# Patient Record
Sex: FEMALE | Race: OTHER | NOT HISPANIC OR LATINO | URBAN - METROPOLITAN AREA
[De-identification: names, ages, dates, MRNs, and addresses within clinical notes are randomized per-mention and may not be internally consistent; named-entity substitution may affect disease eponyms.]

---

## 2017-09-01 ENCOUNTER — OUTPATIENT (OUTPATIENT)
Dept: OUTPATIENT SERVICES | Facility: HOSPITAL | Age: 77
LOS: 1 days | End: 2017-09-01
Payer: MEDICAID

## 2017-09-01 PROCEDURE — G9001: CPT

## 2017-09-17 ENCOUNTER — EMERGENCY (EMERGENCY)
Facility: HOSPITAL | Age: 77
LOS: 1 days | Discharge: HOME | End: 2017-09-17
Attending: EMERGENCY MEDICINE | Admitting: EMERGENCY MEDICINE
Payer: MEDICARE

## 2017-09-17 VITALS
RESPIRATION RATE: 16 BRPM | TEMPERATURE: 98 F | SYSTOLIC BLOOD PRESSURE: 97 MMHG | OXYGEN SATURATION: 100 % | DIASTOLIC BLOOD PRESSURE: 64 MMHG | HEART RATE: 82 BPM

## 2017-09-17 VITALS
TEMPERATURE: 98 F | OXYGEN SATURATION: 98 % | SYSTOLIC BLOOD PRESSURE: 104 MMHG | DIASTOLIC BLOOD PRESSURE: 70 MMHG | HEART RATE: 87 BPM | RESPIRATION RATE: 18 BRPM

## 2017-09-17 PROCEDURE — 99284 EMERGENCY DEPT VISIT MOD MDM: CPT | Mod: 25,GC

## 2017-09-17 RX ORDER — IBUPROFEN 200 MG
600 TABLET ORAL ONCE
Qty: 0 | Refills: 0 | Status: DISCONTINUED | OUTPATIENT
Start: 2017-09-17 | End: 2017-09-17

## 2017-09-17 RX ORDER — ACETAMINOPHEN 500 MG
975 TABLET ORAL ONCE
Qty: 0 | Refills: 0 | Status: COMPLETED | OUTPATIENT
Start: 2017-09-17 | End: 2017-09-17

## 2017-09-17 RX ADMIN — Medication 975 MILLIGRAM(S): at 06:54

## 2017-09-17 RX ADMIN — Medication 975 MILLIGRAM(S): at 06:35

## 2017-09-17 NOTE — ED ADULT NURSE NOTE - PMH
Asthma    Breast carcinoma  Lt side s/p lumpectomy and radiation in 2004  DM (diabetes mellitus)    No pertinent past medical history    Renal mass

## 2017-09-17 NOTE — ED PROVIDER NOTE - MEDICAL DECISION MAKING DETAILS
Chronic arthritic pain, benign exam, pt has no pain at present. Plan - acetaminophen, dc home Chronic arthritic pain, benign exam, pt has no pain at present. Plan - acetaminophen, dc home    Cabot: 77F with OA and chronic ankle and wrist pain, here with her chronic pain.  Did not try any oral medications to treat but states that her pain is now gone.  No trauma.  No F/C/edema.  On exam, looks well, HDS, wrist and knee joints without edema, non tender, no erythema, FROM.  Ok to go home with tylenol for pain.

## 2017-09-17 NOTE — ED PROVIDER NOTE - CARE PLAN
Principal Discharge DX:	Arthralgia, unspecified joint  Instructions for follow-up, activity and diet:	Take all medications as directed.  For pain take Acetaminophen (Tylenol) 250mg-650mg every 6-8 hours.  Follow up with your primary physician in 2 days. If needed call [phone] to find a primary care physician or call  [phone] to schedule an appointment with the general medicine clinic.   You were given copies of all labs and imaging results from this ER visit, please take them to your appointment.  Return to the ER for worsening symptoms or any other concerns.

## 2017-09-17 NOTE — ED PROVIDER NOTE - PROGRESS NOTE DETAILS
Juan C Barton MD [facility] Note: 76 yo woman p/w b/l ankle pain. Has had pain in ankles for a "long time," only using Vicks Vapor Rub for pain. Pain worsened overnight, prompting visit. States she has new doctor, had plain films 2 wks ago. Denies fever, injury. Physical Exam: *Gen: NAD, AOx3; *Eyes: PERRL; *HEENT: Airway patent, MMM; *CV: RRR, S1/S2; *Resp: CTAB, non-labored; *Abd: soft, non tender, no guarding; *: no cva tenderness; *Skin: dry, intact; *Neuro: AOx3, no deficits; *MSK: ankles w/ full rom, no effusion, non tender w/ palpation, normal pedal pulses b/l. A/P: 76 yo woman w/ b/l ankle pain. Likely 2/2 osteoarthritis. Will treat pain, discharge w/ outpt f/u.

## 2017-09-17 NOTE — ED ADULT NURSE REASSESSMENT NOTE - NS ED NURSE REASSESS COMMENT FT1
No acute distress at present. Pt. denies pain at present. Pt. is being discharged. Discharge teaching done by MD Weil.

## 2017-09-17 NOTE — ED PROVIDER NOTE - PLAN OF CARE
Take all medications as directed.  For pain take Acetaminophen (Tylenol) 250mg-650mg every 6-8 hours.  Follow up with your primary physician in 2 days. If needed call [phone] to find a primary care physician or call  [phone] to schedule an appointment with the general medicine clinic.   You were given copies of all labs and imaging results from this ER visit, please take them to your appointment.  Return to the ER for worsening symptoms or any other concerns.

## 2017-09-17 NOTE — ED PROVIDER NOTE - ATTENDING CONTRIBUTION TO CARE
I, Jennifer Cabot, MD, have performed a history and physical exam of the patient and discussed their management with the resident. I reviewed the resident's note and agree with the documented findings and plan of care. My medical decision making and observations are found above.    Cabot: 77F with OA and chronic ankle and wrist pain, here with her chronic pain.  Did not try any oral medications to treat but states that her pain is now gone.  No trauma.  No F/C/edema.  On exam, looks well, HDS, wrist and knee joints without edema, non tender, no erythema, FROM.  Ok to go home with tylenol for pain.

## 2017-09-17 NOTE — ED ADULT TRIAGE NOTE - CHIEF COMPLAINT QUOTE
Patient c/o bilateral ankle pain, and other joint pains, has arthritis. Pain has been for 2-3 weeks, getting worse.

## 2017-09-17 NOTE — ED PROVIDER NOTE - MUSCULOSKELETAL, MLM
No muscle or joint tenderness. Full ROM in wrists, knees, shoulders, ankles, knees, and hips w/o pain.

## 2017-09-17 NOTE — ED ADULT NURSE NOTE - OBJECTIVE STATEMENT
Patient received in room 10. Patient received in room 10. Pt. is A&O x3 and ambulatory with a cane at baseline. Pt. c/o bilateral ankle pain and bilateral wrist pain. Pt. states she has arthritis and the pain began about 3 weeks ago but has been getting worse the past few days. No edema noted. Respirations are even and unlabored on room air. VS as noted. MD at bedside at present. Will continue to monitor.

## 2017-09-17 NOTE — ED PROVIDER NOTE - OBJECTIVE STATEMENT
76 yo F hx osteoarthritis/breast ca/asthma/DM p/w intermittent bilateral wrist and bilateral ankle pain for 3 weeks. Hx similar symptoms for years, previously relieved w/ tylenol. She has not taken tylenol for the current pain because she previous developed "stomach upset" when taking tylenol plus "some additive." Has only attempted topical analgesics w/o relief. No associated symptoms, including no fever/chills/rash/n/v/d.

## 2017-09-21 ENCOUNTER — INPATIENT (INPATIENT)
Facility: HOSPITAL | Age: 77
LOS: 2 days | Discharge: HOME | DRG: 690 | End: 2017-09-24
Attending: HOSPITALIST | Admitting: HOSPITALIST
Payer: COMMERCIAL

## 2017-09-21 VITALS
WEIGHT: 192.9 LBS | TEMPERATURE: 98 F | HEART RATE: 84 BPM | HEIGHT: 65 IN | DIASTOLIC BLOOD PRESSURE: 70 MMHG | RESPIRATION RATE: 17 BRPM | OXYGEN SATURATION: 98 % | SYSTOLIC BLOOD PRESSURE: 101 MMHG

## 2017-09-21 DIAGNOSIS — N17.9 ACUTE KIDNEY FAILURE, UNSPECIFIED: ICD-10-CM

## 2017-09-21 LAB
ALBUMIN SERPL ELPH-MCNC: 3 G/DL — LOW (ref 3.5–5)
ALP SERPL-CCNC: 132 U/L — HIGH (ref 40–120)
ALT FLD-CCNC: 16 U/L DA — SIGNIFICANT CHANGE UP (ref 10–60)
ANION GAP SERPL CALC-SCNC: 10 MMOL/L — SIGNIFICANT CHANGE UP (ref 5–17)
APPEARANCE UR: ABNORMAL
AST SERPL-CCNC: 10 U/L — SIGNIFICANT CHANGE UP (ref 10–40)
BASOPHILS # BLD AUTO: 0.1 K/UL — SIGNIFICANT CHANGE UP (ref 0–0.2)
BASOPHILS NFR BLD AUTO: 0.7 % — SIGNIFICANT CHANGE UP (ref 0–2)
BILIRUB SERPL-MCNC: 0.3 MG/DL — SIGNIFICANT CHANGE UP (ref 0.2–1.2)
BILIRUB UR-MCNC: NEGATIVE — SIGNIFICANT CHANGE UP
BUN SERPL-MCNC: 54 MG/DL — HIGH (ref 7–18)
CALCIUM SERPL-MCNC: 8.8 MG/DL — SIGNIFICANT CHANGE UP (ref 8.4–10.5)
CHLORIDE SERPL-SCNC: 103 MMOL/L — SIGNIFICANT CHANGE UP (ref 96–108)
CO2 SERPL-SCNC: 18 MMOL/L — LOW (ref 22–31)
COLOR SPEC: YELLOW — SIGNIFICANT CHANGE UP
CREAT SERPL-MCNC: 3.48 MG/DL — HIGH (ref 0.5–1.3)
DIFF PNL FLD: ABNORMAL
EOSINOPHIL # BLD AUTO: 0.1 K/UL — SIGNIFICANT CHANGE UP (ref 0–0.5)
EOSINOPHIL NFR BLD AUTO: 0.8 % — SIGNIFICANT CHANGE UP (ref 0–6)
GLUCOSE SERPL-MCNC: 307 MG/DL — HIGH (ref 70–99)
GLUCOSE UR QL: 1000 MG/DL
HCT VFR BLD CALC: 35.8 % — SIGNIFICANT CHANGE UP (ref 34.5–45)
HGB BLD-MCNC: 11.1 G/DL — LOW (ref 11.5–15.5)
KETONES UR-MCNC: NEGATIVE — SIGNIFICANT CHANGE UP
LEUKOCYTE ESTERASE UR-ACNC: ABNORMAL
LYMPHOCYTES # BLD AUTO: 1 K/UL — SIGNIFICANT CHANGE UP (ref 1–3.3)
LYMPHOCYTES # BLD AUTO: 9.5 % — LOW (ref 13–44)
MAGNESIUM SERPL-MCNC: 1.7 MG/DL — SIGNIFICANT CHANGE UP (ref 1.6–2.6)
MCHC RBC-ENTMCNC: 23.4 PG — LOW (ref 27–34)
MCHC RBC-ENTMCNC: 31 GM/DL — LOW (ref 32–36)
MCV RBC AUTO: 75.4 FL — LOW (ref 80–100)
MONOCYTES # BLD AUTO: 0.5 K/UL — SIGNIFICANT CHANGE UP (ref 0–0.9)
MONOCYTES NFR BLD AUTO: 4.5 % — SIGNIFICANT CHANGE UP (ref 2–14)
NEUTROPHILS # BLD AUTO: 9.3 K/UL — HIGH (ref 1.8–7.4)
NEUTROPHILS NFR BLD AUTO: 84.5 % — HIGH (ref 43–77)
NITRITE UR-MCNC: NEGATIVE — SIGNIFICANT CHANGE UP
PH UR: 5 — SIGNIFICANT CHANGE UP (ref 5–8)
PLATELET # BLD AUTO: 255 K/UL — SIGNIFICANT CHANGE UP (ref 150–400)
POTASSIUM SERPL-MCNC: 4.9 MMOL/L — SIGNIFICANT CHANGE UP (ref 3.5–5.3)
POTASSIUM SERPL-SCNC: 4.9 MMOL/L — SIGNIFICANT CHANGE UP (ref 3.5–5.3)
PROT SERPL-MCNC: 7.7 G/DL — SIGNIFICANT CHANGE UP (ref 6–8.3)
PROT UR-MCNC: 500 MG/DL
RBC # BLD: 4.74 M/UL — SIGNIFICANT CHANGE UP (ref 3.8–5.2)
RBC # FLD: 15.8 % — HIGH (ref 10.3–14.5)
SODIUM SERPL-SCNC: 131 MMOL/L — LOW (ref 135–145)
SP GR SPEC: 1.02 — SIGNIFICANT CHANGE UP (ref 1.01–1.02)
UROBILINOGEN FLD QL: NEGATIVE — SIGNIFICANT CHANGE UP
WBC # BLD: 11 K/UL — HIGH (ref 3.8–10.5)
WBC # FLD AUTO: 11 K/UL — HIGH (ref 3.8–10.5)

## 2017-09-21 PROCEDURE — 99285 EMERGENCY DEPT VISIT HI MDM: CPT

## 2017-09-21 RX ORDER — INSULIN LISPRO 100/ML
VIAL (ML) SUBCUTANEOUS
Qty: 0 | Refills: 0 | Status: DISCONTINUED | OUTPATIENT
Start: 2017-09-21 | End: 2017-09-24

## 2017-09-21 RX ORDER — HEPARIN SODIUM 5000 [USP'U]/ML
5000 INJECTION INTRAVENOUS; SUBCUTANEOUS EVERY 12 HOURS
Qty: 0 | Refills: 0 | Status: DISCONTINUED | OUTPATIENT
Start: 2017-09-21 | End: 2017-09-24

## 2017-09-21 RX ORDER — SODIUM CHLORIDE 9 MG/ML
1000 INJECTION INTRAMUSCULAR; INTRAVENOUS; SUBCUTANEOUS ONCE
Qty: 0 | Refills: 0 | Status: COMPLETED | OUTPATIENT
Start: 2017-09-21 | End: 2017-09-21

## 2017-09-21 RX ORDER — DIAZEPAM 5 MG
5 TABLET ORAL ONCE
Qty: 0 | Refills: 0 | Status: DISCONTINUED | OUTPATIENT
Start: 2017-09-21 | End: 2017-09-21

## 2017-09-21 RX ORDER — CEFTRIAXONE 500 MG/1
1 INJECTION, POWDER, FOR SOLUTION INTRAMUSCULAR; INTRAVENOUS ONCE
Qty: 0 | Refills: 0 | Status: COMPLETED | OUTPATIENT
Start: 2017-09-21 | End: 2017-09-21

## 2017-09-21 RX ORDER — CEFTRIAXONE 500 MG/1
1 INJECTION, POWDER, FOR SOLUTION INTRAMUSCULAR; INTRAVENOUS EVERY 24 HOURS
Qty: 0 | Refills: 0 | Status: DISCONTINUED | OUTPATIENT
Start: 2017-09-21 | End: 2017-09-24

## 2017-09-21 RX ORDER — INSULIN LISPRO 100/ML
6 VIAL (ML) SUBCUTANEOUS ONCE
Qty: 0 | Refills: 0 | Status: COMPLETED | OUTPATIENT
Start: 2017-09-21 | End: 2017-09-21

## 2017-09-21 RX ADMIN — Medication 6 UNIT(S): at 20:56

## 2017-09-21 RX ADMIN — Medication 5 MILLIGRAM(S): at 20:55

## 2017-09-21 RX ADMIN — CEFTRIAXONE 100 GRAM(S): 500 INJECTION, POWDER, FOR SOLUTION INTRAMUSCULAR; INTRAVENOUS at 23:03

## 2017-09-21 RX ADMIN — SODIUM CHLORIDE 1000 MILLILITER(S): 9 INJECTION INTRAMUSCULAR; INTRAVENOUS; SUBCUTANEOUS at 23:03

## 2017-09-21 RX ADMIN — SODIUM CHLORIDE 1000 MILLILITER(S): 9 INJECTION INTRAMUSCULAR; INTRAVENOUS; SUBCUTANEOUS at 21:42

## 2017-09-21 NOTE — H&P ADULT - PROBLEM SELECTOR PLAN 2
patient has positive UA with >50 WBC and moderate LE.  s/p 1 dose of rocephin in ED  will start on Iv rocephin 1 gm Q24  f/u urine cultures pain and stiffness of both upper extremity and lower extremity especially in fingers and toes, likely secondary to uncontrolled blood sugars causing Diabetic neuropathy, possible hypocalcemic  induced stiffness , possible vitamin deficiency.  -O/e- neurological exam is intact, no Chvostek sign or trousseau sign , but while examining patient has about 2-3 episodes of stiffness of both thumbs leading to adduction .  EKG-  NSR @83BPM, LAD, bifascicular block,( unchanged from previous EKG) no ST T wave changes.  will give 1 dose of oral calcium carbonate  will start on gabapentin and pain management with tylenol  f/u vitamin b12, folate levels. pain and stiffness of both upper extremity and lower extremity especially in fingers and toes, likely due to osteoarthritis.  Also possibly related to diabetic neuropathy, possible hypocalcemic  induced stiffness , possible vitamin deficiency.  -O/e- neurological exam is intact, no Chvostek sign or trousseau sign , but while examining patient has about 2-3 episodes of stiffness of both thumbs leading to adduction .  EKG-  NSR @83BPM, LAD, bifascicular block,( unchanged from previous EKG) no ST T wave changes.  will give 1 dose of oral calcium carbonate  will start on gabapentin and pain control with Tylenol  f/u vitamin b12, folate levels.

## 2017-09-21 NOTE — H&P ADULT - PROBLEM SELECTOR PLAN 5
s/p left mastectomy and radiation  currently in remission.  needs outpatient oncologist follow up s/p left lumpectomy and radiation  currently in remission.  needs outpatient oncologist follow up

## 2017-09-21 NOTE — H&P ADULT - HISTORY OF PRESENT ILLNESS
77 Female, from home, lives with spouse, ambulates with the help of walker,  PMH of Asthma, anemia( on weekly ? procrit), CHF(last echo in 2015 s/o preserved EF), DM, CKD (unknown baseline) HTN( not on meds as BP running low) breast ca s/p left mastectomy, radiation in 2005, ? uterine cancer presented to ED with c/o pain and stiffness in both fingers and toes  worsening since last week. patient was well until sunday when she started to have pain in all her fingers and toes with stiffness, baseline she has this pain, but not so severe, went to [facility], who d/c her on tylenol #3, later today in evening again she started having pain in both fingers and toes, which was so bad, she started crying, 9/10 intensity, crampy, "pulling kind of pain", associated with stiffness of fingers, little relief with massage, no aggravating factors, radiating to neck and shoulders, unable to ambulate as earlier or doing daily activities due to pain. patient was on calcium supplement but stopped taking it for last few days. patient denies any joint stiffness, morning stiffness, rash, joint pains, numbness, tingling of extremities. patient is concerned of stroke, so decided to come to ED. Also she had lab work done yesterday, which showed that she has UTI, PCP called her to collect antibiotic, awaiting delivery to home.  PSH- s/p left mastectomy 2005  allergies- NKDA  FH- breast cancer in aunt, and DM in father  social Hx- exsmoker, smoked 2 PPD for 30 years, quit 7 years ago, ex ethanolic, no illicit drug use.    In ED, patient had stable vitals, EKG- NSR @83BPM, LAD, bifascicular block,( unchanged from previous EKG) no ST T wave changes. UA- >50 WBC, moderate LE, Labs pertinent for WBC- 11, h/h-11.1/ 35, corrected sodium of 134, glucose of 307, anion gap of 10, bun/ creat- 54/3.48, alk phos-132 , s/p 1 dose of rocephin, 2 L NS bolus, 8 units of humalog, 5 mg of valium in ED      will admit to medicine for evaluation of pain and stiffness in extremities. 77 Female, from home, lives with spouse, ambulates with the help of walker,  PMH of Asthma, anemia( on weekly ? procrit), CHF(last echo in 2015 s/o preserved EF), DM, CKD (unknown baseline) HTN( not on meds as BP running low) breast ca s/p left mastectomy, radiation in 2005, ? uterine cancer presented to ED with c/o pain and stiffness in both upper and lower extremities especially in fingers and toes  worsening since last week. patient was well until sunday when she started to have pain in all her fingers and toes with stiffness, baseline she has this pain, but not so severe, went to [facility], who d/c her on tylenol #3, later today in evening again she started having pain in both fingers and toes, which was so bad, she started crying, 9/10 intensity, crampy, "pulling kind of pain", associated with stiffness of fingers, little relief with massage, no aggravating factors, radiating to neck and shoulders, unable to ambulate as earlier or doing daily activities due to pain. patient was on calcium supplement but stopped taking it for last few days. patient denies any joint stiffness, morning stiffness, rash, joint pains, numbness, tingling of extremities. patient is concerned of stroke, so decided to come to ED. Also she had lab work done yesterday, which showed that she has UTI, PCP called her to collect antibiotic, awaiting delivery to home.  PSH- s/p left mastectomy 2005  allergies- NKDA  FH- breast cancer in aunt, and DM in father  social Hx- exsmoker, smoked 2 PPD for 30 years, quit 7 years ago, ex ethanolic, no illicit drug use.    In ED, patient had stable vitals, EKG- NSR @83BPM, LAD, bifascicular block,( unchanged from previous EKG) no ST T wave changes. UA- >50 WBC, moderate LE, Labs pertinent for WBC- 11, h/h-11.1/ 35, corrected sodium of 134, glucose of 307, anion gap of 10, bun/ creat- 54/3.48, alk phos-132 , s/p 1 dose of rocephin, 2 L NS bolus, 8 units of humalog, 5 mg of valium in ED      will admit to medicine for evaluation of pain and stiffness in extremities. 77 Female, from home, lives with spouse, ambulates with the help of walker,  PMH of Asthma, anemia( on weekly ? procrit), CHF(last echo in 2015 s/o preserved EF), DM, CKD (unknown baseline) HTN( not on meds as BP running low) breast ca s/p left lumpectomy, radiation in 2005, ? uterine cancer presented to ED with c/o pain and stiffness in both upper and lower extremities especially in fingers and toes  worsening since last week. patient was well until sunday when she started to have pain in all her fingers and toes with stiffness, baseline she has this pain, but not so severe, went to [facility], who d/c her on tylenol #3, later today in evening again she started having pain in both fingers and toes, which was so bad, she started crying, 9/10 intensity, crampy, "pulling kind of pain", associated with stiffness of fingers, little relief with massage, no aggravating factors, radiating to neck and shoulders, unable to ambulate as earlier or doing daily activities due to pain. patient was on calcium supplement but stopped taking it for last few days. patient denies any joint stiffness, morning stiffness, rash, joint pains, numbness, tingling of extremities. patient is concerned of stroke, so decided to come to ED. Also she had lab work done yesterday, which showed that she has UTI, PCP called her to collect antibiotic, awaiting delivery to home.  PSH- s/p left mastectomy 2005  allergies- NKDA  FH- breast cancer in aunt, and DM in father  social Hx- exsmoker, smoked 2 PPD for 30 years, quit 7 years ago, ex ethanolic, no illicit drug use.    In ED, patient had stable vitals, EKG- NSR @83BPM, LAD, bifascicular block,( unchanged from previous EKG) no ST T wave changes. UA- >50 WBC, moderate LE, Labs pertinent for WBC- 11, h/h-11.1/ 35, corrected sodium of 134, glucose of 307, anion gap of 10, bun/ creat- 54/3.48, alk phos-132 , s/p 1 dose of rocephin, 2 L NS bolus, 8 units of humalog, 5 mg of valium in ED      will admit to medicine for evaluation of pain and stiffness in extremities. 77 Female, from home, lives with spouse, ambulates with the help of walker,  PMH of Asthma, anemia( on weekly ? procrit), CHF(last echo in 2015 s/o preserved EF), DM, CKD (unknown baseline) HTN( not on meds as BP running low) breast ca s/p left lumpectomy, radiation in 2005, ? uterine cancer presented to ED with c/o pain and stiffness in both upper and lower extremities especially in fingers and toes  worsening since last week. patient was well until sunday when she started to have pain in all her fingers and toes with stiffness, baseline she has this pain, but not so severe, went to [facility], who d/c her on tylenol #3, later today in evening again she started having pain in both fingers and toes, which was so bad, she started crying, 9/10 intensity, crampy, "pulling kind of pain", associated with stiffness of fingers, little relief with massage, no aggravating factors, radiating to neck and shoulders, unable to ambulate as earlier or doing daily activities due to pain. patient was on calcium supplement but stopped taking it for last few days. patient denies any joint stiffness, morning stiffness, rash, joint pains, numbness, tingling of extremities. patient is concerned of stroke, so decided to come to ED. Also she had lab work done yesterday, which showed that she has UTI, PCP called her to collect antibiotic, awaiting delivery to home.  PSH- s/p left lumpectomy  2005  allergies- NKDA  FH- breast cancer in aunt, and DM in father  social Hx- exsmoker, smoked 2 PPD for 30 years, quit 7 years ago, ex ethanolic, no illicit drug use.    In ED, patient had stable vitals, EKG- NSR @83BPM, LAD, bifascicular block,( unchanged from previous EKG) no ST T wave changes. UA- >50 WBC, moderate LE, Labs pertinent for WBC- 11, h/h-11.1/ 35, corrected sodium of 134, glucose of 307, anion gap of 10, bun/ creat- 54/3.48, alk phos-132 , s/p 1 dose of rocephin, 2 L NS bolus, 8 units of humalog, 5 mg of valium in ED      will admit to medicine for evaluation of pain and stiffness in extremities. 77 Female, from home, lives with spouse, ambulates with the help of walker,  PMH of Asthma, anemia( on weekly procrit), CHF(last echo in 2015 s/o preserved EF), DM, CKD (unknown baseline) HTN( not on meds as BP running low) breast ca s/p left lumpectomy, radiation in 2005, questionable uterine cancer presented to ED with c/o pain and stiffness in both upper and lower extremities especially in fingers and toes  worsening since last week. patient was well until sunday when she started to have pain in all her fingers and toes with stiffness, baseline she has this pain, but not so severe, went to [facility], who d/c her on tylenol #3, later today in evening again she started having pain in both fingers and toes, which was so bad, she started crying, 9/10 intensity, crampy, "pulling kind of pain", associated with stiffness of fingers, little relief with massage, no aggravating factors, radiating to neck and shoulders, unable to ambulate as earlier or doing daily activities due to pain. patient was on calcium supplement but stopped taking it for last few days. patient denies any joint stiffness, morning stiffness, rash, joint pains, numbness, tingling of extremities. patient is concerned of stroke, so decided to come to ED. Also she had lab work done yesterday, which showed that she has UTI, PCP called her to collect antibiotic, awaiting delivery to home.  PSH- s/p left lumpectomy  2005  allergies- NKDA  FH- breast cancer in aunt, and DM in father  social Hx- exsmoker, smoked 2 PPD for 30 years, quit 7 years ago, ex ethanolic, no illicit drug use.    In ED, patient had stable vitals, EKG- NSR @83BPM, LAD, bifascicular block,( unchanged from previous EKG) no ST T wave changes. UA- >50 WBC, moderate LE, Labs pertinent for WBC- 11, h/h-11.1/ 35, corrected sodium of 134, glucose of 307, anion gap of 10, bun/ creat- 54/3.48, alk phos-132 , s/p 1 dose of rocephin, 2 L NS bolus, 8 units of humalog, 5 mg of valium in ED      will admit to medicine for evaluation of pain and stiffness in extremities.

## 2017-09-21 NOTE — ED PROVIDER NOTE - MEDICAL DECISION MAKING DETAILS
77 yr old female with hx of osteoarthritis/breast ca/asthma/DM and renal impairment? presents to ed c/o cramping at wrist and feet x 3 wks not improving. pt states having sx on and of but decided to come today because its getting worse.  no fever, no trauma, no n/v, no dysuria, no diarrhea.  pt tried otc meds without relieve.   in ed fs 335.    pt with muscle cramps r/o electrolyte imbalance (hyperK, hypocalcium, 77 yr old female with hx of osteoarthritis/breast ca/asthma/DM and renal impairment? presents to ed c/o cramping at wrist and feet x 3 wks not improving. pt states having sx on and of but decided to come today because its getting worse.  no fever, no trauma, no n/v, no dysuria, no diarrhea.  pt tried otc meds without relieve.   in ed fs 335.    pt with muscle cramps r/o electrolyte imbalance (hyperK, hypocalcium,) vs neuropathy vs arthritis.  will obtain basic labs, ua, give valium then re-assess

## 2017-09-21 NOTE — H&P ADULT - ATTENDING COMMENTS
Patient was seen and examined by myself with team. Case was discussed with house staff in details.  76 y/o F with h/o CKD 4 presents with generalized weakness and joint aches and pains. Patient was seen and examined by myself with team. Case was discussed with house staff in details.  76 y/o F with h/o CKD 4 presents with generalized weakness and joint aches and pains.  She is found to have abnormal urinalysis with possible UTI.- continue with antibiotics; follow up urine culture  - Pulmonary congestion on CXR with very mild SOB- avoid IV fluids; consider diuretics; monitor renal functions  - CKD 4- avoid nephrotoxins; monitor renal functions daily  - Presumed osteoarthritis with multiple joint pains- Tylenol and oral opioids PRN for pain; avoid NSAIDS due to renal dysfunction.  Physical therapy as tolerated.  - Relative hypotension- monitor BP; currently asymptomatic.  DISPO- discharge planning with oput patient follow up Patient was seen and examined by myself with team on September 22nd. Case was discussed with house staff in details.  78 y/o F with h/o CKD 4 presents with generalized weakness and joint aches and pains.  She is found to have abnormal urinalysis with possible UTI.- continue with antibiotics; follow up urine culture  - Pulmonary congestion on CXR with very mild SOB- avoid IV fluids; consider diuretics; monitor renal functions  - CKD 4- avoid nephrotoxins; monitor renal functions daily  - Presumed osteoarthritis with multiple joint pains- Tylenol and oral opioids PRN for pain; avoid NSAIDS due to renal dysfunction.  Physical therapy as tolerated.  - Relative hypotension- monitor BP; currently asymptomatic.  DISPO- discharge planning with outpatient follow up Patient was seen and examined by myself with team on September 22nd. Case was discussed with house staff in details.  76 y/o F with h/o CKD 4 presents with generalized weakness and joint aches and pains.  She is found to have abnormal urinalysis with possible UTI.- continue with antibiotics; follow up urine culture  - Pulmonary congestion on CXR with very mild SOB- avoid IV fluids; consider diuretics; monitor renal functions  - CKD 4- avoid nephrotoxins; monitor renal functions daily  - Presumed osteoarthritis with multiple joint pains- Tylenol and oral opioids PRN for pain; avoid NSAIDS due to renal dysfunction.  Physical therapy as tolerated.  - Relative hypotension- monitor BP; currently asymptomatic.  - Chronic Diastolic heart failure - not in overt heart failure but CXR evidence of mild congestion. - close monitoring.  DISPO- discharge planning with outpatient follow up Patient was seen and examined by myself with team on September 22nd. Case was discussed with house staff in details.  76 y/o F with h/o CKD 4 presents with generalized weakness and joint aches and pains.  She is found to have abnormal urinalysis with possible UTI.- continue with antibiotics; follow up urine culture  - Pulmonary congestion on CXR with very mild SOB- avoid IV fluids; consider diuretics; monitor renal functions  - CKD 4- avoid nephrotoxins; monitor renal functions daily  - Presumed osteoarthritis with multiple joint pains- Tylenol and oral opioids PRN for pain; avoid NSAIDS due to renal dysfunction.  Physical therapy as tolerated.  - Relative hypotension- monitor BP; currently asymptomatic.  - Chronic Diastolic heart failure - not in overt heart failure but CXR evidence of mild congestion. - close monitoring.  DISPO- discharge planning with outpatient follow up.  Plan discussed with patient

## 2017-09-21 NOTE — ED PROVIDER NOTE - CONSTITUTIONAL, MLM
normal... Well appearing, well nourished, awake, alert, oriented to person, place, time/situation and in no apparent distress. obese

## 2017-09-21 NOTE — ED ADULT NURSE NOTE - ED STAT RN HANDOFF DETAILS 2
received  pt.in bed  at  2330 from rn ng. pt.is awake and  responsive,.denies pain..endorsed to lamberto pappas

## 2017-09-21 NOTE — H&P ADULT - MUSCULOSKELETAL
detailed exam normal strength/no joint swelling/no joint erythema/no calf tenderness/ROM intact/no joint warmth

## 2017-09-21 NOTE — H&P ADULT - PROBLEM SELECTOR PLAN 6
patient has baseline anemia of 9-10 based on previous records  H/h on admission is 11.1/35  on weekly injections of procrit   likely multiple etiology- AOCD secondary to CKD, iron deficiency- hypochromic.  f/u anemia panel.  f/u CBC in AM

## 2017-09-21 NOTE — H&P ADULT - NSHPSOCIALHISTORY_GEN_ALL_CORE
exsmoker, smoked 2 PPD for 30 years, quit 7 years ago, ex ethanolic, no illicit drug use ex smoker, smoked 2 PPD for 30 years, quit 7 years ago, ex ethanolic, no illicit drug use

## 2017-09-21 NOTE — H&P ADULT - PROBLEM SELECTOR PLAN 7
not in exacerbation  aptinet takes Advair and tiotropium inhalers at home  will continue home dose of advair and tiotropium inhalers not in exacerbation  patient takes Advair and tiotropium inhalers at home  will continue home dose of advair and tiotropium inhalers

## 2017-09-21 NOTE — H&P ADULT - NSHPLABSRESULTS_GEN_ALL_CORE
ICU Vital Signs Last 24 Hrs  T(C): 36.9 (21 Sep 2017 23:58), Max: 36.9 (21 Sep 2017 23:58)  T(F): 98.4 (21 Sep 2017 23:58), Max: 98.4 (21 Sep 2017 23:58)  HR: 76 (21 Sep 2017 23:58) (76 - 84)  BP: 101/57 (21 Sep 2017 23:58) (101/57 - 101/70)  BP(mean): --  ABP: --  ABP(mean): --  RR: 16 (21 Sep 2017 23:58) (16 - 17)  SpO2: 99% (21 Sep 2017 23:58) (98% - 99%)      LABS                    11.1   11.0  )-----------( 255      ( 21 Sep 2017 21:21 )             35.8         131<L>  |  103  |  54<H>  ----------------------------<  307<H>  4.9   |  18<L>  |  3.48<H>    Ca    8.8      21 Sep 2017 21:21  Mg     1.7         TPro  7.7  /  Alb  3.0<L>  /  TBili  0.3  /  DBili  x   /  AST  10  /  ALT  16  /  AlkPhos  132<H>          Urinalysis Basic - ( 21 Sep 2017 21:58 )    Color: Yellow / Appearance: x / S.020 / pH: x  Gluc: x / Ketone: Negative  / Bili: Negative / Urobili: Negative   Blood: x / Protein: 500 mg/dL / Nitrite: Negative   Leuk Esterase: Moderate / RBC: 0-2 /HPF / WBC >50 /HPF   Sq Epi: x / Non Sq Epi: Few / Bacteria: Many /HPF    CAPILLARY BLOOD GLUCOSE  243 (21 Sep 2017 23:57)    EKG- NSR @83BPM, LAD, bifascicular block,( unchanged from previous EKG) no ST T wave changes.

## 2017-09-21 NOTE — ED ADULT NURSE NOTE - OBJECTIVE STATEMENT
Pt is co bilateral hand cramps, with high glucose in triage. denies chest pain, sob, nausea and vomiting.

## 2017-09-21 NOTE — H&P ADULT - NSHPREVIEWOFSYSTEMS_GEN_ALL_CORE
ROS negative for fever, SOB, chest pain, cough, rash, headache, dizziness, diaphoresis, palpitations, abdominal pain, numbness, tingling of extremities, diarrhea, constipation, urinary disturbances.

## 2017-09-21 NOTE — H&P ADULT - PROBLEM SELECTOR PLAN 4
patient takes  lantus 40 units twice daily and humalog 20 units twice daily at home, will start on HSS and continue home dose of lantus and start on humalog 10 units premeals thrice daily  monitor accuchecks.  f/u a1c patient takes  Lantus 40 units twice daily and Humalog 20 units twice daily at home, will start on HSS and continue home dose of Lantus and start on Humalog 10 units pre meals thrice daily  monitor accuchecks.  f/u a1c

## 2017-09-21 NOTE — ED PROVIDER NOTE - PROGRESS NOTE DETAILS
Cornelius: pt ua shows uti and renal function worsen compare to results from 2 yr ago.  pt possibly has underlying ckd since she is seeing Dr Raza Cornelius: pt ua shows uti and renal function worsen compare to results from 2 yr ago.  pt possibly has underlying ckd since she is seeing Dr Castanon.  Lab shows normal K and Calcium.  unlikely lytes imbalance causing sx.  admit to medicine for ckd with uti.  hydrate appropriately.

## 2017-09-21 NOTE — H&P ADULT - NEUROLOGICAL DETAILS
alert and oriented x 3/sensation intact/deep reflexes intact/cranial nerves intact/normal strength/superficial reflexes intact

## 2017-09-21 NOTE — ED PROVIDER NOTE - OBJECTIVE STATEMENT
77 yr old female with hx of osteoarthritis/breast ca/asthma/DM and renal impairment? presents to ed c/o cramping at wrist and feet x 3 wks not improving. pt states having sx on and of but decided to come today because its getting worse.  no fever, no trauma, no n/v, no dysuria, no diarrhea.  pt tried otc meds without relieve.   in ed fs 335.

## 2017-09-21 NOTE — ED PROVIDER NOTE - CARE PLAN
Principal Discharge DX:	FLYNN (acute kidney injury)  Secondary Diagnosis:	Urinary tract infection with hematuria, site unspecified

## 2017-09-21 NOTE — ED PROVIDER NOTE - FAMILY HISTORY
Aunt  Still living? Unknown  Family history of breast cancer, Age at diagnosis: Age Unknown     Father  Still living? Unknown  Diabetes mellitus, Age at diagnosis: Age Unknown

## 2017-09-21 NOTE — H&P ADULT - PROBLEM SELECTOR PLAN 1
pain and stiffness of both upper extremity and lower extremity especially in fingers and toes, likely secondary to uncontrolled blood sugars causing Diabetic neuropathy vs hypocalcemic  induced stiffness vs vitamin deficiency.  -O/e- neurological exam is intact, no chvostek sign or trousseau sign , but while examining patient has about 2-3 episodes of stiffness of both thumbs leading to adduction .  EKG-  NSR @83BPM, LAD, bifascicular block,( unchanged from previous EKG) no ST T wave changes.  will give 1 dose of oral calcium carbonate  will start on gabapentin and pain management with tylenol  f/u vitamin b12, folate levels. patient has positive UA with >50 WBC and moderate LE.  s/p 1 dose of rocephin in ED  will start on Iv rocephin 1 gm Q24  f/u urine cultures patient has positive UA with >50 WBC and moderate LE.  s/p 1 dose of Rocephin in ED  will start on Iv Rocephin 1 gm Q24  f/u urine cultures

## 2017-09-21 NOTE — H&P ADULT - PROBLEM SELECTOR PLAN 3
patient has CKD stage iV with baseline creatinine of 1.9 to 2.0 from previous records  patient has creatinine of 3.48 on admission   will get urine lytes and avoid nephrotoxic medications.  f/u BMP in AM.  Dr. Castanon outpatient nephrologist  nephrology consult- DR. Sorenson patient has CKD stage iV with baseline creatinine of 1.9 to 2.0 from previous records  patient has creatinine of 3.48 on admission  FENA- 2.6%   will avoid nephrotoxic medications.  f/u BMP in AM.  Dr. Castanon outpatient nephrologist  nephrology consult- DR. Sorenson patient has CKD stage IV with baseline creatinine of  2.0- 3.0  from previous records and per nephrologist Dr Castanon  patient has creatinine of 3.48 on admission  FENA- 2.6%   will avoid nephrotoxic medications.  f/u BMP in AM.  Dr. Castanon outpatient nephrologist  nephrology consult- DR. Sorenson

## 2017-09-21 NOTE — H&P ADULT - ASSESSMENT
77 Female, from home, lives with spouse, ambulates with the help of walker,  PMH of Asthma, anemia( on weekly ? procrit), CHF(last echo in 2015 s/o preserved EF), DM, CKD (unknown baseline) HTN( not on meds as BP running low) breast ca s/p left mastectomy, radiation in 2005, ? uterine cancer presented to ED with c/o pain and stiffness in both fingers and toes  worsening since last week.     In ED, patient had stable vitals, EKG- NSR @83BPM, LAD, bifascicular block,( unchanged from previous EKG) no ST T wave changes. UA- >50 WBC, moderate LE, Labs pertinent for WBC- 11, h/h-11.1/ 35, corrected sodium of 134, glucose of 307, anion gap of 10, bun/ creat- 54/3.48, alk phos-132 , s/p 1 dose of rocephin, 2 L NS bolus, 8 units of humalog, 5 mg of valium in ED      will admit to medicine for evaluation of pain and stiffness in extremities. 77 Female, from home, lives with spouse, ambulates with the help of walker,  PMH of Asthma, anemia( on weekly ? procrit), CHF(last echo in 2015 s/o preserved EF), DM, CKD (unknown baseline) HTN( not on meds as BP running low) breast ca s/p left mastectomy, radiation in 2005, ? uterine cancer presented to ED with c/o pain and stiffness of  upper and lower extremities especially in both fingers and toes  worsening since last week.     In ED, patient had stable vitals, EKG- NSR @83BPM, LAD, bifascicular block,( unchanged from previous EKG) no ST T wave changes. UA- >50 WBC, moderate LE, Labs pertinent for WBC- 11, h/h-11.1/ 35, corrected sodium of 134, glucose of 307, anion gap of 10, bun/ creat- 54/3.48, alk phos-132 , s/p 1 dose of rocephin, 2 L NS bolus, 8 units of humalog, 5 mg of valium in ED      will admit to medicine for evaluation of pain and stiffness in extremities. 77 Female, from home, lives with spouse, ambulates with the help of walker,  PMH of Asthma, anemia( on weekly ? procrit), CHF(last echo in 2015 s/o preserved EF), DM, CKD (unknown baseline) HTN( not on meds as BP running low) breast ca s/p left lumpectomy, radiation in 2005, ? uterine cancer presented to ED with c/o pain and stiffness of  upper and lower extremities especially in both fingers and toes  worsening since last week.     In ED, patient had stable vitals, EKG- NSR @83BPM, LAD, bifascicular block,( unchanged from previous EKG) no ST T wave changes. UA- >50 WBC, moderate LE, Labs pertinent for WBC- 11, h/h-11.1/ 35, corrected sodium of 134, glucose of 307, anion gap of 10, bun/ creat- 54/3.48, alk phos-132 , s/p 1 dose of rocephin, 2 L NS bolus, 8 units of humalog, 5 mg of valium in ED      will admit to medicine for evaluation of pain and stiffness in extremities. 77 Female, from home, lives with spouse, ambulates with the help of walker,  PMH of Asthma, anemia( on weekly ? procrit), CHF(last echo in 2015 s/o preserved EF), DM, CKD (unknown baseline) HTN( not on meds as BP running low) breast ca s/p left lumpectomy, radiation in 2005, questionable uterine cancer presented to ED with c/o pain and stiffness of  upper and lower extremities especially in both fingers and toes  worsening since last week.     In ED, patient had stable vitals, EKG- NSR @83BPM, LAD, bifascicular block,( unchanged from previous EKG) no ST T wave changes. UA- >50 WBC, moderate LE, Labs pertinent for WBC- 11, h/h-11.1/ 35, corrected sodium of 134, glucose of 307, anion gap of 10, bun/ creat- 54/3.48, alk phos-132 , s/p 1 dose of rocephin, 2 L NS bolus, 8 units of humalog, 5 mg of valium in ED      will admit to medicine for evaluation of pain and stiffness in extremities and UTI. 77 Female, from home, lives with spouse, ambulates with the help of walker,  PMH of Asthma, anemia(on procrit), CHF(last echo in 2015 s/o preserved EF), DM, CKD (unknown baseline) HTN( not on meds as BP running low) breast ca s/p left lumpectomy, radiation in 2005, questionable uterine cancer presented to ED with c/o pain and stiffness of  upper and lower extremities especially in both fingers and toes  worsening since last week.     In ED, patient had stable vitals, EKG- NSR @83BPM, LAD, bifascicular block,( unchanged from previous EKG) no ST T wave changes. UA- >50 WBC, moderate LE, Labs pertinent for WBC- 11, h/h-11.1/ 35, corrected sodium of 134, glucose of 307, anion gap of 10, bun/ creat- 54/3.48, alk phos-132 , s/p 1 dose of Rocephin 2 L NS bolus, 8 units of Humalog 5 mg of valium in ED      Admitted to medicine for evaluation of pain and stiffness in extremities and UTI.

## 2017-09-21 NOTE — H&P ADULT - REASON FOR ADMISSION
pain and stiffness in both fingers and toes pain and stiffness of upper and lower extremities especially in both fingers and toes

## 2017-09-22 ENCOUNTER — TRANSCRIPTION ENCOUNTER (OUTPATIENT)
Age: 77
End: 2017-09-22

## 2017-09-22 DIAGNOSIS — E78.5 HYPERLIPIDEMIA, UNSPECIFIED: ICD-10-CM

## 2017-09-22 DIAGNOSIS — I50.9 HEART FAILURE, UNSPECIFIED: ICD-10-CM

## 2017-09-22 DIAGNOSIS — N39.0 URINARY TRACT INFECTION, SITE NOT SPECIFIED: ICD-10-CM

## 2017-09-22 DIAGNOSIS — N18.9 CHRONIC KIDNEY DISEASE, UNSPECIFIED: ICD-10-CM

## 2017-09-22 DIAGNOSIS — D64.9 ANEMIA, UNSPECIFIED: ICD-10-CM

## 2017-09-22 DIAGNOSIS — N17.9 ACUTE KIDNEY FAILURE, UNSPECIFIED: ICD-10-CM

## 2017-09-22 DIAGNOSIS — E11.9 TYPE 2 DIABETES MELLITUS WITHOUT COMPLICATIONS: ICD-10-CM

## 2017-09-22 DIAGNOSIS — R69 ILLNESS, UNSPECIFIED: ICD-10-CM

## 2017-09-22 DIAGNOSIS — C50.919 MALIGNANT NEOPLASM OF UNSPECIFIED SITE OF UNSPECIFIED FEMALE BREAST: ICD-10-CM

## 2017-09-22 DIAGNOSIS — Z29.9 ENCOUNTER FOR PROPHYLACTIC MEASURES, UNSPECIFIED: ICD-10-CM

## 2017-09-22 DIAGNOSIS — R52 PAIN, UNSPECIFIED: ICD-10-CM

## 2017-09-22 DIAGNOSIS — J45.909 UNSPECIFIED ASTHMA, UNCOMPLICATED: ICD-10-CM

## 2017-09-22 LAB
24R-OH-CALCIDIOL SERPL-MCNC: 10.6 NG/ML — LOW (ref 30–80)
ANION GAP SERPL CALC-SCNC: 11 MMOL/L — SIGNIFICANT CHANGE UP (ref 5–17)
BUN SERPL-MCNC: 50 MG/DL — HIGH (ref 7–18)
CALCIUM SERPL-MCNC: 8.8 MG/DL — SIGNIFICANT CHANGE UP (ref 8.4–10.5)
CHLORIDE SERPL-SCNC: 112 MMOL/L — HIGH (ref 96–108)
CHOLEST SERPL-MCNC: 251 MG/DL — HIGH (ref 10–199)
CO2 SERPL-SCNC: 18 MMOL/L — LOW (ref 22–31)
CREAT SERPL-MCNC: 3.06 MG/DL — HIGH (ref 0.5–1.3)
FERRITIN SERPL-MCNC: 30 NG/ML — SIGNIFICANT CHANGE UP (ref 15–150)
FOLATE SERPL-MCNC: 8.8 NG/ML — SIGNIFICANT CHANGE UP (ref 4.8–24.2)
GLUCOSE SERPL-MCNC: 186 MG/DL — HIGH (ref 70–99)
HBA1C BLD-MCNC: 13.4 % — HIGH (ref 4–5.6)
HCT VFR BLD CALC: 33 % — LOW (ref 34.5–45)
HDLC SERPL-MCNC: 46 MG/DL — SIGNIFICANT CHANGE UP (ref 40–125)
HGB BLD-MCNC: 10.3 G/DL — LOW (ref 11.5–15.5)
IRON SATN MFR SERPL: 24 UG/DL — LOW (ref 40–150)
IRON SATN MFR SERPL: 9 % — LOW (ref 15–50)
LIPID PNL WITH DIRECT LDL SERPL: 160 MG/DL — SIGNIFICANT CHANGE UP
MAGNESIUM SERPL-MCNC: 1.9 MG/DL — SIGNIFICANT CHANGE UP (ref 1.6–2.6)
MCHC RBC-ENTMCNC: 23.4 PG — LOW (ref 27–34)
MCHC RBC-ENTMCNC: 31.2 GM/DL — LOW (ref 32–36)
MCV RBC AUTO: 75 FL — LOW (ref 80–100)
OSMOLALITY UR: 197 MOS/KG — SIGNIFICANT CHANGE UP (ref 50–1200)
PHOSPHATE SERPL-MCNC: 3.8 MG/DL — SIGNIFICANT CHANGE UP (ref 2.5–4.5)
PLATELET # BLD AUTO: 222 K/UL — SIGNIFICANT CHANGE UP (ref 150–400)
POTASSIUM SERPL-MCNC: 4.3 MMOL/L — SIGNIFICANT CHANGE UP (ref 3.5–5.3)
POTASSIUM SERPL-SCNC: 4.3 MMOL/L — SIGNIFICANT CHANGE UP (ref 3.5–5.3)
POTASSIUM UR-SCNC: 6 MMOL/L — LOW (ref 25–125)
PROT ?TM UR-MCNC: 88 MG/DL — HIGH (ref 0–12)
RBC # BLD: 4.4 M/UL — SIGNIFICANT CHANGE UP (ref 3.8–5.2)
RBC # BLD: 4.42 M/UL — SIGNIFICANT CHANGE UP (ref 3.8–5.2)
RBC # FLD: 15.8 % — HIGH (ref 10.3–14.5)
RETICS #: 80.4 K/UL — SIGNIFICANT CHANGE UP (ref 25–125)
RETICS/RBC NFR: 1.8 % — SIGNIFICANT CHANGE UP (ref 0.5–2.5)
SODIUM SERPL-SCNC: 141 MMOL/L — SIGNIFICANT CHANGE UP (ref 135–145)
SODIUM UR-SCNC: 34 MMOL/L — LOW (ref 40–220)
TIBC SERPL-MCNC: 260 UG/DL — SIGNIFICANT CHANGE UP (ref 250–450)
TOTAL CHOLESTEROL/HDL RATIO MEASUREMENT: 5.5 RATIO — SIGNIFICANT CHANGE UP (ref 3.3–7.1)
TRIGL SERPL-MCNC: 223 MG/DL — HIGH (ref 10–149)
TSH SERPL-MCNC: 0.37 UU/ML — SIGNIFICANT CHANGE UP (ref 0.34–4.82)
UIBC SERPL-MCNC: 236 UG/DL — SIGNIFICANT CHANGE UP (ref 110–370)
VIT B12 SERPL-MCNC: 402 PG/ML — SIGNIFICANT CHANGE UP (ref 243–894)
WBC # BLD: 8.6 K/UL — SIGNIFICANT CHANGE UP (ref 3.8–10.5)
WBC # FLD AUTO: 8.6 K/UL — SIGNIFICANT CHANGE UP (ref 3.8–10.5)

## 2017-09-22 PROCEDURE — 99223 1ST HOSP IP/OBS HIGH 75: CPT | Mod: GC

## 2017-09-22 PROCEDURE — 71010: CPT | Mod: 26

## 2017-09-22 RX ORDER — ACETAMINOPHEN 500 MG
650 TABLET ORAL EVERY 6 HOURS
Qty: 0 | Refills: 0 | Status: DISCONTINUED | OUTPATIENT
Start: 2017-09-22 | End: 2017-09-24

## 2017-09-22 RX ORDER — INSULIN GLARGINE 100 [IU]/ML
40 INJECTION, SOLUTION SUBCUTANEOUS
Qty: 0 | Refills: 0 | Status: DISCONTINUED | OUTPATIENT
Start: 2017-09-22 | End: 2017-09-24

## 2017-09-22 RX ORDER — GABAPENTIN 400 MG/1
1 CAPSULE ORAL
Qty: 60 | Refills: 0 | OUTPATIENT
Start: 2017-09-22 | End: 2017-10-22

## 2017-09-22 RX ORDER — INSULIN LISPRO 100/ML
2 VIAL (ML) SUBCUTANEOUS ONCE
Qty: 0 | Refills: 0 | Status: COMPLETED | OUTPATIENT
Start: 2017-09-22 | End: 2017-09-22

## 2017-09-22 RX ORDER — ALBUTEROL 90 UG/1
2 AEROSOL, METERED ORAL EVERY 8 HOURS
Qty: 0 | Refills: 0 | Status: DISCONTINUED | OUTPATIENT
Start: 2017-09-22 | End: 2017-09-24

## 2017-09-22 RX ORDER — SODIUM CHLORIDE 9 MG/ML
1000 INJECTION INTRAMUSCULAR; INTRAVENOUS; SUBCUTANEOUS
Qty: 0 | Refills: 0 | Status: DISCONTINUED | OUTPATIENT
Start: 2017-09-22 | End: 2017-09-22

## 2017-09-22 RX ORDER — SENNA PLUS 8.6 MG/1
2 TABLET ORAL AT BEDTIME
Qty: 0 | Refills: 0 | Status: DISCONTINUED | OUTPATIENT
Start: 2017-09-22 | End: 2017-09-24

## 2017-09-22 RX ORDER — INSULIN LISPRO 100/ML
10 VIAL (ML) SUBCUTANEOUS
Qty: 0 | Refills: 0 | Status: DISCONTINUED | OUTPATIENT
Start: 2017-09-22 | End: 2017-09-24

## 2017-09-22 RX ORDER — INSULIN LISPRO 100/ML
3 VIAL (ML) SUBCUTANEOUS ONCE
Qty: 0 | Refills: 0 | Status: COMPLETED | OUTPATIENT
Start: 2017-09-22 | End: 2017-09-22

## 2017-09-22 RX ORDER — FUROSEMIDE 40 MG
20 TABLET ORAL DAILY
Qty: 0 | Refills: 0 | Status: DISCONTINUED | OUTPATIENT
Start: 2017-09-22 | End: 2017-09-24

## 2017-09-22 RX ORDER — CALCIUM CARBONATE 500(1250)
1 TABLET ORAL ONCE
Qty: 0 | Refills: 0 | Status: COMPLETED | OUTPATIENT
Start: 2017-09-22 | End: 2017-09-22

## 2017-09-22 RX ORDER — FUROSEMIDE 40 MG
1 TABLET ORAL
Qty: 0 | Refills: 0 | COMMUNITY
Start: 2017-09-22

## 2017-09-22 RX ORDER — TIOTROPIUM BROMIDE 18 UG/1
1 CAPSULE ORAL; RESPIRATORY (INHALATION) DAILY
Qty: 0 | Refills: 0 | Status: DISCONTINUED | OUTPATIENT
Start: 2017-09-22 | End: 2017-09-24

## 2017-09-22 RX ORDER — GABAPENTIN 400 MG/1
1 CAPSULE ORAL
Qty: 0 | Refills: 0 | COMMUNITY
Start: 2017-09-22

## 2017-09-22 RX ORDER — BRIMONIDINE TARTRATE 2 MG/MG
1 SOLUTION/ DROPS OPHTHALMIC
Qty: 0 | Refills: 0 | Status: DISCONTINUED | OUTPATIENT
Start: 2017-09-22 | End: 2017-09-24

## 2017-09-22 RX ORDER — SODIUM CHLORIDE 9 MG/ML
250 INJECTION, SOLUTION INTRAVENOUS
Qty: 0 | Refills: 0 | Status: DISCONTINUED | OUTPATIENT
Start: 2017-09-22 | End: 2017-09-22

## 2017-09-22 RX ORDER — GABAPENTIN 400 MG/1
100 CAPSULE ORAL
Qty: 0 | Refills: 0 | Status: DISCONTINUED | OUTPATIENT
Start: 2017-09-22 | End: 2017-09-24

## 2017-09-22 RX ORDER — TIMOLOL 0.5 %
1 DROPS OPHTHALMIC (EYE)
Qty: 0 | Refills: 0 | Status: DISCONTINUED | OUTPATIENT
Start: 2017-09-22 | End: 2017-09-24

## 2017-09-22 RX ADMIN — HEPARIN SODIUM 5000 UNIT(S): 5000 INJECTION INTRAVENOUS; SUBCUTANEOUS at 06:33

## 2017-09-22 RX ADMIN — CEFTRIAXONE 100 GRAM(S): 500 INJECTION, POWDER, FOR SOLUTION INTRAMUSCULAR; INTRAVENOUS at 22:44

## 2017-09-22 RX ADMIN — GABAPENTIN 100 MILLIGRAM(S): 400 CAPSULE ORAL at 06:33

## 2017-09-22 RX ADMIN — Medication 1 DROP(S): at 18:53

## 2017-09-22 RX ADMIN — BRIMONIDINE TARTRATE 1 DROP(S): 2 SOLUTION/ DROPS OPHTHALMIC at 18:53

## 2017-09-22 RX ADMIN — Medication 10 UNIT(S): at 17:14

## 2017-09-22 RX ADMIN — ALBUTEROL 2 PUFF(S): 90 AEROSOL, METERED ORAL at 22:45

## 2017-09-22 RX ADMIN — ALBUTEROL 2 PUFF(S): 90 AEROSOL, METERED ORAL at 06:49

## 2017-09-22 RX ADMIN — Medication 1 DROP(S): at 06:32

## 2017-09-22 RX ADMIN — Medication 10 UNIT(S): at 08:31

## 2017-09-22 RX ADMIN — GABAPENTIN 100 MILLIGRAM(S): 400 CAPSULE ORAL at 17:16

## 2017-09-22 RX ADMIN — Medication 650 MILLIGRAM(S): at 18:53

## 2017-09-22 RX ADMIN — GABAPENTIN 100 MILLIGRAM(S): 400 CAPSULE ORAL at 00:18

## 2017-09-22 RX ADMIN — Medication 1: at 08:31

## 2017-09-22 RX ADMIN — Medication 1 TABLET(S): at 00:18

## 2017-09-22 RX ADMIN — Medication 2 UNIT(S): at 00:18

## 2017-09-22 RX ADMIN — TIOTROPIUM BROMIDE 1 CAPSULE(S): 18 CAPSULE ORAL; RESPIRATORY (INHALATION) at 12:38

## 2017-09-22 RX ADMIN — Medication 2: at 17:14

## 2017-09-22 RX ADMIN — Medication 2: at 12:36

## 2017-09-22 RX ADMIN — INSULIN GLARGINE 40 UNIT(S): 100 INJECTION, SOLUTION SUBCUTANEOUS at 22:43

## 2017-09-22 RX ADMIN — HEPARIN SODIUM 5000 UNIT(S): 5000 INJECTION INTRAVENOUS; SUBCUTANEOUS at 17:16

## 2017-09-22 RX ADMIN — BRIMONIDINE TARTRATE 1 DROP(S): 2 SOLUTION/ DROPS OPHTHALMIC at 06:32

## 2017-09-22 RX ADMIN — SENNA PLUS 2 TABLET(S): 8.6 TABLET ORAL at 22:44

## 2017-09-22 RX ADMIN — INSULIN GLARGINE 40 UNIT(S): 100 INJECTION, SOLUTION SUBCUTANEOUS at 11:14

## 2017-09-22 RX ADMIN — Medication 3 UNIT(S): at 03:10

## 2017-09-22 RX ADMIN — SODIUM CHLORIDE 50 MILLILITER(S): 9 INJECTION, SOLUTION INTRAVENOUS at 11:14

## 2017-09-22 RX ADMIN — Medication 10 UNIT(S): at 12:37

## 2017-09-22 RX ADMIN — Medication 650 MILLIGRAM(S): at 18:15

## 2017-09-22 NOTE — CONSULT NOTE ADULT - ASSESSMENT
A/P   PT WITH  CKD 4-5 ,  CR IS BACK AT HER BASELINE  HAS NO VOL DEPLETION,  WAS TOLD TO  D/C IV FLUIDS  HB GOOD, GOT JACKELINE LAST WEEK  BP IS AT HER BASELINE, HAD HIGH CR ON  ADMISSION  ON IV ABX FOR UTI   IS GOING HOME TODAY, WILL FOLLOW ME IN OFFICE IN  1-2 WEEKS

## 2017-09-22 NOTE — PROGRESS NOTE ADULT - PROBLEM SELECTOR PLAN 9
IMPROVED VTE score -2 , DVT prophylaxis with heparin SQ not in exacerbation  patient takes Advair and tiotropium inhalers at home  c/w home dose of advair and tiotropium inhalers

## 2017-09-22 NOTE — PROGRESS NOTE ADULT - PROBLEM SELECTOR PLAN 5
s/p left lumpectomy and radiation  currently in remission.  needs outpatient oncologist follow up Pt ASCVD risk score approx 13%  suggested to pt to start with moderate-intensity statin as recommended for >75 years of age, however patient refused at this time, stating previous use of statin did not agree with her and made her feel "not right". Discussed risks and benefits with patient.

## 2017-09-22 NOTE — PROGRESS NOTE ADULT - PROBLEM SELECTOR PLAN 7
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  needs outpatient oncologist follow up

## 2017-09-22 NOTE — CONSULT NOTE ADULT - SUBJECTIVE AND OBJECTIVE BOX
HISTORY OF PRESENT ILLNESS: HPI:  77 Female, HpEF, DM, CKD (unknown baseline) CARDIOMEMS  HTN( not on meds as BP running low) breast ca s/p left lumpectomy, radiation in 2005, questionable uterine cancer presented to ED with c/o pain and stiffness in both upper and lower extremities especially in fingers and toes  worsening since last week. patient was well until sunday when she started to have pain in all her fingers and toes with stiffness, baseline she has this pain, but not so severe, [facility], who d/c her on tylenol #3, later today in evening again she started having pain in both fingers and toes, which was so bad, she started crying, 9/10 intensity, crampy, "pulling kind of pain", associated with stiffness of fingers, little relief with massage, no aggravating factors, radiating to neck and shoulders, unable to ambulate as earlier or doing daily activities due to pain. patient was on calcium supplement but stopped taking it for last few days. patient denies any joint stiffness, morning stiffness, rash, joint pains, numbness, tingling of extremities. patient is concerned of stroke, so decided to come to ED. Also she had lab work done yesterday, which showed that she has UTI, PCP called her to collect antibiotic, awaiting delivery to home.  PSH- s/p left lumpectomy  2005  allergies- NKDA  FH- breast cancer in aunt, and DM in father  social Hx- exsmoker, smoked 2 PPD for 30 years, quit 7 years ago, ex ethanolic, no illicit drug use.    In ED, patient had stable vitals, EKG- NSR @83BPM, LAD, bifascicular block,( unchanged from previous EKG) no ST T wave changes. UA- >50 WBC, moderate LE, Labs pertinent for WBC- 11, h/h-11.1/ 35, corrected sodium of 134, glucose of 307, anion gap of 10, bun/ creat- 54/3.48, alk phos-132 , s/p 1 dose of rocephin, 2 L NS bolus, 8 units of humalog, 5 mg of valium in ED      will admit to medicine for evaluation of pain and stiffness in extremities. (21 Sep 2017 23:24)      PAST MEDICAL & SURGICAL HISTORY:  No pertinent past medical history  Breast carcinoma: Lt side s/p lumpectomy and radiation in 2004  Renal mass  DM (diabetes mellitus)  Asthma  S/P lumpectomy, left breast: 2004          MEDICATIONS:  MEDICATIONS  (STANDING):  cefTRIAXone   IVPB 1 Gram(s) IV Intermittent every 24 hours  insulin lispro (HumaLOG) corrective regimen sliding scale   SubCutaneous three times a day before meals  heparin  Injectable 5000 Unit(s) SubCutaneous every 12 hours  insulin glargine Injectable (LANTUS) 40 Unit(s) SubCutaneous two times a day  ALBUTerol    90 MICROgram(s) HFA Inhaler 2 Puff(s) Inhalation every 8 hours  tiotropium 18 MICROgram(s) Capsule 1 Capsule(s) Inhalation daily  brimonidine 0.2% Ophthalmic Solution 1 Drop(s) Both EYES two times a day  timolol 0.5% Solution 1 Drop(s) Both EYES two times a day  insulin lispro Injectable (HumaLOG) 10 Unit(s) SubCutaneous three times a day before meals  gabapentin 100 milliGRAM(s) Oral two times a day      Allergies    No Known Allergies    Intolerances        FAMILY HISTORY:  Diabetes mellitus (Father)  Family history of breast cancer (Aunt)    Non-contributary for premature coronary disease or sudden cardiac death    SOCIAL HISTORY:    [X] Non-smoker  [ ] Smoker  [ ] Alcohol      REVIEW OF SYSTEMS:  [ ]chest pain  [  ]shortness of breath  [  ]palpitations  [  ]syncope  [ ]near syncope [ ]upper extremity weakness   [ ] lower extremity weakness  [  ]diplopia  [  ]altered mental status   [  ]fevers  [ ]chills [ ]nausea  [ ]vomitting  [  ]dysphagia    [ ]abdominal pain  [ ]melena  [ ]BRBPR    [  ]epistaxis  [  ]rash    [ ]lower extremity edema        [ ] All others negative	  [ ] Unable to obtain    PHYSICAL EXAM:  T(C): 37.1 (09-22-17 @ 15:49), Max: 37.1 (09-22-17 @ 01:10)  HR: 106 (09-22-17 @ 15:49) (76 - 106)  BP: 103/61 (09-22-17 @ 15:49) (101/57 - 141/87)  RR: 20 (09-22-17 @ 15:49) (16 - 20)  SpO2: 97% (09-22-17 @ 15:49) (96% - 100%)  Wt(kg): --  I&O's Summary        HEENT:   Normal oral mucosa, PERRL, EOMI	  Lymphatic: No obvious lymphadenopathy , no edema  Cardiovascular: Normal S1 S2, No JVD,  1/6 HODA murmur , Peripheral pulses palpable 2+ bilaterally  Respiratory: Lungs clear to auscultation, normal effort 	  Gastrointestinal:  Soft, Non-tender, + BS	  Skin: No rashes, No cyanosis, warm to touch  Musculoskeletal: Normal range of motion, normal strength  Psychiatry:  Appropriate Mood & affect     TELEMETRY: 	    ECG:  	Sinus 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, LAFB  RADIOLOGY:      CXR: mild vascular congestion      	  LABS:	 	    CARDIAC MARKERS:                              10.3   8.6   )-----------( 222      ( 22 Sep 2017 07:00 )             33.0     Hb Trend: 10.3<--, 11.1<--    09-22    141  |  112<H>  |  50<H>  ----------------------------<  186<H>  4.3   |  18<L>  |  3.06<H>    Ca    8.8      22 Sep 2017 07:00  Phos  3.8     09-22  Mg     1.9     09-22    TPro  7.7  /  Alb  3.0<L>  /  TBili  0.3  /  DBili  x   /  AST  10  /  ALT  16  /  AlkPhos  132<H>  09-21    Creatinine Trend: 3.06<--, 3.48<--    HgA1c: Hemoglobin A1C, Whole Blood: 13.4 % (09-22 @ 09:40)    TSH: Thyroid Stimulating Hormone, Serum: 0.37 uU/mL (09-22 @ 07:00)      ASSESSMENT/PLAN: 	77y Female HpEF, DM, CKD (unknown baseline) CARDIOMEMS  HTN( not on meds as BP running low) breast ca s/p left lumpectomy, radiation in 2005, questionable uterine cancer presented to ED with c/o pain and stiffness in both upper and lower extremities.    - Patient appears well compensated from CHF prospective  - Cont home dose of lasix  - Stressed compliance with transmitting cardiomems data    I once again thank you for allowing me to participate in the care of our mutual patient.  If you have any questions or concerns please do not hesitate to contact me.    Kyler Jaimes MD, FACC  [facility], [facility]  [address] [place] [address]  PHONE:  [phone]  BEEPER : [phone]
Patient is a 77y Female whom presented to the hospital with GEN  ACHES AND PAIN   KNOWN  FROM OFFICE ,  WAS BEING FOLLOWED FOR CKD 4-5, BASELINE CR 2.8-3.2  HAS DMII  WITH  NEPHROPATHY AND CKD, HAS RENAL CYST  BP LOW LATELY, OFF BP  MEDS   , ANEMIA ON JACKELINE AS OUT PT, BR ASTHMA,  H/O  BREAST CA IN   THE PAST     PAST MEDICAL & SURGICAL HISTORY:  No pertinent past medical history  Breast carcinoma: Lt side s/p lumpectomy and radiation in   Renal mass  DM (diabetes mellitus)  Asthma  S/P lumpectomy, left breast:     No Known Allergies    Home Medications Reviewed  Hospital Medications:   MEDICATIONS  (STANDING):  cefTRIAXone   IVPB 1 Gram(s) IV Intermittent every 24 hours  insulin lispro (HumaLOG) corrective regimen sliding scale   SubCutaneous three times a day before meals  heparin  Injectable 5000 Unit(s) SubCutaneous every 12 hours  insulin glargine Injectable (LANTUS) 40 Unit(s) SubCutaneous two times a day  ALBUTerol    90 MICROgram(s) HFA Inhaler 2 Puff(s) Inhalation every 8 hours  tiotropium 18 MICROgram(s) Capsule 1 Capsule(s) Inhalation daily  brimonidine 0.2% Ophthalmic Solution 1 Drop(s) Both EYES two times a day  timolol 0.5% Solution 1 Drop(s) Both EYES two times a day  insulin lispro Injectable (HumaLOG) 10 Unit(s) SubCutaneous three times a day before meals  gabapentin 100 milliGRAM(s) Oral two times a day    SOCIAL HISTORY:  Denies ETOh,Smoking,   FAMILY HISTORY:  Diabetes mellitus (Father)  Family history of breast cancer (Aunt)    REVIEW OF SYSTEMS:  WAS HAVING   PAINS ALL OVER HER JTS AND BACK   NO FEVER/CHILLS   NO SOB   DIDNT NOTICE LEG SWELLING  VOIDING WELL  APPETIE IS GOOD,  NO N/V  VITALS:  T(F): 98.3 (17 @ 07:49), Max: 98.7 (17 @ 01:10)  HR: 87 (17 @ 07:49)  BP: 106/59 (17 @ 07:49)  RR: 20 (17 @ 07:49)  SpO2: 100% (17 @ 07:49)  Wt(kg): --    Height (cm): 165.1 ( @ 19:22)  Weight (kg): 87.5 ( @ 19:22)  BMI (kg/m2): 32.1 ( @ 19:22)  BSA (m2): 1.95 ( @ :22)  PHYSICAL EXAM:  Constitutional: NAD  HEENT: CONJUNCTIVA PINL  Neck: No JVD  Respiratory: CTAB, no wheezes, rales or rhonchi  Cardiovascular: S1, S2, RRR  Gastrointestinal: BS+, soft, NT/ND  Extremities:  No peripheral edema  Neurological: A/O x 3,   Psychiatric: Normal mood, normal affect  : No CVA tenderness. No haas.     LABS:      141  |  112<H>  |  50<H>  ----------------------------<  186<H>  4.3   |  18<L>  |  3.06<H>    Ca    8.8      22 Sep 2017 07:00  Phos  3.8       Mg     1.9         TPro  7.7  /  Alb  3.0<L>  /  TBili  0.3  /  DBili      /  AST  10  /  ALT  16  /  AlkPhos  132<H>      Creatinine Trend: 3.06 <--, 3.48 <--                        10.3   8.6   )-----------( 222      ( 22 Sep 2017 07:00 )             33.0     Urine Studies:  Urinalysis Basic - ( 21 Sep 2017 21:58 )    Color: Yellow / Appearance:  / S.020 / pH:   Gluc:  / Ketone: Negative  / Bili: Negative / Urobili: Negative   Blood:  / Protein: 500 mg/dL / Nitrite: Negative   Leuk Esterase: Moderate / RBC: 0-2 /HPF / WBC >50 /HPF   Sq Epi:  / Non Sq Epi: Few / Bacteria: Many /HPF      Sodium, Random Urine: 34 mmol/L ( @ 04:00)  Osmolality, Random Urine: 197 mos/kg ( @ 04:00)  Potassium, Random Urine: 6 mmol/L ( @ 04:00)  Creatinine, Random Urine: 32 mg/dL ( @ 04:00)    RADIOLOGY & ADDITIONAL STUDIES:

## 2017-09-22 NOTE — PROGRESS NOTE ADULT - PROBLEM SELECTOR PLAN 8
IMPROVED VTE score -2 , DVT prophylaxis with heparin SQ not in exacerbation  patient takes Advair and tiotropium inhalers at home  c/w home dose of advair and tiotropium inhalers patient has baseline anemia of 9-10 based on previous records  H/h on admission is 11.1/35  on weekly injections of procrit   likely multiple etiology- AOCD secondary to CKD, iron deficiency- hypochromic  this am Hgb 10.3 in AM, MCV 75  low serum iron and iron sat  f/u anemia panel

## 2017-09-22 NOTE — DISCHARGE NOTE ADULT - INFLUENZA VACCINE DATE
Sept. 21st, 2017 (@ [facility] w/ primary doctor) Sept. 21, 2017 (@[facility] w[facility]) Sept 21, 2017 (@[facility] w/ PCP)

## 2017-09-22 NOTE — PROGRESS NOTE ADULT - PROBLEM SELECTOR PLAN 2
pain and stiffness of both upper extremity and lower extremity especially in fingers and toes, likely secondary to uncontrolled blood sugars causing Diabetic neuropathy, possible hypocalcemic  induced stiffness , possible vitamin deficiency.  -O/e- neurological exam is intact, no Chvostek sign or trousseau sign , but while examining patient has about 2-3 episodes of stiffness of both thumbs leading to adduction .  EKG-  NSR @83BPM, LAD, bifascicular block,( unchanged from previous EKG) no ST T wave changes.  will give 1 dose of oral calcium carbonate  will start on gabapentin and pain management with tylenol  f/u vitamin b12, folate levels. pain and stiffness of both upper extremity and lower extremity especially in fingers and toes, likely secondary to uncontrolled blood sugars causing Diabetic neuropathy, possible hypocalcemic  induced stiffness , possible vitamin deficiency.  -O/e- neurological exam is intact, no Chvostek sign or trousseau sign , but while examining patient has about 2-3 episodes of stiffness of both thumbs leading to adduction .  EKG-  NSR @83BPM, LAD, bifascicular block,( unchanged from previous EKG) no ST T wave changes.  gave 1 dose of oral calcium carbonate  started on gabapentin and pain management with tylenol

## 2017-09-22 NOTE — DISCHARGE NOTE ADULT - PLAN OF CARE
resolution Pain is likely secondary to diabetic neuropathy. Continue with gabapentin as prescribed Creatinine back to baseline of approx 3 follow up with your Nephrologist Dr. Castanon within one week follow up with your primary care doctor Hgb>10 continue with procrit injections It is recommended that you take a moderate intensity statin. You refused to take statin due to previous bad experience. Follow up with cardiologist for regular checkup of lipid profile. continue with medications as prescribed including lasix  [facility] reporting continue with insulin as prescribed continue with insulin as prescribed.     VITAMIN D SUPPLEMENT:  PLEASE TAKE 1 TAB ONCE A WEEK AND FOLLOW UP WITH YOUR PCP TO HAVE YOUR VITAMIN D LEVEL MEASURED.

## 2017-09-22 NOTE — DISCHARGE NOTE ADULT - MEDICATION SUMMARY - MEDICATIONS TO TAKE
I will START or STAY ON the medications listed below when I get home from the hospital:    HumaLOG 100 units/mL subcutaneous solution  -- 20 unit(s) subcutaneous 2 times a day (before meals) before breakfast and before dinner  -- Indication: For DM (diabetes mellitus)    Lantus 100 units/mL subcutaneous solution  -- 40 unit(s) subcutaneous 2 times a day  -- Indication: For DM (diabetes mellitus)    tiotropium 18 mcg inhalation capsule  -- 1 cap(s) inhaled once a day  -- Indication: For Asthma    albuterol-ipratropium CFC free 100 mcg-20 mcg/inh inhalation aerosol  --  inhaled   -- Indication: For Asthma    Advair Diskus 100 mcg-50 mcg inhalation powder  -- 1 puff(s) inhaled 2 times a day  -- Indication: For Asthma    furosemide 20 mg oral tablet  -- 1 tab(s) by mouth once a day  -- Indication: For Heart failure    Alphagan P 0.1% ophthalmic solution  -- 1 drop(s) to each affected eye every 8 hours  -- Indication: For glaucoma    timolol hemihydrate 0.5% ophthalmic solution  -- 1 drop(s) to each affected eye 2 times a day  -- Indication: For glaucoma I will START or STAY ON the medications listed below when I get home from the hospital:    HumaLOG 100 units/mL subcutaneous solution  -- 20 unit(s) subcutaneous 2 times a day (before meals) before breakfast and before dinner  -- Indication: For DM (diabetes mellitus)    Lantus 100 units/mL subcutaneous solution  -- 40 unit(s) subcutaneous 2 times a day  -- Indication: For DM (diabetes mellitus)    albuterol-ipratropium CFC free 100 mcg-20 mcg/inh inhalation aerosol  --  inhaled   -- Indication: For Asthma    Advair Diskus 100 mcg-50 mcg inhalation powder  -- 1 puff(s) inhaled 2 times a day  -- Indication: For Asthma    tiotropium 18 mcg inhalation capsule  -- 1 cap(s) inhaled once a day  -- Indication: For Asthma    furosemide 20 mg oral tablet  -- 1 tab(s) by mouth once a day  -- Indication: For Heart failure    Alphagan P 0.1% ophthalmic solution  -- 1 drop(s) to each affected eye every 8 hours  -- Indication: For glaucoma    timolol hemihydrate 0.5% ophthalmic solution  -- 1 drop(s) to each affected eye 2 times a day  -- Indication: For glaucoma    D3-50 (50,000 intl units) oral capsule  -- 1 cap(s) by mouth once a week   -- It is very important that you take or use this exactly as directed.  Do not skip doses or discontinue unless directed by your doctor.    -- Indication: For vitamin D supplement I will START or STAY ON the medications listed below when I get home from the hospital:    gabapentin 100 mg oral capsule  -- 1 cap(s) by mouth 2 times a day  -- Indication: For neuropathy    HumaLOG 100 units/mL subcutaneous solution  -- 20 unit(s) subcutaneous 2 times a day (before meals) before breakfast and before dinner  -- Indication: For DM (diabetes mellitus)    Lantus 100 units/mL subcutaneous solution  -- 40 unit(s) subcutaneous 2 times a day  -- Indication: For DM (diabetes mellitus)    albuterol-ipratropium CFC free 100 mcg-20 mcg/inh inhalation aerosol  --  inhaled   -- Indication: For Asthma    Advair Diskus 100 mcg-50 mcg inhalation powder  -- 1 puff(s) inhaled 2 times a day  -- Indication: For Asthma    tiotropium 18 mcg inhalation capsule  -- 1 cap(s) inhaled once a day  -- Indication: For Asthma    furosemide 20 mg oral tablet  -- 1 tab(s) by mouth once a day  -- Indication: For Hypertension    Alphagan P 0.1% ophthalmic solution  -- 1 drop(s) to each affected eye every 8 hours  -- Indication: For glaucoma    timolol hemihydrate 0.5% ophthalmic solution  -- 1 drop(s) to each affected eye 2 times a day  -- Indication: For glaucoma    D3-50 (50,000 intl units) oral capsule  -- 1 cap(s) by mouth once a week   -- It is very important that you take or use this exactly as directed.  Do not skip doses or discontinue unless directed by your doctor.    -- Indication: For vitamin D supplement I will START or STAY ON the medications listed below when I get home from the hospital:    gabapentin 100 mg oral capsule  -- 1 cap(s) by mouth 2 times a day  -- Indication: For neuropathy    HumaLOG 100 units/mL subcutaneous solution  -- 20 unit(s) subcutaneous 2 times a day (before meals) before breakfast and before dinner  -- Indication: For DM (diabetes mellitus)    Lantus 100 units/mL subcutaneous solution  -- 40 unit(s) subcutaneous 2 times a day  -- Indication: For DM (diabetes mellitus)    albuterol-ipratropium CFC free 100 mcg-20 mcg/inh inhalation aerosol  --  inhaled   -- Indication: For Asthma    Advair Diskus 100 mcg-50 mcg inhalation powder  -- 1 puff(s) inhaled 2 times a day  -- Indication: For Asthma    tiotropium 18 mcg inhalation capsule  -- 1 cap(s) inhaled once a day  -- Indication: For Asthma    furosemide 20 mg oral tablet  -- 1 tab(s) by mouth once a day  -- Indication: For Hypertension    Alphagan P 0.1% ophthalmic solution  -- 1 drop(s) to each affected eye every 8 hours  -- Indication: For glaucoma    timolol hemihydrate 0.5% ophthalmic solution  -- 1 drop(s) to each affected eye 2 times a day  -- Indication: For glaucoma    D3-50 (50,000 intl units) oral capsule  -- 1 cap(s) by mouth once a week   -- It is very important that you take or use this exactly as directed.  Do not skip doses or discontinue unless directed by your doctor.    -- Indication: For vitamin d supplement

## 2017-09-22 NOTE — DISCHARGE NOTE ADULT - CARE PROVIDER_API CALL
Kyler Jaimes (MD), Cardiovascular Disease  [address] [facility] [address]  [place] [address]  Phone: [phone]  Fax: [phone] Kyler Jaimes), Cardiovascular Disease  [address] [facility] [place] [address]  [place] [address]  Phone: [phone]  Fax: [phone]    Regan Castanon), Internal Medicine; Nephrology  [address] [place] [address]  Phone: [phone]  Fax: [phone]

## 2017-09-22 NOTE — PROGRESS NOTE ADULT - PROBLEM SELECTOR PLAN 3
patient has CKD stage iV with baseline creatinine of 1.9 to 2.0 from previous records  patient has creatinine of 3.48 on admission  FENA- 2.6%   will avoid nephrotoxic medications.  f/u BMP in AM.  Dr. Castanon outpatient nephrologist  nephrology consult- DR. Sorenson patient has CKD stage iV with baseline creatinine of 1.9 to 2.0 from previous records  FENA- 2.6% on admission  patient has creatinine of 3.48 on admission, improved to 3.06 s/p 2L NS bolus  will give another 250ml of 0.45% NS this AM  will avoid nephrotoxic medications.  Dr. Castanon outpatient nephrologist  f/u nephrology consult- Dr. Sorenson  f/u vitamin b12, folate levels

## 2017-09-22 NOTE — PROGRESS NOTE ADULT - PROBLEM SELECTOR PLAN 6
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  needs outpatient oncologist follow up pt has history of [facility], pt counseled by Dr. Jaimes, cardiologist on transmitting information  pt euvolemic at this time  will resume home dose of lasix PO 20mg daily as per Dr. Jaimes recommendations

## 2017-09-22 NOTE — PROGRESS NOTE ADULT - SUBJECTIVE AND OBJECTIVE BOX
HPI:  77 Female, from home, lives with spouse, ambulates with the help of walker,  PMH of Asthma, anemia( on weekly procrit), CHF(last echo in  s/o preserved EF), DM, CKD (unknown baseline) HTN( not on meds as BP running low) breast ca s/p left lumpectomy, radiation in , questionable uterine cancer presented to ED with c/o pain and stiffness in both upper and lower extremities especially in fingers and toes  worsening since last week. patient was well until  when she started to have pain in all her fingers and toes with stiffness, baseline she has this pain, but not so severe, went to [facility], who d/c her on tylenol #3, later today in evening again she started having pain in both fingers and toes, which was so bad, she started crying, 9/10 intensity, crampy, "pulling kind of pain", associated with stiffness of fingers, little relief with massage, no aggravating factors, radiating to neck and shoulders, unable to ambulate as earlier or doing daily activities due to pain. patient was on calcium supplement but stopped taking it for last few days. patient denies any joint stiffness, morning stiffness, rash, joint pains, numbness, tingling of extremities. patient is concerned of stroke, so decided to come to ED. Also she had lab work done yesterday, which showed that she has UTI, PCP called her to collect antibiotic, awaiting delivery to home.  PSH- s/p left lumpectomy    allergies- NKDA  FH- breast cancer in aunt, and DM in father  social Hx- exsmoker, smoked 2 PPD for 30 years, quit 7 years ago, ex ethanolic, no illicit drug use.    In ED, patient had stable vitals, EKG- NSR @83BPM, LAD, bifascicular block,( unchanged from previous EKG) no ST T wave changes. UA- >50 WBC, moderate LE, Labs pertinent for WBC- 11, h/h-11.1/ 35, corrected sodium of 134, glucose of 307, anion gap of 10, bun/ creat- 54/3.48, alk phos-132 , s/p 1 dose of rocephin, 2 L NS bolus, 8 units of humalog, 5 mg of valium in ED      will admit to medicine for evaluation of pain and stiffness in extremities. (21 Sep 2017 23:24)      Patient is a 77y old  Female who presents with a chief complaint of pain and stiffness of upper and lower extremities especially in both fingers and toes (21 Sep 2017 23:24)      INTERVAL HPI/OVERNIGHT EVENTS:  T(C): 37.1 (17 @ 01:10), Max: 37.1 (17 @ 01:10)  HR: 81 (17 @ 01:10) (76 - 84)  BP: 141/87 (17 @ 01:10) (101/57 - 141/87)  RR: 18 (17 @ 01:10) (16 - 18)  SpO2: 96% (17 @ 01:10) (96% - 99%)  Wt(kg): --  I&O's Summary      REVIEW OF SYSTEMS: denies fever, chills, SOB, palpitations, chest pain, abdominal pain, nausea, vomitting, diarrhea, constipation, dizziness    MEDICATIONS  (STANDING):  cefTRIAXone   IVPB 1 Gram(s) IV Intermittent every 24 hours  insulin lispro (HumaLOG) corrective regimen sliding scale   SubCutaneous three times a day before meals  heparin  Injectable 5000 Unit(s) SubCutaneous every 12 hours  insulin glargine Injectable (LANTUS) 40 Unit(s) SubCutaneous two times a day  ALBUTerol    90 MICROgram(s) HFA Inhaler 2 Puff(s) Inhalation every 8 hours  tiotropium 18 MICROgram(s) Capsule 1 Capsule(s) Inhalation daily  brimonidine 0.2% Ophthalmic Solution 1 Drop(s) Both EYES two times a day  timolol 0.5% Solution 1 Drop(s) Both EYES two times a day  insulin lispro Injectable (HumaLOG) 10 Unit(s) SubCutaneous three times a day before meals  gabapentin 100 milliGRAM(s) Oral two times a day    MEDICATIONS  (PRN):  acetaminophen   Tablet. 650 milliGRAM(s) Oral every 6 hours PRN Moderate Pain (4 - 6)      PHYSICAL EXAM:  GENERAL: NAD, well-groomed, well-developed  HEAD:  Atraumatic, Normocephalic  EYES: EOMI, PERRLA, conjunctiva and sclera clear  ENMT: No tonsillar erythema, exudates, or enlargement; Moist mucous membranes, Good dentition, No lesions  NECK: Supple, No JVD, Normal thyroid  NERVOUS SYSTEM:  Alert & Oriented X3, Good concentration; Motor Strength 5/5 B/L upper and lower extremities; DTRs 2+ intact and symmetric  CHEST/LUNG: Clear to percussion bilaterally; No rales, rhonchi, wheezing, or rubs  HEART: Regular rate and rhythm; No murmurs, rubs, or gallops  ABDOMEN: Soft, Nontender, Nondistended; Bowel sounds present  EXTREMITIES:  2+ Peripheral Pulses, No clubbing, cyanosis, or edema  LYMPH: No lymphadenopathy noted  SKIN: No rashes or lesions  LABS:                        10.3   8.6   )-----------( 222      ( 22 Sep 2017 07:00 )             33.0         131<L>  |  103  |  54<H>  ----------------------------<  307<H>  4.9   |  18<L>  |  3.48<H>    Ca    8.8      21 Sep 2017 21:21  Mg     1.7         TPro  7.7  /  Alb  3.0<L>  /  TBili  0.3  /  DBili  x   /  AST  10  /  ALT  16  /  AlkPhos  132<H>        Urinalysis Basic - ( 21 Sep 2017 21:58 )    Color: Yellow / Appearance: x / S.020 / pH: x  Gluc: x / Ketone: Negative  / Bili: Negative / Urobili: Negative   Blood: x / Protein: 500 mg/dL / Nitrite: Negative   Leuk Esterase: Moderate / RBC: 0-2 /HPF / WBC >50 /HPF   Sq Epi: x / Non Sq Epi: Few / Bacteria: Many /HPF      CAPILLARY BLOOD GLUCOSE  290 (22 Sep 2017 01:10)  243 (21 Sep 2017 23:57)  307 (21 Sep 2017 21:39)  335 (21 Sep 2017 19:22)            Urinalysis Basic - ( 21 Sep 2017 21:58 )    Color: Yellow / Appearance: x / S.020 / pH: x  Gluc: x / Ketone: Negative  / Bili: Negative / Urobili: Negative   Blood: x / Protein: 500 mg/dL / Nitrite: Negative   Leuk Esterase: Moderate / RBC: 0-2 /HPF / WBC >50 /HPF   Sq Epi: x / Non Sq Epi: Few / Bacteria: Many /HPF HPI:  77 Female, from home, lives with spouse, ambulates with the help of walker,  PMH of Asthma, anemia( on weekly procrit), CHF(last echo in  s/o preserved EF), DM, CKD (unknown baseline) HTN( not on meds as BP running low) breast ca s/p left lumpectomy, radiation in , questionable uterine cancer presented to ED with c/o pain and stiffness in both upper and lower extremities especially in fingers and toes  worsening since last week. patient was well until  when she started to have pain in all her fingers and toes with stiffness, baseline she has this pain, but not so severe, went to [facility], who d/c her on tylenol #3, later today in evening again she started having pain in both fingers and toes, which was so bad, she started crying, 9/10 intensity, crampy, "pulling kind of pain", associated with stiffness of fingers, little relief with massage, no aggravating factors, radiating to neck and shoulders, unable to ambulate as earlier or doing daily activities due to pain. patient was on calcium supplement but stopped taking it for last few days. patient denies any joint stiffness, morning stiffness, rash, joint pains, numbness, tingling of extremities. patient is concerned of stroke, so decided to come to ED. Also she had lab work done yesterday, which showed that she has UTI, PCP called her to collect antibiotic, awaiting delivery to home.  PSH- s/p left lumpectomy    allergies- NKDA  FH- breast cancer in aunt, and DM in father  social Hx- exsmoker, smoked 2 PPD for 30 years, quit 7 years ago, ex ethanolic, no illicit drug use.    In ED, patient had stable vitals, EKG- NSR @83BPM, LAD, bifascicular block,( unchanged from previous EKG) no ST T wave changes. UA- >50 WBC, moderate LE, Labs pertinent for WBC- 11, h/h-11.1/ 35, corrected sodium of 134, glucose of 307, anion gap of 10, bun/ creat- 54/3.48, alk phos-132 , s/p 1 dose of rocephin, 2 L NS bolus, 8 units of humalog, 5 mg of valium in ED      will admit to medicine for evaluation of pain and stiffness in extremities. (21 Sep 2017 23:24)      Patient is a 77y old  Female who presents with a chief complaint of pain and stiffness of upper and lower extremities especially in both fingers and toes (21 Sep 2017 23:24)      INTERVAL HPI/OVERNIGHT EVENTS:    Pt states she is feeling much better, not feeling as weak or stiff as last night.     T(C): 37.1 (17 @ 01:10), Max: 37.1 (17 @ 01:10)  HR: 81 (17 @ 01:10) (76 - 84)  BP: 141/87 (17 @ 01:10) (101/57 - 141/87)  RR: 18 (17 @ 01:10) (16 - 18)  SpO2: 96% (17 @ 01:10) (96% - 99%)  Wt(kg): --  I&O's Summary      REVIEW OF SYSTEMS: denies fever, chills, SOB, palpitations, chest pain, abdominal pain, nausea, vomitting, diarrhea, constipation, dizziness    MEDICATIONS  (STANDING):  cefTRIAXone   IVPB 1 Gram(s) IV Intermittent every 24 hours  insulin lispro (HumaLOG) corrective regimen sliding scale   SubCutaneous three times a day before meals  heparin  Injectable 5000 Unit(s) SubCutaneous every 12 hours  insulin glargine Injectable (LANTUS) 40 Unit(s) SubCutaneous two times a day  ALBUTerol    90 MICROgram(s) HFA Inhaler 2 Puff(s) Inhalation every 8 hours  tiotropium 18 MICROgram(s) Capsule 1 Capsule(s) Inhalation daily  brimonidine 0.2% Ophthalmic Solution 1 Drop(s) Both EYES two times a day  timolol 0.5% Solution 1 Drop(s) Both EYES two times a day  insulin lispro Injectable (HumaLOG) 10 Unit(s) SubCutaneous three times a day before meals  gabapentin 100 milliGRAM(s) Oral two times a day    MEDICATIONS  (PRN):  acetaminophen   Tablet. 650 milliGRAM(s) Oral every 6 hours PRN Moderate Pain (4 - 6)      PHYSICAL EXAM:  GENERAL: NAD, well-groomed, well-developed  HEAD:  Atraumatic, Normocephalic  EYES: EOMI, PERRLA, conjunctiva and sclera clear  ENMT: No tonsillar erythema, exudates, or enlargement; Moist mucous membranes, Good dentition, No lesions  NECK: Supple, No JVD, Normal thyroid  NERVOUS SYSTEM:  Alert & Oriented X3, Good concentration; Motor Strength 5/5 B/L upper and lower extremities; DTRs 2+ intact and symmetric  CHEST/LUNG: Clear to percussion bilaterally; No rales, rhonchi, wheezing, or rubs  HEART: Regular rate and rhythm; No murmurs, rubs, or gallops  ABDOMEN: Soft, Nontender, Nondistended; Bowel sounds present  EXTREMITIES:  2+ Peripheral Pulses, No clubbing, cyanosis, or edema  LYMPH: No lymphadenopathy noted  SKIN: No rashes or lesions  LABS:                        10.3   8.6   )-----------( 222      ( 22 Sep 2017 07:00 )             33.0         131<L>  |  103  |  54<H>  ----------------------------<  307<H>  4.9   |  18<L>  |  3.48<H>    Ca    8.8      21 Sep 2017 21:21  Mg     1.7         TPro  7.7  /  Alb  3.0<L>  /  TBili  0.3  /  DBili  x   /  AST  10  /  ALT  16  /  AlkPhos  132<H>        Urinalysis Basic - ( 21 Sep 2017 21:58 )    Color: Yellow / Appearance: x / S.020 / pH: x  Gluc: x / Ketone: Negative  / Bili: Negative / Urobili: Negative   Blood: x / Protein: 500 mg/dL / Nitrite: Negative   Leuk Esterase: Moderate / RBC: 0-2 /HPF / WBC >50 /HPF   Sq Epi: x / Non Sq Epi: Few / Bacteria: Many /HPF      CAPILLARY BLOOD GLUCOSE  290 (22 Sep 2017 01:10)  243 (21 Sep 2017 23:57)  307 (21 Sep 2017 21:39)  335 (21 Sep 2017 19:22)            Urinalysis Basic - ( 21 Sep 2017 21:58 )    Color: Yellow / Appearance: x / S.020 / pH: x  Gluc: x / Ketone: Negative  / Bili: Negative / Urobili: Negative   Blood: x / Protein: 500 mg/dL / Nitrite: Negative   Leuk Esterase: Moderate / RBC: 0-2 /HPF / WBC >50 /HPF   Sq Epi: x / Non Sq Epi: Few / Bacteria: Many /HPF HPI:  77 Female, from home, lives with spouse, ambulates with the help of walker,  PMH of Asthma, anemia( on weekly procrit), CHF(last echo in  s/o preserved EF), DM, CKD (unknown baseline) HTN( not on meds as BP running low) breast ca s/p left lumpectomy, radiation in , questionable uterine cancer presented to ED with c/o pain and stiffness in both upper and lower extremities especially in fingers and toes  worsening since last week. patient was well until  when she started to have pain in all her fingers and toes with stiffness, baseline she has this pain, but not so severe, went to [facility], who d/c her on tylenol #3, later today in evening again she started having pain in both fingers and toes, which was so bad, she started crying, 9/10 intensity, crampy, "pulling kind of pain", associated with stiffness of fingers, little relief with massage, no aggravating factors, radiating to neck and shoulders, unable to ambulate as earlier or doing daily activities due to pain. patient was on calcium supplement but stopped taking it for last few days. patient denies any joint stiffness, morning stiffness, rash, joint pains, numbness, tingling of extremities. patient is concerned of stroke, so decided to come to ED. Also she had lab work done yesterday, which showed that she has UTI, PCP called her to collect antibiotic, awaiting delivery to home.  PSH- s/p left lumpectomy    allergies- NKDA  FH- breast cancer in aunt, and DM in father  social Hx- exsmoker, smoked 2 PPD for 30 years, quit 7 years ago, ex ethanolic, no illicit drug use.    In ED, patient had stable vitals, EKG- NSR @83BPM, LAD, bifascicular block,( unchanged from previous EKG) no ST T wave changes. UA- >50 WBC, moderate LE, Labs pertinent for WBC- 11, h/h-11.1/ 35, corrected sodium of 134, glucose of 307, anion gap of 10, bun/ creat- 54/3.48, alk phos-132 , s/p 1 dose of rocephin, 2 L NS bolus, 8 units of humalog, 5 mg of valium in ED      will admit to medicine for evaluation of pain and stiffness in extremities. (21 Sep 2017 23:24)      Patient is a 77y old  Female who presents with a chief complaint of pain and stiffness of upper and lower extremities especially in both fingers and toes (21 Sep 2017 23:24)      INTERVAL HPI/OVERNIGHT EVENTS:    Pt states she is feeling much better, not feeling as weak or stiff as last night.     T(C): 37.1 (17 @ 01:10), Max: 37.1 (17 @ 01:10)  HR: 81 (17 @ 01:10) (76 - 84)  BP: 141/87 (17 @ 01:10) (101/57 - 141/87)  RR: 18 (17 @ 01:10) (16 - 18)  SpO2: 96% (17 @ 01:10) (96% - 99%)  Wt(kg): --  I&O's Summary      REVIEW OF SYSTEMS: denies fever, chills, SOB, palpitations, chest pain, abdominal pain, nausea, vomitting, diarrhea, constipation, dizziness    MEDICATIONS  (STANDING):  cefTRIAXone   IVPB 1 Gram(s) IV Intermittent every 24 hours  insulin lispro (HumaLOG) corrective regimen sliding scale   SubCutaneous three times a day before meals  heparin  Injectable 5000 Unit(s) SubCutaneous every 12 hours  insulin glargine Injectable (LANTUS) 40 Unit(s) SubCutaneous two times a day  ALBUTerol    90 MICROgram(s) HFA Inhaler 2 Puff(s) Inhalation every 8 hours  tiotropium 18 MICROgram(s) Capsule 1 Capsule(s) Inhalation daily  brimonidine 0.2% Ophthalmic Solution 1 Drop(s) Both EYES two times a day  timolol 0.5% Solution 1 Drop(s) Both EYES two times a day  insulin lispro Injectable (HumaLOG) 10 Unit(s) SubCutaneous three times a day before meals  gabapentin 100 milliGRAM(s) Oral two times a day    MEDICATIONS  (PRN):  acetaminophen   Tablet. 650 milliGRAM(s) Oral every 6 hours PRN Moderate Pain (4 - 6)      PHYSICAL EXAM:  GENERAL: NAD, well-groomed, well-developed  HEAD:  Atraumatic, Normocephalic  EYES: EOMI, PERRLA, conjunctiva and sclera clear  ENMT: No tonsillar erythema, exudates, or enlargement; Moist mucous membranes, Good dentition, No lesions  NECK: Supple, No JVD, Normal thyroid  NERVOUS SYSTEM:  Awake & Oriented X3, Good concentration; Motor Strength 5/5 B/L upper and lower extremities  CHEST/LUNG: Clear to auscultation bilaterally; No rales, rhonchi, wheezing, or rubs  HEART: Regular rate and rhythm; No murmurs, rubs, or gallops  ABDOMEN: Soft, Nontender, Nondistended; Bowel sounds present  EXTREMITIES:  2+ Peripheral Pulses, No clubbing, cyanosis, or edema, no contractures noticed on digits    LABS:                        10.3   8.6   )-----------( 222      ( 22 Sep 2017 07:00 )             33.0         131<L>  |  103  |  54<H>  ----------------------------<  307<H>  4.9   |  18<L>  |  3.48<H>    Ca    8.8      21 Sep 2017 21:21  Mg     1.7         TPro  7.7  /  Alb  3.0<L>  /  TBili  0.3  /  DBili  x   /  AST  10  /  ALT  16  /  AlkPhos  132<H>        Urinalysis Basic - ( 21 Sep 2017 21:58 )    Color: Yellow / Appearance: x / S.020 / pH: x  Gluc: x / Ketone: Negative  / Bili: Negative / Urobili: Negative   Blood: x / Protein: 500 mg/dL / Nitrite: Negative   Leuk Esterase: Moderate / RBC: 0-2 /HPF / WBC >50 /HPF   Sq Epi: x / Non Sq Epi: Few / Bacteria: Many /HPF      CAPILLARY BLOOD GLUCOSE  290 (22 Sep 2017 01:10)  243 (21 Sep 2017 23:57)  307 (21 Sep 2017 21:39)  335 (21 Sep 2017 19:22)            Urinalysis Basic - ( 21 Sep 2017 21:58 )    Color: Yellow / Appearance: x / S.020 / pH: x  Gluc: x / Ketone: Negative  / Bili: Negative / Urobili: Negative   Blood: x / Protein: 500 mg/dL / Nitrite: Negative   Leuk Esterase: Moderate / RBC: 0-2 /HPF / WBC >50 /HPF   Sq Epi: x / Non Sq Epi: Few / Bacteria: Many /HPF HPI:  77 Female, from home, lives with spouse, ambulates with the help of walker,  PMH of Asthma, anemia( on weekly procrit), CHF(last echo in  s/o preserved EF) with MEMS, DM, CKD (unknown baseline) HTN( not on meds as BP running low) breast ca s/p left lumpectomy, radiation in , questionable uterine cancer presented to ED with c/o pain and stiffness in both upper and lower extremities especially in fingers and toes  worsening since last week. patient was well until  when she started to have pain in all her fingers and toes with stiffness, baseline she has this pain, but not so severe, went to [facility], who d/c her on tylenol #3, later today in evening again she started having pain in both fingers and toes, which was so bad, she started crying, 9/10 intensity, crampy, "pulling kind of pain", associated with stiffness of fingers, little relief with massage, no aggravating factors, radiating to neck and shoulders, unable to ambulate as earlier or doing daily activities due to pain. patient was on calcium supplement but stopped taking it for last few days. patient denies any joint stiffness, morning stiffness, rash, joint pains, numbness, tingling of extremities. patient is concerned of stroke, so decided to come to ED. Also she had lab work done yesterday, which showed that she has UTI, PCP called her to collect antibiotic, awaiting delivery to home.  PSH- s/p left lumpectomy    allergies- NKDA  FH- breast cancer in aunt, and DM in father  social Hx- exsmoker, smoked 2 PPD for 30 years, quit 7 years ago, ex ethanolic, no illicit drug use.    In ED, patient had stable vitals, EKG- NSR @83BPM, LAD, bifascicular block,( unchanged from previous EKG) no ST T wave changes. UA- >50 WBC, moderate LE, Labs pertinent for WBC- 11, h/h-11.1/ 35, corrected sodium of 134, glucose of 307, anion gap of 10, bun/ creat- 54/3.48, alk phos-132 , s/p 1 dose of rocephin, 2 L NS bolus, 8 units of humalog, 5 mg of valium in ED      will admit to medicine for evaluation of pain and stiffness in extremities. (21 Sep 2017 23:24)      Patient is a 77y old  Female who presents with a chief complaint of pain and stiffness of upper and lower extremities especially in both fingers and toes (21 Sep 2017 23:24)      INTERVAL HPI/OVERNIGHT EVENTS:    Pt states she is feeling much better, not feeling as weak or stiff as last night.     T(C): 37.1 (17 @ 01:10), Max: 37.1 (17 @ 01:10)  HR: 81 (17 @ 01:10) (76 - 84)  BP: 141/87 (17 @ 01:10) (101/57 - 141/87)  RR: 18 (17 @ 01:10) (16 - 18)  SpO2: 96% (17 @ 01:10) (96% - 99%)  Wt(kg): --  I&O's Summary      REVIEW OF SYSTEMS: denies fever, chills, SOB, palpitations, chest pain, abdominal pain, nausea, vomitting, diarrhea, constipation, dizziness    MEDICATIONS  (STANDING):  cefTRIAXone   IVPB 1 Gram(s) IV Intermittent every 24 hours  insulin lispro (HumaLOG) corrective regimen sliding scale   SubCutaneous three times a day before meals  heparin  Injectable 5000 Unit(s) SubCutaneous every 12 hours  insulin glargine Injectable (LANTUS) 40 Unit(s) SubCutaneous two times a day  ALBUTerol    90 MICROgram(s) HFA Inhaler 2 Puff(s) Inhalation every 8 hours  tiotropium 18 MICROgram(s) Capsule 1 Capsule(s) Inhalation daily  brimonidine 0.2% Ophthalmic Solution 1 Drop(s) Both EYES two times a day  timolol 0.5% Solution 1 Drop(s) Both EYES two times a day  insulin lispro Injectable (HumaLOG) 10 Unit(s) SubCutaneous three times a day before meals  gabapentin 100 milliGRAM(s) Oral two times a day    MEDICATIONS  (PRN):  acetaminophen   Tablet. 650 milliGRAM(s) Oral every 6 hours PRN Moderate Pain (4 - 6)      PHYSICAL EXAM:  GENERAL: NAD, well-groomed, well-developed  HEAD:  Atraumatic, Normocephalic  EYES: EOMI, PERRLA, conjunctiva and sclera clear  ENMT: No tonsillar erythema, exudates, or enlargement; Moist mucous membranes, Good dentition, No lesions  NECK: Supple, No JVD, Normal thyroid  NERVOUS SYSTEM:  Awake & Oriented X3, Good concentration; Motor Strength 5/5 B/L upper and lower extremities  CHEST/LUNG: Clear to auscultation bilaterally; No rales, rhonchi, wheezing, or rubs  HEART: Regular rate and rhythm; No murmurs, rubs, or gallops  ABDOMEN: Soft, Nontender, Nondistended; Bowel sounds present  EXTREMITIES:  2+ Peripheral Pulses, No clubbing, cyanosis, or edema, no contractures noticed on digits    LABS:                        10.3   8.6   )-----------( 222      ( 22 Sep 2017 07:00 )             33.0         131<L>  |  103  |  54<H>  ----------------------------<  307<H>  4.9   |  18<L>  |  3.48<H>    Ca    8.8      21 Sep 2017 21:21  Mg     1.7         TPro  7.7  /  Alb  3.0<L>  /  TBili  0.3  /  DBili  x   /  AST  10  /  ALT  16  /  AlkPhos  132<H>        Urinalysis Basic - ( 21 Sep 2017 21:58 )    Color: Yellow / Appearance: x / S.020 / pH: x  Gluc: x / Ketone: Negative  / Bili: Negative / Urobili: Negative   Blood: x / Protein: 500 mg/dL / Nitrite: Negative   Leuk Esterase: Moderate / RBC: 0-2 /HPF / WBC >50 /HPF   Sq Epi: x / Non Sq Epi: Few / Bacteria: Many /HPF      CAPILLARY BLOOD GLUCOSE  290 (22 Sep 2017 01:10)  243 (21 Sep 2017 23:57)  307 (21 Sep 2017 21:39)  335 (21 Sep 2017 19:22)            Urinalysis Basic - ( 21 Sep 2017 21:58 )    Color: Yellow / Appearance: x / S.020 / pH: x  Gluc: x / Ketone: Negative  / Bili: Negative / Urobili: Negative   Blood: x / Protein: 500 mg/dL / Nitrite: Negative   Leuk Esterase: Moderate / RBC: 0-2 /HPF / WBC >50 /HPF   Sq Epi: x / Non Sq Epi: Few / Bacteria: Many /HPF

## 2017-09-22 NOTE — DISCHARGE NOTE ADULT - PATIENT PORTAL LINK FT
“You can access the [employer] Patient Portal, offered by [facility], by registering with the following website: [email or web address]”

## 2017-09-22 NOTE — DISCHARGE NOTE ADULT - HOSPITAL COURSE
77 Female, from home, lives with spouse, ambulates with the help of walker,  PMH of Asthma, anemia( on weekly ? procrit), CHF(last echo in 2015 s/o preserved EF) with MEMS, DM, CKD (unknown baseline) HTN( not on meds as BP running low) breast ca s/p left lumpectomy, radiation in 2005, questionable uterine cancer, presented to ED with c/o pain and stiffness of  upper and lower extremities especially in both fingers and toes  worsening since last week. In ED, patient had stable vitals, EKG- NSR @83BPM, LAD, bifascicular block,( unchanged from previous EKG) no ST T wave changes. UA- >50 WBC, moderate LE, Labs pertinent for WBC- 11, h/h-11.1/ 35, corrected sodium of 134, glucose of 307, anion gap of 10, bun/ creat- 54/3.48, alk phos-132 , s/p 1 dose of Rocephin, 2 L NS bolus, 8 units of humalog, 5 mg of valium in ED.  Pt admitted to medicine for evaluation of pain and stiffness in extremities and UTI. Pt symptoms resolved after 1 dose of calcium carbonate and initiation of gabapentin. Pt likely had episode of diabetic neuropathic pain. Pt also treated with rocephin for UTI (to complete total of 3 doses.. count!! i think there's only been 2 doses..)           Problem/Plan - 1:  ·  Problem: Urinary tract infection with hematuria, site unspecified.  Plan: patient has positive UA with >50 WBC and moderate LE.  s/p 1 dose of rocephin in ED  continue with Iv rocephin 1 gm Q24  f/u urine cultures.      Problem/Plan - 2:  ·  Problem: Pain.  Plan: pain and stiffness of both upper extremity and lower extremity especially in fingers and toes, likely secondary to uncontrolled blood sugars causing Diabetic neuropathy, possible hypocalcemic  induced stiffness , possible vitamin deficiency.  -O/e- neurological exam is intact, no Chvostek sign or trousseau sign , but while examining patient has about 2-3 episodes of stiffness of both thumbs leading to adduction .  EKG-  NSR @83BPM, LAD, bifascicular block,( unchanged from previous EKG) no ST T wave changes.  gave 1 dose of oral calcium carbonate  started on gabapentin and pain management with tylenol.      Problem/Plan - 3:  ·  Problem: Acute kidney injury superimposed on chronic kidney disease.  Plan: patient has CKD stage iV with baseline creatinine of 1.9 to 2.0 from previous records  FENA- 2.6% on admission  patient has creatinine of 3.48 on admission, improved to 3.06 s/p 2L NS bolus  will give another 250ml of 0.45% NS this AM  will avoid nephrotoxic medications.  Dr. Castanon outpatient nephrologist  f/u nephrology consult- Dr. Sorenson  f/u vitamin b12, folate levels.      Problem/Plan - 4:  ·  Problem: DM (diabetes mellitus).  Plan: patient takes  lantus 40 units twice daily and humalog 20 units twice daily at home, started on HSS, continuing home dose of lantus  started on humalog 10 units premeals thrice daily  monitor accuchecks.  f/u a1c.      Problem/Plan - 5:  ·  Problem: Hyperlipidemia.  Plan: Pt ASCVD risk score approx 13%  suggested to pt to start with moderate-intensity statin as recommended for >75 years of age, however patient refused at this time, stating previous use of statin did not agree with her and made her feel "not right". Discussed risks and benefits with patient.      Problem/Plan - 6:  Problem: Heart failure. Breast carcinoma  Plan: pt has history of MEMS, pt counseled by Dr. Jaimes, cardiologist on transmitting information  pt euvolemic at this time  will resume home dose of lasix PO 20mg daily as per Dr. Jaimes recommendations. s/p left lumpectomy and radiation  currently in remission.  needs outpatient oncologist follow up       Problem/Plan - 7:  ·  Problem: Breast carcinoma. Anemia  Plan: s/p left lumpectomy and radiation  currently in remission.  needs outpatient oncologist follow up. 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 not in exacerbation  patient takes Advair and tiotropium inhalers at home  c/w home dose of advair and tiotropium inhalers      Problem/Plan - 9:  ·  Problem: Asthma. Prophylactic measure  Plan: not in exacerbation  patient takes Advair and tiotropium inhalers at home  c/w home dose of advair and tiotropium inhalers. IMPROVED VTE score -2 , DVT prophylaxis with heparin SQ      Problem/Plan - 10:  Problem: Prophylactic measure. Plan; IMPROVED VTE score -2 , DVT prophylaxis with heparin SQ 77 Female, from home, lives with spouse, ambulates with the help of walker,  PMH of Asthma, anemia( on weekly ? procrit), CHF(last echo in 2015 s/o preserved EF) with MEMS, DM, CKD (unknown baseline) HTN( not on meds as BP running low) breast ca s/p left lumpectomy, radiation in 2005, questionable uterine cancer, presented to ED with c/o pain and stiffness of  upper and lower extremities especially in both fingers and toes  worsening since last week. In ED, patient had stable vitals, EKG- NSR @83BPM, LAD, bifascicular block,( unchanged from previous EKG) no ST T wave changes. UA- >50 WBC, moderate LE, Labs pertinent for WBC- 11, h/h-11.1/ 35, corrected sodium of 134, glucose of 307, anion gap of 10, bun/ creat- 54/3.48, alk phos-132 , s/p 1 dose of Rocephin, 2 L NS bolus, 8 units of humalog, 5 mg of valium in ED.  Pt admitted to medicine for evaluation of pain and stiffness in extremities and UTI. Pt symptoms resolved after 1 dose of calcium carbonate and initiation of gabapentin. Pt likely had episode of diabetic neuropathic pain. Pt also treated with rocephin for UTI.            Problem/Plan - 1:  ·  Problem: Urinary tract infection with hematuria, site unspecified.  Plan: patient has positive UA with >50 WBC and moderate LE.  s/p 1 dose of rocephin in ED  continue with Iv rocephin 1 gm Q24  f/u urine cultures.      Problem/Plan - 2:  ·  Problem: Pain.  Plan: pain and stiffness of both upper extremity and lower extremity especially in fingers and toes, likely secondary to uncontrolled blood sugars causing Diabetic neuropathy, possible hypocalcemic  induced stiffness , possible vitamin deficiency.  -O/e- neurological exam is intact, no Chvostek sign or trousseau sign , but while examining patient has about 2-3 episodes of stiffness of both thumbs leading to adduction .  EKG-  NSR @83BPM, LAD, bifascicular block,( unchanged from previous EKG) no ST T wave changes.  gave 1 dose of oral calcium carbonate  started on gabapentin and pain management with tylenol.      Problem/Plan - 3:  ·  Problem: Acute kidney injury superimposed on chronic kidney disease.  Plan: patient has CKD stage iV with baseline creatinine of 1.9 to 2.0 from previous records  FENA- 2.6% on admission  patient has creatinine of 3.48 on admission, improved to 3.06 s/p 2L NS bolus  will give another 250ml of 0.45% NS this AM  will avoid nephrotoxic medications.  Dr. Castanon outpatient nephrologist  f/u nephrology consult- Dr. Sorenson  f/u vitamin b12, folate levels.      Problem/Plan - 4:  ·  Problem: DM (diabetes mellitus).  Plan: patient takes  lantus 40 units twice daily and humalog 20 units twice daily at home, started on HSS, continuing home dose of lantus  started on humalog 10 units premeals thrice daily  monitor accuchecks.  f/u a1c.      Problem/Plan - 5:  ·  Problem: Hyperlipidemia.  Plan: Pt ASCVD risk score approx 13%  suggested to pt to start with moderate-intensity statin as recommended for >75 years of age, however patient refused at this time, stating previous use of statin did not agree with her and made her feel "not right". Discussed risks and benefits with patient.      Problem/Plan - 6:  Problem: Heart failure. Breast carcinoma  Plan: pt has history of MEMS, pt counseled by Dr. Jaimes, cardiologist on transmitting information  pt euvolemic at this time  will resume home dose of lasix PO 20mg daily as per Dr. Jaimes recommendations. s/p left lumpectomy and radiation  currently in remission.  needs outpatient oncologist follow up       Problem/Plan - 7:  ·  Problem: Breast carcinoma. Anemia  Plan: s/p left lumpectomy and radiation  currently in remission.  needs outpatient oncologist follow up. 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 not in exacerbation  patient takes Advair and tiotropium inhalers at home  c/w home dose of advair and tiotropium inhalers      Problem/Plan - 9:  ·  Problem: Asthma. Prophylactic measure  Plan: not in exacerbation  patient takes Advair and tiotropium inhalers at home  c/w home dose of advair and tiotropium inhalers. IMPROVED VTE score -2 , DVT prophylaxis with heparin SQ      Problem/Plan - 10:  Problem: Prophylactic measure. Plan; IMPROVED VTE score -2 , DVT prophylaxis with heparin SQ 77 Female, from home, lives with spouse, ambulates with the help of walker,  PMH of Asthma, anemia( on weekly ? procrit), CHF(last echo in 2015 s/o preserved EF) with MEMS, DM, CKD (unknown baseline) HTN( not on meds as BP running low) breast ca s/p left lumpectomy, radiation in 2005, questionable uterine cancer, presented to ED with c/o pain and stiffness of  upper and lower extremities especially in both fingers and toes  worsening since last week. In ED, patient had stable vitals, EKG- NSR @83BPM, LAD, bifascicular block,( unchanged from previous EKG) no ST T wave changes. UA- >50 WBC, moderate LE, Labs pertinent for WBC- 11, h/h-11.1/ 35, corrected sodium of 134, glucose of 307, anion gap of 10, bun/ creat- 54/3.48, alk phos-132 , s/p 1 dose of Rocephin, 2 L NS bolus, 8 units of humalog, 5 mg of valium in ED.  Pt admitted to medicine for evaluation of pain and stiffness in extremities and UTI. Pt symptoms resolved after 1 dose of calcium carbonate and initiation of gabapentin. Pt likely had episode of diabetic neuropathic pain.     UA of patient was positive for UTI. Urine cultures showed... ....................Pt also treated with rocephin for UTI for a total of 3 days.     Pt was seen by outpatient nephrologist and cardiologist during admission. Nephrologist Dr. Castanon stated that pt creatinine is back to baseline (approx 3), no more fluids are required, and to followup with Dr. Castanon in one week. Dr. Jaimes cardiologist also evaluated the patient, and we have recontinued patient's home dose of PO lasix 20mg daily as recommended by Dr. Jaimes. Dr. Jaimes stressed regular reporting of MEMS with patient.    Pt is stable and medically cleared for discharge. Pt to follow up with Dr. Castanon nephrologist in one week, and with Dr. Jaimes as scheduled. Discussed above with attending.         f/u nephrology consult- Dr. Sorenson  f/u vitamin b12, folate levels.      Problem/Plan - 4:  ·  Problem: DM (diabetes mellitus).  Plan: patient takes  lantus 40 units twice daily and humalog 20 units twice daily at home, started on HSS, continuing home dose of lantus  started on humalog 10 units premeals thrice daily  monitor accuchecks.  f/u a1c.      Problem/Plan - 5:  ·  Problem: Hyperlipidemia.  Plan: Pt ASCVD risk score approx 13%  suggested to pt to start with moderate-intensity statin as recommended for >75 years of age, however patient refused at this time, stating previous use of statin did not agree with her and made her feel "not right". Discussed risks and benefits with patient.     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 Plan; IMPROVED VTE score -2 , DVT prophylaxis with heparin SQ 77 Female, from home, lives with spouse, ambulates with the help of walker,  PMH of Asthma, anemia( on weekly ? procrit), CHF(last echo in 2015 s/o preserved EF) with MEMS, DM, CKD (unknown baseline) HTN( not on meds as BP running low) breast ca s/p left lumpectomy, radiation in 2005, questionable uterine cancer, presented to ED with c/o pain and stiffness of  upper and lower extremities especially in both fingers and toes  worsening since last week. In ED, patient had stable vitals, EKG- NSR @83BPM, LAD, bifascicular block,( unchanged from previous EKG) no ST T wave changes. UA- >50 WBC, moderate LE, Labs pertinent for WBC- 11, h/h-11.1/ 35, corrected sodium of 134, glucose of 307, anion gap of 10, bun/ creat- 54/3.48, alk phos-132 , s/p 1 dose of Rocephin, 2 L NS bolus, 8 units of humalog, 5 mg of valium in ED.  Pt admitted to medicine for evaluation of pain and stiffness in extremities and UTI. Pt symptoms resolved after 1 dose of calcium carbonate and initiation of gabapentin. Pt likely had episode of diabetic neuropathic pain.     UA of patient was positive for UTI and was treated with Rocephin for UTI for a total of 3 days.     Pt was seen by outpatient nephrologist and cardiologist during admission. Nephrologist Dr. Castanon stated that pt creatinine is back to baseline (approx 3), no more fluids are required, and to followup with Dr. Castanon in one week. Dr. Jaimes cardiologist also evaluated the patient, and we have recontinued patient's home dose of PO lasix 20mg daily as recommended by Dr. Jaimes. Dr. Jaimes stressed regular reporting of MEMS with patient.    Pt is stable and medically cleared for discharge. Pt to follow up with Dr. Castanon nephrologist in one week, and with Dr. Jaimes as scheduled. Discussed above with attending.

## 2017-09-22 NOTE — DISCHARGE NOTE ADULT - CARE PLAN
Principal Discharge DX:	Pain  Goal:	resolution  Instructions for follow-up, activity and diet:	Pain is likely secondary to diabetic neuropathy. Continue with gabapentin as prescribed  Secondary Diagnosis:	Acute kidney injury superimposed on chronic kidney disease  Goal:	Creatinine back to baseline of approx 3  Instructions for follow-up, activity and diet:	follow up with your Nephrologist Dr. Castanon within one week  Secondary Diagnosis:	Urinary tract infection with hematuria, site unspecified  Goal:	resolution  Instructions for follow-up, activity and diet:	follow up with your primary care doctor  Secondary Diagnosis:	Anemia  Goal:	Hgb>10  Instructions for follow-up, activity and diet:	continue with procrit injections  Secondary Diagnosis:	Hyperlipidemia  Instructions for follow-up, activity and diet:	It is recommended that you take a moderate intensity statin. You refused to take statin due to previous bad experience. Follow up with cardiologist for regular checkup of lipid profile.  Secondary Diagnosis:	Heart failure  Instructions for follow-up, activity and diet:	continue with medications as prescribed including lasix  MEMS reporting  Secondary Diagnosis:	DM (diabetes mellitus)  Instructions for follow-up, activity and diet:	continue with insulin as prescribed Principal Discharge DX:	Pain  Goal:	resolution  Instructions for follow-up, activity and diet:	Pain is likely secondary to diabetic neuropathy. Continue with gabapentin as prescribed  Secondary Diagnosis:	Acute kidney injury superimposed on chronic kidney disease  Goal:	Creatinine back to baseline of approx 3  Instructions for follow-up, activity and diet:	follow up with your Nephrologist Dr. Castanon within one week  Secondary Diagnosis:	Urinary tract infection with hematuria, site unspecified  Goal:	resolution  Instructions for follow-up, activity and diet:	follow up with your primary care doctor  Secondary Diagnosis:	Anemia  Goal:	Hgb>10  Instructions for follow-up, activity and diet:	continue with procrit injections  Secondary Diagnosis:	Hyperlipidemia  Instructions for follow-up, activity and diet:	It is recommended that you take a moderate intensity statin. You refused to take statin due to previous bad experience. Follow up with cardiologist for regular checkup of lipid profile.  Secondary Diagnosis:	Heart failure  Instructions for follow-up, activity and diet:	continue with medications as prescribed including lasix  MEMS reporting  Secondary Diagnosis:	DM (diabetes mellitus)  Instructions for follow-up, activity and diet:	continue with insulin as prescribed.     VITAMIN D SUPPLEMENT:  PLEASE TAKE 1 TAB ONCE A WEEK AND FOLLOW UP WITH YOUR PCP TO HAVE YOUR VITAMIN D LEVEL MEASURED.

## 2017-09-22 NOTE — DISCHARGE NOTE ADULT - SECONDARY DIAGNOSIS.
Acute kidney injury superimposed on chronic kidney disease Urinary tract infection with hematuria, site unspecified Anemia Hyperlipidemia Heart failure DM (diabetes mellitus)

## 2017-09-22 NOTE — PROGRESS NOTE ADULT - PROBLEM SELECTOR PLAN 4
patient takes  lantus 40 units twice daily and humalog 20 units twice daily at home, will start on HSS and continue home dose of lantus and start on humalog 10 units premeals thrice daily  monitor accuchecks.  f/u a1c patient takes  lantus 40 units twice daily and humalog 20 units twice daily at home, started on HSS, continuing home dose of lantus  started on humalog 10 units premeals thrice daily  monitor accuchecks.  f/u a1c

## 2017-09-23 LAB
ANION GAP SERPL CALC-SCNC: 8 MMOL/L — SIGNIFICANT CHANGE UP (ref 5–17)
BUN SERPL-MCNC: 57 MG/DL — HIGH (ref 7–18)
CALCIUM SERPL-MCNC: 8.4 MG/DL — SIGNIFICANT CHANGE UP (ref 8.4–10.5)
CHLORIDE SERPL-SCNC: 111 MMOL/L — HIGH (ref 96–108)
CO2 SERPL-SCNC: 18 MMOL/L — LOW (ref 22–31)
CREAT SERPL-MCNC: 3.45 MG/DL — HIGH (ref 0.5–1.3)
GLUCOSE SERPL-MCNC: 158 MG/DL — HIGH (ref 70–99)
POTASSIUM SERPL-MCNC: 4.9 MMOL/L — SIGNIFICANT CHANGE UP (ref 3.5–5.3)
POTASSIUM SERPL-SCNC: 4.9 MMOL/L — SIGNIFICANT CHANGE UP (ref 3.5–5.3)
SODIUM SERPL-SCNC: 137 MMOL/L — SIGNIFICANT CHANGE UP (ref 135–145)

## 2017-09-23 PROCEDURE — 99233 SBSQ HOSP IP/OBS HIGH 50: CPT | Mod: GC

## 2017-09-23 RX ORDER — ERGOCALCIFEROL 1.25 MG/1
50000 CAPSULE ORAL
Qty: 0 | Refills: 0 | Status: DISCONTINUED | OUTPATIENT
Start: 2017-09-23 | End: 2017-09-24

## 2017-09-23 RX ADMIN — GABAPENTIN 100 MILLIGRAM(S): 400 CAPSULE ORAL at 06:11

## 2017-09-23 RX ADMIN — BRIMONIDINE TARTRATE 1 DROP(S): 2 SOLUTION/ DROPS OPHTHALMIC at 06:12

## 2017-09-23 RX ADMIN — HEPARIN SODIUM 5000 UNIT(S): 5000 INJECTION INTRAVENOUS; SUBCUTANEOUS at 06:11

## 2017-09-23 RX ADMIN — Medication 10 UNIT(S): at 09:15

## 2017-09-23 RX ADMIN — ALBUTEROL 2 PUFF(S): 90 AEROSOL, METERED ORAL at 21:29

## 2017-09-23 RX ADMIN — BRIMONIDINE TARTRATE 1 DROP(S): 2 SOLUTION/ DROPS OPHTHALMIC at 18:14

## 2017-09-23 RX ADMIN — Medication 1 DROP(S): at 06:13

## 2017-09-23 RX ADMIN — Medication 1: at 18:10

## 2017-09-23 RX ADMIN — Medication 10 UNIT(S): at 18:10

## 2017-09-23 RX ADMIN — Medication 650 MILLIGRAM(S): at 12:38

## 2017-09-23 RX ADMIN — Medication: at 09:16

## 2017-09-23 RX ADMIN — Medication 10 UNIT(S): at 12:28

## 2017-09-23 RX ADMIN — INSULIN GLARGINE 40 UNIT(S): 100 INJECTION, SOLUTION SUBCUTANEOUS at 09:13

## 2017-09-23 RX ADMIN — HEPARIN SODIUM 5000 UNIT(S): 5000 INJECTION INTRAVENOUS; SUBCUTANEOUS at 18:11

## 2017-09-23 RX ADMIN — ALBUTEROL 2 PUFF(S): 90 AEROSOL, METERED ORAL at 12:41

## 2017-09-23 RX ADMIN — Medication 650 MILLIGRAM(S): at 18:17

## 2017-09-23 RX ADMIN — SENNA PLUS 2 TABLET(S): 8.6 TABLET ORAL at 21:30

## 2017-09-23 RX ADMIN — TIOTROPIUM BROMIDE 1 CAPSULE(S): 18 CAPSULE ORAL; RESPIRATORY (INHALATION) at 12:39

## 2017-09-23 RX ADMIN — INSULIN GLARGINE 40 UNIT(S): 100 INJECTION, SOLUTION SUBCUTANEOUS at 22:10

## 2017-09-23 RX ADMIN — Medication 650 MILLIGRAM(S): at 13:38

## 2017-09-23 RX ADMIN — GABAPENTIN 100 MILLIGRAM(S): 400 CAPSULE ORAL at 18:18

## 2017-09-23 RX ADMIN — ALBUTEROL 2 PUFF(S): 90 AEROSOL, METERED ORAL at 06:12

## 2017-09-23 RX ADMIN — ERGOCALCIFEROL 50000 UNIT(S): 1.25 CAPSULE ORAL at 12:32

## 2017-09-23 RX ADMIN — Medication 20 MILLIGRAM(S): at 06:11

## 2017-09-23 RX ADMIN — CEFTRIAXONE 100 GRAM(S): 500 INJECTION, POWDER, FOR SOLUTION INTRAMUSCULAR; INTRAVENOUS at 22:10

## 2017-09-23 RX ADMIN — Medication 3: at 12:29

## 2017-09-23 NOTE — DIETITIAN INITIAL EVALUATION ADULT. - DIET TYPE
consistent carbohydrate (evening snack)/no concentrated potassium/low sodium/DASH/TLC (sodium and cholesterol restricted diet)/no concentrated phosphorus

## 2017-09-23 NOTE — PHYSICAL THERAPY INITIAL EVALUATION ADULT - CRITERIA FOR SKILLED THERAPEUTIC INTERVENTIONS
impairments found/predicted duration of therapy intervention/rehab potential/therapy frequency/anticipated discharge recommendation

## 2017-09-23 NOTE — CHART NOTE - NSCHARTNOTEFT_GEN_A_CORE
Patient's blood pressure was running on lower side. Discharge postponed due to blood pressure. 74/42 repeat blood pressure was 91/52. will monitor patient for today. discussed with RN and Dr Ferguson.

## 2017-09-23 NOTE — DIETITIAN INITIAL EVALUATION ADULT. - PROBLEM SELECTOR PLAN 3
patient has CKD stage IV with baseline creatinine of  2.0- 3.0  from previous records and per nephrologist Dr Castanon  patient has creatinine of 3.48 on admission  FENA- 2.6%   will avoid nephrotoxic medications.  f/u BMP in AM.  Dr. Castanon outpatient nephrologist  nephrology consult- DR. Sorenson

## 2017-09-23 NOTE — PROGRESS NOTE ADULT - SUBJECTIVE AND OBJECTIVE BOX
MEDICAL ATTENDING NOTE    Patient is a 77y old  Female who presents with a chief complaint of pain and stiffness of upper and lower extremities especially in both fingers and toes (22 Sep 2017 17:53)      INTERVAL HPI/OVERNIGHT EVENTS: no new complaints    MEDICATIONS  (STANDING):  cefTRIAXone   IVPB 1 Gram(s) IV Intermittent every 24 hours  insulin lispro (HumaLOG) corrective regimen sliding scale   SubCutaneous three times a day before meals  heparin  Injectable 5000 Unit(s) SubCutaneous every 12 hours  insulin glargine Injectable (LANTUS) 40 Unit(s) SubCutaneous two times a day  ALBUTerol    90 MICROgram(s) HFA Inhaler 2 Puff(s) Inhalation every 8 hours  tiotropium 18 MICROgram(s) Capsule 1 Capsule(s) Inhalation daily  brimonidine 0.2% Ophthalmic Solution 1 Drop(s) Both EYES two times a day  timolol 0.5% Solution 1 Drop(s) Both EYES two times a day  insulin lispro Injectable (HumaLOG) 10 Unit(s) SubCutaneous three times a day before meals  gabapentin 100 milliGRAM(s) Oral two times a day  senna 2 Tablet(s) Oral at bedtime  furosemide    Tablet 20 milliGRAM(s) Oral daily  ergocalciferol 35035 Unit(s) Oral <User Schedule>    MEDICATIONS  (PRN):  acetaminophen   Tablet. 650 milliGRAM(s) Oral every 6 hours PRN Moderate Pain (4 - 6)      __________________________________________________  REVIEW OF SYSTEMS:    CONSTITUTIONAL: No fever,   EYES: no acute visual disturbances  RESPIRATORY: No cough; No shortness of breath  CARDIOVASCULAR: No chest pain, no palpitations  GASTROINTESTINAL: No abd. pain. No nausea or vomiting; No diarrhea   NEUROLOGICAL: No headache   MUSCULOSKELETAL: No joint pain, no muscle pain      ALL OTHER  ROS negative        Vital Signs Last 24 Hrs  T(C): 36.9 (22 Sep 2017 23:22), Max: 37.1 (22 Sep 2017 15:49)  T(F): 98.5 (22 Sep 2017 23:22), Max: 98.7 (22 Sep 2017 15:49)  HR: 78 (23 Sep 2017 08:26) (78 - 106)  BP: 118/74 (23 Sep 2017 08:26) (103/61 - 131/88)  BP(mean): --  RR: 18 (22 Sep 2017 23:22) (18 - 20)  SpO2: 98% (23 Sep 2017 08:26) (96% - 98%)    ________________________________________________  PHYSICAL EXAM:  GENERAL: NAD  HEENT: Normocephalic;  conjunctivae and sclerae clear; moist mucous membranes;   NECK : supple  CHEST/LUNG: Clear to auscultation ; no wheezing  HEART: S1 S2  regular;   ABDOMEN: Soft, Nontender, Nondistended; Bowel sounds present  EXTREMITIES: no cyanosis; no edema; no calf tenderness  SKIN: warm and dry  NERVOUS SYSTEM:  Awake and alert; Oriented  to place, person and time ; no new deficits    _________________________________________________  LABS:                        10.3   8.6   )-----------( 222      ( 22 Sep 2017 07:00 )             33.0     09-22    141  |  112<H>  |  50<H>  ----------------------------<  186<H>  4.3   |  18<L>  |  3.06<H>    Ca    8.8      22 Sep 2017 07:00  Phos  3.8     09-22  Mg     1.9     09-22    TPro  7.7  /  Alb  3.0<L>  /  TBili  0.3  /  DBili  x   /  AST  10  /  ALT  16  /  AlkPhos  132<H>  09-21            CAPILLARY BLOOD GLUCOSE  268 (23 Sep 2017 12:01)  231 (23 Sep 2017 08:48)  252 (22 Sep 2017 20:56)  229 (22 Sep 2017 17:06)            Consultant(s) Notes Reviewed:   YES    Care Discussed with Consultants :     Plan of care was discussed with patient ; all questions and concerns were addressed and care was aligned with patient's wishes.

## 2017-09-23 NOTE — PROGRESS NOTE ADULT - PROBLEM SELECTOR PLAN 4
Continue with Himalog and Lantus  Dose adjustments to optimize control  Discussed with patient in details about her uncontrolled diabetes and need for compliance. She expressed that she has no been very compliant with her diet.

## 2017-09-23 NOTE — PHYSICAL THERAPY INITIAL EVALUATION ADULT - ACTIVE RANGE OF MOTION EXAMINATION, REHAB EVAL
bilateral upper extremity Active ROM was WFL (within functional limits)/bilateral  lower extremity Active ROM was WFL (within functional limits)/with C/O pain

## 2017-09-23 NOTE — PROGRESS NOTE ADULT - PROBLEM SELECTOR PLAN 6
chronic diastolic heart failure- stable. Continue regular home cardiac meds.  No signs of acute decompensation

## 2017-09-23 NOTE — PROGRESS NOTE ADULT - PROBLEM SELECTOR PLAN 5
Patient declined statins; low cholesterol diet recommended.  risk/benefits have been discussed and understood.

## 2017-09-23 NOTE — DIETITIAN INITIAL EVALUATION ADULT. - PROBLEM SELECTOR PLAN 1
patient has positive UA with >50 WBC and moderate LE.  s/p 1 dose of Rocephin in ED  will start on Iv Rocephin 1 gm Q24  f/u urine cultures

## 2017-09-23 NOTE — DIETITIAN INITIAL EVALUATION ADULT. - PROBLEM SELECTOR PLAN 2
pain and stiffness of both upper extremity and lower extremity especially in fingers and toes, likely due to osteoarthritis.  Also possibly related to diabetic neuropathy, possible hypocalcemic  induced stiffness , possible vitamin deficiency.  -O/e- neurological exam is intact, no Chvostek sign or trousseau sign , but while examining patient has about 2-3 episodes of stiffness of both thumbs leading to adduction .  EKG-  NSR @83BPM, LAD, bifascicular block,( unchanged from previous EKG) no ST T wave changes.  will give 1 dose of oral calcium carbonate  will start on gabapentin and pain control with Tylenol  f/u vitamin b12, folate levels.

## 2017-09-23 NOTE — PROGRESS NOTE ADULT - SUBJECTIVE AND OBJECTIVE BOX
no tele    no palpitations, no syncope, no angina    cefTRIAXone   IVPB 1 Gram(s) IV Intermittent every 24 hours  insulin lispro (HumaLOG) corrective regimen sliding scale   SubCutaneous three times a day before meals  heparin  Injectable 5000 Unit(s) SubCutaneous every 12 hours  insulin glargine Injectable (LANTUS) 40 Unit(s) SubCutaneous two times a day  ALBUTerol    90 MICROgram(s) HFA Inhaler 2 Puff(s) Inhalation every 8 hours  tiotropium 18 MICROgram(s) Capsule 1 Capsule(s) Inhalation daily  brimonidine 0.2% Ophthalmic Solution 1 Drop(s) Both EYES two times a day  timolol 0.5% Solution 1 Drop(s) Both EYES two times a day  insulin lispro Injectable (HumaLOG) 10 Unit(s) SubCutaneous three times a day before meals  gabapentin 100 milliGRAM(s) Oral two times a day  acetaminophen   Tablet. 650 milliGRAM(s) Oral every 6 hours PRN  senna 2 Tablet(s) Oral at bedtime  furosemide    Tablet 20 milliGRAM(s) Oral daily  ergocalciferol 60296 Unit(s) Oral <User Schedule>                            10.3   8.6   )-----------( 222      ( 22 Sep 2017 07:00 )             33.0       09-22    141  |  112<H>  |  50<H>  ----------------------------<  186<H>  4.3   |  18<L>  |  3.06<H>    Ca    8.8      22 Sep 2017 07:00  Phos  3.8     09-22  Mg     1.9     09-22    TPro  7.7  /  Alb  3.0<L>  /  TBili  0.3  /  DBili  x   /  AST  10  /  ALT  16  /  AlkPhos  132<H>  09-21      T(C): 36.9 (09-22-17 @ 23:22), Max: 37.1 (09-22-17 @ 15:49)  HR: 78 (09-23-17 @ 08:26) (78 - 106)  BP: 118/74 (09-23-17 @ 08:26) (103/61 - 131/88)  RR: 18 (09-22-17 @ 23:22) (18 - 20)  SpO2: 98% (09-23-17 @ 08:26) (96% - 98%)  Wt(kg): --    no JVD  RRR, no murmurs  CTAB  soft nt/nd  +1 edema    A/P) 76 y/o female PMH diastolic heart failure, DM, CKD, HTN, has cardioMEMS admitted with upper/lower ext stiffness. Is stable from CV perspective.    -continue lasix 20mg po daily  -no further inpatient cardiac workup needed  -f/u with Josue on October 2nd at 2:15 PM

## 2017-09-23 NOTE — DIETITIAN INITIAL EVALUATION ADULT. - PROBLEM SELECTOR PLAN 7
not in exacerbation  patient takes Advair and tiotropium inhalers at home  will continue home dose of advair and tiotropium inhalers

## 2017-09-23 NOTE — DIETITIAN INITIAL EVALUATION ADULT. - OTHER INFO
Pt visited. Reports H/O DM x 32 years. IDDM.  Appetite is Good. Per pt for  Last week Pt have not been checking Acc checks  for a week since pt does not have  strips/Pt admitts to Eating Lot of ROTIS and Carbs.But have decreased in amount lately. Food choices Obtained

## 2017-09-23 NOTE — PROGRESS NOTE ADULT - PROBLEM SELECTOR PLAN 8
patient has baseline anemia of 9-10 based on previous records  H/h on admission is 11.1/35  on weekly injections of procrit   likely multiple etiology- AOCD secondary to CKD, iron deficiency- hypochromic  this am Hgb 10.3 in AM, MCV 75  low serum iron and iron sat  f/u anemia panel

## 2017-09-23 NOTE — DIETITIAN INITIAL EVALUATION ADULT. - PROBLEM SELECTOR PLAN 4
patient takes  Lantus 40 units twice daily and Humalog 20 units twice daily at home, will start on HSS and continue home dose of Lantus and start on Humalog 10 units pre meals thrice daily  monitor accuchecks.  f/u a1c

## 2017-09-23 NOTE — PROGRESS NOTE ADULT - ATTENDING COMMENTS
DISPO- OOB to chair.  She was scheduled for discharge today but told by nursing staff that systolic BP was 70's earlier this morning. Repeat blood pressure is fine. She is asymptomatic.  will monitor for few hours in view of transient hypotension.  If stable, will likely discharge later today.

## 2017-09-24 VITALS
TEMPERATURE: 98 F | RESPIRATION RATE: 15 BRPM | HEART RATE: 68 BPM | SYSTOLIC BLOOD PRESSURE: 95 MMHG | DIASTOLIC BLOOD PRESSURE: 68 MMHG | OXYGEN SATURATION: 99 %

## 2017-09-24 LAB
CULTURE RESULTS: SIGNIFICANT CHANGE UP
SPECIMEN SOURCE: SIGNIFICANT CHANGE UP

## 2017-09-24 PROCEDURE — 99239 HOSP IP/OBS DSCHRG MGMT >30: CPT

## 2017-09-24 RX ORDER — CHOLECALCIFEROL (VITAMIN D3) 125 MCG
1 CAPSULE ORAL
Qty: 4 | Refills: 0 | OUTPATIENT
Start: 2017-09-24 | End: 2017-10-24

## 2017-09-24 RX ORDER — GABAPENTIN 400 MG/1
1 CAPSULE ORAL
Qty: 0 | Refills: 0 | DISCHARGE
Start: 2017-09-24 | End: 2017-10-24

## 2017-09-24 RX ORDER — TIOTROPIUM BROMIDE 18 UG/1
1 CAPSULE ORAL; RESPIRATORY (INHALATION)
Qty: 1 | Refills: 0
Start: 2017-09-24 | End: 2017-10-08

## 2017-09-24 RX ORDER — FUROSEMIDE 40 MG
1 TABLET ORAL
Qty: 14 | Refills: 0 | OUTPATIENT
Start: 2017-09-24 | End: 2017-10-08

## 2017-09-24 RX ORDER — GABAPENTIN 400 MG/1
1 CAPSULE ORAL
Qty: 60 | Refills: 0
Start: 2017-09-24 | End: 2017-10-24

## 2017-09-24 RX ADMIN — Medication 4: at 12:26

## 2017-09-24 RX ADMIN — Medication 10 UNIT(S): at 12:26

## 2017-09-24 RX ADMIN — ALBUTEROL 2 PUFF(S): 90 AEROSOL, METERED ORAL at 05:44

## 2017-09-24 RX ADMIN — INSULIN GLARGINE 40 UNIT(S): 100 INJECTION, SOLUTION SUBCUTANEOUS at 08:32

## 2017-09-24 RX ADMIN — Medication 2: at 08:36

## 2017-09-24 RX ADMIN — Medication 10 UNIT(S): at 08:31

## 2017-09-24 RX ADMIN — HEPARIN SODIUM 5000 UNIT(S): 5000 INJECTION INTRAVENOUS; SUBCUTANEOUS at 05:45

## 2017-09-24 RX ADMIN — TIOTROPIUM BROMIDE 1 CAPSULE(S): 18 CAPSULE ORAL; RESPIRATORY (INHALATION) at 12:27

## 2017-09-24 RX ADMIN — ALBUTEROL 2 PUFF(S): 90 AEROSOL, METERED ORAL at 13:44

## 2017-09-24 RX ADMIN — Medication 1 DROP(S): at 06:21

## 2017-09-24 RX ADMIN — GABAPENTIN 100 MILLIGRAM(S): 400 CAPSULE ORAL at 05:45

## 2017-09-24 RX ADMIN — BRIMONIDINE TARTRATE 1 DROP(S): 2 SOLUTION/ DROPS OPHTHALMIC at 05:45

## 2017-09-24 NOTE — PROGRESS NOTE ADULT - ASSESSMENT
77 Female, from home, lives with spouse, ambulates with the help of walker,  PMH of Asthma, anemia( on weekly ? procrit), CHF(last echo in 2015 s/o preserved EF), DM, CKD (unknown baseline) HTN( not on meds as BP running low) breast ca s/p left lumpectomy, radiation in 2005, questionable uterine cancer presented to ED with c/o pain and stiffness of  upper and lower extremities especially in both fingers and toes  worsening since last week.
77 Female, from home, lives with spouse, ambulates with the help of walker,  PMH of Asthma, anemia( on weekly ? procrit), CHF(last echo in 2015 s/o preserved EF), DM, CKD (unknown baseline) HTN( not on meds as BP running low) breast ca s/p left lumpectomy, radiation in 2005, questionable uterine cancer presented to ED with c/o pain and stiffness of  upper and lower extremities especially in both fingers and toes  worsening since last week.
A/P   PT WITH  CKD 4   CR YESTERDAY WAS 3.4,  WILL FOLLOW  IN  OFF   HAS NO VOL OVERLOAD   BP  USUALLY  RUNS LOW ON  HER, IS ASYMPTOMATIC  TREATED FOR UTI  IS BEING D/C HOME TODAY, HAS APT TO SEE ME IN OFFICE
77 Female, from home, lives with spouse, ambulates with the help of walker,  PMH of Asthma, anemia( on weekly ? procrit), CHF(last echo in 2015 s/o preserved EF), DM, CKD (unknown baseline) HTN( not on meds as BP running low) breast ca s/p left lumpectomy, radiation in 2005, questionable uterine cancer presented to ED with c/o pain and stiffness of  upper and lower extremities especially in both fingers and toes  worsening since last week.     In ED, patient had stable vitals, EKG- NSR @83BPM, LAD, bifascicular block,( unchanged from previous EKG) no ST T wave changes. UA- >50 WBC, moderate LE, Labs pertinent for WBC- 11, h/h-11.1/ 35, corrected sodium of 134, glucose of 307, anion gap of 10, bun/ creat- 54/3.48, alk phos-132 , s/p 1 dose of rocephin, 2 L NS bolus, 8 units of humalog, 5 mg of valium in ED      will admit to medicine for evaluation of pain and stiffness in extremities and UTI.

## 2017-09-24 NOTE — PROGRESS NOTE ADULT - SUBJECTIVE AND OBJECTIVE BOX
MEDICAL ATTENDING NOTE    Patient is a 77y old  Female who presents with a chief complaint of pain and stiffness of upper and lower extremities especially in both fingers and toes (22 Sep 2017 17:53)      INTERVAL HPI/OVERNIGHT EVENTS: no new complaints    MEDICATIONS  (STANDING):  cefTRIAXone   IVPB 1 Gram(s) IV Intermittent every 24 hours  insulin lispro (HumaLOG) corrective regimen sliding scale   SubCutaneous three times a day before meals  heparin  Injectable 5000 Unit(s) SubCutaneous every 12 hours  insulin glargine Injectable (LANTUS) 40 Unit(s) SubCutaneous two times a day  ALBUTerol    90 MICROgram(s) HFA Inhaler 2 Puff(s) Inhalation every 8 hours  tiotropium 18 MICROgram(s) Capsule 1 Capsule(s) Inhalation daily  brimonidine 0.2% Ophthalmic Solution 1 Drop(s) Both EYES two times a day  timolol 0.5% Solution 1 Drop(s) Both EYES two times a day  insulin lispro Injectable (HumaLOG) 10 Unit(s) SubCutaneous three times a day before meals  gabapentin 100 milliGRAM(s) Oral two times a day  senna 2 Tablet(s) Oral at bedtime  furosemide    Tablet 20 milliGRAM(s) Oral daily  ergocalciferol 47495 Unit(s) Oral <User Schedule>    MEDICATIONS  (PRN):  acetaminophen   Tablet. 650 milliGRAM(s) Oral every 6 hours PRN Moderate Pain (4 - 6)      __________________________________________________  REVIEW OF SYSTEMS:    CONSTITUTIONAL: No fever,   EYES: no acute visual disturbances  RESPIRATORY: No cough; No shortness of breath  CARDIOVASCULAR: No chest pain, no palpitations  GASTROINTESTINAL: No abd. pain. No nausea or vomiting; No diarrhea   NEUROLOGICAL: No headache   MUSCULOSKELETAL: occasional joint pains but relieved with Tylenol      ALL OTHER  ROS negative        Vital Signs Last 24 Hrs  T(C): 36.7 (24 Sep 2017 08:16), Max: 37 (23 Sep 2017 16:03)  T(F): 98.1 (24 Sep 2017 08:16), Max: 98.6 (23 Sep 2017 16:03)  HR: 100 (24 Sep 2017 08:16) (73 - 100)  BP: 85/44 (24 Sep 2017 08:16) (70/44 - 103/60)  BP(mean): --  RR: 16 (24 Sep 2017 08:16) (16 - 16)  SpO2: 98% (24 Sep 2017 08:16) (97% - 98%)    ________________________________________________  PHYSICAL EXAM:  GENERAL: NAD; obese  HEENT: Normocephalic;  conjunctivae and sclerae clear; moist mucous membranes;   NECK : supple  CHEST/LUNG: Clear to auscultation ; no wheezing  HEART: S1 S2  regular;   ABDOMEN: Soft, Nontender, Nondistended; Bowel sounds present  EXTREMITIES: no cyanosis; no edema; no calf tenderness  SKIN: warm and dry  NERVOUS SYSTEM:  Awake and alert; Oriented  to place, person and time ; no new deficits    _________________________________________________  LABS:    09-23    137  |  111<H>  |  57<H>  ----------------------------<  158<H>  4.9   |  18<L>  |  3.45<H>    Ca    8.4      23 Sep 2017 15:03          CAPILLARY BLOOD GLUCOSE  336 (24 Sep 2017 11:29)  226 (24 Sep 2017 08:15)  212 (23 Sep 2017 21:46)  160 (23 Sep 2017 16:18)            RADIOLOGY & ADDITIONAL TESTS:    Imaging Personally Reviewed:  YES    Consultant(s) Notes Reviewed:   YES    Care Discussed with Consultants :     Plan of care was discussed with patient ; all questions and concerns were addressed and care was aligned with patient's wishes.

## 2017-09-24 NOTE — PROGRESS NOTE ADULT - PROBLEM SELECTOR PLAN 4
uncontrolled T2DM with A1c >13  Continue with Humalog and Lantus  Dose adjustments to optimize control  diabetes counselling done

## 2017-09-24 NOTE — PROGRESS NOTE ADULT - PROBLEM SELECTOR PLAN 1
complete antibiotics today
completed antibiotics
patient has positive UA with >50 WBC and moderate LE.  s/p 1 dose of rocephin in ED  continue with Iv rocephin 1 gm Q24  f/u urine cultures

## 2017-09-24 NOTE — PROGRESS NOTE ADULT - SUBJECTIVE AND OBJECTIVE BOX
Patient is a 77y Female with  ARF  ,  HAS H/O CKD 4    HER D/C  HOME WAS DELAYED  YESTERDAY AS HER BP  WAS NOTED TO  BE LOW  IS READY  TO GO  HOME TODAY   FEELS BETTER, JT PAINS IMPROVED  No Known Allergies    Hospital Medications:   MEDICATIONS  (STANDING):  cefTRIAXone   IVPB 1 Gram(s) IV Intermittent every 24 hours  insulin lispro (HumaLOG) corrective regimen sliding scale   SubCutaneous three times a day before meals  heparin  Injectable 5000 Unit(s) SubCutaneous every 12 hours  insulin glargine Injectable (LANTUS) 40 Unit(s) SubCutaneous two times a day  ALBUTerol    90 MICROgram(s) HFA Inhaler 2 Puff(s) Inhalation every 8 hours  tiotropium 18 MICROgram(s) Capsule 1 Capsule(s) Inhalation daily  brimonidine 0.2% Ophthalmic Solution 1 Drop(s) Both EYES two times a day  timolol 0.5% Solution 1 Drop(s) Both EYES two times a day  insulin lispro Injectable (HumaLOG) 10 Unit(s) SubCutaneous three times a day before meals  gabapentin 100 milliGRAM(s) Oral two times a day  senna 2 Tablet(s) Oral at bedtime  furosemide    Tablet 20 milliGRAM(s) Oral daily  ergocalciferol 69347 Unit(s) Oral <User Schedule>    REVIEW OF SYSTEMS:  IS AFEBRILE  HAS NO  COUGH, SOB OR  CH PAIN  NO DIZINESS   APPETITE IS GOOD,  NO N/V  NO URINARY COMPLAINTS  VITALS:  T(F): 98.1 (17 @ 08:16), Max: 98.6 (17 @ 16:03)  HR: 100 (17 @ 08:16)  BP: 85/44 (17 @ 08:16)  RR: 16 (17 @ 08:16)  SpO2: 98% (17 @ 08:16)  Wt(kg): --      PHYSICAL EXAM:  Constitutional: NAD  HEENT: CONJUNCTIVA PINK  Neck: No JVD  Respiratory: CTAB, no wheezes, rales or rhonchi  Cardiovascular: S1, S2, RRR  Gastrointestinal: BS+, soft, NT/ND  Extremities: No peripheral edema  Neurological: A/O x 3,    No haas.   LABS:      137  |  111<H>  |  57<H>  ----------------------------<  158<H>  4.9   |  18<L>  |  3.45<H>    Ca    8.4      23 Sep 2017 15:03      Creatinine Trend: 3.45 <--, 3.06 <--, 3.48 <--    Urine Studies:  Urinalysis Basic - ( 21 Sep 2017 21:58 )    Color: Yellow / Appearance:  / S.020 / pH:   Gluc:  / Ketone: Negative  / Bili: Negative / Urobili: Negative   Blood:  / Protein: 500 mg/dL / Nitrite: Negative   Leuk Esterase: Moderate / RBC: 0-2 /HPF / WBC >50 /HPF   Sq Epi:  / Non Sq Epi: Few / Bacteria: Many /HPF      Sodium, Random Urine: 34 mmol/L ( @ 04:00)  Osmolality, Random Urine: 197 mos/kg ( @ 04:00)  Potassium, Random Urine: 6 mmol/L ( @ 04:00)  Creatinine, Random Urine: 32 mg/dL ( @ 04:00)    RADIOLOGY & ADDITIONAL STUDIES:

## 2017-09-24 NOTE — PROGRESS NOTE ADULT - ATTENDING COMMENTS
DISPO- Bp improved today; likely her baseline SBP is between 90- 110. she remains asymptomatic at this time. She was able to ambulate >100 feet with me using her cane.  I will discharge her home today and have recommended that she should followup with her PMD within 1 week. Daughter and son were presents at bedside.  Plan discussed with patient and family in details at bedside and all their questions / concerns were addressed  Discussed with nursing staff

## 2017-09-24 NOTE — PROGRESS NOTE ADULT - PROBLEM SELECTOR PLAN 10
IMPROVED VTE score -2 , DVT prophylaxis with heparin SQ

## 2017-09-26 DIAGNOSIS — J45.909 UNSPECIFIED ASTHMA, UNCOMPLICATED: ICD-10-CM

## 2017-09-26 DIAGNOSIS — D63.1 ANEMIA IN CHRONIC KIDNEY DISEASE: ICD-10-CM

## 2017-09-26 DIAGNOSIS — I13.0 HYPERTENSIVE HEART AND CHRONIC KIDNEY DISEASE WITH HEART FAILURE AND STAGE 1 THROUGH STAGE 4 CHRONIC KIDNEY DISEASE, OR UNSPECIFIED CHRONIC KIDNEY DISEASE: ICD-10-CM

## 2017-09-26 DIAGNOSIS — I95.9 HYPOTENSION, UNSPECIFIED: ICD-10-CM

## 2017-09-26 DIAGNOSIS — M19.90 UNSPECIFIED OSTEOARTHRITIS, UNSPECIFIED SITE: ICD-10-CM

## 2017-09-26 DIAGNOSIS — N17.9 ACUTE KIDNEY FAILURE, UNSPECIFIED: ICD-10-CM

## 2017-09-26 DIAGNOSIS — I50.32 CHRONIC DIASTOLIC (CONGESTIVE) HEART FAILURE: ICD-10-CM

## 2017-09-26 DIAGNOSIS — Z85.3 PERSONAL HISTORY OF MALIGNANT NEOPLASM OF BREAST: ICD-10-CM

## 2017-09-26 DIAGNOSIS — N39.0 URINARY TRACT INFECTION, SITE NOT SPECIFIED: ICD-10-CM

## 2017-09-26 DIAGNOSIS — N18.4 CHRONIC KIDNEY DISEASE, STAGE 4 (SEVERE): ICD-10-CM

## 2017-09-26 DIAGNOSIS — E11.40 TYPE 2 DIABETES MELLITUS WITH DIABETIC NEUROPATHY, UNSPECIFIED: ICD-10-CM

## 2017-09-26 DIAGNOSIS — N28.1 CYST OF KIDNEY, ACQUIRED: ICD-10-CM

## 2017-09-26 DIAGNOSIS — E11.22 TYPE 2 DIABETES MELLITUS WITH DIABETIC CHRONIC KIDNEY DISEASE: ICD-10-CM

## 2017-09-26 DIAGNOSIS — R31.9 HEMATURIA, UNSPECIFIED: ICD-10-CM

## 2017-10-19 PROCEDURE — 83735 ASSAY OF MAGNESIUM: CPT

## 2017-10-19 PROCEDURE — 80061 LIPID PANEL: CPT

## 2017-10-19 PROCEDURE — 82607 VITAMIN B-12: CPT

## 2017-10-19 PROCEDURE — 81001 URINALYSIS AUTO W/SCOPE: CPT

## 2017-10-19 PROCEDURE — 82306 VITAMIN D 25 HYDROXY: CPT

## 2017-10-19 PROCEDURE — 94640 AIRWAY INHALATION TREATMENT: CPT

## 2017-10-19 PROCEDURE — 85045 AUTOMATED RETICULOCYTE COUNT: CPT

## 2017-10-19 PROCEDURE — 82728 ASSAY OF FERRITIN: CPT

## 2017-10-19 PROCEDURE — 84100 ASSAY OF PHOSPHORUS: CPT

## 2017-10-19 PROCEDURE — 87086 URINE CULTURE/COLONY COUNT: CPT

## 2017-10-19 PROCEDURE — 83036 HEMOGLOBIN GLYCOSYLATED A1C: CPT

## 2017-10-19 PROCEDURE — 96374 THER/PROPH/DIAG INJ IV PUSH: CPT

## 2017-10-19 PROCEDURE — 82570 ASSAY OF URINE CREATININE: CPT

## 2017-10-19 PROCEDURE — 93005 ELECTROCARDIOGRAM TRACING: CPT

## 2017-10-19 PROCEDURE — 84133 ASSAY OF URINE POTASSIUM: CPT

## 2017-10-19 PROCEDURE — 84300 ASSAY OF URINE SODIUM: CPT

## 2017-10-19 PROCEDURE — 83935 ASSAY OF URINE OSMOLALITY: CPT

## 2017-10-19 PROCEDURE — 82746 ASSAY OF FOLIC ACID SERUM: CPT

## 2017-10-19 PROCEDURE — 99285 EMERGENCY DEPT VISIT HI MDM: CPT | Mod: 25

## 2017-10-19 PROCEDURE — 71045 X-RAY EXAM CHEST 1 VIEW: CPT

## 2017-10-19 PROCEDURE — 80048 BASIC METABOLIC PNL TOTAL CA: CPT

## 2017-10-19 PROCEDURE — 85027 COMPLETE CBC AUTOMATED: CPT

## 2017-10-19 PROCEDURE — 84443 ASSAY THYROID STIM HORMONE: CPT

## 2017-10-19 PROCEDURE — 96372 THER/PROPH/DIAG INJ SC/IM: CPT | Mod: XU

## 2017-10-19 PROCEDURE — 84156 ASSAY OF PROTEIN URINE: CPT

## 2017-10-19 PROCEDURE — 80053 COMPREHEN METABOLIC PANEL: CPT

## 2017-10-19 PROCEDURE — 83550 IRON BINDING TEST: CPT

## 2017-11-29 ENCOUNTER — INPATIENT (INPATIENT)
Facility: HOSPITAL | Age: 77
LOS: 1 days | Discharge: HOME | DRG: 291 | End: 2017-12-01
Attending: HOSPITALIST | Admitting: HOSPITALIST
Payer: COMMERCIAL

## 2017-11-29 VITALS
TEMPERATURE: 98 F | OXYGEN SATURATION: 97 % | HEIGHT: 65 IN | WEIGHT: 203.05 LBS | DIASTOLIC BLOOD PRESSURE: 86 MMHG | SYSTOLIC BLOOD PRESSURE: 138 MMHG | HEART RATE: 104 BPM | RESPIRATION RATE: 20 BRPM

## 2017-11-29 DIAGNOSIS — J45.909 UNSPECIFIED ASTHMA, UNCOMPLICATED: ICD-10-CM

## 2017-11-29 DIAGNOSIS — I50.9 HEART FAILURE, UNSPECIFIED: ICD-10-CM

## 2017-11-29 DIAGNOSIS — I10 ESSENTIAL (PRIMARY) HYPERTENSION: ICD-10-CM

## 2017-11-29 DIAGNOSIS — N18.9 CHRONIC KIDNEY DISEASE, UNSPECIFIED: ICD-10-CM

## 2017-11-29 DIAGNOSIS — D64.9 ANEMIA, UNSPECIFIED: ICD-10-CM

## 2017-11-29 DIAGNOSIS — Z29.9 ENCOUNTER FOR PROPHYLACTIC MEASURES, UNSPECIFIED: ICD-10-CM

## 2017-11-29 DIAGNOSIS — E11.9 TYPE 2 DIABETES MELLITUS WITHOUT COMPLICATIONS: ICD-10-CM

## 2017-11-29 LAB
ALBUMIN SERPL ELPH-MCNC: 2.9 G/DL — LOW (ref 3.5–5)
ALP SERPL-CCNC: 98 U/L — SIGNIFICANT CHANGE UP (ref 40–120)
ALT FLD-CCNC: 18 U/L DA — SIGNIFICANT CHANGE UP (ref 10–60)
ANION GAP SERPL CALC-SCNC: 10 MMOL/L — SIGNIFICANT CHANGE UP (ref 5–17)
AST SERPL-CCNC: 10 U/L — SIGNIFICANT CHANGE UP (ref 10–40)
BASOPHILS # BLD AUTO: 0.1 K/UL — SIGNIFICANT CHANGE UP (ref 0–0.2)
BASOPHILS NFR BLD AUTO: 0.9 % — SIGNIFICANT CHANGE UP (ref 0–2)
BILIRUB SERPL-MCNC: 0.3 MG/DL — SIGNIFICANT CHANGE UP (ref 0.2–1.2)
BUN SERPL-MCNC: 55 MG/DL — HIGH (ref 7–18)
CALCIUM SERPL-MCNC: 8.4 MG/DL — SIGNIFICANT CHANGE UP (ref 8.4–10.5)
CHLORIDE SERPL-SCNC: 112 MMOL/L — HIGH (ref 96–108)
CK SERPL-CCNC: 124 U/L — SIGNIFICANT CHANGE UP (ref 21–215)
CO2 SERPL-SCNC: 17 MMOL/L — LOW (ref 22–31)
CREAT SERPL-MCNC: 3.61 MG/DL — HIGH (ref 0.5–1.3)
EOSINOPHIL # BLD AUTO: 0.4 K/UL — SIGNIFICANT CHANGE UP (ref 0–0.5)
EOSINOPHIL NFR BLD AUTO: 3.4 % — SIGNIFICANT CHANGE UP (ref 0–6)
GLUCOSE SERPL-MCNC: 390 MG/DL — HIGH (ref 70–99)
HCT VFR BLD CALC: 31.4 % — LOW (ref 34.5–45)
HGB BLD-MCNC: 9.3 G/DL — LOW (ref 11.5–15.5)
LYMPHOCYTES # BLD AUTO: 16.2 % — SIGNIFICANT CHANGE UP (ref 13–44)
LYMPHOCYTES # BLD AUTO: 2 K/UL — SIGNIFICANT CHANGE UP (ref 1–3.3)
MCHC RBC-ENTMCNC: 23.3 PG — LOW (ref 27–34)
MCHC RBC-ENTMCNC: 29.5 GM/DL — LOW (ref 32–36)
MCV RBC AUTO: 79.1 FL — LOW (ref 80–100)
MONOCYTES # BLD AUTO: 0.7 K/UL — SIGNIFICANT CHANGE UP (ref 0–0.9)
MONOCYTES NFR BLD AUTO: 5.9 % — SIGNIFICANT CHANGE UP (ref 2–14)
NEUTROPHILS # BLD AUTO: 8.9 K/UL — HIGH (ref 1.8–7.4)
NEUTROPHILS NFR BLD AUTO: 73.6 % — SIGNIFICANT CHANGE UP (ref 43–77)
NT-PROBNP SERPL-SCNC: 3409 PG/ML — HIGH (ref 0–450)
PLATELET # BLD AUTO: 219 K/UL — SIGNIFICANT CHANGE UP (ref 150–400)
POTASSIUM SERPL-MCNC: 5.4 MMOL/L — HIGH (ref 3.5–5.3)
POTASSIUM SERPL-SCNC: 5.4 MMOL/L — HIGH (ref 3.5–5.3)
PROT SERPL-MCNC: 7.4 G/DL — SIGNIFICANT CHANGE UP (ref 6–8.3)
RBC # BLD: 3.97 M/UL — SIGNIFICANT CHANGE UP (ref 3.8–5.2)
RBC # FLD: 18.1 % — HIGH (ref 10.3–14.5)
SODIUM SERPL-SCNC: 139 MMOL/L — SIGNIFICANT CHANGE UP (ref 135–145)
TROPONIN I SERPL-MCNC: 0.12 NG/ML — HIGH (ref 0–0.04)
WBC # BLD: 12.1 K/UL — HIGH (ref 3.8–10.5)
WBC # FLD AUTO: 12.1 K/UL — HIGH (ref 3.8–10.5)

## 2017-11-29 PROCEDURE — 71010: CPT | Mod: 26

## 2017-11-29 PROCEDURE — 99285 EMERGENCY DEPT VISIT HI MDM: CPT

## 2017-11-29 RX ORDER — INSULIN LISPRO 100/ML
VIAL (ML) SUBCUTANEOUS
Qty: 0 | Refills: 0 | Status: DISCONTINUED | OUTPATIENT
Start: 2017-11-29 | End: 2017-11-29

## 2017-11-29 RX ORDER — ASPIRIN/CALCIUM CARB/MAGNESIUM 324 MG
162 TABLET ORAL ONCE
Qty: 0 | Refills: 0 | Status: COMPLETED | OUTPATIENT
Start: 2017-11-29 | End: 2017-11-29

## 2017-11-29 RX ORDER — FUROSEMIDE 40 MG
40 TABLET ORAL ONCE
Qty: 0 | Refills: 0 | Status: DISCONTINUED | OUTPATIENT
Start: 2017-11-29 | End: 2017-11-29

## 2017-11-29 RX ORDER — ERGOCALCIFEROL 1.25 MG/1
50000 CAPSULE ORAL
Qty: 0 | Refills: 0 | Status: DISCONTINUED | OUTPATIENT
Start: 2017-11-29 | End: 2017-12-01

## 2017-11-29 RX ORDER — FUROSEMIDE 40 MG
60 TABLET ORAL ONCE
Qty: 0 | Refills: 0 | Status: COMPLETED | OUTPATIENT
Start: 2017-11-29 | End: 2017-11-29

## 2017-11-29 RX ORDER — INSULIN LISPRO 100/ML
VIAL (ML) SUBCUTANEOUS
Qty: 0 | Refills: 0 | Status: DISCONTINUED | OUTPATIENT
Start: 2017-11-29 | End: 2017-12-01

## 2017-11-29 RX ORDER — INSULIN LISPRO 100/ML
7 VIAL (ML) SUBCUTANEOUS
Qty: 0 | Refills: 0 | Status: DISCONTINUED | OUTPATIENT
Start: 2017-11-29 | End: 2017-11-30

## 2017-11-29 RX ORDER — ALBUTEROL 90 UG/1
2 AEROSOL, METERED ORAL EVERY 6 HOURS
Qty: 0 | Refills: 0 | Status: DISCONTINUED | OUTPATIENT
Start: 2017-11-29 | End: 2017-12-01

## 2017-11-29 RX ORDER — TIOTROPIUM BROMIDE 18 UG/1
1 CAPSULE ORAL; RESPIRATORY (INHALATION) DAILY
Qty: 0 | Refills: 0 | Status: DISCONTINUED | OUTPATIENT
Start: 2017-11-29 | End: 2017-12-01

## 2017-11-29 RX ORDER — HEPARIN SODIUM 5000 [USP'U]/ML
5000 INJECTION INTRAVENOUS; SUBCUTANEOUS EVERY 8 HOURS
Qty: 0 | Refills: 0 | Status: DISCONTINUED | OUTPATIENT
Start: 2017-11-29 | End: 2017-12-01

## 2017-11-29 RX ORDER — INSULIN LISPRO 100/ML
8 VIAL (ML) SUBCUTANEOUS ONCE
Qty: 0 | Refills: 0 | Status: COMPLETED | OUTPATIENT
Start: 2017-11-29 | End: 2017-11-29

## 2017-11-29 RX ORDER — TIMOLOL 0.5 %
1 DROPS OPHTHALMIC (EYE) DAILY
Qty: 0 | Refills: 0 | Status: DISCONTINUED | OUTPATIENT
Start: 2017-11-29 | End: 2017-12-01

## 2017-11-29 RX ORDER — FUROSEMIDE 40 MG
60 TABLET ORAL DAILY
Qty: 0 | Refills: 0 | Status: DISCONTINUED | OUTPATIENT
Start: 2017-11-30 | End: 2017-12-01

## 2017-11-29 RX ORDER — INSULIN GLARGINE 100 [IU]/ML
25 INJECTION, SOLUTION SUBCUTANEOUS AT BEDTIME
Qty: 0 | Refills: 0 | Status: DISCONTINUED | OUTPATIENT
Start: 2017-11-29 | End: 2017-11-30

## 2017-11-29 RX ADMIN — ERGOCALCIFEROL 50000 UNIT(S): 1.25 CAPSULE ORAL at 23:11

## 2017-11-29 RX ADMIN — ALBUTEROL 2 PUFF(S): 90 AEROSOL, METERED ORAL at 23:12

## 2017-11-29 RX ADMIN — Medication 3: at 23:16

## 2017-11-29 RX ADMIN — INSULIN GLARGINE 25 UNIT(S): 100 INJECTION, SOLUTION SUBCUTANEOUS at 23:11

## 2017-11-29 RX ADMIN — Medication 8 UNIT(S): at 18:09

## 2017-11-29 RX ADMIN — Medication 162 MILLIGRAM(S): at 18:08

## 2017-11-29 RX ADMIN — Medication 60 MILLIGRAM(S): at 18:09

## 2017-11-29 RX ADMIN — HEPARIN SODIUM 5000 UNIT(S): 5000 INJECTION INTRAVENOUS; SUBCUTANEOUS at 23:12

## 2017-11-29 NOTE — H&P ADULT - ATTENDING COMMENTS
Patient was seen and examined at bedside in ED yesterday. She states she was on daily Lasix however another physician told her that she may skip and take it as needed for edema. She also mentions that lately she has been non compliant with salt restrictions in diet and may have drank too much.  She noted progressive worsening of breath since few days, last night she need to place 6 pillows instead of 2 pillows.  Physical exam:   vitals are stable  HEENT: Neck supple  Heart: S1, S2 normal  Abdomen: NT, ND  Chest: bilateral rales appreciated up to mid lung zone  LE: LE edema + 2-3  CXR:   < from: Xray Chest 1 View AP- PORTABLE-Urgent (11.29.17 @ 14:47) >    IMPRESSION:    Cardiomegaly with prominence of interstitial and vascular markings..    < end of copied text >  < from: Transthoracic Echocardiogram (07.21.15 @ 14:51) >  < from: Transthoracic Echocardiogram (07.21.15 @ 14:51) >    CONCLUSIONS:  1. Mitral annular calcification, otherwise normal mitral  valve. Minimal mitral regurgitation.  2. Normal left ventricular internal dimensions and wall  thicknesses.  3. Normal left ventricular systolic function. No segmental  wall motion abnormalities.  4. Mild diastolic dysfunction (Stage I).  5. Normal right ventricular size and function.  < end of copied text >  A/P:    1- Acute on chronic diastolic CHF:   repeat ECHO  trend troponin,  Lasix 60 mg daily.   cardiology consultation  most likely the exacerbation is secondary to medication, diet non compliance.  Tele monitor  2-  CKD stage 5: baseline creatinine 3-3.5  GOC discussion should be started, as most probably she will need dialysis soemtime in the future  Renal consult, creatinine may worsen with diuresis  3- Diabetes: start on Lantus 25 units BID   endo consult to adjust  suspecting medication non compliance though she says she took her insulin last night,   presented with FSBS 390.    Rest as above Patient was seen and examined at bedside in ED yesterday. She states she was on daily Lasix however another physician told her that she may skip and take it as needed for edema. She also mentions that lately she has been non compliant with salt restrictions in diet and may have drank too much.  She noted progressive worsening of breath since few days, last night she need to place 6 pillows instead of 2 pillows.  Physical exam:   vitals are stable  HEENT: Neck supple  Heart: S1, S2 normal  Abdomen: NT, ND  Chest: bilateral rales appreciated up to mid lung zone  LE: LE edema + 2-3  CXR:   < from: Xray Chest 1 View AP- PORTABLE-Urgent (11.29.17 @ 14:47) >    IMPRESSION:    Cardiomegaly with prominence of interstitial and vascular markings..    < end of copied text >  < from: Transthoracic Echocardiogram (07.21.15 @ 14:51) >  < from: Transthoracic Echocardiogram (07.21.15 @ 14:51) >    CONCLUSIONS:  1. Mitral annular calcification, otherwise normal mitral  valve. Minimal mitral regurgitation.  2. Normal left ventricular internal dimensions and wall  thicknesses.  3. Normal left ventricular systolic function. No segmental  wall motion abnormalities.  4. Mild diastolic dysfunction (Stage I).  5. Normal right ventricular size and function.  < end of copied text >  A/P:    1- Acute on chronic diastolic CHF:   repeat ECHO  trend troponin,  Lasix 60 mg daily.   cardiology consultation  most likely the exacerbation is secondary to medication, diet non compliance.  Tele monitor, daily weight, I/o charting  2-  CKD stage 5: baseline creatinine 3-3.5  GOC discussion should be started, as most probably she will need dialysis soemtime in the future  Renal consult, creatinine may worsen with diuresis  3- Diabetes: start on Lantus 25 units BID   endo consult to adjust  suspecting medication non compliance though she says she took her insulin last night,   presented with FSBS 390.    Rest as above

## 2017-11-29 NOTE — H&P ADULT - ASSESSMENT
77F PMHx Asthma, Anemia (on weekly procrit), CHF (last echo in 2015 s/o preserved EF), DM, CKD (Baseline Creatinine ~ 3), HTN (recently-discontinued home dose of carvedilol due to hypotension), Breast CA x 2 (s/p L lumpectomy, radiation 2005) Uterine CA (s/p JACLYN) BIBEMS c/o exertional dyspnea x 3 days non-responsive to home dose of lasix 40mg. Called EMS when symptoms worsened to the point of not being able to walk more than two steps and less than one block. Patient was recently discharged from [facility] for treatment of urinary tract infection and was scheduled to follow up with outpatient nephrologist within 1 week of discharge; however, her appointment had to be rescheduled.   In ED vitals significant for ; SpO2 97% on room air; Labs significant for WBC 12.1, Hgb 9.3 (Baseline ~ 10.5), K 5.4, BUN/Cr 55/3.61 (Baseline Creatinine ~ 3)  EKG revealed left anterior bifascicular block (unchanged from prior admission); Troponin 0.125, BNP 3409  CXR revealed cardiomegaly with prominence pulmonary interstitial/vascular markings. Patient was given Lasix 60mg IV and will be admitted for HF exacerbation.

## 2017-11-29 NOTE — H&P ADULT - PROBLEM SELECTOR PLAN 3
Patient reports recently discontinuing anti-HTN medications due to hypotension  Continue to monitor BP and start medication as needed

## 2017-11-29 NOTE — ED ADULT NURSE NOTE - CHPI ED SYMPTOMS NEG
no hemoptysis/no wheezing/no chills/no body aches/no chest pain/no fever/no headache/no cough/no diaphoresis/no edema

## 2017-11-29 NOTE — H&P ADULT - PROBLEM SELECTOR PLAN 6
Patient takes Lantus 20U BID in addition to Humalog 20U BID  Will start lantus 25U QHS and humalog 7U TID   HSS and accuchecks   F/U A1c Patient takes Lantus 40U BID in addition to Humalog 20U BID  Will start lantus 25U QHS and humalog 7U TID   HSS and accuchecks   F/U A1c  Endocrinology Dr. Carlos consulted

## 2017-11-29 NOTE — H&P ADULT - PROBLEM SELECTOR PLAN 2
BUN/Cr in ED 55/3.61 (Baseline Creatinine ~ 3)  Patient was instructed to follow up with outpatient nephrologist, Dr. Castanon, but failed to do so  Nephrology Dr. Tyler consulted BUN/Cr in ED 55/3.61 (Baseline Creatinine ~ 3)  Patient was instructed to follow up with outpatient nephrologist, Dr. Castanon, but failed to do so  Nephrology Dr. Sorenson consulted

## 2017-11-29 NOTE — H&P ADULT - HISTORY OF PRESENT ILLNESS
77F PMHx Asthma, Anemia( on weekly procrit), CHF (last echo in 2015 s/o preserved EF), DM, CKD (unknown baseline) HTN( not on meds as BP running low) breast ca s/p left lumpectomy, radiation in 2005, questionable uterine cancer BIBEMS c/o dyspnea x 3 days. Patient took 20mg lasix 2 days ago with minimal relief (was told by primary care physician to take it only if she felt badly) also took albuterol pump without relief. felt very shortness of breath today while ascending stairs upon EMS arrival.     Patient recently discharged from [facility] presented to ED with c/o pain and stiffness in both upper and lower extremities especially in fingers and toes  worsening since last week. patient was well until sunday when she started to have pain in all her fingers and toes with stiffness, baseline she has this pain, but not so severe, went to [facility], who d/c her on tylenol #3, later today in evening again she started having pain in both fingers and toes, which was so bad, she started crying, 9/10 intensity, crampy, "pulling kind of pain", associated with stiffness of fingers, little relief with massage, no aggravating factors, radiating to neck and shoulders, unable to ambulate as earlier or doing daily activities due to pain. patient was on calcium supplement but stopped taking it for last few days. patient denies any joint stiffness, morning stiffness, rash, joint pains, numbness, tingling of extremities. patient is concerned of stroke, so decided to come to ED. Also she had lab work done yesterday, which showed that she has UTI, PCP called her to collect antibiotic, awaiting delivery to home.  PSH- s/p left lumpectomy  2005  allergies- NKDA  FH- breast cancer in aunt, and DM in father  social Hx- exsmoker, smoked 2 PPD for 30 years, quit 7 years ago, ex ethanolic, no illicit drug use.    In ED, patient had stable vitals, EKG- NSR @83BPM, LAD, bifascicular block,( unchanged from previous EKG) no ST T wave changes. UA- >50 WBC, moderate LE, Labs pertinent for WBC- 11, h/h-11.1/ 35, corrected sodium of 134, glucose of 307, anion gap of 10, bun/ creat- 54/3.48, alk phos-132 , s/p 1 dose of rocephin, 2 L NS bolus, 8 units of humalog, 5 mg of valium in ED      UA of patient was positive for UTI and was treated with Rocephin for UTI for a total of 3 days.     Pt was seen by outpatient nephrologist and cardiologist during admission. Nephrologist Dr. Castanon stated that pt creatinine is back to baseline (approx 3), no more fluids are required, and to followup with Dr. Castanon in one week. Dr. Jaimes cardiologist also evaluated the patient, and we have recontinued patient's home dose of PO lasix 20mg daily as recommended by Dr. Jaimes. Dr. Jaimes stressed regular reporting of MEMS with patient.    Pt is stable and medically cleared for discharge. Pt to follow up with Dr. Castanon nephrologist in one week, and with Dr. Jaimes as scheduled. Discussed above with attending.     will admit to medicine for evaluation of pain and stiffness in extremities.  ED Course:   Vitals stable, Orthostatics negative   Labs significant for  EKG   Imaging revealed   S/P IVF [] Antimicrobials []     Goals of Care: Full Code [] DNR [] DNI []    Emergency Contact: 77F PMHx Asthma, Anemia( on weekly procrit), CHF (last echo in 2015 s/o preserved EF), DM, CKD (unknown baseline) HTN( not on meds as BP running low) breast ca s/p left lumpectomy, radiation in 2005, questionable uterine cancer BIBEMS c/o dyspnea x 3 days. Patient took 20mg lasix 2 days ago with minimal relief (was told by primary care physician to take it only if she felt badly) also took albuterol pump without relief. felt very shortness of breath today while ascending stairs upon EMS arrival.     Patient recently discharged from [facility] for treatment of urinary tract infection. He was evaluated by both cardiology, Dr. Jaimes, and nephrology, Dr. Castanon, and scheduled to follow up with outpatient nephrologist within 1 week of discharge.     PSH- s/p left lumpectomy  2005  allergies- NKDA  FH- breast cancer in aunt, and DM in father  social Hx- exsmoker, smoked 2 PPD for 30 years, quit 7 years ago, ex ethanolic, no illicit drug use.    ED Course:   Vitals significant for ; normal SpO2 and blood pressure   Labs significant for WBC 12.1, Hgb 9.3 (Baseline ~ 10.5), K 5.4, BUN/Cr 55/3.61 (Baseline Creatinine ~ 3)  EKG revealed LAD, bifascicular block (unchanged from prior admission); Troponin 0.125, BNP 3409  CXR revealed cardiomegaly with prominence pulmonary interstitial/vascular markings   S/P IVF [] Antimicrobials [] Lasix 60mg IV    Goals of Care: Full Code [] DNR [] DNI []    Emergency Contact: Matthew Miranda (Son) [phone] 77F PMHx Asthma, Anemia (on weekly procrit), CHF (last echo in 2015 s/o preserved EF), DM, CKD (Baseline Creatinine ~ 3), HTN (recently-discontinued home dose of carvedilol due to hypotension), Breast CA x 2 (s/p L lumpectomy, radiation 2005) Uterine CA (s/p JACLYN) BIBEMS c/o dyspnea x 3 days. According to patient, she attempted to take her home-dose of lasix 20mg (which she takes as needed for shortness of breath as per the recommendation of her outpatient PCP, Dr. Garcia @ [facility]), but her symptoms persisted. She also reports increased intake of water(4 bottle daily over similar time period). Her dyspnea has been associated with subjective weakness and fatigue over the past two days that is worse with exertion and mildly improves with rest. She has only been able to walk up 2 steps and less than 1 block on day of admission, prompting her to call EMS to bring her to the hospital. Patient denied any symptoms of chest pain, orthopnea, paroxysmal nocturnal dyspnea, cough, dizziness, or syncope. She also denied any recent sick contacts or fevers.     Patient was recently discharged from [facility] for treatment of urinary tract infection, during which she was given ceftriaxone x 3 days and was evaluated by cardiology, Dr. Jaimes, and Nephrology, Dr. Castanon. She was scheduled to follow up with outpatient nephrologist within 1 week of discharge; however, her appointment had to be rescheduled .     ED Course:   Vitals significant for ; SpO2 97% on room air   Labs significant for WBC 12.1, Hgb 9.3 (Baseline ~ 10.5), K 5.4, BUN/Cr 55/3.61 (Baseline Creatinine ~ 3)  EKG revealed left anterior bifascicular block (unchanged from prior admission); Troponin 0.125, BNP 3409  CXR revealed cardiomegaly with prominence pulmonary interstitial/vascular markings   S/P IVF [] Antimicrobials [] Lasix 60mg IV    Goals of Care: Full Code [x] DNR [] DNI []    Emergency Contact: Matthew Miranda (Son) [phone] 77F PMHx Asthma, Anemia (on weekly procrit), CHF (last echo in 2015 s/o preserved EF), DM, CKD (Baseline Creatinine ~ 3), HTN (recently-discontinued home dose of carvedilol due to hypotension), Breast CA x 2 (s/p L lumpectomy, radiation 2005) Uterine CA (s/p JACLYN) BIBEMS c/o dyspnea x 3 days. According to patient, she attempted to take her home-dose of lasix 20mg (which she takes as needed for shortness of breath as per the recommendation of her outpatient PCP, Dr. Garcia @ [facility]), but her symptoms persisted. She also reports increased intake of water(4 bottle daily over similar time period). Her dyspnea has been associated with subjective weakness and fatigue over the past two days that is worse with exertion and mildly improves with rest. She has only been able to walk up 2 steps and less than 1 block on day of admission, prompting her to call EMS to bring her to the hospital. Patient denied any symptoms of chest pain, orthopnea, paroxysmal nocturnal dyspnea, cough, dizziness, or syncope. She also denied any recent sick contacts or fevers.     Patient was recently discharged from [facility] 9/2017 for treatment of urinary tract infection, during which she was given ceftriaxone x 3 days and was evaluated by cardiology, Dr. Jaimes, and Nephrology, Dr. Castanon. She was scheduled to follow up with outpatient nephrologist within 1 week of discharge; however, her appointment had to be rescheduled .     ED Course:   Vitals significant for ; SpO2 97% on room air   Labs significant for WBC 12.1, Hgb 9.3 (Baseline ~ 10.5), K 5.4, BUN/Cr 55/3.61 (Baseline Creatinine ~ 3)  EKG revealed left anterior bifascicular block (unchanged from prior admission); Troponin 0.125, BNP 3409  CXR revealed cardiomegaly with prominence pulmonary interstitial/vascular markings   S/P IVF [] Antimicrobials [] Lasix 60mg IV    Goals of Care: Full Code [x] DNR [] DNI []    Emergency Contact: Matthew Miranda (Son) [phone]

## 2017-11-29 NOTE — H&P ADULT - NEGATIVE CARDIOVASCULAR SYMPTOMS
no chest pain/no peripheral edema/no palpitations no paroxysmal nocturnal dyspnea/no chest pain/no palpitations/no orthopnea/no peripheral edema

## 2017-11-29 NOTE — ED ADULT NURSE NOTE - ED STAT RN HANDOFF DETAILS 2
pt.  remained   stable.  denies  pain.  on CM with  NSR. denies  chest pain,  transfer to  [address] report  given to jaqueline orantes,.not  in  distress

## 2017-11-29 NOTE — H&P ADULT - NSHPSOCIALHISTORY_GEN_ALL_CORE
Lives at home with spouse, ambulates with the help of walker  Former 2PPD smoker x 30 years (quit 7 years prior). Prior alcoholic. Denies any prior drug use.

## 2017-11-29 NOTE — H&P ADULT - FAMILY HISTORY
Diabetes mellitus     Aunt  Still living? Unknown  Family history of breast cancer, Age at diagnosis: Age Unknown

## 2017-11-29 NOTE — ED PROVIDER NOTE - OBJECTIVE STATEMENT
Patient with shortness of breath for 3 days. history asthma and congestive heart failure Diabetes Mellitus, renal insufficiency. patient took 20mg lasix 2 days ago with minimal relief (was told by primary care physician to take it only if she felt badly) also took albuterol pump without relief. felt very shortness of breath today while ascending stairs upon EMS arrival. denies chest pain.

## 2017-11-29 NOTE — H&P ADULT - PMH
Asthma    Breast carcinoma  Lt side s/p lumpectomy and radiation in 2004  DM (diabetes mellitus)    No pertinent past medical history    Renal mass Anemia    Asthma    Breast carcinoma  Lt side s/p lumpectomy and radiation in 2004  Chronic kidney disease    Congestive heart failure    DM (diabetes mellitus)    Hypertension    Renal mass

## 2017-11-29 NOTE — ED PROVIDER NOTE - CARE PLAN
Principal Discharge DX:	CHF exacerbation  Secondary Diagnosis:	NSTEMI (non-ST elevated myocardial infarction)

## 2017-11-29 NOTE — H&P ADULT - PROBLEM SELECTOR PLAN 5
Potentially due to chronic kidney disease  Patient has weekly epogen injections (unsure of dosage)  Hgb in ED 9.3 (Baseline ~ 10.5)  Primary team to follow up with PCP to determine epogen dosage  Continue to monitor CBC

## 2017-11-29 NOTE — H&P ADULT - PROBLEM SELECTOR PLAN 1
Patient reports 3 day history of worsening exertional dyspnea, refractory to Lasix 20mg that she was told to take as needed by PCP  EKG in ED revealed left anterior bifascicular block without ischemic changes; Troponin 0.125, BNP 3409  CXR showed cardiomegaly with prominent pulmonary interstitial/vascular markings  Continue with lasix 60mg IV daily   F/U T2, TTE  Cardiology Dr. Jaimes consulted

## 2017-11-29 NOTE — ED PROVIDER NOTE - MEDICAL DECISION MAKING DETAILS
Patient with ALFORD and shortness of breath, positive troponin. admit to telemetry for cardiac workup, IV diuresis

## 2017-11-30 ENCOUNTER — TRANSCRIPTION ENCOUNTER (OUTPATIENT)
Age: 77
End: 2017-11-30

## 2017-11-30 LAB
ANION GAP SERPL CALC-SCNC: 11 MMOL/L — SIGNIFICANT CHANGE UP (ref 5–17)
ANION GAP SERPL CALC-SCNC: 11 MMOL/L — SIGNIFICANT CHANGE UP (ref 5–17)
BUN SERPL-MCNC: 57 MG/DL — HIGH (ref 7–18)
BUN SERPL-MCNC: 57 MG/DL — HIGH (ref 7–18)
CALCIUM SERPL-MCNC: 8.8 MG/DL — SIGNIFICANT CHANGE UP (ref 8.4–10.5)
CALCIUM SERPL-MCNC: 8.8 MG/DL — SIGNIFICANT CHANGE UP (ref 8.4–10.5)
CHLORIDE SERPL-SCNC: 111 MMOL/L — HIGH (ref 96–108)
CHLORIDE SERPL-SCNC: 111 MMOL/L — HIGH (ref 96–108)
CHOLEST SERPL-MCNC: 310 MG/DL — HIGH (ref 10–199)
CK MB BLD-MCNC: 4.4 % — HIGH (ref 0–3.5)
CK MB CFR SERPL CALC: 5.1 NG/ML — HIGH (ref 0–3.6)
CK SERPL-CCNC: 116 U/L — SIGNIFICANT CHANGE UP (ref 21–215)
CO2 SERPL-SCNC: 17 MMOL/L — LOW (ref 22–31)
CO2 SERPL-SCNC: 19 MMOL/L — LOW (ref 22–31)
CREAT SERPL-MCNC: 3.33 MG/DL — HIGH (ref 0.5–1.3)
CREAT SERPL-MCNC: 3.56 MG/DL — HIGH (ref 0.5–1.3)
GLUCOSE SERPL-MCNC: 265 MG/DL — HIGH (ref 70–99)
GLUCOSE SERPL-MCNC: 274 MG/DL — HIGH (ref 70–99)
HBA1C BLD-MCNC: 11.4 % — HIGH (ref 4–5.6)
HCT VFR BLD CALC: 29 % — LOW (ref 34.5–45)
HDLC SERPL-MCNC: 47 MG/DL — SIGNIFICANT CHANGE UP (ref 40–125)
HGB BLD-MCNC: 8.6 G/DL — LOW (ref 11.5–15.5)
LIPID PNL WITH DIRECT LDL SERPL: 224 MG/DL — SIGNIFICANT CHANGE UP
MAGNESIUM SERPL-MCNC: 1.8 MG/DL — SIGNIFICANT CHANGE UP (ref 1.6–2.6)
MCHC RBC-ENTMCNC: 24.2 PG — LOW (ref 27–34)
MCHC RBC-ENTMCNC: 29.8 GM/DL — LOW (ref 32–36)
MCV RBC AUTO: 81.2 FL — SIGNIFICANT CHANGE UP (ref 80–100)
PHOSPHATE SERPL-MCNC: 4.3 MG/DL — SIGNIFICANT CHANGE UP (ref 2.5–4.5)
PLATELET # BLD AUTO: 212 K/UL — SIGNIFICANT CHANGE UP (ref 150–400)
POTASSIUM SERPL-MCNC: 4.7 MMOL/L — SIGNIFICANT CHANGE UP (ref 3.5–5.3)
POTASSIUM SERPL-MCNC: 5 MMOL/L — SIGNIFICANT CHANGE UP (ref 3.5–5.3)
POTASSIUM SERPL-SCNC: 4.7 MMOL/L — SIGNIFICANT CHANGE UP (ref 3.5–5.3)
POTASSIUM SERPL-SCNC: 5 MMOL/L — SIGNIFICANT CHANGE UP (ref 3.5–5.3)
RBC # BLD: 3.57 M/UL — LOW (ref 3.8–5.2)
RBC # FLD: 18.1 % — HIGH (ref 10.3–14.5)
SODIUM SERPL-SCNC: 139 MMOL/L — SIGNIFICANT CHANGE UP (ref 135–145)
SODIUM SERPL-SCNC: 141 MMOL/L — SIGNIFICANT CHANGE UP (ref 135–145)
TOTAL CHOLESTEROL/HDL RATIO MEASUREMENT: 6.6 RATIO — SIGNIFICANT CHANGE UP (ref 3.3–7.1)
TRIGL SERPL-MCNC: 195 MG/DL — HIGH (ref 10–149)
TROPONIN I SERPL-MCNC: 0.15 NG/ML — HIGH (ref 0–0.04)
TSH SERPL-MCNC: 0.72 UU/ML — SIGNIFICANT CHANGE UP (ref 0.34–4.82)
VIT B12 SERPL-MCNC: 345 PG/ML — SIGNIFICANT CHANGE UP (ref 243–894)
WBC # BLD: 8.7 K/UL — SIGNIFICANT CHANGE UP (ref 3.8–10.5)
WBC # FLD AUTO: 8.7 K/UL — SIGNIFICANT CHANGE UP (ref 3.8–10.5)

## 2017-11-30 RX ORDER — ATORVASTATIN CALCIUM 80 MG/1
40 TABLET, FILM COATED ORAL AT BEDTIME
Qty: 0 | Refills: 0 | Status: DISCONTINUED | OUTPATIENT
Start: 2017-11-30 | End: 2017-12-01

## 2017-11-30 RX ORDER — INSULIN LISPRO 100/ML
10 VIAL (ML) SUBCUTANEOUS
Qty: 0 | Refills: 0 | Status: DISCONTINUED | OUTPATIENT
Start: 2017-11-30 | End: 2017-11-30

## 2017-11-30 RX ORDER — INSULIN LISPRO 100/ML
12 VIAL (ML) SUBCUTANEOUS
Qty: 0 | Refills: 0 | Status: DISCONTINUED | OUTPATIENT
Start: 2017-11-30 | End: 2017-12-01

## 2017-11-30 RX ORDER — INSULIN GLARGINE 100 [IU]/ML
40 INJECTION, SOLUTION SUBCUTANEOUS AT BEDTIME
Qty: 0 | Refills: 0 | Status: DISCONTINUED | OUTPATIENT
Start: 2017-11-30 | End: 2017-12-01

## 2017-11-30 RX ORDER — ERYTHROPOIETIN 10000 [IU]/ML
20000 INJECTION, SOLUTION INTRAVENOUS; SUBCUTANEOUS ONCE
Qty: 0 | Refills: 0 | Status: COMPLETED | OUTPATIENT
Start: 2017-11-30 | End: 2017-11-30

## 2017-11-30 RX ADMIN — HEPARIN SODIUM 5000 UNIT(S): 5000 INJECTION INTRAVENOUS; SUBCUTANEOUS at 14:02

## 2017-11-30 RX ADMIN — Medication 7 UNIT(S): at 11:52

## 2017-11-30 RX ADMIN — INSULIN GLARGINE 40 UNIT(S): 100 INJECTION, SOLUTION SUBCUTANEOUS at 21:54

## 2017-11-30 RX ADMIN — TIOTROPIUM BROMIDE 1 CAPSULE(S): 18 CAPSULE ORAL; RESPIRATORY (INHALATION) at 14:11

## 2017-11-30 RX ADMIN — Medication 1 DROP(S): at 13:57

## 2017-11-30 RX ADMIN — ALBUTEROL 2 PUFF(S): 90 AEROSOL, METERED ORAL at 16:52

## 2017-11-30 RX ADMIN — Medication 5: at 11:52

## 2017-11-30 RX ADMIN — ATORVASTATIN CALCIUM 40 MILLIGRAM(S): 80 TABLET, FILM COATED ORAL at 21:54

## 2017-11-30 RX ADMIN — HEPARIN SODIUM 5000 UNIT(S): 5000 INJECTION INTRAVENOUS; SUBCUTANEOUS at 06:33

## 2017-11-30 RX ADMIN — Medication 3: at 08:47

## 2017-11-30 RX ADMIN — Medication 7 UNIT(S): at 08:47

## 2017-11-30 RX ADMIN — HEPARIN SODIUM 5000 UNIT(S): 5000 INJECTION INTRAVENOUS; SUBCUTANEOUS at 21:55

## 2017-11-30 RX ADMIN — Medication 1: at 16:53

## 2017-11-30 RX ADMIN — ERYTHROPOIETIN 20000 UNIT(S): 10000 INJECTION, SOLUTION INTRAVENOUS; SUBCUTANEOUS at 16:53

## 2017-11-30 RX ADMIN — ALBUTEROL 2 PUFF(S): 90 AEROSOL, METERED ORAL at 08:48

## 2017-11-30 RX ADMIN — Medication 60 MILLIGRAM(S): at 06:33

## 2017-11-30 RX ADMIN — Medication 10 UNIT(S): at 16:53

## 2017-11-30 RX ADMIN — ALBUTEROL 2 PUFF(S): 90 AEROSOL, METERED ORAL at 22:08

## 2017-11-30 NOTE — PROGRESS NOTE ADULT - ATTENDING COMMENTS
Patient was seen and examined by myself. Case was discussed with house staff in details. I have reviewed and agree with the plan as outlined above with edits where appropriate. Patient was seen and examined by myself. Case was discussed with house staff in details. I have reviewed and agree with the plan as outlined above with edits where appropriate.  Acute heart failure- improving.; continue with current meds.

## 2017-11-30 NOTE — CONSULT NOTE ADULT - ASSESSMENT
77 yr old with stable CKD . admitted with hypervolemia and worsening anemia after missing diuretics and JACKELINE.  Symptomatically better with diuresis. cont lasix IV  will give a dose of procrit 20,000.  If continues to feel better in AM no objection to D/C and close follow-up at renal office.

## 2017-11-30 NOTE — DISCHARGE NOTE ADULT - MEDICATION SUMMARY - MEDICATIONS TO TAKE
I will START or STAY ON the medications listed below when I get home from the hospital:    gabapentin 100 mg oral capsule  -- 1 cap(s) by mouth 2 times a day  -- Indication: For Neuropathic pain    Lantus 100 units/mL subcutaneous solution  -- 50 unit(s) subcutaneous once a day (at bedtime)   -- Do not drink alcoholic beverages when taking this medication.  It is very important that you take or use this exactly as directed.  Do not skip doses or discontinue unless directed by your doctor.  Keep in refrigerator.  Do not freeze.    -- Indication: For Diabetes    HumaLOG 100 units/mL subcutaneous solution  -- 20 unit(s) subcutaneous 2 times a day with lunch and dinner.  -- Do not drink alcoholic beverages when taking this medication.  It is very important that you take or use this exactly as directed.  Do not skip doses or discontinue unless directed by your doctor.  Keep in refrigerator.  Do not freeze.    -- Indication: For Diabetes    atorvastatin 40 mg oral tablet  -- 1 tab(s) by mouth once a day (at bedtime)  -- Indication: For Hld    tiotropium 18 mcg inhalation capsule  -- 1 cap(s) inhaled once a day  -- Indication: For Asthma    albuterol-ipratropium CFC free 100 mcg-20 mcg/inh inhalation aerosol  --  inhaled   -- Indication: For Asthma    Advair Diskus 100 mcg-50 mcg inhalation powder  -- 1 puff(s) inhaled 2 times a day  -- Indication: For Asthma    furosemide 40 mg oral tablet  -- 1 tab(s) by mouth once a day   -- Avoid prolonged or excessive exposure to direct and/or artificial sunlight while taking this medication.  It is very important that you take or use this exactly as directed.  Do not skip doses or discontinue unless directed by your doctor.  It may be advisable to drink a full glass orange juice or eat a banana daily while taking this medication.    -- Indication: For CHF     Alphagan P 0.1% ophthalmic solution  -- 1 drop(s) to each affected eye every 8 hours  -- Indication: For GLAUCOMA    timolol hemihydrate 0.5% ophthalmic solution  -- 1 drop(s) to each affected eye 2 times a day  -- Indication: For GLAUCOMA    Drisdol 50,000 intl units (1.25 mg) oral capsule  -- 1 cap(s) by mouth once a week  -- Indication: For HypOVITAMINOSIS D

## 2017-11-30 NOTE — DISCHARGE NOTE ADULT - MEDICATION SUMMARY - MEDICATIONS TO STOP TAKING
I will STOP taking the medications listed below when I get home from the hospital:    HumaLOG 100 units/mL subcutaneous solution  -- 20 unit(s) subcutaneous 2 times a day (before meals) before breakfast and before dinner    Lantus 100 units/mL subcutaneous solution  -- 40 unit(s) subcutaneous 2 times a day    furosemide 20 mg oral tablet  -- 1 tab(s) by mouth once a day

## 2017-11-30 NOTE — CONSULT NOTE ADULT - PROBLEM SELECTOR RECOMMENDATION 9
un controlled  due to non compliance with insulin and smbg  change lantus to 40 units qhs  humalog 12 ac tid   fsg ac and hs  compliance d/w pt at length   c

## 2017-11-30 NOTE — DISCHARGE NOTE ADULT - CARE PROVIDER_API CALL
Regan Castanon), Internal Medicine; Nephrology  [facility] [address] [place] [address]  Phone: [phone]  Fax: [phone]

## 2017-11-30 NOTE — CONSULT NOTE ADULT - SUBJECTIVE AND OBJECTIVE BOX
77 yr old female with advanced CKD and CHF. recently admitted at [facility] and upon discharge did not follow-up  at the nephrology office.  Presents to ED with progressive SOB after missing a few doses of lasix. In ED CXR showed pulmonary edema. started on lasix 60mg iv and feels better already. She has missed recent doses of weekly procrit. noted to have worsening anemia. renal consulted ofr CKD and anemia of CKD.    PAST MEDICAL & SURGICAL HISTORY:  Anemia  Hypertension  Congestive heart failure  Chronic kidney disease  Breast carcinoma: Lt side s/p lumpectomy and radiation in 2004  Renal mass  DM (diabetes mellitus)  Asthma  S/P lumpectomy, left breast: 2004    No Known Allergies    Home Medications Reviewed  Hospital Medications:   MEDICATIONS  (STANDING):  ALBUTerol    90 MICROgram(s) HFA Inhaler 2 Puff(s) Inhalation every 6 hours  atorvastatin 40 milliGRAM(s) Oral at bedtime  ergocalciferol 62487 Unit(s) Oral <User Schedule>  furosemide   Injectable 60 milliGRAM(s) IV Push daily  heparin  Injectable 5000 Unit(s) SubCutaneous every 8 hours  insulin glargine Injectable (LANTUS) 40 Unit(s) SubCutaneous at bedtime  insulin lispro (HumaLOG) corrective regimen sliding scale   SubCutaneous Before meals and at bedtime  insulin lispro Injectable (HumaLOG) 12 Unit(s) SubCutaneous three times a day before meals  timolol 0.5% Solution 1 Drop(s) Both EYES daily  tiotropium 18 MICROgram(s) Capsule 1 Capsule(s) Inhalation daily    SOCIAL HISTORY:  Denies ETOh,Smoking,   FAMILY HISTORY:  Diabetes mellitus  Family history of breast cancer (Aunt)      VITALS:  T(F): 98.4 (11-30-17 @ 15:54), Max: 99.3 (11-29-17 @ 21:14)  HR: 89 (11-30-17 @ 15:54)  BP: 107/64 (11-30-17 @ 15:54)  RR: 18 (11-30-17 @ 15:54)  SpO2: 99% (11-30-17 @ 15:54)  Wt(kg): --      PHYSICAL EXAM:  Constitutional: NAD  HEENT: anicteric sclera, oropharynx clear,   Neck: JVD+  Respiratory: CTAB, no wheezes, rales or rhonchi  Cardiovascular: S1, S2, RRR  Gastrointestinal: BS+, soft, NT/ND  Extremities: No cyanosis or clubbing.  peripheral edema++  Neurological: A/O x 3, no focal deficits  Psychiatric: Normal mood, normal affect  : No CVA tenderness. No haas.       LABS:  11-30    141  |  111<H>  |  57<H>  ----------------------------<  274<H>  5.0   |  19<L>  |  3.33<H>    Ca    8.8      30 Nov 2017 07:26  Phos  4.3     11-30  Mg     1.8     11-30    TPro  7.4  /  Alb  2.9<L>  /  TBili  0.3  /  DBili      /  AST  10  /  ALT  18  /  AlkPhos  98  11-29    Creatinine Trend: 3.33 <--, 3.56 <--, 3.61 <--                        8.6    8.7   )-----------( 212      ( 30 Nov 2017 07:26 )             29.0     Urine Studies:      RADIOLOGY & ADDITIONAL STUDIES:

## 2017-11-30 NOTE — CONSULT NOTE ADULT - SUBJECTIVE AND OBJECTIVE BOX
HISTORY OF PRESENT ILLNESS: HPI:  77F PMHx Asthma, Anemia (on weekly procrit), CHF s/p Cardio MEMS implantation (last echo in 2015 s/o preserved EF), DM, CKD (Baseline Creatinine ~ 3), HTN (recently-discontinued home dose of carvedilol due to hypotension), Breast CA x 2 (s/p L lumpectomy, radiation 2005) Uterine CA (s/p JACLYN) BIBEMS c/o dyspnea x 3 days. According to patient, she attempted to take her home-dose of lasix 20mg (which she takes as needed for shortness of breath as per the recommendation of her outpatient PCP, Dr. Garcia @ [facility]), but her symptoms persisted. She also reports increased intake of water(4 bottle daily over similar time period). Her dyspnea has been associated with subjective weakness and fatigue over the past two days that is worse with exertion and mildly improves with rest. She has only been able to walk up 2 steps and less than 1 block on day of admission, prompting her to call EMS to bring her to the hospital. Patient denied any symptoms of chest pain, orthopnea, paroxysmal nocturnal dyspnea, cough, dizziness, or syncope. She also denied any recent sick contacts or fevers.         ED Course:   Vitals significant for ; SpO2 97% on room air   Labs significant for WBC 12.1, Hgb 9.3 (Baseline ~ 10.5), K 5.4, BUN/Cr 55/3.61 (Baseline Creatinine ~ 3)  EKG revealed left anterior bifascicular block (unchanged from prior admission); Troponin 0.125, BNP 3409  CXR revealed cardiomegaly with prominence pulmonary interstitial/vascular markings   S/P IVF [] Antimicrobials [] Lasix 60mg IV    Goals of Care: Full Code [x] DNR [] DNI []    Emergency Contact: Matthew Miranda (Son) [phone] (29 Nov 2017 17:51)      PAST MEDICAL & SURGICAL HISTORY:  Anemia  Hypertension  Congestive heart failure  Chronic kidney disease  Breast carcinoma: Lt side s/p lumpectomy and radiation in 2004  Renal mass  DM (diabetes mellitus)  Asthma  S/P lumpectomy, left breast: 2004          MEDICATIONS:  MEDICATIONS  (STANDING):  ALBUTerol    90 MICROgram(s) HFA Inhaler 2 Puff(s) Inhalation every 6 hours  atorvastatin 40 milliGRAM(s) Oral at bedtime  epoetin randy Injectable 20067 Unit(s) SubCutaneous once  ergocalciferol 72260 Unit(s) Oral <User Schedule>  furosemide   Injectable 60 milliGRAM(s) IV Push daily  heparin  Injectable 5000 Unit(s) SubCutaneous every 8 hours  insulin glargine Injectable (LANTUS) 25 Unit(s) SubCutaneous at bedtime  insulin lispro (HumaLOG) corrective regimen sliding scale   SubCutaneous Before meals and at bedtime  insulin lispro Injectable (HumaLOG) 7 Unit(s) SubCutaneous three times a day before meals  timolol 0.5% Solution 1 Drop(s) Both EYES daily  tiotropium 18 MICROgram(s) Capsule 1 Capsule(s) Inhalation daily      Allergies    No Known Allergies    Intolerances        FAMILY HISTORY:  Diabetes mellitus  Family history of breast cancer (Aunt)    Non-contributary for premature coronary disease or sudden cardiac death    SOCIAL HISTORY:    [ X] Non-smoker  [ ] Smoker  [ ] Alcohol      REVIEW OF SYSTEMS:  [ ]chest pain  [ X ]shortness of breath  [  ]palpitations  [  ]syncope  [ ]near syncope [ ]upper extremity weakness   [ ] lower extremity weakness  [  ]diplopia  [  ]altered mental status   [  ]fevers  [ ]chills [ ]nausea  [ ]vomitting  [  ]dysphagia    [ ]abdominal pain  [ ]melena  [ ]BRBPR    [  ]epistaxis  [  ]rash    [ ]lower extremity edema        [X ] All others negative	  [ ] Unable to obtain    PHYSICAL EXAM:  T(C): 37 (11-30-17 @ 11:41), Max: 37.4 (11-29-17 @ 21:14)  HR: 84 (11-30-17 @ 11:41) (83 - 103)  BP: 139/53 (11-30-17 @ 11:41) (122/69 - 155/85)  RR: 18 (11-30-17 @ 11:41) (18 - 20)  SpO2: 98% (11-30-17 @ 11:41) (96% - 100%)  Wt(kg): --  I&O's Summary        HEENT:   Normal oral mucosa, PERRL, EOMI	  Lymphatic: No obvious lymphadenopathy , no edema  Cardiovascular: Normal S1 S2, No JVD,  1/6 HODA murmur , Peripheral pulses palpable 2+ bilaterally  Respiratory: Lungs clear to auscultation, normal effort 	  Gastrointestinal:  Soft, Non-tender, + BS	  Skin: No rashes, No cyanosis, warm to touch  Musculoskeletal: Normal range of motion, normal strength  Psychiatry:  Appropriate Mood & affect     TELEMETRY: 	  Sinus  ECG:  	Sinus 87 BPM, RBBB, LAFB  RADIOLOGY:      CXR: Cardiomegaly with prominence of interstitial and vascular markings..        	  LABS:	 	    CARDIAC MARKERS:  CARDIAC MARKERS ( 30 Nov 2017 01:03 )  0.147 ng/mL / x     / 116 U/L / x     / 5.1 ng/mL  CARDIAC MARKERS ( 29 Nov 2017 15:16 )  0.125 ng/mL / x     / 124 U/L / x     / x                                  8.6    8.7   )-----------( 212      ( 30 Nov 2017 07:26 )             29.0     Hb Trend: 8.6<--, 9.3<--    11-30    141  |  111<H>  |  57<H>  ----------------------------<  274<H>  5.0   |  19<L>  |  3.33<H>    Ca    8.8      30 Nov 2017 07:26  Phos  4.3     11-30  Mg     1.8     11-30    TPro  7.4  /  Alb  2.9<L>  /  TBili  0.3  /  DBili  x   /  AST  10  /  ALT  18  /  AlkPhos  98  11-29    Creatinine Trend: 3.33<--, 3.56<--, 3.61<--    Coags:      proBNP: Serum Pro-Brain Natriuretic Peptide: 3409 pg/mL (11-29 @ 15:16)    Lipid Profile:   HgA1c: Hemoglobin A1C, Whole Blood: 11.4 % (11-30 @ 10:08)    TSH: Thyroid Stimulating Hormone, Serum: 0.72 uU/mL (11-30 @ 07:26)      ASSESSMENT/PLAN: 	77y Female  CHF s/p Cardio MEMS implantation (last echo in 2015 s/o preserved EF), DM, CKD (Baseline Creatinine ~ 3), HTN (recently-discontinued home dose of carvedilol due to hypotension), Breast CA x 2 (s/p L lumpectomy, radiation 2005) Uterine CA (s/p JACLYN) BIBEMS c/o dyspnea x 3 days acute on chronic diastolic CHF    - Cont Lasix 60 IV daily, pt's home dose was 20 QD  - She has not used her MEMS pillow in a while, will check to see last transmission to our office  - echo  - Stress in AM  - Suspect pulmonary congestion is multifactorial, renal disease and diastolic CHF    I once again thank you for allowing me to participate in the care of your mutual patient.  If you have any questions or concerns please do not hesitate to contact me.    Kyler Jaimes MD, [facility], [facility]  [address] [place] [address]  PHONE:  [phone]  BEEPER : [phone]

## 2017-11-30 NOTE — CONSULT NOTE ADULT - ASSESSMENT
77F PMHx Asthma, Anemia (on weekly procrit), CHF (last echo in 2015 s/o preserved EF), DM, CKD (Baseline Creatinine ~ 3), HTN (recently-discontinued home dose of carvedilol due to hypotension), Breast CA x 2 (s/p L lumpectomy, radiation 2005) Uterine CA (s/p JACLYN) BIBEMS c/o dyspnea x 3 days. admitted with chf exac and un cont dm

## 2017-11-30 NOTE — DISCHARGE NOTE ADULT - PATIENT PORTAL LINK FT
“You can access the Follow[employer] Patient Portal, offered by [facility], by registering with the following website: [email or web address]”

## 2017-11-30 NOTE — DISCHARGE NOTE ADULT - HOSPITAL COURSE
Reyna Miranda is a 77year old F PMHx Asthma, Anemia (on weekly procrit), CHF (last echo in 2015 s/o preserved EF), DM, CKD (Baseline Creatinine ~ 3), HTN (recently-discontinued home dose of carvedilol due to hypotension), Breast CA x 2 (s/p L lumpectomy, radiation 2005) Uterine CA (s/p JACLYN) BIBEMS c/o dyspnea x 3 days. According to patient, she attempted to take her home-dose of lasix 20mg (which she takes as needed for shortness of breath as per the recommendation of her outpatient PCP, Dr. Garcia @ [facility]), but her symptoms persisted. She also reports increased intake of water(4 bottle daily over similar time period). Her dyspnea has been associated with subjective weakness and fatigue over the past two days that is worse with exertion and mildly improves with rest. She has only been able to walk up 2 steps and less than 1 block on day of admission, prompting her to call EMS to bring her to the hospital. Patient denied any symptoms of chest pain, orthopnea, paroxysmal nocturnal dyspnea, cough, dizziness, or syncope. She also denied any recent sick contacts or fevers.     Patient was recently discharged from [facility] 9/2017 for treatment of urinary tract infection, during which she was given ceftriaxone x 3 days and was evaluated by cardiology, Dr. Jaimes, and Nephrology, Dr. Castanon. She was scheduled to follow up with outpatient nephrologist within 1 week of discharge; however, her appointment had to be rescheduled .     ED Course:   Vitals significant for ; SpO2 97% on room air   Labs significant for WBC 12.1, Hgb 9.3 (Baseline ~ 10.5), K 5.4, BUN/Cr 55/3.61 (Baseline Creatinine ~ 3)  EKG revealed left anterior bifascicular block (unchanged from prior admission); Troponin 0.125, BNP 3409  CXR revealed cardiomegaly with prominence pulmonary interstitial/vascular markings   s/p Lasix 60mg IV    Floor course:    Patient admitted to telemetry floor for CHF exacerbation. EKG in ED revealed left anterior bifascicular block without ischemic changes Troponin 0.125, Troponin: 0.147; BNP 3409. CXR showed cardiomegaly with prominent pulmonary interstitial/vascular markings. Initially treated with Lasix 60 mg IV and switched to Lasix 40 mg QD PO. Strict I/O's, daily weights. BMP monitored daily with replacement as needed. Dr Jaimes consulted.  TTE revealed:    CONCLUSIONS:  1. Densly calcified mitral valve leaflet, mitral annulus  calcification. Mild-moderate mitral regurgitation. Mild  mitral valve stenosis.  2. Calcified trileaflet aortic valve with normal opening.  3. Aortic Root: 3.5 cm.    4. Mild left atrial enlargement.  5. Moderate concentric left ventricular hypertrophy.  6. Normal Left Ventricular Systolic Function,  (EF = 55 to  60%)  7. Grade II diastolic dysfunction.  8. Right atrium not well visualized.  9. Right ventricle not well visualized.  10. RA Pressure is 10 mm Hg.  11. RV systolic pressure is 49 mm Hg. Moderate pulmonary  hypertension.  12. There is mild-moderate tricuspid regurgitation.  13. There is moderate pulmonic regurgitation.  14. Normal pericardium with no pericardial effusion.      NST: revealed    IMPRESSIONS:Normal Study  * Negative ECG evidence of ischemia after IV  administartion of Regadenoson.  * Myocardial Perfusion SPECT results are normal.  * No clear evidence of ischemia or infarct.  * Post-stress resting myocardial perfusion gated SPECT  imaging was performed (LVEF = 53 %) with no regional wall  motion abnormalities.        Patient with Chronic kidney disease stage 4, consulted by Dr Sorenson.  BUN/Cr in ED 55/3.61 (Baseline Creatinine ~ 3)  Procrit started, will continue it on a weekly basis. Patient will follow up with Dr Castanon clinic within a week for BMP check up and routinary check up.    Patient was evaluated by endocrinology services due to poorly controlled DM with HbA1C: 11%, will continue with Lantus 50U HS and Humalog 20U at lunch and dinner.  Patient will follow up with endocrinologist clinic within a week.    Patient is stable and ready to be discharged home today. Explained importance of medication compliance.   For a complete history please refer to medical records as this is only a brief summary of your stay.

## 2017-11-30 NOTE — CHART NOTE - NSCHARTNOTEFT_GEN_A_CORE
RN was exposed to needle stick from insulin injection tonight.     Pt was informed and consent obtained to perform blood work for hepatitis panel and HIV.    All questions/concerns addressed. Consent in chart.

## 2017-11-30 NOTE — DISCHARGE NOTE ADULT - PLAN OF CARE
RESOLUTION You were admitted for heart failure exacerbation likely secondary to medication non-compliance. You will continue with Lasix 40 mg You were admitted for heart failure exacerbation likely secondary to medication non-compliance. You were evaluated by cardiology services. You will continue with Lasix 40 mg daily. Your stress test was normal. You will follow up with your primary care doctor within a week. You will continue with your home asthma medications as mentioned in the note.  Follow up with your primary care doctor within a week. Avoid triggers. You have chronic kidney disease stage 4, you were evaluated by nephrology services. 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 Follow up with your nephrologist Dr Castanon within a week for BMP and routinary check up. HbA1C <7% Your HbA1C was found to be: Your blood pressure has been in good control off medications.  Monitor your blood pressure at home and document it for your next appointment with primary care doctor. You will follow up with your primary care doctor within a week. Low salt diet encouraged. control of symptoms Your HbA1C was found to be: 11.4%, poorly controlled diabetes. You were evaluated by endocrinologist who recommended to continue with Lantus 50U HS and Humalog 20U with lunch and dinner. Check your glucose at home daily, before meals and at bedtime. Low fat, low processed food diet encouraged. If your blood sugar is greater 250 mg/dl contact your primary care doctor for adjustment of medications. Follow up with your primary care doctor within a week. BP<130/90 mmHg

## 2017-11-30 NOTE — DISCHARGE NOTE ADULT - CARE PLAN
Principal Discharge DX:	CHF exacerbation  Goal:	RESOLUTION  Instructions for follow-up, activity and diet:	You were admitted for heart failure exacerbation likely secondary to medication non-compliance. You will continue with Lasix 40 mg  Secondary Diagnosis:	Asthma  Secondary Diagnosis:	Chronic kidney disease  Secondary Diagnosis:	DM (diabetes mellitus)  Secondary Diagnosis:	Hypertension Principal Discharge DX:	CHF exacerbation  Goal:	RESOLUTION  Instructions for follow-up, activity and diet:	You were admitted for heart failure exacerbation likely secondary to medication non-compliance. You were evaluated by cardiology services. You will continue with Lasix 40 mg daily. Your stress test was normal. You will follow up with your primary care doctor within a week.  Secondary Diagnosis:	Asthma  Instructions for follow-up, activity and diet:	You will continue with your home asthma medications as mentioned in the note.  Follow up with your primary care doctor within a week. Avoid triggers.  Secondary Diagnosis:	Chronic kidney disease  Instructions for follow-up, activity and diet:	You have chronic kidney disease stage 4, you were evaluated by nephrology services. 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 Follow up with your nephrologist Dr Castanon within a week for BMP and routinary check up.  Secondary Diagnosis:	DM (diabetes mellitus)  Goal:	HbA1C <7%  Instructions for follow-up, activity and diet:	Your HbA1C was found to be:  Secondary Diagnosis:	Hypertension Principal Discharge DX:	CHF exacerbation  Goal:	RESOLUTION  Instructions for follow-up, activity and diet:	You were admitted for heart failure exacerbation likely secondary to medication non-compliance. You were evaluated by cardiology services. You will continue with Lasix 40 mg daily. Your stress test was normal. You will follow up with your primary care doctor within a week.  Secondary Diagnosis:	Asthma  Goal:	control of symptoms  Instructions for follow-up, activity and diet:	You will continue with your home asthma medications as mentioned in the note.  Follow up with your primary care doctor within a week. Avoid triggers.  Secondary Diagnosis:	Chronic kidney disease  Instructions for follow-up, activity and diet:	You have chronic kidney disease stage 4, you were evaluated by nephrology services. 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 Follow up with your nephrologist Dr Castanon within a week for BMP and routinary check up.  Secondary Diagnosis:	DM (diabetes mellitus)  Goal:	HbA1C <7%  Instructions for follow-up, activity and diet:	Your HbA1C was found to be: 11.4%, poorly controlled diabetes. You were evaluated by endocrinologist who recommended to continue with Lantus 50U HS and Humalog 20U with lunch and dinner. Check your glucose at home daily, before meals and at bedtime. Low fat, low processed food diet encouraged. If your blood sugar is greater 250 mg/dl contact your primary care doctor for adjustment of medications. Follow up with your primary care doctor within a week.  Secondary Diagnosis:	Hypertension  Goal:	BP<130/90 mmHg  Instructions for follow-up, activity and diet:	Your blood pressure has been in good control off medications.  Monitor your blood pressure at home and document it for your next appointment with primary care doctor. You will follow up with your primary care doctor within a week. Low salt diet encouraged.

## 2017-11-30 NOTE — PROGRESS NOTE ADULT - PROBLEM SELECTOR PLAN 7
IMPROVE VTE score = 2 for age and immobilization  Heparin for DVT chemoprophylaxis    DISPOSITION: dc planning for tomorrow

## 2017-11-30 NOTE — PROGRESS NOTE ADULT - PROBLEM SELECTOR PLAN 6
Patient takes Lantus 40U BID in addition to Humalog 20U BID  Lantus to 40 units HS  Humalog 12 units AC TID  HSS and FS AC/HS  HbA1C: 11.4%  Dr Carlos consult noted Lantus to 40 units HS  Humalog 12 units AC TID  HSS and FS AC/HS  HbA1C: 11.4%  Dr Carlos consult noted

## 2017-11-30 NOTE — CONSULT NOTE ADULT - SUBJECTIVE AND OBJECTIVE BOX
Patient is a 77y old  Female who presents with a chief complaint of Shortness of breath (30 Nov 2017 06:58)      HPI:  77F PMHx Asthma, Anemia (on weekly procrit), CHF (last echo in 2015 s/o preserved EF), DM, CKD (Baseline Creatinine ~ 3), HTN (recently-discontinued home dose of carvedilol due to hypotension), Breast CA x 2 (s/p L lumpectomy, radiation 2005) Uterine CA (s/p JACLYN) BIBEMS c/o dyspnea x 3 days. According to patient, she attempted to take her home-dose of lasix 20mg (which she takes as needed for shortness of breath as per the recommendation of her outpatient PCP, Dr. Garcia @ [facility]), but her symptoms persisted. She also reports increased intake of water(4 bottle daily over similar time period). Her dyspnea has been associated with subjective weakness and fatigue over the past two days that is worse with exertion and mildly improves with rest. She has only been able to walk up 2 steps and less than 1 block on day of admission, prompting her to call EMS to bring her to the hospital. Patient denied any symptoms of chest pain, orthopnea, paroxysmal nocturnal dyspnea, cough, dizziness, or syncope. She also denied any recent sick contacts or fevers.     Patient was recently discharged from [facility] 9/2017 for treatment of urinary tract infection, during which she was given ceftriaxone x 3 days and was evaluated by cardiology, Dr. Jaimes, and Nephrology, Dr. Castanon. She was scheduled to follow up with outpatient nephrologist within 1 week of discharge; however, her appointment had to be rescheduled . Pt admits to taking lantus 40 bid and novolog 20 bid, checks fsg qam 150-200    ED Course:   Vitals significant for ; SpO2 97% on room air   Labs significant for WBC 12.1, Hgb 9.3 (Baseline ~ 10.5), K 5.4, BUN/Cr 55/3.61 (Baseline Creatinine ~ 3)  EKG revealed left anterior bifascicular block (unchanged from prior admission); Troponin 0.125, BNP 3409  CXR revealed cardiomegaly with prominence pulmonary interstitial/vascular markings   S/P IVF [] Antimicrobials [] Lasix 60mg IV    Goals of Care: Full Code [x] DNR [] DNI []    Emergency Contact: Matthew Miranda (Son) [phone] (29 Nov 2017 17:51)      PAST MEDICAL & SURGICAL HISTORY:  Anemia  Hypertension  Congestive heart failure  Chronic kidney disease  Breast carcinoma: Lt side s/p lumpectomy and radiation in 2004  Renal mass  DM (diabetes mellitus)  Asthma  S/P lumpectomy, left breast: 2004         MEDICATIONS  (STANDING):  ALBUTerol    90 MICROgram(s) HFA Inhaler 2 Puff(s) Inhalation every 6 hours  atorvastatin 40 milliGRAM(s) Oral at bedtime  epoetin randy Injectable 78545 Unit(s) SubCutaneous once  ergocalciferol 50093 Unit(s) Oral <User Schedule>  furosemide   Injectable 60 milliGRAM(s) IV Push daily  heparin  Injectable 5000 Unit(s) SubCutaneous every 8 hours  insulin glargine Injectable (LANTUS) 25 Unit(s) SubCutaneous at bedtime  insulin lispro (HumaLOG) corrective regimen sliding scale   SubCutaneous Before meals and at bedtime  insulin lispro Injectable (HumaLOG) 7 Unit(s) SubCutaneous three times a day before meals  timolol 0.5% Solution 1 Drop(s) Both EYES daily  tiotropium 18 MICROgram(s) Capsule 1 Capsule(s) Inhalation daily    MEDICATIONS  (PRN):      FAMILY HISTORY:  Diabetes mellitus  Family history of breast cancer (Aunt)      SOCIAL HISTORY:      REVIEW OF SYSTEMS:  CONSTITUTIONAL: No fever, weight loss, or fatigue  EYES: No eye pain, visual disturbances, or discharge  ENT:  No difficulty hearing, tinnitus, vertigo; No sinus or throat pain  NECK: No pain or stiffness  RESPIRATORY: No cough, wheezing, chills or hemoptysis; No Shortness of Breath  CARDIOVASCULAR: No chest pain, palpitations, passing out, dizziness, or leg swelling  GASTROINTESTINAL: No abdominal or epigastric pain. No nausea, vomiting, or hematemesis; No diarrhea or constipation. No melena or hematochezia.  GENITOURINARY: No dysuria, frequency, hematuria, or incontinence  NEUROLOGICAL: No headaches, memory loss, loss of strength, numbness, or tremors  SKIN: No itching, burning, rashes, or lesions   LYMPH Nodes: No enlarged glands  ENDOCRINE: No heat or cold intolerance; No hair loss  MUSCULOSKELETAL: No joint pain or swelling; No muscle, back, or extremity pain  PSYCHIATRIC: No depression, anxiety, mood swings, or difficulty sleeping  HEME/LYMPH: No easy bruising, or bleeding gums  ALLERGY AND IMMUNOLOGIC: No hives or eczema	        Vital Signs Last 24 Hrs  T(C): 36.9 (30 Nov 2017 15:54), Max: 37.4 (29 Nov 2017 21:14)  T(F): 98.4 (30 Nov 2017 15:54), Max: 99.3 (29 Nov 2017 21:14)  HR: 89 (30 Nov 2017 15:54) (83 - 103)  BP: 107/64 (30 Nov 2017 15:54) (107/64 - 155/85)  BP(mean): --  RR: 18 (30 Nov 2017 15:54) (18 - 20)  SpO2: 99% (30 Nov 2017 15:54) (96% - 100%)      Constitutional:    HEENT: nad    Neck:  No JVD, bruits or thyromegaly    Respiratory:  Clear without rales or rhonchi    Cardiovascular:  RR without murmur, rub or gallop.    Gastrointestinal: Soft without hepatosplenomegaly.    Extremities: without cyanosis, clubbing or edema.    Neurological:  Oriented   x  3    . No gross sensory or motor defects.        LABS:                        8.6    8.7   )-----------( 212      ( 30 Nov 2017 07:26 )             29.0     11-30    141  |  111<H>  |  57<H>  ----------------------------<  274<H>  5.0   |  19<L>  |  3.33<H>    Ca    8.8      30 Nov 2017 07:26  Phos  4.3     11-30  Mg     1.8     11-30    TPro  7.4  /  Alb  2.9<L>  /  TBili  0.3  /  DBili  x   /  AST  10  /  ALT  18  /  AlkPhos  98  11-29    CARDIAC MARKERS ( 30 Nov 2017 01:03 )  0.147 ng/mL / x     / 116 U/L / x     / 5.1 ng/mL  CARDIAC MARKERS ( 29 Nov 2017 15:16 )  0.125 ng/mL / x     / 124 U/L / x     / x              CAPILLARY BLOOD GLUCOSE      POCT Blood Glucose.: 369 mg/dL (30 Nov 2017 11:27)  POCT Blood Glucose.: 273 mg/dL (30 Nov 2017 08:18)  POCT Blood Glucose.: 261 mg/dL (29 Nov 2017 22:18)  POCT Blood Glucose.: 338 mg/dL (29 Nov 2017 18:07)      RADIOLOGY & ADDITIONAL STUDIES:

## 2017-12-01 VITALS
DIASTOLIC BLOOD PRESSURE: 63 MMHG | RESPIRATION RATE: 18 BRPM | TEMPERATURE: 99 F | SYSTOLIC BLOOD PRESSURE: 127 MMHG | OXYGEN SATURATION: 97 % | HEART RATE: 79 BPM

## 2017-12-01 LAB
ANION GAP SERPL CALC-SCNC: 8 MMOL/L — SIGNIFICANT CHANGE UP (ref 5–17)
BUN SERPL-MCNC: 67 MG/DL — HIGH (ref 7–18)
CALCIUM SERPL-MCNC: 8.9 MG/DL — SIGNIFICANT CHANGE UP (ref 8.4–10.5)
CHLORIDE SERPL-SCNC: 110 MMOL/L — HIGH (ref 96–108)
CO2 SERPL-SCNC: 21 MMOL/L — LOW (ref 22–31)
CREAT SERPL-MCNC: 3.39 MG/DL — HIGH (ref 0.5–1.3)
GLUCOSE SERPL-MCNC: 197 MG/DL — HIGH (ref 70–99)
HAV IGM SER-ACNC: SIGNIFICANT CHANGE UP
HBV CORE IGM SER-ACNC: SIGNIFICANT CHANGE UP
HBV SURFACE AG SER-ACNC: SIGNIFICANT CHANGE UP
HCT VFR BLD CALC: 29.2 % — LOW (ref 34.5–45)
HCV AB S/CO SERPL IA: 0.08 S/CO — SIGNIFICANT CHANGE UP
HCV AB SERPL-IMP: SIGNIFICANT CHANGE UP
HGB BLD-MCNC: 8.8 G/DL — LOW (ref 11.5–15.5)
HIV 1 & 2 AB SERPL IA.RAPID: SIGNIFICANT CHANGE UP
HIV 1+2 AB+HIV1 P24 AG SERPL QL IA: SIGNIFICANT CHANGE UP
MAGNESIUM SERPL-MCNC: 1.9 MG/DL — SIGNIFICANT CHANGE UP (ref 1.6–2.6)
MCHC RBC-ENTMCNC: 24.3 PG — LOW (ref 27–34)
MCHC RBC-ENTMCNC: 30 GM/DL — LOW (ref 32–36)
MCV RBC AUTO: 81.1 FL — SIGNIFICANT CHANGE UP (ref 80–100)
PHOSPHATE SERPL-MCNC: 4 MG/DL — SIGNIFICANT CHANGE UP (ref 2.5–4.5)
PLATELET # BLD AUTO: 207 K/UL — SIGNIFICANT CHANGE UP (ref 150–400)
POTASSIUM SERPL-MCNC: 5.5 MMOL/L — HIGH (ref 3.5–5.3)
POTASSIUM SERPL-SCNC: 5.5 MMOL/L — HIGH (ref 3.5–5.3)
RBC # BLD: 3.6 M/UL — LOW (ref 3.8–5.2)
RBC # FLD: 17.9 % — HIGH (ref 10.3–14.5)
SODIUM SERPL-SCNC: 139 MMOL/L — SIGNIFICANT CHANGE UP (ref 135–145)
WBC # BLD: 8.5 K/UL — SIGNIFICANT CHANGE UP (ref 3.8–10.5)
WBC # FLD AUTO: 8.5 K/UL — SIGNIFICANT CHANGE UP (ref 3.8–10.5)

## 2017-12-01 PROCEDURE — 71010: CPT | Mod: 26

## 2017-12-01 RX ORDER — INSULIN LISPRO 100/ML
20 VIAL (ML) SUBCUTANEOUS
Qty: 0 | Refills: 0 | COMMUNITY

## 2017-12-01 RX ORDER — ATORVASTATIN CALCIUM 80 MG/1
1 TABLET, FILM COATED ORAL
Qty: 30 | Refills: 0
Start: 2017-12-01 | End: 2017-12-31

## 2017-12-01 RX ORDER — SODIUM POLYSTYRENE SULFONATE 4.1 MEQ/G
15 POWDER, FOR SUSPENSION ORAL ONCE
Qty: 0 | Refills: 0 | Status: COMPLETED | OUTPATIENT
Start: 2017-12-01 | End: 2017-12-01

## 2017-12-01 RX ORDER — INSULIN GLARGINE 100 [IU]/ML
40 INJECTION, SOLUTION SUBCUTANEOUS
Qty: 0 | Refills: 0 | COMMUNITY

## 2017-12-01 RX ORDER — FUROSEMIDE 40 MG
1 TABLET ORAL
Qty: 30 | Refills: 0 | OUTPATIENT
Start: 2017-12-01 | End: 2017-12-31

## 2017-12-01 RX ORDER — ERGOCALCIFEROL 1.25 MG/1
1 CAPSULE ORAL
Qty: 3 | Refills: 0
Start: 2017-12-01 | End: 2017-12-22

## 2017-12-01 RX ORDER — FUROSEMIDE 40 MG
40 TABLET ORAL DAILY
Qty: 0 | Refills: 0 | Status: DISCONTINUED | OUTPATIENT
Start: 2017-12-01 | End: 2017-12-01

## 2017-12-01 RX ORDER — INSULIN GLARGINE 100 [IU]/ML
50 INJECTION, SOLUTION SUBCUTANEOUS
Qty: 1 | Refills: 0 | OUTPATIENT
Start: 2017-12-01 | End: 2017-12-31

## 2017-12-01 RX ORDER — INSULIN LISPRO 100/ML
20 VIAL (ML) SUBCUTANEOUS
Qty: 1 | Refills: 0 | OUTPATIENT
Start: 2017-12-01 | End: 2017-12-31

## 2017-12-01 RX ADMIN — Medication 40 MILLIGRAM(S): at 17:32

## 2017-12-01 RX ADMIN — Medication 1 DROP(S): at 11:29

## 2017-12-01 RX ADMIN — ALBUTEROL 2 PUFF(S): 90 AEROSOL, METERED ORAL at 11:26

## 2017-12-01 RX ADMIN — TIOTROPIUM BROMIDE 1 CAPSULE(S): 18 CAPSULE ORAL; RESPIRATORY (INHALATION) at 11:29

## 2017-12-01 RX ADMIN — SODIUM POLYSTYRENE SULFONATE 15 GRAM(S): 4.1 POWDER, FOR SUSPENSION ORAL at 13:27

## 2017-12-01 RX ADMIN — Medication 3: at 11:29

## 2017-12-01 RX ADMIN — HEPARIN SODIUM 5000 UNIT(S): 5000 INJECTION INTRAVENOUS; SUBCUTANEOUS at 13:28

## 2017-12-01 RX ADMIN — Medication 12 UNIT(S): at 11:26

## 2017-12-01 RX ADMIN — ALBUTEROL 2 PUFF(S): 90 AEROSOL, METERED ORAL at 17:02

## 2017-12-01 RX ADMIN — Medication 12 UNIT(S): at 17:03

## 2017-12-01 RX ADMIN — HEPARIN SODIUM 5000 UNIT(S): 5000 INJECTION INTRAVENOUS; SUBCUTANEOUS at 05:55

## 2017-12-01 RX ADMIN — Medication 3: at 17:03

## 2017-12-01 NOTE — PROGRESS NOTE ADULT - ATTENDING COMMENTS
Patient seen and examined, agree with above assessment and plan as transcribed above.    - F/u echo and stress  - Keep Net negative, clinically improving      Kyler Jaimes MD, FACC  [facility], [facility]  [address] [place] [address]  PHONE:  [phone]  BEEPER : [phone]

## 2017-12-01 NOTE — PROGRESS NOTE ADULT - ASSESSMENT
77F PMHx Asthma, Anemia (on weekly procrit), CHF (last echo in 2015 s/o preserved EF), DM, CKD (Baseline Creatinine ~ 3), HTN (recently-discontinued home dose of carvedilol due to hypotension), Breast CA x 2 (s/p L lumpectomy, radiation 2005) Uterine CA (s/p JACLYN) BIBEMS c/o exertional dyspnea x 3 days non-responsive to home dose of lasix 40mg. Called EMS when symptoms worsened to the point of not being able to walk more than two steps and less than one block. Patient was recently discharged from [facility] for treatment of urinary tract infection and was scheduled to follow up with outpatient nephrologist within 1 week of discharge; however, her appointment had to be rescheduled.   In ED vitals significant for ; SpO2 97% on room air; Labs significant for WBC 12.1, Hgb 9.3 (Baseline ~ 10.5), K 5.4, BUN/Cr 55/3.61 (Baseline Creatinine ~ 3)  EKG revealed left anterior bifascicular block (unchanged from prior admission); Troponin 0.125, BNP 3409.    n.
77 yr old with stable CKD . admitted with hypervolemia and worsening anemia after missing diuretics and JACKELINE.  Symptomatically better with diuresis. cont lasix IV.  s/p procrit 20,000.  s/p NST.  D/C planning. F/U at renal clinic upon D/C
77F PMHx Asthma, Anemia (on weekly procrit), CHF (last echo in 2015 s/o preserved EF), DM, CKD (Baseline Creatinine ~ 3), HTN (recently-discontinued home dose of carvedilol due to hypotension), Breast CA x 2 (s/p L lumpectomy, radiation 2005) Uterine CA (s/p JACLYN) BIBEMS c/o exertional dyspnea x 3 days non-responsive to home dose of lasix 40mg. Called EMS when symptoms worsened to the point of not being able to walk more than two steps and less than one block. Patient was recently discharged from [facility] for treatment of urinary tract infection and was scheduled to follow up with outpatient nephrologist within 1 week of discharge; however, her appointment had to be rescheduled.   In ED vitals significant for ; SpO2 97% on room air; Labs significant for WBC 12.1, Hgb 9.3 (Baseline ~ 10.5), K 5.4, BUN/Cr 55/3.61 (Baseline Creatinine ~ 3)  EKG revealed left anterior bifascicular block (unchanged from prior admission); Troponin 0.125, BNP 3409.    CXR revealed cardiomegaly with prominent pulmonary interstitial/vascular markings. Patient was given Lasix 60mg IV and will be admitted for HF exacerbation.

## 2017-12-01 NOTE — PROGRESS NOTE ADULT - PROBLEM SELECTOR PLAN 1
clinically improved'  switch to oral Lasix  outpatient PCP follow up within 1 week
EKG in ED revealed left anterior bifascicular block without ischemic changes Troponin 0.125, Troponin: 0.147; BNP 3409  CXR showed cardiomegaly with prominent pulmonary interstitial/vascular markings  Lasix 60mg IV daily   TTE: pending  NST tomorrow  CXR repeat in AM   Strict I/O's, daily weight  Monitor BMP daily  Dr Jaimes consult noted

## 2017-12-01 NOTE — PROGRESS NOTE ADULT - PROBLEM SELECTOR PLAN 3
stable
Patient reports recently discontinuing anti-HTN medications due to hypotension  Continue to monitor BP and start medication as needed

## 2017-12-01 NOTE — PROGRESS NOTE ADULT - PROBLEM SELECTOR PLAN 5
Potentially due to chronic kidney disease  Weekly Epogen (dose received today)  Hgb in ED 9.3 (Baseline ~ 10.5)  Monitor CBC
Lantus dose adjusted to 50units at home once daily as patient was non compliant with the twice daily dose. She has bee n advised on appropriate regimen and counselled multiple times on need for compliance.  She will follow up with her endocrinologist for further dose adjustments on insulin upon discharge.

## 2017-12-01 NOTE — PROGRESS NOTE ADULT - PROBLEM SELECTOR PLAN 4
continue weekly epogen per primary nephrologist other wise stable
Not currently in exacerbation  Continue with DUONEB PRN and daily Spiriva

## 2017-12-01 NOTE — PROGRESS NOTE ADULT - SUBJECTIVE AND OBJECTIVE BOX
PGY 1 Note discussed with supervising resident and primary attending    Patient is a 77y old  Female who presents with a chief complaint of Shortness of breath (30 Nov 2017 06:58)      INTERVAL HPI/OVERNIGHT EVENTS: Patient seen and examined at bedside. No overnight events. Refers improvement of symptoms.    MEDICATIONS  (STANDING):  ALBUTerol    90 MICROgram(s) HFA Inhaler 2 Puff(s) Inhalation every 6 hours  atorvastatin 40 milliGRAM(s) Oral at bedtime  ergocalciferol 67500 Unit(s) Oral <User Schedule>  furosemide   Injectable 60 milliGRAM(s) IV Push daily  heparin  Injectable 5000 Unit(s) SubCutaneous every 8 hours  insulin glargine Injectable (LANTUS) 25 Unit(s) SubCutaneous at bedtime  insulin lispro (HumaLOG) corrective regimen sliding scale   SubCutaneous Before meals and at bedtime  insulin lispro Injectable (HumaLOG) 7 Unit(s) SubCutaneous three times a day before meals  timolol 0.5% Solution 1 Drop(s) Both EYES daily  tiotropium 18 MICROgram(s) Capsule 1 Capsule(s) Inhalation daily    MEDICATIONS  (PRN):      __________________________________________________  REVIEW OF SYSTEMS:    CONSTITUTIONAL: No fever, no chills  EYES: no acute visual disturbances  NECK: No pain or stiffness  RESPIRATORY: No cough; No shortness of breath  CARDIOVASCULAR: No chest pain, no palpitations  GASTROINTESTINAL: No pain. No nausea or vomiting; No diarrhea   NEUROLOGICAL: No headache or numbness, no tremors  MUSCULOSKELETAL: No joint pain, no muscle pain  GENITOURINARY: no dysuria, no frequency, no hesitancy  PSYCHIATRY: no depression , no anxiety  ALL OTHER  ROS negative        Vital Signs Last 24 Hrs  T(C): 37 (30 Nov 2017 11:41), Max: 37.4 (29 Nov 2017 21:14)  T(F): 98.6 (30 Nov 2017 11:41), Max: 99.3 (29 Nov 2017 21:14)  HR: 84 (30 Nov 2017 11:41) (83 - 104)  BP: 139/53 (30 Nov 2017 11:41) (122/69 - 155/85)  BP(mean): --  RR: 18 (30 Nov 2017 11:41) (18 - 20)  SpO2: 98% (30 Nov 2017 11:41) (96% - 100%)    ________________________________________________  PHYSICAL EXAM:  GENERAL: NAD, obese  HEENT: Normocephalic;  PERRLA, EOMI, conjunctivae and sclerae clear; moist mucous membranes.  NECK : supple  CHEST/LUNG: CTA B/L   HEART: S1 S2 nL, no M/G/R, RRR  ABDOMEN: Soft, NT/ND, BS+  EXTREMITIES: no ECC  SKIN: warm and dry; no rash  NERVOUS SYSTEM:  AAOx3    _________________________________________________  LABS:                        8.6    8.7   )-----------( 212      ( 30 Nov 2017 07:26 )             29.0     11-30    141  |  111<H>  |  57<H>  ----------------------------<  274<H>  5.0   |  19<L>  |  3.33<H>    Ca    8.8      30 Nov 2017 07:26  Phos  4.3     11-30  Mg     1.8     11-30    TPro  7.4  /  Alb  2.9<L>  /  TBili  0.3  /  DBili  x   /  AST  10  /  ALT  18  /  AlkPhos  98  11-29        POCT Blood Glucose.: 369 mg/dL (30 Nov 2017 11:27)  POCT Blood Glucose.: 273 mg/dL (30 Nov 2017 08:18)  POCT Blood Glucose.: 261 mg/dL (29 Nov 2017 22:18)  POCT Blood Glucose.: 338 mg/dL (29 Nov 2017 18:07)  POCT Blood Glucose.: 442 mg/dL (29 Nov 2017 13:44)        RADIOLOGY & ADDITIONAL TESTS:    Imaging Personally Reviewed:  YES    Consultant(s) Notes Reviewed:   YES    Care Discussed with Consultants : YES    Plan of care was discussed with patient and /or primary care giver; all questions and concerns were addressed and care was aligned with patient's wishes. YES
Interval Events:  pt in nad  eating well    Allergies    No Known Allergies    Intolerances      Endocrine/Metabolic Medications:  atorvastatin 40 milliGRAM(s) Oral at bedtime  insulin glargine Injectable (LANTUS) 40 Unit(s) SubCutaneous at bedtime  insulin lispro (HumaLOG) corrective regimen sliding scale   SubCutaneous Before meals and at bedtime  insulin lispro Injectable (HumaLOG) 12 Unit(s) SubCutaneous three times a day before meals      Vital Signs Last 24 Hrs  T(C): 36.6 (01 Dec 2017 10:53), Max: 37.1 (30 Nov 2017 23:40)  T(F): 97.8 (01 Dec 2017 10:53), Max: 98.8 (30 Nov 2017 23:40)  HR: 80 (01 Dec 2017 10:53) (80 - 103)  BP: 121/54 (01 Dec 2017 10:53) (106/72 - 128/74)  BP(mean): --  RR: 18 (01 Dec 2017 10:53) (18 - 18)  SpO2: 98% (01 Dec 2017 10:53) (96% - 100%)      PHYSICAL EXAM  All physical exam findings normal, except those marked:  General:	Alert, active, cooperative, NAD, well hydrated  .		[] Abnormal:  Neck		Normal: supple, no cervical adenopathy, no palpable thyroid  .		[] Abnormal:  Cardiovascular	Normal: regular rate, normal S1, S2, no murmurs  .		[] Abnormal:  Respiratory	Normal: no chest wall deformity, normal respiratory pattern, CTA B/L  .		[] Abnormal:  Abdominal	Normal: soft, ND, NT, bowel sounds present, no masses, no organomegaly  .		[] Abnormal:  		Normal normal genitalia, testes descended, circumcised/uncircumcised  .		Corin stage:			Breast corin:  .		Menstrual history:  .		[] Abnormal:  Extremities	Normal: FROM x4  .		[] Abnormal:  Skin		Normal: intact and not indurated, no rash, no acanthosis nigricans  .		[] Abnormal:  Neurologic	Normal: grossly intact  .		[] Abnormal:    LABS                        8.8    8.5   )-----------( 207      ( 01 Dec 2017 08:01 )             29.2                               139    |  110    |  67                  Calcium: 8.9   / iCa: x      (12-01 @ 08:01)    ----------------------------<  197       Magnesium: 1.9                              5.5     |  21     |  3.39             Phosphorous: 4.0        CAPILLARY BLOOD GLUCOSE      POCT Blood Glucose.: 293 mg/dL (01 Dec 2017 10:47)  POCT Blood Glucose.: 207 mg/dL (01 Dec 2017 07:39)  POCT Blood Glucose.: 145 mg/dL (30 Nov 2017 21:19)  POCT Blood Glucose.: 191 mg/dL (30 Nov 2017 16:14)        Assesment/plan    Dm- better but remains un cont  will need more basal insulin   cont humalog 12 ac tid  change lantus to 46 qhs  fsg ac and hs
SOB IMPROVED. S/P NST    No Known Allergies    Hospital Medications:   MEDICATIONS  (STANDING):  ALBUTerol    90 MICROgram(s) HFA Inhaler 2 Puff(s) Inhalation every 6 hours  atorvastatin 40 milliGRAM(s) Oral at bedtime  ergocalciferol 06521 Unit(s) Oral <User Schedule>  furosemide    Tablet 40 milliGRAM(s) Oral daily  heparin  Injectable 5000 Unit(s) SubCutaneous every 8 hours  insulin glargine Injectable (LANTUS) 40 Unit(s) SubCutaneous at bedtime  insulin lispro (HumaLOG) corrective regimen sliding scale   SubCutaneous Before meals and at bedtime  insulin lispro Injectable (HumaLOG) 12 Unit(s) SubCutaneous three times a day before meals  sodium polystyrene sulfonate Suspension 15 Gram(s) Oral once  timolol 0.5% Solution 1 Drop(s) Both EYES daily  tiotropium 18 MICROgram(s) Capsule 1 Capsule(s) Inhalation daily      VITALS:  T(F): 97.8 (12-01-17 @ 10:53), Max: 98.8 (11-30-17 @ 23:40)  HR: 80 (12-01-17 @ 10:53)  BP: 121/54 (12-01-17 @ 10:53)  RR: 18 (12-01-17 @ 10:53)  SpO2: 98% (12-01-17 @ 10:53)  Wt(kg): --    11-30 @ 07:01  -  12-01 @ 07:00  --------------------------------------------------------  IN: 200 mL / OUT: 600 mL / NET: -400 mL        PHYSICAL EXAM:  Constitutional: NAD  HEENT: anicteric sclera, oropharynx clear.  Neck: No JVD  Respiratory: occasional rales.   Cardiovascular: S1, S2, RRR  Gastrointestinal: BS+, soft, NT/ND  Extremities: No cyanosis or clubbing.  peripheral edema  Neurological: A/O x 3, no focal deficits  Psychiatric: Normal mood, normal affect  : No CVA tenderness. No haas.       LABS:  12-01    139  |  110<H>  |  67<H>  ----------------------------<  197<H>  5.5<H>   |  21<L>  |  3.39<H>    Ca    8.9      01 Dec 2017 08:01  Phos  4.0     12-01  Mg     1.9     12-01    TPro  7.4  /  Alb  2.9<L>  /  TBili  0.3  /  DBili      /  AST  10  /  ALT  18  /  AlkPhos  98  11-29    Creatinine Trend: 3.39 <--, 3.33 <--, 3.56 <--, 3.61 <--                        8.8    8.5   )-----------( 207      ( 01 Dec 2017 08:01 )             29.2     Urine Studies:      RADIOLOGY & ADDITIONAL STUDIES:
Subjective:  pt seen and examined, no complaints on exam.   no chest pain on Palpitation on exam     ALBUTerol    90 MICROgram(s) HFA Inhaler 2 Puff(s) Inhalation every 6 hours  atorvastatin 40 milliGRAM(s) Oral at bedtime  ergocalciferol 78701 Unit(s) Oral <User Schedule>  furosemide   Injectable 60 milliGRAM(s) IV Push daily  heparin  Injectable 5000 Unit(s) SubCutaneous every 8 hours  insulin glargine Injectable (LANTUS) 40 Unit(s) SubCutaneous at bedtime  insulin lispro (HumaLOG) corrective regimen sliding scale   SubCutaneous Before meals and at bedtime  insulin lispro Injectable (HumaLOG) 12 Unit(s) SubCutaneous three times a day before meals  timolol 0.5% Solution 1 Drop(s) Both EYES daily  tiotropium 18 MICROgram(s) Capsule 1 Capsule(s) Inhalation daily                            8.6    8.7   )-----------( 212      ( 30 Nov 2017 07:26 )             29.0       Hemoglobin: 8.6 g/dL (11-30 @ 07:26)  Hemoglobin: 9.3 g/dL (11-29 @ 15:16)      11-30    141  |  111<H>  |  57<H>  ----------------------------<  274<H>  5.0   |  19<L>  |  3.33<H>    Ca    8.8      30 Nov 2017 07:26  Phos  4.3     11-30  Mg     1.8     11-30    TPro  7.4  /  Alb  2.9<L>  /  TBili  0.3  /  DBili  x   /  AST  10  /  ALT  18  /  AlkPhos  98  11-29    Creatinine Trend: 3.33<--, 3.56<--, 3.61<--    COAGS:     CARDIAC MARKERS ( 30 Nov 2017 01:03 )  0.147 ng/mL / x     / 116 U/L / x     / 5.1 ng/mL  CARDIAC MARKERS ( 29 Nov 2017 15:16 )  0.125 ng/mL / x     / 124 U/L / x     / x            T(C): 36.8 (12-01-17 @ 04:30), Max: 37.1 (11-30-17 @ 23:40)  HR: 82 (12-01-17 @ 04:30) (82 - 103)  BP: 109/56 (12-01-17 @ 04:30) (107/64 - 139/53)  RR: 18 (12-01-17 @ 04:30) (18 - 18)  SpO2: 96% (12-01-17 @ 04:30) (96% - 100%)  Wt(kg): --    I&O's Summary    30 Nov 2017 07:01  -  01 Dec 2017 05:24  --------------------------------------------------------  IN: 200 mL / OUT: 600 mL / NET: -400 mL    HEENT:   Normal oral mucosa, PERRL, EOMI	  Lymphatic: No lymphadenopathy , no edema  Cardiovascular: Normal S1 S2, No JVD, No murmurs , Peripheral pulses palpable 2+ bilaterally  Respiratory: Lungs clear to auscultation, normal effort 	  Gastrointestinal:  Soft, Non-tender, + BS	    TELEMETRY: 	  NSR  DIAGNOSTIC TESTING:  [ ] Echocardiogram: < from: Transthoracic Echocardiogram (07.21.15 @ 14:51) >  CONCLUSIONS:  1. Mitral annular calcification, otherwise normal mitral  valve. Minimal mitral regurgitation.  2. Normal left ventricular internal dimensions and wall  thicknesses.  3. Normal left ventricular systolic function. No segmental  wall motion abnormalities.  4. Mild diastolic dysfunction (Stage I).  5. Normal right ventricular size and function.    < end of copied text >    [ ]  Catheterization:  [ ] Stress Test:    OTHER: 	        ASSESSMENT/PLAN: 	77y Female CHF s/p Cardio MEMS implantation (last echo in 2015 s/o preserved EF), DM, CKD (Baseline Creatinine ~ 3), HTN (recently-discontinued home dose of carvedilol due to hypotension), Breast CA x 2 (s/p L lumpectomy, radiation 2005) Uterine CA (s/p JACLYN) BIBEMS c/o dyspnea x 3 days acute on chronic diastolic     Echo - repeat pending .   NST today   Diuresis with IV lasix . keep net negative/   GI / DVT prophylaxis.   keep K>4, mag >2.0   trend creatine    cont tele   supp o2   further plan post echo / nst   D/W Dr Jaimes
MEDICAL ATTENDING NOTE    Patient is a 77y old  Female who presents with a chief complaint of Shortness of breath (30 Nov 2017 06:58)      INTERVAL HPI/OVERNIGHT EVENTS: no new complaints    MEDICATIONS  (STANDING):  ALBUTerol    90 MICROgram(s) HFA Inhaler 2 Puff(s) Inhalation every 6 hours  atorvastatin 40 milliGRAM(s) Oral at bedtime  ergocalciferol 67213 Unit(s) Oral <User Schedule>  furosemide    Tablet 40 milliGRAM(s) Oral daily  heparin  Injectable 5000 Unit(s) SubCutaneous every 8 hours  insulin glargine Injectable (LANTUS) 40 Unit(s) SubCutaneous at bedtime  insulin lispro (HumaLOG) corrective regimen sliding scale   SubCutaneous Before meals and at bedtime  insulin lispro Injectable (HumaLOG) 12 Unit(s) SubCutaneous three times a day before meals  timolol 0.5% Solution 1 Drop(s) Both EYES daily  tiotropium 18 MICROgram(s) Capsule 1 Capsule(s) Inhalation daily    MEDICATIONS  (PRN):      __________________________________________________  ----------------------------------------------------------------------------------  REVIEW OF SYSTEMS: no fever, no SOB, No Chest pain; feels well      Vital Signs Last 24 Hrs  T(C): 37.1 (01 Dec 2017 15:43), Max: 37.1 (30 Nov 2017 23:40)  T(F): 98.7 (01 Dec 2017 15:43), Max: 98.8 (30 Nov 2017 23:40)  HR: 79 (01 Dec 2017 15:43) (79 - 103)  BP: 127/63 (01 Dec 2017 15:43) (106/72 - 128/74)  BP(mean): --  RR: 18 (01 Dec 2017 15:43) (18 - 18)  SpO2: 97% (01 Dec 2017 15:43) (96% - 100%)    _________________  PHYSICAL EXAM:  ---------------------------   NAD; Normocephalic;   LUNGS - no wheezing  HEART: S1 S2+   ABDOMEN: Soft, Nontender, non distended  EXTREMITIES: no cyanosis; no edema  NERVOUS SYSTEM:  Awake and alert; no new deficits    _________________________________________________  LABS:                        8.8    8.5   )-----------( 207      ( 01 Dec 2017 08:01 )             29.2     12-01    139  |  110<H>  |  67<H>  ----------------------------<  197<H>  5.5<H>   |  21<L>  |  3.39<H>    Ca    8.9      01 Dec 2017 08:01  Phos  4.0     12-01  Mg     1.9     12-01          CAPILLARY BLOOD GLUCOSE      POCT Blood Glucose.: 283 mg/dL (01 Dec 2017 16:24)  POCT Blood Glucose.: 293 mg/dL (01 Dec 2017 10:47)  POCT Blood Glucose.: 207 mg/dL (01 Dec 2017 07:39)  POCT Blood Glucose.: 145 mg/dL (30 Nov 2017 21:19)            Care Discussed with Consultants :     Plan of care was discussed with patient ; all questions and concerns were addressed and care was aligned with patient's wishes.  Patient has been advised to follow up with PMD upon discharge from the hospital.  Discharge plans discussed with nursing staff .

## 2017-12-01 NOTE — PROGRESS NOTE ADULT - PROBLEM SELECTOR PLAN 2
CKD 4- stable; follow up with nephrologist
BUN/Cr in ED 55/3.61 (Baseline Creatinine ~ 3)  Procrit started  Dr. Sorenson consulted

## 2017-12-19 PROCEDURE — A9502: CPT

## 2017-12-19 PROCEDURE — 83880 ASSAY OF NATRIURETIC PEPTIDE: CPT

## 2017-12-19 PROCEDURE — 78452 HT MUSCLE IMAGE SPECT MULT: CPT

## 2017-12-19 PROCEDURE — 93005 ELECTROCARDIOGRAM TRACING: CPT

## 2017-12-19 PROCEDURE — 80074 ACUTE HEPATITIS PANEL: CPT

## 2017-12-19 PROCEDURE — 80061 LIPID PANEL: CPT

## 2017-12-19 PROCEDURE — 82607 VITAMIN B-12: CPT

## 2017-12-19 PROCEDURE — 71045 X-RAY EXAM CHEST 1 VIEW: CPT

## 2017-12-19 PROCEDURE — 84100 ASSAY OF PHOSPHORUS: CPT

## 2017-12-19 PROCEDURE — 83036 HEMOGLOBIN GLYCOSYLATED A1C: CPT

## 2017-12-19 PROCEDURE — 87389 HIV-1 AG W/HIV-1&-2 AB AG IA: CPT

## 2017-12-19 PROCEDURE — 99285 EMERGENCY DEPT VISIT HI MDM: CPT | Mod: 25

## 2017-12-19 PROCEDURE — 93306 TTE W/DOPPLER COMPLETE: CPT

## 2017-12-19 PROCEDURE — 84443 ASSAY THYROID STIM HORMONE: CPT

## 2017-12-19 PROCEDURE — 85027 COMPLETE CBC AUTOMATED: CPT

## 2017-12-19 PROCEDURE — 80053 COMPREHEN METABOLIC PANEL: CPT

## 2017-12-19 PROCEDURE — 82553 CREATINE MB FRACTION: CPT

## 2017-12-19 PROCEDURE — 83735 ASSAY OF MAGNESIUM: CPT

## 2017-12-19 PROCEDURE — 86703 HIV-1/HIV-2 1 RESULT ANTBDY: CPT

## 2017-12-19 PROCEDURE — 93017 CV STRESS TEST TRACING ONLY: CPT

## 2017-12-19 PROCEDURE — 82962 GLUCOSE BLOOD TEST: CPT

## 2017-12-19 PROCEDURE — 82550 ASSAY OF CK (CPK): CPT

## 2017-12-19 PROCEDURE — 84484 ASSAY OF TROPONIN QUANT: CPT

## 2017-12-19 PROCEDURE — 94640 AIRWAY INHALATION TREATMENT: CPT

## 2017-12-19 PROCEDURE — 80048 BASIC METABOLIC PNL TOTAL CA: CPT

## 2018-01-04 NOTE — ED ADULT NURSE NOTE - FALL HARM RISK CONCLUSION
Initial Anesthesia Post-op Note    Patient: Fela Ellison  Procedure(s) Performed: ADENOTONSILLECTOMY  Anesthesia type: General    Vital Last Value   Temperature 36.7 Â°C (98 Â°F) (01/04/18 0830)   Pulse 88 (01/04/18 0830)   Respiratory Rate 16 (01/04/18 0656)   Non-Invasive   Blood Pressure 103/57 (01/04/18 0830)   Arterial  Blood Pressure     Pulse Oximetry 93 % (01/04/18 0830)     Last 24 I/O:   Intake/Output Summary (Last 24 hours) at 01/04/18 0830  Last data filed at 01/04/18 0816   Gross per 24 hour   Intake              700 ml   Output               30 ml   Net              670 ml       PATIENT LOCATION: PACU Phase 1  POST-OP VITAL SIGNS: stable  LEVEL OF CONSCIOUSNESS: obtunded  RESPIRATORY STATUS: spontaneous ventilation  CARDIOVASCULAR: blood pressure returned to baseline and stable  HYDRATION: euvolemic    PAIN MANAGEMENT: well controlled  NAUSEA: None  AIRWAY PATENCY: positional obstruction  POST-OP ASSESSMENT: no complications, patient tolerated procedure well with no complications and no evidence of recall  COMPLICATIONS: none  Comments: 103/57 P86 95%    Report given to RN. Vital signs are stable at this time. Patient is spontaneously breathing. She needs a slight jaw thrust to open her AW otherwise she does obstruct. There are no immediate anesthetic complications.   HANDOFF:  Handoff to receiving nurse was performed and questions were answered Fall with Harm Risk

## 2018-01-10 PROBLEM — Z00.00 ENCOUNTER FOR PREVENTIVE HEALTH EXAMINATION: Status: ACTIVE | Noted: 2018-01-10

## 2018-02-09 ENCOUNTER — APPOINTMENT (OUTPATIENT)
Dept: VASCULAR SURGERY | Facility: CLINIC | Age: 78
End: 2018-02-09
Payer: MEDICARE

## 2018-02-09 VITALS — SYSTOLIC BLOOD PRESSURE: 158 MMHG | HEART RATE: 98 BPM | DIASTOLIC BLOOD PRESSURE: 78 MMHG

## 2018-02-09 VITALS — BODY MASS INDEX: 32.15 KG/M2 | WEIGHT: 193 LBS | RESPIRATION RATE: 17 BRPM | TEMPERATURE: 98.7 F | HEIGHT: 65 IN

## 2018-02-09 DIAGNOSIS — Z85.3 PERSONAL HISTORY OF MALIGNANT NEOPLASM OF BREAST: ICD-10-CM

## 2018-02-09 DIAGNOSIS — Z86.39 PERSONAL HISTORY OF OTHER ENDOCRINE, NUTRITIONAL AND METABOLIC DISEASE: ICD-10-CM

## 2018-02-09 DIAGNOSIS — Z86.79 PERSONAL HISTORY OF OTHER DISEASES OF THE CIRCULATORY SYSTEM: ICD-10-CM

## 2018-02-09 DIAGNOSIS — Z87.891 PERSONAL HISTORY OF NICOTINE DEPENDENCE: ICD-10-CM

## 2018-02-09 DIAGNOSIS — T82.898A OTHER SPECIFIED COMPLICATION OF VASCULAR PROSTHETIC DEVICES, IMPLANTS AND GRAFTS, INITIAL ENCOUNTER: ICD-10-CM

## 2018-02-09 PROCEDURE — 93990 DOPPLER FLOW TESTING: CPT

## 2018-02-09 PROCEDURE — 99204 OFFICE O/P NEW MOD 45 MIN: CPT

## 2018-02-09 RX ORDER — SODIUM POLYSTYRENE SULFONATE 15 G/60ML
15 SUSPENSION ORAL; RECTAL
Qty: 120 | Refills: 0 | Status: ACTIVE | COMMUNITY
Start: 2017-10-13

## 2018-02-09 RX ORDER — EPOETIN ALFA 4000 [IU]/ML
4000 SOLUTION INTRAVENOUS; SUBCUTANEOUS
Qty: 10 | Refills: 0 | Status: ACTIVE | COMMUNITY
Start: 2017-12-15

## 2018-02-09 RX ORDER — HUMAN INSULIN 100 [IU]/ML
100 INJECTION, SUSPENSION SUBCUTANEOUS
Qty: 30 | Refills: 0 | Status: ACTIVE | COMMUNITY
Start: 2017-10-20

## 2018-02-09 RX ORDER — SYRINGE AND NEEDLE,INSULIN,1ML 31 GX5/16"
31G X 5/16" SYRINGE, EMPTY DISPOSABLE MISCELLANEOUS
Qty: 100 | Refills: 0 | Status: ACTIVE | COMMUNITY
Start: 2017-12-15

## 2018-02-09 RX ORDER — INSULIN GLARGINE 100 [IU]/ML
100 INJECTION, SOLUTION SUBCUTANEOUS
Qty: 30 | Refills: 0 | Status: ACTIVE | COMMUNITY
Start: 2017-12-15

## 2018-02-09 RX ORDER — PANTOPRAZOLE 40 MG/1
40 TABLET, DELAYED RELEASE ORAL
Qty: 90 | Refills: 0 | Status: ACTIVE | COMMUNITY
Start: 2017-09-30

## 2018-02-09 RX ORDER — METOPROLOL TARTRATE 25 MG/1
25 TABLET, FILM COATED ORAL
Qty: 30 | Refills: 0 | Status: ACTIVE | COMMUNITY
Start: 2018-01-04

## 2018-02-09 RX ORDER — ERYTHROPOIETIN 10000 [IU]/ML
10000 INJECTION, SOLUTION INTRAVENOUS; SUBCUTANEOUS
Qty: 4 | Refills: 0 | Status: ACTIVE | COMMUNITY
Start: 2017-09-21

## 2018-02-09 RX ORDER — BLOOD SUGAR DIAGNOSTIC
STRIP MISCELLANEOUS
Qty: 100 | Refills: 0 | Status: ACTIVE | COMMUNITY
Start: 2017-09-22

## 2018-02-09 RX ORDER — BRIMONIDINE TARTRATE 1.5 MG/ML
0.15 SOLUTION/ DROPS OPHTHALMIC
Qty: 5 | Refills: 0 | Status: ACTIVE | COMMUNITY
Start: 2018-01-05

## 2018-02-09 RX ORDER — GABAPENTIN 100 MG/1
100 CAPSULE ORAL
Qty: 90 | Refills: 0 | Status: ACTIVE | COMMUNITY
Start: 2017-09-19

## 2018-02-09 RX ORDER — BUDESONIDE AND FORMOTEROL FUMARATE DIHYDRATE 160; 4.5 UG/1; UG/1
160-4.5 AEROSOL RESPIRATORY (INHALATION)
Qty: 10 | Refills: 0 | Status: ACTIVE | COMMUNITY
Start: 2017-09-19

## 2018-02-09 RX ORDER — LATANOPROST/PF 0.005 %
0.01 DROPS OPHTHALMIC (EYE)
Qty: 2 | Refills: 0 | Status: ACTIVE | COMMUNITY
Start: 2018-01-05

## 2018-02-09 RX ORDER — FLUCONAZOLE 150 MG/1
150 TABLET ORAL
Qty: 1 | Refills: 0 | Status: ACTIVE | COMMUNITY
Start: 2017-10-10

## 2018-02-09 RX ORDER — INSULIN ASPART 100 [IU]/ML
100 INJECTION, SOLUTION INTRAVENOUS; SUBCUTANEOUS
Qty: 18 | Refills: 0 | Status: ACTIVE | COMMUNITY
Start: 2017-09-19

## 2018-02-09 RX ORDER — ALBUTEROL SULFATE 90 UG/1
108 (90 BASE) AEROSOL, METERED RESPIRATORY (INHALATION)
Qty: 18 | Refills: 0 | Status: ACTIVE | COMMUNITY
Start: 2017-10-20

## 2018-02-09 RX ORDER — CALCIUM ACETATE 667 MG
667 TABLET ORAL
Qty: 90 | Refills: 0 | Status: ACTIVE | COMMUNITY
Start: 2018-01-15

## 2018-03-28 ENCOUNTER — APPOINTMENT (OUTPATIENT)
Age: 78
End: 2018-03-28

## 2018-04-18 ENCOUNTER — APPOINTMENT (OUTPATIENT)
Age: 78
End: 2018-04-18

## 2018-05-06 ENCOUNTER — INPATIENT (INPATIENT)
Facility: HOSPITAL | Age: 78
LOS: 7 days | Discharge: HOME | DRG: 314 | End: 2018-05-14
Attending: INTERNAL MEDICINE | Admitting: INTERNAL MEDICINE
Payer: COMMERCIAL

## 2018-05-06 VITALS
HEIGHT: 65 IN | DIASTOLIC BLOOD PRESSURE: 96 MMHG | HEART RATE: 98 BPM | TEMPERATURE: 98 F | RESPIRATION RATE: 20 BRPM | WEIGHT: 192.9 LBS | SYSTOLIC BLOOD PRESSURE: 166 MMHG | OXYGEN SATURATION: 94 %

## 2018-05-06 LAB
ALBUMIN SERPL ELPH-MCNC: 3.4 G/DL — LOW (ref 3.5–5)
ALP SERPL-CCNC: 102 U/L — SIGNIFICANT CHANGE UP (ref 40–120)
ALT FLD-CCNC: 23 U/L DA — SIGNIFICANT CHANGE UP (ref 10–60)
ANION GAP SERPL CALC-SCNC: 7 MMOL/L — SIGNIFICANT CHANGE UP (ref 5–17)
APPEARANCE UR: CLEAR — SIGNIFICANT CHANGE UP
AST SERPL-CCNC: 18 U/L — SIGNIFICANT CHANGE UP (ref 10–40)
BASOPHILS # BLD AUTO: 0.1 K/UL — SIGNIFICANT CHANGE UP (ref 0–0.2)
BASOPHILS NFR BLD AUTO: 0.9 % — SIGNIFICANT CHANGE UP (ref 0–2)
BILIRUB SERPL-MCNC: 0.7 MG/DL — SIGNIFICANT CHANGE UP (ref 0.2–1.2)
BILIRUB UR-MCNC: NEGATIVE — SIGNIFICANT CHANGE UP
BUN SERPL-MCNC: 30 MG/DL — HIGH (ref 7–18)
CALCIUM SERPL-MCNC: 8.9 MG/DL — SIGNIFICANT CHANGE UP (ref 8.4–10.5)
CHLORIDE SERPL-SCNC: 101 MMOL/L — SIGNIFICANT CHANGE UP (ref 96–108)
CO2 SERPL-SCNC: 26 MMOL/L — SIGNIFICANT CHANGE UP (ref 22–31)
COLOR SPEC: YELLOW — SIGNIFICANT CHANGE UP
CREAT SERPL-MCNC: 3.02 MG/DL — HIGH (ref 0.5–1.3)
DIFF PNL FLD: ABNORMAL
EOSINOPHIL # BLD AUTO: 0.2 K/UL — SIGNIFICANT CHANGE UP (ref 0–0.5)
EOSINOPHIL NFR BLD AUTO: 1.6 % — SIGNIFICANT CHANGE UP (ref 0–6)
GLUCOSE SERPL-MCNC: 183 MG/DL — HIGH (ref 70–99)
GLUCOSE UR QL: 100 MG/DL
HCT VFR BLD CALC: 37.9 % — SIGNIFICANT CHANGE UP (ref 34.5–45)
HGB BLD-MCNC: 11.6 G/DL — SIGNIFICANT CHANGE UP (ref 11.5–15.5)
KETONES UR-MCNC: NEGATIVE — SIGNIFICANT CHANGE UP
LEUKOCYTE ESTERASE UR-ACNC: ABNORMAL
LYMPHOCYTES # BLD AUTO: 0.8 K/UL — LOW (ref 1–3.3)
LYMPHOCYTES # BLD AUTO: 7.1 % — LOW (ref 13–44)
MCHC RBC-ENTMCNC: 27.8 PG — SIGNIFICANT CHANGE UP (ref 27–34)
MCHC RBC-ENTMCNC: 30.6 GM/DL — LOW (ref 32–36)
MCV RBC AUTO: 90.8 FL — SIGNIFICANT CHANGE UP (ref 80–100)
MONOCYTES # BLD AUTO: 0.7 K/UL — SIGNIFICANT CHANGE UP (ref 0–0.9)
MONOCYTES NFR BLD AUTO: 6.1 % — SIGNIFICANT CHANGE UP (ref 2–14)
NEUTROPHILS # BLD AUTO: 9.5 K/UL — HIGH (ref 1.8–7.4)
NEUTROPHILS NFR BLD AUTO: 84.3 % — HIGH (ref 43–77)
NITRITE UR-MCNC: NEGATIVE — SIGNIFICANT CHANGE UP
NT-PROBNP SERPL-SCNC: 9078 PG/ML — HIGH (ref 0–450)
PH UR: 8 — SIGNIFICANT CHANGE UP (ref 5–8)
PLATELET # BLD AUTO: 193 K/UL — SIGNIFICANT CHANGE UP (ref 150–400)
POTASSIUM SERPL-MCNC: 4.8 MMOL/L — SIGNIFICANT CHANGE UP (ref 3.5–5.3)
POTASSIUM SERPL-SCNC: 4.8 MMOL/L — SIGNIFICANT CHANGE UP (ref 3.5–5.3)
PROT SERPL-MCNC: 8.1 G/DL — SIGNIFICANT CHANGE UP (ref 6–8.3)
PROT UR-MCNC: 100
RBC # BLD: 4.17 M/UL — SIGNIFICANT CHANGE UP (ref 3.8–5.2)
RBC # FLD: 15.4 % — HIGH (ref 10.3–14.5)
SODIUM SERPL-SCNC: 134 MMOL/L — LOW (ref 135–145)
SP GR SPEC: 1.01 — SIGNIFICANT CHANGE UP (ref 1.01–1.02)
TROPONIN I SERPL-MCNC: 0.12 NG/ML — HIGH (ref 0–0.04)
UROBILINOGEN FLD QL: NEGATIVE — SIGNIFICANT CHANGE UP
WBC # BLD: 11.3 K/UL — HIGH (ref 3.8–10.5)
WBC # FLD AUTO: 11.3 K/UL — HIGH (ref 3.8–10.5)

## 2018-05-06 PROCEDURE — 71045 X-RAY EXAM CHEST 1 VIEW: CPT | Mod: 26

## 2018-05-06 RX ORDER — ACETAMINOPHEN 500 MG
650 TABLET ORAL ONCE
Qty: 0 | Refills: 0 | Status: COMPLETED | OUTPATIENT
Start: 2018-05-06 | End: 2018-05-06

## 2018-05-06 RX ORDER — PIPERACILLIN AND TAZOBACTAM 4; .5 G/20ML; G/20ML
2.25 INJECTION, POWDER, LYOPHILIZED, FOR SOLUTION INTRAVENOUS ONCE
Qty: 0 | Refills: 0 | Status: COMPLETED | OUTPATIENT
Start: 2018-05-06 | End: 2018-05-07

## 2018-05-06 RX ADMIN — Medication 650 MILLIGRAM(S): at 22:53

## 2018-05-06 NOTE — ED ADULT NURSE NOTE - OBJECTIVE STATEMENT
Pt states that she is having shortness of breath and back pain, pt has h/o chf, on dialysis, last HD yesterday. dialysis tues, thurs and sat Pt states that she is having shortness of breath and back pain, pt has h/o chf, on dialysis, last HD yesterday. dialysis tues, thurs and sat. Dialysis cath on right chest

## 2018-05-06 NOTE — ED PROVIDER NOTE - OBJECTIVE STATEMENT
79 y/o F pt w/ PMHx of Asthma, CHF, HTN, DM, ESRD (on dialysis TuThuSat) last completed dialysis yesterday via R chest port, suspected history of COPD, prior smoker c/o SOB today and diffuse back pain. No chest pain, no abd pain, no injury, but +subjective fever. No change today in urine production. Pt uses 2L nasal cannula oxygen 24 hours a day. No h/o blood clots. NKDA.

## 2018-05-06 NOTE — ED PROVIDER NOTE - PMH
Anemia    Asthma    Breast carcinoma  Lt side s/p lumpectomy and radiation in 2004  Chronic kidney disease    Congestive heart failure    DM (diabetes mellitus)    Hypertension    Renal mass

## 2018-05-06 NOTE — ED ADULT NURSE NOTE - NS ED NURSE REPORT GIVEN TO FT
ICU RN by bedside ICU RN Oneal by bedside, report given to RN Oneal after pt was intubated in dialysis during Rapid Response

## 2018-05-06 NOTE — ED PROVIDER NOTE - CARE PLAN
Principal Discharge DX:	Hypervolemia, unspecified hypervolemia type  Secondary Diagnosis:	Fever, unspecified fever cause  Secondary Diagnosis:	Dyspnea, unspecified type

## 2018-05-06 NOTE — ED PROVIDER NOTE - PROGRESS NOTE DETAILS
Pt could not convey who her nephrologist is so on-call nephrologist, Dr. Yanez, was called.  He says that if we are able to bring in a dialysis nurse he will arrange Pt to have dialysis tonight.  Pt is stable at this time but with shortness of breath, CXR with fluid overload.  Pulse ox maintained in 90's on her regular nasal cannula 2 L (which she says she uses 24 hours per day). Pt's new vital signs show fever and tachycardia--code sepsis initiated, added lactate, BCx's, Zosyn 2.25 g IV dose modified for HD patient; no IVF due to fluid overload in context of ESRD on HD; UA and UCx and CXR already done.   -HD nurse contacted and is coming in for emergent HD. Troponin came back elevated, but with no chest pain, EKG with bifascicular block unchanged from prior EKG but no acute ischemic changes.  Not suspecting ACS on clinical grounds at this time.  Previous troponins in November 2017 were elevated to similar range as well.    -D/w Dr. Benites for admission and he accepts, and he agrees with no aspirin or anticoagulation for now.  MAR informed.

## 2018-05-06 NOTE — ED PROVIDER NOTE - MEDICAL DECISION MAKING DETAILS
Dialysis pt with sx and signs of fluid overload. Will attempt to reach her doctor to expedite next dialysis and anticipate admission. Family does not know dialysis doctor's name, but thinks it's Dr. Segal.

## 2018-05-07 DIAGNOSIS — I10 ESSENTIAL (PRIMARY) HYPERTENSION: ICD-10-CM

## 2018-05-07 DIAGNOSIS — D64.9 ANEMIA, UNSPECIFIED: ICD-10-CM

## 2018-05-07 DIAGNOSIS — J45.909 UNSPECIFIED ASTHMA, UNCOMPLICATED: ICD-10-CM

## 2018-05-07 DIAGNOSIS — E11.9 TYPE 2 DIABETES MELLITUS WITHOUT COMPLICATIONS: ICD-10-CM

## 2018-05-07 DIAGNOSIS — J45.901 UNSPECIFIED ASTHMA WITH (ACUTE) EXACERBATION: ICD-10-CM

## 2018-05-07 DIAGNOSIS — R06.02 SHORTNESS OF BREATH: ICD-10-CM

## 2018-05-07 DIAGNOSIS — E87.70 FLUID OVERLOAD, UNSPECIFIED: ICD-10-CM

## 2018-05-07 DIAGNOSIS — A41.9 SEPSIS, UNSPECIFIED ORGANISM: ICD-10-CM

## 2018-05-07 DIAGNOSIS — E11.22 TYPE 2 DIABETES MELLITUS WITH DIABETIC CHRONIC KIDNEY DISEASE: ICD-10-CM

## 2018-05-07 DIAGNOSIS — Z29.9 ENCOUNTER FOR PROPHYLACTIC MEASURES, UNSPECIFIED: ICD-10-CM

## 2018-05-07 DIAGNOSIS — J18.9 PNEUMONIA, UNSPECIFIED ORGANISM: ICD-10-CM

## 2018-05-07 DIAGNOSIS — C50.919 MALIGNANT NEOPLASM OF UNSPECIFIED SITE OF UNSPECIFIED FEMALE BREAST: ICD-10-CM

## 2018-05-07 DIAGNOSIS — R50.9 FEVER, UNSPECIFIED: ICD-10-CM

## 2018-05-07 DIAGNOSIS — I50.32 CHRONIC DIASTOLIC (CONGESTIVE) HEART FAILURE: ICD-10-CM

## 2018-05-07 DIAGNOSIS — N18.6 END STAGE RENAL DISEASE: ICD-10-CM

## 2018-05-07 DIAGNOSIS — I50.9 HEART FAILURE, UNSPECIFIED: ICD-10-CM

## 2018-05-07 DIAGNOSIS — J96.00 ACUTE RESPIRATORY FAILURE, UNSPECIFIED WHETHER WITH HYPOXIA OR HYPERCAPNIA: ICD-10-CM

## 2018-05-07 DIAGNOSIS — J96.90 RESPIRATORY FAILURE, UNSPECIFIED, UNSPECIFIED WHETHER WITH HYPOXIA OR HYPERCAPNIA: ICD-10-CM

## 2018-05-07 LAB
ANION GAP SERPL CALC-SCNC: 10 MMOL/L — SIGNIFICANT CHANGE UP (ref 5–17)
ANION GAP SERPL CALC-SCNC: 8 MMOL/L — SIGNIFICANT CHANGE UP (ref 5–17)
BASE EXCESS BLDA CALC-SCNC: -0.9 MMOL/L — SIGNIFICANT CHANGE UP (ref -2–2)
BASOPHILS # BLD AUTO: 0.1 K/UL — SIGNIFICANT CHANGE UP (ref 0–0.2)
BASOPHILS NFR BLD AUTO: 0.4 % — SIGNIFICANT CHANGE UP (ref 0–2)
BLOOD GAS COMMENTS ARTERIAL: SIGNIFICANT CHANGE UP
BUN SERPL-MCNC: 32 MG/DL — HIGH (ref 7–18)
BUN SERPL-MCNC: 34 MG/DL — HIGH (ref 7–18)
CALCIUM SERPL-MCNC: 7.8 MG/DL — LOW (ref 8.4–10.5)
CALCIUM SERPL-MCNC: 8.1 MG/DL — LOW (ref 8.4–10.5)
CHLORIDE SERPL-SCNC: 100 MMOL/L — SIGNIFICANT CHANGE UP (ref 96–108)
CHLORIDE SERPL-SCNC: 99 MMOL/L — SIGNIFICANT CHANGE UP (ref 96–108)
CO2 SERPL-SCNC: 21 MMOL/L — LOW (ref 22–31)
CO2 SERPL-SCNC: 23 MMOL/L — SIGNIFICANT CHANGE UP (ref 22–31)
CREAT SERPL-MCNC: 3.34 MG/DL — HIGH (ref 0.5–1.3)
CREAT SERPL-MCNC: 3.34 MG/DL — HIGH (ref 0.5–1.3)
E CLOAC COMP DNA BLD POS QL NAA+PROBE: SIGNIFICANT CHANGE UP
EOSINOPHIL # BLD AUTO: 0 K/UL — SIGNIFICANT CHANGE UP (ref 0–0.5)
EOSINOPHIL NFR BLD AUTO: 0.1 % — SIGNIFICANT CHANGE UP (ref 0–6)
FERRITIN SERPL-MCNC: 1013 NG/ML — HIGH (ref 15–150)
GLUCOSE BLDC GLUCOMTR-MCNC: 398 MG/DL — HIGH (ref 70–99)
GLUCOSE SERPL-MCNC: 367 MG/DL — HIGH (ref 70–99)
GLUCOSE SERPL-MCNC: 381 MG/DL — HIGH (ref 70–99)
GRAM STN FLD: SIGNIFICANT CHANGE UP
HBA1C BLD-MCNC: 7.6 % — HIGH (ref 4–5.6)
HCO3 BLDA-SCNC: 24 MMOL/L — SIGNIFICANT CHANGE UP (ref 23–27)
HCT VFR BLD CALC: 33.5 % — LOW (ref 34.5–45)
HGB BLD-MCNC: 10.4 G/DL — LOW (ref 11.5–15.5)
HOROWITZ INDEX BLDA+IHG-RTO: 100 — SIGNIFICANT CHANGE UP
IRON SATN MFR SERPL: 18 % — SIGNIFICANT CHANGE UP (ref 15–50)
IRON SATN MFR SERPL: 54 UG/DL — SIGNIFICANT CHANGE UP (ref 40–150)
LACTATE SERPL-SCNC: 1.3 MMOL/L — SIGNIFICANT CHANGE UP (ref 0.7–2)
LYMPHOCYTES # BLD AUTO: 0.3 K/UL — LOW (ref 1–3.3)
LYMPHOCYTES # BLD AUTO: 2.1 % — LOW (ref 13–44)
MAGNESIUM SERPL-MCNC: 1.6 MG/DL — SIGNIFICANT CHANGE UP (ref 1.6–2.6)
MCHC RBC-ENTMCNC: 28.1 PG — SIGNIFICANT CHANGE UP (ref 27–34)
MCHC RBC-ENTMCNC: 31.2 GM/DL — LOW (ref 32–36)
MCV RBC AUTO: 90.2 FL — SIGNIFICANT CHANGE UP (ref 80–100)
METHOD TYPE: SIGNIFICANT CHANGE UP
MONOCYTES # BLD AUTO: 0.7 K/UL — SIGNIFICANT CHANGE UP (ref 0–0.9)
MONOCYTES NFR BLD AUTO: 5.3 % — SIGNIFICANT CHANGE UP (ref 2–14)
NEUTROPHILS # BLD AUTO: 12.1 K/UL — HIGH (ref 1.8–7.4)
NEUTROPHILS NFR BLD AUTO: 92 % — HIGH (ref 43–77)
PCO2 BLDA: 43 MMHG — SIGNIFICANT CHANGE UP (ref 32–46)
PH BLDA: 7.36 — SIGNIFICANT CHANGE UP (ref 7.35–7.45)
PHOSPHATE SERPL-MCNC: 4.2 MG/DL — SIGNIFICANT CHANGE UP (ref 2.5–4.5)
PLATELET # BLD AUTO: 149 K/UL — LOW (ref 150–400)
PO2 BLDA: 300 MMHG — HIGH (ref 74–108)
POTASSIUM SERPL-MCNC: 5 MMOL/L — SIGNIFICANT CHANGE UP (ref 3.5–5.3)
POTASSIUM SERPL-MCNC: 6.4 MMOL/L — CRITICAL HIGH (ref 3.5–5.3)
POTASSIUM SERPL-SCNC: 5 MMOL/L — SIGNIFICANT CHANGE UP (ref 3.5–5.3)
POTASSIUM SERPL-SCNC: 6.4 MMOL/L — CRITICAL HIGH (ref 3.5–5.3)
RAPID RVP RESULT: SIGNIFICANT CHANGE UP
RBC # BLD: 3.71 M/UL — LOW (ref 3.8–5.2)
RBC # BLD: 3.72 M/UL — LOW (ref 3.8–5.2)
RBC # FLD: 16.4 % — HIGH (ref 10.3–14.5)
RETICS #: 118.3 K/UL — SIGNIFICANT CHANGE UP (ref 25–125)
RETICS/RBC NFR: 3.2 % — HIGH (ref 0.5–2.5)
SAO2 % BLDA: 97 % — HIGH (ref 92–96)
SODIUM SERPL-SCNC: 130 MMOL/L — LOW (ref 135–145)
SODIUM SERPL-SCNC: 131 MMOL/L — LOW (ref 135–145)
SPECIMEN SOURCE: SIGNIFICANT CHANGE UP
SPECIMEN SOURCE: SIGNIFICANT CHANGE UP
TIBC SERPL-MCNC: 292 UG/DL — SIGNIFICANT CHANGE UP (ref 250–450)
TSH SERPL-MCNC: 0.52 UU/ML — SIGNIFICANT CHANGE UP (ref 0.34–4.82)
UIBC SERPL-MCNC: 238 UG/DL — SIGNIFICANT CHANGE UP (ref 110–370)
WBC # BLD: 13.1 K/UL — HIGH (ref 3.8–10.5)
WBC # FLD AUTO: 13.1 K/UL — HIGH (ref 3.8–10.5)

## 2018-05-07 PROCEDURE — 71045 X-RAY EXAM CHEST 1 VIEW: CPT | Mod: 26

## 2018-05-07 PROCEDURE — 99291 CRITICAL CARE FIRST HOUR: CPT

## 2018-05-07 PROCEDURE — 99223 1ST HOSP IP/OBS HIGH 75: CPT

## 2018-05-07 PROCEDURE — 71045 X-RAY EXAM CHEST 1 VIEW: CPT | Mod: 26,77,59

## 2018-05-07 RX ORDER — MIDAZOLAM HYDROCHLORIDE 1 MG/ML
2 INJECTION, SOLUTION INTRAMUSCULAR; INTRAVENOUS ONCE
Qty: 0 | Refills: 0 | Status: DISCONTINUED | OUTPATIENT
Start: 2018-05-07 | End: 2018-05-07

## 2018-05-07 RX ORDER — FENTANYL CITRATE 50 UG/ML
1 INJECTION INTRAVENOUS
Qty: 2500 | Refills: 0 | Status: DISCONTINUED | OUTPATIENT
Start: 2018-05-07 | End: 2018-05-08

## 2018-05-07 RX ORDER — PIPERACILLIN AND TAZOBACTAM 4; .5 G/20ML; G/20ML
3.38 INJECTION, POWDER, LYOPHILIZED, FOR SOLUTION INTRAVENOUS EVERY 12 HOURS
Qty: 0 | Refills: 0 | Status: DISCONTINUED | OUTPATIENT
Start: 2018-05-07 | End: 2018-05-14

## 2018-05-07 RX ORDER — BUMETANIDE 0.25 MG/ML
1 INJECTION INTRAMUSCULAR; INTRAVENOUS DAILY
Qty: 0 | Refills: 0 | Status: DISCONTINUED | OUTPATIENT
Start: 2018-05-07 | End: 2018-05-07

## 2018-05-07 RX ORDER — INSULIN LISPRO 100/ML
VIAL (ML) SUBCUTANEOUS EVERY 4 HOURS
Qty: 0 | Refills: 0 | Status: DISCONTINUED | OUTPATIENT
Start: 2018-05-07 | End: 2018-05-08

## 2018-05-07 RX ORDER — INSULIN LISPRO 100/ML
VIAL (ML) SUBCUTANEOUS EVERY 6 HOURS
Qty: 0 | Refills: 0 | Status: DISCONTINUED | OUTPATIENT
Start: 2018-05-07 | End: 2018-05-07

## 2018-05-07 RX ORDER — CEFEPIME 1 G/1
INJECTION, POWDER, FOR SOLUTION INTRAMUSCULAR; INTRAVENOUS
Qty: 0 | Refills: 0 | Status: DISCONTINUED | OUTPATIENT
Start: 2018-05-07 | End: 2018-05-07

## 2018-05-07 RX ORDER — IPRATROPIUM/ALBUTEROL SULFATE 18-103MCG
3 AEROSOL WITH ADAPTER (GRAM) INHALATION EVERY 6 HOURS
Qty: 0 | Refills: 0 | Status: DISCONTINUED | OUTPATIENT
Start: 2018-05-07 | End: 2018-05-08

## 2018-05-07 RX ORDER — ETOMIDATE 2 MG/ML
20 INJECTION INTRAVENOUS ONCE
Qty: 0 | Refills: 0 | Status: COMPLETED | OUTPATIENT
Start: 2018-05-07 | End: 2018-05-07

## 2018-05-07 RX ORDER — INSULIN GLARGINE 100 [IU]/ML
20 INJECTION, SOLUTION SUBCUTANEOUS AT BEDTIME
Qty: 0 | Refills: 0 | Status: DISCONTINUED | OUTPATIENT
Start: 2018-05-07 | End: 2018-05-08

## 2018-05-07 RX ORDER — VANCOMYCIN HCL 1 G
1000 VIAL (EA) INTRAVENOUS ONCE
Qty: 0 | Refills: 0 | Status: COMPLETED | OUTPATIENT
Start: 2018-05-07 | End: 2018-05-07

## 2018-05-07 RX ORDER — VANCOMYCIN HCL 1 G
1000 VIAL (EA) INTRAVENOUS
Qty: 0 | Refills: 0 | Status: DISCONTINUED | OUTPATIENT
Start: 2018-05-07 | End: 2018-05-07

## 2018-05-07 RX ORDER — INSULIN LISPRO 100/ML
15 VIAL (ML) SUBCUTANEOUS
Qty: 0 | Refills: 0 | Status: DISCONTINUED | OUTPATIENT
Start: 2018-05-07 | End: 2018-05-07

## 2018-05-07 RX ORDER — FUROSEMIDE 40 MG
80 TABLET ORAL ONCE
Qty: 0 | Refills: 0 | Status: DISCONTINUED | OUTPATIENT
Start: 2018-05-07 | End: 2018-05-07

## 2018-05-07 RX ORDER — ACETAMINOPHEN 500 MG
650 TABLET ORAL EVERY 6 HOURS
Qty: 0 | Refills: 0 | Status: DISCONTINUED | OUTPATIENT
Start: 2018-05-07 | End: 2018-05-14

## 2018-05-07 RX ORDER — CEFEPIME 1 G/1
1000 INJECTION, POWDER, FOR SOLUTION INTRAMUSCULAR; INTRAVENOUS EVERY 24 HOURS
Qty: 0 | Refills: 0 | Status: DISCONTINUED | OUTPATIENT
Start: 2018-05-07 | End: 2018-05-07

## 2018-05-07 RX ORDER — HEPARIN SODIUM 5000 [USP'U]/ML
5000 INJECTION INTRAVENOUS; SUBCUTANEOUS EVERY 12 HOURS
Qty: 0 | Refills: 0 | Status: DISCONTINUED | OUTPATIENT
Start: 2018-05-07 | End: 2018-05-14

## 2018-05-07 RX ORDER — INSULIN LISPRO 100/ML
VIAL (ML) SUBCUTANEOUS
Qty: 0 | Refills: 0 | Status: DISCONTINUED | OUTPATIENT
Start: 2018-05-07 | End: 2018-05-07

## 2018-05-07 RX ORDER — FENTANYL CITRATE 50 UG/ML
0.5 INJECTION INTRAVENOUS
Qty: 2500 | Refills: 0 | Status: DISCONTINUED | OUTPATIENT
Start: 2018-05-07 | End: 2018-05-07

## 2018-05-07 RX ORDER — SODIUM CHLORIDE 9 MG/ML
500 INJECTION INTRAMUSCULAR; INTRAVENOUS; SUBCUTANEOUS ONCE
Qty: 0 | Refills: 0 | Status: COMPLETED | OUTPATIENT
Start: 2018-05-07 | End: 2018-05-07

## 2018-05-07 RX ORDER — PIPERACILLIN AND TAZOBACTAM 4; .5 G/20ML; G/20ML
3.38 INJECTION, POWDER, LYOPHILIZED, FOR SOLUTION INTRAVENOUS EVERY 12 HOURS
Qty: 0 | Refills: 0 | Status: DISCONTINUED | OUTPATIENT
Start: 2018-05-07 | End: 2018-05-07

## 2018-05-07 RX ORDER — PANTOPRAZOLE SODIUM 20 MG/1
40 TABLET, DELAYED RELEASE ORAL DAILY
Qty: 0 | Refills: 0 | Status: DISCONTINUED | OUTPATIENT
Start: 2018-05-07 | End: 2018-05-09

## 2018-05-07 RX ORDER — IPRATROPIUM/ALBUTEROL SULFATE 18-103MCG
3 AEROSOL WITH ADAPTER (GRAM) INHALATION ONCE
Qty: 0 | Refills: 0 | Status: COMPLETED | OUTPATIENT
Start: 2018-05-07 | End: 2018-05-07

## 2018-05-07 RX ORDER — PROPOFOL 10 MG/ML
5 INJECTION, EMULSION INTRAVENOUS
Qty: 1000 | Refills: 0 | Status: DISCONTINUED | OUTPATIENT
Start: 2018-05-07 | End: 2018-05-08

## 2018-05-07 RX ORDER — INSULIN GLARGINE 100 [IU]/ML
45 INJECTION, SOLUTION SUBCUTANEOUS AT BEDTIME
Qty: 0 | Refills: 0 | Status: DISCONTINUED | OUTPATIENT
Start: 2018-05-07 | End: 2018-05-07

## 2018-05-07 RX ORDER — ACETAMINOPHEN 500 MG
1000 TABLET ORAL ONCE
Qty: 0 | Refills: 0 | Status: COMPLETED | OUTPATIENT
Start: 2018-05-07 | End: 2018-05-07

## 2018-05-07 RX ADMIN — Medication 3 MILLILITER(S): at 00:39

## 2018-05-07 RX ADMIN — PIPERACILLIN AND TAZOBACTAM 100 GRAM(S): 4; .5 INJECTION, POWDER, LYOPHILIZED, FOR SOLUTION INTRAVENOUS at 00:19

## 2018-05-07 RX ADMIN — Medication 12: at 13:12

## 2018-05-07 RX ADMIN — FENTANYL CITRATE 8.47 MICROGRAM(S)/KG/HR: 50 INJECTION INTRAVENOUS at 23:53

## 2018-05-07 RX ADMIN — PROPOFOL 2.62 MICROGRAM(S)/KG/MIN: 10 INJECTION, EMULSION INTRAVENOUS at 20:46

## 2018-05-07 RX ADMIN — Medication 400 MILLIGRAM(S): at 03:52

## 2018-05-07 RX ADMIN — HEPARIN SODIUM 5000 UNIT(S): 5000 INJECTION INTRAVENOUS; SUBCUTANEOUS at 20:38

## 2018-05-07 RX ADMIN — ETOMIDATE 20 MILLIGRAM(S): 2 INJECTION INTRAVENOUS at 04:51

## 2018-05-07 RX ADMIN — PIPERACILLIN AND TAZOBACTAM 25 GRAM(S): 4; .5 INJECTION, POWDER, LYOPHILIZED, FOR SOLUTION INTRAVENOUS at 20:46

## 2018-05-07 RX ADMIN — Medication 10: at 05:37

## 2018-05-07 RX ADMIN — SODIUM CHLORIDE 2000 MILLILITER(S): 9 INJECTION INTRAMUSCULAR; INTRAVENOUS; SUBCUTANEOUS at 05:23

## 2018-05-07 RX ADMIN — INSULIN GLARGINE 20 UNIT(S): 100 INJECTION, SOLUTION SUBCUTANEOUS at 21:23

## 2018-05-07 RX ADMIN — PANTOPRAZOLE SODIUM 40 MILLIGRAM(S): 20 TABLET, DELAYED RELEASE ORAL at 13:13

## 2018-05-07 RX ADMIN — HEPARIN SODIUM 5000 UNIT(S): 5000 INJECTION INTRAVENOUS; SUBCUTANEOUS at 05:29

## 2018-05-07 RX ADMIN — Medication 40 MILLIGRAM(S): at 20:44

## 2018-05-07 RX ADMIN — MIDAZOLAM HYDROCHLORIDE 2 MILLIGRAM(S): 1 INJECTION, SOLUTION INTRAMUSCULAR; INTRAVENOUS at 05:00

## 2018-05-07 RX ADMIN — PIPERACILLIN AND TAZOBACTAM 25 GRAM(S): 4; .5 INJECTION, POWDER, LYOPHILIZED, FOR SOLUTION INTRAVENOUS at 05:47

## 2018-05-07 RX ADMIN — Medication 40 MILLIGRAM(S): at 05:30

## 2018-05-07 RX ADMIN — PROPOFOL 2.62 MICROGRAM(S)/KG/MIN: 10 INJECTION, EMULSION INTRAVENOUS at 07:02

## 2018-05-07 RX ADMIN — FENTANYL CITRATE 4.24 MICROGRAM(S)/KG/HR: 50 INJECTION INTRAVENOUS at 20:45

## 2018-05-07 RX ADMIN — Medication 250 MILLIGRAM(S): at 00:39

## 2018-05-07 RX ADMIN — Medication 4: at 21:23

## 2018-05-07 RX ADMIN — PROPOFOL 2.62 MICROGRAM(S)/KG/MIN: 10 INJECTION, EMULSION INTRAVENOUS at 03:52

## 2018-05-07 RX ADMIN — Medication 40 MILLIGRAM(S): at 13:13

## 2018-05-07 NOTE — CONSULT NOTE ADULT - PROBLEM SELECTOR RECOMMENDATION 4
1- Blood sugar monitoring and control.  2- Accu-Cheks with coverage.  3- 1800 yazmin ADA diet.  4- Follow HB A1C.

## 2018-05-07 NOTE — ED ADULT NURSE REASSESSMENT NOTE - NS ED NURSE REASSESS COMMENT FT1
pt was in hemodialysis for 1 hour and went into distress, rapid response was initiated, Dr. Christensen, Dr. Kelly, Nurse Supervisors, this RN, RN Bijal and RT Lucita and rapid response team responded, pt was intubated, placed on ventilator and transeferred to ICU bed 7, pt endorsed to ICU RN.

## 2018-05-07 NOTE — CONSULT NOTE ADULT - SUBJECTIVE AND OBJECTIVE BOX
Chief complain        HPI    PAST MEDICAL & SURGICAL HISTORY:  Anemia  Hypertension  Congestive heart failure  Chronic kidney disease  Breast carcinoma: Lt side s/p lumpectomy and radiation in   Renal mass  DM (diabetes mellitus)  Asthma  S/P lumpectomy, left breast:       Home Medications Reviewed    Hospital Medications:   MEDICATIONS  (STANDING):  ALBUTerol/ipratropium for Nebulization 3 milliLiter(s) Nebulizer every 6 hours  heparin  Injectable 5000 Unit(s) SubCutaneous every 12 hours  insulin glargine Injectable (LANTUS) 20 Unit(s) SubCutaneous at bedtime  insulin lispro (HumaLOG) corrective regimen sliding scale   SubCutaneous every 4 hours  methylPREDNISolone sodium succinate Injectable 40 milliGRAM(s) IV Push every 6 hours  pantoprazole  Injectable 40 milliGRAM(s) IV Push daily  piperacillin/tazobactam IVPB. 3.375 Gram(s) IV Intermittent every 12 hours  propofol Infusion 5 MICROgram(s)/kG/Min (2.625 mL/Hr) IV Continuous <Continuous>    MEDICATIONS  (PRN):  acetaminophen   Tablet. 650 milliGRAM(s) Oral every 6 hours PRN Moderate Pain (4 - 6)      Allergies    No Known Allergies    Intolerances                              10.4   13.1  )-----------( 149      ( 07 May 2018 04:42 )             33.5     05-07    131<L>  |  100  |  34<H>  ----------------------------<  367<H>  5.0   |  23  |  3.34<H>    Ca    7.8<L>      07 May 2018 05:47  Phos  4.2     05-07  Mg     1.6     -07    TPro  8.1  /  Alb  3.4<L>  /  TBili  0.7  /  DBili  x   /  AST  18  /  ALT  23  /  AlkPhos  102  05-06      Urinalysis Basic - ( 06 May 2018 22:50 )    Color: Yellow / Appearance: Clear / S.010 / pH: x  Gluc: x / Ketone: Negative  / Bili: Negative / Urobili: Negative   Blood: x / Protein: 100 / Nitrite: Negative   Leuk Esterase: Trace / RBC: 0-2 /HPF / WBC 6-10 /HPF   Sq Epi: x / Non Sq Epi: Few /HPF / Bacteria: Moderate /HPF        ABG - ( 07 May 2018 05:02 )  pH, Arterial: 7.36  pH, Blood: x     /  pCO2: 43    /  pO2: 300   / HCO3: 24    / Base Excess: -0.9  /  SaO2: 97                  RADIOLOGY & ADDITIONAL STUDIES:    SOCIAL HISTORY: Denies ETOh,Smoking,     FAMILY HISTORY:  Diabetes mellitus  Family history of breast cancer (Aunt)      REVIEW OF SYSTEMS:  CONSTITUTIONAL: No malaise, No fatigue, No fevers or chills, well developed, no diaphoresis  EYES/ENT: No visual changes;  No vertigo or throat pain   NECK: No pain or stiffness  RESPIRATORY: No cough, wheezing, hemoptysis; No shortness of breath  CARDIOVASCULAR: No chest pain or palpitations. No edema  GASTROINTESTINAL: No abdominal or epigastric pain. No nausea, vomiting, or hematemesis; No diarrhea or constipation. No melena or hematochezia.  GENITOURINARY: No dysuria, frequency, foamy urine, urinary urgency, incontinence or hematuria  NEUROLOGICAL: No numbness or weakness, No tremor  SKIN: No itching, burning, rashes, or lesions   VASCULAR: No claudication  Musculoskeletal: no arthralgia, no myalgia  All other review of systems is negative unless indicated above.    VITALS:  Vital Signs Last 24 Hrs  T(C): 37.4 (07 May 2018 06:30), Max: 39.4 (07 May 2018 03:50)  T(F): 99.3 (07 May 2018 06:30), Max: 103 (07 May 2018 03:50)  HR: 82 (07 May 2018 10:00) (71 - 122)  BP: 113/65 (07 May 2018 10:00) (56/32 - 177/122)  BP(mean): 74 (07 May 2018 10:00) (30 - 131)  RR: 20 (07 May 2018 10:00) (14 - 32)  SpO2: 98% (07 May 2018 10:00) (76% - 100%)     @ 07:01  -   @ 07:00  --------------------------------------------------------  IN: 81.2 mL / OUT: 5 mL / NET: 76.2 mL      Height (cm): 165.1 ( @ 19:56)  Weight (kg): 84.7 ( @ 03:50)  BMI (kg/m2): 31.1 ( @ 03:50)  BSA (m2): 1.92 ( @ 03:50)    PHYSICAL EXAM:  Constitutional: NAD  HEENT: anicteric sclera, oropharynx clear, MMM  Neck: No JVD  Respiratory: good air entrance B/L, no wheezes, rales or rhonchi  Cardiovascular: S1, S2, RRR, no pericardial rub, no murmur  Gastrointestinal: BS+, soft, no tenderness, no distension, no bruit  Pelvis: bladder non-distended, no CVA tenderness  Extremities: No cyanosis or clubbing. No peripheral edema  Neurological: A/O x 3, no focal deficits  Psychiatric: Normal mood, normal affect  : No CVA tenderness. No ahas.   Skin: No rashes  Vascular: all pulses present  Access: Chief complain    Patient is a 78y old  Female who presents with a chief complaint of SOB, fever.   History of diabetes and hypertension.  She came to the ER c/o headache fever weakness and fatigue.  She let  ER and went home, at home she felt sick and called 911.  In NS  ER sepsis code was called.  She was given IV antibiotics  She underwent dialysis treatment for 1 hour in the night for hyperkalemia.  She developed tremor and more SOB during the treatment , under went respiratory failure and was intubated and transferred to the  ICU.  Cultures result still pending    Comprehensive Metabolic Panel (18 @ 22:50)    Sodium, Serum: 134 mmol/L    Potassium, Serum: 4.8 mmol/L    Chloride, Serum: 101 mmol/L    Carbon Dioxide, Serum: 26 mmol/L    Anion Gap, Serum: 7 mmol/L    Blood Urea Nitrogen, Serum: 30 mg/dL      HPI    PAST MEDICAL & SURGICAL HISTORY:  Anemia  Hypertension  Congestive heart failure  Chronic kidney disease  Breast carcinoma: Lt side s/p lumpectomy and radiation in   Renal mass  DM (diabetes mellitus)  Asthma  S/P lumpectomy, left breast:       Home Medications Reviewed    Hospital Medications:   MEDICATIONS  (STANDING):  ALBUTerol/ipratropium for Nebulization 3 milliLiter(s) Nebulizer every 6 hours  heparin  Injectable 5000 Unit(s) SubCutaneous every 12 hours  insulin glargine Injectable (LANTUS) 20 Unit(s) SubCutaneous at bedtime  insulin lispro (HumaLOG) corrective regimen sliding scale   SubCutaneous every 4 hours  methylPREDNISolone sodium succinate Injectable 40 milliGRAM(s) IV Push every 6 hours  pantoprazole  Injectable 40 milliGRAM(s) IV Push daily  piperacillin/tazobactam IVPB. 3.375 Gram(s) IV Intermittent every 12 hours  propofol Infusion 5 MICROgram(s)/kG/Min (2.625 mL/Hr) IV Continuous <Continuous>    MEDICATIONS  (PRN):  acetaminophen   Tablet. 650 milliGRAM(s) Oral every 6 hours PRN Moderate Pain (4 - 6)                              10.4   13.1  )-----------( 149      ( 07 May 2018 04:42 )             33.5     05-07    131<L>  |  100  |  34<H>  ----------------------------<  367<H>  5.0   |  23  |  3.34<H>    Ca    7.8<L>      07 May 2018 05:47  Phos  4.2     05-  Mg     1.6     -    TPro  8.1  /  Alb  3.4<L>  /  TBili  0.7  /  DBili  x   /  AST  18  /  ALT  23  /  AlkPhos  102        Urinalysis Basic - ( 06 May 2018 22:50 )    Color: Yellow / Appearance: Clear / S.010 / pH: x  Gluc: x / Ketone: Negative  / Bili: Negative / Urobili: Negative   Blood: x / Protein: 100 / Nitrite: Negative   Leuk Esterase: Trace / RBC: 0-2 /HPF / WBC 6-10 /HPF   Sq Epi: x / Non Sq Epi: Few /HPF / Bacteria: Moderate /HPF        ABG - ( 07 May 2018 05:02 )  pH, Arterial: 7.36  pH, Blood: x     /  pCO2: 43    /  pO2: 300   / HCO3: 24    / Base Excess: -0.9  /  SaO2: 97                  RADIOLOGY & ADDITIONAL STUDIES:    SOCIAL HISTORY: Denies ETOh,Smoking,     FAMILY HISTORY:  Diabetes mellitus  Family history of breast cancer (Aunt)      REVIEW OF SYSTEMS:  on vent support and sedated.    VITALS:  Vital Signs Last 24 Hrs  T(C): 37.4 (07 May 2018 06:30), Max: 39.4 (07 May 2018 03:50)  T(F): 99.3 (07 May 2018 06:30), Max: 103 (07 May 2018 03:50)  HR: 82 (07 May 2018 10:00) (71 - 122)  BP: 113/65 (07 May 2018 10:00) (56/32 - 177/122)  BP(mean): 74 (07 May 2018 10:00) (30 - 131)  RR: 20 (07 May 2018 10:00) (14 - 32)  SpO2: 98% (07 May 2018 10:00) (76% - 100%)     @ 07:01  -   @ 07:00  --------------------------------------------------------  IN: 81.2 mL / OUT: 5 mL / NET: 76.2 mL      Height (cm): 165.1 ( @ 19:56)  Weight (kg): 84.7 ( @ 03:50)  BMI (kg/m2): 31.1 ( @ 03:50)  BSA (m2): 1.92 ( @ 03:50)    PHYSICAL EXAM:  Constitutional; on vent support.  Neck: No JVD  Respiratory: on vent support , good air entrance via ventilator.  Cardiovascular: S1, S2, RRR, no pericardial rub, no murmur.  Gastrointestinal: BS+, soft, no tenderness, no distension, no bruit.  Pelvis: haas catheter, urine clear.   Extremities: No cyanosis or clubbing. No peripheral edema  Neurological: A/O x 3, no focal deficits  Psychiatric: Normal mood, normal affect  : No CVA tenderness. No haas.   Access: left radial avf clotted.   Right pc exit site and tunnel no erythema and no discharge.

## 2018-05-07 NOTE — CONSULT NOTE ADULT - ATTENDING COMMENTS
Patient is a 77 y/o patient with a h/o ESRD on HD, Asthma, HFpEF, DM, on home [id number] with chief complaint of SOB. She also has had a non productive cough and fever. CXR shows bilateral increased interstitial markings R > L. WBC 11K. She was seen by Dr Perez of nephrology and he recommended emergent HD which was being done when the patient had a rapid response for worsening tachypnea and was breathing @ ~40 bpm. The patient was intubated and sedated with propofol. Will treat with broad spectrum antibiotics ( Vancomycin/ Zosyn ). Working Dx pneumonia. DVT prophylaxis with SC heparin. Brain natriuretic peptide 9K pg/ml. Right subclavian permacath, site clean without pus or surrounding erythema. Troponin 0.1ng/ml. Viral panel negative. Critical care time 40 minutes.

## 2018-05-07 NOTE — CONSULT NOTE ADULT - PROBLEM SELECTOR RECOMMENDATION 9
Heparin Sq. Oxygen supp  Pfts as OP Ventilaator support  Bronchodilators  DVT/Stress ulcer PPX  pulmonary toilet  follow up ABGs

## 2018-05-07 NOTE — H&P ADULT - NSHPPHYSICALEXAM_GEN_ALL_CORE
PHYSICAL EXAM:  GENERAL: NAD   HEENT:  Atraumatic, Normocephalic,  EOMI, PERRLA, conjunctiva and sclera clear  NECK: Supple, No JVD  LUNG: Clear to auscultation bilaterally; No wheeze; No crackles, bilateral scattered rhonchi, R side perm Cath   CVS: Regular rate and rhythm, no RMG  ABDOMEN: Soft, Nontender, Nondistended; Bowel sounds present; No guarding  : no suprapubic pain   EXTREMITIES:  2+ Peripheral Pulses, No cyanosis or edema  SKIN: No rashes or lesions  Neuro: AAOx3, non-focal

## 2018-05-07 NOTE — CONSULT NOTE ADULT - PROBLEM SELECTOR RECOMMENDATION 3
1- Bronchodilators.  2- Respirator management, and O2 as needed.  3- Pulmonary evaluation and management.  4- Steroids as per primary, and pulmonary team if needed.

## 2018-05-07 NOTE — CONSULT NOTE ADULT - PROBLEM SELECTOR RECOMMENDATION 3
Will do emergent HD as per Nephro   Duoneb   If symptoms SOB and hypoxia  persists after HD, V/Q can be done to r/o PE ID eval  cont antibiotics  follow up CXR

## 2018-05-07 NOTE — CONSULT NOTE ADULT - PROBLEM SELECTOR RECOMMENDATION 2
patient presented with wheezing, respiratory distress requiring mechanical ventilation likely 2/2 pulmonary edema as unable to complete full dialysis 2/2 hypotension vs asthma exacerbation  - mechanical ventilation, propofol sedation, wean to Fi02 50%

## 2018-05-07 NOTE — CHART NOTE - NSCHARTNOTEFT_GEN_A_CORE
77 y/o Female PMHx Asthma, Anemia, HFpEF, DM, ESRD on HD via PC ( T,T,S, last one Saturday) s/p rt AVF, HTN, Breast CA x 2 (s/p L lumpectomy, radiation 2005) Uterine CA (s/p JACLYN) admitted to ICU w/ respiratory distress 2/2 sepsis and pulmonary edema. Vascular surgery called re possible infected PC and possibility of PC removal as per Dr Tamayo. Spoke to Dr Tamayo, she would like to continue HD via PC, no need for vascular intervention at this time.   D/w Dr Bowie. Reconsult as needed.

## 2018-05-07 NOTE — H&P ADULT - PROBLEM SELECTOR PLAN 1
presents with SOB, dry cough and fever   CXR; looks fluid overload  Will do emergent HD as per Nephro   Duoneb   If symptoms SOB and hypoxia  persists after HD, V/Q can be done to r/o PE

## 2018-05-07 NOTE — CONSULT NOTE ADULT - SUBJECTIVE AND OBJECTIVE BOX
PULMONARY CONSULT NOTE      SHIKHA REDMAN  MRN-[id number]    Patient is a 78y old  Female who presents with a chief complaint of SOB, fever.       HISTORY OF PRESENT ILLNESS:  77 y/o F PMHx Asthma, Anemia (on weekly procrit), HF pEF, DM, ESRD on HD ( T,T,S, last one Saturday), HTN, on Home ,  Breast CA x 2 (s/p L lumpectomy, radiation ) Uterine CA (s/p JACLYN)  c/o SOB. Pt that she started feeling SOB and went to [facility] but nobody saw her because SOB didn't improve pt decided to come to [facility]. As per patient she drinks lots of water. At ED code sepsis was called 2/2 fevers, tachycardia and leukocytosis. Pt has been complaining of a dry cough since Saturday. Nephrologist on call was notified and recommended emergent HD. Pt denies CP, LE swelling, N/V, abdominal pain or any other complaints.       MEDICATIONS  (STANDING):  ALBUTerol/ipratropium for Nebulization 3 milliLiter(s) Nebulizer every 6 hours  heparin  Injectable 5000 Unit(s) SubCutaneous every 12 hours  insulin glargine Injectable (LANTUS) 20 Unit(s) SubCutaneous at bedtime  insulin lispro (HumaLOG) corrective regimen sliding scale   SubCutaneous every 4 hours  methylPREDNISolone sodium succinate Injectable 40 milliGRAM(s) IV Push every 6 hours  pantoprazole  Injectable 40 milliGRAM(s) IV Push daily  piperacillin/tazobactam IVPB. 3.375 Gram(s) IV Intermittent every 12 hours  propofol Infusion 5 MICROgram(s)/kG/Min (2.625 mL/Hr) IV Continuous <Continuous>      MEDICATIONS  (PRN):  acetaminophen   Tablet. 650 milliGRAM(s) Oral every 6 hours PRN Moderate Pain (4 - 6)      Allergies    No Known Allergies    Intolerances        PAST MEDICAL & SURGICAL HISTORY:  Anemia  Hypertension  Congestive heart failure  Chronic kidney disease  Breast carcinoma: Lt side s/p lumpectomy and radiation in   Renal mass  DM (diabetes mellitus)  Asthma  S/P lumpectomy, left breast:       FAMILY HISTORY:  Diabetes mellitus  Family history of breast cancer (Aunt)      SOCIAL HISTORY  Smoking History:     REVIEW OF SYSTEMS:    CONSTITUTIONAL:  No fevers, chills, sweats    HEENT:  Eyes:  No diplopia or blurred vision. ENT:  No earache, sore throat or runny nose.    CARDIOVASCULAR:  No pressure, squeezing, tightness, or heaviness about the chest; no palpitations.    RESPIRATORY:  Per HPI    GASTROINTESTINAL:  No abdominal pain, nausea, vomiting or diarrhea.    GENITOURINARY:  No dysuria, frequency or urgency.    NEUROLOGIC:  No paresthesias, fasciculations, seizures or weakness.    PSYCHIATRIC:  No disorder of thought or mood.    Vital Signs Last 24 Hrs  T(C): 37.4 (07 May 2018 06:30), Max: 39.4 (07 May 2018 03:50)  T(F): 99.3 (07 May 2018 06:30), Max: 103 (07 May 2018 03:50)  HR: 82 (07 May 2018 10:00) (71 - 122)  BP: 113/65 (07 May 2018 10:00) (56/32 - 177/122)  BP(mean): 74 (07 May 2018 10:00) (30 - 131)  RR: 20 (07 May 2018 10:00) (14 - 32)  SpO2: 98% (07 May 2018 10:00) (76% - 100%)  I&O's Detail    06 May 2018 07:01  -  07 May 2018 07:00  --------------------------------------------------------  IN:    propofol Infusion: 81.2 mL  Total IN: 81.2 mL    OUT:    Indwelling Catheter - Urethral: 5 mL  Total OUT: 5 mL    Total NET: 76.2 mL          PHYSICAL EXAMINATION:    GENERAL: The patient is a well-developed, well-nourished _____in no apparent distress.     HEENT: Head is normocephalic and atraumatic. Extraocular muscles are intact. Mucous membranes are moist.     NECK: Supple.     LUNGS: Clear to auscultation without wheezing, rales, or rhonchi. Respirations unlabored    HEART: Regular rate and rhythm without murmur.    ABDOMEN: Soft, nontender, and nondistended.  No hepatosplenomegaly is noted.    EXTREMITIES: Without any cyanosis, clubbing, rash, lesions or edema.    NEUROLOGIC: Grossly intact.      LABS:                        10.4   13.1  )-----------( 149      ( 07 May 2018 04:42 )             33.5     05-    131<L>  |  100  |  34<H>  ----------------------------<  367<H>  5.0   |  23  |  3.34<H>    Ca    7.8<L>      07 May 2018 05:47  Phos  4.2     -  Mg     1.6     -    TPro  8.1  /  Alb  3.4<L>  /  TBili  0.7  /  DBili  x   /  AST  18  /  ALT  23  /  AlkPhos  102  -06      Urinalysis Basic - ( 06 May 2018 22:50 )    Color: Yellow / Appearance: Clear / S.010 / pH: x  Gluc: x / Ketone: Negative  / Bili: Negative / Urobili: Negative   Blood: x / Protein: 100 / Nitrite: Negative   Leuk Esterase: Trace / RBC: 0-2 /HPF / WBC 6-10 /HPF   Sq Epi: x / Non Sq Epi: Few /HPF / Bacteria: Moderate /HPF      ABG - ( 07 May 2018 05:02 )  pH, Arterial: 7.36  pH, Blood: x     /  pCO2: 43    /  pO2: 300   / HCO3: 24    / Base Excess: -0.9  /  SaO2: 97                CARDIAC MARKERS ( 06 May 2018 22:50 )  0.123 ng/mL / x     / x     / x     / x            Serum Pro-Brain Natriuretic Peptide: 9078 pg/mL (18 @ 22:50)    Lactate, Blood: 1.3 mmol/L (18 @ 00:05)        MICROBIOLOGY:    RADIOLOGY & ADDITIONAL STUDIES:    CXR:    Ct scan chest:    ekg;    echo: PULMONARY CONSULT NOTE      SHIKHA REDMAN  MRN-[id number]    Patient is a 78y old  Female who presents with a chief complaint of SOB, fever.       HISTORY OF PRESENT ILLNESS:  79 y/o F PMHx Asthma, Anemia (on weekly procrit), HF pEF, DM, ESRD on HD ( T,T,S, last one Saturday), HTN, on Home ,  Breast CA x 2 (s/p L lumpectomy, radiation ) Uterine CA (s/p JACLYN)  c/o SOB. Pt that she started feeling SOB and went to [facility] but nobody saw her because SOB didn't improve pt decided to come to [facility]. As per patient she drinks lots of water. At ED code sepsis was called 2/2 fevers, tachycardia and leukocytosis. Pt has been complaining of a dry cough since Saturday. Nephrologist on call was notified and recommended emergent HD. Pt denies CP, LE swelling, N/V, abdominal pain or any other complaints. Currently intubated on mechanical ventilation having HD. She had a RR this early morning and was intubated because of worsening resp status      MEDICATIONS  (STANDING):  ALBUTerol/ipratropium for Nebulization 3 milliLiter(s) Nebulizer every 6 hours  heparin  Injectable 5000 Unit(s) SubCutaneous every 12 hours  insulin glargine Injectable (LANTUS) 20 Unit(s) SubCutaneous at bedtime  insulin lispro (HumaLOG) corrective regimen sliding scale   SubCutaneous every 4 hours  methylPREDNISolone sodium succinate Injectable 40 milliGRAM(s) IV Push every 6 hours  pantoprazole  Injectable 40 milliGRAM(s) IV Push daily  piperacillin/tazobactam IVPB. 3.375 Gram(s) IV Intermittent every 12 hours  propofol Infusion 5 MICROgram(s)/kG/Min (2.625 mL/Hr) IV Continuous <Continuous>      MEDICATIONS  (PRN):  acetaminophen   Tablet. 650 milliGRAM(s) Oral every 6 hours PRN Moderate Pain (4 - 6)      Allergies    No Known Allergies    Intolerances        PAST MEDICAL & SURGICAL HISTORY:  Anemia  Hypertension  Congestive heart failure  Chronic kidney disease  Breast carcinoma: Lt side s/p lumpectomy and radiation in   Renal mass  DM (diabetes mellitus)  Asthma  S/P lumpectomy, left breast:       FAMILY HISTORY:  Diabetes mellitus  Family history of breast cancer (Aunt)      SOCIAL HISTORY  Smoking History:     REVIEW OF SYSTEMS:    CONSTITUTIONAL:  No fevers, chills, sweats    HEENT:  Eyes:  No diplopia or blurred vision. ENT:  No earache, sore throat or runny nose.    CARDIOVASCULAR:  No pressure, squeezing, tightness, or heaviness about the chest; no palpitations.    RESPIRATORY:  Per HPI    GASTROINTESTINAL:  No abdominal pain, nausea, vomiting or diarrhea.    GENITOURINARY:  No dysuria, frequency or urgency.    NEUROLOGIC:  No paresthesias, fasciculations, seizures or weakness.    PSYCHIATRIC:  No disorder of thought or mood.    Vital Signs Last 24 Hrs  T(C): 37.4 (07 May 2018 06:30), Max: 39.4 (07 May 2018 03:50)  T(F): 99.3 (07 May 2018 06:30), Max: 103 (07 May 2018 03:50)  HR: 82 (07 May 2018 10:00) (71 - 122)  BP: 113/65 (07 May 2018 10:00) (56/32 - 177/122)  BP(mean): 74 (07 May 2018 10:00) (30 - 131)  RR: 20 (07 May 2018 10:00) (14 - 32)  SpO2: 98% (07 May 2018 10:00) (76% - 100%)  I&O's Detail    06 May 2018 07:01  -  07 May 2018 07:00  --------------------------------------------------------  IN:    propofol Infusion: 81.2 mL  Total IN: 81.2 mL    OUT:    Indwelling Catheter - Urethral: 5 mL  Total OUT: 5 mL    Total NET: 76.2 mL          PHYSICAL EXAMINATION:    GENERAL: The patient is a well-developed, well-nourished _____in no apparent distress.     HEENT: Head is normocephalic and atraumatic. Extraocular muscles are intact. Mucous membranes are moist.     NECK: Supple.     LUNGS: Clear to auscultation without wheezing, rales, or rhonchi. Respirations unlabored    HEART: Regular rate and rhythm without murmur.    ABDOMEN: Soft, nontender, and nondistended.  No hepatosplenomegaly is noted.    EXTREMITIES: Without any cyanosis, clubbing, rash, lesions or edema.    NEUROLOGIC: Grossly intact.      LABS:                        10.4   13.1  )-----------( 149      ( 07 May 2018 04:42 )             33.5         131<L>  |  100  |  34<H>  ----------------------------<  367<H>  5.0   |  23  |  3.34<H>    Ca    7.8<L>      07 May 2018 05:47  Phos  4.2       Mg     1.6         TPro  8.1  /  Alb  3.4<L>  /  TBili  0.7  /  DBili  x   /  AST  18  /  ALT  23  /  AlkPhos  102        Urinalysis Basic - ( 06 May 2018 22:50 )    Color: Yellow / Appearance: Clear / S.010 / pH: x  Gluc: x / Ketone: Negative  / Bili: Negative / Urobili: Negative   Blood: x / Protein: 100 / Nitrite: Negative   Leuk Esterase: Trace / RBC: 0-2 /HPF / WBC 6-10 /HPF   Sq Epi: x / Non Sq Epi: Few /HPF / Bacteria: Moderate /HPF      ABG - ( 07 May 2018 05:02 )  pH, Arterial: 7.36  pH, Blood: x     /  pCO2: 43    /  pO2: 300   / HCO3: 24    / Base Excess: -0.9  /  SaO2: 97                CARDIAC MARKERS ( 06 May 2018 22:50 )  0.123 ng/mL / x     / x     / x     / x            Serum Pro-Brain Natriuretic Peptide: 9078 pg/mL (18 @ 22:50)    Lactate, Blood: 1.3 mmol/L (18 @ 00:05)        MICROBIOLOGY:    RADIOLOGY & ADDITIONAL STUDIES:    CXR:  < from: Xray Chest 1 View-PORTABLE IMMEDIATE (18 @ 05:39) >  INTERPRETATION:  History: Post intubation    Portable comparison is made to study of 2018.    Feeding tube has been inserted with the tip below the diaphragm. Right   central line is again seen with the tip overlying the right atrium. ET   tube has some placed with the tip in the proximal right mainstem   bronchus. There are bilateral perihilar infiltrates and suggestion of   loculated left pleural effusion.    Impression: ET tube has been placed with the tip in the right mainstem   bronchus. Subsequent films demonstrate repositioning of the ET tube.    < end of copied text >    Ct scan chest:    ekg;    echo:

## 2018-05-07 NOTE — CONSULT NOTE ADULT - PROBLEM SELECTOR RECOMMENDATION 7
on Lantus 50 and Humalog 20 at home  will start Lantus 45 and Humalog 15   HSS  f/u A1c on Lantus 50 and Humalog 20 at home  will start Lantus 45 and Humalog 15   HSS  f/u A1c  Endo eval

## 2018-05-07 NOTE — CONSULT NOTE ADULT - PROBLEM SELECTOR RECOMMENDATION 9
2/4 sirs criteria met  patient developed rigors after 1 hour dialysis ? 2/2 bacteremia ? 2/2 permacath infection vs LLL CAP  vancomycin 1gm given after dialysis  renal dose zosyn started  - follow up BCx, UCx, sputum Cx  - MAP>60  - continue with zosyn q12

## 2018-05-07 NOTE — H&P ADULT - PROBLEM SELECTOR PLAN 8
IMPROVE VTE Individual Risk Assessment    RISK                                                          Points  [] Previous VTE                                           3  [] Thrombophilia                                        2  [] Lower limb paralysis                              2   [] Current Cancer                                       2   [x] Immobilization > 24 hrs                        1  [] ICU/CCU stay > 24 hours                       1  [x] Age > 60                                                   1    IMPROVE VTE Score: 2      Heparin Sq

## 2018-05-07 NOTE — H&P ADULT - HISTORY OF PRESENT ILLNESS
7F PMHx Asthma, Anemia (on weekly procrit), HF pEF, DM, ESRD on HD ( T,T,S, last one Saturday), HTN, on Home 02,  Breast CA x 2 (s/p L lumpectomy, radiation 2005) Uterine CA (s/p JACLYN)  c/o SOB. Pt that she started feeling SOB and [facility] because SOB didn't improve pt decided to come to [facility]. As per patient she drinks lots of water. At ED code sepsis was called 2/2 fevers, tachycardia and leukocytosis. Pt has been complaining of a dry cough since Saturday. Nephrologist on call was notified and recommended emergent HD. Pt denies CP, LE swelling, N/V, abdominal pain or any other complaints.

## 2018-05-07 NOTE — CONSULT NOTE ADULT - PROBLEM SELECTOR RECOMMENDATION 9
1- UA & CS.  2- Follow Blood culture to final report.  3- Start and Continue Vancomycin I gm IVPB x 1 dose then 500 mg IVPB TIW for possible catheter infection.  4- Continue IV Zosyn.  5- Fluid and electrolytes management.  6- CBC and BMP follow up.   7- Follow up CXR, and CT chest when possible to rule out loculated pl. effusion.  8- Nephrology management.

## 2018-05-07 NOTE — CONSULT NOTE ADULT - PROBLEM SELECTOR RECOMMENDATION 6
BSL uncontrolled   insulin gtt if not well controlled on lantus (1/2 home dose) and moderate scale HSS

## 2018-05-07 NOTE — CONSULT NOTE ADULT - ASSESSMENT
7F PMHx Asthma, Anemia (on weekly procrit), HF pEF, DM, ESRD on HD ( T,T,S, last one Saturday), HTN, on Home 02,  Breast CA x 2 (s/p L lumpectomy, radiation 2005) Uterine CA (s/p JACLYN)  c/o SOB. Pt that she started feeling SOB and went to [facility] but nobody saw her because SOB didn't improve [facility] As per patient she drinks lots of water. At ED code sepsis was called 2/2 fevers, tachycardia and leukocytosis. Pt has been complaining of a dry cough since Saturday.   Patient was intubated and was started on IV Zosyn.
A/P: ESRD due to diabetes and hypertension dialyzed via PC.  Fever on admission source ? pneumonia versus bacteremia via PC  Respiratory failure due to fever acute bronchitis with exacerbation of COPD /Asthma.  CHF on xr chest.    Patient on IV antibiotics broad spectrum  Attempt dialysis today via PC with fluid removal.  Await blood culture result.
78F PMHx Asthma, Anemia (on weekly procrit), HFpEF, DM, ESRD on HD ( T,T,S, last one Saturday), HTN, on Home 02,  Breast CA x 2 (s/p L lumpectomy, radiation 2005) Uterine CA (s/p JACLYN)  c/o SOB.  Emergently intubated for respiratory distress likely 2/2 sepsis and pulmonary edema

## 2018-05-07 NOTE — CONSULT NOTE ADULT - CONSULT REASON
CHF
Sepsis, rule out catheter infection, or Pneumonia.
esrd hyperkalemia  CHF  sepsis respiratory failure.
respiratory distress
Shortness of breath

## 2018-05-07 NOTE — H&P ADULT - PROBLEM SELECTOR PLAN 2
code sepsis was called   102 fever, tachycardia, leukocytosis  pt has dry cough since Saturday   since pt is HD will give vancomycin x 1 and start Cefepime   f/u RVP  f/u BCx code sepsis was called   102 fever, tachycardia, leukocytosis  pt has dry cough since Saturday   perm cath infected??  since pt is HD will give vancomycin x 1 and start Cefepime   f/u RVP  f/u BCx code sepsis was called   102 fever, tachycardia, leukocytosis  pt has dry cough since Saturday   perm cath infected??  since pt is HD will give vancomycin x 1 and start Zosyn  f/u RVP  f/u BCx

## 2018-05-07 NOTE — CONSULT NOTE ADULT - SUBJECTIVE AND OBJECTIVE BOX
HPI:  78F PMHx Asthma, Anemia (on weekly procrit), HFpEF, DM, ESRD on HD ( T,T,S, last one Saturday), HTN, on Home 02,  Breast CA x 2 (s/p L lumpectomy, radiation 2005) Uterine CA (s/p JACLYN)  c/o SOB. Pt that she started feeling SOB and went to [facility] but nobody saw her, [facility] As per patient she drinks lots of water. At ED code sepsis was called 2/2 fevers, tachycardia and leukocytosis. Pt has been complaining of a dry cough since Saturday. Nephrologist on call was notified and recommended emergent HD. Pt denies CP, LE swelling, N/V, abdominal pain or any other complaints. (07 May 2018 01:15)    CXR: + congestion, hilar congestion, ? LLL infiltrate  UA negative  RVP negative  BCx pending    Patient underwent emergent dialysis for pulmonary edema  Only able to perform 1 hour of dialysis 2/2 SBP 90    Patient then complained of chills, was shaking.  Sensorium became altered, and patient became tachypneic, restless, wheezing  duoneb given but patient remained tachypneic with abdominal breathing    Patient was emergently intubated after etomidate 20mg given  transferred to ICU    PAST MEDICAL & SURGICAL HISTORY:  Anemia  Hypertension  Congestive heart failure  Chronic kidney disease  Breast carcinoma: Lt side s/p lumpectomy and radiation in 2004  Renal mass  DM (diabetes mellitus)  Asthma  S/P lumpectomy, left breast: 2004    FAMILY HISTORY:  Diabetes mellitus  Family history of breast cancer (Aunt)    Social:  former smoker  no ETOH     MEDICATIONS  (STANDING):  acetaminophen  IVPB 1000 milliGRAM(s) IV Intermittent once  ALBUTerol/ipratropium for Nebulization 3 milliLiter(s) Nebulizer every 6 hours  etomidate Injectable 20 milliGRAM(s) IV Push once  heparin  Injectable 5000 Unit(s) SubCutaneous every 12 hours  insulin glargine Injectable (LANTUS) 20 Unit(s) SubCutaneous at bedtime  insulin lispro (HumaLOG) corrective regimen sliding scale   SubCutaneous every 6 hours  methylPREDNISolone sodium succinate Injectable 40 milliGRAM(s) IV Push every 6 hours  pantoprazole  Injectable 40 milliGRAM(s) IV Push daily  piperacillin/tazobactam IVPB. 3.375 Gram(s) IV Intermittent every 12 hours  propofol Infusion 5 MICROgram(s)/kG/Min (2.625 mL/Hr) IV Continuous <Continuous>    MEDICATIONS  (PRN):  acetaminophen   Tablet. 650 milliGRAM(s) Oral every 6 hours PRN Moderate Pain (4 - 6)      Vital Signs Last 24 Hrs  T(C): 37.1 (07 May 2018 01:20), Max: 39.2 (06 May 2018 23:18)  T(F): 98.8 (07 May 2018 01:20), Max: 102.6 (06 May 2018 23:18)  HR: 96 (07 May 2018 01:20) (96 - 107)  BP: 103/59 (07 May 2018 01:20) (103/59 - 166/96)  BP(mean): --  RR: 18 (07 May 2018 01:20) (18 - 26)  SpO2: 100% (07 May 2018 01:20) (94% - 100%)  CBC Full  -  ( 06 May 2018 21:22 )  WBC Count : 11.3 K/uL  Hemoglobin : 11.6 g/dL  Hematocrit : 37.9 %  Platelet Count - Automated : 193 K/uL  Mean Cell Volume : 90.8 fl  Mean Cell Hemoglobin : 27.8 pg  Mean Cell Hemoglobin Concentration : 30.6 gm/dL  Auto Neutrophil # : 9.5 K/uL  Auto Lymphocyte # : 0.8 K/uL  Auto Monocyte # : 0.7 K/uL  Auto Eosinophil # : 0.2 K/uL  Auto Basophil # : 0.1 K/uL  Auto Neutrophil % : 84.3 %  Auto Lymphocyte % : 7.1 %  Auto Monocyte % : 6.1 %  Auto Eosinophil % : 1.6 %  Auto Basophil % : 0.9 %      05-06    134<L>  |  101  |  30<H>  ----------------------------<  183<H>  4.8   |  26  |  3.02<H>    Ca    8.9      06 May 2018 22:50    TPro  8.1  /  Alb  3.4<L>  /  TBili  0.7  /  DBili  x   /  AST  18  /  ALT  23  /  AlkPhos  102  05-06      REVIEW OF SYSTEMS  as noted in HPI  	    Physicial Exam:     Constitutional: severe respiratory distress in dialysis room, audible wheezing  HEENT: conjunctivae clear  Neck: No bruits  Respiratory: abdominal breathing, diffuse wheezing  Cardiovascular: Regular rate & rhythm, normal S1, S2  Gastrointestinal: Soft, obese, no guarding  Extremities: No peripheral edema, No cyanosis, clubbing   Neurological: GCS:  moves all extremities, nonfocal exam  Musculoskeletal: No joint pain, swelling or deformity  Skin: No rashes

## 2018-05-07 NOTE — CONSULT NOTE ADULT - PROBLEM SELECTOR RECOMMENDATION 5
pt on Lasix  will continue tomorrow after HD  Cardio eval: Dr Galaviz Will do emergent HD as per Nephro   Renal follow up   If symptoms SOB and hypoxia  persists after HD, V/Q can be done to r/o PE

## 2018-05-07 NOTE — CONSULT NOTE ADULT - PROBLEM SELECTOR RECOMMENDATION 4
since pt is HD will give vancomycin x 1 and start Zosyn  f/u RVP  f/u BCx Bronchodilators  Pfts as OP

## 2018-05-07 NOTE — H&P ADULT - ASSESSMENT
7F PMHx Asthma, Anemia (on weekly procrit), HF pEF, DM, ESRD on HD ( T,T,S, last one Saturday), HTN c/o SOB. Pt that she started feeling SOB. At ED code sepsis was called 2/2 fevers, tachycardia and leukocytosis. Pt has been complaining of a dry cough since Saturday. Nephrologist on call was notified and recommended emergent HD

## 2018-05-07 NOTE — CONSULT NOTE ADULT - PROBLEM SELECTOR RECOMMENDATION 6
T,T,S   emergent HD today   c/w HD  Neprho eval: Dr. Tyler T,T,S   emergent HD today   c/w HD  Renal follow up

## 2018-05-07 NOTE — CONSULT NOTE ADULT - SUBJECTIVE AND OBJECTIVE BOX
HISTORY OF PRESENT ILLNESS: HPI:  7F PMHx Asthma, Anemia (on weekly procrit), Diastolic CHF, Cardio MEMS device  DM, ESRD on HD ( T,T,S, last one Saturday), HTN, on Home 02,  Breast CA x 2 (s/p L lumpectomy, radiation 2005) Uterine CA (s/p JACLYN)  c/o SOB. Pt that she started feeling SOB and went to [facility] but nobody saw her because SOB didn't improve pt decided to come to [facility]. As per patient she drinks lots of water. At ED code sepsis was called 2/2 fevers, tachycardia and leukocytosis. Pt has been complaining of a dry cough since Saturday. Nephrologist on call was notified and recommended emergent HD. Pt denies CP, LE swelling, N/V, abdominal pain or any other complaints.  She is currently intubated on vent support      PAST MEDICAL & SURGICAL HISTORY:  Anemia  Hypertension  Congestive heart failure  Chronic kidney disease  Breast carcinoma: Lt side s/p lumpectomy and radiation in 2004  Renal mass  DM (diabetes mellitus)  Asthma  S/P lumpectomy, left breast: 2004          MEDICATIONS:  MEDICATIONS  (STANDING):  ALBUTerol/ipratropium for Nebulization 3 milliLiter(s) Nebulizer every 6 hours  heparin  Injectable 5000 Unit(s) SubCutaneous every 12 hours  insulin glargine Injectable (LANTUS) 20 Unit(s) SubCutaneous at bedtime  insulin lispro (HumaLOG) corrective regimen sliding scale   SubCutaneous every 4 hours  methylPREDNISolone sodium succinate Injectable 40 milliGRAM(s) IV Push every 6 hours  pantoprazole  Injectable 40 milliGRAM(s) IV Push daily  piperacillin/tazobactam IVPB. 3.375 Gram(s) IV Intermittent every 12 hours  propofol Infusion 5 MICROgram(s)/kG/Min (2.625 mL/Hr) IV Continuous <Continuous>      Allergies    No Known Allergies    Intolerances        FAMILY HISTORY:  Diabetes mellitus  Family history of breast cancer (Aunt)    Non-contributary for premature coronary disease or sudden cardiac death    SOCIAL HISTORY:    [ X] Non-smoker  [ ] Smoker  [ ] Alcohol      REVIEW OF SYSTEMS:  [ ]chest pain  [  ]shortness of breath  [  ]palpitations  [  ]syncope  [ ]near syncope [ ]upper extremity weakness   [ ] lower extremity weakness  [  ]diplopia  [  ]altered mental status   [  ]fevers  [ ]chills [ ]nausea  [ ]vomitting  [  ]dysphagia    [ ]abdominal pain  [ ]melena  [ ]BRBPR    [  ]epistaxis  [  ]rash    [ ]lower extremity edema        [ ] All others negative	  [X ] Unable to obtain    PHYSICAL EXAM:  T(C): 37.4 (05-07-18 @ 06:30), Max: 39.4 (05-07-18 @ 03:50)  HR: 82 (05-07-18 @ 10:00) (71 - 122)  BP: 113/65 (05-07-18 @ 10:00) (56/32 - 177/122)  RR: 20 (05-07-18 @ 10:00) (14 - 32)  SpO2: 98% (05-07-18 @ 10:00) (76% - 100%)  Wt(kg): --  I&O's Summary    06 May 2018 07:01  -  07 May 2018 07:00  --------------------------------------------------------  IN: 81.2 mL / OUT: 5 mL / NET: 76.2 mL          HEENT:   Normal oral mucosa, PERRL, EOMI	  Lymphatic: No obvious lymphadenopathy , no edema  Cardiovascular: Normal S1 S2, No JVD,  1/6 HODA murmur , Peripheral pulses palpable 2+ bilaterally  Respiratory: Lungs clear to auscultation, normal effort 	  Gastrointestinal:  Soft, Non-tender, + BS	  Skin: No rashes, No cyanosis, warm to touch  Psychiatry:  Unable to assess Mood & affect     TELEMETRY: 	  sinus   ECG:  	PENDING NONE IN CHART  RADIOLOGY:        CXR: PENDING  	  	  LABS:	 	    CARDIAC MARKERS:  CARDIAC MARKERS ( 06 May 2018 22:50 )  0.123 ng/mL / x     / x     / x     / x                                  10.4   13.1  )-----------( 149      ( 07 May 2018 04:42 )             33.5     Hb Trend: 10.4<--, 11.6<--    05-07    131<L>  |  100  |  34<H>  ----------------------------<  367<H>  5.0   |  23  |  3.34<H>    Ca    7.8<L>      07 May 2018 05:47  Phos  4.2     05-07  Mg     1.6     05-07    TPro  8.1  /  Alb  3.4<L>  /  TBili  0.7  /  DBili  x   /  AST  18  /  ALT  23  /  AlkPhos  102  05-06    Creatinine Trend: 3.34<--, 3.34<--, 3.02<--    Coags:      proBNP: Serum Pro-Brain Natriuretic Peptide: 9078 pg/mL (05-06 @ 22:50)      TSH: Thyroid Stimulating Hormone, Serum: 0.52 uU/mL (05-07 @ 04:42)      ASSESSMENT/PLAN: 	78y Female PMHx Asthma, Anemia (on weekly procrit), Diastolic CHF, Cardio MEMS device  DM, ESRD on HD ( T,T,S, last one Saturday), HTN, on Home 02,  Breast CA x 2 (s/p L lumpectomy, radiation 2005) Uterine CA (s/p JACLYN)  c/o SOB now with respiratory failure.    - echo  - keep net negative with HD  - Abx per CINDY Jaimes MD, FACC HISTORY OF PRESENT ILLNESS: HPI:  7F PMHx Asthma, Anemia (on weekly procrit), Diastolic CHF, Cardio MEMS device  DM, ESRD on HD ( T,T,S, last one Saturday), HTN, on Home 02,  Breast CA x 2 (s/p L lumpectomy, radiation 2005) Uterine CA (s/p JACLYN)  c/o SOB. Pt that she started feeling SOB and went to [facility] but nobody saw her because SOB didn't improve pt decided to come to [facility]. As per patient she drinks lots of water. At ED code sepsis was called 2/2 fevers, tachycardia and leukocytosis. Pt has been complaining of a dry cough since Saturday. Nephrologist on call was notified and recommended emergent HD. Pt denies CP, LE swelling, N/V, abdominal pain or any other complaints.  She is currently intubated on vent support      PAST MEDICAL & SURGICAL HISTORY:  Anemia  Hypertension  Congestive heart failure  Chronic kidney disease  Breast carcinoma: Lt side s/p lumpectomy and radiation in 2004  Renal mass  DM (diabetes mellitus)  Asthma  S/P lumpectomy, left breast: 2004          MEDICATIONS:  MEDICATIONS  (STANDING):  ALBUTerol/ipratropium for Nebulization 3 milliLiter(s) Nebulizer every 6 hours  heparin  Injectable 5000 Unit(s) SubCutaneous every 12 hours  insulin glargine Injectable (LANTUS) 20 Unit(s) SubCutaneous at bedtime  insulin lispro (HumaLOG) corrective regimen sliding scale   SubCutaneous every 4 hours  methylPREDNISolone sodium succinate Injectable 40 milliGRAM(s) IV Push every 6 hours  pantoprazole  Injectable 40 milliGRAM(s) IV Push daily  piperacillin/tazobactam IVPB. 3.375 Gram(s) IV Intermittent every 12 hours  propofol Infusion 5 MICROgram(s)/kG/Min (2.625 mL/Hr) IV Continuous <Continuous>      Allergies    No Known Allergies    Intolerances        FAMILY HISTORY:  Diabetes mellitus  Family history of breast cancer (Aunt)    Non-contributary for premature coronary disease or sudden cardiac death    SOCIAL HISTORY:    [ X] Non-smoker  [ ] Smoker  [ ] Alcohol      REVIEW OF SYSTEMS:  [ ]chest pain  [  ]shortness of breath  [  ]palpitations  [  ]syncope  [ ]near syncope [ ]upper extremity weakness   [ ] lower extremity weakness  [  ]diplopia  [  ]altered mental status   [  ]fevers  [ ]chills [ ]nausea  [ ]vomitting  [  ]dysphagia    [ ]abdominal pain  [ ]melena  [ ]BRBPR    [  ]epistaxis  [  ]rash    [ ]lower extremity edema        [ ] All others negative	  [X ] Unable to obtain    PHYSICAL EXAM:  T(C): 37.4 (05-07-18 @ 06:30), Max: 39.4 (05-07-18 @ 03:50)  HR: 82 (05-07-18 @ 10:00) (71 - 122)  BP: 113/65 (05-07-18 @ 10:00) (56/32 - 177/122)  RR: 20 (05-07-18 @ 10:00) (14 - 32)  SpO2: 98% (05-07-18 @ 10:00) (76% - 100%)  Wt(kg): --  I&O's Summary    06 May 2018 07:01  -  07 May 2018 07:00  --------------------------------------------------------  IN: 81.2 mL / OUT: 5 mL / NET: 76.2 mL          HEENT:   Normal oral mucosa, PERRL, EOMI	  Lymphatic: No obvious lymphadenopathy , no edema  Cardiovascular: Normal S1 S2, No JVD,  1/6 HODA murmur , Peripheral pulses palpable 2+ bilaterally  Respiratory: Lungs clear to auscultation, normal effort 	  Gastrointestinal:  Soft, Non-tender, + BS	  Skin: No rashes, No cyanosis, warm to touch  Psychiatry:  Unable to assess Mood & affect     TELEMETRY: 	  sinus   ECG:  	PENDING NONE IN CHART  RADIOLOGY:        CXR: PENDING  	  	  LABS:	 	    CARDIAC MARKERS:  CARDIAC MARKERS ( 06 May 2018 22:50 )  0.123 ng/mL / x     / x     / x     / x                                  10.4   13.1  )-----------( 149      ( 07 May 2018 04:42 )             33.5     Hb Trend: 10.4<--, 11.6<--    05-07    131<L>  |  100  |  34<H>  ----------------------------<  367<H>  5.0   |  23  |  3.34<H>    Ca    7.8<L>      07 May 2018 05:47  Phos  4.2     05-07  Mg     1.6     05-07    TPro  8.1  /  Alb  3.4<L>  /  TBili  0.7  /  DBili  x   /  AST  18  /  ALT  23  /  AlkPhos  102  05-06    Creatinine Trend: 3.34<--, 3.34<--, 3.02<--    Coags:      proBNP: Serum Pro-Brain Natriuretic Peptide: 9078 pg/mL (05-06 @ 22:50)      TSH: Thyroid Stimulating Hormone, Serum: 0.52 uU/mL (05-07 @ 04:42)      ASSESSMENT/PLAN: 	78y Female PMHx Asthma, Anemia (on weekly procrit), Diastolic CHF, Cardio MEMS device  DM, ESRD on HD ( T,T,S, last one Saturday), HTN, on Home 02,  Breast CA x 2 (s/p L lumpectomy, radiation 2005) Uterine CA (s/p JACLYN)  c/o SOB now with respiratory failure.    - echo  - keep net negative with HD  - Abx per MICU  - Outpatient records placed in chart    Kyler Jaimes MD, FACC

## 2018-05-07 NOTE — CONSULT NOTE ADULT - SUBJECTIVE AND OBJECTIVE BOX
HPI:  7F PMHx Asthma, Anemia (on weekly procrit), HF pEF, DM, ESRD on HD ( T,T,S, last one Saturday), HTN, on Home ,  Breast CA x 2 (s/p L lumpectomy, radiation ) Uterine CA (s/p JACLYN)  c/o SOB. Pt that she started feeling SOB and went to [facility] but nobody saw her because SOB didn't improve pt decided to come to [facility]. As per patient she drinks lots of water. At ED code sepsis was called 2/2 fevers, tachycardia and leukocytosis. Pt has been complaining of a dry cough since Saturday. Nephrologist on call was notified and recommended emergent HD. Pt denies CP, LE swelling, N/V, abdominal pain or any other complaints. (07 May 2018 01:15)      PAST MEDICAL & SURGICAL HISTORY:  Anemia  Hypertension  Congestive heart failure  Chronic kidney disease  Breast carcinoma: Lt side s/p lumpectomy and radiation in   Renal mass  DM (diabetes mellitus)  Asthma  S/P lumpectomy, left breast:       No Known Allergies      acetaminophen   Tablet. 650 milliGRAM(s) Oral every 6 hours PRN  ALBUTerol/ipratropium for Nebulization 3 milliLiter(s) Nebulizer every 6 hours  furosemide   Injectable 80 milliGRAM(s) IV Push once  heparin  Injectable 5000 Unit(s) SubCutaneous every 12 hours  insulin glargine Injectable (LANTUS) 20 Unit(s) SubCutaneous at bedtime  insulin lispro (HumaLOG) corrective regimen sliding scale   SubCutaneous every 4 hours  methylPREDNISolone sodium succinate Injectable 40 milliGRAM(s) IV Push every 6 hours  pantoprazole  Injectable 40 milliGRAM(s) IV Push daily  piperacillin/tazobactam IVPB. 3.375 Gram(s) IV Intermittent every 12 hours  propofol Infusion 5 MICROgram(s)/kG/Min IV Continuous <Continuous>      Social Hx:    FAMILY HISTORY:  Diabetes mellitus  Family history of breast cancer (Aunt)        ROS  [ x ] UNABLE TO ELICIT    General:  [  ] None  [  ] Fever  [  ] Chills  [  ] Malaise    Skin:  [  ] None [  ] Rash  [  ] Wound  [  ] Ulcer    HEENT:  [  ] None  [  ] Sore Throat  [  ] Nasal congestion/ runny nose  [  ] Photophobia [  ] Neck pain      Chest:  [  ] None   [  ] SOB  [  ] Cough  [  ] None    Cardiovascular:   [  ] None  [  ] CP  [  ] Palpitation    Gastrointestinal:  [  ] None  [  ] Abd pain   [  ] Nausea    [  ] Vomiting   [  ] Diarrhea	     Genitourinary:  [  ] None [  ] Polyuria   [  ] Urgency  [  ] Frequency  [  ] Dysuria    [  ]  Hematuria       Musculoskeletal:  [  ] None [  ] Back Pain	[  ] Body aches  [  ] Joint pain    Neurological: [  ] None [  ]Dizziness  [  ]Visual Disturbance  [  ]Headaches   [  ] Weakness      PHYSICAL EXAM:    Vital Signs Last 24 Hrs  T(C): 37.4 (07 May 2018 06:30), Max: 39.4 (07 May 2018 03:50)  T(F): 99.3 (07 May 2018 06:30), Max: 103 (07 May 2018 03:50)  HR: 87 (07 May 2018 14:00) (68 - 122)  BP: 120/52 (07 May 2018 14:00) (56/32 - 177/122)  BP(mean): 68 (07 May 2018 14:00) (30 - 131)  RR: 14 (07 May 2018 14:00) (14 - 32)  SpO2: 100% (07 May 2018 14:00) (76% - 100%)    Constitutional:    HEENT: [  ] Wnl  [  ] Pharyngeal congestion [ x ] Intubated.    Neck:  [ x ] Supple  [  ]Lymphadenopathy  [  ] No JVD   [  ] JVD  [  ] Masses   [  ] WNL    CHEST/Respiratory:  [  ]Clear to auscultation  [ x ] Rales   [  ] Rhonchi   [  ] Wheezing     [ x ] Right side dialysis catheter      Cardiovascular:  [ x ] Reg S1 S2   [  ] Irreg S1 S2   [ x ]No Murmur  [  ] +ve Murmurs  [  ]Systolic [  ]Diastolic      Abdomen:  [ x ] Soft  [ x ] No tendrerness  [  ] Tenderness  [  ] Organomegaly  [  ] ABD Distention  [  ] Rigidity                       [ x ] No Regidity                       [ x ] No Rebound Tenderness  [  ] No Guarding Rigidity  [  ] Rebound Tenderness[  ] Guarding Rigidity                          [ x ]  +ve Bowel Sounds  [  ] Decreased Bowel Sounds    [  ] Absent Bowel Sounds                            Extremities: [ x ] No edema [  ] Edema  [  ] Clubbing   [  ] Cyanosis                         [ x ] No Tender Calf muscles  [  ] Tender Calf muscles                        [ x ] Palpable peripheral pulses    Neurological: [  ] Awake  [  ] Alert  [  ] Oriented  x                             [  ] Confused  [  ] Drowsy  [ x ] respond to painful stimuli  [  ] Unresponsive    Skin:  [ x ] Intact [  ] Redness [  ] Thrombophlebitis  [  ] Rashes  [  ] Dry  [  ] Ulcers    Ortho:  [  ] Joint Swelling  [  ] Joint erythema [  ] Joint tenderness                [  ] Increased temp. to touch  [  ] DJD [ x ] WNL      LABS/DIAGNOSTIC TESTS                          10.4   13.1  )-----------( 149      ( 07 May 2018 04:42 )             33.5     WBC Count: 13.1 K/uL ( @ 04:42)  WBC Count: 11.3 K/uL ( @ 21:22)          131<L>  |  100  |  34<H>  ----------------------------<  367<H>  5.0   |  23  |  3.34<H>    Ca    7.8<L>      07 May 2018 05:47  Phos  4.2       Mg     1.6         TPro  8.1  /  Alb  3.4<L>  /  TBili  0.7  /  DBili  x   /  AST  18  /  ALT  23  /  AlkPhos  102        Urinalysis Basic - ( 06 May 2018 22:50 )    Color: Yellow / Appearance: Clear / S.010 / pH: x  Gluc: x / Ketone: Negative  / Bili: Negative / Urobili: Negative   Blood: x / Protein: 100 / Nitrite: Negative   Leuk Esterase: Trace / RBC: 0-2 /HPF / WBC 6-10 /HPF   Sq Epi: x / Non Sq Epi: Few /HPF / Bacteria: Moderate /HPF        LIVER FUNCTIONS - ( 06 May 2018 22:50 )  Alb: 3.4 g/dL / Pro: 8.1 g/dL / ALK PHOS: 102 U/L / ALT: 23 U/L DA / AST: 18 U/L / GGT: x                 LACTATE:Lactate, Blood: 1.3 mmol/L ( @ 00:05)        CULTURES:   None yet.    RADIOLOGY    CXR:    EXAM:  XR CHEST AP OR PA 1V                            PROCEDURE DATE:  2018          INTERPRETATION:  Chest portable.    Clinical History: Shortness of breath.    Comparison: 2017.    Single AP view submitted. Right-sided dialysis catheter is present.    The evaluation of the cardiomediastinal silhouette is limited on portable   technique.    There is pulmonary vascular congestion, with reticular interstitial   markings likely reflecting pulmonary edema.  There is a small left-sidedpleural effusion, possibly loculated.    Impression:    Findings as discussed above.

## 2018-05-07 NOTE — PROGRESS NOTE ADULT - SUBJECTIVE AND OBJECTIVE BOX
INTERVAL HPI/OVERNIGHT EVENTS: ***    PRESSORS: [ ] YES [ ] NO  WHICH:    ANTIBIOTICS:                  DATE STARTED:  ANTIBIOTICS:                  DATE STARTED:  ANTIBIOTICS:                  DATE STARTED:    Antimicrobial:  piperacillin/tazobactam IVPB. 3.375 Gram(s) IV Intermittent every 12 hours    Cardiovascular:  furosemide   Injectable 80 milliGRAM(s) IV Push once    Pulmonary:  ALBUTerol/ipratropium for Nebulization 3 milliLiter(s) Nebulizer every 6 hours    Hematalogic:  heparin  Injectable 5000 Unit(s) SubCutaneous every 12 hours    Other:  acetaminophen   Tablet. 650 milliGRAM(s) Oral every 6 hours PRN  insulin glargine Injectable (LANTUS) 20 Unit(s) SubCutaneous at bedtime  insulin lispro (HumaLOG) corrective regimen sliding scale   SubCutaneous every 4 hours  methylPREDNISolone sodium succinate Injectable 40 milliGRAM(s) IV Push every 6 hours  pantoprazole  Injectable 40 milliGRAM(s) IV Push daily  propofol Infusion 5 MICROgram(s)/kG/Min IV Continuous <Continuous>    acetaminophen   Tablet. 650 milliGRAM(s) Oral every 6 hours PRN  ALBUTerol/ipratropium for Nebulization 3 milliLiter(s) Nebulizer every 6 hours  furosemide   Injectable 80 milliGRAM(s) IV Push once  heparin  Injectable 5000 Unit(s) SubCutaneous every 12 hours  insulin glargine Injectable (LANTUS) 20 Unit(s) SubCutaneous at bedtime  insulin lispro (HumaLOG) corrective regimen sliding scale   SubCutaneous every 4 hours  methylPREDNISolone sodium succinate Injectable 40 milliGRAM(s) IV Push every 6 hours  pantoprazole  Injectable 40 milliGRAM(s) IV Push daily  piperacillin/tazobactam IVPB. 3.375 Gram(s) IV Intermittent every 12 hours  propofol Infusion 5 MICROgram(s)/kG/Min IV Continuous <Continuous>    Drug Dosing Weight  Height (cm): 165.1 (06 May 2018 19:56)  Weight (kg): 84.7 (07 May 2018 03:50)  BMI (kg/m2): 31.1 (07 May 2018 03:50)  BSA (m2): 1.92 (07 May 2018 03:50)    CENTRAL LINE: [ ] YES [ ] NO  LOCATION:   DATE INSERTED:  REMOVE: [ ] YES [ ] NO  EXPLAIN:    LEE: [ ] YES [ ] NO    DATE INSERTED:  REMOVE:  [ ] YES [ ] NO  EXPLAIN:    A-LINE:  [ ] YES [ ] NO  LOCATION:   DATE INSERTED:  REMOVE:  [ ] YES [ ] NO  EXPLAIN:    PMH -reviewed admission note, no change since admission  PAST MEDICAL & SURGICAL HISTORY:  Anemia  Hypertension  Congestive heart failure  Chronic kidney disease  Breast carcinoma: Lt side s/p lumpectomy and radiation in   Renal mass  DM (diabetes mellitus)  Asthma  S/P lumpectomy, left breast:       ICU Vital Signs Last 24 Hrs  T(C): 37.7 (07 May 2018 10:00), Max: 39.4 (07 May 2018 03:50)  T(F): 99.8 (07 May 2018 10:00), Max: 103 (07 May 2018 03:50)  HR: 87 (07 May 2018 14:00) (68 - 122)  BP: 120/52 (07 May 2018 14:00) (56/32 - 177/122)  BP(mean): 68 (07 May 2018 14:00) (30 - 131)  ABP: --  ABP(mean): --  RR: 14 (07 May 2018 14:00) (14 - 32)  SpO2: 100% (07 May 2018 14:00) (76% - 100%)      ABG - ( 07 May 2018 05:02 )  pH, Arterial: 7.36  pH, Blood: x     /  pCO2: 43    /  pO2: 300   / HCO3: 24    / Base Excess: -0.9  /  SaO2: 97                    - @ 07:01  -   @ 07:00  --------------------------------------------------------  IN: 70.9 mL / OUT: 5 mL / NET: 65.9 mL        Mode: AC/ CMV (Assist Control/ Continuous Mandatory Ventilation)  RR (machine): 14  TV (machine): 450  FiO2: 50  PEEP: 5  ITime: 1  MAP: 10  PIP: 27      PHYSICAL EXAM:    GENERAL: [ ]NAD, [ ]well-groomed, [ ]well-developed  HEAD:  [ ]Atraumatic, [ ]Normocephalic  EYES: [ ]EOMI, [ ]PERRLA, [ ]conjunctiva and sclera clear  ENMT: [ ]No tonsillar erythema, exudates, or enlargement; [ ]Moist mucous membranes, [ ]Good dentition, [ ]No lesions  NECK: [ ]Supple, normal appearance, [ ]No JVD; [ ]Normal thyroid; [ ]Trachea midline  NERVOUS SYSTEM:  [ ]Alert & Oriented X3, [ ]Good concentration; [ ]Motor Strength 5/5 B/L upper and lower extremities; [ ]DTRs 2+ intact and symmetric  CHEST/LUNG: [ ]No chest deformity; [ ]Normal percussion bilaterally; [ ]No rales, rhonchi, wheezing   HEART: [ ]Regular rate and rhythm; [ ]No murmurs, rubs, or gallops  ABDOMEN: [ ]Soft, Nontender, Nondistended; [ ]Bowel sounds present  EXTREMITIES:  [ ]2+ Peripheral Pulses, [ ]No clubbing, cyanosis, or edema  LYMPH: [ ]No lymphadenopathy noted  SKIN: [ ]No rashes or lesions; [ ]Good capillary refill      LABS:  CBC Full  -  ( 07 May 2018 04:42 )  WBC Count : 13.1 K/uL  Hemoglobin : 10.4 g/dL  Hematocrit : 33.5 %  Platelet Count - Automated : 149 K/uL  Mean Cell Volume : 90.2 fl  Mean Cell Hemoglobin : 28.1 pg  Mean Cell Hemoglobin Concentration : 31.2 gm/dL  Auto Neutrophil # : 12.1 K/uL  Auto Lymphocyte # : 0.3 K/uL  Auto Monocyte # : 0.7 K/uL  Auto Eosinophil # : 0.0 K/uL  Auto Basophil # : 0.1 K/uL  Auto Neutrophil % : 92.0 %  Auto Lymphocyte % : 2.1 %  Auto Monocyte % : 5.3 %  Auto Eosinophil % : 0.1 %  Auto Basophil % : 0.4 %        131<L>  |  100  |  34<H>  ----------------------------<  367<H>  5.0   |  23  |  3.34<H>    Ca    7.8<L>      07 May 2018 05:47  Phos  4.2     -  Mg     1.6         TPro  8.1  /  Alb  3.4<L>  /  TBili  0.7  /  DBili  x   /  AST  18  /  ALT  23  /  AlkPhos  102  -      Urinalysis Basic - ( 06 May 2018 22:50 )    Color: Yellow / Appearance: Clear / S.010 / pH: x  Gluc: x / Ketone: Negative  / Bili: Negative / Urobili: Negative   Blood: x / Protein: 100 / Nitrite: Negative   Leuk Esterase: Trace / RBC: 0-2 /HPF / WBC 6-10 /HPF   Sq Epi: x / Non Sq Epi: Few /HPF / Bacteria: Moderate /HPF          RADIOLOGY & ADDITIONAL STUDIES REVIEWED:  ***    [ ]GOALS OF CARE DISCUSSION WITH PATIENT/FAMILY/PROXY:    CRITICAL CARE TIME SPENT: 35 minutes INTERVAL HPI/OVERNIGHT EVENTS: ***    PRESSORS: [ ] YES [x ] NO  WHICH:    ANTIBIOTICS:    zosyn              DATE STARTED:   ANTIBIOTICS:    vancomycin              DATE STARTED:   ANTIBIOTICS:                  DATE STARTED:    Antimicrobial:  piperacillin/tazobactam IVPB. 3.375 Gram(s) IV Intermittent every 12 hours    Cardiovascular:  furosemide   Injectable 80 milliGRAM(s) IV Push once    Pulmonary:  ALBUTerol/ipratropium for Nebulization 3 milliLiter(s) Nebulizer every 6 hours    Hematalogic:  heparin  Injectable 5000 Unit(s) SubCutaneous every 12 hours    Other:  acetaminophen   Tablet. 650 milliGRAM(s) Oral every 6 hours PRN  insulin glargine Injectable (LANTUS) 20 Unit(s) SubCutaneous at bedtime  insulin lispro (HumaLOG) corrective regimen sliding scale   SubCutaneous every 4 hours  methylPREDNISolone sodium succinate Injectable 40 milliGRAM(s) IV Push every 6 hours  pantoprazole  Injectable 40 milliGRAM(s) IV Push daily  propofol Infusion 5 MICROgram(s)/kG/Min IV Continuous <Continuous>    acetaminophen   Tablet. 650 milliGRAM(s) Oral every 6 hours PRN  ALBUTerol/ipratropium for Nebulization 3 milliLiter(s) Nebulizer every 6 hours  furosemide   Injectable 80 milliGRAM(s) IV Push once  heparin  Injectable 5000 Unit(s) SubCutaneous every 12 hours  insulin glargine Injectable (LANTUS) 20 Unit(s) SubCutaneous at bedtime  insulin lispro (HumaLOG) corrective regimen sliding scale   SubCutaneous every 4 hours  methylPREDNISolone sodium succinate Injectable 40 milliGRAM(s) IV Push every 6 hours  pantoprazole  Injectable 40 milliGRAM(s) IV Push daily  piperacillin/tazobactam IVPB. 3.375 Gram(s) IV Intermittent every 12 hours  propofol Infusion 5 MICROgram(s)/kG/Min IV Continuous <Continuous>    Drug Dosing Weight  Height (cm): 165.1 (06 May 2018 19:56)  Weight (kg): 84.7 (07 May 2018 03:50)  BMI (kg/m2): 31.1 (07 May 2018 03:50)  BSA (m2): 1.92 (07 May 2018 03:50)    CENTRAL LINE: [ ] YES [x ] NO  LOCATION:   DATE INSERTED:  REMOVE: [ ] YES [ ] NO  EXPLAIN:    LEE: [ ] YES [x ] NO    DATE INSERTED:  REMOVE:  [ ] YES [ ] NO  EXPLAIN:    A-LINE:  [ ] YES [x ] NO  LOCATION:   DATE INSERTED:  REMOVE:  [ ] YES [ ] NO  EXPLAIN:    PMH -reviewed admission note, no change since admission  PAST MEDICAL & SURGICAL HISTORY:  Anemia  Hypertension  Congestive heart failure  Chronic kidney disease  Breast carcinoma: Lt side s/p lumpectomy and radiation in   Renal mass  DM (diabetes mellitus)  Asthma  S/P lumpectomy, left breast:       ICU Vital Signs Last 24 Hrs  T(C): 37.7 (07 May 2018 10:00), Max: 39.4 (07 May 2018 03:50)  T(F): 99.8 (07 May 2018 10:00), Max: 103 (07 May 2018 03:50)  HR: 87 (07 May 2018 14:00) (68 - 122)  BP: 120/52 (07 May 2018 14:00) (56/32 - 177/122)  BP(mean): 68 (07 May 2018 14:00) (30 - 131)  ABP: --  ABP(mean): --  RR: 14 (07 May 2018 14:00) (14 - 32)  SpO2: 100% (07 May 2018 14:00) (76% - 100%)      ABG - ( 07 May 2018 05:02 )  pH, Arterial: 7.36  pH, Blood: x     /  pCO2: 43    /  pO2: 300   / HCO3: 24    / Base Excess: -0.9  /  SaO2: 97                    05-06 @ 07:01  -  05-07 @ 07:00  --------------------------------------------------------  IN: 70.9 mL / OUT: 5 mL / NET: 65.9 mL        Mode: AC/ CMV (Assist Control/ Continuous Mandatory Ventilation)  RR (machine): 14  TV (machine): 450  FiO2: 50  PEEP: 5  ITime: 1  MAP: 10  PIP: 27      PHYSICAL EXAM:    GENERAL: [ ]NAD, [x ]well-groomed, obese  HEAD:  [x ]Atraumatic, [ x]Normocephalic  EYES: [x ]EOMI, [x ]PERRLA, [ ]conjunctiva and sclera clear  ENMT: [ ]No tonsillar erythema, exudates, or enlargement; [ ]Moist mucous membranes, [ ]Good dentition, [ ]No lesions  NECK: [x ]Supple, normal appearance, [ ]No JVD; [ ]Normal thyroid; [x ]Trachea midline  NERVOUS SYSTEM:  could not be assessed  CHEST/LUNG: [ ]No chest deformity; [x ]Normal percussion bilaterally; [x ]No rales, rhonchi, wheezing   HEART: [x]Regular rate and rhythm; [ ]No murmurs, rubs, or gallops  ABDOMEN: [ ]Soft, Nontender, Nondistended; [ x]Bowel sounds present  EXTREMITIES:  [x ]2+ Peripheral Pulses, [x ]No clubbing, cyanosis, or edema  LYMPH: [ ]No lymphadenopathy noted  SKIN: [ x]No rashes or lesions; [ ]Good capillary refill      LABS:  CBC Full  -  ( 07 May 2018 04:42 )  WBC Count : 13.1 K/uL  Hemoglobin : 10.4 g/dL  Hematocrit : 33.5 %  Platelet Count - Automated : 149 K/uL  Mean Cell Volume : 90.2 fl  Mean Cell Hemoglobin : 28.1 pg  Mean Cell Hemoglobin Concentration : 31.2 gm/dL  Auto Neutrophil # : 12.1 K/uL  Auto Lymphocyte # : 0.3 K/uL  Auto Monocyte # : 0.7 K/uL  Auto Eosinophil # : 0.0 K/uL  Auto Basophil # : 0.1 K/uL  Auto Neutrophil % : 92.0 %  Auto Lymphocyte % : 2.1 %  Auto Monocyte % : 5.3 %  Auto Eosinophil % : 0.1 %  Auto Basophil % : 0.4 %        131<L>  |  100  |  34<H>  ----------------------------<  367<H>  5.0   |  23  |  3.34<H>    Ca    7.8<L>      07 May 2018 05:47  Phos  4.2     -  Mg     1.6     -    TPro  8.1  /  Alb  3.4<L>  /  TBili  0.7  /  DBili  x   /  AST  18  /  ALT  23  /  AlkPhos  102  05-06      Urinalysis Basic - ( 06 May 2018 22:50 )    Color: Yellow / Appearance: Clear / S.010 / pH: x  Gluc: x / Ketone: Negative  / Bili: Negative / Urobili: Negative   Blood: x / Protein: 100 / Nitrite: Negative   Leuk Esterase: Trace / RBC: 0-2 /HPF / WBC 6-10 /HPF   Sq Epi: x / Non Sq Epi: Few /HPF / Bacteria: Moderate /HPF          RADIOLOGY & ADDITIONAL STUDIES REVIEWED:  ***    [ ]GOALS OF CARE DISCUSSION WITH PATIENT/FAMILY/PROXY:    CRITICAL CARE TIME SPENT: 35 minutes INTERVAL HPI/OVERNIGHT EVENTS:   remains intubated      PRESSORS: [ ] YES [x ] NO  WHICH:    ANTIBIOTICS:    zosyn              DATE STARTED:   ANTIBIOTICS:    vancomycin              DATE STARTED:   ANTIBIOTICS:                  DATE STARTED:    Antimicrobial:  piperacillin/tazobactam IVPB. 3.375 Gram(s) IV Intermittent every 12 hours    Cardiovascular:  furosemide   Injectable 80 milliGRAM(s) IV Push once    Pulmonary:  ALBUTerol/ipratropium for Nebulization 3 milliLiter(s) Nebulizer every 6 hours    Hematalogic:  heparin  Injectable 5000 Unit(s) SubCutaneous every 12 hours    Other:  acetaminophen   Tablet. 650 milliGRAM(s) Oral every 6 hours PRN  insulin glargine Injectable (LANTUS) 20 Unit(s) SubCutaneous at bedtime  insulin lispro (HumaLOG) corrective regimen sliding scale   SubCutaneous every 4 hours  methylPREDNISolone sodium succinate Injectable 40 milliGRAM(s) IV Push every 6 hours  pantoprazole  Injectable 40 milliGRAM(s) IV Push daily  propofol Infusion 5 MICROgram(s)/kG/Min IV Continuous <Continuous>    acetaminophen   Tablet. 650 milliGRAM(s) Oral every 6 hours PRN  ALBUTerol/ipratropium for Nebulization 3 milliLiter(s) Nebulizer every 6 hours  furosemide   Injectable 80 milliGRAM(s) IV Push once  heparin  Injectable 5000 Unit(s) SubCutaneous every 12 hours  insulin glargine Injectable (LANTUS) 20 Unit(s) SubCutaneous at bedtime  insulin lispro (HumaLOG) corrective regimen sliding scale   SubCutaneous every 4 hours  methylPREDNISolone sodium succinate Injectable 40 milliGRAM(s) IV Push every 6 hours  pantoprazole  Injectable 40 milliGRAM(s) IV Push daily  piperacillin/tazobactam IVPB. 3.375 Gram(s) IV Intermittent every 12 hours  propofol Infusion 5 MICROgram(s)/kG/Min IV Continuous <Continuous>    Drug Dosing Weight  Height (cm): 165.1 (06 May 2018 19:56)  Weight (kg): 84.7 (07 May 2018 03:50)  BMI (kg/m2): 31.1 (07 May 2018 03:50)  BSA (m2): 1.92 (07 May 2018 03:50)    CENTRAL LINE: [ ] YES [x ] NO  LOCATION:   DATE INSERTED:  REMOVE: [ ] YES [ ] NO  EXPLAIN:    LEE: [ ] YES [x ] NO    DATE INSERTED:  REMOVE:  [ ] YES [ ] NO  EXPLAIN:    A-LINE:  [ ] YES [x ] NO  LOCATION:   DATE INSERTED:  REMOVE:  [ ] YES [ ] NO  EXPLAIN:    PMH -reviewed admission note, no change since admission  PAST MEDICAL & SURGICAL HISTORY:  Anemia  Hypertension  Congestive heart failure  Chronic kidney disease  Breast carcinoma: Lt side s/p lumpectomy and radiation in   Renal mass  DM (diabetes mellitus)  Asthma  S/P lumpectomy, left breast:       ICU Vital Signs Last 24 Hrs  T(C): 37.7 (07 May 2018 10:00), Max: 39.4 (07 May 2018 03:50)  T(F): 99.8 (07 May 2018 10:00), Max: 103 (07 May 2018 03:50)  HR: 87 (07 May 2018 14:00) (68 - 122)  BP: 120/52 (07 May 2018 14:00) (56/32 - 177/122)  BP(mean): 68 (07 May 2018 14:00) (30 - 131)  ABP: --  ABP(mean): --  RR: 14 (07 May 2018 14:00) (14 - 32)  SpO2: 100% (07 May 2018 14:00) (76% - 100%)      ABG - ( 07 May 2018 05:02 )  pH, Arterial: 7.36  pH, Blood: x     /  pCO2: 43    /  pO2: 300   / HCO3: 24    / Base Excess: -0.9  /  SaO2: 97                    05-06 @ 07:01  -   @ 07:00  --------------------------------------------------------  IN: 70.9 mL / OUT: 5 mL / NET: 65.9 mL        Mode: AC/ CMV (Assist Control/ Continuous Mandatory Ventilation)  RR (machine): 14  TV (machine): 450  FiO2: 50  PEEP: 5  ITime: 1  MAP: 10  PIP: 27      PHYSICAL EXAM:    GENERAL: [ ]NAD, [x ]well-groomed, obese  HEAD:  [x ]Atraumatic, [ x]Normocephalic  EYES: [x ]EOMI, [x ]PERRLA, [ ]conjunctiva and sclera clear  ENMT: [ ]No tonsillar erythema, exudates, or enlargement; [ ]Moist mucous membranes, [ ]Good dentition, [ ]No lesions  NECK: [x ]Supple, normal appearance, [ ]No JVD; [ ]Normal thyroid; [x ]Trachea midline  NERVOUS SYSTEM:  could not be assessed  CHEST/LUNG: [ ]No chest deformity; [x ]Normal percussion bilaterally; [x ]No rales, rhonchi, wheezing   HEART: [x]Regular rate and rhythm; [ ]No murmurs, rubs, or gallops  ABDOMEN: [ ]Soft, Nontender, Nondistended; [ x]Bowel sounds present  EXTREMITIES:  [x ]2+ Peripheral Pulses, [x ]No clubbing, cyanosis, or edema  LYMPH: [ ]No lymphadenopathy noted  SKIN: [ x]No rashes or lesions; [ ]Good capillary refill      LABS:  CBC Full  -  ( 07 May 2018 04:42 )  WBC Count : 13.1 K/uL  Hemoglobin : 10.4 g/dL  Hematocrit : 33.5 %  Platelet Count - Automated : 149 K/uL  Mean Cell Volume : 90.2 fl  Mean Cell Hemoglobin : 28.1 pg  Mean Cell Hemoglobin Concentration : 31.2 gm/dL  Auto Neutrophil # : 12.1 K/uL  Auto Lymphocyte # : 0.3 K/uL  Auto Monocyte # : 0.7 K/uL  Auto Eosinophil # : 0.0 K/uL  Auto Basophil # : 0.1 K/uL  Auto Neutrophil % : 92.0 %  Auto Lymphocyte % : 2.1 %  Auto Monocyte % : 5.3 %  Auto Eosinophil % : 0.1 %  Auto Basophil % : 0.4 %        131<L>  |  100  |  34<H>  ----------------------------<  367<H>  5.0   |  23  |  3.34<H>    Ca    7.8<L>      07 May 2018 05:47  Phos  4.2     -  Mg     1.6     -    TPro  8.1  /  Alb  3.4<L>  /  TBili  0.7  /  DBili  x   /  AST  18  /  ALT  23  /  AlkPhos  102  05-06      Urinalysis Basic - ( 06 May 2018 22:50 )    Color: Yellow / Appearance: Clear / S.010 / pH: x  Gluc: x / Ketone: Negative  / Bili: Negative / Urobili: Negative   Blood: x / Protein: 100 / Nitrite: Negative   Leuk Esterase: Trace / RBC: 0-2 /HPF / WBC 6-10 /HPF   Sq Epi: x / Non Sq Epi: Few /HPF / Bacteria: Moderate /HPF          RADIOLOGY & ADDITIONAL STUDIES REVIEWED:  ***    [ ]GOALS OF CARE DISCUSSION WITH PATIENT/FAMILY/PROXY:    CRITICAL CARE TIME SPENT: 35 minutes

## 2018-05-07 NOTE — CONSULT NOTE ADULT - PROBLEM SELECTOR RECOMMENDATION 5
pulmonary edema possibly 2/2 CHF exacerbation  trend troponins, EKG without ischemic changes  follow TTE

## 2018-05-07 NOTE — PROGRESS NOTE ADULT - SUBJECTIVE AND OBJECTIVE BOX
7F PMHx Asthma, Anemia (on weekly procrit), HF pEF, DM, ESRD on HD ( T,T,S, last one Saturday), HTN, on Home 02,  Breast CA x 2 (s/p L lumpectomy, radiation 2005) Uterine CA (s/p JACLYN)  c/o SOB. Pt that she started feeling SOB [facility] [place]  [facility] because SOB didn't improve pt decided to come [facility]. As per patient she drinks lots of water. At ED code sepsis was called 2/2 fevers, tachycardia and leukocytosis. Pt has been complaining of a dry cough since Saturday. Nephrologist on call was notified and recommended emergent HD. Pt denies CP, LE swelling, N/V, abdominal pain or any other complaints.     Patient underwent emergent dialysis for pulmonary edema and was Only able to perform 1 hour of dialysis 2/2 SBP 90    Patient then complained of chills, was shaking.  Sensorium became altered, and patient became tachypneic, restless, wheezing  duoneb given but patient remained tachypneic with abdominal breathing    Patient was emergently intubated after etomidate 20mg given  transferred to ICU      Review of Systems:  Other Review of Systems: All other review of systems negative, except as noted in HPI	      pt seen in icu [ x ], reg med floor [   ], bed [x  ], chair at bedside [   ], a+o x3 [  ], sedated [ x ],  nad [ x ]    haas [x  ], ngt [x  ], et tube [ x ], propofol drip [ x ]        Allergies    No Known Allergies        Vitals    T(F): 99.3 (05-07-18 @ 06:30), Max: 103 (05-07-18 @ 03:50)  HR: 82 (05-07-18 @ 10:00) (71 - 122)  BP: 113/65 (05-07-18 @ 10:00) (56/32 - 177/122)  RR: 20 (05-07-18 @ 10:00) (14 - 32)  SpO2: 98% (05-07-18 @ 10:00) (76% - 100%)  Wt(kg): --  CAPILLARY BLOOD GLUCOSE      POCT Blood Glucose.: 424 mg/dL (07 May 2018 12:45)      Labs                          10.4   13.1  )-----------( 149      ( 07 May 2018 04:42 )             33.5       05-07    131<L>  |  100  |  34<H>  ----------------------------<  367<H>  5.0   |  23  |  3.34<H>    Ca    7.8<L>      07 May 2018 05:47  Phos  4.2     05-07  Mg     1.6     05-07    TPro  8.1  /  Alb  3.4<L>  /  TBili  0.7  /  DBili  x   /  AST  18  /  ALT  23  /  AlkPhos  102  05-06      CARDIAC MARKERS ( 06 May 2018 22:50 )  0.123 ng/mL / x     / x     / x     / x                Radiology Results      Meds    MEDICATIONS  (STANDING):  ALBUTerol/ipratropium for Nebulization 3 milliLiter(s) Nebulizer every 6 hours  furosemide   Injectable 80 milliGRAM(s) IV Push once  heparin  Injectable 5000 Unit(s) SubCutaneous every 12 hours  insulin glargine Injectable (LANTUS) 20 Unit(s) SubCutaneous at bedtime  insulin lispro (HumaLOG) corrective regimen sliding scale   SubCutaneous every 4 hours  methylPREDNISolone sodium succinate Injectable 40 milliGRAM(s) IV Push every 6 hours  pantoprazole  Injectable 40 milliGRAM(s) IV Push daily  piperacillin/tazobactam IVPB. 3.375 Gram(s) IV Intermittent every 12 hours  propofol Infusion 5 MICROgram(s)/kG/Min (2.625 mL/Hr) IV Continuous <Continuous>      MEDICATIONS  (PRN):  acetaminophen   Tablet. 650 milliGRAM(s) Oral every 6 hours PRN Moderate Pain (4 - 6)      Physical Exam    Neuro :  no focal deficits  Respiratory:  B/L rales  CV: RRR, S1S2, no murmurs,   Abdominal: Soft, NT, ND +BS,  Extremities: No edema, + peripheral pulses    ASSESSMENT    sepsis poss 2nd to perm cath  Hypervolemia  Anemia  uti  abnormal trop  h/o Hypertension  Congestive heart failure  Chronic kidney disease  No pertinent past medical history  Breast carcinoma  Renal mass  DM (diabetes mellitus)  Asthma  S/P lumpectomy, left breast  No significant past surgical history      PLAN    cont zosyn  id cons  f/u blood and ucx  pulm cons  vent mgmt  renal cons noted  cont hd as per renal  vascular cons for poss d/c perma cath  ce x 1 noted above  cardio cons noted  cont current meds  mgmt as per icu

## 2018-05-08 DIAGNOSIS — J44.9 CHRONIC OBSTRUCTIVE PULMONARY DISEASE, UNSPECIFIED: ICD-10-CM

## 2018-05-08 LAB
ANION GAP SERPL CALC-SCNC: 10 MMOL/L — SIGNIFICANT CHANGE UP (ref 5–17)
BASE EXCESS BLDA CALC-SCNC: 2.6 MMOL/L — HIGH (ref -2–2)
BASE EXCESS BLDA CALC-SCNC: 3.8 MMOL/L — HIGH (ref -2–2)
BASOPHILS # BLD AUTO: 0 K/UL — SIGNIFICANT CHANGE UP (ref 0–0.2)
BASOPHILS NFR BLD AUTO: 0.2 % — SIGNIFICANT CHANGE UP (ref 0–2)
BLOOD GAS COMMENTS ARTERIAL: SIGNIFICANT CHANGE UP
BLOOD GAS COMMENTS ARTERIAL: SIGNIFICANT CHANGE UP
BUN SERPL-MCNC: 28 MG/DL — HIGH (ref 7–18)
CALCIUM SERPL-MCNC: 8.2 MG/DL — LOW (ref 8.4–10.5)
CHLORIDE SERPL-SCNC: 101 MMOL/L — SIGNIFICANT CHANGE UP (ref 96–108)
CO2 SERPL-SCNC: 24 MMOL/L — SIGNIFICANT CHANGE UP (ref 22–31)
CREAT SERPL-MCNC: 2.57 MG/DL — HIGH (ref 0.5–1.3)
EOSINOPHIL # BLD AUTO: 0 K/UL — SIGNIFICANT CHANGE UP (ref 0–0.5)
EOSINOPHIL NFR BLD AUTO: 0 % — SIGNIFICANT CHANGE UP (ref 0–6)
GLUCOSE SERPL-MCNC: 225 MG/DL — HIGH (ref 70–99)
HCO3 BLDA-SCNC: 27 MMOL/L — SIGNIFICANT CHANGE UP (ref 23–27)
HCO3 BLDA-SCNC: 28 MMOL/L — HIGH (ref 23–27)
HCT VFR BLD CALC: 34.7 % — SIGNIFICANT CHANGE UP (ref 34.5–45)
HGB BLD-MCNC: 10.4 G/DL — LOW (ref 11.5–15.5)
HOROWITZ INDEX BLDA+IHG-RTO: 40 — SIGNIFICANT CHANGE UP
HOROWITZ INDEX BLDA+IHG-RTO: 40 — SIGNIFICANT CHANGE UP
LYMPHOCYTES # BLD AUTO: 0.5 K/UL — LOW (ref 1–3.3)
LYMPHOCYTES # BLD AUTO: 5.4 % — LOW (ref 13–44)
MAGNESIUM SERPL-MCNC: 1.8 MG/DL — SIGNIFICANT CHANGE UP (ref 1.6–2.6)
MCHC RBC-ENTMCNC: 27.2 PG — SIGNIFICANT CHANGE UP (ref 27–34)
MCHC RBC-ENTMCNC: 29.9 GM/DL — LOW (ref 32–36)
MCV RBC AUTO: 90.8 FL — SIGNIFICANT CHANGE UP (ref 80–100)
MONOCYTES # BLD AUTO: 0.3 K/UL — SIGNIFICANT CHANGE UP (ref 0–0.9)
MONOCYTES NFR BLD AUTO: 3.3 % — SIGNIFICANT CHANGE UP (ref 2–14)
NEUTROPHILS # BLD AUTO: 9 K/UL — HIGH (ref 1.8–7.4)
NEUTROPHILS NFR BLD AUTO: 91.1 % — HIGH (ref 43–77)
PCO2 BLDA: 37 MMHG — SIGNIFICANT CHANGE UP (ref 32–46)
PCO2 BLDA: 52 MMHG — HIGH (ref 32–46)
PH BLDA: 7.36 — SIGNIFICANT CHANGE UP (ref 7.35–7.45)
PH BLDA: 7.48 — HIGH (ref 7.35–7.45)
PHOSPHATE SERPL-MCNC: 3.6 MG/DL — SIGNIFICANT CHANGE UP (ref 2.5–4.5)
PLATELET # BLD AUTO: 156 K/UL — SIGNIFICANT CHANGE UP (ref 150–400)
PO2 BLDA: 96 MMHG — SIGNIFICANT CHANGE UP (ref 74–108)
PO2 BLDA: SIGNIFICANT CHANGE UP MMHG (ref 74–108)
POTASSIUM SERPL-MCNC: 4.2 MMOL/L — SIGNIFICANT CHANGE UP (ref 3.5–5.3)
POTASSIUM SERPL-SCNC: 4.2 MMOL/L — SIGNIFICANT CHANGE UP (ref 3.5–5.3)
RBC # BLD: 3.82 M/UL — SIGNIFICANT CHANGE UP (ref 3.8–5.2)
RBC # FLD: 15.6 % — HIGH (ref 10.3–14.5)
SAO2 % BLDA: 94 % — SIGNIFICANT CHANGE UP (ref 92–96)
SAO2 % BLDA: 96 % — SIGNIFICANT CHANGE UP (ref 92–96)
SODIUM SERPL-SCNC: 135 MMOL/L — SIGNIFICANT CHANGE UP (ref 135–145)
WBC # BLD: 9.9 K/UL — SIGNIFICANT CHANGE UP (ref 3.8–10.5)
WBC # FLD AUTO: 9.9 K/UL — SIGNIFICANT CHANGE UP (ref 3.8–10.5)

## 2018-05-08 PROCEDURE — 71045 X-RAY EXAM CHEST 1 VIEW: CPT | Mod: 26

## 2018-05-08 RX ORDER — ERYTHROPOIETIN 10000 [IU]/ML
4000 INJECTION, SOLUTION INTRAVENOUS; SUBCUTANEOUS
Qty: 0 | Refills: 0 | Status: DISCONTINUED | OUTPATIENT
Start: 2018-05-08 | End: 2018-05-08

## 2018-05-08 RX ORDER — ALBUTEROL 90 UG/1
1 AEROSOL, METERED ORAL EVERY 4 HOURS
Qty: 0 | Refills: 0 | Status: DISCONTINUED | OUTPATIENT
Start: 2018-05-08 | End: 2018-05-08

## 2018-05-08 RX ORDER — INSULIN LISPRO 100/ML
VIAL (ML) SUBCUTANEOUS EVERY 6 HOURS
Qty: 0 | Refills: 0 | Status: DISCONTINUED | OUTPATIENT
Start: 2018-05-08 | End: 2018-05-09

## 2018-05-08 RX ORDER — INSULIN GLARGINE 100 [IU]/ML
24 INJECTION, SOLUTION SUBCUTANEOUS AT BEDTIME
Qty: 0 | Refills: 0 | Status: DISCONTINUED | OUTPATIENT
Start: 2018-05-08 | End: 2018-05-09

## 2018-05-08 RX ORDER — ERYTHROPOIETIN 10000 [IU]/ML
4000 INJECTION, SOLUTION INTRAVENOUS; SUBCUTANEOUS
Qty: 0 | Refills: 0 | Status: DISCONTINUED | OUTPATIENT
Start: 2018-05-08 | End: 2018-05-14

## 2018-05-08 RX ORDER — LABETALOL HCL 100 MG
10 TABLET ORAL ONCE
Qty: 0 | Refills: 0 | Status: COMPLETED | OUTPATIENT
Start: 2018-05-08 | End: 2018-05-08

## 2018-05-08 RX ORDER — ALBUTEROL 90 UG/1
2 AEROSOL, METERED ORAL EVERY 6 HOURS
Qty: 0 | Refills: 0 | Status: DISCONTINUED | OUTPATIENT
Start: 2018-05-08 | End: 2018-05-09

## 2018-05-08 RX ORDER — IPRATROPIUM/ALBUTEROL SULFATE 18-103MCG
3 AEROSOL WITH ADAPTER (GRAM) INHALATION EVERY 6 HOURS
Qty: 0 | Refills: 0 | Status: DISCONTINUED | OUTPATIENT
Start: 2018-05-08 | End: 2018-05-14

## 2018-05-08 RX ORDER — TIOTROPIUM BROMIDE 18 UG/1
1 CAPSULE ORAL; RESPIRATORY (INHALATION) DAILY
Qty: 0 | Refills: 0 | Status: DISCONTINUED | OUTPATIENT
Start: 2018-05-08 | End: 2018-05-09

## 2018-05-08 RX ADMIN — PROPOFOL 2.62 MICROGRAM(S)/KG/MIN: 10 INJECTION, EMULSION INTRAVENOUS at 05:18

## 2018-05-08 RX ADMIN — PIPERACILLIN AND TAZOBACTAM 25 GRAM(S): 4; .5 INJECTION, POWDER, LYOPHILIZED, FOR SOLUTION INTRAVENOUS at 05:19

## 2018-05-08 RX ADMIN — PROPOFOL 2.62 MICROGRAM(S)/KG/MIN: 10 INJECTION, EMULSION INTRAVENOUS at 00:57

## 2018-05-08 RX ADMIN — HEPARIN SODIUM 5000 UNIT(S): 5000 INJECTION INTRAVENOUS; SUBCUTANEOUS at 18:29

## 2018-05-08 RX ADMIN — Medication 10 MILLIGRAM(S): at 20:27

## 2018-05-08 RX ADMIN — Medication 40 MILLIGRAM(S): at 00:58

## 2018-05-08 RX ADMIN — HEPARIN SODIUM 5000 UNIT(S): 5000 INJECTION INTRAVENOUS; SUBCUTANEOUS at 05:16

## 2018-05-08 RX ADMIN — INSULIN GLARGINE 24 UNIT(S): 100 INJECTION, SOLUTION SUBCUTANEOUS at 21:38

## 2018-05-08 RX ADMIN — PIPERACILLIN AND TAZOBACTAM 25 GRAM(S): 4; .5 INJECTION, POWDER, LYOPHILIZED, FOR SOLUTION INTRAVENOUS at 18:26

## 2018-05-08 RX ADMIN — Medication 40 MILLIGRAM(S): at 23:58

## 2018-05-08 RX ADMIN — Medication 3 MILLILITER(S): at 20:44

## 2018-05-08 RX ADMIN — Medication 6: at 18:29

## 2018-05-08 RX ADMIN — Medication 6: at 12:04

## 2018-05-08 RX ADMIN — Medication 6: at 05:16

## 2018-05-08 RX ADMIN — Medication 12: at 21:38

## 2018-05-08 RX ADMIN — Medication 40 MILLIGRAM(S): at 05:19

## 2018-05-08 RX ADMIN — Medication 40 MILLIGRAM(S): at 12:00

## 2018-05-08 RX ADMIN — PANTOPRAZOLE SODIUM 40 MILLIGRAM(S): 20 TABLET, DELAYED RELEASE ORAL at 12:00

## 2018-05-08 RX ADMIN — Medication 40 MILLIGRAM(S): at 18:26

## 2018-05-08 RX ADMIN — Medication 4: at 01:15

## 2018-05-08 NOTE — PROGRESS NOTE ADULT - SUBJECTIVE AND OBJECTIVE BOX
Patient is a 78y old  Female who presents with a chief complaint of SOB, fever (07 May 2018 01:15)  Awake, alert, comfortable in bed in NAD. Extubated on supplemental oxygen via AM    INTERVAL HPI/OVERNIGHT EVENTS:      VITAL SIGNS:  T(F): 97.4 (18 @ 06:00)  HR: 90 (18 @ 08:55)  BP: 119/55 (18 @ 07:00)  RR: 16 (18 @ 07:00)  SpO2: 99% (18 @ 08:55)  Wt(kg): --  I&O's Detail    07 May 2018 07:01  -  08 May 2018 07:00  --------------------------------------------------------  IN:    fentaNYL  Infusion: 8.4 mL    fentaNYL  Infusion: 65.6 mL    IV PiggyBack: 200 mL    propofol Infusion: 392.1 mL  Total IN: 666.1 mL    OUT:    Indwelling Catheter - Urethral: 280 mL  Total OUT: 280 mL    Total NET: 386.1 mL        Mode: CPAP with PS  FiO2: 40  PEEP: 5  PS: 10  MAP: 9  PIP: 15        REVIEW OF SYSTEMS:    CONSTITUTIONAL:  No fevers, chills, sweats    HEENT:  Eyes:  No diplopia or blurred vision. ENT:  No earache, sore throat or runny nose.    CARDIOVASCULAR:  No pressure, squeezing, tightness, or heaviness about the chest; no palpitations.    RESPIRATORY:  Per HPI    GASTROINTESTINAL:  No abdominal pain, nausea, vomiting or diarrhea.    GENITOURINARY:  No dysuria, frequency or urgency.    NEUROLOGIC:  No paresthesias, fasciculations, seizures or weakness.    PSYCHIATRIC:  No disorder of thought or mood.      PHYSICAL EXAM:    Constitutional: Well developed and nourished  Eyes:Perrla  ENMT: normal  Neck:supple  Respiratory: Fine wheezes bilaterally  Cardiovascular: S1 S2 regular  Gastrointestinal: Soft, Non tender  Extremities: No edema  Vascular:normal  Neurological:Awake, alert  Musculoskeletal:Normal      MEDICATIONS  (STANDING):  epoetin randy Injectable 4000 Unit(s) IV Push <User Schedule>  fentaNYL   Infusion 1 MICROgram(s)/kG/Hr (8.47 mL/Hr) IV Continuous <Continuous>  heparin  Injectable 5000 Unit(s) SubCutaneous every 12 hours  insulin glargine Injectable (LANTUS) 24 Unit(s) SubCutaneous at bedtime  insulin lispro (HumaLOG) corrective regimen sliding scale   SubCutaneous every 4 hours  methylPREDNISolone sodium succinate Injectable 40 milliGRAM(s) IV Push every 6 hours  pantoprazole  Injectable 40 milliGRAM(s) IV Push daily  piperacillin/tazobactam IVPB. 3.375 Gram(s) IV Intermittent every 12 hours  propofol Infusion 5 MICROgram(s)/kG/Min (2.625 mL/Hr) IV Continuous <Continuous>    MEDICATIONS  (PRN):  acetaminophen   Tablet. 650 milliGRAM(s) Oral every 6 hours PRN Moderate Pain (4 - 6)  ALBUTerol    90 MICROgram(s) HFA Inhaler 2 Puff(s) Inhalation every 6 hours PRN Shortness of Breath and/or Wheezing      Allergies    No Known Allergies    Intolerances        LABS:                        10.4   9.9   )-----------( 156      ( 08 May 2018 06:16 )             34.7     05-    135  |  101  |  28<H>  ----------------------------<  225<H>  4.2   |  24  |  2.57<H>    Ca    8.2<L>      08 May 2018 06:16  Phos  3.6     05-08  Mg     1.8         TPro  8.1  /  Alb  3.4<L>  /  TBili  0.7  /  DBili  x   /  AST  18  /  ALT  23  /  AlkPhos  102  05-06      Urinalysis Basic - ( 06 May 2018 22:50 )    Color: Yellow / Appearance: Clear / S.010 / pH: x  Gluc: x / Ketone: Negative  / Bili: Negative / Urobili: Negative   Blood: x / Protein: 100 / Nitrite: Negative   Leuk Esterase: Trace / RBC: 0-2 /HPF / WBC 6-10 /HPF   Sq Epi: x / Non Sq Epi: Few /HPF / Bacteria: Moderate /HPF      ABG - ( 08 May 2018 11:27 )  pH, Arterial: 7.36  pH, Blood: x     /  pCO2: 52    /  pO2: 96    / HCO3: 28    / Base Excess: 2.6   /  SaO2: 94                CARDIAC MARKERS ( 06 May 2018 22:50 )  0.123 ng/mL / x     / x     / x     / x          CAPILLARY BLOOD GLUCOSE      POCT Blood Glucose.: 253 mg/dL (08 May 2018 05:04)  POCT Blood Glucose.: 213 mg/dL (08 May 2018 01:11)  POCT Blood Glucose.: 219 mg/dL (07 May 2018 21:20)  POCT Blood Glucose.: 166 mg/dL (07 May 2018 17:47)  POCT Blood Glucose.: 252 mg/dL (07 May 2018 15:32)  POCT Blood Glucose.: 424 mg/dL (07 May 2018 12:45)    pro-bnp [id number]  @ 22:50     d-dimer --   @ 22:50      RADIOLOGY & ADDITIONAL TESTS:    CXR:  < from: Xray Chest 1 View- PORTABLE-Routine (18 @ 07:32) >    IMPRESSION:  The 2 ends of the ET tube are seen, one in projects over the right   mainstem bronchus and it is unclear which end is outside of the patient;   please correlate clinically and reposition the ET tube as indicated.    Findings were discussed with Dr. Terry on 2018 10:21 AM with readback.    < end of copied text >    Ct scan chest:    ekg;    echo:

## 2018-05-08 NOTE — PROGRESS NOTE ADULT - SUBJECTIVE AND OBJECTIVE BOX
Meds:  piperacillin/tazobactam IVPB. 3.375 Gram(s) IV Intermittent every 12 hours    Allergies:  Allergies    No Known Allergies    Intolerances        ROS  [ x ] UNABLE TO ELICIT    General:  [  ] None  [  ] Fever  [  ] Chills  [  ] Malaise    Skin:  [  ] None [  ] Rash  [  ] Wound  [  ] Ulcer    HEENT:  [  ] None  [  ] Sore Throat  [  ] Nasal congestion/ runny nose  [  ] Photophobia [  ] Neck pain      Chest:  [  ] None   [  ] SOB  [  ] Cough  [  ] None    Cardiovascular:   [  ] None  [  ] CP  [  ] Palpitation    Gastrointestinal:  [  ] None  [  ] Abd pain   [  ] Nausea    [  ] Vomiting   [  ] Diarrhea	     Genitourinary:  [  ] None [  ] Polyuria   [  ] Urgency  [  ] Frequency  [  ] Dysuria    [  ]  Hematuria       Musculoskeletal:  [  ] None [  ] Back Pain	[  ] Body aches  [  ] Joint pain    Neurological: [  ] None [  ]Dizziness  [  ]Visual Disturbance  [  ]Headaches   [  ] Weakness          PHYSICAL EXAM:    Vital Signs Last 24 Hrs  T(C): 36.3 (08 May 2018 06:00), Max: 37.7 (07 May 2018 10:00)  T(F): 97.4 (08 May 2018 06:00), Max: 99.8 (07 May 2018 10:00)  HR: 55 (08 May 2018 06:00) (55 - 95)  BP: 114/48 (08 May 2018 06:00) (100/45 - 140/58)  BP(mean): 63 (08 May 2018 06:00) (55 - 110)  RR: 17 (08 May 2018 06:00) (0 - 23)  SpO2: 100% (08 May 2018 06:00) (98% - 100%)    Constitutional:    HEENT: [  ] Wnl  [  ] Pharyngeal congestion [ x ] Intubated.    Neck:  [ x ] Supple  [  ]Lymphadenopathy  [  ] No JVD   [  ] JVD  [  ] Masses   [  ] WNL    CHEST/Respiratory:  [  ]Clear to auscultation  [ x ] Rales   [  ] Rhonchi   [  ] Wheezing     [ x ] Right side dialysis catheter      Cardiovascular:  [ x ] Reg S1 S2   [  ] Irreg S1 S2   [ x ]No Murmur  [  ] +ve Murmurs  [  ]Systolic [  ]Diastolic      Abdomen:  [ x ] Soft  [ x ] No tendrerness  [  ] Tenderness  [  ] Organomegaly  [  ] ABD Distention  [  ] Rigidity                       [ x ] No Regidity                       [ x ] No Rebound Tenderness  [  ] No Guarding Rigidity  [  ] Rebound Tenderness[  ] Guarding Rigidity                          [ x ]  +ve Bowel Sounds  [  ] Decreased Bowel Sounds    [  ] Absent Bowel Sounds                            Extremities: [ x ] No edema [  ] Edema  [  ] Clubbing   [  ] Cyanosis                         [ x ] No Tender Calf muscles  [  ] Tender Calf muscles                        [ x ] Palpable peripheral pulses    Neurological: [  ] Awake  [  ] Alert  [  ] Oriented  x                             [  ] Confused  [  ] Drowsy  [ x ] respond to painful stimuli  [  ] Unresponsive    Skin:  [ x ] Intact [  ] Redness [  ] Thrombophlebitis  [  ] Rashes  [  ] Dry  [  ] Ulcers    Ortho:  [  ] Joint Swelling  [  ] Joint erythema [  ] Joint tenderness                [  ] Increased temp. to touch  [  ] DJD [ x ] WNL          LABS/DIAGNOSTIC TESTS                          10.4   9.9   )-----------( 156      ( 08 May 2018 06:16 )             34.7         05-08    135  |  101  |  28<H>  ----------------------------<  225<H>  4.2   |  24  |  2.57<H>    Ca    8.2<L>      08 May 2018 06:16  Phos  3.6     05-08  Mg     1.8     05-08    TPro  8.1  /  Alb  3.4<L>  /  TBili  0.7  /  DBili  x   /  AST  18  /  ALT  23  /  AlkPhos  102  05-06      LIVER FUNCTIONS - ( 06 May 2018 22:50 )  Alb: 3.4 g/dL / Pro: 8.1 g/dL / ALK PHOS: 102 U/L / ALT: 23 U/L DA / AST: 18 U/L / GGT: x               CULTURES:   Culture - Blood (05.07.18 @ 11:31)    Gram Stain:   Growth in aerobic bottle: Gram Negative Rods  Growth in anaerobic bottle: Gram Negative Rods    -  Enterobacter cloacae complex: Detec    Specimen Source: .Blood Blood-Peripheral    Organism: Blood Culture PCR    Culture Results:   Growth in aerobic bottle: Gram Negative Rods  Growth in anaerobic bottle: Gram Negative Rods  "Due to technical problems, Proteus sp. will Not be reported as part of  the BCID panel until further notice"  ***Blood Panel PCR results on this specimenare available  approximately 3 hours after the Gram stain result.***  Gram stain, PCR, and/or culture results may not always  correspond due to difference in methodologies.  ************************************************************  This PCR assaywas performed using [employer].  The following targets are tested for: Enterococcus,  vancomycin resistant enterococci, Listeria monocytogenes,  coagulase negative staphylococci, S. aureus,  methicillin resistant S. aureus, Streptococcus agalactiae  (Group B), S. pneumoniae, S. pyogenes (Group A),  Acinetobacter baumannii, Enterobacter cloacae, E. coli,  Klebsiella oxytoca, K. pneumoniae, Proteus sp.,  Serratia marcescens, Haemophilus influenzae,  Neisseria meningitidis, Pseudomonas aeruginosa, Candida  albicans, C. glabrata, C krusei, C parapsilosis,  C. tropicalis and the KPC resistance gene.    Organism Identification: Blood Culture PCR    Method Type: PCR    Culture - Blood (05.07.18 @ 11:28)    Gram Stain:   Growth in aerobic bottle: Gram Negative Rods  Growth in anaerobic bottle: Gram Negative Rods    Specimen Source: .Blood Blood-Peripheral    Culture Results:   Growth in aerobic bottle: Gram Negative Rods  Growth in anaerobic bottle: Gram Negative Rods    RADIOLOGY    CXR:    EXAM:  XR CHEST AP OR PA 1V                            PROCEDURE DATE:  05/06/2018          INTERPRETATION:  Chest portable.    Clinical History: Shortness of breath.    Comparison: 12/1/2017.    Single AP view submitted. Right-sided dialysis catheter is present.    The evaluation of the cardiomediastinal silhouette is limited on portable   technique.    There is pulmonary vascular congestion, with reticular interstitial   markings likely reflecting pulmonary edema.  There is a small left-sidedpleural effusion, possibly loculated.    Impression:    Findings as discussed above.        Assessment and Recommendation:   7F PMHx Asthma, Anemia (on weekly procrit), HF pEF, DM, ESRD on HD ( T,T,S, last one Saturday), HTN, on Home 02,  Breast CA x 2 (s/p L lumpectomy, radiation 2005) Uterine CA (s/p JACLYN)  c/o SOB. Pt that she started feeling SOB and went to [facility] but nobody saw her because SOB didn't improve pt [facility] As per patient she drinks lots of water. At ED code sepsis was called 2/2 fevers, tachycardia and leukocytosis. Pt has been complaining of a dry cough since Saturday.   Patient was intubated and was started on IV Zosyn.  Blood cultures grew Enterobacter cloacae complex and patient was started on IV gentamycin.    Problem/Recommendation - 1:  Problem: Pneumonia.   Recommendation:   1- UA & CS.  2- Repeat blood cultures, and Follow +VE Blood culture to final report.  3- Discontinue Vancomycin, and continue gentamycin.  4- Continue IV Zosyn.  5- Fluid and electrolytes management.  6- CBC and BMP follow up.   7- Follow up CXR, and CT chest when possible to rule out loculated pl. effusion.  8- Nephrology management.    Problem/Recommendation - 2:  ·  Problem: Acute respiratory failure, unspecified whether with hypoxia or hypercapnia.    Recommendation:   1- As per problem # 1.  2- Respirator and ICU management.     Problem/Recommendation - 3:  ·  Problem: Asthma.    Recommendation:   1- Bronchodilators.  2- Respirator management, and O2 as needed.  3- Pulmonary evaluation and management.  4- Steroids as per primary, and pulmonary team if needed.     Problem/Recommendation - 4:  ·  Problem: Type 2 diabetes mellitus with stage 5 chronic kidney disease.    Recommendation:   1- Blood sugar monitoring and control.  2- Accu-Cheks with coverage.  3- 1800 yazmin ADA diet.  4- Follow HB A1C.     Problem/Recommendation - 5:  ·  Problem: Anemia.    Recommendation:   1- Anemia profile.  2- Iron supplements.  3- Closely monitor H & H.  4- Observe for bleeding.     Discussed with medical resident.

## 2018-05-08 NOTE — PROGRESS NOTE ADULT - SUBJECTIVE AND OBJECTIVE BOX
Patient is a 78y old  Female who presents with a chief complaint of SOB, fever (07 May 2018 01:15)    pt seen in icu [ x ], reg med floor [   ], bed [x  ], chair at bedside [   ], a+o x3 [  ], sedated [ x ],  nad [ x ]    haas [x  ], ngt [x  ], et tube [ x ], propofol drip [ x ]        Allergies    No Known Allergies        Vitals    T(F): 97.4 (05-08-18 @ 06:00), Max: 99.8 (05-07-18 @ 10:00)  HR: 56 (05-08-18 @ 07:00) (55 - 95)  BP: 119/55 (05-08-18 @ 07:00) (100/45 - 140/58)  RR: 16 (05-08-18 @ 07:00) (0 - 20)  SpO2: 100% (05-08-18 @ 07:00) (98% - 100%)  Wt(kg): --  CAPILLARY BLOOD GLUCOSE      POCT Blood Glucose.: 253 mg/dL (08 May 2018 05:04)      Labs                          10.4   9.9   )-----------( 156      ( 08 May 2018 06:16 )             34.7       05-08    135  |  101  |  28<H>  ----------------------------<  225<H>  4.2   |  24  |  2.57<H>    Ca    8.2<L>      08 May 2018 06:16  Phos  3.6     05-08  Mg     1.8     05-08    TPro  8.1  /  Alb  3.4<L>  /  TBili  0.7  /  DBili  x   /  AST  18  /  ALT  23  /  AlkPhos  102  05-06      CARDIAC MARKERS ( 06 May 2018 22:50 )  0.123 ng/mL / x     / x     / x     / x            .Blood Blood-Peripheral  05-07 @ 11:31   Growth in aerobic bottle: Gram Negative Rods  Growth in anaerobic bottle: Gram Negative Rods  "Due to technical problems, Proteus sp. will Not be reported as part of  the BCID panel until further notice"  ***Blood Panel PCR results on this specimenare available  approximately 3 hours after the Gram stain result.***  Gram stain, PCR, and/or culture results may not always  correspond due to difference in methodologies.  ************************************************************  This PCR assaywas performed using [employer].  The following targets are tested for: Enterococcus,  vancomycin resistant enterococci, Listeria monocytogenes,  coagulase negative staphylococci, S. aureus,  methicillin resistant S. aureus, Streptococcus agalactiae  (Group B), S. pneumoniae, S. pyogenes (Group A),  Acinetobacter baumannii, Enterobacter cloacae, E. coli,  Klebsiella oxytoca, K. pneumoniae, Proteus sp.,  Serratia marcescens, Haemophilus influenzae,  Neisseria meningitidis, Pseudomonas aeruginosa, Candida  albicans, C. glabrata, C krusei, C parapsilosis,  C. tropicalis and the KPC resistance gene.  --  Blood Culture PCR      .Blood Blood-Peripheral  05-07 @ 11:28   Growth in aerobic bottle: Gram Negative Rods  Growth in anaerobic bottle: Gram Negative Rods  --    Growth in aerobic bottle: Gram Negative Rods  Growth in anaerobic bottle: Gram Negative Rods          Radiology Results      Meds    MEDICATIONS  (STANDING):  fentaNYL   Infusion 1 MICROgram(s)/kG/Hr (8.47 mL/Hr) IV Continuous <Continuous>  heparin  Injectable 5000 Unit(s) SubCutaneous every 12 hours  insulin glargine Injectable (LANTUS) 20 Unit(s) SubCutaneous at bedtime  insulin lispro (HumaLOG) corrective regimen sliding scale   SubCutaneous every 4 hours  methylPREDNISolone sodium succinate Injectable 40 milliGRAM(s) IV Push every 6 hours  pantoprazole  Injectable 40 milliGRAM(s) IV Push daily  piperacillin/tazobactam IVPB. 3.375 Gram(s) IV Intermittent every 12 hours  propofol Infusion 5 MICROgram(s)/kG/Min (2.625 mL/Hr) IV Continuous <Continuous>      MEDICATIONS  (PRN):  acetaminophen   Tablet. 650 milliGRAM(s) Oral every 6 hours PRN Moderate Pain (4 - 6)  ALBUTerol    90 MICROgram(s) HFA Inhaler 2 Puff(s) Inhalation every 6 hours PRN Shortness of Breath and/or Wheezing      Physical Exam      Neuro :  no focal deficits  Respiratory:  B/L rales  CV: RRR, S1S2, no murmurs,   Abdominal: Soft, NT, ND +BS,  Extremities: No edema, + peripheral pulses    ASSESSMENT    sepsis poss 2nd pna vs perm cath infxn  E cloacae bacteremia  Hypervolemia  Anemia  uti  abnormal trop  h/o Hypertension  Congestive heart failure  Chronic kidney disease  No pertinent past medical history  Breast carcinoma  Renal mass  DM (diabetes mellitus)  Asthma  S/P lumpectomy, left breast  No significant past surgical history      PLAN    cont zosyn  id f/u noted  Follow up CXR, and CT chest when possible to rule out loculated pl. effusion.  vanco d/c'd and pt to start on gent  blood cx with enterobacter cloacae noted above  f/u ucx  pulm f/u  vent mgmt  renal f/u  cont hd as per renal  vascular cons noted  no need for vascular intervention at this time  ce x 1 noted above  cardio f/u   cont current meds  mgmt as per icu Patient is a 78y old  Female who presents with a chief complaint of SOB, fever (07 May 2018 01:15)    pt seen in icu [ x ], reg med floor [   ], bed [x  ], chair at bedside [   ], awake and responsive [x ], sedated [  ],  nad [ x ]    haas [x  ], ngt [x  ], et tube [ x ], propofol drip [ ]        Allergies    No Known Allergies        Vitals    T(F): 97.4 (05-08-18 @ 06:00), Max: 99.8 (05-07-18 @ 10:00)  HR: 56 (05-08-18 @ 07:00) (55 - 95)  BP: 119/55 (05-08-18 @ 07:00) (100/45 - 140/58)  RR: 16 (05-08-18 @ 07:00) (0 - 20)  SpO2: 100% (05-08-18 @ 07:00) (98% - 100%)  Wt(kg): --  CAPILLARY BLOOD GLUCOSE      POCT Blood Glucose.: 253 mg/dL (08 May 2018 05:04)      Labs                          10.4   9.9   )-----------( 156      ( 08 May 2018 06:16 )             34.7       05-08    135  |  101  |  28<H>  ----------------------------<  225<H>  4.2   |  24  |  2.57<H>    Ca    8.2<L>      08 May 2018 06:16  Phos  3.6     05-08  Mg     1.8     05-08    TPro  8.1  /  Alb  3.4<L>  /  TBili  0.7  /  DBili  x   /  AST  18  /  ALT  23  /  AlkPhos  102  05-06      CARDIAC MARKERS ( 06 May 2018 22:50 )  0.123 ng/mL / x     / x     / x     / x            .Blood Blood-Peripheral  05-07 @ 11:31   Growth in aerobic bottle: Gram Negative Rods  Growth in anaerobic bottle: Gram Negative Rods  "Due to technical problems, Proteus sp. will Not be reported as part of  the BCID panel until further notice"  ***Blood Panel PCR results on this specimenare available  approximately 3 hours after the Gram stain result.***  Gram stain, PCR, and/or culture results may not always  correspond due to difference in methodologies.  ************************************************************  This PCR assaywas performed using [employer].  The following targets are tested for: Enterococcus,  vancomycin resistant enterococci, Listeria monocytogenes,  coagulase negative staphylococci, S. aureus,  methicillin resistant S. aureus, Streptococcus agalactiae  (Group B), S. pneumoniae, S. pyogenes (Group A),  Acinetobacter baumannii, Enterobacter cloacae, E. coli,  Klebsiella oxytoca, K. pneumoniae, Proteus sp.,  Serratia marcescens, Haemophilus influenzae,  Neisseria meningitidis, Pseudomonas aeruginosa, Candida  albicans, C. glabrata, C krusei, C parapsilosis,  C. tropicalis and the KPC resistance gene.  --  Blood Culture PCR      .Blood Blood-Peripheral  05-07 @ 11:28   Growth in aerobic bottle: Gram Negative Rods  Growth in anaerobic bottle: Gram Negative Rods  --    Growth in aerobic bottle: Gram Negative Rods  Growth in anaerobic bottle: Gram Negative Rods          Radiology Results      Meds    MEDICATIONS  (STANDING):  fentaNYL   Infusion 1 MICROgram(s)/kG/Hr (8.47 mL/Hr) IV Continuous <Continuous>  heparin  Injectable 5000 Unit(s) SubCutaneous every 12 hours  insulin glargine Injectable (LANTUS) 20 Unit(s) SubCutaneous at bedtime  insulin lispro (HumaLOG) corrective regimen sliding scale   SubCutaneous every 4 hours  methylPREDNISolone sodium succinate Injectable 40 milliGRAM(s) IV Push every 6 hours  pantoprazole  Injectable 40 milliGRAM(s) IV Push daily  piperacillin/tazobactam IVPB. 3.375 Gram(s) IV Intermittent every 12 hours  propofol Infusion 5 MICROgram(s)/kG/Min (2.625 mL/Hr) IV Continuous <Continuous>      MEDICATIONS  (PRN):  acetaminophen   Tablet. 650 milliGRAM(s) Oral every 6 hours PRN Moderate Pain (4 - 6)  ALBUTerol    90 MICROgram(s) HFA Inhaler 2 Puff(s) Inhalation every 6 hours PRN Shortness of Breath and/or Wheezing      Physical Exam      Neuro :  no focal deficits  Respiratory:  B/L rales  CV: RRR, S1S2, no murmurs,   Abdominal: Soft, NT, ND +BS,  Extremities: No edema, + peripheral pulses    ASSESSMENT    sepsis poss 2nd pna vs perm cath infxn  E cloacae bacteremia  Hypervolemia  Anemia  uti  abnormal trop  h/o Hypertension  Congestive heart failure  Chronic kidney disease  No pertinent past medical history  Breast carcinoma  Renal mass  DM (diabetes mellitus)  Asthma  S/P lumpectomy, left breast  No significant past surgical history      PLAN    cont zosyn  id f/u noted  Follow up CXR, and CT chest when possible to rule out loculated pl. effusion.  vanco d/c'd and pt to start on gent  blood cx with enterobacter cloacae noted above  f/u ucx  pulm f/u  pt off sedation  vent mgmt with wean trails as tolerated to extubation  renal f/u  cont hd as per renal  vascular cons noted  no need for vascular intervention at this time  ce x 1 noted above  cardio f/u   cont current meds  mgmt as per icu

## 2018-05-08 NOTE — PROGRESS NOTE ADULT - SUBJECTIVE AND OBJECTIVE BOX
INTERVAL HPI/OVERNIGHT EVENTS: patient extubated in the morning, tolerated well. patient alert and awake    PRESSORS: [ ] YES [x ] NO  WHICH:    ANTIBIOTICS:       zosyn           DATE STARTED:   ANTIBIOTICS:                  DATE STARTED:  ANTIBIOTICS:                  DATE STARTED:    Antimicrobial:  piperacillin/tazobactam IVPB. 3.375 Gram(s) IV Intermittent every 12 hours    Cardiovascular:    Pulmonary:  ALBUTerol    90 MICROgram(s) HFA Inhaler 2 Puff(s) Inhalation every 6 hours PRN    Hematalogic:  heparin  Injectable 5000 Unit(s) SubCutaneous every 12 hours    Other:  acetaminophen   Tablet. 650 milliGRAM(s) Oral every 6 hours PRN  epoetin randy Injectable 4000 Unit(s) IV Push <User Schedule>  fentaNYL   Infusion 1 MICROgram(s)/kG/Hr IV Continuous <Continuous>  insulin glargine Injectable (LANTUS) 24 Unit(s) SubCutaneous at bedtime  insulin lispro (HumaLOG) corrective regimen sliding scale   SubCutaneous every 4 hours  methylPREDNISolone sodium succinate Injectable 40 milliGRAM(s) IV Push every 6 hours  pantoprazole  Injectable 40 milliGRAM(s) IV Push daily  propofol Infusion 5 MICROgram(s)/kG/Min IV Continuous <Continuous>    acetaminophen   Tablet. 650 milliGRAM(s) Oral every 6 hours PRN  ALBUTerol    90 MICROgram(s) HFA Inhaler 2 Puff(s) Inhalation every 6 hours PRN  epoetin randy Injectable 4000 Unit(s) IV Push <User Schedule>  fentaNYL   Infusion 1 MICROgram(s)/kG/Hr IV Continuous <Continuous>  heparin  Injectable 5000 Unit(s) SubCutaneous every 12 hours  insulin glargine Injectable (LANTUS) 24 Unit(s) SubCutaneous at bedtime  insulin lispro (HumaLOG) corrective regimen sliding scale   SubCutaneous every 4 hours  methylPREDNISolone sodium succinate Injectable 40 milliGRAM(s) IV Push every 6 hours  pantoprazole  Injectable 40 milliGRAM(s) IV Push daily  piperacillin/tazobactam IVPB. 3.375 Gram(s) IV Intermittent every 12 hours  propofol Infusion 5 MICROgram(s)/kG/Min IV Continuous <Continuous>    Drug Dosing Weight  Height (cm): 165.1 (06 May 2018 19:56)  Weight (kg): 84.7 (07 May 2018 03:50)  BMI (kg/m2): 31.1 (07 May 2018 03:50)  BSA (m2): 1.92 (07 May 2018 03:50)    CENTRAL LINE: [ ] YES [x ] NO  LOCATION:   DATE INSERTED:  REMOVE: [ ] YES [ ] NO  EXPLAIN:    LEE: [x ] YES [ ] NO    DATE INSERTED:   REMOVE:  [ ] YES [ ] NO  EXPLAIN:    A-LINE:  [ ] YES [ x] NO  LOCATION:   DATE INSERTED:  REMOVE:  [ ] YES [ ] NO  EXPLAIN:    PMH -reviewed admission note, no change since admission  PAST MEDICAL & SURGICAL HISTORY:  Anemia  Hypertension  Congestive heart failure  Chronic kidney disease  Breast carcinoma: Lt side s/p lumpectomy and radiation in   Renal mass  DM (diabetes mellitus)  Asthma  S/P lumpectomy, left breast:       ICU Vital Signs Last 24 Hrs  T(C): 37.2 (08 May 2018 11:00), Max: 37.2 (08 May 2018 11:00)  T(F): 98.9 (08 May 2018 11:00), Max: 98.9 (08 May 2018 11:00)  HR: 106 (08 May 2018 14:00) (55 - 117)  BP: 157/74 (08 May 2018 14:00) (100/45 - 198/79)  BP(mean): 91 (08 May 2018 14:00) (55 - 113)  ABP: --  ABP(mean): --  RR: 21 (08 May 2018 14:00) (0 - 31)  SpO2: 99% (08 May 2018 14:00) (95% - 100%)      ABG - ( 08 May 2018 11:27 )  pH, Arterial: 7.36  pH, Blood: x     /  pCO2: 52    /  pO2: 96    / HCO3: 28    / Base Excess: 2.6   /  SaO2: 94                    05- @ 07:01  -  -08 @ 07:00  --------------------------------------------------------  IN: 666.1 mL / OUT: 280 mL / NET: 386.1 mL        Mode: CPAP with PS  FiO2: 40  PEEP: 5  PS: 10  MAP: 9  PIP: 15      PHYSICAL EXAM:    GENERAL: [x ]NAD, [x ]well-groomed, [ x]well-developed  HEAD:  [x ]Atraumatic, [ ]Normocephalic  EYES: [ x]EOMI, [x ]PERRLA, [x ]conjunctiva and sclera clear  ENMT: [ ]No tonsillar erythema, exudates, or enlargement; [x ]Moist mucous membranes, [ ]Good dentition, [ ]No lesions  NECK: [x ]Supple, normal appearance, [ ]No JVD; [ ]Normal thyroid; [ x]Trachea midline  NERVOUS SYSTEM:  [x ]Alert & Oriented X3, [ ]Good concentration; [ ]Motor Strength 5/5 B/L upper and lower extremities; [ ]DTRs 2+ intact and symmetric  CHEST/LUNG: [ ]No chest deformity; [ ]Normal percussion bilaterally; [ x]No rales, rhonchi, wheezing   HEART: [ x]Regular rate and rhythm; [x ]No murmurs, rubs, or gallops  ABDOMEN: [x ]Soft, Nontender, Nondistended; [ ]Bowel sounds present  EXTREMITIES:  [ ]2+ Peripheral Pulses, [x ]No clubbing, cyanosis, or edema  LYMPH: [ ]No lymphadenopathy noted  SKIN: [ ]No rashes or lesions; [ ]Good capillary refill      LABS:  CBC Full  -  ( 08 May 2018 06:16 )  WBC Count : 9.9 K/uL  Hemoglobin : 10.4 g/dL  Hematocrit : 34.7 %  Platelet Count - Automated : 156 K/uL  Mean Cell Volume : 90.8 fl  Mean Cell Hemoglobin : 27.2 pg  Mean Cell Hemoglobin Concentration : 29.9 gm/dL  Auto Neutrophil # : 9.0 K/uL  Auto Lymphocyte # : 0.5 K/uL  Auto Monocyte # : 0.3 K/uL  Auto Eosinophil # : 0.0 K/uL  Auto Basophil # : 0.0 K/uL  Auto Neutrophil % : 91.1 %  Auto Lymphocyte % : 5.4 %  Auto Monocyte % : 3.3 %  Auto Eosinophil % : 0.0 %  Auto Basophil % : 0.2 %    -    135  |  101  |  28<H>  ----------------------------<  225<H>  4.2   |  24  |  2.57<H>    Ca    8.2<L>      08 May 2018 06:16  Phos  3.6     05-08  Mg     1.8     -08    TPro  8.1  /  Alb  3.4<L>  /  TBili  0.7  /  DBili  x   /  AST  18  /  ALT  23  /  AlkPhos  102        Urinalysis Basic - ( 06 May 2018 22:50 )    Color: Yellow / Appearance: Clear / S.010 / pH: x  Gluc: x / Ketone: Negative  / Bili: Negative / Urobili: Negative   Blood: x / Protein: 100 / Nitrite: Negative   Leuk Esterase: Trace / RBC: 0-2 /HPF / WBC 6-10 /HPF   Sq Epi: x / Non Sq Epi: Few /HPF / Bacteria: Moderate /HPF      Culture Results:   Growth in aerobic bottle: Gram Negative Rods  Growth in anaerobic bottle: Gram Negative Rods  "Due to technical problems, Proteus sp. will Not be reported as part of  the BCID panel until further notice"  ***Blood Panel PCR results on this specimenare available  approximately 3 hours after the Gram stain result.***  Gram stain, PCR, and/or culture results may not always  correspond due to difference in methodologies.  ************************************************************  This PCR assaywas performed using [email or web address].  The following targets are tested for: Enterococcus,  vancomycin resistant enterococci, Listeria monocytogenes,  coagulase negative staphylococci, S. aureus,  methicillin resistant S. aureus, Streptococcus agalactiae  (Group B), S. pneumoniae, S. pyogenes (Group A),  Acinetobacter baumannii, Enterobacter cloacae, E. coli,  Klebsiella oxytoca, K. pneumoniae, Proteus sp.,  Serratia marcescens, Haemophilus influenzae,  Neisseria meningitidis, Pseudomonas aeruginosa, Candida  albicans, C. glabrata, C krusei, C parapsilosis,  C. tropicalis and the KPC resistance gene. ( @ 11:31)  Culture Results:   Growth in aerobic bottle: Gram Negative Rods  Growth in anaerobic bottle: Gram Negative Rods ( @ 11:28)  Culture Results:   >100,000 CFU/ml Enterococcus faecium  <10,000 CFU/ml Normal Urogenital rosie present ( @ 10:24)      RADIOLOGY & ADDITIONAL STUDIES REVIEWED:  ***    [ ]GOALS OF CARE DISCUSSION WITH PATIENT/FAMILY/PROXY:    CRITICAL CARE TIME SPENT: 35 minutes

## 2018-05-08 NOTE — PROGRESS NOTE ADULT - SUBJECTIVE AND OBJECTIVE BOX
Patient extubated denies CP, SOB  Review of systems otherwise (-)  	  MEDICATIONS:  MEDICATIONS  (STANDING):  epoetin randy Injectable 4000 Unit(s) IV Push <User Schedule>  fentaNYL   Infusion 1 MICROgram(s)/kG/Hr (8.47 mL/Hr) IV Continuous <Continuous>  heparin  Injectable 5000 Unit(s) SubCutaneous every 12 hours  insulin glargine Injectable (LANTUS) 24 Unit(s) SubCutaneous at bedtime  insulin lispro (HumaLOG) corrective regimen sliding scale   SubCutaneous every 4 hours  methylPREDNISolone sodium succinate Injectable 40 milliGRAM(s) IV Push every 6 hours  pantoprazole  Injectable 40 milliGRAM(s) IV Push daily  piperacillin/tazobactam IVPB. 3.375 Gram(s) IV Intermittent every 12 hours  propofol Infusion 5 MICROgram(s)/kG/Min (2.625 mL/Hr) IV Continuous <Continuous>      LABS:	 	    CARDIAC MARKERS:  CARDIAC MARKERS ( 06 May 2018 22:50 )  0.123 ng/mL / x     / x     / x     / x                                    10.4   9.9   )-----------( 156      ( 08 May 2018 06:16 )             34.7     Hemoglobin: 10.4 g/dL (05-08 @ 06:16)  Hemoglobin: 10.4 g/dL (05-07 @ 04:42)  Hemoglobin: 11.6 g/dL (05-06 @ 21:22)      05-08    135  |  101  |  28<H>  ----------------------------<  225<H>  4.2   |  24  |  2.57<H>    Ca    8.2<L>      08 May 2018 06:16  Phos  3.6     05-08  Mg     1.8     05-08    TPro  8.1  /  Alb  3.4<L>  /  TBili  0.7  /  DBili  x   /  AST  18  /  ALT  23  /  AlkPhos  102  05-06    Creatinine Trend: 2.57<--, 3.34<--, 3.34<--, 3.02<--    COAGS:         PHYSICAL EXAM:  T(C): 36.3 (05-08-18 @ 06:00), Max: 36.8 (05-07-18 @ 14:45)  HR: 90 (05-08-18 @ 08:55) (55 - 95)  BP: 119/55 (05-08-18 @ 07:00) (100/45 - 140/58)  RR: 16 (05-08-18 @ 07:00) (0 - 18)  SpO2: 99% (05-08-18 @ 08:55) (99% - 100%)  Wt(kg): --  I&O's Summary    07 May 2018 07:01  -  08 May 2018 07:00  --------------------------------------------------------  IN: 666.1 mL / OUT: 280 mL / NET: 386.1 mL          HEENT:   Normal oral mucosa, PERRL, EOMI	  Lymphatic: No obvious lymphadenopathy , no edema  Cardiovascular: Normal S1 S2, No JVD, 1/6 HODA murmur, Peripheral pulses palpable 2+ bilaterally  Respiratory: coarse b/l BS  normal effort 	  Gastrointestinal:  Soft, Non-tender, + BS	  Skin: No rashes,  No cyanosis, warm to touch  Musculoskeletal: Normal range of motion, normal strength  Psychiatry:  Appropriate Mood & affect            ASSESSMENT/PLAN: 	78y FemalePMHx Asthma, Anemia (on weekly procrit), Diastolic CHF, Cardio MEMS device  DM, ESRD on HD ( T,T,S, last one Saturday), HTN, on Home 02,  Breast CA x 2 (s/p L lumpectomy, radiation 2005) Uterine CA (s/p JACLYN)  c/o SOB now with respiratory failure.    - keep net negative with HD  - check 12 lead EKG    Kyler Jaimes MD, FACC

## 2018-05-08 NOTE — PROGRESS NOTE ADULT - SUBJECTIVE AND OBJECTIVE BOX
Problem List:    ESRD due to diabetes and hypertension  Started dialysis on 12.2017  Admitted for fever  Blood culture positive to Enterobacter.  Patient was placed on IV broad spectrum antibiotics.          PAST MEDICAL & SURGICAL HISTORY:  Anemia  Hypertension  Congestive heart failure  Chronic kidney disease  Breast carcinoma: Lt side s/p lumpectomy and radiation in 2004  Renal mass  DM (diabetes mellitus)  Asthma  S/P lumpectomy, left breast: 2004      No Known Allergies      MEDICATIONS  (STANDING):  fentaNYL   Infusion 1 MICROgram(s)/kG/Hr (8.47 mL/Hr) IV Continuous <Continuous>  heparin  Injectable 5000 Unit(s) SubCutaneous every 12 hours  insulin glargine Injectable (LANTUS) 24 Unit(s) SubCutaneous at bedtime  insulin lispro (HumaLOG) corrective regimen sliding scale   SubCutaneous every 4 hours  methylPREDNISolone sodium succinate Injectable 40 milliGRAM(s) IV Push every 6 hours  pantoprazole  Injectable 40 milliGRAM(s) IV Push daily  piperacillin/tazobactam IVPB. 3.375 Gram(s) IV Intermittent every 12 hours  propofol Infusion 5 MICROgram(s)/kG/Min (2.625 mL/Hr) IV Continuous <Continuous>    MEDICATIONS  (PRN):  acetaminophen   Tablet. 650 milliGRAM(s) Oral every 6 hours PRN Moderate Pain (4 - 6)  ALBUTerol    90 MICROgram(s) HFA Inhaler 2 Puff(s) Inhalation every 6 hours PRN Shortness of Breath and/or Wheezing                            10.4   9.9   )-----------( 156      ( 08 May 2018 06:16 )             34.7     05-08    135  |  101  |  28<H>  ----------------------------<  225<H>  4.2   |  24  |  2.57<H>    Ca    8.2<L>      08 May 2018 06:16  Phos  3.6     05-08  Mg     1.8     05-08    TPro  8.1  /  Alb  3.4<L>  /  TBili  0.7  /  DBili  x   /  AST  18  /  ALT  23  /  AlkPhos  102  05-06    Urine Microscopic-Add On (NC) (05.06.18 @ 22:50)    Bacteria: Moderate /HPF    Epithelial Cells: Few /HPF    White Blood Cell - Urine: 6-10 /HPF    Red Blood Cell - Urine: 0-2 /HPF    Gram Stain:   Growth in aerobic bottle: Gram Negative Rods  Growth in anaerobic bottle: Gram Negative Rods (05.07.18 @ 11:31)              REVIEW OF SYSTEMS:  intubated    VITALS:  T(F): 97.4 (05-08-18 @ 06:00), Max: 99.8 (05-07-18 @ 10:00)  HR: 90 (05-08-18 @ 08:55)  BP: 119/55 (05-08-18 @ 07:00)  RR: 16 (05-08-18 @ 07:00)  SpO2: 99% (05-08-18 @ 08:55)  Wt(kg): --    05-07 @ 07:01  -  05-08 @ 07:00  --------------------------------------------------------  IN: 666.1 mL / OUT: 280 mL / NET: 386.1 mL        PHYSICAL EXAM:  Constitutional: sedated  Neck: No JVD, no carotid bruit, supple, no adenopathy  Respiratory: Good air entrance B/L, no wheezes, rales or rhonchi  Cardiovascular: S1, S2, RRR, no pericardial rub, no murmur  Abdomen: BS+, soft, no tenderness, no bruit  Pelvis: bladder nondistended  Extremities: No cyanosis or clubbing. No peripheral edema.     Neurological: A/O x 3, no focal deficits  Psychiatric: Normal mood, normal affect  Skin: No rashes  Vascular Access: right IJ with no tunnel erythema

## 2018-05-09 LAB
-  AMIKACIN: SIGNIFICANT CHANGE UP
-  AMPICILLIN/SULBACTAM: SIGNIFICANT CHANGE UP
-  AMPICILLIN: SIGNIFICANT CHANGE UP
-  AMPICILLIN: SIGNIFICANT CHANGE UP
-  AZTREONAM: SIGNIFICANT CHANGE UP
-  CEFAZOLIN: SIGNIFICANT CHANGE UP
-  CEFEPIME: SIGNIFICANT CHANGE UP
-  CEFOXITIN: SIGNIFICANT CHANGE UP
-  CEFTRIAXONE: SIGNIFICANT CHANGE UP
-  CIPROFLOXACIN: SIGNIFICANT CHANGE UP
-  CIPROFLOXACIN: SIGNIFICANT CHANGE UP
-  ERTAPENEM: SIGNIFICANT CHANGE UP
-  GENTAMICIN: SIGNIFICANT CHANGE UP
-  IMIPENEM: SIGNIFICANT CHANGE UP
-  LEVOFLOXACIN: SIGNIFICANT CHANGE UP
-  MEROPENEM: SIGNIFICANT CHANGE UP
-  NITROFURANTOIN: SIGNIFICANT CHANGE UP
-  PIPERACILLIN/TAZOBACTAM: SIGNIFICANT CHANGE UP
-  TETRACYCLINE: SIGNIFICANT CHANGE UP
-  TOBRAMYCIN: SIGNIFICANT CHANGE UP
-  TRIMETHOPRIM/SULFAMETHOXAZOLE: SIGNIFICANT CHANGE UP
-  VANCOMYCIN: SIGNIFICANT CHANGE UP
ANION GAP SERPL CALC-SCNC: 7 MMOL/L — SIGNIFICANT CHANGE UP (ref 5–17)
BASOPHILS # BLD AUTO: 0 K/UL — SIGNIFICANT CHANGE UP (ref 0–0.2)
BASOPHILS NFR BLD AUTO: 0.3 % — SIGNIFICANT CHANGE UP (ref 0–2)
BUN SERPL-MCNC: 59 MG/DL — HIGH (ref 7–18)
CALCIUM SERPL-MCNC: 8.1 MG/DL — LOW (ref 8.4–10.5)
CHLORIDE SERPL-SCNC: 103 MMOL/L — SIGNIFICANT CHANGE UP (ref 96–108)
CO2 SERPL-SCNC: 28 MMOL/L — SIGNIFICANT CHANGE UP (ref 22–31)
CREAT SERPL-MCNC: 3.58 MG/DL — HIGH (ref 0.5–1.3)
CULTURE RESULTS: SIGNIFICANT CHANGE UP
EOSINOPHIL # BLD AUTO: 0 K/UL — SIGNIFICANT CHANGE UP (ref 0–0.5)
EOSINOPHIL NFR BLD AUTO: 0.1 % — SIGNIFICANT CHANGE UP (ref 0–6)
GLUCOSE BLDC GLUCOMTR-MCNC: 105 MG/DL — HIGH (ref 70–99)
GLUCOSE BLDC GLUCOMTR-MCNC: 311 MG/DL — HIGH (ref 70–99)
GLUCOSE BLDC GLUCOMTR-MCNC: 92 MG/DL — SIGNIFICANT CHANGE UP (ref 70–99)
GLUCOSE SERPL-MCNC: 158 MG/DL — HIGH (ref 70–99)
HCT VFR BLD CALC: 34.7 % — SIGNIFICANT CHANGE UP (ref 34.5–45)
HGB BLD-MCNC: 10.4 G/DL — LOW (ref 11.5–15.5)
LYMPHOCYTES # BLD AUTO: 0.7 K/UL — LOW (ref 1–3.3)
LYMPHOCYTES # BLD AUTO: 5.5 % — LOW (ref 13–44)
MAGNESIUM SERPL-MCNC: 2.4 MG/DL — SIGNIFICANT CHANGE UP (ref 1.6–2.6)
MCHC RBC-ENTMCNC: 27.3 PG — SIGNIFICANT CHANGE UP (ref 27–34)
MCHC RBC-ENTMCNC: 30 GM/DL — LOW (ref 32–36)
MCV RBC AUTO: 91.2 FL — SIGNIFICANT CHANGE UP (ref 80–100)
METHOD TYPE: SIGNIFICANT CHANGE UP
METHOD TYPE: SIGNIFICANT CHANGE UP
MONOCYTES # BLD AUTO: 0.8 K/UL — SIGNIFICANT CHANGE UP (ref 0–0.9)
MONOCYTES NFR BLD AUTO: 6.5 % — SIGNIFICANT CHANGE UP (ref 2–14)
NEUTROPHILS # BLD AUTO: 10.3 K/UL — HIGH (ref 1.8–7.4)
NEUTROPHILS NFR BLD AUTO: 87.6 % — HIGH (ref 43–77)
ORGANISM # SPEC MICROSCOPIC CNT: SIGNIFICANT CHANGE UP
PHOSPHATE SERPL-MCNC: 5.2 MG/DL — HIGH (ref 2.5–4.5)
PLATELET # BLD AUTO: 187 K/UL — SIGNIFICANT CHANGE UP (ref 150–400)
POTASSIUM SERPL-MCNC: 4.2 MMOL/L — SIGNIFICANT CHANGE UP (ref 3.5–5.3)
POTASSIUM SERPL-SCNC: 4.2 MMOL/L — SIGNIFICANT CHANGE UP (ref 3.5–5.3)
RBC # BLD: 3.8 M/UL — SIGNIFICANT CHANGE UP (ref 3.8–5.2)
RBC # FLD: 15.8 % — HIGH (ref 10.3–14.5)
SODIUM SERPL-SCNC: 138 MMOL/L — SIGNIFICANT CHANGE UP (ref 135–145)
SPECIMEN SOURCE: SIGNIFICANT CHANGE UP
WBC # BLD: 11.8 K/UL — HIGH (ref 3.8–10.5)
WBC # FLD AUTO: 11.8 K/UL — HIGH (ref 3.8–10.5)

## 2018-05-09 PROCEDURE — 71045 X-RAY EXAM CHEST 1 VIEW: CPT | Mod: 26

## 2018-05-09 RX ORDER — LINEZOLID 600 MG/300ML
600 INJECTION, SOLUTION INTRAVENOUS ONCE
Qty: 0 | Refills: 0 | Status: COMPLETED | OUTPATIENT
Start: 2018-05-09 | End: 2018-05-09

## 2018-05-09 RX ORDER — INSULIN LISPRO 100/ML
4 VIAL (ML) SUBCUTANEOUS ONCE
Qty: 0 | Refills: 0 | Status: COMPLETED | OUTPATIENT
Start: 2018-05-09 | End: 2018-05-09

## 2018-05-09 RX ORDER — TIMOLOL 0.5 %
1 DROPS OPHTHALMIC (EYE)
Qty: 0 | Refills: 0 | Status: DISCONTINUED | OUTPATIENT
Start: 2018-05-09 | End: 2018-05-14

## 2018-05-09 RX ORDER — INSULIN LISPRO 100/ML
5 VIAL (ML) SUBCUTANEOUS
Qty: 0 | Refills: 0 | Status: DISCONTINUED | OUTPATIENT
Start: 2018-05-09 | End: 2018-05-11

## 2018-05-09 RX ORDER — CHLORHEXIDINE GLUCONATE 213 G/1000ML
1 SOLUTION TOPICAL
Qty: 0 | Refills: 0 | Status: DISCONTINUED | OUTPATIENT
Start: 2018-05-09 | End: 2018-05-14

## 2018-05-09 RX ORDER — INSULIN LISPRO 100/ML
VIAL (ML) SUBCUTANEOUS
Qty: 0 | Refills: 0 | Status: DISCONTINUED | OUTPATIENT
Start: 2018-05-09 | End: 2018-05-14

## 2018-05-09 RX ORDER — INSULIN GLARGINE 100 [IU]/ML
28 INJECTION, SOLUTION SUBCUTANEOUS AT BEDTIME
Qty: 0 | Refills: 0 | Status: DISCONTINUED | OUTPATIENT
Start: 2018-05-09 | End: 2018-05-11

## 2018-05-09 RX ORDER — LANOLIN ALCOHOL/MO/W.PET/CERES
3 CREAM (GRAM) TOPICAL AT BEDTIME
Qty: 0 | Refills: 0 | Status: DISCONTINUED | OUTPATIENT
Start: 2018-05-09 | End: 2018-05-14

## 2018-05-09 RX ORDER — LINEZOLID 600 MG/300ML
INJECTION, SOLUTION INTRAVENOUS
Qty: 0 | Refills: 0 | Status: DISCONTINUED | OUTPATIENT
Start: 2018-05-09 | End: 2018-05-14

## 2018-05-09 RX ORDER — LABETALOL HCL 100 MG
10 TABLET ORAL ONCE
Qty: 0 | Refills: 0 | Status: COMPLETED | OUTPATIENT
Start: 2018-05-09 | End: 2018-05-09

## 2018-05-09 RX ORDER — METOPROLOL TARTRATE 50 MG
25 TABLET ORAL
Qty: 0 | Refills: 0 | Status: DISCONTINUED | OUTPATIENT
Start: 2018-05-09 | End: 2018-05-10

## 2018-05-09 RX ORDER — LINEZOLID 600 MG/300ML
600 INJECTION, SOLUTION INTRAVENOUS EVERY 12 HOURS
Qty: 0 | Refills: 0 | Status: DISCONTINUED | OUTPATIENT
Start: 2018-05-10 | End: 2018-05-14

## 2018-05-09 RX ORDER — INSULIN LISPRO 100/ML
8 VIAL (ML) SUBCUTANEOUS ONCE
Qty: 0 | Refills: 0 | Status: COMPLETED | OUTPATIENT
Start: 2018-05-09 | End: 2018-05-09

## 2018-05-09 RX ORDER — BUDESONIDE AND FORMOTEROL FUMARATE DIHYDRATE 160; 4.5 UG/1; UG/1
2 AEROSOL RESPIRATORY (INHALATION)
Qty: 0 | Refills: 0 | Status: DISCONTINUED | OUTPATIENT
Start: 2018-05-09 | End: 2018-05-14

## 2018-05-09 RX ORDER — PANTOPRAZOLE SODIUM 20 MG/1
40 TABLET, DELAYED RELEASE ORAL
Qty: 0 | Refills: 0 | Status: DISCONTINUED | OUTPATIENT
Start: 2018-05-09 | End: 2018-05-14

## 2018-05-09 RX ADMIN — PANTOPRAZOLE SODIUM 40 MILLIGRAM(S): 20 TABLET, DELAYED RELEASE ORAL at 06:12

## 2018-05-09 RX ADMIN — Medication 4 UNIT(S): at 03:38

## 2018-05-09 RX ADMIN — CHLORHEXIDINE GLUCONATE 1 APPLICATION(S): 213 SOLUTION TOPICAL at 17:10

## 2018-05-09 RX ADMIN — Medication 5 UNIT(S): at 17:14

## 2018-05-09 RX ADMIN — Medication 40 MILLIGRAM(S): at 06:12

## 2018-05-09 RX ADMIN — ERYTHROPOIETIN 4000 UNIT(S): 10000 INJECTION, SOLUTION INTRAVENOUS; SUBCUTANEOUS at 10:15

## 2018-05-09 RX ADMIN — Medication 5 UNIT(S): at 08:04

## 2018-05-09 RX ADMIN — Medication 10 MILLIGRAM(S): at 02:32

## 2018-05-09 RX ADMIN — Medication 3 MILLILITER(S): at 20:19

## 2018-05-09 RX ADMIN — Medication 8 UNIT(S): at 02:30

## 2018-05-09 RX ADMIN — BUDESONIDE AND FORMOTEROL FUMARATE DIHYDRATE 2 PUFF(S): 160; 4.5 AEROSOL RESPIRATORY (INHALATION) at 21:13

## 2018-05-09 RX ADMIN — PIPERACILLIN AND TAZOBACTAM 25 GRAM(S): 4; .5 INJECTION, POWDER, LYOPHILIZED, FOR SOLUTION INTRAVENOUS at 17:14

## 2018-05-09 RX ADMIN — Medication 25 MILLIGRAM(S): at 17:15

## 2018-05-09 RX ADMIN — HEPARIN SODIUM 5000 UNIT(S): 5000 INJECTION INTRAVENOUS; SUBCUTANEOUS at 17:15

## 2018-05-09 RX ADMIN — Medication 3 MILLILITER(S): at 16:06

## 2018-05-09 RX ADMIN — Medication 6: at 06:18

## 2018-05-09 RX ADMIN — PIPERACILLIN AND TAZOBACTAM 25 GRAM(S): 4; .5 INJECTION, POWDER, LYOPHILIZED, FOR SOLUTION INTRAVENOUS at 06:13

## 2018-05-09 RX ADMIN — Medication 1 DROP(S): at 17:16

## 2018-05-09 RX ADMIN — Medication 25 MILLIGRAM(S): at 00:56

## 2018-05-09 RX ADMIN — LINEZOLID 600 MILLIGRAM(S): 600 INJECTION, SOLUTION INTRAVENOUS at 17:15

## 2018-05-09 RX ADMIN — Medication 3 MILLILITER(S): at 08:00

## 2018-05-09 RX ADMIN — Medication 1 DROP(S): at 06:14

## 2018-05-09 RX ADMIN — Medication 3 MILLIGRAM(S): at 21:12

## 2018-05-09 RX ADMIN — Medication 3 MILLILITER(S): at 02:04

## 2018-05-09 RX ADMIN — Medication 8: at 17:14

## 2018-05-09 NOTE — PROVIDER CONTACT NOTE (CRITICAL VALUE NOTIFICATION) - RECOMMENDATIONS
Positive urine culture > then [id number] cfu entropcocus fasium, vanco restitent, less thn [id number] cfunormal urogeniutal rosie present.

## 2018-05-09 NOTE — DIETITIAN INITIAL EVALUATION ADULT. - OTHER INFO
Nutrition consult requested for intubation >48hrs. S/p extubation & transfer from ICU to medical floor,  Pt. from home lives with family. Visited pt. with daughters at bedside, per pt. appetite fair with SOB, not eating so well, eating 2 meals with increase intake of fluids, denies nausea/vomiting or diarrhea PTA, stated -170 Lbs & gained wt. due fluid overload, in house pt. consuming 40% of meals & tolerating. Started HD on 12/2017 via [facility] at [facility] on T. TH. Sat, accepted nutrition education & copy on Dialysis/Stage 5 nutrition therapy, reinforce & receptive to information given. Skin intact. Discussed with MD/RN.

## 2018-05-09 NOTE — PROGRESS NOTE ADULT - SUBJECTIVE AND OBJECTIVE BOX
Patient is a 78y old  Female who presents with a chief complaint of SOB, fever (07 May 2018 01:15)  Awake, alert, comfortable in bed in NAD. Having HD    INTERVAL HPI/OVERNIGHT EVENTS:      VITAL SIGNS:  T(F): 98 (05-09-18 @ 12:10)  HR: 98 (05-09-18 @ 12:10)  BP: 164/78 (05-09-18 @ 12:10)  RR: 19 (05-09-18 @ 12:10)  SpO2: 98% (05-09-18 @ 12:10)  Wt(kg): --  I&O's Detail    08 May 2018 07:01  -  09 May 2018 07:00  --------------------------------------------------------  IN:    IV PiggyBack: 25 mL  Total IN: 25 mL    OUT:    Chest Tube: 100 mL    Indwelling Catheter - Urethral: 220 mL    Voided: 50 mL  Total OUT: 370 mL    Total NET: -345 mL              REVIEW OF SYSTEMS:    CONSTITUTIONAL:  No fevers, chills, sweats    HEENT:  Eyes:  No diplopia or blurred vision. ENT:  No earache, sore throat or runny nose.    CARDIOVASCULAR:  No pressure, squeezing, tightness, or heaviness about the chest; no palpitations.    RESPIRATORY:  Per HPI    GASTROINTESTINAL:  No abdominal pain, nausea, vomiting or diarrhea.    GENITOURINARY:  No dysuria, frequency or urgency.    NEUROLOGIC:  No paresthesias, fasciculations, seizures or weakness.    PSYCHIATRIC:  No disorder of thought or mood.      PHYSICAL EXAM:    Constitutional: Well developed and nourished  Eyes:Perrla  ENMT: normal  Neck:supple  Respiratory: good air entry  Cardiovascular: S1 S2 regular  Gastrointestinal: Soft, Non tender  Extremities: No edema  Vascular:normal  Neurological:Awake, alert,Ox3  Musculoskeletal:Normal      MEDICATIONS  (STANDING):  ALBUTerol/ipratropium for Nebulization 3 milliLiter(s) Nebulizer every 6 hours  buDESOnide  80 MICROgram(s)/formoterol 4.5 MICROgram(s) Inhaler 2 Puff(s) Inhalation two times a day  chlorhexidine 4% Liquid 1 Application(s) Topical two times a day  epoetin randy Injectable 4000 Unit(s) IV Push <User Schedule>  heparin  Injectable 5000 Unit(s) SubCutaneous every 12 hours  insulin glargine Injectable (LANTUS) 28 Unit(s) SubCutaneous at bedtime  insulin lispro (HumaLOG) corrective regimen sliding scale   SubCutaneous every 6 hours  insulin lispro Injectable (HumaLOG) 5 Unit(s) SubCutaneous three times a day before meals  metoprolol tartrate 25 milliGRAM(s) Oral two times a day  pantoprazole    Tablet 40 milliGRAM(s) Oral before breakfast  piperacillin/tazobactam IVPB. 3.375 Gram(s) IV Intermittent every 12 hours  predniSONE   Tablet 40 milliGRAM(s) Oral daily  timolol 0.5% Solution 1 Drop(s) Both EYES two times a day    MEDICATIONS  (PRN):  acetaminophen   Tablet. 650 milliGRAM(s) Oral every 6 hours PRN Moderate Pain (4 - 6)      Allergies    No Known Allergies    Intolerances        LABS:                        10.4   11.8  )-----------( 187      ( 09 May 2018 09:16 )             34.7     05-09    138  |  103  |  59<H>  ----------------------------<  158<H>  4.2   |  28  |  3.58<H>    Ca    8.1<L>      09 May 2018 09:16  Phos  5.2     05-09  Mg     2.4     05-09          ABG - ( 08 May 2018 11:27 )  pH, Arterial: 7.36  pH, Blood: x     /  pCO2: 52    /  pO2: 96    / HCO3: 28    / Base Excess: 2.6   /  SaO2: 94                    CAPILLARY BLOOD GLUCOSE      POCT Blood Glucose.: 105 mg/dL (09 May 2018 11:03)  POCT Blood Glucose.: 200 mg/dL (09 May 2018 07:43)  POCT Blood Glucose.: 254 mg/dL (09 May 2018 06:15)  POCT Blood Glucose.: 362 mg/dL (09 May 2018 03:21)  POCT Blood Glucose.: 440 mg/dL (09 May 2018 02:09)  POCT Blood Glucose.: 412 mg/dL (08 May 2018 21:32)  POCT Blood Glucose.: 266 mg/dL (08 May 2018 18:24)    pro-bnp [id number] 05-06 @ 22:50     d-dimer --  05-06 @ 22:50      RADIOLOGY & ADDITIONAL TESTS:    CXR:    Ct scan chest:    ekg;    echo:

## 2018-05-09 NOTE — DIETITIAN INITIAL EVALUATION ADULT. - PROBLEM SELECTOR PLAN 2
code sepsis was called   102 fever, tachycardia, leukocytosis  pt has dry cough since Saturday   perm cath infected??  since pt is HD will give vancomycin x 1 and start Zosyn  f/u RVP  f/u BCx

## 2018-05-09 NOTE — CHART NOTE - NSCHARTNOTEFT_GEN_A_CORE
78F PMHx Asthma, Anemia (on weekly procrit), HFpEF, DM, ESRD on HD ( T,T,S, last one Saturday), HTN, on Home 02,  Breast CA x 2 (s/p L lumpectomy, radiation 2005) Uterine CA (s/p JACLYN)  c/o SOB.  Emergently intubated for respiratory distress likely 2/2 sepsis and pulmonary edema.     Problem/Plan - 1:  ·  Problem: Sepsis, due to unspecified organism.  Plan: Bacteremia: enterobacter cloacae, unknown source at the moment  UCx: enterobacter faecium and normal rosie  f/u BCx, UCx to final results - sensitivity pending  c/w Karma Dave  CT chest f/up to r/o loculation.      Problem/Plan - 2:  ·  Problem: Acute respiratory failure, unspecified whether with hypoxia or hypercapnia.  Plan: RESOLVED  likely 2/2 pulmonary edema  off ventilator since AM, tolerating well.      Problem/Plan - 3:  ·  Problem: Persistent asthma with acute exacerbation, unspecified asthma severity.  Plan: c/w Solumedrol, DUONEB q6hrs.      Problem/Plan - 4:  ·  Problem: ESRD on dialysis.  Plan: HD via permacath T/T/S as BP tolerates, R AVF not mature  Dr Chadwick (primary doctor).      Problem/Plan - 5:  ·  Problem: Chronic diastolic heart failure.  Plan: EKG without ischemic changes  TTE: G2DD, pEF, moderate tricupid regurgitation and moderate mitral stenosis  Dr Jaimes.      Problem/Plan - 6:  Problem: Type 2 diabetes mellitus with stage 5 chronic kidney disease not on chronic dialysis, with long-term current use of insulin. Plan: Lantus 24U HS + moderate HiSS  FS AC/HS  HbA1C: 7.6%.     Problem/Plan - 7:  ·  Problem: Need for prophylactic measure.  Plan: IMPROVE VTE Individual Risk Assessment          RISK                                                          Points  [  ] Previous VTE                                                3  [  ] Thrombophilia                                             2  [ x ] Lower limb paralysis                                   2        (unable to hold up >15 seconds)    [  ] Current Cancer                                             2         (within 6 months)  [ x ] Immobilization > 24 hrs                              1  [ x ] ICU/CCU stay > 24 hours                             1  [ x ] Age > 60                                                         1    IMPROVE VTE Score: 5  HSQ for DVT chemoppx  Protonix for GI ppx.      Follow up:   f/u BCx, UCx to final results - sensitivity pending  CT chest to r/o loculation.   ?possibility of permacath removal as this may be source of infection    Accepting Attending Physician: Dr Benites  Resident on call:    D/w ICU Attending and agrees 78F PMHx Asthma, Anemia (on weekly procrit), HFpEF, DM, ESRD on HD ( T,T,S, last one Saturday), HTN, on Home 02,  Breast CA x 2 (s/p L lumpectomy, radiation 2005) Uterine CA (s/p JACLYN)  c/o SOB.     Emergently intubated during HD for respiratory distress likely 2/2 sepsis and pulmonary edema.    Intubated 5/7  Extubated 5/8     Problem/Plan - 1:  ·  Problem: Sepsis, due to unspecified organism.  Plan: Bacteremia: enterobacter cloacae, unknown source at the moment  UCx: enterobacter faecium and normal rosie  f/u BCx, UCx to final results - sensitivity pending  c/w Karma Dave  CT chest f/up to r/o loculation.   d/c haas and TOV 5/8 10pm     Problem/Plan - 2:  ·  Problem: Acute respiratory failure, unspecified whether with hypoxia or hypercapnia.  Plan: RESOLVED  likely 2/2 pulmonary edema  off ventilator since AM, tolerating well.      Problem/Plan - 3:  ·  Problem: Persistent asthma with acute exacerbation, unspecified asthma severity.  Plan: c/w Solumedrol, DUONEB q6hrs.      Problem/Plan - 4:  ·  Problem: ESRD on dialysis.  Plan: HD via permacath T/T/S as BP tolerates, R AVF not mature  Dr Chadwick (primary doctor).      Problem/Plan - 5:  ·  Problem: Chronic diastolic heart failure.  Plan: EKG without ischemic changes  TTE: G2DD, pEF, moderate tricupid regurgitation and moderate mitral stenosis  Dr Jaimes.      Problem/Plan - 6:  Problem: Type 2 diabetes mellitus with stage 5 chronic kidney disease not on chronic dialysis, with long-term current use of insulin. Plan: Lantus 24U HS + moderate HiSS  FS AC/HS  HbA1C: 7.6%.     Problem/Plan - 7:  ·  Problem: Need for prophylactic measure.  Plan: IMPROVE VTE Individual Risk Assessment          RISK                                                          Points  [  ] Previous VTE                                                3  [  ] Thrombophilia                                             2  [ x ] Lower limb paralysis                                   2        (unable to hold up >15 seconds)    [  ] Current Cancer                                             2         (within 6 months)  [ x ] Immobilization > 24 hrs                              1  [ x ] ICU/CCU stay > 24 hours                             1  [ x ] Age > 60                                                         1    IMPROVE VTE Score: 5  HSQ for DVT chemoppx  Protonix for GI ppx.      Follow up:   d/c haas and TOV 5/8 10pm  f/u BCx, UCx to final results - sensitivity pending  CT chest to r/o loculation.   ?possibility of permacath removal as this may be source of infection    Accepting Attending Physician: Dr Benites  Resident on call:    D/w ICU Attending and agrees 78F PMHx Asthma, Anemia (on weekly procrit), HFpEF, DM, ESRD on HD ( T,T,S, last one Saturday), HTN, on Home 02,  Breast CA x 2 (s/p L lumpectomy, radiation 2005) Uterine CA (s/p JACLYN)  c/o SOB.     Emergently intubated during HD for respiratory distress likely 2/2 sepsis and pulmonary edema.    Intubated 5/7  Extubated 5/8     Problem/Plan - 1:  ·  Problem: Sepsis, due to unspecified organism.  Plan: Bacteremia: enterobacter cloacae, unknown source at the moment  UCx: enterobacter faecium and normal rosie  f/u BCx, UCx to final results - sensitivity pending  c/w Karma Dave  CT chest f/up to r/o loculation.   d/c haas and TOV 5/8 10pm     Problem/Plan - 2:  ·  Problem: Acute respiratory failure, unspecified whether with hypoxia or hypercapnia.  Plan: RESOLVED  likely 2/2 pulmonary edema  off ventilator since AM, tolerating well.      Problem/Plan - 3:  ·  Problem: Persistent asthma with acute exacerbation, unspecified asthma severity.  Plan: c/w Solumedrol, DUONEB q6hrs.      Problem/Plan - 4:  ·  Problem: ESRD on dialysis.  Plan: HD via permacath T/T/S as BP tolerates, R AVF not mature  Dr Chadwick (primary doctor).      Problem/Plan - 5:  ·  Problem: Chronic diastolic heart failure.  Plan: EKG without ischemic changes  TTE: G2DD, pEF, moderate tricuspid regurgitation and moderate mitral stenosis  HTN - started on lopressor 25mg BID for BP control  Dr Jaimes.      Problem/Plan - 6:  Problem: Type 2 diabetes mellitus with stage 5 chronic kidney disease not on chronic dialysis, with long-term current use of insulin. Plan: Lantus 24U HS + moderate HiSS  FS AC/HS  HbA1C: 7.6%.     Problem/Plan - 7:  ·  Problem: Need for prophylactic measure.  Plan: IMPROVE VTE Individual Risk Assessment          RISK                                                          Points  [  ] Previous VTE                                                3  [  ] Thrombophilia                                             2  [ x ] Lower limb paralysis                                   2        (unable to hold up >15 seconds)    [  ] Current Cancer                                             2         (within 6 months)  [ x ] Immobilization > 24 hrs                              1  [ x ] ICU/CCU stay > 24 hours                             1  [ x ] Age > 60                                                         1    IMPROVE VTE Score: 5  HSQ for DVT chemoppx  Protonix for GI ppx.      Follow up:   d/c haas and TOV 5/8 10pm  f/u BCx, UCx to final results - sensitivity pending  CT chest to r/o loculation.   ?possibility of permacath removal as this may be source of infection    Accepting Attending Physician: Dr Benites  Resident on call:    D/w ICU Attending and agrees 78F PMHx Asthma, Anemia (on weekly procrit), HFpEF, DM, ESRD on HD ( T,T,S, last one Saturday), HTN, on Home 02,  Breast CA x 2 (s/p L lumpectomy, radiation 2005) Uterine CA (s/p JACLYN)  c/o SOB.     Patient underwent emergent dialysis for pulmonary edema and was only able to perform 1 hour of dialysis 2/2 SBP 90.Patient then complained of chills, was shaking.  Sensorium became altered, and patient became tachypneic, restless, wheezingduoneb given but patient remained tachypneic with abdominal breathing.    Emergently intubated during HD for respiratory distress likely 2/2 sepsis and pulmonary edema.    Intubated 5/7  Extubated 5/8     Problem/Plan - 1:  ·  Problem: Sepsis, due to unspecified organism.  Plan: Bacteremia: enterobacter cloacae, unknown source at the moment  UCx: enterobacter faecium and normal rosie  f/u BCx, UCx to final results - sensitivity pending  c/w Edouardn  Dr Dave  CT chest f/up to r/o loculation.   d/c haas and TOV     Problem/Plan - 2:  ·  Problem: Acute respiratory failure, unspecified whether with hypoxia or hypercapnia.  Plan: RESOLVED  likely 2/2 pulmonary edema  off ventilator since AM, tolerating well.      Problem/Plan - 3:  ·  Problem: Persistent asthma with acute exacerbation, unspecified asthma severity.  Plan: c/w Solumedrol, DUONEB q6hrs.      Problem/Plan - 4:  ·  Problem: ESRD on dialysis.  Plan: HD via permacath , R AVF not mature  next HD 5/9 Wed  Dr Chadwick (primary doctor).      Problem/Plan - 5:  ·  Problem: Chronic diastolic heart failure.  Plan: EKG without ischemic changes  TTE: G2DD, pEF, moderate tricuspid regurgitation and moderate mitral stenosis  HTN - started on lopressor 25mg BID for BP control  Dr Jaimes.      Problem/Plan - 6:  Problem: Type 2 diabetes mellitus with stage 5 chronic kidney disease not on chronic dialysis, with long-term current use of insulin. Plan: Lantus 24U HS + moderate HiSS  FS AC/HS  HbA1C: 7.6%.     Problem/Plan - 7:  ·  Problem: Need for prophylactic measure.  Plan: IMPROVE VTE Individual Risk Assessment          RISK                                                          Points  [  ] Previous VTE                                                3  [  ] Thrombophilia                                             2  [ x ] Lower limb paralysis                                   2        (unable to hold up >15 seconds)    [  ] Current Cancer                                             2         (within 6 months)  [ x ] Immobilization > 24 hrs                              1  [ x ] ICU/CCU stay > 24 hours                             1  [ x ] Age > 60                                                         1    IMPROVE VTE Score: 5  HSQ for DVT chemoppx  Protonix for GI ppx.      Follow up:   d/c haas and TOV   f/u BCx, UCx to final results - sensitivity pending  CT chest to r/o loculation.   ?possibility of permacath removal as this may be source of infection    Accepting Attending Physician: Dr Benites  Resident on call:    D/w ICU Attending and agrees 78F PMHx Asthma, Anemia (on weekly procrit), HFpEF, DM, ESRD on HD ( T,T,S, last one Saturday), HTN, on Home 02,  Breast CA x 2 (s/p L lumpectomy, radiation 2005) Uterine CA (s/p JACLYN)  c/o SOB.     Patient underwent emergent dialysis for pulmonary edema and was only able to perform 1 hour of dialysis 2/2 SBP 90.Patient then complained of chills, was shaking.  Sensorium became altered, and patient became tachypneic, restless, wheezingduoneb given but patient remained tachypneic with abdominal breathing.    Emergently intubated during HD for respiratory distress likely 2/2 sepsis and pulmonary edema.    Intubated 5/7  Extubated 5/8     Problem/Plan - 1:  ·  Problem: Sepsis, due to unspecified organism.  Plan: Bacteremia: enterobacter cloacae, unknown source at the moment  UCx: enterobacter faecium and normal rosie  f/u BCx, UCx to final results - sensitivity pending  c/w Karma Dave  CT chest f/up to r/o loculation.   d/c haas and TOV     Problem/Plan - 2:  ·  Problem: Acute respiratory failure, unspecified whether with hypoxia or hypercapnia.  Plan: RESOLVED  likely 2/2 pulmonary edema  off ventilator since 5/8 AM, tolerating well.      Problem/Plan - 3:  ·  Problem: Persistent asthma with acute exacerbation, unspecified asthma severity.  Plan: c/w Solumedrol, DUONEB q6hrs.      Problem/Plan - 4:  ·  Problem: ESRD on dialysis.  Plan: HD via permacath , R AVF not mature  next HD 5/9 Wed  Dr Chadwick (primary doctor).      Problem/Plan - 5:  ·  Problem: Chronic diastolic heart failure.  Plan: EKG without ischemic changes  TTE: G2DD, pEF, moderate tricuspid regurgitation and moderate mitral stenosis  HTN - started on lopressor 25mg BID for BP control  Dr Jaimes.      Problem/Plan - 6:  Problem: Type 2 diabetes mellitus with stage 5 chronic kidney disease not on chronic dialysis, with long-term current use of insulin. Plan: Lantus 24U HS + moderate HiSS  FS AC/HS  HbA1C: 7.6%.     Problem/Plan - 7:  ·  Problem: Need for prophylactic measure.  Plan: IMPROVE VTE Individual Risk Assessment          RISK                                                          Points  [  ] Previous VTE                                                3  [  ] Thrombophilia                                             2  [ x ] Lower limb paralysis                                   2        (unable to hold up >15 seconds)    [  ] Current Cancer                                             2         (within 6 months)  [ x ] Immobilization > 24 hrs                              1  [ x ] ICU/CCU stay > 24 hours                             1  [ x ] Age > 60                                                         1    IMPROVE VTE Score: 5  HSQ for DVT chemoppx  Protonix for GI ppx.      Follow up:   d/c haas and TOV   f/u BCx, UCx to final results - sensitivity pending  CT chest to r/o loculation.   ?possibility of permacath removal as this may be source of infection    Accepting Attending Physician: Dr Benites  Resident on call: Dr Malin    D/w ICU Attending Dr Kitchen and agrees 78F PMHx Asthma, Anemia (on weekly procrit), HFpEF, DM, ESRD on HD ( T,T,S, last one Saturday), HTN, on Home 02,  Breast CA x 2 (s/p L lumpectomy, radiation 2005) Uterine CA (s/p JACLYN)  c/o SOB.     Patient underwent emergent dialysis for pulmonary edema and was only able to perform 1 hour of dialysis 2/2 SBP 90. Patient then complained of chills, was shaking.  Sensorium became altered, and patient became tachypneic, restless, wheezingduoneb given but patient remained tachypneic with abdominal breathing.    RRT was called and pt emergently intubated during HD for respiratory distress likely 2/2 sepsis and pulmonary edema.    Intubated 5/7  Extubated 5/8     Problem/Plan - 1:  ·  Problem: Sepsis, due to unspecified organism.  Plan: Bacteremia: enterobacter cloacae, unknown source at the moment  UCx: enterobacter faecium and normal rosie  f/u BCx, UCx to final results - sensitivity pending  c/w Edouardn  Dr Dave  CT chest f/up to r/o loculation.   d/c haas and TOV     Problem/Plan - 2:  ·  Problem: Acute respiratory failure, unspecified whether with hypoxia or hypercapnia.  Plan: RESOLVED  likely 2/2 pulmonary edema  off ventilator since 5/8 AM, tolerating well.      Problem/Plan - 3:  ·  Problem: Persistent asthma with acute exacerbation, unspecified asthma severity.  Plan: c/w Solumedrol, DUONEB q6hrs.      Problem/Plan - 4:  ·  Problem: ESRD on dialysis.  Plan: HD via permacath , R AVF not mature  next HD 5/9 Wed  Dr Chadwick (primary doctor).      Problem/Plan - 5:  ·  Problem: Chronic diastolic heart failure.  Plan: EKG without ischemic changes  TTE: G2DD, pEF, moderate tricuspid regurgitation and moderate mitral stenosis  HTN - started on lopressor 25mg BID for BP control  Dr Jaimes.      Problem/Plan - 6:  Problem: Type 2 diabetes mellitus with stage 5 chronic kidney disease not on chronic dialysis, with long-term current use of insulin. Plan: Lantus 24U HS + moderate HiSS  FS AC/HS  HbA1C: 7.6%.     Problem/Plan - 7:  ·  Problem: Need for prophylactic measure.  Plan: IMPROVE VTE Individual Risk Assessment          RISK                                                          Points  [  ] Previous VTE                                                3  [  ] Thrombophilia                                             2  [ x ] Lower limb paralysis                                   2        (unable to hold up >15 seconds)    [  ] Current Cancer                                             2         (within 6 months)  [ x ] Immobilization > 24 hrs                              1  [ x ] ICU/CCU stay > 24 hours                             1  [ x ] Age > 60                                                         1    IMPROVE VTE Score: 5  HSQ for DVT chemoppx  Protonix for GI ppx.      Follow up:   d/c haas and TOV   f/u BCx, UCx to final results - sensitivity pending  CT chest to r/o loculation.   ?possibility of permacath removal as this may be source of infection    Accepting Attending Physician: Dr Benites  Resident on call: Dr Malin    D/w ICU Attending Dr Kitchen and agrees 78F PMHx Asthma, Anemia (on weekly procrit), HFpEF, DM, ESRD on HD ( T,T,S, last one Saturday), HTN, on Home 02,  Breast CA x 2 (s/p L lumpectomy, radiation 2005) Uterine CA (s/p JACLYN)  c/o SOB.     Patient underwent emergent dialysis for pulmonary edema and was only able to perform 1 hour of dialysis 2/2 SBP 90. Patient then complained of chills, was shaking.  Sensorium became altered, and patient became tachypneic, restless, wheezingduoneb given but patient remained tachypneic with abdominal breathing.    RRT was called and pt emergently intubated during HD for respiratory distress likely 2/2 sepsis and pulmonary edema.    Intubated 5/7  Extubated 5/8     Problem/Plan - 1:  ·  Problem: Sepsis, due to unspecified organism.  Plan: Bacteremia: enterobacter cloacae, unknown source at the moment  UCx: enterobacter faecium and normal rosie  f/u BCx, UCx to final results - sensitivity pending  c/w Edouardn  Dr Dave  CT chest f/up to r/o loculation.   d/c haas and TOV     Problem/Plan - 2:  ·  Problem: Acute respiratory failure, unspecified whether with hypoxia or hypercapnia.  Plan: RESOLVED  likely 2/2 pulmonary edema  off ventilator since 5/8 AM, tolerating well.      Problem/Plan - 3:  ·  Problem: Persistent asthma with acute exacerbation, unspecified asthma severity.  Plan: c/w Solumedrol, DUONEB q6hrs.      Problem/Plan - 4:  ·  Problem: ESRD on dialysis.  Plan: HD via permacath , R AVF not mature  next HD 5/9 Wed  Dr Chadwick (primary doctor).      Problem/Plan - 5:  ·  Problem: Chronic diastolic heart failure.  Plan: EKG without ischemic changes  TTE: G2DD, pEF, moderate tricuspid regurgitation and moderate mitral stenosis  HTN - started on lopressor 25mg BID for BP control  Dr Jaimes.      Problem/Plan - 6:  Problem: Type 2 diabetes mellitus with stage 5 chronic kidney disease not on chronic dialysis, with long-term current use of insulin. Plan: Lantus 24U HS + moderate HiSS  FS AC/HS  HbA1C: 7.6%.     Problem/Plan - 7:  ·  Problem: Need for prophylactic measure.  Plan: IMPROVE VTE Individual Risk Assessment          RISK                                                          Points  [  ] Previous VTE                                                3  [  ] Thrombophilia                                             2  [ x ] Lower limb paralysis                                   2        (unable to hold up >15 seconds)    [  ] Current Cancer                                             2         (within 6 months)  [ x ] Immobilization > 24 hrs                              1  [ x ] ICU/CCU stay > 24 hours                             1  [ x ] Age > 60                                                         1    IMPROVE VTE Score: 5  HSQ for DVT chemoppx  Protonix for GI ppx.      Follow up:   BP monitoring as lopressor is new med to pt  d/c haas and TOV   f/u BCx, UCx to final results - sensitivity pending  CT chest to r/o loculation.   ?possibility of permacath removal as this may be source of infection    Accepting Attending Physician: Dr Benites  Resident on call: Dr Malin    D/w ICU Attending Dr Kitchen and agrees 78F PMHx Asthma, Anemia (on weekly procrit), HFpEF, DM, ESRD on HD ( T,T,S, last one Saturday), HTN, on Home 02,  Breast CA x 2 (s/p L lumpectomy, radiation 2005) Uterine CA (s/p JACLYN)  c/o SOB.     Patient underwent emergent dialysis for pulmonary edema and was only able to perform 1 hour of dialysis 2/2 SBP 90. Patient then complained of chills, was shaking.  Sensorium became altered, and patient became tachypneic, restless, wheezingduoneb given but patient remained tachypneic with abdominal breathing.    RRT was called and pt emergently intubated during HD for respiratory distress likely 2/2 sepsis and pulmonary edema.    Intubated 5/7  Extubated 5/8     Problem/Plan - 1:  ·  Problem: Sepsis, due to unspecified organism.  Plan: Bacteremia: enterobacter cloacae, unknown source at the moment  UCx: enterobacter faecium and normal rosie  f/u BCx, UCx to final results - sensitivity pending  c/w Edouardn  Dr Dave  CT chest f/up to r/o loculation.   d/c haas and TOV     Problem/Plan - 2:  ·  Problem: Acute respiratory failure, unspecified whether with hypoxia or hypercapnia.  Plan: RESOLVED  likely 2/2 pulmonary edema  off ventilator since 5/8 AM, tolerating well.      Problem/Plan - 3:  ·  Problem: Persistent asthma with acute exacerbation, unspecified asthma severity.  Plan: c/w Solumedrol, DUONEB q6hrs.      Problem/Plan - 4:  ·  Problem: ESRD on dialysis.  Plan: HD via permacath , R AVF not mature  next HD 5/9 Wed  Dr Chadwick (primary doctor).      Problem/Plan - 5:  ·  Problem: Chronic diastolic heart failure.  Plan: EKG without ischemic changes  TTE: G2DD, pEF, moderate tricuspid regurgitation and moderate mitral stenosis  HTN - started on lopressor 25mg BID for BP control  Dr Jaimes.      Problem/Plan - 6:  Problem: Type 2 diabetes mellitus with stage 5 chronic kidney disease not on chronic dialysis, with long-term current use of insulin. Plan:   inc Lantus 28U HS (home dose 50u)  start humalog 5u BID (home dose humalog 20u BID)  moderate HiSS  FS AC/HS  HbA1C: 7.6%     Problem/Plan - 7:  ·  Problem: Need for prophylactic measure.  Plan: IMPROVE VTE Individual Risk Assessment          RISK                                                          Points  [  ] Previous VTE                                                3  [  ] Thrombophilia                                             2  [ x ] Lower limb paralysis                                   2        (unable to hold up >15 seconds)    [  ] Current Cancer                                             2         (within 6 months)  [ x ] Immobilization > 24 hrs                              1  [ x ] ICU/CCU stay > 24 hours                             1  [ x ] Age > 60                                                         1    IMPROVE VTE Score: 5  HSQ for DVT chemoppx  Protonix for GI ppx.      Follow up:   BP monitoring as lopressor is new med to pt  d/c samina and TOV   f/u BCx, UCx to final results - sensitivity pending  CT chest to r/o loculation.   ?possibility of permacath removal as this may be source of infection    Accepting Attending Physician: Dr Benites  Resident on call: Dr Malin    D/w ICU Attending Dr Kitchen and agrees 78F PMHx Asthma, Anemia (on weekly procrit), HFpEF, DM, ESRD on HD ( T,T,S, last one Saturday), HTN, on Home 02,  Breast CA x 2 (s/p L lumpectomy, radiation 2005) Uterine CA (s/p JACLYN)  c/o SOB.     Patient underwent emergent dialysis for pulmonary edema and was only able to perform 1 hour of dialysis 2/2 SBP 90. Patient then complained of chills, was shaking.  Sensorium became altered, and patient became tachypneic, restless, wheezingduoneb given but patient remained tachypneic with abdominal breathing.    RRT was called and pt emergently intubated during HD for respiratory distress likely 2/2 sepsis and pulmonary edema.    Intubated 5/7  Extubated 5/8     Problem/Plan - 1:  ·  Problem: Sepsis, due to unspecified organism.  Plan: Bacteremia: enterobacter cloacae, unknown source at the moment  UCx: enterobacter faecium and normal rosie  f/u BCx, UCx to final results - sensitivity pending  c/w Edouardn  Dr Dave  CT chest f/up to r/o loculation.   d/c haas and TOV     Problem/Plan - 2:  ·  Problem: Acute respiratory failure, unspecified whether with hypoxia or hypercapnia.  Plan: RESOLVED  likely 2/2 pulmonary edema  off ventilator since 5/8 AM, tolerating well.   d/c solumedrol 40q6h  start PO Pred 40mg daily and taper     Problem/Plan - 3:  ·  Problem: Persistent asthma with acute exacerbation, unspecified asthma severity.  Plan: c/w Solumedrol, DUONEB q6hrs.      Problem/Plan - 4:  ·  Problem: ESRD on dialysis.  Plan: HD via permacath , R AVF not mature  next HD 5/9 Wed  Dr Chadwick (primary doctor).      Problem/Plan - 5:  ·  Problem: Chronic diastolic heart failure.  Plan: EKG without ischemic changes  TTE: G2DD, pEF, moderate tricuspid regurgitation and moderate mitral stenosis  HTN - started on lopressor 25mg BID for BP control  Dr Jaimes.      Problem/Plan - 6:  Problem: Type 2 diabetes mellitus with stage 5 chronic kidney disease not on chronic dialysis, with long-term current use of insulin. Plan:   inc Lantus 28U HS (home dose 50u)  start humalog 5u BID (home dose humalog 20u BID)  moderate HiSS  FS AC/HS  HbA1C: 7.6%     Problem/Plan - 7:  ·  Problem: Need for prophylactic measure.  Plan: IMPROVE VTE Individual Risk Assessment          RISK                                                          Points  [  ] Previous VTE                                                3  [  ] Thrombophilia                                             2  [ x ] Lower limb paralysis                                   2        (unable to hold up >15 seconds)    [  ] Current Cancer                                             2         (within 6 months)  [ x ] Immobilization > 24 hrs                              1  [ x ] ICU/CCU stay > 24 hours                             1  [ x ] Age > 60                                                         1    IMPROVE VTE Score: 5  HSQ for DVT chemoppx  Protonix for GI ppx.      Follow up:   BP monitoring as lopressor is new med to pt  d/c haas and TOV   f/u BCx, UCx to final results - sensitivity pending  CT chest to r/o loculation.   ?possibility of permacath removal as this may be source of infection    Accepting Attending Physician: Dr Benites  Resident on call: Dr Malin    D/w ICU Attending Dr Kitchen and agrees

## 2018-05-09 NOTE — PROGRESS NOTE ADULT - SUBJECTIVE AND OBJECTIVE BOX
Meds:  piperacillin/tazobactam IVPB. 3.375 Gram(s) IV Intermittent every 12 hours    Allergies:  Allergies    No Known Allergies    Intolerances        ROS  [  ] UNABLE TO ELICIT    General:  [  ] None  [  ] Fever  [  ] Chills  [ x ] Malaise    Skin:  [ x ] None [  ] Rash  [  ] Wound  [  ] Ulcer    HEENT:  [ x ] None  [  ] Sore Throat  [  ] Nasal congestion/ runny nose  [  ] Photophobia [  ] Neck pain      Chest:  [ x ] None   [  ] SOB  [  ] Cough  [  ] None    Cardiovascular:   [ x ] None  [  ] CP  [  ] Palpitation    Gastrointestinal:  [ x ] None  [  ] Abd pain   [  ] Nausea    [  ] Vomiting   [  ] Diarrhea	     Genitourinary:  [ x ] None [  ] Polyuria   [  ] Urgency  [  ] Frequency  [  ] Dysuria    [  ]  Hematuria       Musculoskeletal:  [  ] None [  ] Back Pain	[  ] Body aches  [  ] Joint pain [ x ] Weakness    Neurological: [  ] None [  ]Dizziness  [  ]Visual Disturbance  [  ]Headaches   [ x ] Weakness          PHYSICAL EXAM:  Vital Signs Last 24 Hrs  T(C): 36.6 (09 May 2018 14:28), Max: 37 (08 May 2018 21:10)  T(F): 97.9 (09 May 2018 14:28), Max: 98.6 (08 May 2018 21:10)  HR: 102 (09 May 2018 14:28) (85 - 118)  BP: 158/81 (09 May 2018 14:28) (145/87 - 199/89)  BP(mean): 93 (09 May 2018 03:00) (78 - 120)  RR: 18 (09 May 2018 14:28) (15 - 30)  SpO2: 99% (09 May 2018 14:28) (97% - 100%)    Constitutional:    HEENT: [ x ] Wnl  [  ] Pharyngeal congestion [  ] Intubated.    Neck:  [ x ] Supple  [  ]Lymphadenopathy  [  ] No JVD   [  ] JVD  [  ] Masses   [  ] WNL    CHEST/Respiratory:  [  ]Clear to auscultation  [ x ] Rales   [  ] Rhonchi   [  ] Wheezing     [ x ] Right side dialysis catheter      Cardiovascular:  [ x ] Reg S1 S2   [  ] Irreg S1 S2   [ x ]No Murmur  [  ] +ve Murmurs  [  ]Systolic [  ]Diastolic      Abdomen:  [ x ] Soft  [ x ] No tendrerness  [  ] Tenderness  [  ] Organomegaly  [  ] ABD Distention  [  ] Rigidity                       [ x ] No Regidity                       [ x ] No Rebound Tenderness  [  ] No Guarding Rigidity  [  ] Rebound Tenderness[  ] Guarding Rigidity                          [ x ]  +ve Bowel Sounds  [  ] Decreased Bowel Sounds    [  ] Absent Bowel Sounds                            Extremities: [ x ] No edema [  ] Edema  [  ] Clubbing   [  ] Cyanosis                         [ x ] No Tender Calf muscles  [  ] Tender Calf muscles                        [ x ] Palpable peripheral pulses    Neurological: [ x ] Awake  [ x ] Alert  [ x ] Oriented  x  3                           [  ] Confused  [  ] Drowsy  [  ] respond to painful stimuli  [  ] Unresponsive    Skin:  [ x ] Intact [  ] Redness [  ] Thrombophlebitis  [  ] Rashes  [  ] Dry  [  ] Ulcers    Ortho:  [  ] Joint Swelling  [  ] Joint erythema [  ] Joint tenderness                [  ] Increased temp. to touch  [  ] DJD [ x ] WNL          LABS/DIAGNOSTIC TESTS                          10.4   11.8  )-----------( 187      ( 09 May 2018 09:16 )             34.7   05-09    138  |  103  |  59<H>  ----------------------------<  158<H>  4.2   |  28  |  3.58<H>    Ca    8.1<L>      09 May 2018 09:16  Phos  5.2     05-09  Mg     2.4     05-09          CULTURES:   Culture - Blood (05.08.18 @ 10:19)    Specimen Source: .Blood Blood-Peripheral    Culture Results:   No growth to date.      Culture - Urine (05.07.18 @ 10:24)    -  Ampicillin: R >8    -  Vancomycin: R >16    -  Nitrofurantoin: I 64    -  Tetra/Doxy: R >8    -  Ciprofloxacin: R >2    Specimen Source: .Urine Clean Catch (Midstream)    Culture Results:   >100,000 CFU/ml Enterococcus faecium (vancomycin resistant)  <10,000 CFU/ml Normal Urogenital rosie present    Organism Identification: Enterococcus faecium (vancomycin resistant)    Organism: Enterococcus faecium (vancomycin resistant)    Method Type: LON      Culture - Blood (05.07.18 @ 11:31)    Gram Stain:   Growth in aerobic bottle: Gram Negative Rods  Growth in anaerobic bottle: Gram Negative Rods    -  Enterobacter cloacae complex: Detec    Specimen Source: .Blood Blood-Peripheral    Organism: Blood Culture PCR    Culture Results:   Growth in aerobic bottle: Gram Negative Rods  Growth in anaerobic bottle: Gram Negative Rods  "Due to technical problems, Proteus sp. will Not be reported as part of  the BCID panel until further notice"  ***Blood Panel PCR results on this specimenare available  approximately 3 hours after the Gram stain result.***  Gram stain, PCR, and/or culture results may not always  correspond due to difference in methodologies.  ************************************************************  This PCR assaywas performed using [employer].  The following targets are tested for: Enterococcus,  vancomycin resistant enterococci, Listeria monocytogenes,  coagulase negative staphylococci, S. aureus,  methicillin resistant S. aureus, Streptococcus agalactiae  (Group B), S. pneumoniae, S. pyogenes (Group A),  Acinetobacter baumannii, Enterobacter cloacae, E. coli,  Klebsiella oxytoca, K. pneumoniae, Proteus sp.,  Serratia marcescens, Haemophilus influenzae,  Neisseria meningitidis, Pseudomonas aeruginosa, Candida  albicans, C. glabrata, C krusei, C parapsilosis,  C. tropicalis and the KPC resistance gene.    Organism Identification: Blood Culture PCR    Method Type: PCR    Culture - Blood (05.07.18 @ 11:28)    Gram Stain:   Growth in aerobic bottle: Gram Negative Rods  Growth in anaerobic bottle: Gram Negative Rods    Specimen Source: .Blood Blood-Peripheral    Culture Results:   Growth in aerobic bottle: Gram Negative Rods  Growth in anaerobic bottle: Gram Negative Rods    RADIOLOGY    EXAM:  XR CHEST PORTABLE ROUTINE 1V                            PROCEDURE DATE:  05/09/2018          INTERPRETATION:  Follow-up.    AP chest. Prior 5/8/2018.    Endotracheal tube  removed. Right dialysis catheter reidentified in situ.   No change heart mediastinum. Resolution airspace disease and effusions.   Grossly clear lungs at this time. No consolidation pneumothorax or   effusion.    Impression: As above              Assessment and Recommendation:   7F PMHx Asthma, Anemia (on weekly procrit), HF pEF, DM, ESRD on HD ( T,T,S, last one Saturday), HTN, on Home 02,  Breast CA x 2 (s/p L lumpectomy, radiation 2005) Uterine CA (s/p JACLYN)  c/o SOB. Pt that she started feeling SOB and went to [facility] but nobody saw her because SOB didn't improve pt decided to come to [facility]. As per patient she drinks lots of water. At ED code sepsis was called 2/2 fevers, tachycardia and leukocytosis. Pt has been complaining of a dry cough since Saturday.   Patient was intubated and was started on IV Zosyn.  Blood cultures grew Enterobacter cloacae complex and patient was started on IV gentamycin.  5/8/18 Patient was extubated and transferred to a regular floor.  Urine culture grew VRE.    Problem/Recommendation - 1:  Problem: Pneumonia.   Recommendation:   1- Contact precautions as urine CS grew VRE.  2- Follow repeat blood cultures, and Follow +VE Blood culture to final report.  3- Start and Continue Zyvox 600 mg Po q 12 hours.  4- Continue IV Zosyn.  5- Fluid and electrolytes management.  6- CBC and BMP follow up.   7- Nephrology management.    Problem/Recommendation - 2:  ·  Problem: Acute respiratory failure, unspecified whether with hypoxia or hypercapnia.    Recommendation:   1- As per problem # 1.  2- Respirator and ICU management.     Problem/Recommendation - 3:  ·  Problem: Asthma.    Recommendation:   1- Bronchodilators.  2- Respirator management, and O2 as needed.  3- Pulmonary evaluation and management.  4- Steroids as per primary, and pulmonary team if needed.     Problem/Recommendation - 4:  ·  Problem: Type 2 diabetes mellitus with stage 5 chronic kidney disease.    Recommendation:   1- Blood sugar monitoring and control.  2- Accu-Cheks with coverage.  3- 1800 yazmin ADA diet.  4- Follow HB A1C.     Problem/Recommendation - 5:  ·  Problem: Anemia.    Recommendation:   1- Anemia profile.  2- Iron supplements.  3- Closely monitor H & H.  4- Observe for bleeding.     Discussed with medical resident.

## 2018-05-09 NOTE — PROGRESS NOTE ADULT - SUBJECTIVE AND OBJECTIVE BOX
Problem List:  ESRD due to diabetes and hypertension  Started dialysis on 12.2017  Admitted for fever  Blood culture positive to Enterobacter.  Patient was placed on IV broad spectrum antibiotics.  Patient was extubated.    Repeat BC 05.08 so far negative      PAST MEDICAL & SURGICAL HISTORY:  Anemia  Hypertension  Congestive heart failure  Chronic kidney disease  Breast carcinoma: Lt side s/p lumpectomy and radiation in 2004  Renal mass  DM (diabetes mellitus)  Asthma  S/P lumpectomy, left breast: 2004      No Known Allergies      MEDICATIONS  (STANDING):  ALBUTerol/ipratropium for Nebulization 3 milliLiter(s) Nebulizer every 6 hours  buDESOnide  80 MICROgram(s)/formoterol 4.5 MICROgram(s) Inhaler 2 Puff(s) Inhalation two times a day  chlorhexidine 4% Liquid 1 Application(s) Topical two times a day  epoetin randy Injectable 4000 Unit(s) IV Push <User Schedule>  heparin  Injectable 5000 Unit(s) SubCutaneous every 12 hours  insulin glargine Injectable (LANTUS) 28 Unit(s) SubCutaneous at bedtime  insulin lispro (HumaLOG) corrective regimen sliding scale   SubCutaneous every 6 hours  insulin lispro Injectable (HumaLOG) 5 Unit(s) SubCutaneous three times a day before meals  metoprolol tartrate 25 milliGRAM(s) Oral two times a day  pantoprazole    Tablet 40 milliGRAM(s) Oral before breakfast  piperacillin/tazobactam IVPB. 3.375 Gram(s) IV Intermittent every 12 hours  predniSONE   Tablet 40 milliGRAM(s) Oral daily  timolol 0.5% Solution 1 Drop(s) Both EYES two times a day    MEDICATIONS  (PRN):  acetaminophen   Tablet. 650 milliGRAM(s) Oral every 6 hours PRN Moderate Pain (4 - 6)                            10.4   11.8  )-----------( 187      ( 09 May 2018 09:16 )             34.7     05-09    138  |  103  |  59<H>  ----------------------------<  158<H>  4.2   |  28  |  3.58<H>    Ca    8.1<L>      09 May 2018 09:16  Phos  5.2     05-09  Mg     2.4     05-09              REVIEW OF SYSTEMS:  General: no fever no chills, no weight loss.  EYES/ENT: No visual changes;  No vertigo, no headache.  NECK: No pain or stiffness  RESPIRATORY: No cough, wheezing, hemoptysis; No shortness of breath  CARDIOVASCULAR: No chest pain or palpitations. No Edema  GASTROINTESTINAL: No abdominal or epigastric pain. No nausea, vomiting. No diarrhea or constipation. No melena.  GENITOURINARY: No dysuria, frequency, foamy urine, urinary urgency, incontinence or hematuria  NEUROLOGICAL: No numbness or weakness, no tremor , no dizziness.   Muscle skeletal : no joint pain and no swelling of joints and limbs.  SKIN: No itching, burning, rashes.        VITALS:  T(F): 98 (05-09-18 @ 12:10), Max: 98.6 (05-08-18 @ 21:10)  HR: 98 (05-09-18 @ 12:10)  BP: 164/78 (05-09-18 @ 12:10)  RR: 19 (05-09-18 @ 12:10)  SpO2: 98% (05-09-18 @ 12:10)  Wt(kg): --    05-08 @ 07:01  -  05-09 @ 07:00  --------------------------------------------------------  IN: 25 mL / OUT: 370 mL / NET: -345 mL        PHYSICAL EXAM:  Constitutional: well developed, no diaphoresis, no distress.  Neck: No JVD, no carotid bruit, supple, no adenopathy  Respiratory: Good air entrance B/L, no wheezes, rales or rhonchi  Cardiovascular: S1, S2, RRR, no pericardial rub, no murmur  Abdomen: BS+, soft, no tenderness, no bruit  Pelvis: bladder nondistended  Extremities: No cyanosis or clubbing. No peripheral edema.     Vascular Access: right PC tunnel with no erythema.

## 2018-05-09 NOTE — PROGRESS NOTE ADULT - SUBJECTIVE AND OBJECTIVE BOX
INTERVAL HPI/OVERNIGHT EVENTS:    VITAL SIGNS:  T(F): 98.3 (05-09-18 @ 09:00)  HR: 85 (05-09-18 @ 09:00)  BP: 145/87 (05-09-18 @ 09:00)  RR: 19 (05-09-18 @ 09:00)  SpO2: 99% (05-09-18 @ 09:00)      PHYSICAL EXAM:    Constitutional:  Eyes:  ENMT:  Neck:  Respiratory:  Cardiovascular:  Gastrointestinal:  Extremities:  Vascular:  Neurological:  Musculoskeletal:    MEDICATIONS  (STANDING):  ALBUTerol/ipratropium for Nebulization 3 milliLiter(s) Nebulizer every 6 hours  buDESOnide  80 MICROgram(s)/formoterol 4.5 MICROgram(s) Inhaler 2 Puff(s) Inhalation two times a day  epoetin randy Injectable 4000 Unit(s) IV Push <User Schedule>  heparin  Injectable 5000 Unit(s) SubCutaneous every 12 hours  insulin glargine Injectable (LANTUS) 28 Unit(s) SubCutaneous at bedtime  insulin lispro (HumaLOG) corrective regimen sliding scale   SubCutaneous every 6 hours  insulin lispro Injectable (HumaLOG) 5 Unit(s) SubCutaneous three times a day before meals  metoprolol tartrate 25 milliGRAM(s) Oral two times a day  pantoprazole    Tablet 40 milliGRAM(s) Oral before breakfast  piperacillin/tazobactam IVPB. 3.375 Gram(s) IV Intermittent every 12 hours  predniSONE   Tablet 40 milliGRAM(s) Oral daily  timolol 0.5% Solution 1 Drop(s) Both EYES two times a day    MEDICATIONS  (PRN):  acetaminophen   Tablet. 650 milliGRAM(s) Oral every 6 hours PRN Moderate Pain (4 - 6)      Allergies    No Known Allergies    Intolerances        LABS:                        10.4   11.8  )-----------( 187      ( 09 May 2018 09:16 )             34.7     05-09    138  |  103  |  59<H>  ----------------------------<  158<H>  4.2   |  28  |  3.58<H>    Ca    8.1<L>      09 May 2018 09:16  Phos  5.2     05-09  Mg     2.4     05-09            RADIOLOGY & ADDITIONAL TESTS: INTERVAL HPI/OVERNIGHT EVENTS:    VITAL SIGNS:  T(F): 98.3 (05-09-18 @ 09:00)  HR: 85 (05-09-18 @ 09:00)  BP: 145/87 (05-09-18 @ 09:00)  RR: 19 (05-09-18 @ 09:00)  SpO2: 99% (05-09-18 @ 09:00)      PHYSICAL EXAM:    GENERAL: [x ]NAD, [x ]well-groomed, [ x]well-developed  HEAD:  [x ]Atraumatic, [ ]Normocephalic  EYES: [ x]EOMI, [x ]PERRLA, [x ]conjunctiva and sclera clear  ENMT: [ ]No tonsillar erythema, exudates, or enlargement; [x ]Moist mucous membranes, [ ]Good dentition, [ ]No lesions  NECK: [x ]Supple, normal appearance, [ ]No JVD; [ ]Normal thyroid; [ x]Trachea midline  NERVOUS SYSTEM:  [x ]Alert & Oriented X3, [ ]Good concentration; [ ]Motor Strength 5/5 B/L upper and lower extremities; [ ]DTRs 2+ intact and symmetric  CHEST/LUNG: [ ]No chest deformity; [ ]Normal percussion bilaterally; [ x]No rales, rhonchi, wheezing   HEART: [ x]Regular rate and rhythm; [x ]No murmurs, rubs, or gallops  ABDOMEN: [x ]Soft, Nontender, Nondistended; [ ]Bowel sounds present  EXTREMITIES:  [ ]2+ Peripheral Pulses, [x ]No clubbing, cyanosis, or edema  LYMPH: [ ]No lymphadenopathy noted  SKIN: [ ]No rashes or lesions; [ ]Good capillary refill    MEDICATIONS  (STANDING):  ALBUTerol/ipratropium for Nebulization 3 milliLiter(s) Nebulizer every 6 hours  buDESOnide  80 MICROgram(s)/formoterol 4.5 MICROgram(s) Inhaler 2 Puff(s) Inhalation two times a day  epoetin randy Injectable 4000 Unit(s) IV Push <User Schedule>  heparin  Injectable 5000 Unit(s) SubCutaneous every 12 hours  insulin glargine Injectable (LANTUS) 28 Unit(s) SubCutaneous at bedtime  insulin lispro (HumaLOG) corrective regimen sliding scale   SubCutaneous every 6 hours  insulin lispro Injectable (HumaLOG) 5 Unit(s) SubCutaneous three times a day before meals  metoprolol tartrate 25 milliGRAM(s) Oral two times a day  pantoprazole    Tablet 40 milliGRAM(s) Oral before breakfast  piperacillin/tazobactam IVPB. 3.375 Gram(s) IV Intermittent every 12 hours  predniSONE   Tablet 40 milliGRAM(s) Oral daily  timolol 0.5% Solution 1 Drop(s) Both EYES two times a day    MEDICATIONS  (PRN):  acetaminophen   Tablet. 650 milliGRAM(s) Oral every 6 hours PRN Moderate Pain (4 - 6)      Allergies    No Known Allergies    Intolerances        LABS:                        10.4   11.8  )-----------( 187      ( 09 May 2018 09:16 )             34.7     05-09    138  |  103  |  59<H>  ----------------------------<  158<H>  4.2   |  28  |  3.58<H>    Ca    8.1<L>      09 May 2018 09:16  Phos  5.2     05-09  Mg     2.4     05-09            RADIOLOGY & ADDITIONAL TESTS:

## 2018-05-09 NOTE — DIETITIAN INITIAL EVALUATION ADULT. - NUTRITION INTERVENTION
Medical Food Supplements/Vitamin/Meals and Snack/Nutrition Education/Collaboration and Referral of Nutrition Care

## 2018-05-09 NOTE — PROVIDER CONTACT NOTE (CRITICAL VALUE NOTIFICATION) - ASSESSMENT
Positive urine culture > then [id number] cfu entropcocus fasium, vanco restitent, less thn [id number] cfunormal urogeniutal rosie present.

## 2018-05-09 NOTE — DIETITIAN INITIAL EVALUATION ADULT. - MD RECOMMEND
po supplement/change diet to Soft, Renal Replacement, consistent carbohydrate w/evening snack, as medically feasible

## 2018-05-09 NOTE — DIETITIAN INITIAL EVALUATION ADULT. - FACTORS AFF FOOD INTAKE
pain/other (specify)/difficulty with food procurement/preparation/weakness, SOB/coughing, diabetes, COPD, ESRD on HD

## 2018-05-09 NOTE — PROGRESS NOTE ADULT - SUBJECTIVE AND OBJECTIVE BOX
Patient is a 78y old  Female who presents with a chief complaint of SOB, fever (07 May 2018 01:15)     seen in icu [ x ], reg med floor [   ], bed [x  ], chair at bedside [   ], awake and responsive [x ], sedated [  ],  nad [ x ]      Allergies    No Known Allergies        Vitals    T(F): 98.6 (05-09-18 @ 04:18), Max: 98.9 (05-08-18 @ 11:00)  HR: 88 (05-09-18 @ 04:18) (86 - 118)  BP: 179/87 (05-09-18 @ 04:18) (150/56 - 199/89)  RR: 20 (05-09-18 @ 04:18) (15 - 31)  SpO2: 100% (05-09-18 @ 04:18) (95% - 100%)  Wt(kg): --  CAPILLARY BLOOD GLUCOSE      POCT Blood Glucose.: 200 mg/dL (09 May 2018 07:43)      Labs                          10.4   9.9   )-----------( 156      ( 08 May 2018 06:16 )             34.7       05-08    135  |  101  |  28<H>  ----------------------------<  225<H>  4.2   |  24  |  2.57<H>    Ca    8.2<L>      08 May 2018 06:16  Phos  3.6     05-08  Mg     1.8     05-08              .Blood Blood-Peripheral  05-07 @ 11:31   Growth in aerobic and anaerobic bottles: Enterobacter cloacae/asburiae  "Due to technical problems, Proteus sp. will Not be reported as part of  the BCID panel until further notice"  ***Blood Panel PCR results on this specimen are available  approximately 3 hours after the Gram stain result.***  Gram stain, PCR, and/or culture results may not always  correspond due to difference in methodologies.  ************************************************************  This PCR assay was performed using [employer]..  The following targets are tested for: Enterococcus,  vancomycin resistant enterococci, Listeria monocytogenes,  coagulase negative staphylococci, S. aureus,  methicillin resistant S. aureus, Streptococcus agalactiae  (Group B), S. pneumoniae, S. pyogenes (Group A),  Acinetobacter baumannii, Enterobacter cloacae, E. coli,  Klebsiella oxytoca, K. pneumoniae, Proteus sp.,  Serratia marcescens, Haemophilus influenzae,  Neisseria meningitidis, Pseudomonas aeruginosa, Candida  albicans, C. glabrata, C krusei, C parapsilosis,  C. tropicalis and the KPC resistance gene.  --  Blood Culture PCR      .Blood Blood-Peripheral  05-07 @ 11:28   Growth in aerobic and anaerobic bottles: Enterobacter cloacae/asburiae  See previous culture [id number]  --    Growth in aerobic bottle: Gram Negative Rods  Growth in anaerobic bottle: Gram Negative Rods      .Urine Clean Catch (Midstream)  05-07 @ 10:24   >100,000 CFU/ml Enterococcus faecium  <10,000 CFU/ml Normal Urogenital rosie present  --  --      < from: Transthoracic Echocardiogram (05.08.18 @ 07:08) >  CONCLUSIONS:  1.  Peak mitral valve gradient equals 16 mm Hg, mean  transmitral valve gradient equals 7.5 mm Hg, consistent  with moderate mitral stenosis.  2. Moderate left atrial enlargement.  3. Moderate concentric left ventricular hypertrophy.  4. Normal Left Ventricular Systolic Function,  (EF = 55 to  60%)  5. Grade II diastolic dysfunction.  6. RV systolic pressure is moderately increased at  49 mm  Hg.  7. There is mild-moderate tricuspid regurgitation.    < end of copied text >          Radiology Results    < from: Xray Chest 1 View- PORTABLE-Routine (05.08.18 @ 07:32) >  IMPRESSION:  Study is limited by portable technique and patient rotation.    The 2 ends of the ET tube are seen, one in projects over the right   mainstem bronchus and it is unclear which end is outside of the patient;   please correlate clinically and reposition the ET tube as indicated.   Right-sided dual-lumen catheter and enteric tube remain in place. No   pneumothorax.    Diffuse airspace disease, worse in the right lung. Small bilateral   pleural effusions.    The cardiac silhouette is not accurately assessed by AP technique. Aortic   calcifications.    IMPRESSION:  The 2 ends of the ET tube are seen, one in projects over the right   mainstem bronchus and it is unclear which end is outside of the patient;   please correlate clinically and reposition the ET tube as indicated.      < end of copied text >        Meds    MEDICATIONS  (STANDING):  ALBUTerol/ipratropium for Nebulization 3 milliLiter(s) Nebulizer every 6 hours  buDESOnide  80 MICROgram(s)/formoterol 4.5 MICROgram(s) Inhaler 2 Puff(s) Inhalation two times a day  epoetin randy Injectable 4000 Unit(s) IV Push <User Schedule>  heparin  Injectable 5000 Unit(s) SubCutaneous every 12 hours  insulin glargine Injectable (LANTUS) 28 Unit(s) SubCutaneous at bedtime  insulin lispro (HumaLOG) corrective regimen sliding scale   SubCutaneous every 6 hours  insulin lispro Injectable (HumaLOG) 5 Unit(s) SubCutaneous three times a day before meals  metoprolol tartrate 25 milliGRAM(s) Oral two times a day  pantoprazole    Tablet 40 milliGRAM(s) Oral before breakfast  piperacillin/tazobactam IVPB. 3.375 Gram(s) IV Intermittent every 12 hours  predniSONE   Tablet 40 milliGRAM(s) Oral daily  timolol 0.5% Solution 1 Drop(s) Both EYES two times a day  tiotropium 18 MICROgram(s) Capsule 1 Capsule(s) Inhalation daily      MEDICATIONS  (PRN):  acetaminophen   Tablet. 650 milliGRAM(s) Oral every 6 hours PRN Moderate Pain (4 - 6)  ALBUTerol    90 MICROgram(s) HFA Inhaler 2 Puff(s) Inhalation every 6 hours PRN Shortness of Breath and/or Wheezing      Physical Exam    Neuro :  no focal deficits  Respiratory:  B/L rales  CV: RRR, S1S2, no murmurs,   Abdominal: Soft, NT, ND +BS,  Extremities: No edema, + peripheral pulses    ASSESSMENT    sepsis poss 2nd pna vs perm cath infxn  E cloacae bacteremia  Hypervolemia  Anemia  uti  abnormal trop  h/o Hypertension  Diastolic CHF  pulm htn  mitral stenosis  Chronic kidney disease  No pertinent past medical history  Breast carcinoma  Renal mass  DM (diabetes mellitus)  Asthma  S/P lumpectomy, left breast  No significant past surgical history      PLAN    cont zosyn  id f/u   CXR Diffuse airspace disease, worse in the right lung. Small bilateral pleural effusions.  f/u CT chest when possible to rule out loculated pl. effusion.  cont gent  blood cx with enterobacter cloacae noted above  ucx with >100,000 CFU/ml Enterococcus faecium and <10,000 CFU/ml Normal Urogenital rosie present  f/u rept blood cx  pulm f/u  s/p extubation 5/8/18  renal f/u  cont hd as per renal  vascular cons noted  no need for vascular intervention at this time  ce x 1 noted above  cardio f/u   echo with  moderate mitral stenosis, EF = 55 to 60%. Grade II diastolic dysfunction. mod pulm htn  pulm cons  cont current meds

## 2018-05-09 NOTE — PROVIDER CONTACT NOTE (CRITICAL VALUE NOTIFICATION) - BACKGROUND
Positive urine culture > then [id number] cfu entropcocus fasium, vanco restitent, less thn [id number] cfunormal urogeniutal rosie present.

## 2018-05-09 NOTE — PROVIDER CONTACT NOTE (CRITICAL VALUE NOTIFICATION) - SITUATION
Positive urine culture > then [id number] cfu entropcocus fasium, vanco restitent, less thn [id number] cfunormal urogeniutal rosie present.

## 2018-05-09 NOTE — PROVIDER CONTACT NOTE (CRITICAL VALUE NOTIFICATION) - ACTION/TREATMENT ORDERED:
Positive urine culture > then [id number] cfu entropcocus fasium, vanco restitent, less thn [id number] cfunormal urogeniutal rosie present.

## 2018-05-09 NOTE — PROVIDER CONTACT NOTE (CRITICAL VALUE NOTIFICATION) - TEST AND RESULT REPORTED:
Positive urine culture > then [id number] cfu entropcocus fasium, vanco restitent, less thn [id number] cfunormal urogeniutal rosie present.

## 2018-05-09 NOTE — DIETITIAN INITIAL EVALUATION ADULT. - PERTINENT LABORATORY DATA
05-09 Na138 mmol/L Glu 158 mg/dL<H> K+ 4.2 mmol/L Cr  3.58 mg/dL<H> BUN 59 mg/dL<H> 05-09 Phos 5.2 mg/dL<H> 05-06 Alb 3.4 g/dL<L> 05-07 [id number] 7.6 %<H>

## 2018-05-10 LAB
ANION GAP SERPL CALC-SCNC: 7 MMOL/L — SIGNIFICANT CHANGE UP (ref 5–17)
BASOPHILS # BLD AUTO: 0.1 K/UL — SIGNIFICANT CHANGE UP (ref 0–0.2)
BASOPHILS NFR BLD AUTO: 0.9 % — SIGNIFICANT CHANGE UP (ref 0–2)
BUN SERPL-MCNC: 45 MG/DL — HIGH (ref 7–18)
CALCIUM SERPL-MCNC: 8 MG/DL — LOW (ref 8.4–10.5)
CHLORIDE SERPL-SCNC: 102 MMOL/L — SIGNIFICANT CHANGE UP (ref 96–108)
CO2 SERPL-SCNC: 28 MMOL/L — SIGNIFICANT CHANGE UP (ref 22–31)
CREAT SERPL-MCNC: 2.94 MG/DL — HIGH (ref 0.5–1.3)
EOSINOPHIL # BLD AUTO: 0 K/UL — SIGNIFICANT CHANGE UP (ref 0–0.5)
EOSINOPHIL NFR BLD AUTO: 0.3 % — SIGNIFICANT CHANGE UP (ref 0–6)
GLUCOSE BLDC GLUCOMTR-MCNC: 126 MG/DL — HIGH (ref 70–99)
GLUCOSE BLDC GLUCOMTR-MCNC: 164 MG/DL — HIGH (ref 70–99)
GLUCOSE BLDC GLUCOMTR-MCNC: 264 MG/DL — HIGH (ref 70–99)
GLUCOSE BLDC GLUCOMTR-MCNC: 268 MG/DL — HIGH (ref 70–99)
GLUCOSE BLDC GLUCOMTR-MCNC: 279 MG/DL — HIGH (ref 70–99)
GLUCOSE SERPL-MCNC: 118 MG/DL — HIGH (ref 70–99)
HCT VFR BLD CALC: 34.6 % — SIGNIFICANT CHANGE UP (ref 34.5–45)
HGB BLD-MCNC: 10.6 G/DL — LOW (ref 11.5–15.5)
LYMPHOCYTES # BLD AUTO: 1.6 K/UL — SIGNIFICANT CHANGE UP (ref 1–3.3)
LYMPHOCYTES # BLD AUTO: 19.1 % — SIGNIFICANT CHANGE UP (ref 13–44)
MAGNESIUM SERPL-MCNC: 2.2 MG/DL — SIGNIFICANT CHANGE UP (ref 1.6–2.6)
MCHC RBC-ENTMCNC: 27.8 PG — SIGNIFICANT CHANGE UP (ref 27–34)
MCHC RBC-ENTMCNC: 30.5 GM/DL — LOW (ref 32–36)
MCV RBC AUTO: 91.2 FL — SIGNIFICANT CHANGE UP (ref 80–100)
MONOCYTES # BLD AUTO: 0.6 K/UL — SIGNIFICANT CHANGE UP (ref 0–0.9)
MONOCYTES NFR BLD AUTO: 7.3 % — SIGNIFICANT CHANGE UP (ref 2–14)
NEUTROPHILS # BLD AUTO: 6.2 K/UL — SIGNIFICANT CHANGE UP (ref 1.8–7.4)
NEUTROPHILS NFR BLD AUTO: 72.4 % — SIGNIFICANT CHANGE UP (ref 43–77)
PHOSPHATE SERPL-MCNC: 4.3 MG/DL — SIGNIFICANT CHANGE UP (ref 2.5–4.5)
PLATELET # BLD AUTO: 162 K/UL — SIGNIFICANT CHANGE UP (ref 150–400)
POTASSIUM SERPL-MCNC: 4.4 MMOL/L — SIGNIFICANT CHANGE UP (ref 3.5–5.3)
POTASSIUM SERPL-SCNC: 4.4 MMOL/L — SIGNIFICANT CHANGE UP (ref 3.5–5.3)
RBC # BLD: 3.8 M/UL — SIGNIFICANT CHANGE UP (ref 3.8–5.2)
RBC # FLD: 15.4 % — HIGH (ref 10.3–14.5)
SODIUM SERPL-SCNC: 137 MMOL/L — SIGNIFICANT CHANGE UP (ref 135–145)
WBC # BLD: 8.6 K/UL — SIGNIFICANT CHANGE UP (ref 3.8–10.5)
WBC # FLD AUTO: 8.6 K/UL — SIGNIFICANT CHANGE UP (ref 3.8–10.5)

## 2018-05-10 RX ORDER — METOPROLOL TARTRATE 50 MG
50 TABLET ORAL
Qty: 0 | Refills: 0 | Status: DISCONTINUED | OUTPATIENT
Start: 2018-05-10 | End: 2018-05-14

## 2018-05-10 RX ADMIN — CHLORHEXIDINE GLUCONATE 1 APPLICATION(S): 213 SOLUTION TOPICAL at 17:24

## 2018-05-10 RX ADMIN — Medication 3 MILLILITER(S): at 02:08

## 2018-05-10 RX ADMIN — Medication 50 MILLIGRAM(S): at 17:35

## 2018-05-10 RX ADMIN — Medication 1 DROP(S): at 17:25

## 2018-05-10 RX ADMIN — BUDESONIDE AND FORMOTEROL FUMARATE DIHYDRATE 2 PUFF(S): 160; 4.5 AEROSOL RESPIRATORY (INHALATION) at 12:30

## 2018-05-10 RX ADMIN — Medication 3 MILLILITER(S): at 20:28

## 2018-05-10 RX ADMIN — Medication 3 MILLIGRAM(S): at 21:27

## 2018-05-10 RX ADMIN — Medication 6: at 17:33

## 2018-05-10 RX ADMIN — PANTOPRAZOLE SODIUM 40 MILLIGRAM(S): 20 TABLET, DELAYED RELEASE ORAL at 06:03

## 2018-05-10 RX ADMIN — PIPERACILLIN AND TAZOBACTAM 25 GRAM(S): 4; .5 INJECTION, POWDER, LYOPHILIZED, FOR SOLUTION INTRAVENOUS at 06:05

## 2018-05-10 RX ADMIN — Medication 25 MILLIGRAM(S): at 06:03

## 2018-05-10 RX ADMIN — Medication 1 DROP(S): at 07:01

## 2018-05-10 RX ADMIN — Medication 6: at 12:29

## 2018-05-10 RX ADMIN — LINEZOLID 600 MILLIGRAM(S): 600 INJECTION, SOLUTION INTRAVENOUS at 06:03

## 2018-05-10 RX ADMIN — PIPERACILLIN AND TAZOBACTAM 25 GRAM(S): 4; .5 INJECTION, POWDER, LYOPHILIZED, FOR SOLUTION INTRAVENOUS at 17:35

## 2018-05-10 RX ADMIN — Medication 5 UNIT(S): at 17:34

## 2018-05-10 RX ADMIN — Medication 3 MILLILITER(S): at 08:03

## 2018-05-10 RX ADMIN — LINEZOLID 600 MILLIGRAM(S): 600 INJECTION, SOLUTION INTRAVENOUS at 17:35

## 2018-05-10 RX ADMIN — BUDESONIDE AND FORMOTEROL FUMARATE DIHYDRATE 2 PUFF(S): 160; 4.5 AEROSOL RESPIRATORY (INHALATION) at 21:27

## 2018-05-10 RX ADMIN — Medication 40 MILLIGRAM(S): at 06:03

## 2018-05-10 RX ADMIN — INSULIN GLARGINE 28 UNIT(S): 100 INJECTION, SOLUTION SUBCUTANEOUS at 21:27

## 2018-05-10 RX ADMIN — HEPARIN SODIUM 5000 UNIT(S): 5000 INJECTION INTRAVENOUS; SUBCUTANEOUS at 06:03

## 2018-05-10 RX ADMIN — Medication 5 UNIT(S): at 07:58

## 2018-05-10 RX ADMIN — Medication 2: at 07:57

## 2018-05-10 RX ADMIN — CHLORHEXIDINE GLUCONATE 1 APPLICATION(S): 213 SOLUTION TOPICAL at 06:12

## 2018-05-10 RX ADMIN — Medication 5 UNIT(S): at 12:30

## 2018-05-10 RX ADMIN — HEPARIN SODIUM 5000 UNIT(S): 5000 INJECTION INTRAVENOUS; SUBCUTANEOUS at 17:24

## 2018-05-10 NOTE — SWALLOW BEDSIDE ASSESSMENT ADULT - SWALLOW EVAL: RECOMMENDED FEEDING/EATING TECHNIQUES
maintain upright posture during/after eating for 30 mins/position upright (90 degrees)/no straws/small sips/bites/oral hygiene/allow for swallow between intakes/alternate food with liquid

## 2018-05-10 NOTE — PROGRESS NOTE ADULT - SUBJECTIVE AND OBJECTIVE BOX
Patient is a 78y old  Female who presents with a chief complaint of SOB, fever (07 May 2018 01:15)  Awake, alert, comfortable in bed in NAD. Feeling better.    INTERVAL HPI/OVERNIGHT EVENTS:      VITAL SIGNS:  T(F): 97.8 (05-10-18 @ 05:10)  HR: 70 (05-10-18 @ 05:10)  BP: 114/72 (05-10-18 @ 05:10)  RR: 16 (05-10-18 @ 05:10)  SpO2: 98% (05-10-18 @ 05:10)  Wt(kg): --  I&O's Detail          REVIEW OF SYSTEMS:    CONSTITUTIONAL:  No fevers, chills, sweats    HEENT:  Eyes:  No diplopia or blurred vision. ENT:  No earache, sore throat or runny nose.    CARDIOVASCULAR:  No pressure, squeezing, tightness, or heaviness about the chest; no palpitations.    RESPIRATORY:  Per HPI    GASTROINTESTINAL:  No abdominal pain, nausea, vomiting or diarrhea.    GENITOURINARY:  No dysuria, frequency or urgency.    NEUROLOGIC:  No paresthesias, fasciculations, seizures or weakness.    PSYCHIATRIC:  No disorder of thought or mood.      PHYSICAL EXAM:    Constitutional: Well developed and nourished  Eyes:Perrla  ENMT: normal  Neck:supple  Respiratory: ronchi posteriorly  Cardiovascular: S1 S2 regular  Gastrointestinal: Soft, Non tender  Extremities: No edema  Vascular:normal  Neurological:Awake, alert,Ox3  Musculoskeletal:Normal      MEDICATIONS  (STANDING):  ALBUTerol/ipratropium for Nebulization 3 milliLiter(s) Nebulizer every 6 hours  buDESOnide  80 MICROgram(s)/formoterol 4.5 MICROgram(s) Inhaler 2 Puff(s) Inhalation two times a day  chlorhexidine 4% Liquid 1 Application(s) Topical two times a day  epoetin randy Injectable 4000 Unit(s) IV Push <User Schedule>  heparin  Injectable 5000 Unit(s) SubCutaneous every 12 hours  insulin glargine Injectable (LANTUS) 28 Unit(s) SubCutaneous at bedtime  insulin lispro (HumaLOG) corrective regimen sliding scale   SubCutaneous Before meals and at bedtime  insulin lispro Injectable (HumaLOG) 5 Unit(s) SubCutaneous three times a day before meals  linezolid    Tablet 600 milliGRAM(s) Oral every 12 hours  linezolid    Tablet      metoprolol tartrate 50 milliGRAM(s) Oral two times a day  pantoprazole    Tablet 40 milliGRAM(s) Oral before breakfast  piperacillin/tazobactam IVPB. 3.375 Gram(s) IV Intermittent every 12 hours  predniSONE   Tablet 40 milliGRAM(s) Oral daily  timolol 0.5% Solution 1 Drop(s) Both EYES two times a day    MEDICATIONS  (PRN):  acetaminophen   Tablet. 650 milliGRAM(s) Oral every 6 hours PRN Moderate Pain (4 - 6)  melatonin 3 milliGRAM(s) Oral at bedtime PRN Insomnia      Allergies    No Known Allergies    Intolerances        LABS:                        10.6   8.6   )-----------( 162      ( 10 May 2018 07:17 )             34.6     05-10    137  |  102  |  45<H>  ----------------------------<  118<H>  4.4   |  28  |  2.94<H>    Ca    8.0<L>      10 May 2018 07:17  Phos  4.3     05-10  Mg     2.2     05-10                CAPILLARY BLOOD GLUCOSE      POCT Blood Glucose.: 164 mg/dL (10 May 2018 07:53)  POCT Blood Glucose.: 92 mg/dL (09 May 2018 21:10)  POCT Blood Glucose.: 311 mg/dL (09 May 2018 16:38)    pro-bnp [id number] 05-06 @ 22:50     d-dimer --  05-06 @ 22:50      RADIOLOGY & ADDITIONAL TESTS:    CXR:    Ct scan chest:    ekg;    echo:

## 2018-05-10 NOTE — SWALLOW BEDSIDE ASSESSMENT ADULT - SLP PERTINENT HISTORY OF CURRENT PROBLEM
78 F PMHx Asthma, Anemia, HFpEF, DM, ESRD on HD, HTN, on Home 02, Breast CA x 2 (s/p L lumpectomy, radiation 2005) Uterine CA c/o SOB. On 05/07 Patient underwent dialysis for pulmonary edema. Patient then complained of chills & shaking. Patient became tachypneic, restless, wheezing, duoneb given but patient remained tachypneic with abdominal breathing. RRT was called and pt was intubated for respiratory distress likely 2/2 sepsis and pulmonary edema. Pt was extubated on 5/8. 78 F PMHx Asthma, Anemia, HFpEF, DM, ESRD on HD, HTN, on Home 02, Breast CA x 2 (s/p L lumpectomy, radiation 2005) & Uterine CA. On 05/07 Patient underwent dialysis for pulmonary edema. Patient then complained of chills & shaking. Patient became tachypneic, restless, wheezing, duoneb given but patient remained tachypneic with abdominal breathing. RRT was called and pt was intubated for respiratory distress on 5/7 likely 2/2 sepsis and pulmonary edema. Pt was extubated on 5/8.

## 2018-05-10 NOTE — PROGRESS NOTE ADULT - SUBJECTIVE AND OBJECTIVE BOX
Problem List:  ESRD due to diabetes and hypertension  Started dialysis on 12.2017  Admitted for fever  Blood culture positive to Enterobacter.  Patient was placed on IV broad spectrum antibiotics.  Patient was extubated yesterday.          PAST MEDICAL & SURGICAL HISTORY:  Anemia  Hypertension  Congestive heart failure  Chronic kidney disease  Breast carcinoma: Lt side s/p lumpectomy and radiation in 2004  Renal mass  DM (diabetes mellitus)  Asthma  S/P lumpectomy, left breast: 2004      No Known Allergies      MEDICATIONS  (STANDING):  ALBUTerol/ipratropium for Nebulization 3 milliLiter(s) Nebulizer every 6 hours  buDESOnide  80 MICROgram(s)/formoterol 4.5 MICROgram(s) Inhaler 2 Puff(s) Inhalation two times a day  chlorhexidine 4% Liquid 1 Application(s) Topical two times a day  epoetin randy Injectable 4000 Unit(s) IV Push <User Schedule>  heparin  Injectable 5000 Unit(s) SubCutaneous every 12 hours  insulin glargine Injectable (LANTUS) 28 Unit(s) SubCutaneous at bedtime  insulin lispro (HumaLOG) corrective regimen sliding scale   SubCutaneous Before meals and at bedtime  insulin lispro Injectable (HumaLOG) 5 Unit(s) SubCutaneous three times a day before meals  linezolid    Tablet 600 milliGRAM(s) Oral every 12 hours  linezolid    Tablet      metoprolol tartrate 25 milliGRAM(s) Oral two times a day  pantoprazole    Tablet 40 milliGRAM(s) Oral before breakfast  piperacillin/tazobactam IVPB. 3.375 Gram(s) IV Intermittent every 12 hours  predniSONE   Tablet 40 milliGRAM(s) Oral daily  timolol 0.5% Solution 1 Drop(s) Both EYES two times a day    MEDICATIONS  (PRN):  acetaminophen   Tablet. 650 milliGRAM(s) Oral every 6 hours PRN Moderate Pain (4 - 6)  melatonin 3 milliGRAM(s) Oral at bedtime PRN Insomnia                            10.4   11.8  )-----------( 187      ( 09 May 2018 09:16 )             34.7     05-10    137  |  102  |  45<H>  ----------------------------<  118<H>  4.4   |  28  |  2.94<H>    Ca    8.0<L>      10 May 2018 07:17  Phos  4.3     05-10  Mg     2.2     05-10              REVIEW OF SYSTEMS:  General: no fever no chills, no weight loss.  C/O dizziness sitting in a recliner.  RESPIRATORY: dry cough and wheezing.  CARDIOVASCULAR: No chest pain or palpitations. No Edema  GASTROINTESTINAL: No abdominal or epigastric pain. No nausea, vomiting. No diarrhea or constipation. No melena.  GENITOURINARY: No dysuria, frequency, foamy urine, urinary urgency, incontinence or hematuria          VITALS:  T(F): 97.8 (05-10-18 @ 05:10), Max: 98.4 (05-09-18 @ 20:43)  HR: 70 (05-10-18 @ 05:10)  BP: 114/72 (05-10-18 @ 05:10)  RR: 24 (05-10-18 @ 05:10)  SpO2: 98% (05-10-18 @ 05:10)  Wt(kg): --      PHYSICAL EXAM:  Constitutional: well developed, no diaphoresis, no distress.  Neck: No JVD, no carotid bruit, supple, no adenopathy  Respiratory: reduced air entrance and wheezing  Prolonged expiratory phase.    Cardiovascular: S1, S2, RRR, no pericardial rub, no murmur  Abdomen: BS+, soft, no tenderness, no bruit  Pelvis: bladder nondistended  Extremities: No cyanosis or clubbing. No peripheral edema.     Vascular Access: right PC

## 2018-05-10 NOTE — PROGRESS NOTE ADULT - PROBLEM SELECTOR PLAN 2
cont antibiotics  ID follow up  not on pressors meds  BC reported Enterobacter cloacae  Urine culture showed Enterobacter faecium cont antibiotics  ID follow up  not on pressors meds  BC reported Enterobacter cloacae  Urine culture showed Enterococcus faecium

## 2018-05-10 NOTE — PROGRESS NOTE ADULT - SUBJECTIVE AND OBJECTIVE BOX
Patient is a 78y old  Female who presents with a chief complaint of SOB, fever (07 May 2018 01:15)     seen in icu [  ], reg med floor [ x  ], bed [x  ], chair at bedside [   ], awake and responsive [x ], sedated [  ],  nad [ x ]        Allergies    No Known Allergies        Vitals    T(F): 97.8 (05-10-18 @ 05:10), Max: 98.4 (05-09-18 @ 20:43)  HR: 70 (05-10-18 @ 05:10) (70 - 102)  BP: 114/72 (05-10-18 @ 05:10) (114/72 - 173/92)  RR: 16 (05-10-18 @ 05:10) (16 - 19)  SpO2: 98% (05-10-18 @ 05:10) (98% - 100%)  Wt(kg): --  CAPILLARY BLOOD GLUCOSE      POCT Blood Glucose.: 164 mg/dL (10 May 2018 07:53)      Labs                          10.4   11.8  )-----------( 187      ( 09 May 2018 09:16 )             34.7       05-10    137  |  102  |  45<H>  ----------------------------<  118<H>  4.4   |  28  |  2.94<H>    Ca    8.0<L>      10 May 2018 07:17  Phos  4.3     05-10  Mg     2.2     05-10              .Blood Blood-Peripheral  05-08 @ 10:19   No growth to date.  --  --      .Blood Blood-Peripheral  05-07 @ 11:31   Growth in aerobic and anaerobic bottles: Enterobacter cloacae/asburiae  "Due to technical problems, Proteus sp. will Not be reported as part of  the BCID panel until further notice"  ***Blood Panel PCR results on this specimen are available  approximately 3 hours after the Gram stain result.***  Gram stain, PCR, and/or culture results may not always  correspond due to difference in methodologies.  ************************************************************  This PCR assay was performed using [employer].  The following targets are tested for: Enterococcus,  vancomycin resistant enterococci, Listeria monocytogenes,  coagulase negative staphylococci, S. aureus,  methicillin resistant S. aureus, Streptococcus agalactiae  (Group B), S. pneumoniae, S. pyogenes (Group A),  Acinetobacter baumannii, Enterobacter cloacae, E. coli,  Klebsiella oxytoca, K. pneumoniae, Proteus sp.,  Serratia marcescens, Haemophilus influenzae,  Neisseria meningitidis, Pseudomonas aeruginosa, Candida  albicans, C. glabrata, C krusei, C parapsilosis,  C. tropicalis and the KPC resistance gene.  --  Blood Culture PCR  Enterobacter cloacae/absuria      .Blood Blood-Peripheral  05-07 @ 11:28   Growth in aerobic and anaerobic bottles: Enterobacter cloacae/asburiae  See previous culture [id number]  --    Growth in aerobic bottle: Gram Negative Rods  Growth in anaerobic bottle: Gram Negative Rods      .Urine Clean Catch (Midstream)  05-07 @ 10:24   >100,000 CFU/ml Enterococcus faecium (vancomycin resistant)  <10,000 CFU/ml Normal Urogenital rosie present  --  Enterococcus faecium (vancomycin resistant)          Radiology Results    < from: Xray Chest 1 View- PORTABLE-Routine (05.09.18 @ 14:45) >  INTERPRETATION:  Follow-up.    AP chest. Prior 5/8/2018.    Endotracheal tube  removed. Right dialysis catheter reidentified in situ.   No change heart mediastinum. Resolution airspace disease and effusions.   Grossly clear lungs at this time. No consolidation pneumothorax or   effusion.    Impression: As above    < end of copied text >        Meds    MEDICATIONS  (STANDING):  ALBUTerol/ipratropium for Nebulization 3 milliLiter(s) Nebulizer every 6 hours  buDESOnide  80 MICROgram(s)/formoterol 4.5 MICROgram(s) Inhaler 2 Puff(s) Inhalation two times a day  chlorhexidine 4% Liquid 1 Application(s) Topical two times a day  epoetin randy Injectable 4000 Unit(s) IV Push <User Schedule>  heparin  Injectable 5000 Unit(s) SubCutaneous every 12 hours  insulin glargine Injectable (LANTUS) 28 Unit(s) SubCutaneous at bedtime  insulin lispro (HumaLOG) corrective regimen sliding scale   SubCutaneous Before meals and at bedtime  insulin lispro Injectable (HumaLOG) 5 Unit(s) SubCutaneous three times a day before meals  linezolid    Tablet 600 milliGRAM(s) Oral every 12 hours  linezolid    Tablet      metoprolol tartrate 25 milliGRAM(s) Oral two times a day  pantoprazole    Tablet 40 milliGRAM(s) Oral before breakfast  piperacillin/tazobactam IVPB. 3.375 Gram(s) IV Intermittent every 12 hours  predniSONE   Tablet 40 milliGRAM(s) Oral daily  timolol 0.5% Solution 1 Drop(s) Both EYES two times a day      MEDICATIONS  (PRN):  acetaminophen   Tablet. 650 milliGRAM(s) Oral every 6 hours PRN Moderate Pain (4 - 6)  melatonin 3 milliGRAM(s) Oral at bedtime PRN Insomnia      Physical Exam      Neuro :  no focal deficits  Respiratory:  B/L rales  CV: RRR, S1S2, no murmurs,   Abdominal: Soft, NT, ND +BS,  Extremities: No edema, + peripheral pulses    ASSESSMENT    sepsis poss 2nd pna vs perm cath infxn  E cloacae bacteremia  Hypervolemia  Anemia  uti  abnormal trop  h/o Hypertension  Diastolic CHF  pulm htn  mitral stenosis  Chronic kidney disease  No pertinent past medical history  Breast carcinoma  Renal mass  DM (diabetes mellitus)  Asthma  S/P lumpectomy, left breast  No significant past surgical history      PLAN    cont zosyn  id f/u   CXR Diffuse airspace disease, worse in the right lung. Small bilateral pleural effusions.  rept cxr with resolution of airspace disease and effusion noted above  blood cx with enterobacter cloacae noted above  ucx with >100,000 CFU/ml Enterococcus faecium (vre) and <10,000 CFU/ml Normal Urogenital rosie present noted above  gent d/c'd  pt started on zyvox  rept blood cx neg noted above  pulm f/u  s/p extubation 5/8/18  renal f/u  cont hd as per renal  vascular cons noted  no need for vascular intervention at this time  ce x 1 noted above  cardio f/u   echo with  moderate mitral stenosis, EF = 55 to 60%. Grade II diastolic dysfunction. mod pulm htn  pulm cons  cont current meds  d/c plan once cleared by id Patient is a 78y old  Female who presents with a chief complaint of SOB, fever (07 May 2018 01:15)     seen in icu [  ], reg med floor [ x  ], bed [x  ], chair at bedside [   ], awake and responsive [x ], sedated [  ],  nad [ x ]        Allergies    No Known Allergies          Vitals    T(F): 97.8 (05-10-18 @ 05:10), Max: 98.4 (05-09-18 @ 20:43)  HR: 70 (05-10-18 @ 05:10) (70 - 102)  BP: 114/72 (05-10-18 @ 05:10) (114/72 - 173/92)  RR: 16 (05-10-18 @ 05:10) (16 - 19)  SpO2: 98% (05-10-18 @ 05:10) (98% - 100%)  Wt(kg): --  CAPILLARY BLOOD GLUCOSE      POCT Blood Glucose.: 164 mg/dL (10 May 2018 07:53)      Labs                          10.4   11.8  )-----------( 187      ( 09 May 2018 09:16 )             34.7       05-10    137  |  102  |  45<H>  ----------------------------<  118<H>  4.4   |  28  |  2.94<H>    Ca    8.0<L>      10 May 2018 07:17  Phos  4.3     05-10  Mg     2.2     05-10              .Blood Blood-Peripheral  05-08 @ 10:19   No growth to date.  --  --      .Blood Blood-Peripheral  05-07 @ 11:31   Growth in aerobic and anaerobic bottles: Enterobacter cloacae/asburiae  "Due to technical problems, Proteus sp. will Not be reported as part of  the BCID panel until further notice"  ***Blood Panel PCR results on this specimen are available  approximately 3 hours after the Gram stain result.***  Gram stain, PCR, and/or culture results may not always  correspond due to difference in methodologies.  ************************************************************  This PCR assay was performed using [employer].  The following targets are tested for: Enterococcus,  vancomycin resistant enterococci, Listeria monocytogenes,  coagulase negative staphylococci, S. aureus,  methicillin resistant S. aureus, Streptococcus agalactiae  (Group B), S. pneumoniae, S. pyogenes (Group A),  Acinetobacter baumannii, Enterobacter cloacae, E. coli,  Klebsiella oxytoca, K. pneumoniae, Proteus sp.,  Serratia marcescens, Haemophilus influenzae,  Neisseria meningitidis, Pseudomonas aeruginosa, Candida  albicans, C. glabrata, C krusei, C parapsilosis,  C. tropicalis and the KPC resistance gene.  --  Blood Culture PCR  Enterobacter cloacae/absuria      .Blood Blood-Peripheral  05-07 @ 11:28   Growth in aerobic and anaerobic bottles: Enterobacter cloacae/asburiae  See previous culture [id number]  --    Growth in aerobic bottle: Gram Negative Rods  Growth in anaerobic bottle: Gram Negative Rods      .Urine Clean Catch (Midstream)  05-07 @ 10:24   >100,000 CFU/ml Enterococcus faecium (vancomycin resistant)  <10,000 CFU/ml Normal Urogenital rosie present  --  Enterococcus faecium (vancomycin resistant)          Radiology Results    < from: Xray Chest 1 View- PORTABLE-Routine (05.09.18 @ 14:45) >  INTERPRETATION:  Follow-up.    AP chest. Prior 5/8/2018.    Endotracheal tube  removed. Right dialysis catheter reidentified in situ.   No change heart mediastinum. Resolution airspace disease and effusions.   Grossly clear lungs at this time. No consolidation pneumothorax or   effusion.    Impression: As above    < end of copied text >        Meds    MEDICATIONS  (STANDING):  ALBUTerol/ipratropium for Nebulization 3 milliLiter(s) Nebulizer every 6 hours  buDESOnide  80 MICROgram(s)/formoterol 4.5 MICROgram(s) Inhaler 2 Puff(s) Inhalation two times a day  chlorhexidine 4% Liquid 1 Application(s) Topical two times a day  epoetin randy Injectable 4000 Unit(s) IV Push <User Schedule>  heparin  Injectable 5000 Unit(s) SubCutaneous every 12 hours  insulin glargine Injectable (LANTUS) 28 Unit(s) SubCutaneous at bedtime  insulin lispro (HumaLOG) corrective regimen sliding scale   SubCutaneous Before meals and at bedtime  insulin lispro Injectable (HumaLOG) 5 Unit(s) SubCutaneous three times a day before meals  linezolid    Tablet 600 milliGRAM(s) Oral every 12 hours  linezolid    Tablet      metoprolol tartrate 25 milliGRAM(s) Oral two times a day  pantoprazole    Tablet 40 milliGRAM(s) Oral before breakfast  piperacillin/tazobactam IVPB. 3.375 Gram(s) IV Intermittent every 12 hours  predniSONE   Tablet 40 milliGRAM(s) Oral daily  timolol 0.5% Solution 1 Drop(s) Both EYES two times a day      MEDICATIONS  (PRN):  acetaminophen   Tablet. 650 milliGRAM(s) Oral every 6 hours PRN Moderate Pain (4 - 6)  melatonin 3 milliGRAM(s) Oral at bedtime PRN Insomnia      Physical Exam      Neuro :  no focal deficits  Respiratory:  B/L rales  CV: RRR, S1S2, no murmurs,   Abdominal: Soft, NT, ND +BS,  Extremities: No edema, + peripheral pulses    ASSESSMENT    sepsis poss 2nd pna vs perm cath infxn  E cloacae bacteremia  Hypervolemia  Anemia  uti  abnormal trop  h/o Hypertension  Diastolic CHF  pulm htn  mitral stenosis  Chronic kidney disease  No pertinent past medical history  Breast carcinoma  Renal mass  DM (diabetes mellitus)  Asthma  S/P lumpectomy, left breast  No significant past surgical history      PLAN    cont zosyn  id f/u   CXR Diffuse airspace disease, worse in the right lung. Small bilateral pleural effusions.  rept cxr with resolution of airspace disease and effusion noted above  blood cx with enterobacter cloacae noted above  ucx with >100,000 CFU/ml Enterococcus faecium (vre) and <10,000 CFU/ml Normal Urogenital rosie present noted above  gent d/c'd  pt started on zyvox  rept blood cx neg noted above  pulm f/u  s/p extubation 5/8/18  renal f/u  cont hd as per renal  vascular cons noted  no need for vascular intervention at this time  ce x 1 noted above  cardio f/u   echo with  moderate mitral stenosis, EF = 55 to 60%. Grade II diastolic dysfunction. mod pulm htn  pulm cons  cont current meds  d/c plan once cleared by id

## 2018-05-10 NOTE — PROGRESS NOTE ADULT - SUBJECTIVE AND OBJECTIVE BOX
pt seen and examined, no complaints on exam.   no chest pain or palpitation on exam     acetaminophen   Tablet. 650 milliGRAM(s) Oral every 6 hours PRN  ALBUTerol/ipratropium for Nebulization 3 milliLiter(s) Nebulizer every 6 hours  buDESOnide  80 MICROgram(s)/formoterol 4.5 MICROgram(s) Inhaler 2 Puff(s) Inhalation two times a day  chlorhexidine 4% Liquid 1 Application(s) Topical two times a day  epoetin randy Injectable 4000 Unit(s) IV Push <User Schedule>  heparin  Injectable 5000 Unit(s) SubCutaneous every 12 hours  insulin glargine Injectable (LANTUS) 28 Unit(s) SubCutaneous at bedtime  insulin lispro (HumaLOG) corrective regimen sliding scale   SubCutaneous Before meals and at bedtime  insulin lispro Injectable (HumaLOG) 5 Unit(s) SubCutaneous three times a day before meals  linezolid    Tablet 600 milliGRAM(s) Oral every 12 hours  linezolid    Tablet      melatonin 3 milliGRAM(s) Oral at bedtime PRN  metoprolol tartrate 25 milliGRAM(s) Oral two times a day  pantoprazole    Tablet 40 milliGRAM(s) Oral before breakfast  piperacillin/tazobactam IVPB. 3.375 Gram(s) IV Intermittent every 12 hours  predniSONE   Tablet 40 milliGRAM(s) Oral daily  timolol 0.5% Solution 1 Drop(s) Both EYES two times a day                            10.4   11.8  )-----------( 187      ( 09 May 2018 09:16 )             34.7       Hemoglobin: 10.4 g/dL (05-09 @ 09:16)  Hemoglobin: 10.4 g/dL (05-08 @ 06:16)  Hemoglobin: 10.4 g/dL (05-07 @ 04:42)  Hemoglobin: 11.6 g/dL (05-06 @ 21:22)      05-10    137  |  102  |  45<H>  ----------------------------<  118<H>  4.4   |  28  |  2.94<H>    Ca    8.0<L>      10 May 2018 07:17  Phos  4.3     05-10  Mg     2.2     05-10      Creatinine Trend: 2.94<--, 3.58<--, 2.57<--, 3.34<--, 3.34<--, 3.02<--    COAGS:           T(C): 36.6 (05-10-18 @ 05:10), Max: 36.9 (05-09-18 @ 20:43)  HR: 70 (05-10-18 @ 05:10) (70 - 102)  BP: 114/72 (05-10-18 @ 05:10) (114/72 - 173/92)  RR: 16 (05-10-18 @ 05:10) (16 - 19)  SpO2: 98% (05-10-18 @ 05:10) (98% - 100%)  Wt(kg): --    I&O's Summary      HEENT:   Normal oral mucosa, PERRL, EOMI	  Lymphatic: No obvious lymphadenopathy , no edema  Cardiovascular: Normal S1 S2, No JVD, 1/6 HODA murmur, Peripheral pulses palpable 2+ bilaterally  Respiratory: coarse b/l BS  normal effort 	  Gastrointestinal:  Soft, Non-tender, + BS	  Skin: No rashes,  No cyanosis, warm to touch  Musculoskeletal: Normal range of motion, normal strength  Psychiatry:  Appropriate Mood & affect            ASSESSMENT/PLAN: 	78y FemalePMHx Asthma, Anemia (on weekly procrit), Diastolic CHF, Cardio MEMS device  DM, ESRD on HD ( T,T,S, last one Saturday), HTN, on Home 02,  Breast CA x 2 (s/p L lumpectomy, radiation 2005) Uterine CA (s/p JACLYN)  c/o SOB now with respiratory failure.    HD per renal   ABX per ID ,    GI / DVT prophylaxis.   keep K>4, mag >2.0   BB- BP / hr stable   Steroid taper .   aspiration precaution   daily wts.   aggressive chest pt   D/W Dr Jaimes

## 2018-05-10 NOTE — PROGRESS NOTE ADULT - SUBJECTIVE AND OBJECTIVE BOX
Meds:  piperacillin/tazobactam IVPB. 3.375 Gram(s) IV Intermittent every 12 hours.  Zyvox 600 mg po q 12 hours (Started 5/9/18)    Allergies:  Allergies    No Known Allergies    Intolerances        ROS  [  ] UNABLE TO ELICIT    General:  [  ] None  [  ] Fever  [  ] Chills  [ x ] Malaise    Skin:  [ x ] None [  ] Rash  [  ] Wound  [  ] Ulcer    HEENT:  [ x ] None  [  ] Sore Throat  [  ] Nasal congestion/ runny nose  [  ] Photophobia [  ] Neck pain      Chest:  [ x ] None   [  ] SOB  [  ] Cough  [  ] None    Cardiovascular:   [ x ] None  [  ] CP  [  ] Palpitation    Gastrointestinal:  [ x ] None  [  ] Abd pain   [  ] Nausea    [  ] Vomiting   [  ] Diarrhea	     Genitourinary:  [ x ] None [  ] Polyuria   [  ] Urgency  [  ] Frequency  [  ] Dysuria    [  ]  Hematuria       Musculoskeletal:  [  ] None [  ] Back Pain	[  ] Body aches  [  ] Joint pain [ x ] Weakness    Neurological: [  ] None [  ]Dizziness  [  ]Visual Disturbance  [  ]Headaches   [ x ] Weakness          PHYSICAL EXAM:  Vital Signs Last 24 Hrs  T(C): 36.6 (10 May 2018 05:10), Max: 36.9 (09 May 2018 20:43)  T(F): 97.8 (10 May 2018 05:10), Max: 98.4 (09 May 2018 20:43)  HR: 70 (10 May 2018 05:10) (70 - 102)  BP: 114/72 (10 May 2018 05:10) (114/72 - 173/92)  BP(mean): --  RR: 16 (10 May 2018 05:10) (16 - 19)  SpO2: 98% (10 May 2018 05:10) (98% - 100%)    Constitutional:    HEENT: [ x ] Wnl  [  ] Pharyngeal congestion [  ] Intubated.    Neck:  [ x ] Supple  [  ]Lymphadenopathy  [  ] No JVD   [  ] JVD  [  ] Masses   [  ] WNL    CHEST/Respiratory:  [  ]Clear to auscultation  [ x ] Rales   [  ] Rhonchi   [  ] Wheezing     [ x ] Right side dialysis catheter      Cardiovascular:  [ x ] Reg S1 S2   [  ] Irreg S1 S2   [ x ]No Murmur  [  ] +ve Murmurs  [  ]Systolic [  ]Diastolic      Abdomen:  [ x ] Soft  [ x ] No tendrerness  [  ] Tenderness  [  ] Organomegaly  [  ] ABD Distention  [  ] Rigidity                       [ x ] No Regidity                       [ x ] No Rebound Tenderness  [  ] No Guarding Rigidity  [  ] Rebound Tenderness[  ] Guarding Rigidity                          [ x ]  +ve Bowel Sounds  [  ] Decreased Bowel Sounds    [  ] Absent Bowel Sounds                            Extremities: [ x ] No edema [  ] Edema  [  ] Clubbing   [  ] Cyanosis                         [ x ] No Tender Calf muscles  [  ] Tender Calf muscles                        [ x ] Palpable peripheral pulses    Neurological: [ x ] Awake  [ x ] Alert  [ x ] Oriented  x  3                           [  ] Confused  [  ] Drowsy  [  ] respond to painful stimuli  [  ] Unresponsive    Skin:  [ x ] Intact [  ] Redness [  ] Thrombophlebitis  [  ] Rashes  [  ] Dry  [  ] Ulcers    Ortho:  [  ] Joint Swelling  [  ] Joint erythema [  ] Joint tenderness                [  ] Increased temp. to touch  [  ] DJD [ x ] WNL          LABS/DIAGNOSTIC TESTS                        10.6   8.6   )-----------( 162      ( 10 May 2018 07:17 )             34.6   05-10    137  |  102  |  45<H>  ----------------------------<  118<H>  4.4   |  28  |  2.94<H>    Ca    8.0<L>      10 May 2018 07:17  Phos  4.3     05-10  Mg     2.2     05-10          CULTURES:   Culture - Blood (05.08.18 @ 10:19)    Specimen Source: .Blood Blood-Peripheral    Culture Results:   No growth to date.      Culture - Urine (05.07.18 @ 10:24)    -  Ampicillin: R >8    -  Vancomycin: R >16    -  Nitrofurantoin: I 64    -  Tetra/Doxy: R >8    -  Ciprofloxacin: R >2    Specimen Source: .Urine Clean Catch (Midstream)    Culture Results:   >100,000 CFU/ml Enterococcus faecium (vancomycin resistant)  <10,000 CFU/ml Normal Urogenital rosie present    Organism Identification: Enterococcus faecium (vancomycin resistant)    Organism: Enterococcus faecium (vancomycin resistant)    Method Type: LON      Culture - Blood (05.07.18 @ 11:31)    Gram Stain:   Growth in aerobic bottle: Gram Negative Rods  Growth in anaerobic bottle: Gram Negative Rods    -  Enterobacter cloacae complex: Detec    Specimen Source: .Blood Blood-Peripheral    Organism: Blood Culture PCR    Culture Results:   Growth in aerobic bottle: Gram Negative Rods  Growth in anaerobic bottle: Gram Negative Rods  "Due to technical problems, Proteus sp. will Not be reported as part of  the BCID panel until further notice"  ***Blood Panel PCR results on this specimenare available  approximately 3 hours after the Gram stain result.***  Gram stain, PCR, and/or culture results may not always  correspond due to difference in methodologies.  ************************************************************  This PCR assaywas performed using [employer].  The following targets are tested for: Enterococcus,  vancomycin resistant enterococci, Listeria monocytogenes,  coagulase negative staphylococci, S. aureus,  methicillin resistant S. aureus, Streptococcus agalactiae  (Group B), S. pneumoniae, S. pyogenes (Group A),  Acinetobacter baumannii, Enterobacter cloacae, E. coli,  Klebsiella oxytoca, K. pneumoniae, Proteus sp.,  Serratia marcescens, Haemophilus influenzae,  Neisseria meningitidis, Pseudomonas aeruginosa, Candida  albicans, C. glabrata, C krusei, C parapsilosis,  C. tropicalis and the KPC resistance gene.    Organism Identification: Blood Culture PCR    Method Type: PCR    Culture - Blood (05.07.18 @ 11:28)    Gram Stain:   Growth in aerobic bottle: Gram Negative Rods  Growth in anaerobic bottle: Gram Negative Rods    Specimen Source: .Blood Blood-Peripheral    Culture Results:   Growth in aerobic bottle: Gram Negative Rods  Growth in anaerobic bottle: Gram Negative Rods    RADIOLOGY    EXAM:  XR CHEST PORTABLE ROUTINE 1V                            PROCEDURE DATE:  05/09/2018          INTERPRETATION:  Follow-up.    AP chest. Prior 5/8/2018.    Endotracheal tube  removed. Right dialysis catheter reidentified in situ.   No change heart mediastinum. Resolution airspace disease and effusions.   Grossly clear lungs at this time. No consolidation pneumothorax or   effusion.    Impression: As above              Assessment and Recommendation:   7F PMHx Asthma, Anemia (on weekly procrit), HF pEF, DM, ESRD on HD ( T,T,S, last one Saturday), HTN, on Home 02,  Breast CA x 2 (s/p L lumpectomy, radiation 2005) Uterine CA (s/p JACLYN)  c/o SOB. Pt that she started feeling SOB and went to [facility] but nobody saw her because SOB didn't improve pt decided to come to [facility]. As per patient she drinks lots of water. At ED code sepsis was called 2/2 fevers, tachycardia and leukocytosis. Pt has been complaining of a dry cough since Saturday.   Patient was intubated and was started on IV Zosyn.  Blood cultures grew Enterobacter cloacae complex and patient was started on IV gentamycin.  5/8/18 Patient was extubated and transferred to a regular floor.  Urine culture grew VRE.  repeat blood culture on 5/8/18 is still -ve.    Problem/Recommendation - 1:  Problem: Pneumonia.   Recommendation:   1- Contact precautions as urine CS grew VRE.  2- Follow repeat blood cultures, and Follow +VE Blood culture to final report.  3- Continue Zyvox 600 mg Po q 12 hours and observe platelets while patient is on Zyvox.  4- Continue IV Zosyn.  5- Fluid and electrolytes management.  6- CBC and BMP follow up.   7- Nephrology management.    Problem/Recommendation - 2:  ·  Problem: Acute respiratory failure, unspecified whether with hypoxia or hypercapnia.    Recommendation:   1- As per problem # 1.    Problem/Recommendation - 3:  ·  Problem: Asthma.    Recommendation:   1- Bronchodilators.  2- O2 as needed.  3- Pulmonary management, and follow up.  4- Steroids as per primary, and pulmonary team if needed.     Problem/Recommendation - 4:  ·  Problem: Type 2 diabetes mellitus with stage 5 chronic kidney disease.    Recommendation:   1- Blood sugar monitoring and control.  2- Accu-Cheks with coverage.  3- 1800 yazmin ADA diet.  4- Follow HB A1C.     Problem/Recommendation - 5:  ·  Problem: Anemia.    Recommendation:   1- Closely monitor H & H.  2- Observe for bleeding.     Discussed with medical resident.

## 2018-05-10 NOTE — SWALLOW BEDSIDE ASSESSMENT ADULT - ASR SWALLOW ASPIRATION MONITOR
change of breathing pattern/fever/pneumonia/cough/gurgly voice/position upright (90Y)/throat clearing/upper respiratory infection/oral hygiene

## 2018-05-11 LAB
GLUCOSE BLDC GLUCOMTR-MCNC: 191 MG/DL — HIGH (ref 70–99)
GLUCOSE BLDC GLUCOMTR-MCNC: 261 MG/DL — HIGH (ref 70–99)
GLUCOSE BLDC GLUCOMTR-MCNC: 324 MG/DL — HIGH (ref 70–99)
GLUCOSE BLDC GLUCOMTR-MCNC: 334 MG/DL — HIGH (ref 70–99)

## 2018-05-11 PROCEDURE — 71250 CT THORAX DX C-: CPT | Mod: 26

## 2018-05-11 RX ORDER — INSULIN GLARGINE 100 [IU]/ML
30 INJECTION, SOLUTION SUBCUTANEOUS AT BEDTIME
Qty: 0 | Refills: 0 | Status: DISCONTINUED | OUTPATIENT
Start: 2018-05-11 | End: 2018-05-14

## 2018-05-11 RX ORDER — INSULIN LISPRO 100/ML
6 VIAL (ML) SUBCUTANEOUS
Qty: 0 | Refills: 0 | Status: DISCONTINUED | OUTPATIENT
Start: 2018-05-11 | End: 2018-05-14

## 2018-05-11 RX ORDER — ZOLPIDEM TARTRATE 10 MG/1
5 TABLET ORAL AT BEDTIME
Qty: 0 | Refills: 0 | Status: DISCONTINUED | OUTPATIENT
Start: 2018-05-11 | End: 2018-05-14

## 2018-05-11 RX ORDER — IPRATROPIUM/ALBUTEROL SULFATE 18-103MCG
3 AEROSOL WITH ADAPTER (GRAM) INHALATION ONCE
Qty: 0 | Refills: 0 | Status: COMPLETED | OUTPATIENT
Start: 2018-05-11 | End: 2018-05-11

## 2018-05-11 RX ADMIN — Medication 6 UNIT(S): at 16:56

## 2018-05-11 RX ADMIN — Medication 5 UNIT(S): at 07:58

## 2018-05-11 RX ADMIN — Medication 2: at 07:58

## 2018-05-11 RX ADMIN — BUDESONIDE AND FORMOTEROL FUMARATE DIHYDRATE 2 PUFF(S): 160; 4.5 AEROSOL RESPIRATORY (INHALATION) at 11:29

## 2018-05-11 RX ADMIN — HEPARIN SODIUM 5000 UNIT(S): 5000 INJECTION INTRAVENOUS; SUBCUTANEOUS at 17:02

## 2018-05-11 RX ADMIN — Medication 200 MILLIGRAM(S): at 05:50

## 2018-05-11 RX ADMIN — Medication 3 MILLILITER(S): at 23:48

## 2018-05-11 RX ADMIN — INSULIN GLARGINE 30 UNIT(S): 100 INJECTION, SOLUTION SUBCUTANEOUS at 21:30

## 2018-05-11 RX ADMIN — CHLORHEXIDINE GLUCONATE 1 APPLICATION(S): 213 SOLUTION TOPICAL at 17:08

## 2018-05-11 RX ADMIN — Medication 8: at 11:36

## 2018-05-11 RX ADMIN — Medication 1 DROP(S): at 17:04

## 2018-05-11 RX ADMIN — Medication 40 MILLIGRAM(S): at 05:50

## 2018-05-11 RX ADMIN — Medication 3 MILLILITER(S): at 15:52

## 2018-05-11 RX ADMIN — Medication 3 MILLILITER(S): at 02:07

## 2018-05-11 RX ADMIN — PIPERACILLIN AND TAZOBACTAM 25 GRAM(S): 4; .5 INJECTION, POWDER, LYOPHILIZED, FOR SOLUTION INTRAVENOUS at 05:50

## 2018-05-11 RX ADMIN — BUDESONIDE AND FORMOTEROL FUMARATE DIHYDRATE 2 PUFF(S): 160; 4.5 AEROSOL RESPIRATORY (INHALATION) at 23:24

## 2018-05-11 RX ADMIN — Medication 1 DROP(S): at 05:51

## 2018-05-11 RX ADMIN — Medication 5 UNIT(S): at 11:38

## 2018-05-11 RX ADMIN — Medication 650 MILLIGRAM(S): at 11:29

## 2018-05-11 RX ADMIN — Medication 6: at 21:30

## 2018-05-11 RX ADMIN — PANTOPRAZOLE SODIUM 40 MILLIGRAM(S): 20 TABLET, DELAYED RELEASE ORAL at 05:50

## 2018-05-11 RX ADMIN — LINEZOLID 600 MILLIGRAM(S): 600 INJECTION, SOLUTION INTRAVENOUS at 05:50

## 2018-05-11 RX ADMIN — Medication 650 MILLIGRAM(S): at 12:30

## 2018-05-11 RX ADMIN — CHLORHEXIDINE GLUCONATE 1 APPLICATION(S): 213 SOLUTION TOPICAL at 05:59

## 2018-05-11 RX ADMIN — Medication 3 MILLILITER(S): at 20:57

## 2018-05-11 RX ADMIN — Medication 50 MILLIGRAM(S): at 05:50

## 2018-05-11 RX ADMIN — LINEZOLID 600 MILLIGRAM(S): 600 INJECTION, SOLUTION INTRAVENOUS at 17:01

## 2018-05-11 RX ADMIN — Medication 8: at 16:55

## 2018-05-11 RX ADMIN — HEPARIN SODIUM 5000 UNIT(S): 5000 INJECTION INTRAVENOUS; SUBCUTANEOUS at 05:50

## 2018-05-11 RX ADMIN — PIPERACILLIN AND TAZOBACTAM 25 GRAM(S): 4; .5 INJECTION, POWDER, LYOPHILIZED, FOR SOLUTION INTRAVENOUS at 17:01

## 2018-05-11 RX ADMIN — Medication 50 MILLIGRAM(S): at 17:01

## 2018-05-11 NOTE — PROGRESS NOTE ADULT - PROBLEM SELECTOR PLAN 2
cont antibiotics  ID follow up  BC reported Enterobacter cloacae  Urine culture showed Enterococcus faecium

## 2018-05-11 NOTE — PROGRESS NOTE ADULT - SUBJECTIVE AND OBJECTIVE BOX
Patient is a 78y old  Female who presents with a chief complaint of SOB, fever (07 May 2018 01:15)  Awake, alert, comfortable in bed in NAD    INTERVAL HPI/OVERNIGHT EVENTS:      VITAL SIGNS:  T(F): 98.2 (05-11-18 @ 05:10)  HR: 88 (05-11-18 @ 05:10)  BP: 142/76 (05-11-18 @ 05:10)  RR: 20 (05-11-18 @ 05:10)  SpO2: 99% (05-11-18 @ 05:10)  Wt(kg): --  I&O's Detail          REVIEW OF SYSTEMS:    CONSTITUTIONAL:  No fevers, chills, sweats    HEENT:  Eyes:  No diplopia or blurred vision. ENT:  No earache, sore throat or runny nose.    CARDIOVASCULAR:  No pressure, squeezing, tightness, or heaviness about the chest; no palpitations.    RESPIRATORY:  Per HPI    GASTROINTESTINAL:  No abdominal pain, nausea, vomiting or diarrhea.    GENITOURINARY:  No dysuria, frequency or urgency.    NEUROLOGIC:  No paresthesias, fasciculations, seizures or weakness.    PSYCHIATRIC:  No disorder of thought or mood.      PHYSICAL EXAM:    Constitutional: Well developed and nourished  Eyes:Perrla  ENMT: normal  Neck:supple  Respiratory: + Wheezes  Cardiovascular: S1 S2 regular  Gastrointestinal: Soft, Non tender  Extremities: No edema  Vascular:normal  Neurological:Awake, alert,Ox3  Musculoskeletal:Normal      MEDICATIONS  (STANDING):  ALBUTerol/ipratropium for Nebulization 3 milliLiter(s) Nebulizer every 6 hours  buDESOnide  80 MICROgram(s)/formoterol 4.5 MICROgram(s) Inhaler 2 Puff(s) Inhalation two times a day  chlorhexidine 4% Liquid 1 Application(s) Topical two times a day  epoetin randy Injectable 4000 Unit(s) IV Push <User Schedule>  heparin  Injectable 5000 Unit(s) SubCutaneous every 12 hours  insulin glargine Injectable (LANTUS) 30 Unit(s) SubCutaneous at bedtime  insulin lispro (HumaLOG) corrective regimen sliding scale   SubCutaneous Before meals and at bedtime  insulin lispro Injectable (HumaLOG) 6 Unit(s) SubCutaneous three times a day before meals  linezolid    Tablet 600 milliGRAM(s) Oral every 12 hours  linezolid    Tablet      metoprolol tartrate 50 milliGRAM(s) Oral two times a day  pantoprazole    Tablet 40 milliGRAM(s) Oral before breakfast  piperacillin/tazobactam IVPB. 3.375 Gram(s) IV Intermittent every 12 hours  predniSONE   Tablet 40 milliGRAM(s) Oral daily  timolol 0.5% Solution 1 Drop(s) Both EYES two times a day    MEDICATIONS  (PRN):  acetaminophen   Tablet. 650 milliGRAM(s) Oral every 6 hours PRN Moderate Pain (4 - 6)  guaiFENesin   Syrup  (Sugar-Free) 200 milliGRAM(s) Oral every 6 hours PRN Cough  melatonin 3 milliGRAM(s) Oral at bedtime PRN Insomnia      Allergies    No Known Allergies    Intolerances        LABS:                        10.6   8.6   )-----------( 162      ( 10 May 2018 07:17 )             34.6     05-10    137  |  102  |  45<H>  ----------------------------<  118<H>  4.4   |  28  |  2.94<H>    Ca    8.0<L>      10 May 2018 07:17  Phos  4.3     05-10  Mg     2.2     05-10                CAPILLARY BLOOD GLUCOSE      POCT Blood Glucose.: 334 mg/dL (11 May 2018 11:34)  POCT Blood Glucose.: 191 mg/dL (11 May 2018 07:41)  POCT Blood Glucose.: 126 mg/dL (10 May 2018 21:22)  POCT Blood Glucose.: 268 mg/dL (10 May 2018 17:32)  POCT Blood Glucose.: 279 mg/dL (10 May 2018 16:12)    pro-bnp [id number] 05-06 @ 22:50     d-dimer --  05-06 @ 22:50      RADIOLOGY & ADDITIONAL TESTS:    CXR:  < from: Xray Chest 1 View- PORTABLE-Routine (05.09.18 @ 14:45) >    INTERPRETATION:  Follow-up.    AP chest. Prior 5/8/2018.    Endotracheal tube  removed. Right dialysis catheter reidentified in situ.   No change heart mediastinum. Resolution airspace disease and effusions.   Grossly clear lungs at this time. No consolidation pneumothorax or   effusion.    Impression: As above    < end of copied text >    Ct scan chest:    ekg;    echo:

## 2018-05-11 NOTE — PROGRESS NOTE ADULT - SUBJECTIVE AND OBJECTIVE BOX
Meds:  piperacillin/tazobactam IVPB. 3.375 Gram(s) IV Intermittent every 12 hours.  Zyvox 600 mg po q 12 hours (Started 5/9/18)    Allergies:  Allergies    No Known Allergies    Intolerances        ROS  [  ] UNABLE TO ELICIT    General:  [  ] None  [  ] Fever  [  ] Chills  [ x ] Malaise    Skin:  [ x ] None [  ] Rash  [  ] Wound  [  ] Ulcer    HEENT:  [ x ] None  [  ] Sore Throat  [  ] Nasal congestion/ runny nose  [  ] Photophobia [  ] Neck pain      Chest:  [ x ] None   [  ] SOB  [  ] Cough  [  ] None    Cardiovascular:   [ x ] None  [  ] CP  [  ] Palpitation    Gastrointestinal:  [ x ] None  [  ] Abd pain   [  ] Nausea    [  ] Vomiting   [  ] Diarrhea	     Genitourinary:  [ x ] None [  ] Polyuria   [  ] Urgency  [  ] Frequency  [  ] Dysuria    [  ]  Hematuria       Musculoskeletal:  [  ] None [  ] Back Pain	[  ] Body aches  [  ] Joint pain [ x ] Weakness    Neurological: [  ] None [  ]Dizziness  [  ]Visual Disturbance  [  ]Headaches   [ x ] Weakness          PHYSICAL EXAM:  Vital Signs Last 24 Hrs  T(C): 36.7 (10 May 2018 21:18), Max: 36.8 (10 May 2018 14:24)  T(F): 98.1 (10 May 2018 21:18), Max: 98.2 (10 May 2018 14:24)  HR: 63 (10 May 2018 21:18) (63 - 87)  BP: 112/69 (10 May 2018 21:18) (112/69 - 151/74)  BP(mean): --  RR: 17 (10 May 2018 21:18) (16 - 17)  SpO2: 99% (10 May 2018 21:18) (98% - 100%)    Constitutional:    HEENT: [ x ] Wnl  [  ] Pharyngeal congestion [  ] Intubated.    Neck:  [ x ] Supple  [  ]Lymphadenopathy  [  ] No JVD   [  ] JVD  [  ] Masses   [  ] WNL    CHEST/Respiratory:  [  ]Clear to auscultation  [ x ] Rales   [  ] Rhonchi   [  ] Wheezing     [ x ] Right side dialysis catheter      Cardiovascular:  [ x ] Reg S1 S2   [  ] Irreg S1 S2   [ x ]No Murmur  [  ] +ve Murmurs  [  ]Systolic [  ]Diastolic      Abdomen:  [ x ] Soft  [ x ] No tendrerness  [  ] Tenderness  [  ] Organomegaly  [  ] ABD Distention  [  ] Rigidity                       [ x ] No Regidity                       [ x ] No Rebound Tenderness  [  ] No Guarding Rigidity  [  ] Rebound Tenderness[  ] Guarding Rigidity                          [ x ]  +ve Bowel Sounds  [  ] Decreased Bowel Sounds    [  ] Absent Bowel Sounds                            Extremities: [ x ] No edema [  ] Edema  [  ] Clubbing   [  ] Cyanosis                         [ x ] No Tender Calf muscles  [  ] Tender Calf muscles                        [ x ] Palpable peripheral pulses    Neurological: [ x ] Awake  [ x ] Alert  [ x ] Oriented  x  3                           [  ] Confused  [  ] Drowsy  [  ] respond to painful stimuli  [  ] Unresponsive    Skin:  [ x ] Intact [  ] Redness [  ] Thrombophlebitis  [  ] Rashes  [  ] Dry  [  ] Ulcers    Ortho:  [  ] Joint Swelling  [  ] Joint erythema [  ] Joint tenderness                [  ] Increased temp. to touch  [  ] DJD [ x ] WNL          LABS/DIAGNOSTIC TESTS                        10.6   8.6   )-----------( 162      ( 10 May 2018 07:17 )             34.6   05-10    137  |  102  |  45<H>  ----------------------------<  118<H>  4.4   |  28  |  2.94<H>    Ca    8.0<L>      10 May 2018 07:17  Phos  4.3     05-10  Mg     2.2     05-10    CAPILLARY BLOOD GLUCOSE      POCT Blood Glucose.: 126 mg/dL (10 May 2018 21:22)  POCT Blood Glucose.: 268 mg/dL (10 May 2018 17:32)  POCT Blood Glucose.: 279 mg/dL (10 May 2018 16:12)  POCT Blood Glucose.: 264 mg/dL (10 May 2018 12:01)  POCT Blood Glucose.: 164 mg/dL (10 May 2018 07:53)        CULTURES:   Culture - Blood (05.08.18 @ 10:19)    Specimen Source: .Blood Blood-Peripheral    Culture Results:   No growth to date.      Culture - Urine (05.07.18 @ 10:24)    -  Ampicillin: R >8    -  Vancomycin: R >16    -  Nitrofurantoin: I 64    -  Tetra/Doxy: R >8    -  Ciprofloxacin: R >2    Specimen Source: .Urine Clean Catch (Midstream)    Culture Results:   >100,000 CFU/ml Enterococcus faecium (vancomycin resistant)  <10,000 CFU/ml Normal Urogenital rosie present    Organism Identification: Enterococcus faecium (vancomycin resistant)    Organism: Enterococcus faecium (vancomycin resistant)    Method Type: LON      Culture - Blood (05.07.18 @ 11:31)    Gram Stain:   Growth in aerobic bottle: Gram Negative Rods  Growth in anaerobic bottle: Gram Negative Rods    -  Enterobacter cloacae complex: Detec    Specimen Source: .Blood Blood-Peripheral    Organism: Blood Culture PCR    Culture Results:   Growth in aerobic bottle: Gram Negative Rods  Growth in anaerobic bottle: Gram Negative Rods  "Due to technical problems, Proteus sp. will Not be reported as part of  the BCID panel until further notice"  ***Blood Panel PCR results on this specimenare available  approximately 3 hours after the Gram stain result.***  Gram stain, PCR, and/or culture results may not always  correspond due to difference in methodologies.  ************************************************************  This PCR assaywas performed using [employer]  The following targets are tested for: Enterococcus,  vancomycin resistant enterococci, Listeria monocytogenes,  coagulase negative staphylococci, S. aureus,  methicillin resistant S. aureus, Streptococcus agalactiae  (Group B), S. pneumoniae, S. pyogenes (Group A),  Acinetobacter baumannii, Enterobacter cloacae, E. coli,  Klebsiella oxytoca, K. pneumoniae, Proteus sp.,  Serratia marcescens, Haemophilus influenzae,  Neisseria meningitidis, Pseudomonas aeruginosa, Candida  albicans, C. glabrata, C krusei, C parapsilosis,  C. tropicalis and the KPC resistance gene.    Organism Identification: Blood Culture PCR    Method Type: PCR    Culture - Blood (05.07.18 @ 11:28)    Gram Stain:   Growth in aerobic bottle: Gram Negative Rods  Growth in anaerobic bottle: Gram Negative Rods    Specimen Source: .Blood Blood-Peripheral    Culture Results:   Growth in aerobic bottle: Gram Negative Rods  Growth in anaerobic bottle: Gram Negative Rods    RADIOLOGY    EXAM:  XR CHEST PORTABLE ROUTINE 1V                            PROCEDURE DATE:  05/09/2018          INTERPRETATION:  Follow-up.    AP chest. Prior 5/8/2018.    Endotracheal tube  removed. Right dialysis catheter reidentified in situ.   No change heart mediastinum. Resolution airspace disease and effusions.   Grossly clear lungs at this time. No consolidation pneumothorax or   effusion.    Impression: As above              Assessment and Recommendation:   7F PMHx Asthma, Anemia (on weekly procrit), HF pEF, DM, ESRD on HD ( T,T,S, last one Saturday), HTN, on Home 02,  Breast CA x 2 (s/p L lumpectomy, radiation 2005) Uterine CA (s/p JACLYN)  c/o SOB. Pt that she started feeling SOB and went to [facility] but nobody saw her because SOB didn't improve pt decided to come to [facility]. As per patient she drinks lots of water. At ED code sepsis was called 2/2 fevers, tachycardia and leukocytosis. Pt has been complaining of a dry cough since Saturday.   Patient was intubated and was started on IV Zosyn.  Blood cultures grew Enterobacter cloacae complex and patient was started on IV gentamycin.  5/8/18 Patient was extubated and transferred to a regular floor.  Urine culture grew VRE.  repeat blood culture on 5/8/18 is still -ve.    Problem/Recommendation - 1:  Problem: Pneumonia.   Recommendation:   1- Contact precautions as urine CS grew VRE.  2- Follow repeat blood cultures, and Follow +VE Blood culture to final report.  3- Continue Zyvox 600 mg Po q 12 hours x 14 days total and observe platelets while patient is on Zyvox.  4- Continue IV Zosyn x 7 days then change to po Cipro 250 mg po q 12 hours x 7 more days.  5- Fluid and electrolytes management.  6- CBC and BMP follow up.   7- Nephrology management.    Problem/Recommendation - 2:  ·  Problem: Acute respiratory failure, unspecified whether with hypoxia or hypercapnia.    Recommendation:   1- As per problem # 1.    Problem/Recommendation - 3:  ·  Problem: Asthma.    Recommendation:   1- Bronchodilators.  2- O2 as needed.  3- Pulmonary management, and follow up.  4- Steroids as per primary, and pulmonary team if needed.     Problem/Recommendation - 4:  ·  Problem: Type 2 diabetes mellitus with stage 5 chronic kidney disease.    Recommendation:   1- Blood sugar monitoring and control.  2- Accu-Cheks with coverage.  3- 1800 yazmin ADA diet.  4- Follow HB A1C.     Problem/Recommendation - 5:  ·  Problem: Anemia.    Recommendation:   1- Closely monitor H & H.  2- Observe for bleeding.     Discussed with medical resident.

## 2018-05-11 NOTE — PROGRESS NOTE ADULT - SUBJECTIVE AND OBJECTIVE BOX
Problem List:    ESRD due to diabetes and hypertension  Started dialysis on 12.2017  Admitted for fever  Blood culture positive to Enterobacter.  Patient was placed on IV broad spectrum antibiotics.  Patient was extubated yesterday.    Culture - Blood (05.08.18 @ 10:19)    Specimen Source: .Blood Blood-Peripheral    Culture Results:   No growth to date.    Culture - Urine (05.07.18 @ 10:24)    -  Ampicillin: R >8    -  Ciprofloxacin: R >2    -  Nitrofurantoin: I 64    -  Tetra/Doxy: R >8    -  Vancomycin: R >16    Specimen Source: .Urine Clean Catch (Midstream)    Culture Results:   >100,000 CFU/ml Enterococcus faecium (vancomycin resistant)  <10,000 CFU/ml Normal Urogenital rosie present    Organism Identification: Enterococcus faecium (vancomycin resistant)    Organism: Enterococcus faecium (vancomycin resistant)    Method Type: LON            PAST MEDICAL & SURGICAL HISTORY:  Anemia  Hypertension  Congestive heart failure  Chronic kidney disease  Breast carcinoma: Lt side s/p lumpectomy and radiation in 2004  Renal mass  DM (diabetes mellitus)  Asthma  S/P lumpectomy, left breast: 2004      No Known Allergies      MEDICATIONS  (STANDING):  ALBUTerol/ipratropium for Nebulization 3 milliLiter(s) Nebulizer every 6 hours  buDESOnide  80 MICROgram(s)/formoterol 4.5 MICROgram(s) Inhaler 2 Puff(s) Inhalation two times a day  chlorhexidine 4% Liquid 1 Application(s) Topical two times a day  epoetin randy Injectable 4000 Unit(s) IV Push <User Schedule>  heparin  Injectable 5000 Unit(s) SubCutaneous every 12 hours  insulin glargine Injectable (LANTUS) 28 Unit(s) SubCutaneous at bedtime  insulin lispro (HumaLOG) corrective regimen sliding scale   SubCutaneous Before meals and at bedtime  insulin lispro Injectable (HumaLOG) 5 Unit(s) SubCutaneous three times a day before meals  linezolid    Tablet 600 milliGRAM(s) Oral every 12 hours  linezolid    Tablet      metoprolol tartrate 50 milliGRAM(s) Oral two times a day  pantoprazole    Tablet 40 milliGRAM(s) Oral before breakfast  piperacillin/tazobactam IVPB. 3.375 Gram(s) IV Intermittent every 12 hours  predniSONE   Tablet 40 milliGRAM(s) Oral daily  timolol 0.5% Solution 1 Drop(s) Both EYES two times a day    MEDICATIONS  (PRN):  acetaminophen   Tablet. 650 milliGRAM(s) Oral every 6 hours PRN Moderate Pain (4 - 6)  guaiFENesin   Syrup  (Sugar-Free) 200 milliGRAM(s) Oral every 6 hours PRN Cough  melatonin 3 milliGRAM(s) Oral at bedtime PRN Insomnia                            10.6   8.6   )-----------( 162      ( 10 May 2018 07:17 )             34.6     05-10    137  |  102  |  45<H>  ----------------------------<  118<H>  4.4   |  28  |  2.94<H>    Ca    8.0<L>      10 May 2018 07:17  Phos  4.3     05-10  Mg     2.2     05-10              REVIEW OF SYSTEMS:  General: no fever no chills, no weight loss.  EYES/ENT: No visual changes;  No vertigo, no headache.  NECK: No pain or stiffness  RESPIRATORY: wheezing improved.  CARDIOVASCULAR: No chest pain or palpitations. No Edema  GASTROINTESTINAL: No abdominal or epigastric pain. No nausea, vomiting. No diarrhea or constipation. No melena.  GENITOURINARY: No dysuria, frequency, foamy urine, urinary urgency, incontinence or hematuria  NEUROLOGICAL: No numbness or weakness, no tremor , no dizziness.   Muscle skeletal : no joint pain and no swelling of joints and limbs.  SKIN: No itching, burning, rashes.        VITALS:  T(F): 98.2 (05-11-18 @ 05:10), Max: 98.2 (05-10-18 @ 14:24)  HR: 88 (05-11-18 @ 05:10)  BP: 142/76 (05-11-18 @ 05:10)  RR: 20 (05-11-18 @ 05:10)  SpO2: 99% (05-11-18 @ 05:10)  Wt(kg): --      PHYSICAL EXAM:  Constitutional: well developed, no diaphoresis, no distress.  Neck: No JVD, no carotid bruit, supple, no adenopathy  Respiratory: prolonged expiratory phase with few wheezing. Mild reduced air entrance  Cardiovascular: S1, S2, RRR, no pericardial rub, no murmur  Abdomen: BS+, soft, no tenderness, no bruit  Pelvis: bladder nondistended  Extremities: No cyanosis or clubbing. No peripheral edema.   Pulses: All present  Neurological: A/O x 3, no focal deficits  Psychiatric: Normal mood, normal affect  Skin: No rashes  Vascular Access: right PC, no tunnel erythema  no discharge.  Immature AVF

## 2018-05-11 NOTE — PROGRESS NOTE ADULT - SUBJECTIVE AND OBJECTIVE BOX
Meds:  piperacillin/tazobactam IVPB. 3.375 Gram(s) IV Intermittent every 12 hours.  Zyvox 600 mg po q 12 hours (Started 5/9/18)    Allergies:  Allergies    No Known Allergies    Intolerances        ROS  [  ] UNABLE TO ELICIT    General:  [  ] None  [  ] Fever  [  ] Chills  [ x ] Malaise    Skin:  [ x ] None [  ] Rash  [  ] Wound  [  ] Ulcer    HEENT:  [ x ] None  [  ] Sore Throat  [  ] Nasal congestion/ runny nose  [  ] Photophobia [  ] Neck pain      Chest:  [ x ] None   [  ] SOB  [  ] Cough  [  ] None    Cardiovascular:   [ x ] None  [  ] CP  [  ] Palpitation    Gastrointestinal:  [ x ] None  [  ] Abd pain   [  ] Nausea    [  ] Vomiting   [  ] Diarrhea	     Genitourinary:  [ x ] None [  ] Polyuria   [  ] Urgency  [  ] Frequency  [  ] Dysuria    [  ]  Hematuria       Musculoskeletal:  [  ] None [  ] Back Pain	[  ] Body aches  [  ] Joint pain [ x ] Weakness    Neurological: [  ] None [  ]Dizziness  [  ]Visual Disturbance  [  ]Headaches   [ x ] Weakness          PHYSICAL EXAM:  Vital Signs Last 24 Hrs  T(C): 36.7 (10 May 2018 21:18), Max: 36.8 (10 May 2018 14:24)  T(F): 98.1 (10 May 2018 21:18), Max: 98.2 (10 May 2018 14:24)  HR: 63 (10 May 2018 21:18) (63 - 87)  BP: 112/69 (10 May 2018 21:18) (112/69 - 151/74)  BP(mean): --  RR: 17 (10 May 2018 21:18) (16 - 17)  SpO2: 99% (10 May 2018 21:18) (98% - 100%)    Constitutional:    HEENT: [ x ] Wnl  [  ] Pharyngeal congestion [  ] Intubated.    Neck:  [ x ] Supple  [  ]Lymphadenopathy  [  ] No JVD   [  ] JVD  [  ] Masses   [  ] WNL    CHEST/Respiratory:  [  ]Clear to auscultation  [ x ] Rales   [  ] Rhonchi   [  ] Wheezing     [ x ] Right side dialysis catheter      Cardiovascular:  [ x ] Reg S1 S2   [  ] Irreg S1 S2   [ x ]No Murmur  [  ] +ve Murmurs  [  ]Systolic [  ]Diastolic      Abdomen:  [ x ] Soft  [ x ] No tendrerness  [  ] Tenderness  [  ] Organomegaly  [  ] ABD Distention  [  ] Rigidity                       [ x ] No Regidity                       [ x ] No Rebound Tenderness  [  ] No Guarding Rigidity  [  ] Rebound Tenderness[  ] Guarding Rigidity                          [ x ]  +ve Bowel Sounds  [  ] Decreased Bowel Sounds    [  ] Absent Bowel Sounds                            Extremities: [ x ] No edema [  ] Edema  [  ] Clubbing   [  ] Cyanosis                         [ x ] No Tender Calf muscles  [  ] Tender Calf muscles                        [ x ] Palpable peripheral pulses    Neurological: [ x ] Awake  [ x ] Alert  [ x ] Oriented  x  3                           [  ] Confused  [  ] Drowsy  [  ] respond to painful stimuli  [  ] Unresponsive    Skin:  [ x ] Intact [  ] Redness [  ] Thrombophlebitis  [  ] Rashes  [  ] Dry  [  ] Ulcers    Ortho:  [  ] Joint Swelling  [  ] Joint erythema [  ] Joint tenderness                [  ] Increased temp. to touch  [  ] DJD [ x ] WNL          LABS/DIAGNOSTIC TESTS                        10.6   8.6   )-----------( 162      ( 10 May 2018 07:17 )             34.6   05-10    137  |  102  |  45<H>  ----------------------------<  118<H>  4.4   |  28  |  2.94<H>    Ca    8.0<L>      10 May 2018 07:17  Phos  4.3     05-10  Mg     2.2     05-10    CAPILLARY BLOOD GLUCOSE      POCT Blood Glucose.: 126 mg/dL (10 May 2018 21:22)  POCT Blood Glucose.: 268 mg/dL (10 May 2018 17:32)  POCT Blood Glucose.: 279 mg/dL (10 May 2018 16:12)  POCT Blood Glucose.: 264 mg/dL (10 May 2018 12:01)  POCT Blood Glucose.: 164 mg/dL (10 May 2018 07:53)        CULTURES:   Culture - Blood (05.08.18 @ 10:19)    Specimen Source: .Blood Blood-Peripheral    Culture Results:   No growth to date.      Culture - Urine (05.07.18 @ 10:24)    -  Ampicillin: R >8    -  Vancomycin: R >16    -  Nitrofurantoin: I 64    -  Tetra/Doxy: R >8    -  Ciprofloxacin: R >2    Specimen Source: .Urine Clean Catch (Midstream)    Culture Results:   >100,000 CFU/ml Enterococcus faecium (vancomycin resistant)  <10,000 CFU/ml Normal Urogenital rosie present    Organism Identification: Enterococcus faecium (vancomycin resistant)    Organism: Enterococcus faecium (vancomycin resistant)    Method Type: LON      Culture - Blood (05.07.18 @ 11:31)    Gram Stain:   Growth in aerobic bottle: Gram Negative Rods  Growth in anaerobic bottle: Gram Negative Rods    -  Enterobacter cloacae complex: Detec    Specimen Source: .Blood Blood-Peripheral    Organism: Blood Culture PCR    Culture Results:   Growth in aerobic bottle: Gram Negative Rods  Growth in anaerobic bottle: Gram Negative Rods  "Due to technical problems, Proteus sp. will Not be reported as part of  the BCID panel until further notice"  ***Blood Panel PCR results on this specimenare available  approximately 3 hours after the Gram stain result.***  Gram stain, PCR, and/or culture results may not always  correspond due to difference in methodologies.  ************************************************************  This PCR assaywas performed using [employer].  The following targets are tested for: Enterococcus,  vancomycin resistant enterococci, Listeria monocytogenes,  coagulase negative staphylococci, S. aureus,  methicillin resistant S. aureus, Streptococcus agalactiae  (Group B), S. pneumoniae, S. pyogenes (Group A),  Acinetobacter baumannii, Enterobacter cloacae, E. coli,  Klebsiella oxytoca, K. pneumoniae, Proteus sp.,  Serratia marcescens, Haemophilus influenzae,  Neisseria meningitidis, Pseudomonas aeruginosa, Candida  albicans, C. glabrata, C krusei, C parapsilosis,  C. tropicalis and the KPC resistance gene.    Organism Identification: Blood Culture PCR    Method Type: PCR    Culture - Blood (05.07.18 @ 11:28)    Gram Stain:   Growth in aerobic bottle: Gram Negative Rods  Growth in anaerobic bottle: Gram Negative Rods    Specimen Source: .Blood Blood-Peripheral    Culture Results:   Growth in aerobic bottle: Gram Negative Rods  Growth in anaerobic bottle: Gram Negative Rods    RADIOLOGY    EXAM:  XR CHEST PORTABLE ROUTINE 1V                            PROCEDURE DATE:  05/09/2018          INTERPRETATION:  Follow-up.    AP chest. Prior 5/8/2018.    Endotracheal tube  removed. Right dialysis catheter reidentified in situ.   No change heart mediastinum. Resolution airspace disease and effusions.   Grossly clear lungs at this time. No consolidation pneumothorax or   effusion.    Impression: As above              Assessment and Recommendation:   7F PMHx Asthma, Anemia (on weekly procrit), HF pEF, DM, ESRD on HD ( T,T,S, last one Saturday), HTN, on Home 02,  Breast CA x 2 (s/p L lumpectomy, radiation 2005) Uterine CA (s/p JACLYN)  c/o SOB. Pt that she started feeling SOB and went to [facility] but nobody saw her because SOB didn't improve pt decided to come to [facility]. As per patient she drinks lots of water. At ED code sepsis was called 2/2 fevers, tachycardia and leukocytosis. Pt has been complaining of a dry cough since Saturday.   Patient was intubated and was started on IV Zosyn.  Blood cultures grew Enterobacter cloacae complex and patient was started on IV gentamycin.  5/8/18 Patient was extubated and transferred to a regular floor.  Urine culture grew VRE.  repeat blood culture on 5/8/18 is still -ve.    Problem/Recommendation - 1:  Problem: Pneumonia.   Recommendation:   1- Contact precautions as urine CS grew VRE.  2- Follow repeat blood cultures is still -ve, and Follow +VE Blood culture to final report is noted.  3- Continue Zyvox 600 mg Po q 12 hours x 2 weeks and observe platelets while patient is on Zyvox.  4- Continue IV Zosyn but increase dose to 3.375 gm IVPB q 8 hours x 7 days then give Cipro 500 mg po q 12 hours x 7 more days.  5- Fluid and electrolytes management.  6- CBC and BMP follow up.   7- Nephrology management.    Problem/Recommendation - 2:  ·  Problem: Acute respiratory failure, unspecified whether with hypoxia or hypercapnia.    Recommendation:   1- As per problem # 1.    Problem/Recommendation - 3:  ·  Problem: Asthma.    Recommendation:   1- Bronchodilators.  2- O2 as needed.  3- Pulmonary management, and follow up.  4- Steroids as per primary, and pulmonary team if needed.     Problem/Recommendation - 4:  ·  Problem: Type 2 diabetes mellitus with stage 5 chronic kidney disease.    Recommendation:   1- Blood sugar monitoring and control.  2- Accu-Cheks with coverage.  3- 1800 yazmin ADA diet.  4- Follow HB A1C.     Problem/Recommendation - 5:  ·  Problem: Anemia.    Recommendation:   1- Closely monitor H & H.  2- Observe for bleeding.     Discussed with medical resident.

## 2018-05-11 NOTE — PROGRESS NOTE ADULT - SUBJECTIVE AND OBJECTIVE BOX
Patient is a 78y old  Female who presents with a chief complaint of SOB, fever (07 May 2018 01:15)     seen in icu [  ], reg med floor [ x  ], bed [x  ], chair at bedside [   ], awake and responsive [x ], sedated [  ],  nad [ x ]      Allergies    No Known Allergies        Vitals    T(F): 98.2 (05-11-18 @ 05:10), Max: 98.2 (05-10-18 @ 14:24)  HR: 88 (05-11-18 @ 05:10) (63 - 88)  BP: 142/76 (05-11-18 @ 05:10) (112/69 - 151/74)  RR: 20 (05-11-18 @ 05:10) (16 - 20)  SpO2: 99% (05-11-18 @ 05:10) (99% - 100%)  Wt(kg): --  CAPILLARY BLOOD GLUCOSE      POCT Blood Glucose.: 191 mg/dL (11 May 2018 07:41)      Labs                          10.6   8.6   )-----------( 162      ( 10 May 2018 07:17 )             34.6       05-10    137  |  102  |  45<H>  ----------------------------<  118<H>  4.4   |  28  |  2.94<H>    Ca    8.0<L>      10 May 2018 07:17  Phos  4.3     05-10  Mg     2.2     05-10              .Blood Blood-Peripheral  05-08 @ 10:19   No growth to date.  --  --      .Blood Blood-Peripheral  05-07 @ 11:31   Growth in aerobic and anaerobic bottles: Enterobacter cloacae/asburiae  "Due to technical problems, Proteus sp. will Not be reported as part of  the BCID panel until further notice"  ***Blood Panel PCR results on this specimen are available  approximately 3 hours after the Gram stain result.***  Gram stain, PCR, and/or culture results may not always  correspond due to difference in methodologies.  ************************************************************  This PCR assay was performed using [employer].  The following targets are tested for: Enterococcus,  vancomycin resistant enterococci, Listeria monocytogenes,  coagulase negative staphylococci, S. aureus,  methicillin resistant S. aureus, Streptococcus agalactiae  (Group B), S. pneumoniae, S. pyogenes (Group A),  Acinetobacter baumannii, Enterobacter cloacae, E. coli,  Klebsiella oxytoca, K. pneumoniae, Proteus sp.,  Serratia marcescens, Haemophilus influenzae,  Neisseria meningitidis, Pseudomonas aeruginosa, Candida  albicans, C. glabrata, C krusei, C parapsilosis,  C. tropicalis and the KPC resistance gene.  --  Blood Culture PCR  Enterobacter cloacae/absuria      .Blood Blood-Peripheral  05-07 @ 11:28   Growth in aerobic and anaerobic bottles: Enterobacter cloacae/asburiae  See previous culture [id number]  --    Growth in aerobic bottle: Gram Negative Rods  Growth in anaerobic bottle: Gram Negative Rods      .Urine Clean Catch (Midstream)  05-07 @ 10:24   >100,000 CFU/ml Enterococcus faecium (vancomycin resistant)  <10,000 CFU/ml Normal Urogenital rosie present  --  Enterococcus faecium (vancomycin resistant)          Radiology Results      Meds    MEDICATIONS  (STANDING):  ALBUTerol/ipratropium for Nebulization 3 milliLiter(s) Nebulizer every 6 hours  buDESOnide  80 MICROgram(s)/formoterol 4.5 MICROgram(s) Inhaler 2 Puff(s) Inhalation two times a day  chlorhexidine 4% Liquid 1 Application(s) Topical two times a day  epoetin randy Injectable 4000 Unit(s) IV Push <User Schedule>  heparin  Injectable 5000 Unit(s) SubCutaneous every 12 hours  insulin glargine Injectable (LANTUS) 28 Unit(s) SubCutaneous at bedtime  insulin lispro (HumaLOG) corrective regimen sliding scale   SubCutaneous Before meals and at bedtime  insulin lispro Injectable (HumaLOG) 5 Unit(s) SubCutaneous three times a day before meals  linezolid    Tablet 600 milliGRAM(s) Oral every 12 hours  linezolid    Tablet      metoprolol tartrate 50 milliGRAM(s) Oral two times a day  pantoprazole    Tablet 40 milliGRAM(s) Oral before breakfast  piperacillin/tazobactam IVPB. 3.375 Gram(s) IV Intermittent every 12 hours  predniSONE   Tablet 40 milliGRAM(s) Oral daily  timolol 0.5% Solution 1 Drop(s) Both EYES two times a day      MEDICATIONS  (PRN):  acetaminophen   Tablet. 650 milliGRAM(s) Oral every 6 hours PRN Moderate Pain (4 - 6)  guaiFENesin   Syrup  (Sugar-Free) 200 milliGRAM(s) Oral every 6 hours PRN Cough  melatonin 3 milliGRAM(s) Oral at bedtime PRN Insomnia      Physical Exam    Neuro :  no focal deficits  Respiratory:  B/L rales  CV: RRR, S1S2, no murmurs,   Abdominal: Soft, NT, ND +BS,  Extremities: No edema, + peripheral pulses    ASSESSMENT    sepsis poss 2nd pna vs perm cath infxn  E cloacae bacteremia  Hypervolemia  Anemia  uti  abnormal trop  h/o Hypertension  Diastolic CHF  pulm htn  mitral stenosis  Chronic kidney disease  No pertinent past medical history  Breast carcinoma  Renal mass  DM (diabetes mellitus)  Asthma  S/P lumpectomy, left breast  No significant past surgical history      PLAN      CXR Diffuse airspace disease, worse in the right lung. Small bilateral pleural effusions.  rept cxr with resolution of airspace disease and effusion noted above  blood cx with enterobacter cloacae noted above  ucx with >100,000 CFU/ml Enterococcus faecium (vre) and <10,000 CFU/ml Normal Urogenital rosie present noted above  id f/u noted  Continue Zyvox 600 mg Po q 12 hours x 14 days total until 5/22/18 and observe platelets while patient is on Zyvox.  Continue IV Zosyn x 7 days until 5/13/18 then change to po Cipro 250 mg po q 12 hours x 7 more days  rept blood cx neg noted above  pulm f/u  s/p extubation 5/8/18  renal f/u  cont hd as per renal  vascular cons noted  no need for vascular intervention at this time  ce x 1 noted above  cardio f/u   echo with  moderate mitral stenosis, EF = 55 to 60%. Grade II diastolic dysfunction. mod pulm htn  pulm cons  cont current meds  d/c plan for 5/14 if stable

## 2018-05-11 NOTE — PROGRESS NOTE ADULT - SUBJECTIVE AND OBJECTIVE BOX
pt seen and examined,  no events overnight  afebrile so far today .     acetaminophen   Tablet. 650 milliGRAM(s) Oral every 6 hours PRN  ALBUTerol/ipratropium for Nebulization 3 milliLiter(s) Nebulizer every 6 hours  buDESOnide  80 MICROgram(s)/formoterol 4.5 MICROgram(s) Inhaler 2 Puff(s) Inhalation two times a day  chlorhexidine 4% Liquid 1 Application(s) Topical two times a day  epoetin randy Injectable 4000 Unit(s) IV Push <User Schedule>  heparin  Injectable 5000 Unit(s) SubCutaneous every 12 hours  insulin glargine Injectable (LANTUS) 28 Unit(s) SubCutaneous at bedtime  insulin lispro (HumaLOG) corrective regimen sliding scale   SubCutaneous Before meals and at bedtime  insulin lispro Injectable (HumaLOG) 5 Unit(s) SubCutaneous three times a day before meals  linezolid    Tablet 600 milliGRAM(s) Oral every 12 hours  linezolid    Tablet      melatonin 3 milliGRAM(s) Oral at bedtime PRN  metoprolol tartrate 50 milliGRAM(s) Oral two times a day  pantoprazole    Tablet 40 milliGRAM(s) Oral before breakfast  piperacillin/tazobactam IVPB. 3.375 Gram(s) IV Intermittent every 12 hours  predniSONE   Tablet 40 milliGRAM(s) Oral daily  timolol 0.5% Solution 1 Drop(s) Both EYES two times a day                            10.6   8.6   )-----------( 162      ( 10 May 2018 07:17 )             34.6       Hemoglobin: 10.6 g/dL (05-10 @ 07:17)  Hemoglobin: 10.4 g/dL (05-09 @ 09:16)  Hemoglobin: 10.4 g/dL (05-08 @ 06:16)  Hemoglobin: 10.4 g/dL (05-07 @ 04:42)  Hemoglobin: 11.6 g/dL (05-06 @ 21:22)      05-10    137  |  102  |  45<H>  ----------------------------<  118<H>  4.4   |  28  |  2.94<H>    Ca    8.0<L>      10 May 2018 07:17  Phos  4.3     05-10  Mg     2.2     05-10      Creatinine Trend: 2.94<--, 3.58<--, 2.57<--, 3.34<--, 3.34<--, 3.02<--    COAGS:           T(C): 36.7 (05-10-18 @ 21:18), Max: 36.8 (05-10-18 @ 14:24)  HR: 63 (05-10-18 @ 21:18) (63 - 87)  BP: 112/69 (05-10-18 @ 21:18) (112/69 - 151/74)  RR: 17 (05-10-18 @ 21:18) (16 - 17)  SpO2: 99% (05-10-18 @ 21:18) (98% - 100%)  Wt(kg): --    I&O's Summary    HEENT:   Normal oral mucosa, PERRL, EOMI	  Lymphatic: No obvious lymphadenopathy , no edema  Cardiovascular: Normal S1 S2, No JVD, 1/6 HODA murmur, Peripheral pulses palpable 2+ bilaterally  Respiratory: coarse b/l BS  normal effort 	  Gastrointestinal:  Soft, Non-tender, + BS	  Skin: No rashes,  No cyanosis, warm to touch  Musculoskeletal: Normal range of motion, normal strength  Psychiatry:  Appropriate Mood & affect        ECHO: < from: Transthoracic Echocardiogram (05.08.18 @ 07:08) >  CONCLUSIONS:  1.  Peak mitral valve gradient equals 16 mm Hg, mean  transmitral valve gradient equals 7.5 mm Hg, consistent  with moderate mitral stenosis.  2. Moderate left atrial enlargement.  3. Moderate concentric left ventricular hypertrophy.  4. Normal Left Ventricular Systolic Function,  (EF = 55 to  60%)  5. Grade II diastolic dysfunction.  6. RV systolic pressure is moderately increased at  49 mm  Hg.  7. There is mild-moderate tricuspid regurgitation.    < end of copied text >      ASSESSMENT/PLAN: 	78y FemalePMHx Asthma, Anemia (on weekly procrit), Diastolic CHF, Cardio MEMS device  DM, ESRD on HD ( T,T,S, last one Saturday), HTN, on Home 02,  Breast CA x 2 (s/p L lumpectomy, radiation 2005) Uterine CA (s/p JACLYN)  c/o SOB now with respiratory failure.    HD per renal   ABX per ID ,  ID follow up noted    GI / DVT prophylaxis.   keep K>4, mag >2.0   BB- BP / hr stable   Steroid taper .   aspiration precaution   daily wts.   echo  noted - due to severity of MR , cont to maximize BB .   D/W Dr Jaimes

## 2018-05-12 DIAGNOSIS — R19.7 DIARRHEA, UNSPECIFIED: ICD-10-CM

## 2018-05-12 LAB
ANION GAP SERPL CALC-SCNC: 8 MMOL/L — SIGNIFICANT CHANGE UP (ref 5–17)
BASOPHILS # BLD AUTO: 0 K/UL — SIGNIFICANT CHANGE UP (ref 0–0.2)
BASOPHILS NFR BLD AUTO: 0.4 % — SIGNIFICANT CHANGE UP (ref 0–2)
BUN SERPL-MCNC: 70 MG/DL — HIGH (ref 7–18)
CALCIUM SERPL-MCNC: 7.9 MG/DL — LOW (ref 8.4–10.5)
CHLORIDE SERPL-SCNC: 101 MMOL/L — SIGNIFICANT CHANGE UP (ref 96–108)
CO2 SERPL-SCNC: 27 MMOL/L — SIGNIFICANT CHANGE UP (ref 22–31)
CREAT SERPL-MCNC: 3.93 MG/DL — HIGH (ref 0.5–1.3)
EOSINOPHIL # BLD AUTO: 0 K/UL — SIGNIFICANT CHANGE UP (ref 0–0.5)
EOSINOPHIL NFR BLD AUTO: 0.4 % — SIGNIFICANT CHANGE UP (ref 0–6)
GLUCOSE BLDC GLUCOMTR-MCNC: 103 MG/DL — HIGH (ref 70–99)
GLUCOSE BLDC GLUCOMTR-MCNC: 172 MG/DL — HIGH (ref 70–99)
GLUCOSE BLDC GLUCOMTR-MCNC: 86 MG/DL — SIGNIFICANT CHANGE UP (ref 70–99)
GLUCOSE BLDC GLUCOMTR-MCNC: 99 MG/DL — SIGNIFICANT CHANGE UP (ref 70–99)
GLUCOSE SERPL-MCNC: 98 MG/DL — SIGNIFICANT CHANGE UP (ref 70–99)
HCT VFR BLD CALC: 34 % — LOW (ref 34.5–45)
HGB BLD-MCNC: 10.2 G/DL — LOW (ref 11.5–15.5)
LYMPHOCYTES # BLD AUTO: 0.7 K/UL — LOW (ref 1–3.3)
LYMPHOCYTES # BLD AUTO: 5.6 % — LOW (ref 13–44)
MAGNESIUM SERPL-MCNC: 2.3 MG/DL — SIGNIFICANT CHANGE UP (ref 1.6–2.6)
MCHC RBC-ENTMCNC: 27.2 PG — SIGNIFICANT CHANGE UP (ref 27–34)
MCHC RBC-ENTMCNC: 30 GM/DL — LOW (ref 32–36)
MCV RBC AUTO: 90.6 FL — SIGNIFICANT CHANGE UP (ref 80–100)
MONOCYTES # BLD AUTO: 0.5 K/UL — SIGNIFICANT CHANGE UP (ref 0–0.9)
MONOCYTES NFR BLD AUTO: 4 % — SIGNIFICANT CHANGE UP (ref 2–14)
NEUTROPHILS # BLD AUTO: 10.4 K/UL — HIGH (ref 1.8–7.4)
NEUTROPHILS NFR BLD AUTO: 89.6 % — HIGH (ref 43–77)
PHOSPHATE SERPL-MCNC: 5 MG/DL — HIGH (ref 2.5–4.5)
PLATELET # BLD AUTO: 178 K/UL — SIGNIFICANT CHANGE UP (ref 150–400)
POTASSIUM SERPL-MCNC: 4.9 MMOL/L — SIGNIFICANT CHANGE UP (ref 3.5–5.3)
POTASSIUM SERPL-SCNC: 4.9 MMOL/L — SIGNIFICANT CHANGE UP (ref 3.5–5.3)
RBC # BLD: 3.76 M/UL — LOW (ref 3.8–5.2)
RBC # FLD: 15.8 % — HIGH (ref 10.3–14.5)
SODIUM SERPL-SCNC: 136 MMOL/L — SIGNIFICANT CHANGE UP (ref 135–145)
WBC # BLD: 11.6 K/UL — HIGH (ref 3.8–10.5)
WBC # FLD AUTO: 11.6 K/UL — HIGH (ref 3.8–10.5)

## 2018-05-12 RX ORDER — VANCOMYCIN HCL 1 G
125 VIAL (EA) INTRAVENOUS EVERY 6 HOURS
Qty: 0 | Refills: 0 | Status: DISCONTINUED | OUTPATIENT
Start: 2018-05-12 | End: 2018-05-14

## 2018-05-12 RX ORDER — NYSTATIN 500MM UNIT
500000 POWDER (EA) MISCELLANEOUS DAILY
Qty: 0 | Refills: 0 | Status: DISCONTINUED | OUTPATIENT
Start: 2018-05-12 | End: 2018-05-14

## 2018-05-12 RX ORDER — METRONIDAZOLE 500 MG
500 TABLET ORAL EVERY 8 HOURS
Qty: 0 | Refills: 0 | Status: DISCONTINUED | OUTPATIENT
Start: 2018-05-12 | End: 2018-05-14

## 2018-05-12 RX ORDER — LOPERAMIDE HCL 2 MG
2 TABLET ORAL THREE TIMES A DAY
Qty: 0 | Refills: 0 | Status: DISCONTINUED | OUTPATIENT
Start: 2018-05-12 | End: 2018-05-12

## 2018-05-12 RX ADMIN — PIPERACILLIN AND TAZOBACTAM 25 GRAM(S): 4; .5 INJECTION, POWDER, LYOPHILIZED, FOR SOLUTION INTRAVENOUS at 17:47

## 2018-05-12 RX ADMIN — Medication 650 MILLIGRAM(S): at 11:43

## 2018-05-12 RX ADMIN — Medication 1 DROP(S): at 17:48

## 2018-05-12 RX ADMIN — Medication 500 MILLIGRAM(S): at 21:12

## 2018-05-12 RX ADMIN — Medication 125 MILLIGRAM(S): at 17:48

## 2018-05-12 RX ADMIN — Medication 125 MILLIGRAM(S): at 12:56

## 2018-05-12 RX ADMIN — BUDESONIDE AND FORMOTEROL FUMARATE DIHYDRATE 2 PUFF(S): 160; 4.5 AEROSOL RESPIRATORY (INHALATION) at 12:55

## 2018-05-12 RX ADMIN — LINEZOLID 600 MILLIGRAM(S): 600 INJECTION, SOLUTION INTRAVENOUS at 06:38

## 2018-05-12 RX ADMIN — CHLORHEXIDINE GLUCONATE 1 APPLICATION(S): 213 SOLUTION TOPICAL at 17:46

## 2018-05-12 RX ADMIN — PANTOPRAZOLE SODIUM 40 MILLIGRAM(S): 20 TABLET, DELAYED RELEASE ORAL at 06:38

## 2018-05-12 RX ADMIN — HEPARIN SODIUM 5000 UNIT(S): 5000 INJECTION INTRAVENOUS; SUBCUTANEOUS at 06:38

## 2018-05-12 RX ADMIN — Medication 1 DROP(S): at 06:39

## 2018-05-12 RX ADMIN — Medication 500000 UNIT(S): at 21:12

## 2018-05-12 RX ADMIN — Medication 50 MILLIGRAM(S): at 06:38

## 2018-05-12 RX ADMIN — Medication 650 MILLIGRAM(S): at 17:47

## 2018-05-12 RX ADMIN — Medication 2: at 16:52

## 2018-05-12 RX ADMIN — Medication 650 MILLIGRAM(S): at 12:31

## 2018-05-12 RX ADMIN — Medication 6 UNIT(S): at 16:52

## 2018-05-12 RX ADMIN — INSULIN GLARGINE 30 UNIT(S): 100 INJECTION, SOLUTION SUBCUTANEOUS at 22:15

## 2018-05-12 RX ADMIN — Medication 40 MILLIGRAM(S): at 06:38

## 2018-05-12 RX ADMIN — Medication 500 MILLIGRAM(S): at 13:00

## 2018-05-12 RX ADMIN — ERYTHROPOIETIN 4000 UNIT(S): 10000 INJECTION, SOLUTION INTRAVENOUS; SUBCUTANEOUS at 10:30

## 2018-05-12 RX ADMIN — BUDESONIDE AND FORMOTEROL FUMARATE DIHYDRATE 2 PUFF(S): 160; 4.5 AEROSOL RESPIRATORY (INHALATION) at 22:33

## 2018-05-12 RX ADMIN — HEPARIN SODIUM 5000 UNIT(S): 5000 INJECTION INTRAVENOUS; SUBCUTANEOUS at 17:47

## 2018-05-12 RX ADMIN — LINEZOLID 600 MILLIGRAM(S): 600 INJECTION, SOLUTION INTRAVENOUS at 17:47

## 2018-05-12 RX ADMIN — Medication 3 MILLILITER(S): at 20:29

## 2018-05-12 RX ADMIN — Medication 650 MILLIGRAM(S): at 18:47

## 2018-05-12 RX ADMIN — Medication 50 MILLIGRAM(S): at 17:47

## 2018-05-12 NOTE — PROGRESS NOTE ADULT - SUBJECTIVE AND OBJECTIVE BOX
Patient is a 78y old  Female who presents with a chief complaint of SOB, fever (07 May 2018 01:15)    Awake , alert comfortable in bed in NAD , mild SOB  , no new complains.  INTERVAL HPI/OVERNIGHT EVENTS:      VITAL SIGNS:  T(F): 97.7 (05-12-18 @ 14:08)  HR: 81 (05-12-18 @ 17:52)  BP: 159/109 (05-12-18 @ 17:52)  RR: 16 (05-12-18 @ 14:08)  SpO2: 98% (05-12-18 @ 14:08)  Wt(kg): --  I&O's Detail          REVIEW OF SYSTEMS:    CONSTITUTIONAL:  No fevers, chills, sweats    HEENT:  Eyes:  No diplopia or blurred vision. ENT:  No earache, sore throat or runny nose.    CARDIOVASCULAR:  No pressure, squeezing, tightness, or heaviness about the chest; no palpitations.    RESPIRATORY:  Per HPI    GASTROINTESTINAL:  No abdominal pain, nausea, vomiting or diarrhea.    GENITOURINARY:  No dysuria, frequency or urgency.    NEUROLOGIC:  No paresthesias, fasciculations, seizures or weakness.    PSYCHIATRIC:  No disorder of thought or mood.      PHYSICAL EXAM:    Constitutional: Well developed and nourished  Eyes:Perrla  ENMT: normal  Neck:supple  Respiratory: + Wheezes  Cardiovascular: S1 S2 regular  Gastrointestinal: Soft, Non tender  Extremities: No edema  Vascular:normal  Neurological:Awake, alert,Ox3  Musculoskeletal:Normal      MEDICATIONS  (STANDING):  ALBUTerol/ipratropium for Nebulization 3 milliLiter(s) Nebulizer every 6 hours  buDESOnide  80 MICROgram(s)/formoterol 4.5 MICROgram(s) Inhaler 2 Puff(s) Inhalation two times a day  chlorhexidine 4% Liquid 1 Application(s) Topical two times a day  epoetin randy Injectable 4000 Unit(s) IV Push <User Schedule>  heparin  Injectable 5000 Unit(s) SubCutaneous every 12 hours  insulin glargine Injectable (LANTUS) 30 Unit(s) SubCutaneous at bedtime  insulin lispro (HumaLOG) corrective regimen sliding scale   SubCutaneous Before meals and at bedtime  insulin lispro Injectable (HumaLOG) 6 Unit(s) SubCutaneous three times a day before meals  linezolid    Tablet 600 milliGRAM(s) Oral every 12 hours  linezolid    Tablet      metoprolol tartrate 50 milliGRAM(s) Oral two times a day  metroNIDAZOLE    Tablet 500 milliGRAM(s) Oral every 8 hours  nystatin    Suspension 487447 Unit(s) Oral daily  pantoprazole    Tablet 40 milliGRAM(s) Oral before breakfast  piperacillin/tazobactam IVPB. 3.375 Gram(s) IV Intermittent every 12 hours  predniSONE   Tablet 40 milliGRAM(s) Oral daily  timolol 0.5% Solution 1 Drop(s) Both EYES two times a day  vancomycin    Solution 125 milliGRAM(s) Oral every 6 hours    MEDICATIONS  (PRN):  acetaminophen   Tablet. 650 milliGRAM(s) Oral every 6 hours PRN Moderate Pain (4 - 6)  guaiFENesin   Syrup  (Sugar-Free) 200 milliGRAM(s) Oral every 6 hours PRN Cough  melatonin 3 milliGRAM(s) Oral at bedtime PRN Insomnia  zolpidem 5 milliGRAM(s) Oral at bedtime PRN Insomnia      Allergies    No Known Allergies    Intolerances        LABS:                        10.2   11.6  )-----------( 178      ( 12 May 2018 09:56 )             34.0     05-12    136  |  101  |  70<H>  ----------------------------<  98  4.9   |  27  |  3.93<H>    Ca    7.9<L>      12 May 2018 09:56  Phos  5.0     05-12  Mg     2.3     05-12                CAPILLARY BLOOD GLUCOSE      POCT Blood Glucose.: 172 mg/dL (12 May 2018 16:24)  POCT Blood Glucose.: 86 mg/dL (12 May 2018 11:23)  POCT Blood Glucose.: 103 mg/dL (12 May 2018 07:45)  POCT Blood Glucose.: 261 mg/dL (11 May 2018 21:10)    pro-bnp 9078 05-06 @ 22:50     d-dimer --  05-06 @ 22:50      RADIOLOGY & ADDITIONAL TESTS:    CXR:  < from: Xray Chest 1 View- PORTABLE-Routine (05.09.18 @ 14:45) >  INTERPRETATION:  Follow-up.    AP chest. Prior 5/8/2018.    Endotracheal tube  removed. Right dialysis catheter reidentified in situ.   No change heart mediastinum. Resolution airspace disease and effusions.   Grossly clear lungs at this time. No consolidation pneumothorax or   effusion.    Impression: As above    < end of copied text >    Ct scan chest:  < from: CT Chest No Cont (05.11.18 @ 09:03) >    IMPRESSION:  Small nodular opacities within large portions of the left   upper lobe and parts of the left lower lobe as above likely pneumonia. A   three-month follow-up CT is recommended to confirm resolution.    Trace left pleural effusion.    Areas of atelectasis in the lingula and left lower lobe.    Heterogeneous enlargement of the thyroid gland as above.    < end of copied text >    ekg;    echo: Patient is a 78y old  Female who presents with a chief complaint of SOB, fever (07 May 2018 01:15)    Awake , alert comfortable in bed in NAD , mild SOB, Having diarrhea.  INTERVAL HPI/OVERNIGHT EVENTS:      VITAL SIGNS:  T(F): 97.7 (05-12-18 @ 14:08)  HR: 81 (05-12-18 @ 17:52)  BP: 159/109 (05-12-18 @ 17:52)  RR: 16 (05-12-18 @ 14:08)  SpO2: 98% (05-12-18 @ 14:08)  Wt(kg): --  I&O's Detail          REVIEW OF SYSTEMS:    CONSTITUTIONAL:  No fevers, chills, sweats    HEENT:  Eyes:  No diplopia or blurred vision. ENT:  No earache, sore throat or runny nose.    CARDIOVASCULAR:  No pressure, squeezing, tightness, or heaviness about the chest; no palpitations.    RESPIRATORY:  Per HPI    GASTROINTESTINAL:  No abdominal pain, nausea, vomiting + diarrhea.    GENITOURINARY:  No dysuria, frequency or urgency.    NEUROLOGIC:  No paresthesias, fasciculations, seizures or weakness.    PSYCHIATRIC:  No disorder of thought or mood.      PHYSICAL EXAM:    Constitutional: Well developed and nourished  Eyes:Perrla  ENMT: normal  Neck:supple  Respiratory: + Wheezes but decreased  Cardiovascular: S1 S2 regular  Gastrointestinal: Soft, Non tender  Extremities: No edema  Vascular:normal  Neurological:Awake, alert,Ox3  Musculoskeletal:Normal      MEDICATIONS  (STANDING):  ALBUTerol/ipratropium for Nebulization 3 milliLiter(s) Nebulizer every 6 hours  buDESOnide  80 MICROgram(s)/formoterol 4.5 MICROgram(s) Inhaler 2 Puff(s) Inhalation two times a day  chlorhexidine 4% Liquid 1 Application(s) Topical two times a day  epoetin randy Injectable 4000 Unit(s) IV Push <User Schedule>  heparin  Injectable 5000 Unit(s) SubCutaneous every 12 hours  insulin glargine Injectable (LANTUS) 30 Unit(s) SubCutaneous at bedtime  insulin lispro (HumaLOG) corrective regimen sliding scale   SubCutaneous Before meals and at bedtime  insulin lispro Injectable (HumaLOG) 6 Unit(s) SubCutaneous three times a day before meals  linezolid    Tablet 600 milliGRAM(s) Oral every 12 hours  linezolid    Tablet      metoprolol tartrate 50 milliGRAM(s) Oral two times a day  metroNIDAZOLE    Tablet 500 milliGRAM(s) Oral every 8 hours  nystatin    Suspension 922946 Unit(s) Oral daily  pantoprazole    Tablet 40 milliGRAM(s) Oral before breakfast  piperacillin/tazobactam IVPB. 3.375 Gram(s) IV Intermittent every 12 hours  predniSONE   Tablet 40 milliGRAM(s) Oral daily  timolol 0.5% Solution 1 Drop(s) Both EYES two times a day  vancomycin    Solution 125 milliGRAM(s) Oral every 6 hours    MEDICATIONS  (PRN):  acetaminophen   Tablet. 650 milliGRAM(s) Oral every 6 hours PRN Moderate Pain (4 - 6)  guaiFENesin   Syrup  (Sugar-Free) 200 milliGRAM(s) Oral every 6 hours PRN Cough  melatonin 3 milliGRAM(s) Oral at bedtime PRN Insomnia  zolpidem 5 milliGRAM(s) Oral at bedtime PRN Insomnia      Allergies    No Known Allergies    Intolerances        LABS:                        10.2   11.6  )-----------( 178      ( 12 May 2018 09:56 )             34.0     05-12    136  |  101  |  70<H>  ----------------------------<  98  4.9   |  27  |  3.93<H>    Ca    7.9<L>      12 May 2018 09:56  Phos  5.0     05-12  Mg     2.3     05-12                CAPILLARY BLOOD GLUCOSE      POCT Blood Glucose.: 172 mg/dL (12 May 2018 16:24)  POCT Blood Glucose.: 86 mg/dL (12 May 2018 11:23)  POCT Blood Glucose.: 103 mg/dL (12 May 2018 07:45)  POCT Blood Glucose.: 261 mg/dL (11 May 2018 21:10)    pro-bnp 9078 05-06 @ 22:50     d-dimer --  05-06 @ 22:50      RADIOLOGY & ADDITIONAL TESTS:    CXR:  < from: Xray Chest 1 View- PORTABLE-Routine (05.09.18 @ 14:45) >  INTERPRETATION:  Follow-up.    AP chest. Prior 5/8/2018.    Endotracheal tube  removed. Right dialysis catheter reidentified in situ.   No change heart mediastinum. Resolution airspace disease and effusions.   Grossly clear lungs at this time. No consolidation pneumothorax or   effusion.    Impression: As above    < end of copied text >    Ct scan chest:  < from: CT Chest No Cont (05.11.18 @ 09:03) >    IMPRESSION:  Small nodular opacities within large portions of the left   upper lobe and parts of the left lower lobe as above likely pneumonia. A   three-month follow-up CT is recommended to confirm resolution.    Trace left pleural effusion.    Areas of atelectasis in the lingula and left lower lobe.    Heterogeneous enlargement of the thyroid gland as above.    < end of copied text >    ekg;    echo:

## 2018-05-12 NOTE — PROGRESS NOTE ADULT - SUBJECTIVE AND OBJECTIVE BOX
Meds:  piperacillin/tazobactam IVPB. 3.375 Gram(s) IV Intermittent every 12 hours.  Zyvox 600 mg po q 12 hours (Started 5/9/18)    Allergies:  Allergies    No Known Allergies    Intolerances        ROS  [  ] UNABLE TO ELICIT    General:  [  ] None  [  ] Fever  [  ] Chills  [ x ] Malaise    Skin:  [ x ] None [  ] Rash  [  ] Wound  [  ] Ulcer    HEENT:  [ x ] None  [  ] Sore Throat  [  ] Nasal congestion/ runny nose  [  ] Photophobia [  ] Neck pain      Chest:  [ x ] None   [  ] SOB  [  ] Cough  [  ] None    Cardiovascular:   [ x ] None  [  ] CP  [  ] Palpitation    Gastrointestinal:  [ x ] None  [  ] Abd pain   [  ] Nausea    [  ] Vomiting   [ x ] Diarrhea	     Genitourinary:  [ x ] None [  ] Polyuria   [  ] Urgency  [  ] Frequency  [  ] Dysuria    [  ]  Hematuria       Musculoskeletal:  [  ] None [  ] Back Pain	[  ] Body aches  [  ] Joint pain [ x ] Weakness    Neurological: [  ] None [  ]Dizziness  [  ]Visual Disturbance  [  ]Headaches   [ x ] Weakness          PHYSICAL EXAM:  Vital Signs Last 24 Hrs  T(C): 37.1 (12 May 2018 12:26), Max: 37.5 (12 May 2018 05:03)  T(F): 98.8 (12 May 2018 12:26), Max: 99.5 (12 May 2018 05:03)  HR: 72 (12 May 2018 12:26) (66 - 90)  BP: 138/54 (12 May 2018 12:26) (101/64 - 164/90)  BP(mean): --  RR: 20 (12 May 2018 12:26) (17 - 20)  SpO2: 99% (12 May 2018 12:26) (98% - 100%)    Constitutional:    HEENT: [ x ] Wnl  [  ] Pharyngeal congestion [  ] Intubated.    Neck:  [ x ] Supple  [  ]Lymphadenopathy  [  ] No JVD   [  ] JVD  [  ] Masses   [  ] WNL    CHEST/Respiratory:  [  ]Clear to auscultation  [ x ] Rales   [  ] Rhonchi   [  ] Wheezing     [ x ] Right side dialysis catheter      Cardiovascular:  [ x ] Reg S1 S2   [  ] Irreg S1 S2   [ x ]No Murmur  [  ] +ve Murmurs  [  ]Systolic [  ]Diastolic      Abdomen:  [ x ] Soft  [ x ] No tendrerness  [  ] Tenderness  [  ] Organomegaly  [  ] ABD Distention  [  ] Rigidity                       [ x ] No Regidity                       [ x ] No Rebound Tenderness  [  ] No Guarding Rigidity  [  ] Rebound Tenderness[  ] Guarding Rigidity                          [ x ]  +ve Bowel Sounds  [  ] Decreased Bowel Sounds    [  ] Absent Bowel Sounds                            Extremities: [ x ] No edema [  ] Edema  [  ] Clubbing   [  ] Cyanosis                         [ x ] No Tender Calf muscles  [  ] Tender Calf muscles                        [ x ] Palpable peripheral pulses    Neurological: [ x ] Awake  [ x ] Alert  [ x ] Oriented  x  3                           [  ] Confused  [  ] Drowsy  [  ] respond to painful stimuli  [  ] Unresponsive    Skin:  [ x ] Intact [  ] Redness [  ] Thrombophlebitis  [  ] Rashes  [  ] Dry  [  ] Ulcers    Ortho:  [  ] Joint Swelling  [  ] Joint erythema [  ] Joint tenderness                [  ] Increased temp. to touch  [  ] DJD [ x ] WNL          LABS/DIAGNOSTIC TESTS                        10.2   11.6  )-----------( 178      ( 12 May 2018 09:56 )             34.0   05-12    136  |  101  |  70<H>  ----------------------------<  98  4.9   |  27  |  3.93<H>    Ca    7.9<L>      12 May 2018 09:56  Phos  5.0     05-12  Mg     2.3     05-12          CULTURES:   Culture - Blood (05.08.18 @ 10:19)    Specimen Source: .Blood Blood-Peripheral    Culture Results:   No growth to date.      Culture - Urine (05.07.18 @ 10:24)    -  Ampicillin: R >8    -  Vancomycin: R >16    -  Nitrofurantoin: I 64    -  Tetra/Doxy: R >8    -  Ciprofloxacin: R >2    Specimen Source: .Urine Clean Catch (Midstream)    Culture Results:   >100,000 CFU/ml Enterococcus faecium (vancomycin resistant)  <10,000 CFU/ml Normal Urogenital rosie present    Organism Identification: Enterococcus faecium (vancomycin resistant)    Organism: Enterococcus faecium (vancomycin resistant)    Method Type: LON      Culture - Blood (05.07.18 @ 11:31)    Gram Stain:   Growth in aerobic bottle: Gram Negative Rods  Growth in anaerobic bottle: Gram Negative Rods    -  Enterobacter cloacae complex: Detec    Specimen Source: .Blood Blood-Peripheral    Organism: Blood Culture PCR    Culture Results:   Growth in aerobic bottle: Gram Negative Rods  Growth in anaerobic bottle: Gram Negative Rods  "Due to technical problems, Proteus sp. will Not be reported as part of  the BCID panel until further notice"  ***Blood Panel PCR results on this specimenare available  approximately 3 hours after the Gram stain result.***  Gram stain, PCR, and/or culture results may not always  correspond due to difference in methodologies.  ************************************************************  This PCR assaywas performed using [employer].  The following targets are tested for: Enterococcus,  vancomycin resistant enterococci, Listeria monocytogenes,  coagulase negative staphylococci, S. aureus,  methicillin resistant S. aureus, Streptococcus agalactiae  (Group B), S. pneumoniae, S. pyogenes (Group A),  Acinetobacter baumannii, Enterobacter cloacae, E. coli,  Klebsiella oxytoca, K. pneumoniae, Proteus sp.,  Serratia marcescens, Haemophilus influenzae,  Neisseria meningitidis, Pseudomonas aeruginosa, Candida  albicans, C. glabrata, C krusei, C parapsilosis,  C. tropicalis and the KPC resistance gene.    Organism Identification: Blood Culture PCR    Method Type: PCR    Culture - Blood (05.07.18 @ 11:28)    Gram Stain:   Growth in aerobic bottle: Gram Negative Rods  Growth in anaerobic bottle: Gram Negative Rods    Specimen Source: .Blood Blood-Peripheral    Culture Results:   Growth in aerobic bottle: Gram Negative Rods  Growth in anaerobic bottle: Gram Negative Rods    RADIOLOGY    EXAM:  CT CHEST                            PROCEDURE DATE:  05/11/2018          INTERPRETATION:  CT CHEST WITHOUT CONTRAST    INDICATION: Asthma. Pneumonia. ESRD on hemodialysis. CHF. Evaluate for   loculation.    TECHNIQUE: Unenhanced helical images were obtained of the chest. Coronal   and sagittal images were reconstructed. Maximum intensity projection   images were generated.     COMPARISON: CT abdomen pelvis 3/5/2015.    FINDINGS:     Tubes/Lines: Right-sided IJ central line with the tip in the SVC.    Lungs And Airways: The airways are unremarkable. There are nodular small   opacities throughout large portion of the left upper lobe representing   and portions of the left lower lobe representing impacted distal airways   likely of infectious etiology. There are a few 2 or 3 mm right upper lobe   pulmonary nodules which may also be related to the infection. There is   atelectasis in the lingula as well. Left lung base atelectasis. There are   left upper lobe peripheral reticular markings abutting the anterior chest   wall related to prior radiation therapy.    Pleura: Trace left pleural effusion. No pneumothorax.    Mediastinum: There are no enlarged chest lymph nodes. Hypodense right   thyroid gland dominant 2.4 cm nodule with heterogeneity of the remainder   of the bilateral thyroid glands with enlargement resulting in mild   luminal narrowing of the trachea.    Heart and Vasculature: The heart is enlarged. No pericardial effusion.   Coronary arterial calcifications involving the LAD, left circumflex, and   right coronary artery. Mitral annular calcifications. Cardiomem is noted   within the left lower lobe pulmonary artery.    The main pulmonary artery is enlarged and measures 3.4 cm which may be   seen in pulmonary hypertension. Atherosclerotic changes.    Upper Abdomen: The kidneys are partially visualized. Partially imaged   renal cysts are noted as on the prior abdominal CT of March 5, 2015.    Bones And Soft Tissues: Degenerative changes. Patient appears to be   status post left breast surgery.      IMPRESSION:  Small nodular opacities within large portions of the left   upper lobe and parts of the left lower lobe as above likely pneumonia. A   three-month follow-up CT is recommended to confirm resolution.    Trace left pleural effusion.                  Assessment and Recommendation:   7F PMHx Asthma, Anemia (on weekly procrit), HF pEF, DM, ESRD on HD ( T,T,S, last one Saturday), HTN, on Home 02,  Breast CA x 2 (s/p L lumpectomy, radiation 2005) Uterine CA (s/p JACLYN)  c/o SOB. Pt that she started feeling SOB and went to [facility] but nobody saw her because SOB didn't improve pt decided to come to [facility]. As per patient she drinks lots of water. At ED code sepsis was called 2/2 fevers, tachycardia and leukocytosis. Pt has been complaining of a dry cough since Saturday.   Patient was intubated and was started on IV Zosyn.  Blood cultures grew Enterobacter cloacae complex and patient was started on IV gentamycin.  5/8/18 Patient was extubated and transferred to a regular floor.  Urine culture grew VRE.  repeat blood culture on 5/8/18 is still -ve.  5/11/18 CT chest suggested pneumonia.  5/12/18 Patient c/ diarrhea.    Problem/Recommendation - 1:  Problem: Pneumonia.   Recommendation:   1- Contact precautions as urine CS grew VRE.  2- Follow repeat blood cultures is still -ve, and Follow +VE Blood culture to final report is noted.  3- Continue Zyvox 600 mg Po q 12 hours x 2 weeks and observe platelets while patient is on Zyvox.  4- Continue IV Zosyn but increase dose to 3.375 gm IVPB q 8 hours x 7 days then give Cipro 500 mg po q 12 hours x 7 more days.  5- Fluid and electrolytes management.  6- CBC and BMP follow up.   7- Nephrology management.  8- Stool for CDT.    Problem/Recommendation - 2:  ·  Problem: Acute respiratory failure, unspecified whether with hypoxia or hypercapnia.    Recommendation:   1- As per problem # 1.    Problem/Recommendation - 3:  ·  Problem: Asthma.    Recommendation:   1- Bronchodilators.  2- O2 as needed.  3- Pulmonary management, and follow up.  4- Steroids as per primary, and pulmonary team if needed.     Problem/Recommendation - 4:  ·  Problem: Type 2 diabetes mellitus with stage 5 chronic kidney disease.    Recommendation:   1- Blood sugar monitoring and control.  2- Accu-Cheks with coverage.  3- 1800 yazmin ADA diet.  4- Follow HB A1C.     Problem/Recommendation - 5:  ·  Problem: Anemia.    Recommendation:   1- Closely monitor H & H.  2- Observe for bleeding.     Discussed with medical resident, and with patient.

## 2018-05-12 NOTE — PROGRESS NOTE ADULT - SUBJECTIVE AND OBJECTIVE BOX
Patient is a 78y old  Female who presents with a chief complaint of SOB, fever (07 May 2018 01:15)    seen in icu [  ], reg med floor [ x  ], bed [x  ], chair at bedside [   ], awake and responsive [x ], sedated [  ],  nad [ x ]      Allergies    No Known Allergies        Vitals    T(F): 99.5 (05-12-18 @ 05:03), Max: 99.5 (05-12-18 @ 05:03)  HR: 90 (05-12-18 @ 05:03) (66 - 90)  BP: 129/72 (05-12-18 @ 05:03) (101/64 - 164/90)  RR: 20 (05-12-18 @ 05:03) (17 - 20)  SpO2: 100% (05-12-18 @ 05:03) (100% - 100%)  Wt(kg): --  CAPILLARY BLOOD GLUCOSE      POCT Blood Glucose.: 261 mg/dL (11 May 2018 21:10)      Labs          .Blood Blood-Peripheral  05-08 @ 10:19   No growth to date.  --  --      .Blood Blood-Peripheral  05-07 @ 11:31   Growth in aerobic and anaerobic bottles: Enterobacter cloacae/asburiae  "Due to technical problems, Proteus sp. will Not be reported as part of  the BCID panel until further notice"  ***Blood Panel PCR results on this specimen are available  approximately 3 hours after the Gram stain result.***  Gram stain, PCR, and/or culture results may not always  correspond due to difference in methodologies.  ************************************************************  This PCR assay was performed using [employer].  The following targets are tested for: Enterococcus,  vancomycin resistant enterococci, Listeria monocytogenes,  coagulase negative staphylococci, S. aureus,  methicillin resistant S. aureus, Streptococcus agalactiae  (Group B), S. pneumoniae, S. pyogenes (Group A),  Acinetobacter baumannii, Enterobacter cloacae, E. coli,  Klebsiella oxytoca, K. pneumoniae, Proteus sp.,  Serratia marcescens, Haemophilus influenzae,  Neisseria meningitidis, Pseudomonas aeruginosa, Candida  albicans, C. glabrata, C krusei, C parapsilosis,  C. tropicalis and the KPC resistance gene.  --  Blood Culture PCR  Enterobacter cloacae/absuria      .Blood Blood-Peripheral  05-07 @ 11:28   Growth in aerobic and anaerobic bottles: Enterobacter cloacae/asburiae  See previous culture [id number]  --    Growth in aerobic bottle: Gram Negative Rods  Growth in anaerobic bottle: Gram Negative Rods      .Urine Clean Catch (Midstream)  05-07 @ 10:24   >100,000 CFU/ml Enterococcus faecium (vancomycin resistant)  <10,000 CFU/ml Normal Urogenital rosie present  --  Enterococcus faecium (vancomycin resistant)          Radiology Results      Meds    MEDICATIONS  (STANDING):  ALBUTerol/ipratropium for Nebulization 3 milliLiter(s) Nebulizer every 6 hours  buDESOnide  80 MICROgram(s)/formoterol 4.5 MICROgram(s) Inhaler 2 Puff(s) Inhalation two times a day  chlorhexidine 4% Liquid 1 Application(s) Topical two times a day  epoetin randy Injectable 4000 Unit(s) IV Push <User Schedule>  heparin  Injectable 5000 Unit(s) SubCutaneous every 12 hours  insulin glargine Injectable (LANTUS) 30 Unit(s) SubCutaneous at bedtime  insulin lispro (HumaLOG) corrective regimen sliding scale   SubCutaneous Before meals and at bedtime  insulin lispro Injectable (HumaLOG) 6 Unit(s) SubCutaneous three times a day before meals  linezolid    Tablet 600 milliGRAM(s) Oral every 12 hours  linezolid    Tablet      metoprolol tartrate 50 milliGRAM(s) Oral two times a day  pantoprazole    Tablet 40 milliGRAM(s) Oral before breakfast  piperacillin/tazobactam IVPB. 3.375 Gram(s) IV Intermittent every 12 hours  predniSONE   Tablet 40 milliGRAM(s) Oral daily  timolol 0.5% Solution 1 Drop(s) Both EYES two times a day      MEDICATIONS  (PRN):  acetaminophen   Tablet. 650 milliGRAM(s) Oral every 6 hours PRN Moderate Pain (4 - 6)  guaiFENesin   Syrup  (Sugar-Free) 200 milliGRAM(s) Oral every 6 hours PRN Cough  loperamide 2 milliGRAM(s) Oral three times a day PRN Diarrhea  melatonin 3 milliGRAM(s) Oral at bedtime PRN Insomnia  zolpidem 5 milliGRAM(s) Oral at bedtime PRN Insomnia      Physical Exam    Neuro :  no focal deficits  Respiratory:  B/L rales  CV: RRR, S1S2, no murmurs,   Abdominal: Soft, NT, ND +BS,  Extremities: No edema, + peripheral pulses    ASSESSMENT    sepsis poss 2nd pna vs perm cath infxn  E cloacae bacteremia  Hypervolemia  Anemia  uti  abnormal trop  h/o Hypertension  Diastolic CHF  pulm htn  mitral stenosis  Chronic kidney disease  No pertinent past medical history  Breast carcinoma  Renal mass  DM (diabetes mellitus)  Asthma  S/P lumpectomy, left breast  No significant past surgical history      PLAN      CXR Diffuse airspace disease, worse in the right lung. Small bilateral pleural effusions.  rept cxr with resolution of airspace disease and effusion noted above  blood cx with enterobacter cloacae noted above  ucx with >100,000 CFU/ml Enterococcus faecium (vre) and <10,000 CFU/ml Normal Urogenital rosie present noted above  id f/u  Continue Zyvox 600 mg Po q 12 hours x 14 days total until 5/22/18 and observe platelets while patient is on Zyvox.  Continue IV Zosyn x 7 days until 5/13/18 then change to po Cipro 250 mg po q 12 hours x 7 more days  rept blood cx neg noted above  pulm f/u  s/p extubation 5/8/18  renal f/u  cont hd as per renal  vascular cons noted  no need for vascular intervention at this time  ce x 1 noted above  cardio f/u noted  echo with  moderate mitral stenosis, EF = 55 to 60%. Grade II diastolic dysfunction. mod pulm htn  pulm cons  cont current meds  d/c plan for 5/14 if stable Patient is a 78y old  Female who presents with a chief complaint of SOB, fever (07 May 2018 01:15)    seen in icu [  ], reg med floor [ x  ], bed [x  ], chair at bedside [   ], awake and responsive [x ], sedated [  ],  nad [ x ]      Allergies    No Known Allergies        Vitals    T(F): 99.5 (05-12-18 @ 05:03), Max: 99.5 (05-12-18 @ 05:03)  HR: 90 (05-12-18 @ 05:03) (66 - 90)  BP: 129/72 (05-12-18 @ 05:03) (101/64 - 164/90)  RR: 20 (05-12-18 @ 05:03) (17 - 20)  SpO2: 100% (05-12-18 @ 05:03) (100% - 100%)  Wt(kg): --  CAPILLARY BLOOD GLUCOSE      POCT Blood Glucose.: 261 mg/dL (11 May 2018 21:10)      Labs          .Blood Blood-Peripheral  05-08 @ 10:19   No growth to date.  --  --      .Blood Blood-Peripheral  05-07 @ 11:31   Growth in aerobic and anaerobic bottles: Enterobacter cloacae/asburiae  "Due to technical problems, Proteus sp. will Not be reported as part of  the BCID panel until further notice"  ***Blood Panel PCR results on this specimen are available  approximately 3 hours after the Gram stain result.***  Gram stain, PCR, and/or culture results may not always  correspond due to difference in methodologies.  ************************************************************  This PCR assay was performed using [employer].  The following targets are tested for: Enterococcus,  vancomycin resistant enterococci, Listeria monocytogenes,  coagulase negative staphylococci, S. aureus,  methicillin resistant S. aureus, Streptococcus agalactiae  (Group B), S. pneumoniae, S. pyogenes (Group A),  Acinetobacter baumannii, Enterobacter cloacae, E. coli,  Klebsiella oxytoca, K. pneumoniae, Proteus sp.,  Serratia marcescens, Haemophilus influenzae,  Neisseria meningitidis, Pseudomonas aeruginosa, Candida  albicans, C. glabrata, C krusei, C parapsilosis,  C. tropicalis and the KPC resistance gene.  --  Blood Culture PCR  Enterobacter cloacae/absuria      .Blood Blood-Peripheral  05-07 @ 11:28   Growth in aerobic and anaerobic bottles: Enterobacter cloacae/asburiae  See previous culture [id number]  --    Growth in aerobic bottle: Gram Negative Rods  Growth in anaerobic bottle: Gram Negative Rods      .Urine Clean Catch (Midstream)  05-07 @ 10:24   >100,000 CFU/ml Enterococcus faecium (vancomycin resistant)  <10,000 CFU/ml Normal Urogenital rosie present  --  Enterococcus faecium (vancomycin resistant)          Radiology Results    < from: CT Chest No Cont (05.11.18 @ 09:03) >  IMPRESSION:  Small nodular opacities within large portions of the left   upper lobe and parts of the left lower lobe as above likely pneumonia. A   three-month follow-up CT is recommended to confirm resolution.    Trace left pleural effusion.    < end of copied text >            Meds    MEDICATIONS  (STANDING):  ALBUTerol/ipratropium for Nebulization 3 milliLiter(s) Nebulizer every 6 hours  buDESOnide  80 MICROgram(s)/formoterol 4.5 MICROgram(s) Inhaler 2 Puff(s) Inhalation two times a day  chlorhexidine 4% Liquid 1 Application(s) Topical two times a day  epoetin randy Injectable 4000 Unit(s) IV Push <User Schedule>  heparin  Injectable 5000 Unit(s) SubCutaneous every 12 hours  insulin glargine Injectable (LANTUS) 30 Unit(s) SubCutaneous at bedtime  insulin lispro (HumaLOG) corrective regimen sliding scale   SubCutaneous Before meals and at bedtime  insulin lispro Injectable (HumaLOG) 6 Unit(s) SubCutaneous three times a day before meals  linezolid    Tablet 600 milliGRAM(s) Oral every 12 hours  linezolid    Tablet      metoprolol tartrate 50 milliGRAM(s) Oral two times a day  pantoprazole    Tablet 40 milliGRAM(s) Oral before breakfast  piperacillin/tazobactam IVPB. 3.375 Gram(s) IV Intermittent every 12 hours  predniSONE   Tablet 40 milliGRAM(s) Oral daily  timolol 0.5% Solution 1 Drop(s) Both EYES two times a day      MEDICATIONS  (PRN):  acetaminophen   Tablet. 650 milliGRAM(s) Oral every 6 hours PRN Moderate Pain (4 - 6)  guaiFENesin   Syrup  (Sugar-Free) 200 milliGRAM(s) Oral every 6 hours PRN Cough  loperamide 2 milliGRAM(s) Oral three times a day PRN Diarrhea  melatonin 3 milliGRAM(s) Oral at bedtime PRN Insomnia  zolpidem 5 milliGRAM(s) Oral at bedtime PRN Insomnia      Physical Exam    Neuro :  no focal deficits  Respiratory:  B/L rales  CV: RRR, S1S2, no murmurs,   Abdominal: Soft, NT, ND +BS,  Extremities: No edema, + peripheral pulses    ASSESSMENT    sepsis poss 2nd pna vs perm cath infxn  E cloacae bacteremia  Hypervolemia  Anemia  uti  abnormal trop  h/o Hypertension  Diastolic CHF  pulm htn  mitral stenosis  Chronic kidney disease  Breast carcinoma  Renal mass  DM (diabetes mellitus)  Asthma  S/P lumpectomy, left breast  No significant past surgical history      PLAN      CXR Diffuse airspace disease, worse in the right lung. Small bilateral pleural effusions.  rept cxr with resolution of airspace disease and effusion noted above  ct chest with Small nodular opacities within large portions of the left upper lobe and parts of the left lower lobe. Trace left pleural effusion.  blood cx with enterobacter cloacae noted above  ucx with >100,000 CFU/ml Enterococcus faecium (vre) and <10,000 CFU/ml Normal Urogenital rosie present noted above  id f/u  Continue Zyvox 600 mg Po q 12 hours x 14 days total until 5/22/18 and observe platelets while patient is on Zyvox.  Continue IV Zosyn x 7 days until 5/13/18 then change to po Cipro 250 mg po q 12 hours x 7 more days  rept blood cx neg noted above  pulm f/u  s/p extubation 5/8/18  renal f/u  cont hd as per renal  vascular cons noted  no need for vascular intervention at this time  ce x 1 noted above  cardio f/u noted  echo with  moderate mitral stenosis, EF = 55 to 60%. Grade II diastolic dysfunction. mod pulm htn  pulm cons  cont current meds  d/c plan for 5/14 if stable Patient is a 78y old  Female who presents with a chief complaint of SOB, fever (07 May 2018 01:15)    seen in icu [  ], reg med floor [ x  ], bed [x  ], chair at bedside [   ], awake and responsive [x ], sedated [  ],  nad [ x ]    c/o having diarrhea    Allergies    No Known Allergies        Vitals    T(F): 99.5 (05-12-18 @ 05:03), Max: 99.5 (05-12-18 @ 05:03)  HR: 90 (05-12-18 @ 05:03) (66 - 90)  BP: 129/72 (05-12-18 @ 05:03) (101/64 - 164/90)  RR: 20 (05-12-18 @ 05:03) (17 - 20)  SpO2: 100% (05-12-18 @ 05:03) (100% - 100%)  Wt(kg): --  CAPILLARY BLOOD GLUCOSE      POCT Blood Glucose.: 261 mg/dL (11 May 2018 21:10)      Labs          .Blood Blood-Peripheral  05-08 @ 10:19   No growth to date.  --  --      .Blood Blood-Peripheral  05-07 @ 11:31   Growth in aerobic and anaerobic bottles: Enterobacter cloacae/asburiae  "Due to technical problems, Proteus sp. will Not be reported as part of  the BCID panel until further notice"  ***Blood Panel PCR results on this specimen are available  approximately 3 hours after the Gram stain result.***  Gram stain, PCR, and/or culture results may not always  correspond due to difference in methodologies.  ************************************************************  This PCR assay was performed using "[employer]  The following targets are tested for: Enterococcus,  vancomycin resistant enterococci, Listeria monocytogenes,  coagulase negative staphylococci, S. aureus,  methicillin resistant S. aureus, Streptococcus agalactiae  (Group B), S. pneumoniae, S. pyogenes (Group A),  Acinetobacter baumannii, Enterobacter cloacae, E. coli,  Klebsiella oxytoca, K. pneumoniae, Proteus sp.,  Serratia marcescens, Haemophilus influenzae,  Neisseria meningitidis, Pseudomonas aeruginosa, Candida  albicans, C. glabrata, C krusei, C parapsilosis,  C. tropicalis and the KPC resistance gene.  --  Blood Culture PCR  Enterobacter cloacae/absuria      .Blood Blood-Peripheral  05-07 @ 11:28   Growth in aerobic and anaerobic bottles: Enterobacter cloacae/asburiae  See previous culture [id number]  --    Growth in aerobic bottle: Gram Negative Rods  Growth in anaerobic bottle: Gram Negative Rods      .Urine Clean Catch (Midstream)  05-07 @ 10:24   >100,000 CFU/ml Enterococcus faecium (vancomycin resistant)  <10,000 CFU/ml Normal Urogenital rosie present  --  Enterococcus faecium (vancomycin resistant)          Radiology Results    < from: CT Chest No Cont (05.11.18 @ 09:03) >  IMPRESSION:  Small nodular opacities within large portions of the left   upper lobe and parts of the left lower lobe as above likely pneumonia. A   three-month follow-up CT is recommended to confirm resolution.    Trace left pleural effusion.    < end of copied text >            Meds    MEDICATIONS  (STANDING):  ALBUTerol/ipratropium for Nebulization 3 milliLiter(s) Nebulizer every 6 hours  buDESOnide  80 MICROgram(s)/formoterol 4.5 MICROgram(s) Inhaler 2 Puff(s) Inhalation two times a day  chlorhexidine 4% Liquid 1 Application(s) Topical two times a day  epoetin randy Injectable 4000 Unit(s) IV Push <User Schedule>  heparin  Injectable 5000 Unit(s) SubCutaneous every 12 hours  insulin glargine Injectable (LANTUS) 30 Unit(s) SubCutaneous at bedtime  insulin lispro (HumaLOG) corrective regimen sliding scale   SubCutaneous Before meals and at bedtime  insulin lispro Injectable (HumaLOG) 6 Unit(s) SubCutaneous three times a day before meals  linezolid    Tablet 600 milliGRAM(s) Oral every 12 hours  linezolid    Tablet      metoprolol tartrate 50 milliGRAM(s) Oral two times a day  pantoprazole    Tablet 40 milliGRAM(s) Oral before breakfast  piperacillin/tazobactam IVPB. 3.375 Gram(s) IV Intermittent every 12 hours  predniSONE   Tablet 40 milliGRAM(s) Oral daily  timolol 0.5% Solution 1 Drop(s) Both EYES two times a day      MEDICATIONS  (PRN):  acetaminophen   Tablet. 650 milliGRAM(s) Oral every 6 hours PRN Moderate Pain (4 - 6)  guaiFENesin   Syrup  (Sugar-Free) 200 milliGRAM(s) Oral every 6 hours PRN Cough  loperamide 2 milliGRAM(s) Oral three times a day PRN Diarrhea  melatonin 3 milliGRAM(s) Oral at bedtime PRN Insomnia  zolpidem 5 milliGRAM(s) Oral at bedtime PRN Insomnia      Physical Exam    Neuro :  no focal deficits  Respiratory:  B/L rales  CV: RRR, S1S2, no murmurs,   Abdominal: Soft, NT, ND +BS,  Extremities: No edema, + peripheral pulses    ASSESSMENT    sepsis poss 2nd pna vs perm cath infxn  E cloacae bacteremia  Hypervolemia  Anemia  uti  abnormal trop  diarrhea r/o c diff  h/o Hypertension  Diastolic CHF  pulm htn  mitral stenosis  Chronic kidney disease  Breast carcinoma  Renal mass  DM (diabetes mellitus)  Asthma  S/P lumpectomy, left breast  No significant past surgical history      PLAN      CXR Diffuse airspace disease, worse in the right lung. Small bilateral pleural effusions.  rept cxr with resolution of airspace disease and effusion noted above  ct chest with Small nodular opacities within large portions of the left upper lobe and parts of the left lower lobe. Trace left pleural effusion.  blood cx with enterobacter cloacae noted above  ucx with >100,000 CFU/ml Enterococcus faecium (vre) and <10,000 CFU/ml Normal Urogenital rosie present noted above  id f/u  Continue Zyvox 600 mg Po q 12 hours x 14 days total until 5/22/18 and observe platelets while patient is on Zyvox.  Continue IV Zosyn x 7 days until 5/13/18 then change to po Cipro 250 mg po q 12 hours x 7 more days  rept blood cx neg noted above  pulm f/u  s/p extubation 5/8/18  renal f/u  cont hd as per renal  vascular cons noted  no need for vascular intervention at this time  ce x 1 noted above  cardio f/u noted  echo with  moderate mitral stenosis, EF = 55 to 60%. Grade II diastolic dysfunction. mod pulm htn  pulm cons  add vanco po  cont current meds  d/c plan for 5/14 if stable

## 2018-05-12 NOTE — PROGRESS NOTE ADULT - PROBLEM SELECTOR PLAN 2
cont antibiotics  ID follow up  BC reported Enterobacter cloacae  Urine culture showed Enterococcus faecium.

## 2018-05-12 NOTE — PROGRESS NOTE ADULT - SUBJECTIVE AND OBJECTIVE BOX
pt seen and examined,  no events overnight    acetaminophen   Tablet. 650 milliGRAM(s) Oral every 6 hours PRN  ALBUTerol/ipratropium for Nebulization 3 milliLiter(s) Nebulizer every 6 hours  buDESOnide  80 MICROgram(s)/formoterol 4.5 MICROgram(s) Inhaler 2 Puff(s) Inhalation two times a day  chlorhexidine 4% Liquid 1 Application(s) Topical two times a day  epoetin randy Injectable 4000 Unit(s) IV Push <User Schedule>  guaiFENesin   Syrup  (Sugar-Free) 200 milliGRAM(s) Oral every 6 hours PRN  heparin  Injectable 5000 Unit(s) SubCutaneous every 12 hours  insulin glargine Injectable (LANTUS) 30 Unit(s) SubCutaneous at bedtime  insulin lispro (HumaLOG) corrective regimen sliding scale   SubCutaneous Before meals and at bedtime  insulin lispro Injectable (HumaLOG) 6 Unit(s) SubCutaneous three times a day before meals  linezolid    Tablet 600 milliGRAM(s) Oral every 12 hours  linezolid    Tablet      melatonin 3 milliGRAM(s) Oral at bedtime PRN  metoprolol tartrate 50 milliGRAM(s) Oral two times a day  pantoprazole    Tablet 40 milliGRAM(s) Oral before breakfast  piperacillin/tazobactam IVPB. 3.375 Gram(s) IV Intermittent every 12 hours  predniSONE   Tablet 40 milliGRAM(s) Oral daily  timolol 0.5% Solution 1 Drop(s) Both EYES two times a day  zolpidem 5 milliGRAM(s) Oral at bedtime PRN                            10.6   8.6   )-----------( 162      ( 10 May 2018 07:17 )             34.6       Hemoglobin: 10.6 g/dL (05-10 @ 07:17)  Hemoglobin: 10.4 g/dL (05-09 @ 09:16)  Hemoglobin: 10.4 g/dL (05-08 @ 06:16)      05-10    137  |  102  |  45<H>  ----------------------------<  118<H>  4.4   |  28  |  2.94<H>    Ca    8.0<L>      10 May 2018 07:17  Phos  4.3     05-10  Mg     2.2     05-10      Creatinine Trend: 2.94<--, 3.58<--, 2.57<--, 3.34<--, 3.34<--, 3.02<--    COAGS:           T(C): 37.5 (05-12-18 @ 05:03), Max: 37.5 (05-12-18 @ 05:03)  HR: 90 (05-12-18 @ 05:03) (66 - 90)  BP: 129/72 (05-12-18 @ 05:03) (101/64 - 164/90)  RR: 20 (05-12-18 @ 05:03) (17 - 20)  SpO2: 100% (05-12-18 @ 05:03) (100% - 100%)  Wt(kg): --    I&O's Summary    HEENT:   Normal oral mucosa, PERRL, EOMI	  Lymphatic: No obvious lymphadenopathy , no edema  Cardiovascular: Normal S1 S2, No JVD, 1/6 HODA murmur, Peripheral pulses palpable 2+ bilaterally  Respiratory: coarse b/l BS  normal effort 	  Gastrointestinal:  Soft, Non-tender, + BS	  Skin: No rashes,  No cyanosis, warm to touch  Musculoskeletal: Normal range of motion, normal strength  Psychiatry:  Appropriate Mood & affect        ECHO: < from: Transthoracic Echocardiogram (05.08.18 @ 07:08) >  CONCLUSIONS:  1.  Peak mitral valve gradient equals 16 mm Hg, mean  transmitral valve gradient equals 7.5 mm Hg, consistent  with moderate mitral stenosis.  2. Moderate left atrial enlargement.  3. Moderate concentric left ventricular hypertrophy.  4. Normal Left Ventricular Systolic Function,  (EF = 55 to  60%)  5. Grade II diastolic dysfunction.  6. RV systolic pressure is moderately increased at  49 mm  Hg.  7. There is mild-moderate tricuspid regurgitation.    < end of copied text >      ASSESSMENT/PLAN: 	78y FemalePMHx Asthma, Anemia (on weekly procrit), Diastolic CHF, Cardio MEMS device  DM, ESRD on HD ( T,T,S, last one Saturday), HTN, on Home 02,  Breast CA x 2 (s/p L lumpectomy, radiation 2005) Uterine CA (s/p JACLYN)  c/o SOB now with respiratory failure.    HD per renal   pulm follow up ,  ABX per ID ,  ID follow up noted    GI / DVT prophylaxis.   keep K>4, mag >2.0   Steroid taper .   aspiration precaution   echo  noted - due to severity of MR , cont to maximize BB .   D/W Dr Jaimes pt seen and examined,  no events overnight    acetaminophen   Tablet. 650 milliGRAM(s) Oral every 6 hours PRN  ALBUTerol/ipratropium for Nebulization 3 milliLiter(s) Nebulizer every 6 hours  buDESOnide  80 MICROgram(s)/formoterol 4.5 MICROgram(s) Inhaler 2 Puff(s) Inhalation two times a day  chlorhexidine 4% Liquid 1 Application(s) Topical two times a day  epoetin randy Injectable 4000 Unit(s) IV Push <User Schedule>  guaiFENesin   Syrup  (Sugar-Free) 200 milliGRAM(s) Oral every 6 hours PRN  heparin  Injectable 5000 Unit(s) SubCutaneous every 12 hours  insulin glargine Injectable (LANTUS) 30 Unit(s) SubCutaneous at bedtime  insulin lispro (HumaLOG) corrective regimen sliding scale   SubCutaneous Before meals and at bedtime  insulin lispro Injectable (HumaLOG) 6 Unit(s) SubCutaneous three times a day before meals  linezolid    Tablet 600 milliGRAM(s) Oral every 12 hours  linezolid    Tablet      melatonin 3 milliGRAM(s) Oral at bedtime PRN  metoprolol tartrate 50 milliGRAM(s) Oral two times a day  pantoprazole    Tablet 40 milliGRAM(s) Oral before breakfast  piperacillin/tazobactam IVPB. 3.375 Gram(s) IV Intermittent every 12 hours  predniSONE   Tablet 40 milliGRAM(s) Oral daily  timolol 0.5% Solution 1 Drop(s) Both EYES two times a day  zolpidem 5 milliGRAM(s) Oral at bedtime PRN                            10.6   8.6   )-----------( 162      ( 10 May 2018 07:17 )             34.6       Hemoglobin: 10.6 g/dL (05-10 @ 07:17)  Hemoglobin: 10.4 g/dL (05-09 @ 09:16)  Hemoglobin: 10.4 g/dL (05-08 @ 06:16)      05-10    137  |  102  |  45<H>  ----------------------------<  118<H>  4.4   |  28  |  2.94<H>    Ca    8.0<L>      10 May 2018 07:17  Phos  4.3     05-10  Mg     2.2     05-10      Creatinine Trend: 2.94<--, 3.58<--, 2.57<--, 3.34<--, 3.34<--, 3.02<--    COAGS:           T(C): 37.5 (05-12-18 @ 05:03), Max: 37.5 (05-12-18 @ 05:03)  HR: 90 (05-12-18 @ 05:03) (66 - 90)  BP: 129/72 (05-12-18 @ 05:03) (101/64 - 164/90)  RR: 20 (05-12-18 @ 05:03) (17 - 20)  SpO2: 100% (05-12-18 @ 05:03) (100% - 100%)  Wt(kg): --    I&O's Summary    HEENT:   Normal oral mucosa, PERRL, EOMI	  Lymphatic: No obvious lymphadenopathy , no edema  Cardiovascular: Normal S1 S2, No JVD, 1/6 HODA murmur, Peripheral pulses palpable 2+ bilaterally  Respiratory: coarse b/l BS  normal effort 	  Gastrointestinal:  Soft, Non-tender, + BS	  Skin: No rashes,  No cyanosis, warm to touch  Musculoskeletal: Normal range of motion, normal strength  Psychiatry:  Appropriate Mood & affect        ECHO: < from: Transthoracic Echocardiogram (05.08.18 @ 07:08) >  CONCLUSIONS:  1.  Peak mitral valve gradient equals 16 mm Hg, mean  transmitral valve gradient equals 7.5 mm Hg, consistent  with moderate mitral stenosis.  2. Moderate left atrial enlargement.  3. Moderate concentric left ventricular hypertrophy.  4. Normal Left Ventricular Systolic Function,  (EF = 55 to  60%)  5. Grade II diastolic dysfunction.  6. RV systolic pressure is moderately increased at  49 mm  Hg.  7. There is mild-moderate tricuspid regurgitation.    < end of copied text >      ASSESSMENT/PLAN: 	78y FemalePMHx Asthma, Anemia (on weekly procrit), Diastolic CHF, Cardio MEMS device  DM, ESRD on HD ( T,T,S, last one Saturday), HTN, on Home 02,  Breast CA x 2 (s/p L lumpectomy, radiation 2005) Uterine CA (s/p JACLYN)  c/o SOB now with respiratory failure from pneumonia     HD per renal   pulm follow up ,  ABX per ID ,  ID follow up noted    GI / DVT prophylaxis.   keep K>4, mag >2.0   Steroid taper .   aspiration precaution   echo  noted - due to severity of mitral stenosis (7.5, moderate) , cont to maximize BB .   D/W Dr Jaimes

## 2018-05-12 NOTE — PROGRESS NOTE ADULT - SUBJECTIVE AND OBJECTIVE BOX
ESRD due to diabetes and hypertension  Started dialysis on 12.2017  Admitted for fever  Blood culture positive to Enterobacter.  Patient was placed on IV broad spectrum antibiotics.  Patient was extubated yesterday.    Culture - Blood (05.08.18 @ 10:19)    Specimen Source: .Blood Blood-Peripheral    Culture Results:   No growth to date.    Culture - Urine (05.07.18 @ 10:24)    -  Ampicillin: R >8    -  Ciprofloxacin: R >2    -  Nitrofurantoin: I 64    -  Tetra/Doxy: R >8    -  Vancomycin: R >16    Specimen Source: .Urine Clean Catch (Midstream)    Culture Results:   >100,000 CFU/ml Enterococcus faecium (vancomycin resistant)  <10,000 CFU/ml Normal Urogenital rosie present    Organism Identification: Enterococcus faecium (vancomycin resistant)    Organism: Enterococcus faecium (vancomycin resistant)    Method Type: LON    Patient c/o diarrhea in the last 12 hours diffuse.    No abdominal pain  C/O increased weakness and fatigue   SOB improved.      PAST MEDICAL & SURGICAL HISTORY:  Anemia  Hypertension  Congestive heart failure  Chronic kidney disease  Breast carcinoma: Lt side s/p lumpectomy and radiation in 2004  Renal mass  DM (diabetes mellitus)  Asthma  S/P lumpectomy, left breast: 2004      No Known Allergies      MEDICATIONS  (STANDING):  ALBUTerol/ipratropium for Nebulization 3 milliLiter(s) Nebulizer every 6 hours  buDESOnide  80 MICROgram(s)/formoterol 4.5 MICROgram(s) Inhaler 2 Puff(s) Inhalation two times a day  chlorhexidine 4% Liquid 1 Application(s) Topical two times a day  epoetin randy Injectable 4000 Unit(s) IV Push <User Schedule>  heparin  Injectable 5000 Unit(s) SubCutaneous every 12 hours  insulin glargine Injectable (LANTUS) 30 Unit(s) SubCutaneous at bedtime  insulin lispro (HumaLOG) corrective regimen sliding scale   SubCutaneous Before meals and at bedtime  insulin lispro Injectable (HumaLOG) 6 Unit(s) SubCutaneous three times a day before meals  linezolid    Tablet 600 milliGRAM(s) Oral every 12 hours  linezolid    Tablet      metoprolol tartrate 50 milliGRAM(s) Oral two times a day  metroNIDAZOLE    Tablet 500 milliGRAM(s) Oral every 8 hours  pantoprazole    Tablet 40 milliGRAM(s) Oral before breakfast  piperacillin/tazobactam IVPB. 3.375 Gram(s) IV Intermittent every 12 hours  predniSONE   Tablet 40 milliGRAM(s) Oral daily  timolol 0.5% Solution 1 Drop(s) Both EYES two times a day    MEDICATIONS  (PRN):  acetaminophen   Tablet. 650 milliGRAM(s) Oral every 6 hours PRN Moderate Pain (4 - 6)  guaiFENesin   Syrup  (Sugar-Free) 200 milliGRAM(s) Oral every 6 hours PRN Cough  melatonin 3 milliGRAM(s) Oral at bedtime PRN Insomnia  zolpidem 5 milliGRAM(s) Oral at bedtime PRN Insomnia                      REVIEW OF SYSTEMS:  General: no fever no chills, no weight loss. c/o weakness and fatigue  EYES/ENT: No visual changes;  No vertigo, no headache.    RESPIRATORY: No cough, wheezing, hemoptysis; No shortness of breath  CARDIOVASCULAR: No chest pain or palpitations. No Edema  GASTROINTESTINAL: diarrhea  GENITOURINARY: No dysuria, frequency, foamy urine, urinary urgency, incontinence or hematuria  NEUROLOGICAL: No numbness or weakness, no tremor , no dizziness.           VITALS:  T(F): 99.5 (05-12-18 @ 05:03), Max: 99.5 (05-12-18 @ 05:03)  HR: 90 (05-12-18 @ 05:03)  BP: 129/72 (05-12-18 @ 05:03)  RR: 20 (05-12-18 @ 05:03)  SpO2: 100% (05-12-18 @ 05:03)  Wt(kg): --      PHYSICAL EXAM:  Constitutional: well developed, no diaphoresis, no distress.  Neck: No JVD, no carotid bruit, supple, no adenopathy  Respiratory: Good air entrance, FEW RONCHI  Cardiovascular: S1, S2, RRR, no pericardial rub, no murmur  Abdomen: BS+, soft, no tenderness, no bruit  Pelvis: bladder nondistended  Extremities: No cyanosis or clubbing. No peripheral edema.     Vascular Access: right PC

## 2018-05-13 LAB
C DIFF BY PCR RESULT: SIGNIFICANT CHANGE UP
C DIFF TOX GENS STL QL NAA+PROBE: SIGNIFICANT CHANGE UP
CULTURE RESULTS: SIGNIFICANT CHANGE UP
GLUCOSE BLDC GLUCOMTR-MCNC: 111 MG/DL — HIGH (ref 70–99)
GLUCOSE BLDC GLUCOMTR-MCNC: 146 MG/DL — HIGH (ref 70–99)
GLUCOSE BLDC GLUCOMTR-MCNC: 175 MG/DL — HIGH (ref 70–99)
GLUCOSE BLDC GLUCOMTR-MCNC: 204 MG/DL — HIGH (ref 70–99)
SPECIMEN SOURCE: SIGNIFICANT CHANGE UP

## 2018-05-13 RX ADMIN — BUDESONIDE AND FORMOTEROL FUMARATE DIHYDRATE 2 PUFF(S): 160; 4.5 AEROSOL RESPIRATORY (INHALATION) at 11:28

## 2018-05-13 RX ADMIN — Medication 6 UNIT(S): at 11:28

## 2018-05-13 RX ADMIN — BUDESONIDE AND FORMOTEROL FUMARATE DIHYDRATE 2 PUFF(S): 160; 4.5 AEROSOL RESPIRATORY (INHALATION) at 22:25

## 2018-05-13 RX ADMIN — Medication 125 MILLIGRAM(S): at 06:39

## 2018-05-13 RX ADMIN — CHLORHEXIDINE GLUCONATE 1 APPLICATION(S): 213 SOLUTION TOPICAL at 17:34

## 2018-05-13 RX ADMIN — Medication 6 UNIT(S): at 08:14

## 2018-05-13 RX ADMIN — Medication 500 MILLIGRAM(S): at 14:07

## 2018-05-13 RX ADMIN — Medication 125 MILLIGRAM(S): at 17:35

## 2018-05-13 RX ADMIN — CHLORHEXIDINE GLUCONATE 1 APPLICATION(S): 213 SOLUTION TOPICAL at 06:40

## 2018-05-13 RX ADMIN — PANTOPRAZOLE SODIUM 40 MILLIGRAM(S): 20 TABLET, DELAYED RELEASE ORAL at 06:40

## 2018-05-13 RX ADMIN — Medication 1 DROP(S): at 06:40

## 2018-05-13 RX ADMIN — Medication 2: at 08:14

## 2018-05-13 RX ADMIN — Medication 50 MILLIGRAM(S): at 06:39

## 2018-05-13 RX ADMIN — Medication 500000 UNIT(S): at 12:20

## 2018-05-13 RX ADMIN — Medication 6 UNIT(S): at 17:03

## 2018-05-13 RX ADMIN — Medication 650 MILLIGRAM(S): at 20:08

## 2018-05-13 RX ADMIN — Medication 40 MILLIGRAM(S): at 06:39

## 2018-05-13 RX ADMIN — Medication 125 MILLIGRAM(S): at 00:01

## 2018-05-13 RX ADMIN — Medication 125 MILLIGRAM(S): at 12:25

## 2018-05-13 RX ADMIN — Medication 3 MILLILITER(S): at 20:34

## 2018-05-13 RX ADMIN — LINEZOLID 600 MILLIGRAM(S): 600 INJECTION, SOLUTION INTRAVENOUS at 17:35

## 2018-05-13 RX ADMIN — Medication 500 MILLIGRAM(S): at 06:39

## 2018-05-13 RX ADMIN — HEPARIN SODIUM 5000 UNIT(S): 5000 INJECTION INTRAVENOUS; SUBCUTANEOUS at 06:40

## 2018-05-13 RX ADMIN — PIPERACILLIN AND TAZOBACTAM 25 GRAM(S): 4; .5 INJECTION, POWDER, LYOPHILIZED, FOR SOLUTION INTRAVENOUS at 17:35

## 2018-05-13 RX ADMIN — Medication 4: at 17:03

## 2018-05-13 RX ADMIN — PIPERACILLIN AND TAZOBACTAM 25 GRAM(S): 4; .5 INJECTION, POWDER, LYOPHILIZED, FOR SOLUTION INTRAVENOUS at 06:39

## 2018-05-13 RX ADMIN — LINEZOLID 600 MILLIGRAM(S): 600 INJECTION, SOLUTION INTRAVENOUS at 06:39

## 2018-05-13 RX ADMIN — Medication 500 MILLIGRAM(S): at 22:25

## 2018-05-13 RX ADMIN — Medication 650 MILLIGRAM(S): at 20:38

## 2018-05-13 RX ADMIN — Medication 1 DROP(S): at 17:38

## 2018-05-13 NOTE — PROGRESS NOTE ADULT - SUBJECTIVE AND OBJECTIVE BOX
Problem List:  ESRD due to diabetes and hypertension  Started dialysis on 12.2017  Admitted for fever  Blood culture positive to Enterobacter.  Patient was placed on IV broad spectrum antibiotics.  Patient was extubated yesterday.    Culture - Blood (05.08.18 @ 10:19)    Specimen Source: .Blood Blood-Peripheral    Culture Results:   No growth to date.    Culture - Urine (05.07.18 @ 10:24)    -  Ampicillin: R >8    -  Ciprofloxacin: R >2    -  Nitrofurantoin: I 64    -  Tetra/Doxy: R >8    -  Vancomycin: R >16    Specimen Source: .Urine Clean Catch (Midstream)    Culture Results:   >100,000 CFU/ml Enterococcus faecium (vancomycin resistant)  <10,000 CFU/ml Normal Urogenital rosie present    Organism Identification: Enterococcus faecium (vancomycin resistant)    Organism: Enterococcus faecium (vancomycin resistant)    Method Type: LON    Patient c/o diarrhea frequent and profuse  Appetite reduced but able to eat.          PAST MEDICAL & SURGICAL HISTORY:  Anemia  Hypertension  Congestive heart failure  Chronic kidney disease  Breast carcinoma: Lt side s/p lumpectomy and radiation in 2004  Renal mass  DM (diabetes mellitus)  Asthma  S/P lumpectomy, left breast: 2004      No Known Allergies      MEDICATIONS  (STANDING):  ALBUTerol/ipratropium for Nebulization 3 milliLiter(s) Nebulizer every 6 hours  buDESOnide  80 MICROgram(s)/formoterol 4.5 MICROgram(s) Inhaler 2 Puff(s) Inhalation two times a day  chlorhexidine 4% Liquid 1 Application(s) Topical two times a day  epoetin randy Injectable 4000 Unit(s) IV Push <User Schedule>  heparin  Injectable 5000 Unit(s) SubCutaneous every 12 hours  insulin glargine Injectable (LANTUS) 30 Unit(s) SubCutaneous at bedtime  insulin lispro (HumaLOG) corrective regimen sliding scale   SubCutaneous Before meals and at bedtime  insulin lispro Injectable (HumaLOG) 6 Unit(s) SubCutaneous three times a day before meals  linezolid    Tablet 600 milliGRAM(s) Oral every 12 hours  linezolid    Tablet      metoprolol tartrate 50 milliGRAM(s) Oral two times a day  metroNIDAZOLE    Tablet 500 milliGRAM(s) Oral every 8 hours  nystatin    Suspension [id number] Unit(s) Oral daily  pantoprazole    Tablet 40 milliGRAM(s) Oral before breakfast  piperacillin/tazobactam IVPB. 3.375 Gram(s) IV Intermittent every 12 hours  predniSONE   Tablet 40 milliGRAM(s) Oral daily  timolol 0.5% Solution 1 Drop(s) Both EYES two times a day  vancomycin    Solution 125 milliGRAM(s) Oral every 6 hours    MEDICATIONS  (PRN):  acetaminophen   Tablet. 650 milliGRAM(s) Oral every 6 hours PRN Moderate Pain (4 - 6)  guaiFENesin   Syrup  (Sugar-Free) 200 milliGRAM(s) Oral every 6 hours PRN Cough  melatonin 3 milliGRAM(s) Oral at bedtime PRN Insomnia  zolpidem 5 milliGRAM(s) Oral at bedtime PRN Insomnia                            10.2   11.6  )-----------( 178      ( 12 May 2018 09:56 )             34.0     05-12    136  |  101  |  70<H>  ----------------------------<  98  4.9   |  27  |  3.93<H>    Ca    7.9<L>      12 May 2018 09:56  Phos  5.0     05-12  Mg     2.3     05-12              REVIEW OF SYSTEMS:  Weakness and fatigue  Diarrhea as above  Dyspnea improved  VITALS:  T(F): 98.6 (05-12-18 @ 20:32), Max: 99.2 (05-12-18 @ 09:20)  HR: 89 (05-13-18 @ 04:44)  BP: 134/82 (05-13-18 @ 04:44)  RR: 18 (05-13-18 @ 04:44)  SpO2: 98% (05-13-18 @ 04:44)  Wt(kg): --      PHYSICAL EXAM:  Constitutional:  no diaphoresis, no distress.  Neck: No JVD, no carotid bruit, supple, no adenopathy  Respiratory: Good air entrance no wheezing  Cardiovascular: S1, S2, RRR, no pericardial rub, no murmur  Abdomen: BS+, soft, no tenderness, no bruit  Pelvis: bladder nondistended  Extremities: No cyanosis or clubbing. No peripheral edema.   Pulses: All present  Neurological: A/O x 3, no focal deficits  Psychiatric: Normal mood, normal affect  Skin: No rashes  Vascular Access: right PC

## 2018-05-13 NOTE — PHYSICAL THERAPY INITIAL EVALUATION ADULT - RANGE OF MOTION EXAMINATION, REHAB EVAL
bilateral upper extremity ROM was WNL (within normal limits)/deficits as listed below/bilateral lower extremity was ROM was WNL (within normal limits)/except Lt shoulder limited sec to pain upon movement.

## 2018-05-13 NOTE — PROGRESS NOTE ADULT - SUBJECTIVE AND OBJECTIVE BOX
Patient is a 78y old  Female who presents with a chief complaint of SOB, fever (07 May 2018 01:15)    seen in icu [  ], reg med floor [ x  ], bed [x  ], chair at bedside [   ], awake and responsive [x ], sedated [  ],  nad [ x ]      Allergies    No Known Allergies        Vitals    T(F): 98.6 (05-12-18 @ 20:32), Max: 99.2 (05-12-18 @ 09:20)  HR: 89 (05-13-18 @ 04:44) (63 - 89)  BP: 134/82 (05-13-18 @ 04:44) (134/82 - 159/109)  RR: 18 (05-13-18 @ 04:44) (16 - 20)  SpO2: 98% (05-13-18 @ 04:44) (94% - 99%)  Wt(kg): --  CAPILLARY BLOOD GLUCOSE      POCT Blood Glucose.: 175 mg/dL (13 May 2018 07:51)      Labs                          10.2   11.6  )-----------( 178      ( 12 May 2018 09:56 )             34.0       05-12    136  |  101  |  70<H>  ----------------------------<  98  4.9   |  27  |  3.93<H>    Ca    7.9<L>      12 May 2018 09:56  Phos  5.0     05-12  Mg     2.3     05-12              .Blood Blood-Peripheral  05-08 @ 10:19   No growth to date.  --  --      .Blood Blood-Peripheral  05-07 @ 11:31   Growth in aerobic and anaerobic bottles: Enterobacter cloacae/asburiae  "Due to technical problems, Proteus sp. will Not be reported as part of  the BCID panel until further notice"  ***Blood Panel PCR results on this specimen are available  approximately 3 hours after the Gram stain result.***  Gram stain, PCR, and/or culture results may not always  correspond due to difference in methodologies.  ************************************************************  This PCR assay was performed using [employer].  The following targets are tested for: Enterococcus,  vancomycin resistant enterococci, Listeria monocytogenes,  coagulase negative staphylococci, S. aureus,  methicillin resistant S. aureus, Streptococcus agalactiae  (Group B), S. pneumoniae, S. pyogenes (Group A),  Acinetobacter baumannii, Enterobacter cloacae, E. coli,  Klebsiella oxytoca, K. pneumoniae, Proteus sp.,  Serratia marcescens, Haemophilus influenzae,  Neisseria meningitidis, Pseudomonas aeruginosa, Candida  albicans, C. glabrata, C krusei, C parapsilosis,  C. tropicalis and the KPC resistance gene.  --  Blood Culture PCR  Enterobacter cloacae/absuria      .Blood Blood-Peripheral  05-07 @ 11:28   Growth in aerobic and anaerobic bottles: Enterobacter cloacae/asburiae  See previous culture [id number]  --    Growth in aerobic bottle: Gram Negative Rods  Growth in anaerobic bottle: Gram Negative Rods      .Urine Clean Catch (Midstream)  05-07 @ 10:24   >100,000 CFU/ml Enterococcus faecium (vancomycin resistant)  <10,000 CFU/ml Normal Urogenital rosie present  --  Enterococcus faecium (vancomycin resistant)          Radiology Results      Meds    MEDICATIONS  (STANDING):  ALBUTerol/ipratropium for Nebulization 3 milliLiter(s) Nebulizer every 6 hours  buDESOnide  80 MICROgram(s)/formoterol 4.5 MICROgram(s) Inhaler 2 Puff(s) Inhalation two times a day  chlorhexidine 4% Liquid 1 Application(s) Topical two times a day  epoetin randy Injectable 4000 Unit(s) IV Push <User Schedule>  heparin  Injectable 5000 Unit(s) SubCutaneous every 12 hours  insulin glargine Injectable (LANTUS) 30 Unit(s) SubCutaneous at bedtime  insulin lispro (HumaLOG) corrective regimen sliding scale   SubCutaneous Before meals and at bedtime  insulin lispro Injectable (HumaLOG) 6 Unit(s) SubCutaneous three times a day before meals  linezolid    Tablet 600 milliGRAM(s) Oral every 12 hours  linezolid    Tablet      metoprolol tartrate 50 milliGRAM(s) Oral two times a day  metroNIDAZOLE    Tablet 500 milliGRAM(s) Oral every 8 hours  nystatin    Suspension 720932 Unit(s) Oral daily  pantoprazole    Tablet 40 milliGRAM(s) Oral before breakfast  piperacillin/tazobactam IVPB. 3.375 Gram(s) IV Intermittent every 12 hours  predniSONE   Tablet 40 milliGRAM(s) Oral daily  timolol 0.5% Solution 1 Drop(s) Both EYES two times a day  vancomycin    Solution 125 milliGRAM(s) Oral every 6 hours      MEDICATIONS  (PRN):  acetaminophen   Tablet. 650 milliGRAM(s) Oral every 6 hours PRN Moderate Pain (4 - 6)  guaiFENesin   Syrup  (Sugar-Free) 200 milliGRAM(s) Oral every 6 hours PRN Cough  melatonin 3 milliGRAM(s) Oral at bedtime PRN Insomnia  zolpidem 5 milliGRAM(s) Oral at bedtime PRN Insomnia      Physical Exam    Neuro :  no focal deficits  Respiratory:  B/L rales  CV: RRR, S1S2, no murmurs,   Abdominal: Soft, NT, ND +BS,  Extremities: No edema, + peripheral pulses    ASSESSMENT    sepsis poss 2nd pna vs perm cath infxn  E cloacae bacteremia  Hypervolemia  Anemia  uti  abnormal trop  diarrhea r/o c diff  h/o Hypertension  Diastolic CHF  pulm htn  mitral stenosis  Chronic kidney disease  Breast carcinoma  Renal mass  DM (diabetes mellitus)  Asthma  S/P lumpectomy, left breast  No significant past surgical history      PLAN      CXR Diffuse airspace disease, worse in the right lung. Small bilateral pleural effusions.  rept cxr with resolution of airspace disease and effusion noted above  ct chest with Small nodular opacities within large portions of the left upper lobe and parts of the left lower lobe. Trace left pleural effusion.  blood cx with enterobacter cloacae noted above  ucx with >100,000 CFU/ml Enterococcus faecium (vre) and <10,000 CFU/ml Normal Urogenital rosie present noted above  id f/u  Continue Zyvox 600 mg Po q 12 hours x 14 days total until 5/22/18 and observe platelets while patient is on Zyvox.  Continue IV Zosyn x 7 days until 5/13/18 then change to po Cipro 250 mg po q 12 hours x 7 more days  rept blood cx neg noted above  pulm f/u  s/p extubation 5/8/18  renal f/u  cont hd as per renal  vascular cons noted  no need for vascular intervention at this time  ce x 1 noted above  cardio f/u noted  echo with  moderate mitral stenosis, EF = 55 to 60%. Grade II diastolic dysfunction. mod pulm htn  pulm cons  cont vanco po  f/u stool for d ciff  cont current meds  d/c plan for 5/14 if stable Patient is a 78y old  Female who presents with a chief complaint of SOB, fever (07 May 2018 01:15)    seen in icu [  ], reg med floor [ x  ], bed [x  ], chair at bedside [   ], awake and responsive [x ], sedated [  ],  nad [ x ]    diarrhea episodes greatly reduced    Allergies    No Known Allergies        Vitals    T(F): 98.6 (05-12-18 @ 20:32), Max: 99.2 (05-12-18 @ 09:20)  HR: 89 (05-13-18 @ 04:44) (63 - 89)  BP: 134/82 (05-13-18 @ 04:44) (134/82 - 159/109)  RR: 18 (05-13-18 @ 04:44) (16 - 20)  SpO2: 98% (05-13-18 @ 04:44) (94% - 99%)  Wt(kg): --  CAPILLARY BLOOD GLUCOSE      POCT Blood Glucose.: 175 mg/dL (13 May 2018 07:51)      Labs                          10.2   11.6  )-----------( 178      ( 12 May 2018 09:56 )             34.0       05-12    136  |  101  |  70<H>  ----------------------------<  98  4.9   |  27  |  3.93<H>    Ca    7.9<L>      12 May 2018 09:56  Phos  5.0     05-12  Mg     2.3     05-12              .Blood Blood-Peripheral  05-08 @ 10:19   No growth to date.  --  --      .Blood Blood-Peripheral  05-07 @ 11:31   Growth in aerobic and anaerobic bottles: Enterobacter cloacae/asburiae  "Due to technical problems, Proteus sp. will Not be reported as part of  the BCID panel until further notice"  ***Blood Panel PCR results on this specimen are available  approximately 3 hours after the Gram stain result.***  Gram stain, PCR, and/or culture results may not always  correspond due to difference in methodologies.  ************************************************************  This PCR assay was performed using [employer].  The following targets are tested for: Enterococcus,  vancomycin resistant enterococci, Listeria monocytogenes,  coagulase negative staphylococci, S. aureus,  methicillin resistant S. aureus, Streptococcus agalactiae  (Group B), S. pneumoniae, S. pyogenes (Group A),  Acinetobacter baumannii, Enterobacter cloacae, E. coli,  Klebsiella oxytoca, K. pneumoniae, Proteus sp.,  Serratia marcescens, Haemophilus influenzae,  Neisseria meningitidis, Pseudomonas aeruginosa, Candida  albicans, C. glabrata, C krusei, C parapsilosis,  C. tropicalis and the KPC resistance gene.  --  Blood Culture PCR  Enterobacter cloacae/absuria      .Blood Blood-Peripheral  05-07 @ 11:28   Growth in aerobic and anaerobic bottles: Enterobacter cloacae/asburiae  See previous culture [id number]  --    Growth in aerobic bottle: Gram Negative Rods  Growth in anaerobic bottle: Gram Negative Rods      .Urine Clean Catch (Midstream)  05-07 @ 10:24   >100,000 CFU/ml Enterococcus faecium (vancomycin resistant)  <10,000 CFU/ml Normal Urogenital rosie present  --  Enterococcus faecium (vancomycin resistant)          Radiology Results      Meds    MEDICATIONS  (STANDING):  ALBUTerol/ipratropium for Nebulization 3 milliLiter(s) Nebulizer every 6 hours  buDESOnide  80 MICROgram(s)/formoterol 4.5 MICROgram(s) Inhaler 2 Puff(s) Inhalation two times a day  chlorhexidine 4% Liquid 1 Application(s) Topical two times a day  epoetin randy Injectable 4000 Unit(s) IV Push <User Schedule>  heparin  Injectable 5000 Unit(s) SubCutaneous every 12 hours  insulin glargine Injectable (LANTUS) 30 Unit(s) SubCutaneous at bedtime  insulin lispro (HumaLOG) corrective regimen sliding scale   SubCutaneous Before meals and at bedtime  insulin lispro Injectable (HumaLOG) 6 Unit(s) SubCutaneous three times a day before meals  linezolid    Tablet 600 milliGRAM(s) Oral every 12 hours  linezolid    Tablet      metoprolol tartrate 50 milliGRAM(s) Oral two times a day  metroNIDAZOLE    Tablet 500 milliGRAM(s) Oral every 8 hours  nystatin    Suspension 329799 Unit(s) Oral daily  pantoprazole    Tablet 40 milliGRAM(s) Oral before breakfast  piperacillin/tazobactam IVPB. 3.375 Gram(s) IV Intermittent every 12 hours  predniSONE   Tablet 40 milliGRAM(s) Oral daily  timolol 0.5% Solution 1 Drop(s) Both EYES two times a day  vancomycin    Solution 125 milliGRAM(s) Oral every 6 hours      MEDICATIONS  (PRN):  acetaminophen   Tablet. 650 milliGRAM(s) Oral every 6 hours PRN Moderate Pain (4 - 6)  guaiFENesin   Syrup  (Sugar-Free) 200 milliGRAM(s) Oral every 6 hours PRN Cough  melatonin 3 milliGRAM(s) Oral at bedtime PRN Insomnia  zolpidem 5 milliGRAM(s) Oral at bedtime PRN Insomnia      Physical Exam    Neuro :  no focal deficits  Respiratory:  B/L rales  CV: RRR, S1S2, no murmurs,   Abdominal: Soft, NT, ND +BS,  Extremities: No edema, + peripheral pulses    ASSESSMENT    sepsis poss 2nd pna vs perm cath infxn  E cloacae bacteremia  Hypervolemia  Anemia  uti  abnormal trop  diarrhea r/o c diff  h/o Hypertension  Diastolic CHF  pulm htn  mitral stenosis  Chronic kidney disease  Breast carcinoma  Renal mass  DM (diabetes mellitus)  Asthma  S/P lumpectomy, left breast  No significant past surgical history      PLAN      CXR Diffuse airspace disease, worse in the right lung. Small bilateral pleural effusions.  rept cxr with resolution of airspace disease and effusion noted above  ct chest with Small nodular opacities within large portions of the left upper lobe and parts of the left lower lobe. Trace left pleural effusion.  blood cx with enterobacter cloacae noted above  ucx with >100,000 CFU/ml Enterococcus faecium (vre) and <10,000 CFU/ml Normal Urogenital rosie present noted above  id f/u  Continue Zyvox 600 mg Po q 12 hours x 14 days total until 5/22/18 and observe platelets while patient is on Zyvox.  Continue IV Zosyn x 7 days until 5/13/18 then change to po Cipro 250 mg po q 12 hours x 7 more days  rept blood cx neg noted above  pulm f/u  s/p extubation 5/8/18  renal f/u  cont hd as per renal  vascular cons noted  no need for vascular intervention at this time  ce x 1 noted above  cardio f/u noted  echo with  moderate mitral stenosis, EF = 55 to 60%. Grade II diastolic dysfunction. mod pulm htn  pulm cons  cont vanco po  f/u stool for d ciff  cont current meds  d/c plan for 5/14 if stable

## 2018-05-13 NOTE — PHYSICAL THERAPY INITIAL EVALUATION ADULT - ACTIVE RANGE OF MOTION EXAMINATION, REHAB EVAL
deficits as listed below/except Lt shoulder limited sec to pain upon movement./eryn. upper extremity Active ROM was WNL (within normal limits)/bilateral lower extremity Active ROM was WNL (within normal limits)

## 2018-05-13 NOTE — PHYSICAL THERAPY INITIAL EVALUATION ADULT - PLANNED THERAPY INTERVENTIONS, PT EVAL
balance training/bed mobility training/gait training/strengthening/neuromuscular re-education/ROM/transfer training

## 2018-05-13 NOTE — PROGRESS NOTE ADULT - SUBJECTIVE AND OBJECTIVE BOX
Meds:  piperacillin/tazobactam IVPB. 3.375 Gram(s) IV Intermittent every 12 hours.  Zyvox 600 mg po q 12 hours (Started 5/9/18)    Allergies:  Allergies    No Known Allergies    Intolerances        ROS  [  ] UNABLE TO ELICIT    General:  [  ] None  [  ] Fever  [  ] Chills  [ x ] Malaise    Skin:  [ x ] None [  ] Rash  [  ] Wound  [  ] Ulcer    HEENT:  [ x ] None  [  ] Sore Throat  [  ] Nasal congestion/ runny nose  [  ] Photophobia [  ] Neck pain      Chest:  [ x ] None   [  ] SOB  [  ] Cough  [  ] None    Cardiovascular:   [ x ] None  [  ] CP  [  ] Palpitation    Gastrointestinal:  [ x ] None  [  ] Abd pain   [  ] Nausea    [  ] Vomiting   [ x ] Diarrhea	     Genitourinary:  [ x ] None [  ] Polyuria   [  ] Urgency  [  ] Frequency  [  ] Dysuria    [  ]  Hematuria       Musculoskeletal:  [  ] None [  ] Back Pain	[  ] Body aches  [  ] Joint pain [ x ] Weakness    Neurological: [  ] None [  ]Dizziness  [  ]Visual Disturbance  [  ]Headaches   [ x ] Weakness          PHYSICAL EXAM:  Vital Signs Last 24 Hrs  T(C): 37 (12 May 2018 20:32), Max: 37.1 (12 May 2018 12:26)  T(F): 98.6 (12 May 2018 20:32), Max: 98.8 (12 May 2018 12:26)  HR: 89 (13 May 2018 04:44) (63 - 89)  BP: 134/82 (13 May 2018 04:44) (134/82 - 159/109)  BP(mean): --  RR: 18 (13 May 2018 04:44) (16 - 20)  SpO2: 98% (13 May 2018 04:44) (94% - 99%)    Constitutional:    HEENT: [ x ] Wnl  [  ] Pharyngeal congestion [  ] Intubated.    Neck:  [ x ] Supple  [  ]Lymphadenopathy  [  ] No JVD   [  ] JVD  [  ] Masses   [  ] WNL    CHEST/Respiratory:  [  ]Clear to auscultation  [ x ] Rales   [  ] Rhonchi   [  ] Wheezing     [ x ] Right side dialysis catheter      Cardiovascular:  [ x ] Reg S1 S2   [  ] Irreg S1 S2   [ x ]No Murmur  [  ] +ve Murmurs  [  ]Systolic [  ]Diastolic      Abdomen:  [ x ] Soft  [ x ] No tendrerness  [  ] Tenderness  [  ] Organomegaly  [  ] ABD Distention  [  ] Rigidity                       [ x ] No Regidity                       [ x ] No Rebound Tenderness  [  ] No Guarding Rigidity  [  ] Rebound Tenderness[  ] Guarding Rigidity                          [ x ]  +ve Bowel Sounds  [  ] Decreased Bowel Sounds    [  ] Absent Bowel Sounds                            Extremities: [ x ] No edema [  ] Edema  [  ] Clubbing   [  ] Cyanosis                         [ x ] No Tender Calf muscles  [  ] Tender Calf muscles                        [ x ] Palpable peripheral pulses    Neurological: [ x ] Awake  [ x ] Alert  [ x ] Oriented  x  3                           [  ] Confused  [  ] Drowsy  [  ] respond to painful stimuli  [  ] Unresponsive    Skin:  [ x ] Intact [  ] Redness [  ] Thrombophlebitis  [  ] Rashes  [  ] Dry  [  ] Ulcers    Ortho:  [  ] Joint Swelling  [  ] Joint erythema [  ] Joint tenderness                [  ] Increased temp. to touch  [  ] DJD [ x ] WNL          LABS/DIAGNOSTIC TESTS                        10.2   11.6  )-----------( 178      ( 12 May 2018 09:56 )             34.0   05-12    136  |  101  |  70<H>  ----------------------------<  98  4.9   |  27  |  3.93<H>    Ca    7.9<L>      12 May 2018 09:56  Phos  5.0     05-12  Mg     2.3     05-12    CAPILLARY BLOOD GLUCOSE      POCT Blood Glucose.: 175 mg/dL (13 May 2018 07:51)  POCT Blood Glucose.: 99 mg/dL (12 May 2018 21:20)  POCT Blood Glucose.: 172 mg/dL (12 May 2018 16:24)  POCT Blood Glucose.: 86 mg/dL (12 May 2018 11:23)      CULTURES:   Culture - Blood (05.08.18 @ 10:19)    Specimen Source: .Blood Blood-Peripheral    Culture Results:   No growth to date.      Culture - Urine (05.07.18 @ 10:24)    -  Ampicillin: R >8    -  Vancomycin: R >16    -  Nitrofurantoin: I 64    -  Tetra/Doxy: R >8    -  Ciprofloxacin: R >2    Specimen Source: .Urine Clean Catch (Midstream)    Culture Results:   >100,000 CFU/ml Enterococcus faecium (vancomycin resistant)  <10,000 CFU/ml Normal Urogenital rosie present    Organism Identification: Enterococcus faecium (vancomycin resistant)    Organism: Enterococcus faecium (vancomycin resistant)    Method Type: LON      Culture - Blood (05.07.18 @ 11:31)    Gram Stain:   Growth in aerobic bottle: Gram Negative Rods  Growth in anaerobic bottle: Gram Negative Rods    -  Enterobacter cloacae complex: Detec    Specimen Source: .Blood Blood-Peripheral    Organism: Blood Culture PCR    Culture Results:   Growth in aerobic bottle: Gram Negative Rods  Growth in anaerobic bottle: Gram Negative Rods  "Due to technical problems, Proteus sp. will Not be reported as part of  the BCID panel until further notice"  ***Blood Panel PCR results on this specimenare available  approximately 3 hours after the Gram stain result.***  Gram stain, PCR, and/or culture results may not always  correspond due to difference in methodologies.  ************************************************************  This PCR assaywas performed using [employer].  The following targets are tested for: Enterococcus,  vancomycin resistant enterococci, Listeria monocytogenes,  coagulase negative staphylococci, S. aureus,  methicillin resistant S. aureus, Streptococcus agalactiae  (Group B), S. pneumoniae, S. pyogenes (Group A),  Acinetobacter baumannii, Enterobacter cloacae, E. coli,  Klebsiella oxytoca, K. pneumoniae, Proteus sp.,  Serratia marcescens, Haemophilus influenzae,  Neisseria meningitidis, Pseudomonas aeruginosa, Candida  albicans, C. glabrata, C krusei, C parapsilosis,  C. tropicalis and the KPC resistance gene.    Organism Identification: Blood Culture PCR    Method Type: PCR    Culture - Blood (05.07.18 @ 11:28)    Gram Stain:   Growth in aerobic bottle: Gram Negative Rods  Growth in anaerobic bottle: Gram Negative Rods    Specimen Source: .Blood Blood-Peripheral    Culture Results:   Growth in aerobic bottle: Gram Negative Rods  Growth in anaerobic bottle: Gram Negative Rods    RADIOLOGY    EXAM:  CT CHEST                            PROCEDURE DATE:  05/11/2018          INTERPRETATION:  CT CHEST WITHOUT CONTRAST    INDICATION: Asthma. Pneumonia. ESRD on hemodialysis. CHF. Evaluate for   loculation.    TECHNIQUE: Unenhanced helical images were obtained of the chest. Coronal   and sagittal images were reconstructed. Maximum intensity projection   images were generated.     COMPARISON: CT abdomen pelvis 3/5/2015.    FINDINGS:     Tubes/Lines: Right-sided IJ central line with the tip in the SVC.    Lungs And Airways: The airways are unremarkable. There are nodular small   opacities throughout large portion of the left upper lobe representing   and portions of the left lower lobe representing impacted distal airways   likely of infectious etiology. There are a few 2 or 3 mm right upper lobe   pulmonary nodules which may also be related to the infection. There is   atelectasis in the lingula as well. Left lung base atelectasis. There are   left upper lobe peripheral reticular markings abutting the anterior chest   wall related to prior radiation therapy.    Pleura: Trace left pleural effusion. No pneumothorax.    Mediastinum: There are no enlarged chest lymph nodes. Hypodense right   thyroid gland dominant 2.4 cm nodule with heterogeneity of the remainder   of the bilateral thyroid glands with enlargement resulting in mild   luminal narrowing of the trachea.    Heart and Vasculature: The heart is enlarged. No pericardial effusion.   Coronary arterial calcifications involving the LAD, left circumflex, and   right coronary artery. Mitral annular calcifications. Cardiomem is noted   within the left lower lobe pulmonary artery.    The main pulmonary artery is enlarged and measures 3.4 cm which may be   seen in pulmonary hypertension. Atherosclerotic changes.    Upper Abdomen: The kidneys are partially visualized. Partially imaged   renal cysts are noted as on the prior abdominal CT of March 5, 2015.    Bones And Soft Tissues: Degenerative changes. Patient appears to be   status post left breast surgery.      IMPRESSION:  Small nodular opacities within large portions of the left   upper lobe and parts of the left lower lobe as above likely pneumonia. A   three-month follow-up CT is recommended to confirm resolution.    Trace left pleural effusion.                  Assessment and Recommendation:   7F PMHx Asthma, Anemia (on weekly procrit), HF pEF, DM, ESRD on HD ( T,T,S, last one Saturday), HTN, on Home 02,  Breast CA x 2 (s/p L lumpectomy, radiation 2005) Uterine CA (s/p JACLYN)  c/o SOB. Pt that she started feeling SOB and went to [facility] but nobody saw her because SOB didn't improve pt decided to come to [facility]. As per patient she drinks lots of water. At ED code sepsis was called 2/2 fevers, tachycardia and leukocytosis. Pt has been complaining of a dry cough since Saturday.   Patient was intubated and was started on IV Zosyn.  Blood cultures grew Enterobacter cloacae complex and patient was started on IV gentamycin.  5/8/18 Patient was extubated and transferred to a regular floor.  Urine culture grew VRE.  repeat blood culture on 5/8/18 is still -ve.  5/11/18 CT chest suggested pneumonia.  5/12/18 Patient c/o diarrhea.    Problem/Recommendation - 1:  Problem: Pneumonia.   Recommendation:   1- Contact precautions as urine CS grew VRE.  2- Follow repeat blood cultures is still -ve, and Follow +VE Blood culture to final report is noted.  3- Continue Zyvox 600 mg Po q 12 hours x 2 weeks and observe platelets while patient is on Zyvox.  4- Continue IV Zosyn but increase dose to 3.375 gm IVPB q 8 hours x 7 days then give Cipro 500 mg po q 12 hours x 7 more days.  5- Fluid and electrolytes management.  6- CBC and BMP follow up.   7- Nephrology management.  8- Stool for CDT if diarrhea persist.    Problem/Recommendation - 2:  ·  Problem: Acute respiratory failure, unspecified whether with hypoxia or hypercapnia.    Recommendation:   1- As per problem # 1.    Problem/Recommendation - 3:  ·  Problem: Asthma.    Recommendation:   1- Bronchodilators.  2- O2 as needed.  3- Pulmonary management, and follow up.  4- Steroids as per primary, and pulmonary team if needed.     Problem/Recommendation - 4:  ·  Problem: Type 2 diabetes mellitus with stage 5 chronic kidney disease.    Recommendation:   1- Blood sugar monitoring and control.  2- Accu-Cheks with coverage.  3- 1800 yazmin ADA diet.  4- Follow HB A1C.     Problem/Recommendation - 5:  ·  Problem: Anemia.    Recommendation:   1- Closely monitor H & H.  2- Observe for bleeding.     Discussed with medical resident, and with patient.

## 2018-05-13 NOTE — PROGRESS NOTE ADULT - SUBJECTIVE AND OBJECTIVE BOX
pt seen and examined, no chest pain or palpitation on exam  no events overnight     acetaminophen   Tablet. 650 milliGRAM(s) Oral every 6 hours PRN  ALBUTerol/ipratropium for Nebulization 3 milliLiter(s) Nebulizer every 6 hours  buDESOnide  80 MICROgram(s)/formoterol 4.5 MICROgram(s) Inhaler 2 Puff(s) Inhalation two times a day  chlorhexidine 4% Liquid 1 Application(s) Topical two times a day  epoetin randy Injectable 4000 Unit(s) IV Push <User Schedule>  guaiFENesin   Syrup  (Sugar-Free) 200 milliGRAM(s) Oral every 6 hours PRN  heparin  Injectable 5000 Unit(s) SubCutaneous every 12 hours  insulin glargine Injectable (LANTUS) 30 Unit(s) SubCutaneous at bedtime  insulin lispro (HumaLOG) corrective regimen sliding scale   SubCutaneous Before meals and at bedtime  insulin lispro Injectable (HumaLOG) 6 Unit(s) SubCutaneous three times a day before meals  linezolid    Tablet 600 milliGRAM(s) Oral every 12 hours  linezolid    Tablet      melatonin 3 milliGRAM(s) Oral at bedtime PRN  metoprolol tartrate 50 milliGRAM(s) Oral two times a day  metroNIDAZOLE    Tablet 500 milliGRAM(s) Oral every 8 hours  nystatin    Suspension 107281 Unit(s) Oral daily  pantoprazole    Tablet 40 milliGRAM(s) Oral before breakfast  piperacillin/tazobactam IVPB. 3.375 Gram(s) IV Intermittent every 12 hours  predniSONE   Tablet 40 milliGRAM(s) Oral daily  timolol 0.5% Solution 1 Drop(s) Both EYES two times a day  vancomycin    Solution 125 milliGRAM(s) Oral every 6 hours  zolpidem 5 milliGRAM(s) Oral at bedtime PRN                            10.2   11.6  )-----------( 178      ( 12 May 2018 09:56 )             34.0       Hemoglobin: 10.2 g/dL (05-12 @ 09:56)  Hemoglobin: 10.6 g/dL (05-10 @ 07:17)  Hemoglobin: 10.4 g/dL (05-09 @ 09:16)      05-12    136  |  101  |  70<H>  ----------------------------<  98  4.9   |  27  |  3.93<H>    Ca    7.9<L>      12 May 2018 09:56  Phos  5.0     05-12  Mg     2.3     05-12      Creatinine Trend: 3.93<--, 2.94<--, 3.58<--, 2.57<--, 3.34<--, 3.34<--    COAGS:           T(C): 37 (05-12-18 @ 20:32), Max: 37.3 (05-12-18 @ 09:20)  HR: 89 (05-13-18 @ 04:44) (63 - 89)  BP: 134/82 (05-13-18 @ 04:44) (134/82 - 159/109)  RR: 18 (05-13-18 @ 04:44) (16 - 20)  SpO2: 98% (05-13-18 @ 04:44) (94% - 99%)  Wt(kg): --    I&O's Summary      HEENT:   Normal oral mucosa, PERRL, EOMI	  Lymphatic: No obvious lymphadenopathy , no edema  Cardiovascular: Normal S1 S2, No JVD, 1/6 HODA murmur, Peripheral pulses palpable 2+ bilaterally  Respiratory: coarse b/l BS  normal effort 	  Gastrointestinal:  Soft, Non-tender, + BS	  Skin: No rashes,  No cyanosis, warm to touch  Musculoskeletal: Normal range of motion, normal strength  Psychiatry:  Appropriate Mood & affect        ECHO: < from: Transthoracic Echocardiogram (05.08.18 @ 07:08) >  CONCLUSIONS:  1.  Peak mitral valve gradient equals 16 mm Hg, mean  transmitral valve gradient equals 7.5 mm Hg, consistent  with moderate mitral stenosis.  2. Moderate left atrial enlargement.  3. Moderate concentric left ventricular hypertrophy.  4. Normal Left Ventricular Systolic Function,  (EF = 55 to  60%)  5. Grade II diastolic dysfunction.  6. RV systolic pressure is moderately increased at  49 mm  Hg.  7. There is mild-moderate tricuspid regurgitation.    < end of copied text >      ASSESSMENT/PLAN: 	78y FemalePMHx Asthma, Anemia (on weekly procrit), Diastolic CHF, Cardio MEMS device  DM, ESRD on HD ( T,T,S, last one Saturday), HTN, on Home 02,  Breast CA x 2 (s/p L lumpectomy, radiation 2005) Uterine CA (s/p JACLYN)  c/o SOB now with respiratory failure from pneumonia     HD per renal   pulm follow up ,  ABX per ID ,  ID follow up noted - pna on xray.    GI / DVT prophylaxis.   keep K>4, mag >2.0   Steroid taper .   aspiration precaution   echo  noted - due to severity of mitral stenosis- cont to maximize BB .   D/W Dr Jaimes

## 2018-05-13 NOTE — PROGRESS NOTE ADULT - SUBJECTIVE AND OBJECTIVE BOX
Patient is a 78y old  Female who presents with a chief complaint of SOB, fever (07 May 2018 01:15)    Awake , alert comfortable in bed in NAD , mild SOB,  still with diarrhea.    INTERVAL HPI/OVERNIGHT EVENTS:      VITAL SIGNS:  T(F): 98.6 (05-12-18 @ 20:32)  HR: 89 (05-13-18 @ 04:44)  BP: 134/82 (05-13-18 @ 04:44)  RR: 18 (05-13-18 @ 04:44)  SpO2: 98% (05-13-18 @ 04:44)  Wt(kg): --  I&O's Detail          REVIEW OF SYSTEMS:    CONSTITUTIONAL:  No fevers, chills, sweats    HEENT:  Eyes:  No diplopia or blurred vision. ENT:  No earache, sore throat or runny nose.    CARDIOVASCULAR:  No pressure, squeezing, tightness, or heaviness about the chest; no palpitations.    RESPIRATORY:  Per HPI    GASTROINTESTINAL:  No abdominal pain, nausea, vomiting. + diarrhea.    GENITOURINARY:  No dysuria, frequency or urgency.    NEUROLOGIC:  No paresthesias, fasciculations, seizures or weakness.    PSYCHIATRIC:  No disorder of thought or mood.      PHYSICAL EXAM:    Constitutional: Well developed and nourished  Eyes:Perrla  ENMT: normal  Neck:supple  Respiratory: + Wheezes but decreased  Cardiovascular: S1 S2 regular  Gastrointestinal: Soft, Non tender  Extremities: No edema  Vascular:normal  Neurological:Awake, alert,Ox3  Musculoskeletal:Normal      MEDICATIONS  (STANDING):  ALBUTerol/ipratropium for Nebulization 3 milliLiter(s) Nebulizer every 6 hours  buDESOnide  80 MICROgram(s)/formoterol 4.5 MICROgram(s) Inhaler 2 Puff(s) Inhalation two times a day  chlorhexidine 4% Liquid 1 Application(s) Topical two times a day  epoetin randy Injectable 4000 Unit(s) IV Push <User Schedule>  heparin  Injectable 5000 Unit(s) SubCutaneous every 12 hours  insulin glargine Injectable (LANTUS) 30 Unit(s) SubCutaneous at bedtime  insulin lispro (HumaLOG) corrective regimen sliding scale   SubCutaneous Before meals and at bedtime  insulin lispro Injectable (HumaLOG) 6 Unit(s) SubCutaneous three times a day before meals  linezolid    Tablet 600 milliGRAM(s) Oral every 12 hours  linezolid    Tablet      metoprolol tartrate 50 milliGRAM(s) Oral two times a day  metroNIDAZOLE    Tablet 500 milliGRAM(s) Oral every 8 hours  nystatin    Suspension 044504 Unit(s) Oral daily  pantoprazole    Tablet 40 milliGRAM(s) Oral before breakfast  piperacillin/tazobactam IVPB. 3.375 Gram(s) IV Intermittent every 12 hours  predniSONE   Tablet 40 milliGRAM(s) Oral daily  timolol 0.5% Solution 1 Drop(s) Both EYES two times a day  vancomycin    Solution 125 milliGRAM(s) Oral every 6 hours    MEDICATIONS  (PRN):  acetaminophen   Tablet. 650 milliGRAM(s) Oral every 6 hours PRN Moderate Pain (4 - 6)  guaiFENesin   Syrup  (Sugar-Free) 200 milliGRAM(s) Oral every 6 hours PRN Cough  melatonin 3 milliGRAM(s) Oral at bedtime PRN Insomnia  zolpidem 5 milliGRAM(s) Oral at bedtime PRN Insomnia      Allergies    No Known Allergies    Intolerances        LABS:                        10.2   11.6  )-----------( 178      ( 12 May 2018 09:56 )             34.0     05-12    136  |  101  |  70<H>  ----------------------------<  98  4.9   |  27  |  3.93<H>    Ca    7.9<L>      12 May 2018 09:56  Phos  5.0     05-12  Mg     2.3     05-12                CAPILLARY BLOOD GLUCOSE      POCT Blood Glucose.: 146 mg/dL (13 May 2018 11:25)  POCT Blood Glucose.: 175 mg/dL (13 May 2018 07:51)  POCT Blood Glucose.: 99 mg/dL (12 May 2018 21:20)  POCT Blood Glucose.: 172 mg/dL (12 May 2018 16:24)    pro-bnp 9078 05-06 @ 22:50     d-dimer --  05-06 @ 22:50      RADIOLOGY & ADDITIONAL TESTS:    CXR:  < from: Xray Chest 1 View- PORTABLE-Routine (05.09.18 @ 14:45) >  INTERPRETATION:  Follow-up.    AP chest. Prior 5/8/2018.    Endotracheal tube  removed. Right dialysis catheter reidentified in situ.   No change heart mediastinum. Resolution airspace disease and effusions.   Grossly clear lungs at this time. No consolidation pneumothorax or   effusion.    Impression: As above    < end of copied text >    Ct scan chest:  < from: CT Chest No Cont (05.11.18 @ 09:03) >  IMPRESSION:  Small nodular opacities within large portions of the left   upper lobe and parts of the left lower lobe as above likely pneumonia. A   three-month follow-up CT is recommended to confirm resolution.    Trace left pleural effusion.    Areas of atelectasis in the lingula and left lower lobe.    Heterogeneous enlargement of the thyroid gland as above.      < end of copied text >    ekg;    echo: Patient is a 78y old  Female who presents with a chief complaint of SOB, fever (07 May 2018 01:15)    Awake , alert comfortable in bed in NAD.  Still with diarrhea. No respiratory distress    INTERVAL HPI/OVERNIGHT EVENTS:      VITAL SIGNS:  T(F): 98.6 (05-12-18 @ 20:32)  HR: 89 (05-13-18 @ 04:44)  BP: 134/82 (05-13-18 @ 04:44)  RR: 18 (05-13-18 @ 04:44)  SpO2: 98% (05-13-18 @ 04:44)  Wt(kg): --  I&O's Detail          REVIEW OF SYSTEMS:    CONSTITUTIONAL:  No fevers, chills, sweats    HEENT:  Eyes:  No diplopia or blurred vision. ENT:  No earache, sore throat or runny nose.    CARDIOVASCULAR:  No pressure, squeezing, tightness, or heaviness about the chest; no palpitations.    RESPIRATORY:  Per HPI    GASTROINTESTINAL:  No abdominal pain, nausea, vomiting. + diarrhea.    GENITOURINARY:  No dysuria, frequency or urgency.    NEUROLOGIC:  No paresthesias, fasciculations, seizures or weakness.    PSYCHIATRIC:  No disorder of thought or mood.      PHYSICAL EXAM:    Constitutional: Well developed and nourished  Eyes:Perrla  ENMT: normal  Neck:supple  Respiratory: + Wheezes but decreased  Cardiovascular: S1 S2 regular  Gastrointestinal: Soft, Non tender  Extremities: No edema  Vascular:normal  Neurological:Awake, alert,Ox3  Musculoskeletal:Normal      MEDICATIONS  (STANDING):  ALBUTerol/ipratropium for Nebulization 3 milliLiter(s) Nebulizer every 6 hours  buDESOnide  80 MICROgram(s)/formoterol 4.5 MICROgram(s) Inhaler 2 Puff(s) Inhalation two times a day  chlorhexidine 4% Liquid 1 Application(s) Topical two times a day  epoetin randy Injectable 4000 Unit(s) IV Push <User Schedule>  heparin  Injectable 5000 Unit(s) SubCutaneous every 12 hours  insulin glargine Injectable (LANTUS) 30 Unit(s) SubCutaneous at bedtime  insulin lispro (HumaLOG) corrective regimen sliding scale   SubCutaneous Before meals and at bedtime  insulin lispro Injectable (HumaLOG) 6 Unit(s) SubCutaneous three times a day before meals  linezolid    Tablet 600 milliGRAM(s) Oral every 12 hours  linezolid    Tablet      metoprolol tartrate 50 milliGRAM(s) Oral two times a day  metroNIDAZOLE    Tablet 500 milliGRAM(s) Oral every 8 hours  nystatin    Suspension 785749 Unit(s) Oral daily  pantoprazole    Tablet 40 milliGRAM(s) Oral before breakfast  piperacillin/tazobactam IVPB. 3.375 Gram(s) IV Intermittent every 12 hours  predniSONE   Tablet 40 milliGRAM(s) Oral daily  timolol 0.5% Solution 1 Drop(s) Both EYES two times a day  vancomycin    Solution 125 milliGRAM(s) Oral every 6 hours    MEDICATIONS  (PRN):  acetaminophen   Tablet. 650 milliGRAM(s) Oral every 6 hours PRN Moderate Pain (4 - 6)  guaiFENesin   Syrup  (Sugar-Free) 200 milliGRAM(s) Oral every 6 hours PRN Cough  melatonin 3 milliGRAM(s) Oral at bedtime PRN Insomnia  zolpidem 5 milliGRAM(s) Oral at bedtime PRN Insomnia      Allergies    No Known Allergies    Intolerances        LABS:                        10.2   11.6  )-----------( 178      ( 12 May 2018 09:56 )             34.0     05-12    136  |  101  |  70<H>  ----------------------------<  98  4.9   |  27  |  3.93<H>    Ca    7.9<L>      12 May 2018 09:56  Phos  5.0     05-12  Mg     2.3     05-12                CAPILLARY BLOOD GLUCOSE      POCT Blood Glucose.: 146 mg/dL (13 May 2018 11:25)  POCT Blood Glucose.: 175 mg/dL (13 May 2018 07:51)  POCT Blood Glucose.: 99 mg/dL (12 May 2018 21:20)  POCT Blood Glucose.: 172 mg/dL (12 May 2018 16:24)    pro-bnp 9078 05-06 @ 22:50     d-dimer --  05-06 @ 22:50      RADIOLOGY & ADDITIONAL TESTS:    CXR:  < from: Xray Chest 1 View- PORTABLE-Routine (05.09.18 @ 14:45) >  INTERPRETATION:  Follow-up.    AP chest. Prior 5/8/2018.    Endotracheal tube  removed. Right dialysis catheter reidentified in situ.   No change heart mediastinum. Resolution airspace disease and effusions.   Grossly clear lungs at this time. No consolidation pneumothorax or   effusion.    Impression: As above    < end of copied text >    Ct scan chest:  < from: CT Chest No Cont (05.11.18 @ 09:03) >  IMPRESSION:  Small nodular opacities within large portions of the left   upper lobe and parts of the left lower lobe as above likely pneumonia. A   three-month follow-up CT is recommended to confirm resolution.    Trace left pleural effusion.    Areas of atelectasis in the lingula and left lower lobe.    Heterogeneous enlargement of the thyroid gland as above.      < end of copied text >    ekg;    echo:

## 2018-05-14 ENCOUNTER — TRANSCRIPTION ENCOUNTER (OUTPATIENT)
Age: 78
End: 2018-05-14

## 2018-05-14 VITALS — WEIGHT: 186.29 LBS

## 2018-05-14 LAB
GLUCOSE BLDC GLUCOMTR-MCNC: 109 MG/DL — HIGH (ref 70–99)
GLUCOSE BLDC GLUCOMTR-MCNC: 245 MG/DL — HIGH (ref 70–99)

## 2018-05-14 PROCEDURE — 87186 SC STD MICRODIL/AGAR DIL: CPT

## 2018-05-14 PROCEDURE — 84100 ASSAY OF PHOSPHORUS: CPT

## 2018-05-14 PROCEDURE — 84443 ASSAY THYROID STIM HORMONE: CPT

## 2018-05-14 PROCEDURE — 87581 M.PNEUMON DNA AMP PROBE: CPT

## 2018-05-14 PROCEDURE — 83550 IRON BINDING TEST: CPT

## 2018-05-14 PROCEDURE — 99261: CPT

## 2018-05-14 PROCEDURE — 85027 COMPLETE CBC AUTOMATED: CPT

## 2018-05-14 PROCEDURE — 85045 AUTOMATED RETICULOCYTE COUNT: CPT

## 2018-05-14 PROCEDURE — 93005 ELECTROCARDIOGRAM TRACING: CPT

## 2018-05-14 PROCEDURE — 82962 GLUCOSE BLOOD TEST: CPT

## 2018-05-14 PROCEDURE — 83605 ASSAY OF LACTIC ACID: CPT

## 2018-05-14 PROCEDURE — 94003 VENT MGMT INPAT SUBQ DAY: CPT

## 2018-05-14 PROCEDURE — 87798 DETECT AGENT NOS DNA AMP: CPT

## 2018-05-14 PROCEDURE — 71250 CT THORAX DX C-: CPT

## 2018-05-14 PROCEDURE — 84484 ASSAY OF TROPONIN QUANT: CPT

## 2018-05-14 PROCEDURE — 82803 BLOOD GASES ANY COMBINATION: CPT

## 2018-05-14 PROCEDURE — 87486 CHLMYD PNEUM DNA AMP PROBE: CPT

## 2018-05-14 PROCEDURE — 83036 HEMOGLOBIN GLYCOSYLATED A1C: CPT

## 2018-05-14 PROCEDURE — 87633 RESP VIRUS 12-25 TARGETS: CPT

## 2018-05-14 PROCEDURE — 94002 VENT MGMT INPAT INIT DAY: CPT

## 2018-05-14 PROCEDURE — 36415 COLL VENOUS BLD VENIPUNCTURE: CPT

## 2018-05-14 PROCEDURE — 92610 EVALUATE SWALLOWING FUNCTION: CPT

## 2018-05-14 PROCEDURE — 80048 BASIC METABOLIC PNL TOTAL CA: CPT

## 2018-05-14 PROCEDURE — 83735 ASSAY OF MAGNESIUM: CPT

## 2018-05-14 PROCEDURE — 82728 ASSAY OF FERRITIN: CPT

## 2018-05-14 PROCEDURE — 87040 BLOOD CULTURE FOR BACTERIA: CPT

## 2018-05-14 PROCEDURE — 96375 TX/PRO/DX INJ NEW DRUG ADDON: CPT

## 2018-05-14 PROCEDURE — 83880 ASSAY OF NATRIURETIC PEPTIDE: CPT

## 2018-05-14 PROCEDURE — 87493 C DIFF AMPLIFIED PROBE: CPT

## 2018-05-14 PROCEDURE — 93306 TTE W/DOPPLER COMPLETE: CPT

## 2018-05-14 PROCEDURE — 81001 URINALYSIS AUTO W/SCOPE: CPT

## 2018-05-14 PROCEDURE — 97162 PT EVAL MOD COMPLEX 30 MIN: CPT

## 2018-05-14 PROCEDURE — 87150 DNA/RNA AMPLIFIED PROBE: CPT

## 2018-05-14 PROCEDURE — 99291 CRITICAL CARE FIRST HOUR: CPT | Mod: 25

## 2018-05-14 PROCEDURE — 71045 X-RAY EXAM CHEST 1 VIEW: CPT

## 2018-05-14 PROCEDURE — 80053 COMPREHEN METABOLIC PANEL: CPT

## 2018-05-14 PROCEDURE — 96374 THER/PROPH/DIAG INJ IV PUSH: CPT | Mod: XU

## 2018-05-14 PROCEDURE — 87086 URINE CULTURE/COLONY COUNT: CPT

## 2018-05-14 PROCEDURE — 94640 AIRWAY INHALATION TREATMENT: CPT

## 2018-05-14 RX ORDER — LINEZOLID 600 MG/300ML
1 INJECTION, SOLUTION INTRAVENOUS
Qty: 60 | Refills: 0 | OUTPATIENT
Start: 2018-05-14 | End: 2018-06-12

## 2018-05-14 RX ORDER — METOPROLOL TARTRATE 50 MG
1 TABLET ORAL
Qty: 0 | Refills: 0 | COMMUNITY
Start: 2018-05-14

## 2018-05-14 RX ORDER — PANTOPRAZOLE SODIUM 20 MG/1
1 TABLET, DELAYED RELEASE ORAL
Qty: 30 | Refills: 0
Start: 2018-05-14 | End: 2018-06-12

## 2018-05-14 RX ORDER — TIMOLOL 0.5 %
1 DROPS OPHTHALMIC (EYE)
Qty: 30 | Refills: 0
Start: 2018-05-14 | End: 2018-06-12

## 2018-05-14 RX ORDER — INSULIN GLARGINE 100 [IU]/ML
30 INJECTION, SOLUTION SUBCUTANEOUS
Qty: 3 | Refills: 0
Start: 2018-05-14 | End: 2018-06-12

## 2018-05-14 RX ORDER — ACETAMINOPHEN 500 MG
2 TABLET ORAL
Qty: 120 | Refills: 0
Start: 2018-05-14 | End: 2018-05-28

## 2018-05-14 RX ORDER — CIPROFLOXACIN LACTATE 400MG/40ML
1 VIAL (ML) INTRAVENOUS
Qty: 0 | Refills: 0 | COMMUNITY
Start: 2018-05-14 | End: 2018-05-22

## 2018-05-14 RX ORDER — ACETAMINOPHEN 500 MG
2 TABLET ORAL
Qty: 0 | Refills: 0 | COMMUNITY
Start: 2018-05-14

## 2018-05-14 RX ORDER — NYSTATIN 500MM UNIT
5 POWDER (EA) MISCELLANEOUS
Qty: 0 | Refills: 0 | COMMUNITY
Start: 2018-05-14

## 2018-05-14 RX ORDER — BRIMONIDINE TARTRATE 2 MG/MG
1 SOLUTION/ DROPS OPHTHALMIC
Qty: 4 | Refills: 0
Start: 2018-05-14 | End: 2018-06-12

## 2018-05-14 RX ORDER — CIPROFLOXACIN LACTATE 400MG/40ML
1 VIAL (ML) INTRAVENOUS
Qty: 14 | Refills: 0 | OUTPATIENT
Start: 2018-05-14 | End: 2018-05-20

## 2018-05-14 RX ORDER — METOPROLOL TARTRATE 50 MG
1 TABLET ORAL
Qty: 60 | Refills: 0
Start: 2018-05-14 | End: 2018-06-12

## 2018-05-14 RX ORDER — PANTOPRAZOLE SODIUM 20 MG/1
1 TABLET, DELAYED RELEASE ORAL
Qty: 0 | Refills: 0 | COMMUNITY
Start: 2018-05-14

## 2018-05-14 RX ORDER — METOPROLOL TARTRATE 50 MG
1 TABLET ORAL
Qty: 0 | Refills: 0 | DISCHARGE
Start: 2018-05-14 | End: 2018-06-12

## 2018-05-14 RX ORDER — INSULIN LISPRO 100/ML
8 VIAL (ML) SUBCUTANEOUS
Qty: 4 | Refills: 0 | OUTPATIENT
Start: 2018-05-14 | End: 2018-06-12

## 2018-05-14 RX ORDER — LINEZOLID 600 MG/300ML
1 INJECTION, SOLUTION INTRAVENOUS
Qty: 0 | Refills: 0 | COMMUNITY
Start: 2018-05-14 | End: 2018-05-22

## 2018-05-14 RX ADMIN — Medication 3 MILLILITER(S): at 09:15

## 2018-05-14 RX ADMIN — Medication 500000 UNIT(S): at 11:55

## 2018-05-14 RX ADMIN — CHLORHEXIDINE GLUCONATE 1 APPLICATION(S): 213 SOLUTION TOPICAL at 06:15

## 2018-05-14 RX ADMIN — Medication 6 UNIT(S): at 11:55

## 2018-05-14 RX ADMIN — Medication 125 MILLIGRAM(S): at 00:24

## 2018-05-14 RX ADMIN — Medication 40 MILLIGRAM(S): at 06:12

## 2018-05-14 RX ADMIN — Medication 6 UNIT(S): at 08:26

## 2018-05-14 RX ADMIN — PIPERACILLIN AND TAZOBACTAM 25 GRAM(S): 4; .5 INJECTION, POWDER, LYOPHILIZED, FOR SOLUTION INTRAVENOUS at 06:11

## 2018-05-14 RX ADMIN — PANTOPRAZOLE SODIUM 40 MILLIGRAM(S): 20 TABLET, DELAYED RELEASE ORAL at 06:13

## 2018-05-14 RX ADMIN — BUDESONIDE AND FORMOTEROL FUMARATE DIHYDRATE 2 PUFF(S): 160; 4.5 AEROSOL RESPIRATORY (INHALATION) at 10:43

## 2018-05-14 RX ADMIN — Medication 125 MILLIGRAM(S): at 11:55

## 2018-05-14 RX ADMIN — Medication 500 MILLIGRAM(S): at 06:13

## 2018-05-14 RX ADMIN — LINEZOLID 600 MILLIGRAM(S): 600 INJECTION, SOLUTION INTRAVENOUS at 06:13

## 2018-05-14 RX ADMIN — Medication 1 DROP(S): at 06:14

## 2018-05-14 RX ADMIN — Medication 4: at 11:55

## 2018-05-14 RX ADMIN — Medication 125 MILLIGRAM(S): at 06:11

## 2018-05-14 NOTE — PROGRESS NOTE ADULT - PROBLEM SELECTOR PROBLEM 5
ESRD on dialysis
Chronic diastolic heart failure
Chronic diastolic heart failure

## 2018-05-14 NOTE — PROGRESS NOTE ADULT - PROBLEM SELECTOR PLAN 5
cont HD  Renal follow up
cont HD  Renal follow up
Cont. HD  Renal follow up
Cont. HD  Renal follow up
cont HD  Renal follow up
cont HD  Renal follow up
cont HD  Renal follow up.
EKG without ischemic changes  TTE: G2DD, pEF, moderate tricupid regurgitation and moderate mitral stenosis  Dr Jaimes
pulmonary edema possibly 2/2 CHF exacerbation  EKG without ischemic changes  follow up TTE.  Dr Jaimes

## 2018-05-14 NOTE — PROGRESS NOTE ADULT - PROBLEM SELECTOR PROBLEM 2
Sepsis
Acute respiratory failure, unspecified whether with hypoxia or hypercapnia
Acute respiratory failure, unspecified whether with hypoxia or hypercapnia
Sepsis

## 2018-05-14 NOTE — PROGRESS NOTE ADULT - SUBJECTIVE AND OBJECTIVE BOX
Problem List:  ESRD due to diabetes and hypertension  Started dialysis on 12.2017  Admitted for fever  Blood culture positive to Enterobacter.  Patient was placed on IV broad spectrum antibiotics.  Patient was extubated yesterday.    Culture - Blood (05.08.18 @ 10:19)    Specimen Source: .Blood Blood-Peripheral    Culture Results:   No growth to date.    Culture - Urine (05.07.18 @ 10:24)    -  Ampicillin: R >8    -  Ciprofloxacin: R >2    -  Nitrofurantoin: I 64    -  Tetra/Doxy: R >8    -  Vancomycin: R >16    Specimen Source: .Urine Clean Catch (Midstream)    Culture Results:   >100,000 CFU/ml Enterococcus faecium (vancomycin resistant)  <10,000 CFU/ml Normal Urogenital rosie present    Organism Identification: Enterococcus faecium (vancomycin resistant)    Organism: Enterococcus faecium (vancomycin resistant)    Method Type: LON    Patient diarrhea improved.  SOB improved        PAST MEDICAL & SURGICAL HISTORY:  Anemia  Hypertension  Congestive heart failure  Chronic kidney disease  Breast carcinoma: Lt side s/p lumpectomy and radiation in 2004  Renal mass  DM (diabetes mellitus)  Asthma  S/P lumpectomy, left breast: 2004      No Known Allergies      MEDICATIONS  (STANDING):  ALBUTerol/ipratropium for Nebulization 3 milliLiter(s) Nebulizer every 6 hours  buDESOnide  80 MICROgram(s)/formoterol 4.5 MICROgram(s) Inhaler 2 Puff(s) Inhalation two times a day  chlorhexidine 4% Liquid 1 Application(s) Topical two times a day  epoetin randy Injectable 4000 Unit(s) IV Push <User Schedule>  heparin  Injectable 5000 Unit(s) SubCutaneous every 12 hours  insulin glargine Injectable (LANTUS) 30 Unit(s) SubCutaneous at bedtime  insulin lispro (HumaLOG) corrective regimen sliding scale   SubCutaneous Before meals and at bedtime  insulin lispro Injectable (HumaLOG) 6 Unit(s) SubCutaneous three times a day before meals  linezolid    Tablet 600 milliGRAM(s) Oral every 12 hours  linezolid    Tablet      metoprolol tartrate 50 milliGRAM(s) Oral two times a day  metroNIDAZOLE    Tablet 500 milliGRAM(s) Oral every 8 hours  nystatin    Suspension 833965 Unit(s) Oral daily  pantoprazole    Tablet 40 milliGRAM(s) Oral before breakfast  piperacillin/tazobactam IVPB. 3.375 Gram(s) IV Intermittent every 12 hours  predniSONE   Tablet 40 milliGRAM(s) Oral daily  timolol 0.5% Solution 1 Drop(s) Both EYES two times a day  vancomycin    Solution 125 milliGRAM(s) Oral every 6 hours    MEDICATIONS  (PRN):  acetaminophen   Tablet. 650 milliGRAM(s) Oral every 6 hours PRN Moderate Pain (4 - 6)  guaiFENesin   Syrup  (Sugar-Free) 200 milliGRAM(s) Oral every 6 hours PRN Cough  melatonin 3 milliGRAM(s) Oral at bedtime PRN Insomnia  zolpidem 5 milliGRAM(s) Oral at bedtime PRN Insomnia                            10.2   11.6  )-----------( 178      ( 12 May 2018 09:56 )             34.0     05-12    136  |  101  |  70<H>  ----------------------------<  98  4.9   |  27  |  3.93<H>    Ca    7.9<L>      12 May 2018 09:56  Phos  5.0     05-12  Mg     2.3     05-12              REVIEW OF SYSTEMS:  General: no fever no chills, no weight loss.  EYES/ENT: No visual changes;  No vertigo, no headache.  NECK: No pain or stiffness  RESPIRATORY: cough and sob improved  CARDIOVASCULAR: No chest pain or palpitations. No Edema  GASTROINTESTINAL: No abdominal or epigastric pain. No nausea, no diarrhea .  GENITOURINARY: No dysuria, frequency, foamy urine, urinary urgency, incontinence or hematuria  NEUROLOGICAL: No numbness or weakness, no tremor , no dizziness.   Muscle skeletal : no joint pain and no swelling of joints and limbs.  SKIN: No itching, burning, rashes.        VITALS:  T(F): 98.6 (05-14-18 @ 05:00), Max: 99.3 (05-13-18 @ 20:32)  HR: 70 (05-14-18 @ 05:00)  BP: 151/67 (05-14-18 @ 05:00)  RR: 16 (05-14-18 @ 05:00)  SpO2: 95% (05-14-18 @ 05:00)  Wt(kg): --      PHYSICAL EXAM:  Constitutional: well developed, no diaphoresis, no distress.  Neck: No JVD, no carotid bruit, supple, no adenopathy  Respiratory:  air entrance B/L, no wheezes, rales or rhonchi.  Cardiovascular: S1, S2, RRR, no pericardial rub, no murmur  Abdomen: BS+, soft, no tenderness, no bruit  Extremities: No cyanosis or clubbing. No peripheral edema.   Vascular Access: right PC.

## 2018-05-14 NOTE — PROGRESS NOTE ADULT - PROVIDER SPECIALTY LIST ADULT
Cardiology
Critical Care
Critical Care
Infectious Disease
Internal Medicine
Nephrology
Pulmonology
Cardiology
Internal Medicine
Internal Medicine
Nephrology
Pulmonology

## 2018-05-14 NOTE — PROGRESS NOTE ADULT - SUBJECTIVE AND OBJECTIVE BOX
pt seen and examined, no chest pain / palpitation          acetaminophen   Tablet. 650 milliGRAM(s) Oral every 6 hours PRN  ALBUTerol/ipratropium for Nebulization 3 milliLiter(s) Nebulizer every 6 hours  buDESOnide  80 MICROgram(s)/formoterol 4.5 MICROgram(s) Inhaler 2 Puff(s) Inhalation two times a day  chlorhexidine 4% Liquid 1 Application(s) Topical two times a day  epoetin randy Injectable 4000 Unit(s) IV Push <User Schedule>  guaiFENesin   Syrup  (Sugar-Free) 200 milliGRAM(s) Oral every 6 hours PRN  heparin  Injectable 5000 Unit(s) SubCutaneous every 12 hours  insulin glargine Injectable (LANTUS) 30 Unit(s) SubCutaneous at bedtime  insulin lispro (HumaLOG) corrective regimen sliding scale   SubCutaneous Before meals and at bedtime  insulin lispro Injectable (HumaLOG) 6 Unit(s) SubCutaneous three times a day before meals  linezolid    Tablet 600 milliGRAM(s) Oral every 12 hours  linezolid    Tablet      melatonin 3 milliGRAM(s) Oral at bedtime PRN  metoprolol tartrate 50 milliGRAM(s) Oral two times a day  metroNIDAZOLE    Tablet 500 milliGRAM(s) Oral every 8 hours  nystatin    Suspension 240975 Unit(s) Oral daily  pantoprazole    Tablet 40 milliGRAM(s) Oral before breakfast  piperacillin/tazobactam IVPB. 3.375 Gram(s) IV Intermittent every 12 hours  predniSONE   Tablet 40 milliGRAM(s) Oral daily  timolol 0.5% Solution 1 Drop(s) Both EYES two times a day  vancomycin    Solution 125 milliGRAM(s) Oral every 6 hours  zolpidem 5 milliGRAM(s) Oral at bedtime PRN                            10.2   11.6  )-----------( 178      ( 12 May 2018 09:56 )             34.0       Hemoglobin: 10.2 g/dL (05-12 @ 09:56)  Hemoglobin: 10.6 g/dL (05-10 @ 07:17)      05-12    136  |  101  |  70<H>  ----------------------------<  98  4.9   |  27  |  3.93<H>    Ca    7.9<L>      12 May 2018 09:56  Phos  5.0     05-12  Mg     2.3     05-12      Creatinine Trend: 3.93<--, 2.94<--, 3.58<--, 2.57<--, 3.34<--, 3.34<--    COAGS:           T(C): 37 (05-14-18 @ 05:00), Max: 37.4 (05-13-18 @ 20:32)  HR: 70 (05-14-18 @ 05:00) (70 - 96)  BP: 151/67 (05-14-18 @ 05:00) (128/76 - 177/82)  RR: 16 (05-14-18 @ 05:00) (16 - 18)  SpO2: 95% (05-14-18 @ 05:00) (92% - 100%)  Wt(kg): --    I&O's Summary      HEENT:   Normal oral mucosa, PERRL, EOMI	  Lymphatic: No obvious lymphadenopathy , no edema  Cardiovascular: Normal S1 S2, No JVD, 1/6 HODA murmur, Peripheral pulses palpable 2+ bilaterally  Respiratory: coarse b/l BS  normal effort 	  Gastrointestinal:  Soft, Non-tender, + BS	  Skin: No rashes,  No cyanosis, warm to touch  Musculoskeletal: Normal range of motion, normal strength  Psychiatry:  Appropriate Mood & affect        ECHO: < from: Transthoracic Echocardiogram (05.08.18 @ 07:08) >  CONCLUSIONS:  1.  Peak mitral valve gradient equals 16 mm Hg, mean  transmitral valve gradient equals 7.5 mm Hg, consistent  with moderate mitral stenosis.  2. Moderate left atrial enlargement.  3. Moderate concentric left ventricular hypertrophy.  4. Normal Left Ventricular Systolic Function,  (EF = 55 to  60%)  5. Grade II diastolic dysfunction.  6. RV systolic pressure is moderately increased at  49 mm  Hg.  7. There is mild-moderate tricuspid regurgitation.    < end of copied text >      ASSESSMENT/PLAN: 	78y FemalePMHx Asthma, Anemia (on weekly procrit), Diastolic CHF, Cardio MEMS device  DM, ESRD on HD ( T,T,S, last one Saturday), HTN, on Home 02,  Breast CA x 2 (s/p L lumpectomy, radiation 2005) Uterine CA (s/p JACLYN)  c/o SOB now with respiratory failure from pneumonia     HD per renal   pulm follow up ,  ABX per ID ,    ID follow up noted - pna on xray.    GI / DVT prophylaxis.   keep K>4, mag >2.0   Steroid taper .   echo  noted - due to severity of mitral stenosis- cont to maximize BB .   no further CV work up needed   D/W Dr Jaimes

## 2018-05-14 NOTE — PROGRESS NOTE ADULT - PROBLEM SELECTOR PROBLEM 4
Fluid overload
ESRD on dialysis
ESRD on dialysis
Fluid overload

## 2018-05-14 NOTE — DISCHARGE NOTE ADULT - HOSPITAL COURSE
78 F PMHx Asthma, Anemia (on weekly procrit), HFpEF, DM, ESRD on HD ( T,T,S, last one Saturday), HTN, on Home 02,  Breast CA x 2 (s/p L lumpectomy, radiation 2005) Uterine CA (s/p JACLYN)  p/w SOB.   Pt was initially admitted to medical for pulm edema required HD.     on 05/07 Patient underwent emergent dialysis for pulmonary edema and was only able to perform 1 hour of dialysis 2/2 SBP 90. Patient then complained of chills, was shaking.  Sensorium became altered, and patient became tachypneic, restless, wheezing duoneb given but patient remained tachypneic with abdominal breathing. RRT was called and pt emergently intubated during HD for respiratory distress likely 2/2 sepsis and pulmonary edema.    Intubated 5/7  Extubated 5/8  Downgraded to medical floor on 05/08/2018        1. Sepsis, 2/2 Bacteremia: enterobacter cloacae, 2/2 Pneumonia     CXR Diffuse airspace disease, worse in the right lung. Small bilateral pleural effusions.  rept cxr with resolution of airspace disease and effusion noted above  blood cx with enterobacter cloacae noted above  ucx with >100,000 CFU/ml Enterococcus faecium (vre) and <10,000 CFU/ml Normal Urogenital rosie present   Continue Zyvox 600 mg Po q 12 hours x 14 days total until 5/22/18 and observe platelets while patient is on Zyvox.  Continue IV Zosyn x 7 days until 5/13/18 then change to po Cipro 250 mg po q 12 hours x 7 more days  rept blood cx neg noted above  Dr Dave ID   CT chest - VAL PNA         2.Acute respiratory failure, unspecified whether with hypoxia or hypercapnia. likely 2/2 pulmonary edema  s/p intubated, extubation successful   on PO Pred 40mg daily and slow taper  RESOLVED  home  nebs upon dc    3. ESRD on dialysis.    HD via PermCath , R AVF not mature    Given patient's improved clinical status and current hemodynamic stability, decision was made to discharge to Sub acute Rehabilitation Please refer to patient's complete medical chart with documents for a full hospital course, for this is only a brief summary.

## 2018-05-14 NOTE — PROGRESS NOTE ADULT - PROBLEM SELECTOR PROBLEM 7
Hypertension
Need for prophylactic measure
Need for prophylactic measure

## 2018-05-14 NOTE — PROGRESS NOTE ADULT - SUBJECTIVE AND OBJECTIVE BOX
Patient is a 78y old  Female who presents with a chief complaint of SOB, fever.   Awake, alert, comfortable in bed in NAD.  Still with diarrhea. No respiratory distress      INTERVAL HPI/OVERNIGHT EVENTS:      VITAL SIGNS:  T(F): 98.6 (05-14-18 @ 05:00)  HR: 70 (05-14-18 @ 05:00)  BP: 151/67 (05-14-18 @ 05:00)  RR: 16 (05-14-18 @ 05:00)  SpO2: 95% (05-14-18 @ 05:00)  Wt(kg): --  I&O's Detail          REVIEW OF SYSTEMS:    CONSTITUTIONAL:  No fevers, chills, sweats    HEENT:  Eyes:  No diplopia or blurred vision. ENT:  No earache, sore throat or runny nose.    CARDIOVASCULAR:  No pressure, squeezing, tightness, or heaviness about the chest; no palpitations.    RESPIRATORY:  Per HPI    GASTROINTESTINAL:  No abdominal pain, nausea, vomiting or diarrhea.    GENITOURINARY:  No dysuria, frequency or urgency.    NEUROLOGIC:  No paresthesias, fasciculations, seizures or weakness.    PSYCHIATRIC:  No disorder of thought or mood.      PHYSICAL EXAM:    Constitutional: Well developed and nourished  Eyes:Perrla  ENMT: normal  Neck:supple  Respiratory: good air entry  Cardiovascular: S1 S2 regular  Gastrointestinal: Soft, Non tender  Extremities: No edema  Vascular:normal  Neurological:Awake, alert,Ox3  Musculoskeletal:Normal      MEDICATIONS  (STANDING):  ALBUTerol/ipratropium for Nebulization 3 milliLiter(s) Nebulizer every 6 hours  buDESOnide  80 MICROgram(s)/formoterol 4.5 MICROgram(s) Inhaler 2 Puff(s) Inhalation two times a day  chlorhexidine 4% Liquid 1 Application(s) Topical two times a day  epoetin randy Injectable 4000 Unit(s) IV Push <User Schedule>  heparin  Injectable 5000 Unit(s) SubCutaneous every 12 hours  insulin glargine Injectable (LANTUS) 30 Unit(s) SubCutaneous at bedtime  insulin lispro (HumaLOG) corrective regimen sliding scale   SubCutaneous Before meals and at bedtime  insulin lispro Injectable (HumaLOG) 6 Unit(s) SubCutaneous three times a day before meals  linezolid    Tablet 600 milliGRAM(s) Oral every 12 hours  linezolid    Tablet      metoprolol tartrate 50 milliGRAM(s) Oral two times a day  metroNIDAZOLE    Tablet 500 milliGRAM(s) Oral every 8 hours  nystatin    Suspension 877049 Unit(s) Oral daily  pantoprazole    Tablet 40 milliGRAM(s) Oral before breakfast  piperacillin/tazobactam IVPB. 3.375 Gram(s) IV Intermittent every 12 hours  predniSONE   Tablet 40 milliGRAM(s) Oral daily  timolol 0.5% Solution 1 Drop(s) Both EYES two times a day  vancomycin    Solution 125 milliGRAM(s) Oral every 6 hours    MEDICATIONS  (PRN):  acetaminophen   Tablet. 650 milliGRAM(s) Oral every 6 hours PRN Moderate Pain (4 - 6)  guaiFENesin   Syrup  (Sugar-Free) 200 milliGRAM(s) Oral every 6 hours PRN Cough  melatonin 3 milliGRAM(s) Oral at bedtime PRN Insomnia  zolpidem 5 milliGRAM(s) Oral at bedtime PRN Insomnia      Allergies    No Known Allergies    Intolerances        LABS:                    CAPILLARY BLOOD GLUCOSE      POCT Blood Glucose.: 109 mg/dL (14 May 2018 07:58)  POCT Blood Glucose.: 111 mg/dL (13 May 2018 22:21)  POCT Blood Glucose.: 204 mg/dL (13 May 2018 16:38)  POCT Blood Glucose.: 146 mg/dL (13 May 2018 11:25)        RADIOLOGY & ADDITIONAL TESTS:    CXR:    Ct scan chest:  < from: CT Chest No Cont (05.11.18 @ 09:03) >   Small nodular opacities within large portions of the left   upper lobe and parts of the left lower lobe as above likely pneumonia. A   three-month follow-up CT is recommended to confirm resolution.    Trace left pleural effusion.    Areas of atelectasis in the lingula and left lower lobe.    Heterogeneous enlargement of the thyroid gland as above.    < end of copied text >    ekg;    echo: Patient is a 78y old  Female who presents with a chief complaint of SOB, fever.   Awake, alert, comfortable in bed in NAD. No more diarrhea. No respiratory distress      INTERVAL HPI/OVERNIGHT EVENTS:      VITAL SIGNS:  T(F): 98.6 (05-14-18 @ 05:00)  HR: 70 (05-14-18 @ 05:00)  BP: 151/67 (05-14-18 @ 05:00)  RR: 16 (05-14-18 @ 05:00)  SpO2: 95% (05-14-18 @ 05:00)  Wt(kg): --  I&O's Detail          REVIEW OF SYSTEMS:    CONSTITUTIONAL:  No fevers, chills, sweats    HEENT:  Eyes:  No diplopia or blurred vision. ENT:  No earache, sore throat or runny nose.    CARDIOVASCULAR:  No pressure, squeezing, tightness, or heaviness about the chest; no palpitations.    RESPIRATORY:  Per HPI    GASTROINTESTINAL:  No abdominal pain, nausea, vomiting or diarrhea.    GENITOURINARY:  No dysuria, frequency or urgency.    NEUROLOGIC:  No paresthesias, fasciculations, seizures or weakness.    PSYCHIATRIC:  No disorder of thought or mood.      PHYSICAL EXAM:    Constitutional: Well developed and nourished  Eyes:Perrla  ENMT: normal  Neck:supple  Respiratory: good air entry  Cardiovascular: S1 S2 regular  Gastrointestinal: Soft, Non tender  Extremities: No edema  Vascular:normal  Neurological:Awake, alert,Ox3  Musculoskeletal:Normal      MEDICATIONS  (STANDING):  ALBUTerol/ipratropium for Nebulization 3 milliLiter(s) Nebulizer every 6 hours  buDESOnide  80 MICROgram(s)/formoterol 4.5 MICROgram(s) Inhaler 2 Puff(s) Inhalation two times a day  chlorhexidine 4% Liquid 1 Application(s) Topical two times a day  epoetin randy Injectable 4000 Unit(s) IV Push <User Schedule>  heparin  Injectable 5000 Unit(s) SubCutaneous every 12 hours  insulin glargine Injectable (LANTUS) 30 Unit(s) SubCutaneous at bedtime  insulin lispro (HumaLOG) corrective regimen sliding scale   SubCutaneous Before meals and at bedtime  insulin lispro Injectable (HumaLOG) 6 Unit(s) SubCutaneous three times a day before meals  linezolid    Tablet 600 milliGRAM(s) Oral every 12 hours  linezolid    Tablet      metoprolol tartrate 50 milliGRAM(s) Oral two times a day  metroNIDAZOLE    Tablet 500 milliGRAM(s) Oral every 8 hours  nystatin    Suspension [id number] Unit(s) Oral daily  pantoprazole    Tablet 40 milliGRAM(s) Oral before breakfast  piperacillin/tazobactam IVPB. 3.375 Gram(s) IV Intermittent every 12 hours  predniSONE   Tablet 40 milliGRAM(s) Oral daily  timolol 0.5% Solution 1 Drop(s) Both EYES two times a day  vancomycin    Solution 125 milliGRAM(s) Oral every 6 hours    MEDICATIONS  (PRN):  acetaminophen   Tablet. 650 milliGRAM(s) Oral every 6 hours PRN Moderate Pain (4 - 6)  guaiFENesin   Syrup  (Sugar-Free) 200 milliGRAM(s) Oral every 6 hours PRN Cough  melatonin 3 milliGRAM(s) Oral at bedtime PRN Insomnia  zolpidem 5 milliGRAM(s) Oral at bedtime PRN Insomnia      Allergies    No Known Allergies    Intolerances        LABS:                    CAPILLARY BLOOD GLUCOSE      POCT Blood Glucose.: 109 mg/dL (14 May 2018 07:58)  POCT Blood Glucose.: 111 mg/dL (13 May 2018 22:21)  POCT Blood Glucose.: 204 mg/dL (13 May 2018 16:38)  POCT Blood Glucose.: 146 mg/dL (13 May 2018 11:25)        RADIOLOGY & ADDITIONAL TESTS:    CXR:    Ct scan chest:  < from: CT Chest No Cont (05.11.18 @ 09:03) >   Small nodular opacities within large portions of the left   upper lobe and parts of the left lower lobe as above likely pneumonia. A   three-month follow-up CT is recommended to confirm resolution.    Trace left pleural effusion.    Areas of atelectasis in the lingula and left lower lobe.    Heterogeneous enlargement of the thyroid gland as above.    < end of copied text >    ekg;    echo:

## 2018-05-14 NOTE — PROGRESS NOTE ADULT - PROBLEM SELECTOR PROBLEM 3
Pneumonia
Persistent asthma with acute exacerbation, unspecified asthma severity
Persistent asthma with acute exacerbation, unspecified asthma severity
Pneumonia

## 2018-05-14 NOTE — PROGRESS NOTE ADULT - PROBLEM SELECTOR PLAN 3
cont antibiotics  follow up CXR
cont antibiotics   follow up CXR.
cont antibiotics   follow up CXR.
cont antibiotics  follow up CXR
c/w Solumemaikol, JOAQUIN q6hrs
acute respiratory distress ? 2/2 asthma exacerbation  c/w Solumedrol, DUONEB, mechanical ventilation.
cont antibiotics  follow up CXR

## 2018-05-14 NOTE — DISCHARGE NOTE ADULT - SECONDARY DIAGNOSIS.
DM (diabetes mellitus) Persistent asthma with acute exacerbation, unspecified asthma severity Hypertension Heart failure

## 2018-05-14 NOTE — PROGRESS NOTE ADULT - NSHPATTENDINGPLANDISCUSS_GEN_ALL_CORE
the medical team and the patient
the patient and floor nurse.
the family and medical team abnd Dr. Dave
the patient
the patient and medical team

## 2018-05-14 NOTE — PROGRESS NOTE ADULT - ATTENDING COMMENTS
Agree with above assessment and plan as outlined above.    - PNA and bacteremia, cont Abx per ID  - HD per renal    Kyler Jaimes MD, FACC
CARDIOLOGY ATTENDING    Agree with above. No further inpatient cardiac workup needed.
Patient seen and examined, agree with above assessment and plan as transcribed above.    - cont ABx per ID  - No need for further inpatient cardiac work up.    Kyler Jaimes MD, FACC
Patient seen and examined, agree with above assessment and plan as transcribed above.    - enterobacter cloacea in the blood, ID f/u  - Cont Abx per primary team  - moderate MS, Increase Lopressor to 50 BID to avoid tachycardia and minimize gradient across the mitral valve    Kyler Jaimes MD, FACC
CARDIOLOGY ATTENDING    Agree with above. No further inpatient cardiac workup needed.
Patient seen and examined with resident, Addendum to above.    cpap ps 5 done this am, tolerated cpap -   extubated this am.  post extubation gas stable    workup thus far  Gram Stain:   Growth in aerobic bottle: Gram Negative Rods  Growth in anaerobic bottle: Gram Negative Rods (05.07.18 @ 11:31)  Specimen Source: .Urine Clean Catch (Midstream) (05.07.18 @ 10:24)        assessment  acute repsiratory failure due to pulmonary edema - non cardiogenic renal  suspected  sepsis likely from permacath with bacteremia vs UTI  as cultures positive as above  esrd on hd  asthma  chronic diastolic chf grade 2   h/o breast cancer , uterine cancer  chronic hypoxia on home o2    echo -   < from: Transthoracic Echocardiogram (05.08.18 @ 07:08) >   1.  Peak mitral valve gradient equals 16 mm Hg, mean transmitral valve gradient equals 7.5 mm Hg, consistent with moderate mitral stenosis.  2. Moderate left atrial enlargement.   3. Moderate concentric left ventricular hypertrophy.  4. Normal Left Ventricular Systolic Function,  (EF = 55 to  60%)  5. Grade II diastolic dysfunction.  6. RV systolic pressure is moderately increased at  49 mm Hg. 7. There is mild-moderate tricuspid regurgitation.            plan  Extubated this am  ID f/u and renal f/u to consider Permcath removal  pt clinically responding to IV abx.   UF via HD as per renal  Cardio f/u     f/u echo  empiric abx  f/u cultures  ID anne  family updated
Patient seen and examined with resident, Addendum to above.    pt remains intubated  cxr continues to show pulm edema d/w Renal - plan for UF today with HD  Awaiting vascular f/u for ? removal of permacath    assessment  acute repsiratory failure due to pulmonary edema - non cardiogenic renal   sepsis likely from permacath vs Pna  esrd on hd  asthma  chronic diastolic chf grade 2   h/o breast cancer , uterine cancer  chronic hypoxia on home o2        plan  Vacular f/u for removal of permacat  Renal f/u for UF via HD  Cardio f/u   increase lasix  f/u echo  empiric abx  f/u cultures  ID eval  family updated

## 2018-05-14 NOTE — PROGRESS NOTE ADULT - PROBLEM SELECTOR PLAN 8
Has hx of smoking  Denies Asthma in the past but told to have Copd  Bronchodilators  inhaled steroids/LABA combo 2 puffs po BID with spacer  Pfts as OP
Has hx of smoking  Denies Asthma in the past but told to have Copd  Bronchodilators  inhaled steroids/LABA combo 2 puffs po BID with spacer  Pfts as OP  Po prednisone
Has hx of smoking  Denies Asthma in the past but told to have Copd  Bronchodilators  inhaled steroids/LABA combo 2 puffs po BID with spacer  Pfts as OP  Po prednisone.

## 2018-05-14 NOTE — PROGRESS NOTE ADULT - PROBLEM SELECTOR PLAN 1
Resolved  Remains extubated on supplemental oxygen via AM  follow up ABGS
Resolved  Remains extubated on supplemental oxygen via AM  follow up ABGS
Resolved
Bacteremia: enterobacter cloacae, unknown source at the moment  UCx: enterobacter faecium and normal rosie  c/w Zosyn  Dr Dave  CT chest f/up to r/o loculation
2/2 permacath infection vs LLL CAP  c/w Renally dosed Zosyn q 12hrs + Vanco 1g TIW  Follow up BCx, UCx, sputum Cx  Dr Negro
Resolved  Remains extubated on supplemental oxygen via AM  follow up ABGS

## 2018-05-14 NOTE — PROGRESS NOTE ADULT - ASSESSMENT
ESRD follow with dialysis today.  Enterobacter bactremia  Enterococcus faecium in urine on Linezolid.    Repeat BC results are negative.    Diarrhea subsided patien C D iff toxin is negative.    COPD exacerbation improved.    Anemia continue with Epogen    Pulmonary hypertension.  Mitral reg and Mitral stenosis as per cardiology    CHF resolved as per xr and clinically
78 F PMHx Asthma, Anemia (on weekly procrit), HFpEF, DM, ESRD on HD ( T,T,S, last one Saturday), HTN, on Home 02,  Breast CA x 2 (s/p L lumpectomy, radiation 2005) Uterine CA (s/p JACLYN)  c/o SOB.     on 05/07 Patient underwent emergent dialysis for pulmonary edema and was only able to perform 1 hour of dialysis 2/2 SBP 90. Patient then complained of chills, was shaking.  Sensorium became altered, and patient became tachypneic, restless, wheezing duoneb given but patient remained tachypneic with abdominal breathing.RRT was called and pt emergently intubated during HD for respiratory distress likely 2/2 sepsis and pulmonary edema.    Intubated 5/7  Extubated 5/8  downgraded to medical floor on 05/08/2018        1. Sepsis, due to unspecified organism.  Plan: Bacteremia: enterobacter cloacae, 2/2 Pneumonia or     CXR Diffuse airspace disease, worse in the right lung. Small bilateral pleural effusions.  rept cxr with resolution of airspace disease and effusion noted above  blood cx with enterobacter cloacae noted above  ucx with >100,000 CFU/ml Enterococcus faecium (vre) and <10,000 CFU/ml Normal Urogenital rosie present noted above  Continue Zyvox 600 mg Po q 12 hours x 14 days total until 5/22/18 and observe platelets while patient is on Zyvox.  Continue IV Zosyn x 7 days until 5/13/18 then change to po Cipro 250 mg po q 12 hours x 7 more days  rept blood cx neg noted above  Dr Dave ID   CT chest - VAL PNA         2.Acute respiratory failure, unspecified whether with hypoxia or hypercapnia.  Plan: RESOLVED  likely 2/2 pulmonary edema  s/p intubated, extubation successful   on PO Pred 40mg daily and slow taper  c/w nebs  will monitor respiratory status    3. Persistent asthma with acute exacerbation, unspecified asthma severity.    nebs   steroids Po taper    4. ESRD on dialysis.    HD via permacath , R AVF not mature  next HD 5/9 Wed  Cr in baseline  Dr Chadwick (primary doctor).      5. Chronic diastolic heart failure.  Plan: EKG without ischemic changes  TTE: G2DD, pEF, moderate tricuspid regurgitation and moderate mitral stenosis  c/w BB, inc dose yest    6.HTN - started on lopressor 50 BID for BP control  Dr Jaimes     6. Type 2 diabetes mellitus with stage 5 chronic kidney disease not on chronic dialysis, with long-term current use of insulin. Plan:   inc Lantus 30HS (home dose 50u)  inc humalog 6 BID (home dose humalog 20u BID)  moderate HiSS  FS AC/HS  HbA1C: 7.6%     Problem/Plan - 7:  ·  Problem: Need for prophylactic measure.  Plan: IMPROVE VTE Individual Risk Assessment          RISK                                                          Points  [  ] Previous VTE                                                3  [  ] Thrombophilia                                             2  [ x ] Lower limb paralysis                                   2        (unable to hold up >15 seconds)    [  ] Current Cancer                                             2         (within 6 months)  [ x ] Immobilization > 24 hrs                              1  [ x ] ICU/CCU stay > 24 hours                             1  [ x ] Age > 60                                                         1    IMPROVE VTE Score: 5  HSQ for DVT chemoppx  Protonix for GI ppx
78 F PMHx Asthma, Anemia (on weekly procrit), HFpEF, DM, ESRD on HD ( T,T,S, last one Saturday), HTN, on Home 02,  Breast CA x 2 (s/p L lumpectomy, radiation 2005) Uterine CA (s/p JACLYN)  c/o SOB.     on 05/07 Patient underwent emergent dialysis for pulmonary edema and was only able to perform 1 hour of dialysis 2/2 SBP 90. Patient then complained of chills, was shaking.  Sensorium became altered, and patient became tachypneic, restless, wheezing duoneb given but patient remained tachypneic with abdominal breathing.RRT was called and pt emergently intubated during HD for respiratory distress likely 2/2 sepsis and pulmonary edema.    Intubated 5/7  Extubated 5/8  downgraded to medical floor on 05/08/2018        1. Sepsis, due to unspecified organism.  Plan: Bacteremia: enterobacter cloacae, unknown source at the moment  UCx: enterobacter faecium and normal rosie  f/u BCx, UCx to final results - pending sensitivity  c/w Karma Dave  f/u repeat Bcx  CT chest f/up to r/o loculation.   ?possibility of permacath removal as this may be source of infection      2.Acute respiratory failure, unspecified whether with hypoxia or hypercapnia.  Plan: RESOLVED  likely 2/2 pulmonary edema  s/p intubated, extubation successful   on PO Pred 40mg daily and taper , c/w nebs  will monitor respiratory status    3. Persistent asthma with acute exacerbation, unspecified asthma severity.    nebs   steroids Po taper    4. ESRD on dialysis.    HD via permacath , R AVF not mature  next HD 5/9 Wed  Cr in baseline  Dr Chadwick (primary doctor).      5. Chronic diastolic heart failure.  Plan: EKG without ischemic changes  TTE: G2DD, pEF, moderate tricuspid regurgitation and moderate mitral stenosis    6.HTN - started on lopressor 25mg BID for BP control  Dr Jaimes   Bp better controlled now    6. Type 2 diabetes mellitus with stage 5 chronic kidney disease not on chronic dialysis, with long-term current use of insulin. Plan:   inc Lantus 28U HS (home dose 50u)  start humalog 5u BID (home dose humalog 20u BID)  moderate HiSS  FS AC/HS  HbA1C: 7.6%     Problem/Plan - 7:  ·  Problem: Need for prophylactic measure.  Plan: IMPROVE VTE Individual Risk Assessment          RISK                                                          Points  [  ] Previous VTE                                                3  [  ] Thrombophilia                                             2  [ x ] Lower limb paralysis                                   2        (unable to hold up >15 seconds)    [  ] Current Cancer                                             2         (within 6 months)  [ x ] Immobilization > 24 hrs                              1  [ x ] ICU/CCU stay > 24 hours                             1  [ x ] Age > 60                                                         1    IMPROVE VTE Score: 5  HSQ for DVT chemoppx  Protonix for GI ppx.
78F PMHx Asthma, Anemia (on weekly procrit), HFpEF, DM, ESRD on HD ( T,T,S, last one Saturday), HTN, on Home 02,  Breast CA x 2 (s/p L lumpectomy, radiation 2005) Uterine CA (s/p JACLYN)  c/o SOB.  Emergently intubated for respiratory distress likely 2/2 sepsis and pulmonary edema
ESRD  Enterobacter in blood source not clear yet  Possible IJ catheter to follow repeat BC and to obtain Urine culture.  Await results of the above.        Last dialysis yesterday, plan next dialysis tomorrow.    Anemia continue with Epogen    Pulmonary hypertension.  Mitral reg and Mitral stenosis as per cardiology    Xr chest CHF .
ESRD  Enterobacter in blood source not clear yet  Repeat BC results are negative.    Asthma and post extubation with bronchial asthma acute at this time.  Continue nebulizers.    Anemia continue with Epogen    Pulmonary hypertension.  Mitral reg and Mitral stenosis as per cardiology    CHF resolved as per xr and clinically
ESRD  Enterobacter in blood source not clear yet  Repeat BC results are negative.    Seen in dialysis stable BP      Anemia continue with Epogen    Pulmonary hypertension.  Mitral reg and Mitral stenosis as per cardiology    CHF resolved.
ESRD follow with dialysis today.  Enterobacter bactremia  Enterococcus faecium in urine on Linezolid.    Repeat BC results are negative.    Diarrhea on IV antibiotics start Flagyl and send stool for C Difficile toxin.    Asthma and post extubation with acute bronchial asthma improved today  Continue nebulizers.    Anemia continue with Epogen    Pulmonary hypertension.  Mitral reg and Mitral stenosis as per cardiology    CHF resolved as per xr and clinically
ESRD follow with dialysis today.  Enterobacter bactremia  Enterococcus faecium in urine on Linezolid.    Repeat BC results are negative.    Diarrhea on IV antibiotics start Flagyl and send stool for C Difficile toxin.    Asthma and post extubation with acute bronchial asthma improved today  Continue nebulizers.    Anemia continue with Epogen    Pulmonary hypertension.  Mitral reg and Mitral stenosis as per cardiology    CHF resolved as per xr and clinically
ESRD follow with dialysis tomorrow.  Enterobacter bactremia  Enterococcus faecium in urine on Linezolid.    Repeat BC results are negative.    Asthma and post extubation with acute bronchial asthma improved today  Continue nebulizers.    Anemia continue with Epogen    Pulmonary hypertension.  Mitral reg and Mitral stenosis as per cardiology    CHF resolved as per xr and clinically
78F PMHx Asthma, Anemia (on weekly procrit), HFpEF, DM, ESRD on HD ( T,T,S, last one Saturday), HTN, on Home 02,  Breast CA x 2 (s/p L lumpectomy, radiation 2005) Uterine CA (s/p JACLYN)  c/o SOB.  Emergently intubated for respiratory distress likely 2/2 sepsis and pulmonary edema

## 2018-05-14 NOTE — PROGRESS NOTE ADULT - SUBJECTIVE AND OBJECTIVE BOX
Patient is a 78y old  Female who presents with a chief complaint of SOB, fever (07 May 2018 01:15)    seen in icu [  ], reg med floor [ x  ], bed [x  ], chair at bedside [   ], awake and responsive [x ], sedated [  ],  nad [ x ]    Allergies    No Known Allergies        Vitals    T(F): 98.6 (05-14-18 @ 05:00), Max: 99.3 (05-13-18 @ 20:32)  HR: 70 (05-14-18 @ 05:00) (70 - 96)  BP: 151/67 (05-14-18 @ 05:00) (128/76 - 177/82)  RR: 16 (05-14-18 @ 05:00) (16 - 18)  SpO2: 95% (05-14-18 @ 05:00) (92% - 100%)  Wt(kg): --  CAPILLARY BLOOD GLUCOSE      POCT Blood Glucose.: 111 mg/dL (13 May 2018 22:21)      Labs                          10.2   11.6  )-----------( 178      ( 12 May 2018 09:56 )             34.0       05-12    136  |  101  |  70<H>  ----------------------------<  98  4.9   |  27  |  3.93<H>    Ca    7.9<L>      12 May 2018 09:56  Phos  5.0     05-12  Mg     2.3     05-12      C Diff by PCR Result: NotDetec (05.12.18 @ 22:54)      .Blood Blood-Peripheral  05-08 @ 10:19   No growth at 5 days.  --  --      .Blood Blood-Peripheral  05-07 @ 11:31   Growth in aerobic and anaerobic bottles: Enterobacter cloacae/asburiae  "Due to technical problems, Proteus sp. will Not be reported as part of  the BCID panel until further notice"  ***Blood Panel PCR results on this specimen are available  approximately 3 hours after the Gram stain result.***  Gram stain, PCR, and/or culture results may not always  correspond due to difference in methodologies.  ************************************************************  This PCR assay was performed using [employer].  The following targets are tested for: Enterococcus,  vancomycin resistant enterococci, Listeria monocytogenes,  coagulase negative staphylococci, S. aureus,  methicillin resistant S. aureus, Streptococcus agalactiae  (Group B), S. pneumoniae, S. pyogenes (Group A),  Acinetobacter baumannii, Enterobacter cloacae, E. coli,  Klebsiella oxytoca, K. pneumoniae, Proteus sp.,  Serratia marcescens, Haemophilus influenzae,  Neisseria meningitidis, Pseudomonas aeruginosa, Candida  albicans, C. glabrata, C krusei, C parapsilosis,  C. tropicalis and the KPC resistance gene.  --  Blood Culture PCR  Enterobacter cloacae/absuria      .Blood Blood-Peripheral  05-07 @ 11:28   Growth in aerobic and anaerobic bottles: Enterobacter cloacae/asburiae  See previous culture [id number]  --    Growth in aerobic bottle: Gram Negative Rods  Growth in anaerobic bottle: Gram Negative Rods      .Urine Clean Catch (Midstream)  05-07 @ 10:24   >100,000 CFU/ml Enterococcus faecium (vancomycin resistant)  <10,000 CFU/ml Normal Urogenital rosie present  --  Enterococcus faecium (vancomycin resistant)          Radiology Results      Meds    MEDICATIONS  (STANDING):  ALBUTerol/ipratropium for Nebulization 3 milliLiter(s) Nebulizer every 6 hours  buDESOnide  80 MICROgram(s)/formoterol 4.5 MICROgram(s) Inhaler 2 Puff(s) Inhalation two times a day  chlorhexidine 4% Liquid 1 Application(s) Topical two times a day  epoetin randy Injectable 4000 Unit(s) IV Push <User Schedule>  heparin  Injectable 5000 Unit(s) SubCutaneous every 12 hours  insulin glargine Injectable (LANTUS) 30 Unit(s) SubCutaneous at bedtime  insulin lispro (HumaLOG) corrective regimen sliding scale   SubCutaneous Before meals and at bedtime  insulin lispro Injectable (HumaLOG) 6 Unit(s) SubCutaneous three times a day before meals  linezolid    Tablet 600 milliGRAM(s) Oral every 12 hours  linezolid    Tablet      metoprolol tartrate 50 milliGRAM(s) Oral two times a day  metroNIDAZOLE    Tablet 500 milliGRAM(s) Oral every 8 hours  nystatin    Suspension 866830 Unit(s) Oral daily  pantoprazole    Tablet 40 milliGRAM(s) Oral before breakfast  piperacillin/tazobactam IVPB. 3.375 Gram(s) IV Intermittent every 12 hours  predniSONE   Tablet 40 milliGRAM(s) Oral daily  timolol 0.5% Solution 1 Drop(s) Both EYES two times a day  vancomycin    Solution 125 milliGRAM(s) Oral every 6 hours      MEDICATIONS  (PRN):  acetaminophen   Tablet. 650 milliGRAM(s) Oral every 6 hours PRN Moderate Pain (4 - 6)  guaiFENesin   Syrup  (Sugar-Free) 200 milliGRAM(s) Oral every 6 hours PRN Cough  melatonin 3 milliGRAM(s) Oral at bedtime PRN Insomnia  zolpidem 5 milliGRAM(s) Oral at bedtime PRN Insomnia      Physical Exam    Neuro :  no focal deficits  Respiratory:  B/L rales  CV: RRR, S1S2, no murmurs,   Abdominal: Soft, NT, ND +BS,  Extremities: No edema, + peripheral pulses    ASSESSMENT    sepsis poss 2nd pna vs perm cath infxn  E cloacae bacteremia  Hypervolemia  Anemia  uti  abnormal trop  diarrhea r/o c diff  h/o Hypertension  Diastolic CHF  pulm htn  mitral stenosis  Chronic kidney disease  Breast carcinoma  Renal mass  DM (diabetes mellitus)  Asthma  S/P lumpectomy, left breast  No significant past surgical history      PLAN      CXR Diffuse airspace disease, worse in the right lung. Small bilateral pleural effusions.  rept cxr with resolution of airspace disease and effusion noted   ct chest with Small nodular opacities within large portions of the left upper lobe and parts of the left lower lobe. Trace left pleural effusion.  blood cx with enterobacter cloacae noted above  ucx with >100,000 CFU/ml Enterococcus faecium (vre) and <10,000 CFU/ml Normal Urogenital rosie present noted above  id f/u  Continue Zyvox 600 mg Po q 12 hours x 14 days total until 5/22/18 and observe platelets while patient is on Zyvox.  change zosyn to po Cipro 250 mg po q 12 hours x 7 more days until 5/21/18  rept blood cx neg noted above  pulm f/u  s/p extubation 5/8/18  renal f/u  cont hd as per renal  vascular cons noted  no need for vascular intervention at this time  ce x 1 noted above  cardio f/u noted  echo with  moderate mitral stenosis, EF = 55 to 60%. Grade II diastolic dysfunction. mod pulm htn  pulm cons  d/c vanco po and flagyl  stool for d ciff neg noted above  cont current meds Patient is a 78y old  Female who presents with a chief complaint of SOB, fever (07 May 2018 01:15)    seen in icu [  ], reg med floor [ x  ], bed [ ], chair at bedside [  x ], awake and responsive [x ], sedated [  ],  nad [ x ]    no further c/o diarrhea    Allergies    No Known Allergies        Vitals    T(F): 98.6 (05-14-18 @ 05:00), Max: 99.3 (05-13-18 @ 20:32)  HR: 70 (05-14-18 @ 05:00) (70 - 96)  BP: 151/67 (05-14-18 @ 05:00) (128/76 - 177/82)  RR: 16 (05-14-18 @ 05:00) (16 - 18)  SpO2: 95% (05-14-18 @ 05:00) (92% - 100%)  Wt(kg): --  CAPILLARY BLOOD GLUCOSE      POCT Blood Glucose.: 111 mg/dL (13 May 2018 22:21)      Labs                          10.2   11.6  )-----------( 178      ( 12 May 2018 09:56 )             34.0       05-12    136  |  101  |  70<H>  ----------------------------<  98  4.9   |  27  |  3.93<H>    Ca    7.9<L>      12 May 2018 09:56  Phos  5.0     05-12  Mg     2.3     05-12      C Diff by PCR Result: NotDetec (05.12.18 @ 22:54)      .Blood Blood-Peripheral  05-08 @ 10:19   No growth at 5 days.  --  --      .Blood Blood-Peripheral  05-07 @ 11:31   Growth in aerobic and anaerobic bottles: Enterobacter cloacae/asburiae  "Due to technical problems, Proteus sp. will Not be reported as part of  the BCID panel until further notice"  ***Blood Panel PCR results on this specimen are available  approximately 3 hours after the Gram stain result.***  Gram stain, PCR, and/or culture results may not always  correspond due to difference in methodologies.  ************************************************************  This PCR assay was performed using [employer].  The following targets are tested for: Enterococcus,  vancomycin resistant enterococci, Listeria monocytogenes,  coagulase negative staphylococci, S. aureus,  methicillin resistant S. aureus, Streptococcus agalactiae  (Group B), S. pneumoniae, S. pyogenes (Group A),  Acinetobacter baumannii, Enterobacter cloacae, E. coli,  Klebsiella oxytoca, K. pneumoniae, Proteus sp.,  Serratia marcescens, Haemophilus influenzae,  Neisseria meningitidis, Pseudomonas aeruginosa, Candida  albicans, C. glabrata, C krusei, C parapsilosis,  C. tropicalis and the KPC resistance gene.  --  Blood Culture PCR  Enterobacter cloacae/absuria      .Blood Blood-Peripheral  05-07 @ 11:28   Growth in aerobic and anaerobic bottles: Enterobacter cloacae/asburiae  See previous culture [id number]  --    Growth in aerobic bottle: Gram Negative Rods  Growth in anaerobic bottle: Gram Negative Rods      .Urine Clean Catch (Midstream)  05-07 @ 10:24   >100,000 CFU/ml Enterococcus faecium (vancomycin resistant)  <10,000 CFU/ml Normal Urogenital rosie present  --  Enterococcus faecium (vancomycin resistant)          Radiology Results      Meds    MEDICATIONS  (STANDING):  ALBUTerol/ipratropium for Nebulization 3 milliLiter(s) Nebulizer every 6 hours  buDESOnide  80 MICROgram(s)/formoterol 4.5 MICROgram(s) Inhaler 2 Puff(s) Inhalation two times a day  chlorhexidine 4% Liquid 1 Application(s) Topical two times a day  epoetin randy Injectable 4000 Unit(s) IV Push <User Schedule>  heparin  Injectable 5000 Unit(s) SubCutaneous every 12 hours  insulin glargine Injectable (LANTUS) 30 Unit(s) SubCutaneous at bedtime  insulin lispro (HumaLOG) corrective regimen sliding scale   SubCutaneous Before meals and at bedtime  insulin lispro Injectable (HumaLOG) 6 Unit(s) SubCutaneous three times a day before meals  linezolid    Tablet 600 milliGRAM(s) Oral every 12 hours  linezolid    Tablet      metoprolol tartrate 50 milliGRAM(s) Oral two times a day  metroNIDAZOLE    Tablet 500 milliGRAM(s) Oral every 8 hours  nystatin    Suspension [id number] Unit(s) Oral daily  pantoprazole    Tablet 40 milliGRAM(s) Oral before breakfast  piperacillin/tazobactam IVPB. 3.375 Gram(s) IV Intermittent every 12 hours  predniSONE   Tablet 40 milliGRAM(s) Oral daily  timolol 0.5% Solution 1 Drop(s) Both EYES two times a day  vancomycin    Solution 125 milliGRAM(s) Oral every 6 hours      MEDICATIONS  (PRN):  acetaminophen   Tablet. 650 milliGRAM(s) Oral every 6 hours PRN Moderate Pain (4 - 6)  guaiFENesin   Syrup  (Sugar-Free) 200 milliGRAM(s) Oral every 6 hours PRN Cough  melatonin 3 milliGRAM(s) Oral at bedtime PRN Insomnia  zolpidem 5 milliGRAM(s) Oral at bedtime PRN Insomnia      Physical Exam    Neuro :  no focal deficits  Respiratory:  B/L rales  CV: RRR, S1S2, no murmurs,   Abdominal: Soft, NT, ND +BS,  Extremities: No edema, + peripheral pulses    ASSESSMENT    sepsis poss 2nd pna vs perm cath infxn  E cloacae bacteremia  Hypervolemia  Anemia  uti  abnormal trop  diarrhea r/o c diff  h/o Hypertension  Diastolic CHF  pulm htn  mitral stenosis  Chronic kidney disease  Breast carcinoma  Renal mass  DM (diabetes mellitus)  Asthma  S/P lumpectomy, left breast  No significant past surgical history      PLAN      CXR Diffuse airspace disease, worse in the right lung. Small bilateral pleural effusions.  rept cxr with resolution of airspace disease and effusion noted   ct chest with Small nodular opacities within large portions of the left upper lobe and parts of the left lower lobe. Trace left pleural effusion.  blood cx with enterobacter cloacae noted above  ucx with >100,000 CFU/ml Enterococcus faecium (vre) and <10,000 CFU/ml Normal Urogenital rosie present noted above  id f/u  Continue Zyvox 600 mg Po q 12 hours x 14 days total until 5/22/18 and observe platelets while patient is on Zyvox.  change zosyn to po Cipro 250 mg po q 12 hours x 7 more days until 5/21/18  rept blood cx neg noted above  pulm f/u  s/p extubation 5/8/18  renal f/u  cont hd as per renal  vascular cons noted  no need for vascular intervention at this time  ce x 1 noted above  cardio f/u noted  echo with  moderate mitral stenosis, EF = 55 to 60%. Grade II diastolic dysfunction. mod pulm htn  pulm cons  d/c vanco po and flagyl  stool for d ciff neg noted above  cont current meds

## 2018-05-14 NOTE — DISCHARGE NOTE ADULT - PLAN OF CARE
to improve Continue Zyvox 600 mg Po q 12 hours x 14 days total until 5/22/18 and observe platelets while patient is on Zyvox.  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 changed Lantus dose to 30HS continue reduced dose (home dose 50u)  changed humalog dose to 8 units before meals continue reduced dose  (home dose humalog 20u BID)  HbA1c: 7.6% c/w medrol dose pack - tapering steroids as prescribed Chronic diastolic heart failure.    TTE: G2DD, pEF, moderate tricuspid regurgitation and moderate mitral stenosis  c/w metoprolol 50 mg two times a day 6.HTN - started on lopressor 50 BID for BP control  Dr Jaimes cardio recommends -metoprolol 50 mg two times a day

## 2018-05-14 NOTE — PROGRESS NOTE ADULT - PROBLEM SELECTOR PROBLEM 1
Respiratory failure
Sepsis, due to unspecified organism
Sepsis, due to unspecified organism
Respiratory failure

## 2018-05-14 NOTE — PROGRESS NOTE ADULT - PROBLEM SELECTOR PROBLEM 6
DM (diabetes mellitus)
Type 2 diabetes mellitus with stage 5 chronic kidney disease not on chronic dialysis, with long-term current use of insulin
Type 2 diabetes mellitus with stage 5 chronic kidney disease not on chronic dialysis, with long-term current use of insulin

## 2018-05-14 NOTE — PROGRESS NOTE ADULT - PROBLEM SELECTOR PLAN 4
cont HD  daily weight
cont HD
cont HD.
cont. HD
cont. HD
HD via permacath T/T/S as BP tolerates, R AVF not mature  Dr Chadwick (primary doctor)
HD via permacath T/T/S as BP tolerates, R AVF not mature  Dr Chadwick (primary doctor)

## 2018-05-14 NOTE — DISCHARGE NOTE ADULT - CARE PLAN
Principal Discharge DX:	Bacteremia  Goal:	to improve  Assessment and plan of treatment:	Continue Zyvox 600 mg Po q 12 hours x 14 days total until 5/22/18 and observe platelets while patient is on Zyvox.  Continue oral antibiotics Cipro 250 mg po q 12 hours until 05/22  Secondary Diagnosis:	DM (diabetes mellitus)  Assessment and plan of treatment:	You have Type 2 diabetes mellitus with stage 5 chronic kidney disease not on chronic dialysis, with long-term current use of insulin. changed Lantus dose to 30HS continue reduced dose (home dose 50u)  changed humalog dose to 8 units before meals continue reduced dose  (home dose humalog 20u BID)  HbA1c: 7.6%  Secondary Diagnosis:	Persistent asthma with acute exacerbation, unspecified asthma severity  Assessment and plan of treatment:	c/w medrol dose pack - tapering steroids as prescribed  Secondary Diagnosis:	Hypertension  Assessment and plan of treatment:	Chronic diastolic heart failure.    TTE: G2DD, pEF, moderate tricuspid regurgitation and moderate mitral stenosis  c/w metoprolol 50 mg two times a day  Secondary Diagnosis:	Heart failure  Assessment and plan of treatment:	6.HTN - started on lopressor 50 BID for BP control  Dr Jaimes cardio recommends -metoprolol 50 mg two times a day

## 2018-05-14 NOTE — PROGRESS NOTE ADULT - PROBLEM SELECTOR PLAN 6
Accuchecks with reg insulin coverage  HBA1C
Accuchecks with reg insulin coverage  HBA1C.  Endo follow up
Accuchecks with reg insulin coverage  HBA1C.  Endo follow up.
Accuchecks with reg insulin coverage  HBA1C.  Endo follow up.
Lantus 24U HS + moderate HiSS  FS AC/HS  HbA1C: 7.6%
Lantus 20U HS + moderate HiSS  Monitor accuchecks  f/up HbA1C

## 2018-05-14 NOTE — PROGRESS NOTE ADULT - PROBLEM SELECTOR PLAN 7
monitor BP  cont meds
monitor BP  cont meds
Monitor BP  Cont meds   Cardio follow up
Monitor BP  Cont meds   Cardio follow up
monitor BP  cont meds
monitor BP  cont meds
monitor BP  cont meds.   Cardio follow up
IMPROVE VTE Individual Risk Assessment          RISK                                                          Points  [  ] Previous VTE                                                3  [  ] Thrombophilia                                             2  [ x ] Lower limb paralysis                                   2        (unable to hold up >15 seconds)    [  ] Current Cancer                                             2         (within 6 months)  [ x ] Immobilization > 24 hrs                              1  [ x ] ICU/CCU stay > 24 hours                             1  [ x ] Age > 60                                                         1    IMPROVE VTE Score: 5  HSQ for DVT chemoppx  Protonix for GI ppx
IMPROVE VTE Individual Risk Assessment          RISK                                                          Points  [  ] Previous VTE                                                3  [  ] Thrombophilia                                             2  [ x ] Lower limb paralysis                                   2        (unable to hold up >15 seconds)    [  ] Current Cancer                                             2         (within 6 months)  [ x ] Immobilization > 24 hrs                              1  [ x ] ICU/CCU stay > 24 hours                             1  [ x ] Age > 60                                                         1    IMPROVE VTE Score: 5  HSQ for DVT chemoppx  Protonix for GI ppx

## 2018-05-14 NOTE — PROGRESS NOTE ADULT - SUBJECTIVE AND OBJECTIVE BOX
Meds:  piperacillin/tazobactam IVPB. 3.375 Gram(s) IV Intermittent every 12 hours.  Zyvox 600 mg po q 12 hours (Started 5/9/18)    Allergies:  Allergies    No Known Allergies    Intolerances        ROS  [  ] UNABLE TO ELICIT    General:  [  ] None  [  ] Fever  [  ] Chills  [ x ] Malaise    Skin:  [ x ] None [  ] Rash  [  ] Wound  [  ] Ulcer    HEENT:  [ x ] None  [  ] Sore Throat  [  ] Nasal congestion/ runny nose  [  ] Photophobia [  ] Neck pain      Chest:  [ x ] None   [  ] SOB  [  ] Cough  [  ] None    Cardiovascular:   [ x ] None  [  ] CP  [  ] Palpitation    Gastrointestinal:  [ x ] None  [  ] Abd pain   [  ] Nausea    [  ] Vomiting   [ x ] Diarrhea	     Genitourinary:  [ x ] None [  ] Polyuria   [  ] Urgency  [  ] Frequency  [  ] Dysuria    [  ]  Hematuria       Musculoskeletal:  [  ] None [  ] Back Pain	[  ] Body aches  [  ] Joint pain [ x ] Weakness    Neurological: [  ] None [  ]Dizziness  [  ]Visual Disturbance  [  ]Headaches   [ x ] Weakness          PHYSICAL EXAM:  Vital Signs Last 24 Hrs  T(C): 37 (14 May 2018 05:00), Max: 37.4 (13 May 2018 20:32)  T(F): 98.6 (14 May 2018 05:00), Max: 99.3 (13 May 2018 20:32)  HR: 70 (14 May 2018 05:00) (70 - 96)  BP: 151/67 (14 May 2018 05:00) (128/76 - 177/82)  BP(mean): --  RR: 16 (14 May 2018 05:00) (16 - 18)  SpO2: 95% (14 May 2018 05:00) (92% - 100%)    Constitutional:    HEENT: [ x ] Wnl  [  ] Pharyngeal congestion [  ] Intubated.    Neck:  [ x ] Supple  [  ]Lymphadenopathy  [  ] No JVD   [  ] JVD  [  ] Masses   [  ] WNL    CHEST/Respiratory:  [  ]Clear to auscultation  [ x ] Rales   [  ] Rhonchi   [  ] Wheezing     [ x ] Right side dialysis catheter      Cardiovascular:  [ x ] Reg S1 S2   [  ] Irreg S1 S2   [ x ]No Murmur  [  ] +ve Murmurs  [  ]Systolic [  ]Diastolic      Abdomen:  [ x ] Soft  [ x ] No tendrerness  [  ] Tenderness  [  ] Organomegaly  [  ] ABD Distention  [  ] Rigidity                       [ x ] No Regidity                       [ x ] No Rebound Tenderness  [  ] No Guarding Rigidity  [  ] Rebound Tenderness[  ] Guarding Rigidity                          [ x ]  +ve Bowel Sounds  [  ] Decreased Bowel Sounds    [  ] Absent Bowel Sounds                            Extremities: [ x ] No edema [  ] Edema  [  ] Clubbing   [  ] Cyanosis                         [ x ] No Tender Calf muscles  [  ] Tender Calf muscles                        [ x ] Palpable peripheral pulses    Neurological: [ x ] Awake  [ x ] Alert  [ x ] Oriented  x  3                           [  ] Confused  [  ] Drowsy  [  ] respond to painful stimuli  [  ] Unresponsive    Skin:  [ x ] Intact [  ] Redness [  ] Thrombophlebitis  [  ] Rashes  [  ] Dry  [  ] Ulcers    Ortho:  [  ] Joint Swelling  [  ] Joint erythema [  ] Joint tenderness                [  ] Increased temp. to touch  [  ] DJD [ x ] WNL          LABS/DIAGNOSTIC TESTS                        10.2   11.6  )-----------( 178      ( 12 May 2018 09:56 )             34.0   05-12    136  |  101  |  70<H>  ----------------------------<  98  4.9   |  27  |  3.93<H>    Ca    7.9<L>      12 May 2018 09:56  Phos  5.0     05-12  Mg     2.3     05-12    CAPILLARY BLOOD GLUCOSE      POCT Blood Glucose.: 109 mg/dL (14 May 2018 07:58)  POCT Blood Glucose.: 111 mg/dL (13 May 2018 22:21)  POCT Blood Glucose.: 204 mg/dL (13 May 2018 16:38)  POCT Blood Glucose.: 146 mg/dL (13 May 2018 11:25)      C Diff by PCR Result: NotDetec (05.12.18 @ 22:54)        CULTURES:   Culture - Blood (05.08.18 @ 10:19)    Specimen Source: .Blood Blood-Peripheral    Culture Results:   No growth to date.      Culture - Urine (05.07.18 @ 10:24)    -  Ampicillin: R >8    -  Vancomycin: R >16    -  Nitrofurantoin: I 64    -  Tetra/Doxy: R >8    -  Ciprofloxacin: R >2    Specimen Source: .Urine Clean Catch (Midstream)    Culture Results:   >100,000 CFU/ml Enterococcus faecium (vancomycin resistant)  <10,000 CFU/ml Normal Urogenital rosie present    Organism Identification: Enterococcus faecium (vancomycin resistant)    Organism: Enterococcus faecium (vancomycin resistant)    Method Type: LON      Culture - Blood (05.07.18 @ 11:31)    Gram Stain:   Growth in aerobic bottle: Gram Negative Rods  Growth in anaerobic bottle: Gram Negative Rods    -  Enterobacter cloacae complex: Detec    Specimen Source: .Blood Blood-Peripheral    Organism: Blood Culture PCR    Culture Results:   Growth in aerobic bottle: Gram Negative Rods  Growth in anaerobic bottle: Gram Negative Rods  "Due to technical problems, Proteus sp. will Not be reported as part of  the BCID panel until further notice"  ***Blood Panel PCR results on this specimenare available  approximately 3 hours after the Gram stain result.***  Gram stain, PCR, and/or culture results may not always  correspond due to difference in methodologies.  ************************************************************  This PCR assaywas performed using [employer].  The following targets are tested for: Enterococcus,  vancomycin resistant enterococci, Listeria monocytogenes,  coagulase negative staphylococci, S. aureus,  methicillin resistant S. aureus, Streptococcus agalactiae  (Group B), S. pneumoniae, S. pyogenes (Group A),  Acinetobacter baumannii, Enterobacter cloacae, E. coli,  Klebsiella oxytoca, K. pneumoniae, Proteus sp.,  Serratia marcescens, Haemophilus influenzae,  Neisseria meningitidis, Pseudomonas aeruginosa, Candida  albicans, C. glabrata, C krusei, C parapsilosis,  C. tropicalis and the KPC resistance gene.    Organism Identification: Blood Culture PCR    Method Type: PCR    Culture - Blood (05.07.18 @ 11:28)    Gram Stain:   Growth in aerobic bottle: Gram Negative Rods  Growth in anaerobic bottle: Gram Negative Rods    Specimen Source: .Blood Blood-Peripheral    Culture Results:   Growth in aerobic bottle: Gram Negative Rods  Growth in anaerobic bottle: Gram Negative Rods    RADIOLOGY    EXAM:  CT CHEST                            PROCEDURE DATE:  05/11/2018          INTERPRETATION:  CT CHEST WITHOUT CONTRAST    INDICATION: Asthma. Pneumonia. ESRD on hemodialysis. CHF. Evaluate for   loculation.    TECHNIQUE: Unenhanced helical images were obtained of the chest. Coronal   and sagittal images were reconstructed. Maximum intensity projection   images were generated.     COMPARISON: CT abdomen pelvis 3/5/2015.    FINDINGS:     Tubes/Lines: Right-sided IJ central line with the tip in the SVC.    Lungs And Airways: The airways are unremarkable. There are nodular small   opacities throughout large portion of the left upper lobe representing   and portions of the left lower lobe representing impacted distal airways   likely of infectious etiology. There are a few 2 or 3 mm right upper lobe   pulmonary nodules which may also be related to the infection. There is   atelectasis in the lingula as well. Left lung base atelectasis. There are   left upper lobe peripheral reticular markings abutting the anterior chest   wall related to prior radiation therapy.    Pleura: Trace left pleural effusion. No pneumothorax.    Mediastinum: There are no enlarged chest lymph nodes. Hypodense right   thyroid gland dominant 2.4 cm nodule with heterogeneity of the remainder   of the bilateral thyroid glands with enlargement resulting in mild   luminal narrowing of the trachea.    Heart and Vasculature: The heart is enlarged. No pericardial effusion.   Coronary arterial calcifications involving the LAD, left circumflex, and   right coronary artery. Mitral annular calcifications. Cardiomem is noted   within the left lower lobe pulmonary artery.    The main pulmonary artery is enlarged and measures 3.4 cm which may be   seen in pulmonary hypertension. Atherosclerotic changes.    Upper Abdomen: The kidneys are partially visualized. Partially imaged   renal cysts are noted as on the prior abdominal CT of March 5, 2015.    Bones And Soft Tissues: Degenerative changes. Patient appears to be   status post left breast surgery.      IMPRESSION:  Small nodular opacities within large portions of the left   upper lobe and parts of the left lower lobe as above likely pneumonia. A   three-month follow-up CT is recommended to confirm resolution.    Trace left pleural effusion.                  Assessment and Recommendation:   7F PMHx Asthma, Anemia (on weekly procrit), HF pEF, DM, ESRD on HD ( T,T,S, last one Saturday), HTN, on Home 02,  Breast CA x 2 (s/p L lumpectomy, radiation 2005) Uterine CA (s/p JACLYN)  c/o SOB. Pt that she started feeling SOB and went to [place] but nobody saw her because SOB didn't improve pt decided to come to [facility]. As per patient she drinks lots of water. At ED code sepsis was called 2/2 fevers, tachycardia and leukocytosis. Pt has been complaining of a dry cough since Saturday.   Patient was intubated and was started on IV Zosyn.  Blood cultures grew Enterobacter cloacae complex and patient was started on IV gentamycin.  5/8/18 Patient was extubated and transferred to a regular floor.  Urine culture grew VRE.  repeat blood culture on 5/8/18 is still -ve.  5/11/18 CT chest suggested pneumonia.  5/12/18 Patient c/o diarrhea, and stool CDT was -ve.    Problem/Recommendation - 1:  Problem: Pneumonia.   Recommendation:   1- Contact precautions as urine CS grew VRE.  2- Follow repeat blood cultures is still -ve, and Follow +VE Blood culture to final report is noted.  3- Continue Zyvox 600 mg Po q 12 hours x 2 weeks and observe platelets while patient is on Zyvox.  4- Continue IV Zosyn 3.375 gm IVPB q 8 hours x 7 days then give Cipro 500 mg po q 12 hours x 7 more days.  5- Fluid and electrolytes management.  6- CBC and BMP follow up.   7- Nephrology management.    Problem/Recommendation - 2:  ·  Problem: Acute respiratory failure, unspecified whether with hypoxia or hypercapnia.    Recommendation:   1- As per problem # 1.    Problem/Recommendation - 3:  ·  Problem: Asthma.    Recommendation:   1- Bronchodilators.  2- O2 as needed.  3- Pulmonary management, and follow up.  4- Steroids as per primary, and pulmonary team if needed.     Problem/Recommendation - 4:  ·  Problem: Type 2 diabetes mellitus with stage 5 chronic kidney disease.    Recommendation:   1- Blood sugar monitoring and control.  2- Accu-Cheks with coverage.  3- 1800 yazmin ADA diet.  4- Follow HB A1C.     Problem/Recommendation - 5:  ·  Problem: Anemia.    Recommendation:   1- Closely monitor H & H.  2- Observe for bleeding.     Discussed with medical resident, and with patient.

## 2018-05-31 ENCOUNTER — FORM ENCOUNTER (OUTPATIENT)
Age: 78
End: 2018-05-31

## 2018-06-01 ENCOUNTER — APPOINTMENT (OUTPATIENT)
Dept: ENDOVASCULAR SURGERY | Facility: CLINIC | Age: 78
End: 2018-06-01
Payer: MEDICARE

## 2018-06-01 PROCEDURE — 36902Z: CUSTOM

## 2018-06-01 PROCEDURE — 36215Z: CUSTOM | Mod: 59

## 2018-06-14 ENCOUNTER — FORM ENCOUNTER (OUTPATIENT)
Age: 78
End: 2018-06-14

## 2018-06-15 ENCOUNTER — APPOINTMENT (OUTPATIENT)
Dept: ENDOVASCULAR SURGERY | Facility: CLINIC | Age: 78
End: 2018-06-15
Payer: MEDICARE

## 2018-06-15 PROCEDURE — 36215Z: CUSTOM | Mod: 59

## 2018-06-15 PROCEDURE — 36902Z: CUSTOM

## 2018-07-03 ENCOUNTER — INPATIENT (INPATIENT)
Facility: HOSPITAL | Age: 78
LOS: 3 days | Discharge: HOME HEALTH | End: 2018-07-07
Attending: HOSPITALIST | Admitting: HOSPITALIST
Payer: MEDICARE

## 2018-07-03 VITALS
OXYGEN SATURATION: 100 % | HEART RATE: 95 BPM | TEMPERATURE: 98 F | SYSTOLIC BLOOD PRESSURE: 154 MMHG | RESPIRATION RATE: 16 BRPM | DIASTOLIC BLOOD PRESSURE: 76 MMHG

## 2018-07-03 DIAGNOSIS — R74.8 ABNORMAL LEVELS OF OTHER SERUM ENZYMES: ICD-10-CM

## 2018-07-03 DIAGNOSIS — K21.9 GASTRO-ESOPHAGEAL REFLUX DISEASE WITHOUT ESOPHAGITIS: ICD-10-CM

## 2018-07-03 DIAGNOSIS — E11.9 TYPE 2 DIABETES MELLITUS WITHOUT COMPLICATIONS: ICD-10-CM

## 2018-07-03 DIAGNOSIS — I10 ESSENTIAL (PRIMARY) HYPERTENSION: ICD-10-CM

## 2018-07-03 DIAGNOSIS — N18.6 END STAGE RENAL DISEASE: ICD-10-CM

## 2018-07-03 DIAGNOSIS — D63.8 ANEMIA IN OTHER CHRONIC DISEASES CLASSIFIED ELSEWHERE: ICD-10-CM

## 2018-07-03 DIAGNOSIS — I50.33 ACUTE ON CHRONIC DIASTOLIC (CONGESTIVE) HEART FAILURE: ICD-10-CM

## 2018-07-03 DIAGNOSIS — Z90.711 ACQUIRED ABSENCE OF UTERUS WITH REMAINING CERVICAL STUMP: Chronic | ICD-10-CM

## 2018-07-03 DIAGNOSIS — J44.9 CHRONIC OBSTRUCTIVE PULMONARY DISEASE, UNSPECIFIED: ICD-10-CM

## 2018-07-03 DIAGNOSIS — E78.5 HYPERLIPIDEMIA, UNSPECIFIED: ICD-10-CM

## 2018-07-03 DIAGNOSIS — Z29.9 ENCOUNTER FOR PROPHYLACTIC MEASURES, UNSPECIFIED: ICD-10-CM

## 2018-07-03 LAB
ALBUMIN SERPL ELPH-MCNC: 3.4 G/DL — SIGNIFICANT CHANGE UP (ref 3.3–5)
ALP SERPL-CCNC: 107 U/L — SIGNIFICANT CHANGE UP (ref 40–120)
ALT FLD-CCNC: 27 U/L — SIGNIFICANT CHANGE UP (ref 4–33)
APTT BLD: 28 SEC — SIGNIFICANT CHANGE UP (ref 27.5–37.4)
AST SERPL-CCNC: 40 U/L — HIGH (ref 4–32)
BASOPHILS # BLD AUTO: 0.03 K/UL — SIGNIFICANT CHANGE UP (ref 0–0.2)
BASOPHILS NFR BLD AUTO: 0.3 % — SIGNIFICANT CHANGE UP (ref 0–2)
BILIRUB SERPL-MCNC: 0.8 MG/DL — SIGNIFICANT CHANGE UP (ref 0.2–1.2)
BUN SERPL-MCNC: 49 MG/DL — HIGH (ref 7–23)
CALCIUM SERPL-MCNC: 9 MG/DL — SIGNIFICANT CHANGE UP (ref 8.4–10.5)
CHLORIDE SERPL-SCNC: 93 MMOL/L — LOW (ref 98–107)
CK MB BLD-MCNC: 3.53 NG/ML — SIGNIFICANT CHANGE UP (ref 1–4.7)
CK MB BLD-MCNC: SIGNIFICANT CHANGE UP (ref 0–2.5)
CK SERPL-CCNC: 61 U/L — SIGNIFICANT CHANGE UP (ref 25–170)
CO2 SERPL-SCNC: 26 MMOL/L — SIGNIFICANT CHANGE UP (ref 22–31)
CREAT SERPL-MCNC: 2.9 MG/DL — HIGH (ref 0.5–1.3)
EOSINOPHIL # BLD AUTO: 0.2 K/UL — SIGNIFICANT CHANGE UP (ref 0–0.5)
EOSINOPHIL NFR BLD AUTO: 2.1 % — SIGNIFICANT CHANGE UP (ref 0–6)
GLUCOSE BLDC GLUCOMTR-MCNC: 126 MG/DL — HIGH (ref 70–99)
GLUCOSE BLDC GLUCOMTR-MCNC: 75 MG/DL — SIGNIFICANT CHANGE UP (ref 70–99)
GLUCOSE SERPL-MCNC: 155 MG/DL — HIGH (ref 70–99)
HBV SURFACE AG SER-ACNC: NEGATIVE — SIGNIFICANT CHANGE UP
HCT VFR BLD CALC: 32.2 % — LOW (ref 34.5–45)
HGB BLD-MCNC: 9.9 G/DL — LOW (ref 11.5–15.5)
IMM GRANULOCYTES # BLD AUTO: 0.03 # — SIGNIFICANT CHANGE UP
IMM GRANULOCYTES NFR BLD AUTO: 0.3 % — SIGNIFICANT CHANGE UP (ref 0–1.5)
INR BLD: 1.16 — SIGNIFICANT CHANGE UP (ref 0.88–1.17)
LYMPHOCYTES # BLD AUTO: 1.13 K/UL — SIGNIFICANT CHANGE UP (ref 1–3.3)
LYMPHOCYTES # BLD AUTO: 11.8 % — LOW (ref 13–44)
MAGNESIUM SERPL-MCNC: 2.1 MG/DL — SIGNIFICANT CHANGE UP (ref 1.6–2.6)
MCHC RBC-ENTMCNC: 27.2 PG — SIGNIFICANT CHANGE UP (ref 27–34)
MCHC RBC-ENTMCNC: 30.7 % — LOW (ref 32–36)
MCV RBC AUTO: 88.5 FL — SIGNIFICANT CHANGE UP (ref 80–100)
MONOCYTES # BLD AUTO: 0.55 K/UL — SIGNIFICANT CHANGE UP (ref 0–0.9)
MONOCYTES NFR BLD AUTO: 5.8 % — SIGNIFICANT CHANGE UP (ref 2–14)
NEUTROPHILS # BLD AUTO: 7.61 K/UL — HIGH (ref 1.8–7.4)
NEUTROPHILS NFR BLD AUTO: 79.7 % — HIGH (ref 43–77)
NRBC # FLD: 0 — SIGNIFICANT CHANGE UP
NT-PROBNP SERPL-SCNC: SIGNIFICANT CHANGE UP PG/ML
PHOSPHATE SERPL-MCNC: 4.3 MG/DL — SIGNIFICANT CHANGE UP (ref 2.5–4.5)
PLATELET # BLD AUTO: 219 K/UL — SIGNIFICANT CHANGE UP (ref 150–400)
PMV BLD: 10.3 FL — SIGNIFICANT CHANGE UP (ref 7–13)
POTASSIUM SERPL-MCNC: 4.8 MMOL/L — SIGNIFICANT CHANGE UP (ref 3.5–5.3)
POTASSIUM SERPL-SCNC: 4.8 MMOL/L — SIGNIFICANT CHANGE UP (ref 3.5–5.3)
PROT SERPL-MCNC: 7.6 G/DL — SIGNIFICANT CHANGE UP (ref 6–8.3)
PROTHROM AB SERPL-ACNC: 12.9 SEC — SIGNIFICANT CHANGE UP (ref 9.8–13.1)
RBC # BLD: 3.64 M/UL — LOW (ref 3.8–5.2)
RBC # FLD: 16.9 % — HIGH (ref 10.3–14.5)
REVIEW TO FOLLOW: YES — SIGNIFICANT CHANGE UP
SODIUM SERPL-SCNC: 135 MMOL/L — SIGNIFICANT CHANGE UP (ref 135–145)
TROPONIN T, HIGH SENSITIVITY: 104 NG/L — CRITICAL HIGH (ref ?–14)
TROPONIN T, HIGH SENSITIVITY: 97 NG/L — CRITICAL HIGH (ref ?–14)
TSH SERPL-MCNC: 0.19 UIU/ML — LOW (ref 0.27–4.2)
WBC # BLD: 9.55 K/UL — SIGNIFICANT CHANGE UP (ref 3.8–10.5)
WBC # FLD AUTO: 9.55 K/UL — SIGNIFICANT CHANGE UP (ref 3.8–10.5)

## 2018-07-03 PROCEDURE — 71045 X-RAY EXAM CHEST 1 VIEW: CPT | Mod: 26

## 2018-07-03 PROCEDURE — 93971 EXTREMITY STUDY: CPT | Mod: 26,LT

## 2018-07-03 RX ORDER — ATORVASTATIN CALCIUM 80 MG/1
40 TABLET, FILM COATED ORAL AT BEDTIME
Qty: 0 | Refills: 0 | Status: DISCONTINUED | OUTPATIENT
Start: 2018-07-03 | End: 2018-07-07

## 2018-07-03 RX ORDER — DEXTROSE 50 % IN WATER 50 %
25 SYRINGE (ML) INTRAVENOUS ONCE
Qty: 0 | Refills: 0 | Status: DISCONTINUED | OUTPATIENT
Start: 2018-07-03 | End: 2018-07-07

## 2018-07-03 RX ORDER — GABAPENTIN 400 MG/1
100 CAPSULE ORAL
Qty: 0 | Refills: 0 | Status: DISCONTINUED | OUTPATIENT
Start: 2018-07-03 | End: 2018-07-07

## 2018-07-03 RX ORDER — ASPIRIN/CALCIUM CARB/MAGNESIUM 324 MG
81 TABLET ORAL DAILY
Qty: 0 | Refills: 0 | Status: DISCONTINUED | OUTPATIENT
Start: 2018-07-03 | End: 2018-07-07

## 2018-07-03 RX ORDER — DEXTROSE 50 % IN WATER 50 %
12.5 SYRINGE (ML) INTRAVENOUS ONCE
Qty: 0 | Refills: 0 | Status: DISCONTINUED | OUTPATIENT
Start: 2018-07-03 | End: 2018-07-07

## 2018-07-03 RX ORDER — TIOTROPIUM BROMIDE 18 UG/1
1 CAPSULE ORAL; RESPIRATORY (INHALATION) DAILY
Qty: 0 | Refills: 0 | Status: DISCONTINUED | OUTPATIENT
Start: 2018-07-03 | End: 2018-07-07

## 2018-07-03 RX ORDER — FUROSEMIDE 40 MG
40 TABLET ORAL DAILY
Qty: 0 | Refills: 0 | Status: DISCONTINUED | OUTPATIENT
Start: 2018-07-03 | End: 2018-07-04

## 2018-07-03 RX ORDER — ASPIRIN/CALCIUM CARB/MAGNESIUM 324 MG
162 TABLET ORAL ONCE
Qty: 0 | Refills: 0 | Status: COMPLETED | OUTPATIENT
Start: 2018-07-03 | End: 2018-07-03

## 2018-07-03 RX ORDER — DEXTROSE 50 % IN WATER 50 %
15 SYRINGE (ML) INTRAVENOUS ONCE
Qty: 0 | Refills: 0 | Status: DISCONTINUED | OUTPATIENT
Start: 2018-07-03 | End: 2018-07-07

## 2018-07-03 RX ORDER — INSULIN LISPRO 100/ML
VIAL (ML) SUBCUTANEOUS AT BEDTIME
Qty: 0 | Refills: 0 | Status: DISCONTINUED | OUTPATIENT
Start: 2018-07-03 | End: 2018-07-07

## 2018-07-03 RX ORDER — INSULIN LISPRO 100/ML
8 VIAL (ML) SUBCUTANEOUS
Qty: 0 | Refills: 0 | Status: DISCONTINUED | OUTPATIENT
Start: 2018-07-03 | End: 2018-07-07

## 2018-07-03 RX ORDER — CHLORHEXIDINE GLUCONATE 213 G/1000ML
1 SOLUTION TOPICAL
Qty: 0 | Refills: 0 | Status: DISCONTINUED | OUTPATIENT
Start: 2018-07-03 | End: 2018-07-04

## 2018-07-03 RX ORDER — ACETAMINOPHEN 500 MG
650 TABLET ORAL EVERY 6 HOURS
Qty: 0 | Refills: 0 | Status: DISCONTINUED | OUTPATIENT
Start: 2018-07-03 | End: 2018-07-07

## 2018-07-03 RX ORDER — SODIUM CHLORIDE 9 MG/ML
1000 INJECTION, SOLUTION INTRAVENOUS
Qty: 0 | Refills: 0 | Status: DISCONTINUED | OUTPATIENT
Start: 2018-07-03 | End: 2018-07-07

## 2018-07-03 RX ORDER — FUROSEMIDE 40 MG
40 TABLET ORAL ONCE
Qty: 0 | Refills: 0 | Status: COMPLETED | OUTPATIENT
Start: 2018-07-03 | End: 2018-07-03

## 2018-07-03 RX ORDER — ASPIRIN/CALCIUM CARB/MAGNESIUM 324 MG
162 TABLET ORAL ONCE
Qty: 0 | Refills: 0 | Status: DISCONTINUED | OUTPATIENT
Start: 2018-07-03 | End: 2018-07-03

## 2018-07-03 RX ORDER — INSULIN GLARGINE 100 [IU]/ML
30 INJECTION, SOLUTION SUBCUTANEOUS AT BEDTIME
Qty: 0 | Refills: 0 | Status: DISCONTINUED | OUTPATIENT
Start: 2018-07-03 | End: 2018-07-07

## 2018-07-03 RX ORDER — GLUCAGON INJECTION, SOLUTION 0.5 MG/.1ML
1 INJECTION, SOLUTION SUBCUTANEOUS ONCE
Qty: 0 | Refills: 0 | Status: DISCONTINUED | OUTPATIENT
Start: 2018-07-03 | End: 2018-07-07

## 2018-07-03 RX ORDER — METOPROLOL TARTRATE 50 MG
50 TABLET ORAL
Qty: 0 | Refills: 0 | Status: DISCONTINUED | OUTPATIENT
Start: 2018-07-03 | End: 2018-07-07

## 2018-07-03 RX ORDER — ERGOCALCIFEROL 1.25 MG/1
50000 CAPSULE ORAL
Qty: 0 | Refills: 0 | Status: DISCONTINUED | OUTPATIENT
Start: 2018-07-03 | End: 2018-07-07

## 2018-07-03 RX ORDER — INSULIN LISPRO 100/ML
VIAL (ML) SUBCUTANEOUS
Qty: 0 | Refills: 0 | Status: DISCONTINUED | OUTPATIENT
Start: 2018-07-03 | End: 2018-07-07

## 2018-07-03 RX ORDER — PANTOPRAZOLE SODIUM 20 MG/1
40 TABLET, DELAYED RELEASE ORAL
Qty: 0 | Refills: 0 | Status: DISCONTINUED | OUTPATIENT
Start: 2018-07-03 | End: 2018-07-07

## 2018-07-03 RX ORDER — BRIMONIDINE TARTRATE 2 MG/MG
1 SOLUTION/ DROPS OPHTHALMIC THREE TIMES A DAY
Qty: 0 | Refills: 0 | Status: DISCONTINUED | OUTPATIENT
Start: 2018-07-03 | End: 2018-07-07

## 2018-07-03 RX ORDER — TIMOLOL 0.5 %
1 DROPS OPHTHALMIC (EYE)
Qty: 0 | Refills: 0 | Status: DISCONTINUED | OUTPATIENT
Start: 2018-07-03 | End: 2018-07-07

## 2018-07-03 RX ORDER — BUDESONIDE AND FORMOTEROL FUMARATE DIHYDRATE 160; 4.5 UG/1; UG/1
2 AEROSOL RESPIRATORY (INHALATION)
Qty: 0 | Refills: 0 | Status: DISCONTINUED | OUTPATIENT
Start: 2018-07-03 | End: 2018-07-07

## 2018-07-03 RX ORDER — HEPARIN SODIUM 5000 [USP'U]/ML
5000 INJECTION INTRAVENOUS; SUBCUTANEOUS EVERY 8 HOURS
Qty: 0 | Refills: 0 | Status: DISCONTINUED | OUTPATIENT
Start: 2018-07-03 | End: 2018-07-07

## 2018-07-03 RX ADMIN — BRIMONIDINE TARTRATE 1 DROP(S): 2 SOLUTION/ DROPS OPHTHALMIC at 22:12

## 2018-07-03 RX ADMIN — ATORVASTATIN CALCIUM 40 MILLIGRAM(S): 80 TABLET, FILM COATED ORAL at 22:12

## 2018-07-03 RX ADMIN — GABAPENTIN 100 MILLIGRAM(S): 400 CAPSULE ORAL at 18:06

## 2018-07-03 RX ADMIN — Medication 50 MILLIGRAM(S): at 18:06

## 2018-07-03 RX ADMIN — HEPARIN SODIUM 5000 UNIT(S): 5000 INJECTION INTRAVENOUS; SUBCUTANEOUS at 22:12

## 2018-07-03 RX ADMIN — BUDESONIDE AND FORMOTEROL FUMARATE DIHYDRATE 2 PUFF(S): 160; 4.5 AEROSOL RESPIRATORY (INHALATION) at 22:11

## 2018-07-03 RX ADMIN — Medication 8 UNIT(S): at 18:06

## 2018-07-03 RX ADMIN — Medication 40 MILLIGRAM(S): at 14:26

## 2018-07-03 RX ADMIN — Medication 650 MILLIGRAM(S): at 19:29

## 2018-07-03 RX ADMIN — Medication 650 MILLIGRAM(S): at 18:32

## 2018-07-03 RX ADMIN — Medication 40 MILLIGRAM(S): at 18:06

## 2018-07-03 NOTE — H&P ADULT - PROBLEM SELECTOR PLAN 4
Respiratory status stable at this time  Will continue supplemental oxygen  Continue Spiriva and Symbicort  Robitussin PRN for cough

## 2018-07-03 NOTE — H&P ADULT - PROBLEM SELECTOR PLAN 7
Continue basal/bolus insulin with insulin sliding scale for coverage  Hold oral hypoglycemics  Check HgA1C  Monitor finger sticks  Diabetic diet

## 2018-07-03 NOTE — ED PROVIDER NOTE - CARE PLAN
Principal Discharge DX:	Fatigue Principal Discharge DX:	Troponin I above reference range  Secondary Diagnosis:	ESRD needing dialysis  Secondary Diagnosis:	ALFORD (dyspnea on exertion)

## 2018-07-03 NOTE — H&P ADULT - NEUROLOGICAL DETAILS
responds to pain/responds to verbal commands/sensation intact/cranial nerves intact/normal strength/alert and oriented x 3

## 2018-07-03 NOTE — ED PROVIDER NOTE - OBJECTIVE STATEMENT
77yo F pmh breast & uterine ca, ESRD (HD t, th, sa, last HD sat), asthma on 24 hours 02 p/w weakness x 2weeks. so tired today didn't feel like going to HD so came to ed. has decreased appetite for 2 months. 77yo F pmh breast & uterine ca, ESRD (HD t, th, sa, last HD sat), asthma/copd on intermittent 02 p/w fatigue x 2weeks. so tired today didn't feel like going to HD so came to ed. has decreased appetite for 2 months. has chronic dry cough unchanged. ROS negative for: fever, chest pain, SOB, Nausea, vomiting, diarrhea, abdominal pain, dysuria

## 2018-07-03 NOTE — H&P ADULT - RS GEN PE MLT RESP DETAILS PC
respirations non-labored/no chest wall tenderness/airway patent/no wheezes/no rhonchi/diminished breath sounds, L/rales/diminished breath sounds, R

## 2018-07-03 NOTE — ED ADULT TRIAGE NOTE - CHIEF COMPLAINT QUOTE
Pt BIBEMS c/o generalized weakness, decreased PO intake for the past 2.5 weeks, dialysis pt last session was Saturday, pt states she felt to weak to go today. Pt has shiley to rt chest wall

## 2018-07-03 NOTE — H&P ADULT - PMH
Anemia of chronic disease    Breast carcinoma  Lt side s/p lumpectomy and radiation in 2004  Chronic diastolic (congestive) heart failure    COPD (chronic obstructive pulmonary disease)  On home oxygen  DM (diabetes mellitus)    ESRD (end stage renal disease) on dialysis    GERD (gastroesophageal reflux disease)    HLD (hyperlipidemia)    HTN (hypertension)    MS (mitral stenosis)

## 2018-07-03 NOTE — H&P ADULT - PROBLEM SELECTOR PLAN 1
Admit to telemetry, serial cardiac enzymes, serial EKGs  Check CBC, CMP, TSH, FLP, HgA1C, BNP  Pt hypervolemic on exam with bibasalar rales and unilateral edema with interstitial edema on CXR and elevated proBNP  Renal consult with Dr. Rivera called; plan for HD today with likely UF  Cards consult with Dr. Jaimes called  Continue home medications  Recent echo as shown above  Strict I&Os, monitor daily weights, 1500 cc fluid restriction, sodium restriction  PT eval ordered  F/U MD note

## 2018-07-03 NOTE — ED PROVIDER NOTE - MEDICAL DECISION MAKING DETAILS
Monae:Sent in for odd behavior. braught back be police after being out. Awake alert, cooperative here. Was sent in for evaluation.  Will watch and get labs. Monae:Sent in for odd behavior. braught back be police after being out. Awake alert, cooperative here. Was sent in for evaluation.  Will watch and get labs.  keira, pgy2: -initial impression: symptoms likely due to uremia vs chf. will assess for acs.  -initial plan: labs, EKG, UA, CXR, -likely admit Monae:decreased po, worsening sob on home o2 felt too wek for dialysis. mostly chf and will us to look for uremic pericarditis.  Abd soft non tender. trop increased will admit for chf  keira, pgy2: -initial impression: symptoms likely due to uremia vs chf. will assess for acs.  -initial plan: labs, EKG, UA, CXR, -likely admit

## 2018-07-03 NOTE — ED SUB INTERN NOTE - MEDICAL DECISION MAKING DETAILS
79 yo F with PMH breast/uterine cancer (s/p resection), diabetes, dialysis (most recent Sat, 6/30), and anemia who presents with two weeks of worsening fatigue and generalized weakness. Patient's fatigue most likely related to cardiogenic (i.e. CHF), pulmonary (i.e. COPD), metabolic (i.e. uremia), or endocrine (i.e. thyroid) etiology. High suspicion for CHF due to peripheral edema and crackles bilaterally on lung exam. Will follow up thyroid tests as patient has features concerning for thyroid issues - heat intolerance and weight loss.

## 2018-07-03 NOTE — H&P ADULT - PROBLEM SELECTOR PLAN 3
Monitor renal function and lytes  Renal consult with Dr. Rivera called  Plan for HD today  Continue home medications  Avoid NSAIDs and nephrotoxic agents  Renal diet, fluid restriction, monitor I&Os

## 2018-07-03 NOTE — H&P ADULT - NEGATIVE OPHTHALMOLOGIC SYMPTOMS
no blurred vision L/no pain R/no blurred vision R/no loss of vision R/no diplopia/no loss of vision L/no photophobia/no pain L

## 2018-07-03 NOTE — H&P ADULT - PROBLEM SELECTOR PLAN 2
Troponin elevation likely secondary to ESRD and fluid overload  No signs of ischemia on EKG  Trend troponins and EKG  Cards consult with Dr. Jaimes called  Start ASA 81 mg daily

## 2018-07-03 NOTE — H&P ADULT - HISTORY OF PRESENT ILLNESS
79 y/o female with a PMHx of chronic diastolic heart failure (stage II), ESRD on HD T/R/S, moderate MS, COPD on home oxygen, HTN, HLD, DM, and GERD presents to ED with shortness of breath. Pt reports that she has chronic dyspnea which has worsened over the past couple of days. Pt states that she is short of breath on minimal exertion, such as walking up a flight of stairs or walking from room to room in her home. Pt also endorses 4-5 pillow orthopnea, associated with paroxysmal nocturnal dyspnea, a cough productive of white and sometimes yellow sputum, and swelling in her right lower extremity. Pt elicits that she has been experiencing extreme fatigue and malaise recently to the point where she doesn't want to do anything. Pt did not feel like going to dialysis today because she was so lethargic, which is out of the ordinary for her. Pt also admits to decreased appetite over the past two months. Pt denies fever, chills, recent travel, headache, dizziness, visual deficits, chest pain, palpitations, abdominal pain, N/V/D/C, hematochezia, melena, dysuria, hematuria, LOC, syncope. Upon arrival to ED, EKG: Sinus tachycardia at 103 bpm, RBBB, LAFB, LVH. CE x1: Trop 97. H/H: 9.9/32.2. BUN/Cr: 49/2.90. Glucose: 155. AST: 40. TSH: 0.19. ProBNP: 05843. CXR: Mild interstitial edema. RLE doppler: No evidence of right lower extremity deep venous thrombosis. Pt is admitted to telemetry.

## 2018-07-03 NOTE — ED SUB INTERN NOTE - PHYSICAL EXAMINATION
PHYSICAL EXAM:    GENERAL: Well-appearing, NAD  HEAD:  Atraumatic, Normocephalic  EYES: EOMI, PERRLA, conjunctiva and sclera clear  CHEST/LUNG: Crackles bilaterally to auscultation  HEART: Regular rate and rhythm; S1, S2; No murmurs, rubs, or gallops  ABDOMEN: Soft, Nontender, Nondistended; Normoactive bowel sounds  EXTREMITIES: +bilateral LE edema, right more than left, +right LE erythema, 1+ UE/LE Peripheral Pulses, No clubbing or cyanosis  PSYCH: A&Ox3  NEUROLOGY: non-focal; patient unable to do shoulder shrug on left due to past breast surgery, otherwise CN II-XII intact bilaterally PHYSICAL EXAM:    GENERAL: Well-appearing, NAD  HEAD:  Atraumatic, Normocephalic  EYES: EOMI, PERRLA, conjunctiva and sclera clear  CHEST/LUNG: Crackles bilaterally to auscultation  HEART: Regular rate and rhythm; S1, S2; No murmurs, rubs, or gallops  ABDOMEN: Soft, Nontender, Nondistended; Normoactive bowel sounds  EXTREMITIES: +bilateral LE pitting edema, right more than left, +right LE erythema, 1+ UE/LE Peripheral Pulses, No clubbing or cyanosis  PSYCH: A&Ox3  NEUROLOGY: non-focal; patient unable to do shoulder shrug on left due to past breast surgery, otherwise CN II-XII intact bilaterally

## 2018-07-03 NOTE — H&P ADULT - NSHPSOCIALHISTORY_GEN_ALL_CORE
Pt is  and lives alone. She has a home health aide 7 days/week for 7 hours/day. Pt is a former smoker (quit 8 years ago) and former drinker (quit 40 years ago).

## 2018-07-03 NOTE — H&P ADULT - NEGATIVE GASTROINTESTINAL SYMPTOMS
no melena/no nausea/no vomiting/no change in bowel habits/no flatulence/no abdominal pain/no diarrhea/no hematochezia/no constipation

## 2018-07-03 NOTE — H&P ADULT - ASSESSMENT
77 y/o female with a PMHx of chronic diastolic heart failure (stage II), ESRD on HD T/R/S, moderate MS, COPD on home oxygen, HTN, HLD, DM, GERD, and anemia of chronic diease presents to ED with fatigue, dyspnea on exertion, orthopnea and lower extremity edema in the setting of fluid overload likely cardiorenal in origin.

## 2018-07-03 NOTE — ED ADULT NURSE NOTE - OBJECTIVE STATEMENT
Received pt. in room 18 presenting to the ER complaining of generalized weakness and feeling tired for 5 weeks. Patient have a history of DM, CHF, CKD on dialysis, Asthma, HTN, Anaemia. Patient is alert and oriented x 4 has plus 2 pitted edema in both feet, no c/o pain no respiratory distress, stated " I feel weak all over and very tired for 5 weeks". No dizzyness , has upper right chest shiley and right lower arm with A-V fistula positive bruit and thrill. Private aid and daughter at bedside, placed on cardiac monitor, labs sent will continue to monitor.

## 2018-07-03 NOTE — H&P ADULT - NSHPLABSRESULTS_GEN_ALL_CORE
May 2018, Echo: Moderate mitral stenosis. Moderate left atrial enlargement. Moderate concentric left ventricular hypertrophy. Normal left ventricular systolic function. EF 55 to 60%. Grade II diastolic dysfunction. RV systolic pressure is moderately increased at 49 mmHg. There is mild-moderate tricuspid regurgitation.  ----------  EKG: Sinus tachycardia at 103 bpm, RBBB, LAFB, LVH  CE x1: Trop 97  H/H: 9.9/32.2  BUN/Cr: 49/2.90  Glucose: 155  AST: 40  TSH: 0.19  ProBNP: 32286    7/3 CXR: Mild interstitial edema.  7/3 RLE doppler: No evidence of right lower extremity deep venous thrombosis.

## 2018-07-03 NOTE — ED SUB INTERN NOTE - OBJECTIVE STATEMENT FT
79 yo F with PMH breast/uterine cancer (s/p resection), anemia, CKD on Tues/Thurs/Sat dialysis (most recent Sat, 6/30), and diabetes who presents with two weeks of increasing fatigue and generalized weakness that prevented her from going to her dialysis treatment today (7/3). Patient felt more tired during dialysis on Saturday and had to stop fifteen minutes early (received 3 hours of 3 hours 15 min dialysis). Additionally, she notes that over the past two weeks, she has been having trouble with her daily showers as she gets fatigued and has to take shorter showers. Patient also endorses heat intolerance, a weight loss of 15-20 pounds since beginning dialysis 4-5 months ago, and a decrease in appetite for the past 2 months (her diet now involves only crackers, oatmeal, and coffee).

## 2018-07-03 NOTE — H&P ADULT - ATTENDING COMMENTS
Pt seen 7/4/18 at 9am  78F HTN DM2 ESRD on HD diastolic CHF s/p Cardio MEMS implantation, asthma, anemia (on weekly procrit), breast CA s/p Left lumpectomy and radiation in 2005, uterine CA s/p JACLYN, with no known h/o CAD/MI with no ischemia on NST 12/17 at [facility], with recent TTE 5/18 with normal LV function, moderate LVH with mod mitral stenosis, recently admitted with respiratory failure 2/2 PNA 5/18 now readmitted with recurrent non productive cough, progressively worsening dyspnea and associated weakness, missed HD 7/3.  Dyspnea: Likely volume overload, nephrology contacted to consider HD.  Check RVP.  Appreciate cardiology recs.  For Cardio MEMS interrogation.  Outpt f/u Dr Salas 7/9.  Abnormal TSH: F/u Free T4, T3  Management of HTN, DM2 as above

## 2018-07-03 NOTE — H&P ADULT - NEGATIVE NEUROLOGICAL SYMPTOMS
no generalized seizures/no difficulty walking/no confusion/no headache/no tremors/no paresthesias/no vertigo/no loss of sensation/no loss of consciousness/no hemiparesis/no syncope/no focal seizures/no weakness/no transient paralysis

## 2018-07-03 NOTE — CONSULT NOTE ADULT - SUBJECTIVE AND OBJECTIVE BOX
HISTORY OF PRESENT ILLNESS: 77y/o  Female well known to our service with HTN DM ESRD on HD  diastolic CHF s/p Cardio MEMS implantation, asthma, anemia (on weekly procrit), breast CA s/p Left lumpectomy and radiation in 2005, uterine CA s/p JACLYN,  with no known h/o CAD/MI with [facility],with recent TTE 5/18 with normal LV function, moderate LVH with mod mitral stenosis,  recently admitted with respiratory failure 2/2 PNA 5/18 now readmitted with recurrent non productive cough, progressively worsening dyspnea (there is a chronic component though she states its worse in the last several days) and associated weakness. She states she was so weak today she could not go to HD so she came to the ER. She denies any chest pain, palpitations, syncope or near syncope. She denies any medication or dietary  non compliance .       PAST MEDICAL & SURGICAL HISTORY:  MS (mitral stenosis)  GERD (gastroesophageal reflux disease)  COPD (chronic obstructive pulmonary disease): On home oxygen  HLD (hyperlipidemia)  HTN (hypertension)  ESRD (end stage renal disease) on dialysis  Chronic diastolic (congestive) heart failure  Anemia of chronic disease  Breast carcinoma: Lt side s/p lumpectomy and radiation in 2004  DM (diabetes mellitus)  S/P subtotal hysterectomy  S/P lumpectomy, left breast: 2004        MEDICATIONS  (STANDING):  aspirin enteric coated 81 milliGRAM(s) Oral daily  atorvastatin 40 milliGRAM(s) Oral at bedtime  brimonidine 0.2% Ophthalmic Solution 1 Drop(s) Both EYES three times a day  buDESOnide  80 MICROgram(s)/formoterol 4.5 MICROgram(s) Inhaler 2 Puff(s) Inhalation two times a day  ergocalciferol 53384 Unit(s) Oral <User Schedule>  furosemide    Tablet 40 milliGRAM(s) Oral daily  gabapentin 100 milliGRAM(s) Oral two times a day  heparin  Injectable 5000 Unit(s) SubCutaneous every 8 hours  insulin glargine Injectable (LANTUS) 30 Unit(s) SubCutaneous at bedtime  insulin lispro (HumaLOG) corrective regimen sliding scale   SubCutaneous three times a day before meals  insulin lispro (HumaLOG) corrective regimen sliding scale   SubCutaneous at bedtime  insulin lispro Injectable (HumaLOG) 8 Unit(s) SubCutaneous before lunch  insulin lispro Injectable (HumaLOG) 8 Unit(s) SubCutaneous before dinner  metoprolol tartrate 50 milliGRAM(s) Oral two times a day  pantoprazole   Suspension 40 milliGRAM(s) Oral before breakfast  timolol 0.5% Solution 1 Drop(s) Both EYES two times a day  tiotropium 18 MICROgram(s) Capsule 1 Capsule(s) Inhalation daily      Allergies  No Known Allergies      FAMILY HISTORY:  Diabetes mellitus  Family history of breast cancer (Aunt)  Non-contributary for premature coronary disease or sudden cardiac death    SOCIAL HISTORY:    [ x] Non-smoker  [ ] Smoker  [x ] Alcohol      REVIEW OF SYSTEMS:  [ ]chest pain  [  ]shortness of breath  [  ]palpitations  [  ]syncope  [ ]near syncope [ ]upper extremity weakness   [ ] lower extremity weakness  [  ]diplopia  [  ]altered mental status [X] weakness [x]cough   [  ]fevers  [ ]chills [ ]nausea  [ ]vomitting  [  ]dysphagia    [ ]abdominal pain  [ ]melena  [ ]BRBPR    [  ]epistaxis  [  ]rash    [ ]lower extremity edema        [X ] All others negative	  [ ] Unable to obtain    PHYSICAL EXAM:  T(C): 36.6 (07-03-18 @ 11:45), Max: 36.8 (07-03-18 @ 10:31)  HR: 99 (07-03-18 @ 15:53) (95 - 99)  BP: 112/94 (07-03-18 @ 15:53) (112/94 - 187/66)  RR: 20 (07-03-18 @ 15:53) (16 - 20)  SpO2: 100% (07-03-18 @ 15:53) (100% - 100%)  Wt(kg): --    Appearance: Normal	  HEENT:   Normal oral mucosa, PERRL, EOMI	  Lymphatic: No lymphadenopathy , no edema  Cardiovascular: Normal S1 S2, No JVD, No murmurs , Peripheral pulses palpable 2+ bilaterally  Respiratory: Lungs clear to auscultation, normal effort 	  Gastrointestinal:  Soft, Non-tender, + BS	  Skin: No rashes, No ecchymoses, No cyanosis, warm to touch  Musculoskeletal: Normal range of motion, normal strength  Psychiatry:  Mood & affect appropriate      TELEMETRY:  NSR  	    ECG:  	sinus tach PACS unchanged bifasicular block     Echo:  < from: Transthoracic Echocardiogram (05.08.18 @ 07:08) >  ------------------------------------------------------------------------  CONCLUSIONS: EF 5%%  1.  Peak mitral valve gradient equals 16 mm Hg, mean  transmitral valve gradient equals 7.5 mm Hg, consistent  with moderate mitral stenosis.  2. Moderate left atrial enlargement.  3. Moderate concentric left ventricular hypertrophy.  4. Normal Left Ventricular Systolic Function,  (EF = 55 to  60%)  5. Grade II diastolic dysfunction.  6. RV systolic pressure is moderately increased at  49 mm  Hg.  7. There is mild-moderate tricuspid regurgitation.    < end of copied text >    NST: < from: Nuclear Stress Test-Pharmacologic (12.01.17 @ 08:43) >  IMPRESSIONS:Normal Study  * Negative ECG evidence of ischemia after IV  administartion of Regadenoson.  * Myocardial Perfusion SPECT results are normal.  * No clear evidence of ischemia or infarct.  * Post-stress resting myocardial perfusion gated SPECT  imaging was performed (LVEF = 53 %) with no regional wall  motion abnormalities.    < end of copied text >    Cath: n/a  	  	  LABS:	 	                          9.9    9.55  )-----------( 219      ( 03 Jul 2018 12:01 )             32.2     07-03    135  |  93<L>  |  49<H>  ----------------------------<  155<H>  4.8   |  26  |  2.90<H>    Ca    9.0      03 Jul 2018 12:01  Phos  4.3     07-03  Mg     2.1     07-03    TPro  7.6  /  Alb  3.4  /  TBili  0.8  /  DBili  x   /  AST  40<H>  /  ALT  27  /  AlkPhos  107  07-03    proBNP: Serum Pro-Brain Natriuretic Peptide: 93914 pg/mL (07-03 @ 12:01)      TSH: Thyroid Stimulating Hormone, Serum: 0.19 uIU/mL (07-03 @ 12:01)      ASSESSMENT/PLAN: 77y/o  Female well known to our service with HTN DM ESRD on HD  diastolic CHF s/p Cardio MEMS implantation, asthma, anemia (on weekly procrit), breast CA s/p Left lumpectomy and radiation in 2005, uterine CA s/p JACLYN,  with no known h/o CAD/MI with [facility],with recent TTE 5/18 with normal LV function, moderate LVH with mod mitral stenosis,  recently admitted with respiratory failure 2/2 PNA 5/18 now readmitted with recurrent non productive cough, progressively worsening dyspnea (there is a chronic component though she states its worse in the last several days) and associated weakness. She states she was so weak today she could not go to HD so she came to the ER. She denies any chest pain, palpitations, syncope or near syncope. She denies any medication or dietary  non compliance .     -- elevated high sensitivy troponin non specific given ESRD with no acute ischemia on EKG- could trend CE and check CPK  -- recent TTE with normal LV function and moderate mitral stenosis  -- recent NST with no evidence of ischemia  -- given recent PNA , weakness and dyspnea/cough, consider checking RVP  -- HD per renal for fluid removal   -- c/w asa/statin/bb/lasix  -- will interrogate cardiomems on this admission  -- will follow with you  -- unlikely to need repeat cardiac testing   -- f/u with Dr Salas upon dc 7/9 @ 1130am

## 2018-07-03 NOTE — ED PROVIDER NOTE - PROGRESS NOTE DETAILS
keira: pts cardio dr milan comes to [facility], but pmd is / [facility]. dr luiza bruno will consult but said to adm to medicine

## 2018-07-03 NOTE — ED SUB INTERN NOTE - GENITOURINARY NEGATIVE STATEMENT, MLM
urinates less than she used to before dialysis; still makes urine - a couple times a week, 3x yesterday (7/2/18) per aid

## 2018-07-03 NOTE — ED SUB INTERN NOTE - LAB INTERPRETATION
Patient has chronic anemia, most recently hemoglobin 10.2 in May (so 9.9 today is less concerning). Chloride is low at 93, which is a change from May and could be related to need for dialysis. Her BUN/Creatinine are high, which is likely related to her missed dialysis session today. Troponin T is elevated at 97, which can occur in patients with chronic kidney disease.

## 2018-07-04 LAB
B PERT DNA SPEC QL NAA+PROBE: SIGNIFICANT CHANGE UP
BUN SERPL-MCNC: 58 MG/DL — HIGH (ref 7–23)
C PNEUM DNA SPEC QL NAA+PROBE: NOT DETECTED — SIGNIFICANT CHANGE UP
CALCIUM SERPL-MCNC: 8.5 MG/DL — SIGNIFICANT CHANGE UP (ref 8.4–10.5)
CHLORIDE SERPL-SCNC: 97 MMOL/L — LOW (ref 98–107)
CHOLEST SERPL-MCNC: 205 MG/DL — HIGH (ref 120–199)
CK MB BLD-MCNC: 3.08 NG/ML — SIGNIFICANT CHANGE UP (ref 1–4.7)
CK MB BLD-MCNC: SIGNIFICANT CHANGE UP (ref 0–2.5)
CK SERPL-CCNC: 48 U/L — SIGNIFICANT CHANGE UP (ref 25–170)
CO2 SERPL-SCNC: 25 MMOL/L — SIGNIFICANT CHANGE UP (ref 22–31)
CREAT SERPL-MCNC: 3.25 MG/DL — HIGH (ref 0.5–1.3)
FLUAV H1 2009 PAND RNA SPEC QL NAA+PROBE: NOT DETECTED — SIGNIFICANT CHANGE UP
FLUAV H1 RNA SPEC QL NAA+PROBE: NOT DETECTED — SIGNIFICANT CHANGE UP
FLUAV H3 RNA SPEC QL NAA+PROBE: NOT DETECTED — SIGNIFICANT CHANGE UP
FLUAV SUBTYP SPEC NAA+PROBE: SIGNIFICANT CHANGE UP
FLUBV RNA SPEC QL NAA+PROBE: NOT DETECTED — SIGNIFICANT CHANGE UP
GLUCOSE BLDC GLUCOMTR-MCNC: 119 MG/DL — HIGH (ref 70–99)
GLUCOSE BLDC GLUCOMTR-MCNC: 215 MG/DL — HIGH (ref 70–99)
GLUCOSE BLDC GLUCOMTR-MCNC: 262 MG/DL — HIGH (ref 70–99)
GLUCOSE BLDC GLUCOMTR-MCNC: 94 MG/DL — SIGNIFICANT CHANGE UP (ref 70–99)
GLUCOSE SERPL-MCNC: 192 MG/DL — HIGH (ref 70–99)
HADV DNA SPEC QL NAA+PROBE: NOT DETECTED — SIGNIFICANT CHANGE UP
HBA1C BLD-MCNC: 7.4 % — HIGH (ref 4–5.6)
HCOV 229E RNA SPEC QL NAA+PROBE: NOT DETECTED — SIGNIFICANT CHANGE UP
HCOV HKU1 RNA SPEC QL NAA+PROBE: NOT DETECTED — SIGNIFICANT CHANGE UP
HCOV NL63 RNA SPEC QL NAA+PROBE: NOT DETECTED — SIGNIFICANT CHANGE UP
HCOV OC43 RNA SPEC QL NAA+PROBE: NOT DETECTED — SIGNIFICANT CHANGE UP
HCT VFR BLD CALC: 30 % — LOW (ref 34.5–45)
HDLC SERPL-MCNC: 46 MG/DL — SIGNIFICANT CHANGE UP (ref 45–65)
HGB BLD-MCNC: 9.2 G/DL — LOW (ref 11.5–15.5)
HMPV RNA SPEC QL NAA+PROBE: NOT DETECTED — SIGNIFICANT CHANGE UP
HPIV1 RNA SPEC QL NAA+PROBE: NOT DETECTED — SIGNIFICANT CHANGE UP
HPIV2 RNA SPEC QL NAA+PROBE: NOT DETECTED — SIGNIFICANT CHANGE UP
HPIV3 RNA SPEC QL NAA+PROBE: NOT DETECTED — SIGNIFICANT CHANGE UP
HPIV4 RNA SPEC QL NAA+PROBE: NOT DETECTED — SIGNIFICANT CHANGE UP
LIPID PNL WITH DIRECT LDL SERPL: 137 MG/DL — SIGNIFICANT CHANGE UP
M PNEUMO DNA SPEC QL NAA+PROBE: NOT DETECTED — SIGNIFICANT CHANGE UP
MAGNESIUM SERPL-MCNC: 2 MG/DL — SIGNIFICANT CHANGE UP (ref 1.6–2.6)
MCHC RBC-ENTMCNC: 26.6 PG — LOW (ref 27–34)
MCHC RBC-ENTMCNC: 30.7 % — LOW (ref 32–36)
MCV RBC AUTO: 86.7 FL — SIGNIFICANT CHANGE UP (ref 80–100)
NRBC # FLD: 0 — SIGNIFICANT CHANGE UP
PLATELET # BLD AUTO: 208 K/UL — SIGNIFICANT CHANGE UP (ref 150–400)
PMV BLD: 10.3 FL — SIGNIFICANT CHANGE UP (ref 7–13)
POTASSIUM SERPL-MCNC: 5.1 MMOL/L — SIGNIFICANT CHANGE UP (ref 3.5–5.3)
POTASSIUM SERPL-SCNC: 5.1 MMOL/L — SIGNIFICANT CHANGE UP (ref 3.5–5.3)
RBC # BLD: 3.46 M/UL — LOW (ref 3.8–5.2)
RBC # FLD: 17 % — HIGH (ref 10.3–14.5)
RSV RNA SPEC QL NAA+PROBE: NOT DETECTED — SIGNIFICANT CHANGE UP
RV+EV RNA SPEC QL NAA+PROBE: NOT DETECTED — SIGNIFICANT CHANGE UP
SODIUM SERPL-SCNC: 136 MMOL/L — SIGNIFICANT CHANGE UP (ref 135–145)
T3 SERPL-MCNC: 84.5 NG/DL — SIGNIFICANT CHANGE UP (ref 80–200)
T4 FREE SERPL-MCNC: 1.47 NG/DL — SIGNIFICANT CHANGE UP (ref 0.9–1.8)
TRIGL SERPL-MCNC: 179 MG/DL — HIGH (ref 10–149)
TSH SERPL-MCNC: 0.18 UIU/ML — LOW (ref 0.27–4.2)
WBC # BLD: 8.34 K/UL — SIGNIFICANT CHANGE UP (ref 3.8–10.5)
WBC # FLD AUTO: 8.34 K/UL — SIGNIFICANT CHANGE UP (ref 3.8–10.5)

## 2018-07-04 PROCEDURE — 99223 1ST HOSP IP/OBS HIGH 75: CPT | Mod: AI

## 2018-07-04 RX ORDER — FUROSEMIDE 40 MG
80 TABLET ORAL
Qty: 0 | Refills: 0 | Status: DISCONTINUED | OUTPATIENT
Start: 2018-07-04 | End: 2018-07-07

## 2018-07-04 RX ORDER — FUROSEMIDE 40 MG
40 TABLET ORAL DAILY
Qty: 0 | Refills: 0 | Status: DISCONTINUED | OUTPATIENT
Start: 2018-07-04 | End: 2018-07-04

## 2018-07-04 RX ORDER — ERYTHROPOIETIN 10000 [IU]/ML
10000 INJECTION, SOLUTION INTRAVENOUS; SUBCUTANEOUS
Qty: 0 | Refills: 0 | Status: DISCONTINUED | OUTPATIENT
Start: 2018-07-04 | End: 2018-07-07

## 2018-07-04 RX ORDER — CHLORHEXIDINE GLUCONATE 213 G/1000ML
1 SOLUTION TOPICAL
Qty: 0 | Refills: 0 | Status: DISCONTINUED | OUTPATIENT
Start: 2018-07-04 | End: 2018-07-07

## 2018-07-04 RX ORDER — ACETAMINOPHEN 500 MG
650 TABLET ORAL ONCE
Qty: 0 | Refills: 0 | Status: COMPLETED | OUTPATIENT
Start: 2018-07-04 | End: 2018-07-04

## 2018-07-04 RX ADMIN — HEPARIN SODIUM 5000 UNIT(S): 5000 INJECTION INTRAVENOUS; SUBCUTANEOUS at 05:36

## 2018-07-04 RX ADMIN — HEPARIN SODIUM 5000 UNIT(S): 5000 INJECTION INTRAVENOUS; SUBCUTANEOUS at 21:08

## 2018-07-04 RX ADMIN — ATORVASTATIN CALCIUM 40 MILLIGRAM(S): 80 TABLET, FILM COATED ORAL at 21:19

## 2018-07-04 RX ADMIN — Medication 50 MILLIGRAM(S): at 05:37

## 2018-07-04 RX ADMIN — BUDESONIDE AND FORMOTEROL FUMARATE DIHYDRATE 2 PUFF(S): 160; 4.5 AEROSOL RESPIRATORY (INHALATION) at 09:26

## 2018-07-04 RX ADMIN — Medication 3: at 09:25

## 2018-07-04 RX ADMIN — BRIMONIDINE TARTRATE 1 DROP(S): 2 SOLUTION/ DROPS OPHTHALMIC at 21:19

## 2018-07-04 RX ADMIN — BRIMONIDINE TARTRATE 1 DROP(S): 2 SOLUTION/ DROPS OPHTHALMIC at 13:21

## 2018-07-04 RX ADMIN — INSULIN GLARGINE 30 UNIT(S): 100 INJECTION, SOLUTION SUBCUTANEOUS at 21:23

## 2018-07-04 RX ADMIN — CHLORHEXIDINE GLUCONATE 1 APPLICATION(S): 213 SOLUTION TOPICAL at 11:11

## 2018-07-04 RX ADMIN — Medication 50 MILLIGRAM(S): at 21:19

## 2018-07-04 RX ADMIN — Medication 8 UNIT(S): at 13:20

## 2018-07-04 RX ADMIN — ERGOCALCIFEROL 50000 UNIT(S): 1.25 CAPSULE ORAL at 05:36

## 2018-07-04 RX ADMIN — ERYTHROPOIETIN 10000 UNIT(S): 10000 INJECTION, SOLUTION INTRAVENOUS; SUBCUTANEOUS at 18:44

## 2018-07-04 RX ADMIN — Medication 2: at 13:20

## 2018-07-04 RX ADMIN — BRIMONIDINE TARTRATE 1 DROP(S): 2 SOLUTION/ DROPS OPHTHALMIC at 05:36

## 2018-07-04 RX ADMIN — Medication 81 MILLIGRAM(S): at 13:21

## 2018-07-04 RX ADMIN — TIOTROPIUM BROMIDE 1 CAPSULE(S): 18 CAPSULE ORAL; RESPIRATORY (INHALATION) at 09:26

## 2018-07-04 RX ADMIN — GABAPENTIN 100 MILLIGRAM(S): 400 CAPSULE ORAL at 05:36

## 2018-07-04 RX ADMIN — Medication 1 DROP(S): at 05:36

## 2018-07-04 RX ADMIN — Medication 40 MILLIGRAM(S): at 05:36

## 2018-07-04 RX ADMIN — PANTOPRAZOLE SODIUM 40 MILLIGRAM(S): 20 TABLET, DELAYED RELEASE ORAL at 05:37

## 2018-07-04 RX ADMIN — BUDESONIDE AND FORMOTEROL FUMARATE DIHYDRATE 2 PUFF(S): 160; 4.5 AEROSOL RESPIRATORY (INHALATION) at 21:19

## 2018-07-04 RX ADMIN — Medication 650 MILLIGRAM(S): at 21:19

## 2018-07-04 NOTE — CONSULT NOTE ADULT - SUBJECTIVE AND OBJECTIVE BOX
[facility] - Nicole / Juvenal S /Linette/ ELICEO Isaac/ ELICEO Rojas/ Regan Castanon / TRACY Njeru  ---------------------------------------------------------------------------------------------------------------  Patient seen and examined bedside        PAST MEDICAL & SURGICAL HISTORY:  MS (mitral stenosis)  GERD (gastroesophageal reflux disease)  COPD (chronic obstructive pulmonary disease): On home oxygen  HLD (hyperlipidemia)  HTN (hypertension)  ESRD (end stage renal disease) on dialysis  Chronic diastolic (congestive) heart failure  Anemia of chronic disease  Breast carcinoma: Lt side s/p lumpectomy and radiation in 2004  DM (diabetes mellitus)  S/P subtotal hysterectomy  S/P lumpectomy, left breast: 2004      Allergies: No Known Allergies    Home Medications Reviewed  Hospital Medications:   MEDICATIONS  (STANDING):  aspirin enteric coated 81 milliGRAM(s) Oral daily  atorvastatin 40 milliGRAM(s) Oral at bedtime  brimonidine 0.2% Ophthalmic Solution 1 Drop(s) Both EYES three times a day  buDESOnide  80 MICROgram(s)/formoterol 4.5 MICROgram(s) Inhaler 2 Puff(s) Inhalation two times a day  chlorhexidine 4% Liquid 1 Application(s) Topical <User Schedule>  dextrose 5%. 1000 milliLiter(s) (50 mL/Hr) IV Continuous <Continuous>  dextrose 50% Injectable 12.5 Gram(s) IV Push once  dextrose 50% Injectable 25 Gram(s) IV Push once  dextrose 50% Injectable 25 Gram(s) IV Push once  epoetin randy Injectable 23011 Unit(s) IV Push <User Schedule>  ergocalciferol 30497 Unit(s) Oral <User Schedule>  furosemide    Tablet 40 milliGRAM(s) Oral daily  gabapentin 100 milliGRAM(s) Oral two times a day  heparin  Injectable 5000 Unit(s) SubCutaneous every 8 hours  insulin glargine Injectable (LANTUS) 30 Unit(s) SubCutaneous at bedtime  insulin lispro (HumaLOG) corrective regimen sliding scale   SubCutaneous three times a day before meals  insulin lispro (HumaLOG) corrective regimen sliding scale   SubCutaneous at bedtime  insulin lispro Injectable (HumaLOG) 8 Unit(s) SubCutaneous before lunch  insulin lispro Injectable (HumaLOG) 8 Unit(s) SubCutaneous before dinner  metoprolol tartrate 50 milliGRAM(s) Oral two times a day  pantoprazole   Suspension 40 milliGRAM(s) Oral before breakfast  timolol 0.5% Solution 1 Drop(s) Both EYES two times a day  tiotropium 18 MICROgram(s) Capsule 1 Capsule(s) Inhalation daily    SOCIAL HISTORY:  Denies ETOh,Smoking, illicit drug use  FAMILY HISTORY:  Diabetes mellitus  Family history of breast cancer (Aunt)      REVIEW OF SYSTEMS:  CONSTITUTIONAL: No weakness, fevers or chills  EYES/ENT: No visual changes;  No vertigo or throat pain   NECK: No pain or stiffness  RESPIRATORY: No cough, wheezing, hemoptysis; No shortness of breath  CARDIOVASCULAR: No chest pain or palpitations.  GASTROINTESTINAL: No abdominal or epigastric pain. +nausea, no vomiting, or hematemesis; No diarrhea or constipation. No melena or hematochezia.  NEUROLOGICAL: No numbness or weakness  SKIN: No itching, burning, rashes, or lesions   VASCULAR: No bilateral lower extremity edema.   All other review of systems is negative unless indicated above.    VITALS:  T(F): 98.7 (07-04-18 @ 12:38), Max: 100.5 (07-03-18 @ 19:28)  HR: 80 (07-04-18 @ 12:38)  BP: 145/77 (07-04-18 @ 12:38)  RR: 16 (07-04-18 @ 12:38)  SpO2: 96% (07-04-18 @ 12:38)  Wt(kg): --    07-03 @ 07:01  -  07-04 @ 07:00  --------------------------------------------------------  IN: 400 mL / OUT: 0 mL / NET: 400 mL      Height (cm): 165.1 (07-03 @ 15:48)  Weight (kg): 87.5 (07-03 @ 15:48)  BMI (kg/m2): 32.1 (07-03 @ 15:48)  BSA (m2): 1.95 (07-03 @ 15:48)    PHYSICAL EXAM:  Constitutional: NAD, on NC 02  HEENT: anicteric sclera, oropharynx clear, MMM  Neck: No JVD  Respiratory: bibasilar crepts+, no wheezes  Cardiovascular: S1, S2, RRR  Gastrointestinal: BS+, soft, NT/ND  Extremities: No cyanosis or clubbing. No peripheral edema  Neurological: A/O x 3, no focal deficits  Psychiatric: Normal mood, normal affect  : No CVA tenderness. No haas.   Skin: No rashes  Vascular Access: LFA AVF -thrill/bruit    LABS:  07-04    136  |  97<L>  |  58<H>  ----------------------------<  192<H>  5.1   |  25  |  3.25<H>    Ca    8.5      04 Jul 2018 06:50  Phos  4.3     07-03  Mg     2.0     07-04    TPro  7.6  /  Alb  3.4  /  TBili  0.8  /  DBili      /  AST  40<H>  /  ALT  27  /  AlkPhos  107  07-03    Creatinine Trend: 3.25 <--, 2.90 <--                        9.2    8.34  )-----------( 208      ( 04 Jul 2018 06:50 )             30.0     Urine Studies:        RADIOLOGY & ADDITIONAL STUDIES: [facility] - Nicole / Juvenal S /Linette/ S Poncho/ S Crystal/ Regan Castanon / TRACY Njeru  ---------------------------------------------------------------------------------------------------------------  Patient seen and examined bedside    79 y/o female with a PMHx of chronic diastolic heart failure (stage II), ESRD on HD T/T/S from [facility] unit, moderate MS, COPD on home oxygen, HTN, HLD, DM, and GERD presented to ED with shortness of breath, feeling sick. Pt reports that she has chronic dyspnea which has worsened over the past couple of days. Pt states that she is short of breath on minimal exertion,  endorses 4-5 pillow orthopnea, associated with paroxysmal nocturnal dyspnea, a cough productive of white and sometimes yellow sputum. Pt reports feeling extreme fatigue and malaise recently to the point where she doesn't want to do anything. Pt denies fever, chills, chest pain. Pt had her last complete HD 6/30, didn't go for HD yesterday as felt sick hence came to ER as per pt.  In ED, CXR showed Mild interstitial edema. Pt was given lasix 40mg ivp x1 and admitted to telemetry. Renal consulted today for HD. Pt currently denies sob. states she urinated a lot since lasix.     PAST MEDICAL & SURGICAL HISTORY:  MS (mitral stenosis)  GERD (gastroesophageal reflux disease)  COPD (chronic obstructive pulmonary disease): On home oxygen  HLD (hyperlipidemia)  HTN (hypertension)  ESRD (end stage renal disease) on dialysis  Chronic diastolic (congestive) heart failure  Anemia of chronic disease  Breast carcinoma: Lt side s/p lumpectomy and radiation in 2004  DM (diabetes mellitus)  S/P subtotal hysterectomy  S/P lumpectomy, left breast: 2004      Allergies: No Known Allergies    Home Medications Reviewed  Hospital Medications:   MEDICATIONS  (STANDING):  aspirin enteric coated 81 milliGRAM(s) Oral daily  atorvastatin 40 milliGRAM(s) Oral at bedtime  brimonidine 0.2% Ophthalmic Solution 1 Drop(s) Both EYES three times a day  buDESOnide  80 MICROgram(s)/formoterol 4.5 MICROgram(s) Inhaler 2 Puff(s) Inhalation two times a day  chlorhexidine 4% Liquid 1 Application(s) Topical <User Schedule>  dextrose 5%. 1000 milliLiter(s) (50 mL/Hr) IV Continuous <Continuous>  dextrose 50% Injectable 12.5 Gram(s) IV Push once  dextrose 50% Injectable 25 Gram(s) IV Push once  dextrose 50% Injectable 25 Gram(s) IV Push once  epoetin randy Injectable 25379 Unit(s) IV Push <User Schedule>  ergocalciferol 41977 Unit(s) Oral <User Schedule>  furosemide    Tablet 40 milliGRAM(s) Oral daily  gabapentin 100 milliGRAM(s) Oral two times a day  heparin  Injectable 5000 Unit(s) SubCutaneous every 8 hours  insulin glargine Injectable (LANTUS) 30 Unit(s) SubCutaneous at bedtime  insulin lispro (HumaLOG) corrective regimen sliding scale   SubCutaneous three times a day before meals  insulin lispro (HumaLOG) corrective regimen sliding scale   SubCutaneous at bedtime  insulin lispro Injectable (HumaLOG) 8 Unit(s) SubCutaneous before lunch  insulin lispro Injectable (HumaLOG) 8 Unit(s) SubCutaneous before dinner  metoprolol tartrate 50 milliGRAM(s) Oral two times a day  pantoprazole   Suspension 40 milliGRAM(s) Oral before breakfast  timolol 0.5% Solution 1 Drop(s) Both EYES two times a day  tiotropium 18 MICROgram(s) Capsule 1 Capsule(s) Inhalation daily    SOCIAL HISTORY:  Denies illicit drug use. former smoker (quit 8 years ago) and former drinker (quit 40 yrs ago)  FAMILY HISTORY:  Diabetes mellitus  Family history of breast cancer (Aunt)      REVIEW OF SYSTEMS:  CONSTITUTIONAL: No weakness, fevers or chills  EYES/ENT: No visual changes;  No vertigo or throat pain   NECK: No pain or stiffness  RESPIRATORY: No cough, wheezing, hemoptysis; +shortness of breath better, on home 02  CARDIOVASCULAR: No chest pain or palpitations.  GASTROINTESTINAL: No abdominal or epigastric pain. +nausea, no vomiting, or hematemesis; No diarrhea or constipation. No melena or hematochezia.  NEUROLOGICAL: No numbness or weakness  SKIN: No itching, burning, rashes, or lesions   VASCULAR: No bilateral lower extremity edema.   All other review of systems is negative unless indicated above.    VITALS:  T(F): 98.7 (07-04-18 @ 12:38), Max: 100.5 (07-03-18 @ 19:28)  HR: 80 (07-04-18 @ 12:38)  BP: 145/77 (07-04-18 @ 12:38)  RR: 16 (07-04-18 @ 12:38)  SpO2: 96% (07-04-18 @ 12:38)  Wt(kg): --    07-03 @ 07:01  -  07-04 @ 07:00  --------------------------------------------------------  IN: 400 mL / OUT: 0 mL / NET: 400 mL      Height (cm): 165.1 (07-03 @ 15:48)  Weight (kg): 87.5 (07-03 @ 15:48)  BMI (kg/m2): 32.1 (07-03 @ 15:48)  BSA (m2): 1.95 (07-03 @ 15:48)    PHYSICAL EXAM:  Constitutional: NAD, on NC 02  HEENT: anicteric sclera, oropharynx clear, MMM  Neck: No JVD  Respiratory: bibasilar crepts+, no wheezes  Cardiovascular: S1, S2, RRR  Gastrointestinal: BS+, soft, NT/ND  Extremities: No cyanosis or clubbing. No peripheral edema  Neurological: A/O x 3, no focal deficits  Psychiatric: Normal mood, normal affect  : No CVA tenderness. No haas.   Skin: No rashes  Vascular Access: RFA AVF+thrill (new, used x3), rt IJ PC    LABS:  07-04    136  |  97<L>  |  58<H>  ----------------------------<  192<H>  5.1   |  25  |  3.25<H>    Ca    8.5      04 Jul 2018 06:50  Phos  4.3     07-03  Mg     2.0     07-04    TPro  7.6  /  Alb  3.4  /  TBili  0.8  /  DBili      /  AST  40<H>  /  ALT  27  /  AlkPhos  107  07-03    Creatinine Trend: 3.25 <--, 2.90 <--                        9.2    8.34  )-----------( 208      ( 04 Jul 2018 06:50 )             30.0     Urine Studies:        RADIOLOGY & ADDITIONAL STUDIES:  < from: Xray Chest 1 View- PORTABLE-Urgent (07.03.18 @ 12:43) >    IMPRESSION:  Mild interstitial edema.

## 2018-07-04 NOTE — PHYSICAL THERAPY INITIAL EVALUATION ADULT - RANGE OF MOTION EXAMINATION, REHAB EVAL
no ROM deficits were identified/Except left shoulder tested to 90 degrees only as patient deferred, stating that she has had 26 stitches on that side from surgery for breast CA.

## 2018-07-04 NOTE — CONSULT NOTE ADULT - ATTENDING COMMENTS
CARDIOLOGY ATTENDING    Patient seen and examined. Agree with above. Has mild CHF exacerbation (normal LVEF, unremarkable NST/echo recently). Recommend IV diuresis and d/c home when euvolemic. Trend CPK/MB instead of troponin, no need for repeat cardiac testing.
thanks for consulting. will closely follow up.

## 2018-07-04 NOTE — CONSULT NOTE ADULT - PROBLEM SELECTOR RECOMMENDATION 9
HD scheduled soon for next shift w/2k bath, uf 2.5kg as tolerated, cannulate AVF w/1 needle and return to PC. no indication for urgent/emergent hd at this time.  Informed consent obtained from pt for HD  renal diet, fluid restriction 1.2L/day, dw pt  inc lasix 40 daily to 80mg po on non hd days given has RRF

## 2018-07-04 NOTE — PROGRESS NOTE ADULT - SUBJECTIVE AND OBJECTIVE BOX
SUBJECTIVE: Dyspnea improved s/p HD   Tmax 100.5  ROS Otherwise negative       MEDICATIONS  (STANDING):  aspirin enteric coated 81 milliGRAM(s) Oral daily  atorvastatin 40 milliGRAM(s) Oral at bedtime  brimonidine 0.2% Ophthalmic Solution 1 Drop(s) Both EYES three times a day  buDESOnide  80 MICROgram(s)/formoterol 4.5 MICROgram(s) Inhaler 2 Puff(s) Inhalation two times a day  chlorhexidine 4% Liquid 1 Application(s) Topical <User Schedule>  ergocalciferol 25847 Unit(s) Oral <User Schedule>  furosemide    Tablet 40 milliGRAM(s) Oral daily  gabapentin 100 milliGRAM(s) Oral two times a day  heparin  Injectable 5000 Unit(s) SubCutaneous every 8 hours  insulin glargine Injectable (LANTUS) 30 Unit(s) SubCutaneous at bedtime  insulin lispro (HumaLOG) corrective regimen sliding scale   SubCutaneous three times a day before meals  insulin lispro (HumaLOG) corrective regimen sliding scale   SubCutaneous at bedtime  insulin lispro Injectable (HumaLOG) 8 Unit(s) SubCutaneous before lunch  insulin lispro Injectable (HumaLOG) 8 Unit(s) SubCutaneous before dinner  metoprolol tartrate 50 milliGRAM(s) Oral two times a day  pantoprazole   Suspension 40 milliGRAM(s) Oral before breakfast  timolol 0.5% Solution 1 Drop(s) Both EYES two times a day  tiotropium 18 MICROgram(s) Capsule 1 Capsule(s) Inhalation daily    MEDICATIONS  (PRN):  acetaminophen   Tablet. 650 milliGRAM(s) Oral every 6 hours PRN Mild and moderate pain  dextrose 40% Gel 15 Gram(s) Oral once PRN Blood Glucose LESS THAN 70 milliGRAM(s)/deciliter  glucagon  Injectable 1 milliGRAM(s) IntraMuscular once PRN Glucose LESS THAN 70 milligrams/deciliter  guaiFENesin   Syrup  (Sugar-Free) 200 milliGRAM(s) Oral every 6 hours PRN Cough      LABS:                        9.2    8.34  )-----------( 208      ( 04 Jul 2018 06:50 )             30.0       07-04    136  |  97<L>  |  58<H>  ----------------------------<  192<H>  5.1   |  25  |  3.25<H>    Ca    8.5      04 Jul 2018 06:50  Phos  4.3     07-03  Mg     2.0     07-04    TPro  7.6  /  Alb  3.4  /  TBili  0.8  /  DBili  x   /  AST  40<H>  /  ALT  27  /  AlkPhos  107  07-03        CARDIAC MARKERS ( 04 Jul 2018 06:50 )  x     / x     / 48 u/L / 3.08 ng/mL / x      CARDIAC MARKERS ( 03 Jul 2018 18:00 )  x     / x     / 61 u/L / 3.53 ng/mL / x          Serum Pro-Brain Natriuretic Peptide: 09759 pg/mL (07-03 @ 12:01)      PHYSICAL EXAM:  Vital Signs Last 24 Hrs  T(C): 37.9 (04 Jul 2018 05:30), Max: 38.1 (03 Jul 2018 19:28)  T(F): 100.3 (04 Jul 2018 05:30), Max: 100.5 (03 Jul 2018 19:28)  HR: 90 (04 Jul 2018 05:30) (88 - 105)  BP: 131/72 (04 Jul 2018 05:30) (112/94 - 187/66)  BP(mean): --  RR: 20 (04 Jul 2018 05:30) (16 - 22)  SpO2: 94% (04 Jul 2018 05:30) (94% - 100%)    HEENT: Normal Oral mucosa, PERRL, EOMI  Lymphatic: No obvious lymphadenopathy, No edema  Cardiovascular: Normal S1S2, No JVD, 1/6 HODA, Peripheral pulses palpable 2+ B/L  Respiratory: Lungs clear to auscultation, normal effort  Gastrointestinal: Abdomen soft, ND, NT, +BS  Skin: Warm, dry, intact. No cyanosis, No rash.  Musculoskeletal: Normal ROM, normal strength  Psychiatric: Appropriate Mood and Affect      DIAGNOSTIC DATA  Transthoracic Echocardiogram (05.08.18 @ 07:08) >  ------------------------------------------------------------------------  CONCLUSIONS: EF 5%%  1.  Peak mitral valve gradient equals 16 mm Hg, mean  transmitral valve gradient equals 7.5 mm Hg, consistent  with moderate mitral stenosis.  2. Moderate left atrial enlargement.  3. Moderate concentric left ventricular hypertrophy.  4. Normal Left Ventricular Systolic Function,  (EF = 55 to  60%)  5. Grade II diastolic dysfunction.  6. RV systolic pressure is moderately increased at  49 mm  Hg.  7. There is mild-moderate tricuspid regurgitation.    < end of copied text >    NST: < from: Nuclear Stress Test-Pharmacologic (12.01.17 @ 08:43) >  IMPRESSIONS:Normal Study  * Negative ECG evidence of ischemia after IV  administartion of Regadenoson.  * Myocardial Perfusion SPECT results are normal.  * No clear evidence of ischemia or infarct.  * Post-stress resting myocardial perfusion gated SPECT  imaging was performed (LVEF = 53 %) with no regional wall  motion abnormalities.    < end of copied text >    Cath: n/a      TELEMETRY: NSR    RADIOLOGY:    < from: Xray Chest 1 View- PORTABLE-Urgent (07.03.18 @ 12:43) >    IMPRESSION:  Mild interstitial edema.         ASSESSMENT AND PLAN: 77y/o  Female well known to our service with HTN DM ESRD on HD  diastolic CHF s/p Cardio MEMS implantation, asthma, anemia (on weekly procrit), breast CA s/p Left lumpectomy and radiation in 2005, uterine CA s/p JACLYN,  with no known h/o CAD/MI with no ischemia on NST 12/17 at [facility],with recent TTE 5/18 with normal LV function, moderate LVH with mod mitral stenosis,  recently admitted with respiratory failure 2/2 PNA 5/18 now readmitted with recurrent non productive cough, progressively worsening dyspnea (there is a chronic component though she states its worse in the last several days) and associated weakness. She states she was so weak today she could not go to HD so she came to the ER. She denies any chest pain, palpitations, syncope or near syncope. She denies any medication or dietary  non compliance .     -- elevated high sensitivity troponin non specific given ESRD with no acute ischemia on EKG     - CPK /CKMB - negatve   -- recent TTE with normal LV function and moderate mitral stenosis  -- recent NST with no evidence of ischemia  -- given fever overnight , recent PNA , weakness and dyspnea/cough, consider checking RVP  -- Mild acute on chronic diastolic CHF exacerbation - c/w Lasix and HD per renal for fluid removal   -- c/w asa/statin/bb/lasix  -- will interrogate cardiomems on this admission  -- no need for repeat cardiac imaging at this time   -- f/u with Dr Salas upon dc 7/9 @ 1130am SUBJECTIVE: Dyspnea improved s/p HD   Tmax 100.5  ROS Otherwise negative       MEDICATIONS  (STANDING):  aspirin enteric coated 81 milliGRAM(s) Oral daily  atorvastatin 40 milliGRAM(s) Oral at bedtime  brimonidine 0.2% Ophthalmic Solution 1 Drop(s) Both EYES three times a day  buDESOnide  80 MICROgram(s)/formoterol 4.5 MICROgram(s) Inhaler 2 Puff(s) Inhalation two times a day  chlorhexidine 4% Liquid 1 Application(s) Topical <User Schedule>  ergocalciferol 86273 Unit(s) Oral <User Schedule>  furosemide    Tablet 40 milliGRAM(s) Oral daily  gabapentin 100 milliGRAM(s) Oral two times a day  heparin  Injectable 5000 Unit(s) SubCutaneous every 8 hours  insulin glargine Injectable (LANTUS) 30 Unit(s) SubCutaneous at bedtime  insulin lispro (HumaLOG) corrective regimen sliding scale   SubCutaneous three times a day before meals  insulin lispro (HumaLOG) corrective regimen sliding scale   SubCutaneous at bedtime  insulin lispro Injectable (HumaLOG) 8 Unit(s) SubCutaneous before lunch  insulin lispro Injectable (HumaLOG) 8 Unit(s) SubCutaneous before dinner  metoprolol tartrate 50 milliGRAM(s) Oral two times a day  pantoprazole   Suspension 40 milliGRAM(s) Oral before breakfast  timolol 0.5% Solution 1 Drop(s) Both EYES two times a day  tiotropium 18 MICROgram(s) Capsule 1 Capsule(s) Inhalation daily    MEDICATIONS  (PRN):  acetaminophen   Tablet. 650 milliGRAM(s) Oral every 6 hours PRN Mild and moderate pain  dextrose 40% Gel 15 Gram(s) Oral once PRN Blood Glucose LESS THAN 70 milliGRAM(s)/deciliter  glucagon  Injectable 1 milliGRAM(s) IntraMuscular once PRN Glucose LESS THAN 70 milligrams/deciliter  guaiFENesin   Syrup  (Sugar-Free) 200 milliGRAM(s) Oral every 6 hours PRN Cough      LABS:                        9.2    8.34  )-----------( 208      ( 04 Jul 2018 06:50 )             30.0       07-04    136  |  97<L>  |  58<H>  ----------------------------<  192<H>  5.1   |  25  |  3.25<H>    Ca    8.5      04 Jul 2018 06:50  Phos  4.3     07-03  Mg     2.0     07-04    TPro  7.6  /  Alb  3.4  /  TBili  0.8  /  DBili  x   /  AST  40<H>  /  ALT  27  /  AlkPhos  107  07-03        CARDIAC MARKERS ( 04 Jul 2018 06:50 )  x     / x     / 48 u/L / 3.08 ng/mL / x      CARDIAC MARKERS ( 03 Jul 2018 18:00 )  x     / x     / 61 u/L / 3.53 ng/mL / x          Serum Pro-Brain Natriuretic Peptide: 80679 pg/mL (07-03 @ 12:01)      PHYSICAL EXAM:  Vital Signs Last 24 Hrs  T(C): 37.9 (04 Jul 2018 05:30), Max: 38.1 (03 Jul 2018 19:28)  T(F): 100.3 (04 Jul 2018 05:30), Max: 100.5 (03 Jul 2018 19:28)  HR: 90 (04 Jul 2018 05:30) (88 - 105)  BP: 131/72 (04 Jul 2018 05:30) (112/94 - 187/66)  BP(mean): --  RR: 20 (04 Jul 2018 05:30) (16 - 22)  SpO2: 94% (04 Jul 2018 05:30) (94% - 100%)    HEENT: Normal Oral mucosa, PERRL, EOMI  Lymphatic: No obvious lymphadenopathy, No edema  Cardiovascular: Normal S1S2, No JVD, 1/6 HODA, Peripheral pulses palpable 2+ B/L  Respiratory: Lungs clear to auscultation, normal effort  Gastrointestinal: Abdomen soft, ND, NT, +BS  Skin: Warm, dry, intact. No cyanosis, No rash.  Musculoskeletal: Normal ROM, normal strength  Psychiatric: Appropriate Mood and Affect      DIAGNOSTIC DATA  Transthoracic Echocardiogram (05.08.18 @ 07:08) >  ------------------------------------------------------------------------  CONCLUSIONS: EF 5%%  1.  Peak mitral valve gradient equals 16 mm Hg, mean  transmitral valve gradient equals 7.5 mm Hg, consistent  with moderate mitral stenosis.  2. Moderate left atrial enlargement.  3. Moderate concentric left ventricular hypertrophy.  4. Normal Left Ventricular Systolic Function,  (EF = 55 to  60%)  5. Grade II diastolic dysfunction.  6. RV systolic pressure is moderately increased at  49 mm  Hg.  7. There is mild-moderate tricuspid regurgitation.    < end of copied text >    NST: < from: Nuclear Stress Test-Pharmacologic (12.01.17 @ 08:43) >  IMPRESSIONS:Normal Study  * Negative ECG evidence of ischemia after IV  administartion of Regadenoson.  * Myocardial Perfusion SPECT results are normal.  * No clear evidence of ischemia or infarct.  * Post-stress resting myocardial perfusion gated SPECT  imaging was performed (LVEF = 53 %) with no regional wall  motion abnormalities.    < end of copied text >    Cath: n/a      TELEMETRY: NSR    RADIOLOGY:    < from: Xray Chest 1 View- PORTABLE-Urgent (07.03.18 @ 12:43) >    IMPRESSION:  Mild interstitial edema.         ASSESSMENT AND PLAN: 77y/o  Female well known to our service with HTN DM ESRD on HD  diastolic CHF s/p Cardio MEMS implantation, asthma, anemia (on weekly procrit), breast CA s/p Left lumpectomy and radiation in 2005, uterine CA s/p JACLYN,  with no known h/o CAD/MI with no ischemia on NST 12/17 at [facility],with recent TTE 5/18 with normal LV function, moderate LVH with mod mitral stenosis,  recently admitted with respiratory failure 2/2 PNA 5/18 now readmitted with recurrent non productive cough, progressively worsening dyspnea (there is a chronic component though she states its worse in the last several days) and associated weakness. She states she was so weak today she could not go to HD so she came to the ER. She denies any chest pain, palpitations, syncope or near syncope. She denies any medication or dietary  non compliance .     -- elevated high sensitivity troponin non specific given ESRD with no acute ischemia on EKG     - CPK /CKMB - negatve   -- recent TTE with normal LV function and moderate mitral stenosis  -- recent NST with no evidence of ischemia  -- given fever overnight , recent PNA , weakness and dyspnea/cough, consider checking RVP  -- Mild acute on chronic diastolic CHF exacerbation - c/w Lasix and HD per renal for fluid removal   -- c/w asa/statin/bb/lasix  -- will interrogate cardiomems on this admission  -- no need for repeat cardiac imaging at this time   -- dc tele  -- f/u with Dr Salas upon dc 7/9 @ 1130am

## 2018-07-04 NOTE — PHYSICAL THERAPY INITIAL EVALUATION ADULT - DISCHARGE DISPOSITION, PT EVAL
Anticipating returning home with prior services and no PT services; Follow progress with PT upon completion of functional assessment, which patient deferred.

## 2018-07-04 NOTE — CONSULT NOTE ADULT - ASSESSMENT
79 y/o female with a PMHx of chronic diastolic heart failure (stage II), ESRD on HD T/T/S, moderate MS, COPD on home oxygen, HTN, HLD, DM, GERD, and anemia of chronic diease presents to ED with fatigue, dyspnea on exertion, orthopnea and lower extremity edema in the setting of fluid overload. Renal consulted for HD, fluid management    ESRD on HD T/T/S, missed HD yesterday   w/hypervolemic but not in resp distress. K, bicarb acceptable    HTN, controlled  Anemia in CKD  DM    labs, chart, rad reviewed

## 2018-07-05 DIAGNOSIS — R50.9 FEVER, UNSPECIFIED: ICD-10-CM

## 2018-07-05 LAB
BUN SERPL-MCNC: 36 MG/DL — HIGH (ref 7–23)
CALCIUM SERPL-MCNC: 8.8 MG/DL — SIGNIFICANT CHANGE UP (ref 8.4–10.5)
CHLORIDE SERPL-SCNC: 95 MMOL/L — LOW (ref 98–107)
CO2 SERPL-SCNC: 29 MMOL/L — SIGNIFICANT CHANGE UP (ref 22–31)
CREAT SERPL-MCNC: 2.72 MG/DL — HIGH (ref 0.5–1.3)
GLUCOSE BLDC GLUCOMTR-MCNC: 156 MG/DL — HIGH (ref 70–99)
GLUCOSE BLDC GLUCOMTR-MCNC: 169 MG/DL — HIGH (ref 70–99)
GLUCOSE BLDC GLUCOMTR-MCNC: 322 MG/DL — HIGH (ref 70–99)
GLUCOSE BLDC GLUCOMTR-MCNC: 87 MG/DL — SIGNIFICANT CHANGE UP (ref 70–99)
GLUCOSE SERPL-MCNC: 178 MG/DL — HIGH (ref 70–99)
HCT VFR BLD CALC: 31.7 % — LOW (ref 34.5–45)
HGB BLD-MCNC: 9.5 G/DL — LOW (ref 11.5–15.5)
MAGNESIUM SERPL-MCNC: 2 MG/DL — SIGNIFICANT CHANGE UP (ref 1.6–2.6)
MCHC RBC-ENTMCNC: 27 PG — SIGNIFICANT CHANGE UP (ref 27–34)
MCHC RBC-ENTMCNC: 30 % — LOW (ref 32–36)
MCV RBC AUTO: 90.1 FL — SIGNIFICANT CHANGE UP (ref 80–100)
NRBC # FLD: 0 — SIGNIFICANT CHANGE UP
PLATELET # BLD AUTO: 182 K/UL — SIGNIFICANT CHANGE UP (ref 150–400)
PMV BLD: 10.5 FL — SIGNIFICANT CHANGE UP (ref 7–13)
POTASSIUM SERPL-MCNC: 4.4 MMOL/L — SIGNIFICANT CHANGE UP (ref 3.5–5.3)
POTASSIUM SERPL-SCNC: 4.4 MMOL/L — SIGNIFICANT CHANGE UP (ref 3.5–5.3)
RBC # BLD: 3.52 M/UL — LOW (ref 3.8–5.2)
RBC # FLD: 16.8 % — HIGH (ref 10.3–14.5)
SODIUM SERPL-SCNC: 134 MMOL/L — LOW (ref 135–145)
WBC # BLD: 9.49 K/UL — SIGNIFICANT CHANGE UP (ref 3.8–10.5)
WBC # FLD AUTO: 9.49 K/UL — SIGNIFICANT CHANGE UP (ref 3.8–10.5)

## 2018-07-05 PROCEDURE — 99233 SBSQ HOSP IP/OBS HIGH 50: CPT

## 2018-07-05 RX ORDER — IPRATROPIUM/ALBUTEROL SULFATE 18-103MCG
3 AEROSOL WITH ADAPTER (GRAM) INHALATION ONCE
Qty: 0 | Refills: 0 | Status: COMPLETED | OUTPATIENT
Start: 2018-07-05 | End: 2018-07-06

## 2018-07-05 RX ADMIN — ATORVASTATIN CALCIUM 40 MILLIGRAM(S): 80 TABLET, FILM COATED ORAL at 22:54

## 2018-07-05 RX ADMIN — TIOTROPIUM BROMIDE 1 CAPSULE(S): 18 CAPSULE ORAL; RESPIRATORY (INHALATION) at 13:21

## 2018-07-05 RX ADMIN — Medication 4: at 13:22

## 2018-07-05 RX ADMIN — HEPARIN SODIUM 5000 UNIT(S): 5000 INJECTION INTRAVENOUS; SUBCUTANEOUS at 13:23

## 2018-07-05 RX ADMIN — Medication 1 DROP(S): at 05:55

## 2018-07-05 RX ADMIN — GABAPENTIN 100 MILLIGRAM(S): 400 CAPSULE ORAL at 18:32

## 2018-07-05 RX ADMIN — BUDESONIDE AND FORMOTEROL FUMARATE DIHYDRATE 2 PUFF(S): 160; 4.5 AEROSOL RESPIRATORY (INHALATION) at 08:36

## 2018-07-05 RX ADMIN — BRIMONIDINE TARTRATE 1 DROP(S): 2 SOLUTION/ DROPS OPHTHALMIC at 22:54

## 2018-07-05 RX ADMIN — Medication 80 MILLIGRAM(S): at 18:33

## 2018-07-05 RX ADMIN — Medication 650 MILLIGRAM(S): at 06:40

## 2018-07-05 RX ADMIN — Medication 50 MILLIGRAM(S): at 05:54

## 2018-07-05 RX ADMIN — PANTOPRAZOLE SODIUM 40 MILLIGRAM(S): 20 TABLET, DELAYED RELEASE ORAL at 05:55

## 2018-07-05 RX ADMIN — Medication 1 DROP(S): at 18:32

## 2018-07-05 RX ADMIN — BRIMONIDINE TARTRATE 1 DROP(S): 2 SOLUTION/ DROPS OPHTHALMIC at 13:22

## 2018-07-05 RX ADMIN — Medication 50 MILLIGRAM(S): at 18:32

## 2018-07-05 RX ADMIN — Medication 1: at 08:36

## 2018-07-05 RX ADMIN — CHLORHEXIDINE GLUCONATE 1 APPLICATION(S): 213 SOLUTION TOPICAL at 10:27

## 2018-07-05 RX ADMIN — HEPARIN SODIUM 5000 UNIT(S): 5000 INJECTION INTRAVENOUS; SUBCUTANEOUS at 05:55

## 2018-07-05 RX ADMIN — GABAPENTIN 100 MILLIGRAM(S): 400 CAPSULE ORAL at 05:54

## 2018-07-05 RX ADMIN — Medication 200 MILLIGRAM(S): at 22:59

## 2018-07-05 RX ADMIN — BRIMONIDINE TARTRATE 1 DROP(S): 2 SOLUTION/ DROPS OPHTHALMIC at 05:54

## 2018-07-05 RX ADMIN — Medication 650 MILLIGRAM(S): at 05:54

## 2018-07-05 RX ADMIN — BUDESONIDE AND FORMOTEROL FUMARATE DIHYDRATE 2 PUFF(S): 160; 4.5 AEROSOL RESPIRATORY (INHALATION) at 22:54

## 2018-07-05 RX ADMIN — Medication 8 UNIT(S): at 18:33

## 2018-07-05 RX ADMIN — Medication 1: at 18:32

## 2018-07-05 RX ADMIN — Medication 8 UNIT(S): at 13:22

## 2018-07-05 NOTE — DIETITIAN INITIAL EVALUATION ADULT. - PERTINENT LABORATORY DATA
(7/5) H/H 9.5/31.7 L, Na 134 L, Cl 95 L, BUN 36 H, Creat 2.72 H, Glu 178 H;            (7/4) Cholesterol 205 H, Triglyceride 179 H, HbA1c 7.4% H;           (7/3) Albumin 3.4, AST 40 H

## 2018-07-05 NOTE — DIETITIAN INITIAL EVALUATION ADULT. - OTHER INFO
Nutrition Consult X Registered Dietitian. Pt 77 yo female with ESRD on dialysis. Pt appears alert, oriented. Per Pt her appetite not that well lately; No chew/swallow problem voiced; no nausea/vomiting/diarrhea reported @ present. Pt's body weight: ~182.5# (at time of visit) - bed scale. Per Pt she lost weight: ~4# in 1 month. Of note Pt's HbA1c level 7.4% (7/4). Pt's diet order includes Renal restriction, Consistent Carbohydrate restrictions. RDN offered to discuss prescribed diet, but Pt declined. Spoke to nurse. RDN remains available, Pt made aware.

## 2018-07-05 NOTE — PROGRESS NOTE ADULT - SUBJECTIVE AND OBJECTIVE BOX
SUBJECTIVE: Dyspnea improved s/p HD   Tmax 100.1 this AM + fever 7/4   ROS Otherwise negative       MEDICATIONS  (STANDING):  aspirin enteric coated 81 milliGRAM(s) Oral daily  atorvastatin 40 milliGRAM(s) Oral at bedtime  brimonidine 0.2% Ophthalmic Solution 1 Drop(s) Both EYES three times a day  buDESOnide  80 MICROgram(s)/formoterol 4.5 MICROgram(s) Inhaler 2 Puff(s) Inhalation two times a day  chlorhexidine 4% Liquid 1 Application(s) Topical <User Schedule>  dextrose 5%. 1000 milliLiter(s) (50 mL/Hr) IV Continuous <Continuous>  dextrose 50% Injectable 12.5 Gram(s) IV Push once  dextrose 50% Injectable 25 Gram(s) IV Push once  dextrose 50% Injectable 25 Gram(s) IV Push once  epoetin randy Injectable 47431 Unit(s) IV Push <User Schedule>  ergocalciferol 31836 Unit(s) Oral <User Schedule>  gabapentin 100 milliGRAM(s) Oral two times a day  heparin  Injectable 5000 Unit(s) SubCutaneous every 8 hours  insulin glargine Injectable (LANTUS) 30 Unit(s) SubCutaneous at bedtime  insulin lispro (HumaLOG) corrective regimen sliding scale   SubCutaneous three times a day before meals  insulin lispro (HumaLOG) corrective regimen sliding scale   SubCutaneous at bedtime  insulin lispro Injectable (HumaLOG) 8 Unit(s) SubCutaneous before lunch  insulin lispro Injectable (HumaLOG) 8 Unit(s) SubCutaneous before dinner  metoprolol tartrate 50 milliGRAM(s) Oral two times a day  pantoprazole   Suspension 40 milliGRAM(s) Oral before breakfast  timolol 0.5% Solution 1 Drop(s) Both EYES two times a day  tiotropium 18 MICROgram(s) Capsule 1 Capsule(s) Inhalation daily    MEDICATIONS  (PRN):  acetaminophen   Tablet. 650 milliGRAM(s) Oral every 6 hours PRN Mild and moderate pain  dextrose 40% Gel 15 Gram(s) Oral once PRN Blood Glucose LESS THAN 70 milliGRAM(s)/deciliter  furosemide    Tablet 80 milliGRAM(s) Oral <User Schedule> PRN give on days of non HD  glucagon  Injectable 1 milliGRAM(s) IntraMuscular once PRN Glucose LESS THAN 70 milligrams/deciliter  guaiFENesin   Syrup  (Sugar-Free) 200 milliGRAM(s) Oral every 6 hours PRN Cough      LABS:                        9.5    9.49  )-----------( 182      ( 05 Jul 2018 06:53 )             31.7     Hemoglobin: 9.5 g/dL (07-05 @ 06:53)  Hemoglobin: 9.2 g/dL (07-04 @ 06:50)  Hemoglobin: 9.9 g/dL (07-03 @ 12:01)    07-05    134<L>  |  95<L>  |  36<H>  ----------------------------<  178<H>  4.4   |  29  |  2.72<H>    Ca    8.8      05 Jul 2018 06:53  Mg     2.0     07-05      Creatinine Trend: 2.72<--, 3.25<--, 2.90<--     CARDIAC MARKERS ( 04 Jul 2018 06:50 )  x     / x     / 48 u/L / 3.08 ng/mL / x      CARDIAC MARKERS ( 03 Jul 2018 18:00 )  x     / x     / 61 u/L / 3.53 ng/mL / x            PHYSICAL EXAM  Vital Signs Last 24 Hrs  T(C): 37.3 (05 Jul 2018 12:39), Max: 38 (04 Jul 2018 21:06)  T(F): 99.2 (05 Jul 2018 12:39), Max: 100.4 (04 Jul 2018 21:06)  HR: 66 (05 Jul 2018 12:39) (66 - 105)  BP: 127/60 (05 Jul 2018 12:39) (125/95 - 151/62)  BP(mean): --  RR: 18 (05 Jul 2018 12:39) (18 - 22)  SpO2: 96% (05 Jul 2018 12:39) (92% - 98%)    Cardiovascular: Normal S1S2, No JVD, 1/6 HODA, Peripheral pulses palpable 2+ B/L  Respiratory: Lungs clear to auscultation, normal effort  Gastrointestinal: Abdomen soft, ND, NT, +BS  Extremities no c/c/e b/l le's       DIAGNOSTIC DATA  Transthoracic Echocardiogram (05.08.18 @ 07:08) >  ------------------------------------------------------------------------  CONCLUSIONS: EF 5%%  1.  Peak mitral valve gradient equals 16 mm Hg, mean  transmitral valve gradient equals 7.5 mm Hg, consistent  with moderate mitral stenosis.  2. Moderate left atrial enlargement.  3. Moderate concentric left ventricular hypertrophy.  4. Normal Left Ventricular Systolic Function,  (EF = 55 to  60%)  5. Grade II diastolic dysfunction.  6. RV systolic pressure is moderately increased at  49 mm  Hg.  7. There is mild-moderate tricuspid regurgitation.    < end of copied text >    NST: < from: Nuclear Stress Test-Pharmacologic (12.01.17 @ 08:43) >  IMPRESSIONS:Normal Study  * Negative ECG evidence of ischemia after IV  administartion of Regadenoson.  * Myocardial Perfusion SPECT results are normal.  * No clear evidence of ischemia or infarct.  * Post-stress resting myocardial perfusion gated SPECT  imaging was performed (LVEF = 53 %) with no regional wall  motion abnormalities.    < end of copied text >    Cath: n/a      TELEMETRY: NSR  7/4 tele d/c'ed     RADIOLOGY:    < from: Xray Chest 1 View- PORTABLE-Urgent (07.03.18 @ 12:43) >    IMPRESSION:  Mild interstitial edema.         ASSESSMENT AND PLAN: 79y/o  Female well known to our service with HTN DM ESRD on HD  diastolic CHF s/p Cardio MEMS implantation, asthma, anemia (on weekly procrit), breast CA s/p Left lumpectomy and radiation in 2005, uterine CA s/p JACLYN,  with no known h/o CAD/MI with no ischemia on NST 12/17 at [facility],with recent TTE 5/18 with normal LV function, moderate LVH with mod mitral stenosis,  recently admitted with respiratory failure 2/2 PNA 5/18 now readmitted with recurrent non productive cough, progressively worsening dyspnea (there is a chronic component though she states its worse in the last several days) and associated weakness. She states she was so weak today she could not go to HD so she came to the ER. She denies any chest pain, palpitations, syncope or near syncope. She denies any medication or dietary  non compliance .     -- elevated high sensitivity troponin non specific given ESRD with no acute ischemia on EKG     - CPK /CKMB - negatve   -- recent TTE with normal LV function and moderate mitral stenosis  -- recent NST with no evidence of ischemia  -- given fever overnight , recent PNA , weakness and dyspnea/cough, RVP neg   -- Mild acute on chronic diastolic CHF exacerbation         - c/w Lasix and HD per renal for fluid removal   -- c/w asa / statin / bb Lasix  -- would interrogate cardiomems on this admission  -- no need for repeat cardiac imaging at this time   --  tele d/c'ed   -- f/u with Dr Salas upon dc 7/9 @ 1130am

## 2018-07-05 NOTE — DIETITIAN INITIAL EVALUATION ADULT. - NS AS NUTRI INTERV MEALS SNACK
Diets modified for specific foods and ingredients/Other (specify)/1. Suggest: PO diet rx: Regular, Consistent Carbohydrate (no snacks), Renal Replacement (no prot restr, no conc K, no conc phos, low sodium); Fluid restriction per MD discretion;                2. Encourage & assist Pt with meals; Monitor PO diet tolerance; Honor food preferences;            3. Recommend: Nephro-arcelia 1 cap daily;            4. Monitor labs, weights, hydration status;

## 2018-07-05 NOTE — PROGRESS NOTE ADULT - SUBJECTIVE AND OBJECTIVE BOX
Pt seen and examined at bedside  Had HD yesterday, tolerated it weel  feels better.  dyspnea better  no chest pain  no n/v/d      Allergies:  No Known Allergies    Hospital Medications:   MEDICATIONS  (STANDING):  aspirin enteric coated 81 milliGRAM(s) Oral daily  atorvastatin 40 milliGRAM(s) Oral at bedtime  brimonidine 0.2% Ophthalmic Solution 1 Drop(s) Both EYES three times a day  buDESOnide  80 MICROgram(s)/formoterol 4.5 MICROgram(s) Inhaler 2 Puff(s) Inhalation two times a day  chlorhexidine 4% Liquid 1 Application(s) Topical <User Schedule>  dextrose 5%. 1000 milliLiter(s) (50 mL/Hr) IV Continuous <Continuous>  dextrose 50% Injectable 12.5 Gram(s) IV Push once  dextrose 50% Injectable 25 Gram(s) IV Push once  dextrose 50% Injectable 25 Gram(s) IV Push once  epoetin randy Injectable 05595 Unit(s) IV Push <User Schedule>  ergocalciferol 50388 Unit(s) Oral <User Schedule>  gabapentin 100 milliGRAM(s) Oral two times a day  heparin  Injectable 5000 Unit(s) SubCutaneous every 8 hours  insulin glargine Injectable (LANTUS) 30 Unit(s) SubCutaneous at bedtime  insulin lispro (HumaLOG) corrective regimen sliding scale   SubCutaneous three times a day before meals  insulin lispro (HumaLOG) corrective regimen sliding scale   SubCutaneous at bedtime  insulin lispro Injectable (HumaLOG) 8 Unit(s) SubCutaneous before lunch  insulin lispro Injectable (HumaLOG) 8 Unit(s) SubCutaneous before dinner  metoprolol tartrate 50 milliGRAM(s) Oral two times a day  pantoprazole   Suspension 40 milliGRAM(s) Oral before breakfast  timolol 0.5% Solution 1 Drop(s) Both EYES two times a day  tiotropium 18 MICROgram(s) Capsule 1 Capsule(s) Inhalation daily       VITALS:  T(F): 100.1 (07-05-18 @ 05:44), Max: 100.4 (07-04-18 @ 21:06)  HR: 97 (07-05-18 @ 05:44)  BP: 125/95 (07-05-18 @ 05:44)  RR: 18 (07-05-18 @ 05:44)  SpO2: 92% (07-05-18 @ 05:44)  Wt(kg): --    07-04 @ 07:01  -  07-05 @ 07:00  --------------------------------------------------------  IN: 600 mL / OUT: 2500 mL / NET: -1900 mL        PHYSICAL EXAM:  Constitutional: NAD. lying comfortably in bed, with nasal O2  HEENT: anicteric sclera, oropharynx clear, MMM  Neck: No JVD  Respiratory: crepts rt base, left lung clear  Cardiovascular: S1, S2, RRR  Gastrointestinal: BS+, soft, NT/ND  Extremities: No cyanosis or clubbing. No peripheral edema  Neurological: A/O x 3, no focal deficits  Psychiatric: Normal mood, normal affect  : No CVA tenderness. No haas.   Skin: No rashes  Vascular Access:  avf    LABS:  07-05    134<L>  |  95<L>  |  36<H>  ----------------------------<  178<H>  4.4   |  29  |  2.72<H>    Ca    8.8      05 Jul 2018 06:53  Mg     2.0     07-05      Creatinine Trend: 2.72 <--, 3.25 <--, 2.90 <--                        9.5    9.49  )-----------( 182      ( 05 Jul 2018 06:53 )             31.7     Urine Studies:      RADIOLOGY & ADDITIONAL STUDIES:

## 2018-07-05 NOTE — PROGRESS NOTE ADULT - SUBJECTIVE AND OBJECTIVE BOX
Patient is a 78y old  Female who presents with a chief complaint of Shortness of breath due to missed HD with volume overload        SUBJECTIVE / OVERNIGHT EVENTS: pt states she is feeling better; low grade temps, non toxic      MEDICATIONS  (STANDING):  aspirin enteric coated 81 milliGRAM(s) Oral daily  atorvastatin 40 milliGRAM(s) Oral at bedtime  brimonidine 0.2% Ophthalmic Solution 1 Drop(s) Both EYES three times a day  buDESOnide  80 MICROgram(s)/formoterol 4.5 MICROgram(s) Inhaler 2 Puff(s) Inhalation two times a day  chlorhexidine 4% Liquid 1 Application(s) Topical <User Schedule>  dextrose 5%. 1000 milliLiter(s) (50 mL/Hr) IV Continuous <Continuous>  dextrose 50% Injectable 12.5 Gram(s) IV Push once  dextrose 50% Injectable 25 Gram(s) IV Push once  dextrose 50% Injectable 25 Gram(s) IV Push once  epoetin randy Injectable 47114 Unit(s) IV Push <User Schedule>  ergocalciferol 95968 Unit(s) Oral <User Schedule>  gabapentin 100 milliGRAM(s) Oral two times a day  heparin  Injectable 5000 Unit(s) SubCutaneous every 8 hours  insulin glargine Injectable (LANTUS) 30 Unit(s) SubCutaneous at bedtime  insulin lispro (HumaLOG) corrective regimen sliding scale   SubCutaneous three times a day before meals  insulin lispro (HumaLOG) corrective regimen sliding scale   SubCutaneous at bedtime  insulin lispro Injectable (HumaLOG) 8 Unit(s) SubCutaneous before lunch  insulin lispro Injectable (HumaLOG) 8 Unit(s) SubCutaneous before dinner  metoprolol tartrate 50 milliGRAM(s) Oral two times a day  pantoprazole   Suspension 40 milliGRAM(s) Oral before breakfast  timolol 0.5% Solution 1 Drop(s) Both EYES two times a day  tiotropium 18 MICROgram(s) Capsule 1 Capsule(s) Inhalation daily    MEDICATIONS  (PRN):  acetaminophen   Tablet. 650 milliGRAM(s) Oral every 6 hours PRN Mild and moderate pain  dextrose 40% Gel 15 Gram(s) Oral once PRN Blood Glucose LESS THAN 70 milliGRAM(s)/deciliter  furosemide    Tablet 80 milliGRAM(s) Oral <User Schedule> PRN give on days of non HD  glucagon  Injectable 1 milliGRAM(s) IntraMuscular once PRN Glucose LESS THAN 70 milligrams/deciliter  guaiFENesin   Syrup  (Sugar-Free) 200 milliGRAM(s) Oral every 6 hours PRN Cough      Vital Signs Last 24 Hrs  T(F): 99.2 (05 Jul 2018 12:39), Max: 100.4 (04 Jul 2018 21:06)  HR: 66 (05 Jul 2018 12:39) (66 - 105)  BP: 127/60 (05 Jul 2018 12:39) (125/95 - 151/62)  RR: 18 (05 Jul 2018 12:39) (18 - 22)  SpO2: 96% (05 Jul 2018 12:39) (92% - 98%)      CAPILLARY BLOOD GLUCOSE  POCT Blood Glucose.: 322 mg/dL (05 Jul 2018 13:07)  POCT Blood Glucose.: 169 mg/dL (05 Jul 2018 08:31)  POCT Blood Glucose.: 119 mg/dL (04 Jul 2018 21:12)  POCT Blood Glucose.: 94 mg/dL (04 Jul 2018 18:48)            PHYSICAL EXAM  GENERAL: NAD, well-developed  HEAD:  Atraumatic, Normocephalic  EYES: EOMI, PERRLA, conjunctiva and sclera clear  CHEST/LUNG: limited exam due to position; grossly b/l AE  HEART: Regular rate and rhythm  ABDOMEN: Soft, Nontender, Nondistended; Bowel sounds present  EXTREMITIES: No clubbing, cyanosis, or edema      LABS:                        9.5    9.49  )-----------( 182      ( 05 Jul 2018 06:53 )             31.7     07-05    134<L>  |  95<L>  |  36<H>  ----------------------------<  178<H>  4.4   |  29  |  2.72<H>    Ca    8.8      05 Jul 2018 06:53  Mg     2.0     07-05

## 2018-07-06 LAB
BUN SERPL-MCNC: 50 MG/DL — HIGH (ref 7–23)
CALCIUM SERPL-MCNC: 9 MG/DL — SIGNIFICANT CHANGE UP (ref 8.4–10.5)
CHLORIDE SERPL-SCNC: 93 MMOL/L — LOW (ref 98–107)
CO2 SERPL-SCNC: 26 MMOL/L — SIGNIFICANT CHANGE UP (ref 22–31)
CREAT SERPL-MCNC: 3.65 MG/DL — HIGH (ref 0.5–1.3)
GLUCOSE BLDC GLUCOMTR-MCNC: 135 MG/DL — HIGH (ref 70–99)
GLUCOSE BLDC GLUCOMTR-MCNC: 208 MG/DL — HIGH (ref 70–99)
GLUCOSE BLDC GLUCOMTR-MCNC: 257 MG/DL — HIGH (ref 70–99)
GLUCOSE BLDC GLUCOMTR-MCNC: 374 MG/DL — HIGH (ref 70–99)
GLUCOSE SERPL-MCNC: 243 MG/DL — HIGH (ref 70–99)
HCT VFR BLD CALC: 32.8 % — LOW (ref 34.5–45)
HGB BLD-MCNC: 9.5 G/DL — LOW (ref 11.5–15.5)
MCHC RBC-ENTMCNC: 26.6 PG — LOW (ref 27–34)
MCHC RBC-ENTMCNC: 29 % — LOW (ref 32–36)
MCV RBC AUTO: 91.9 FL — SIGNIFICANT CHANGE UP (ref 80–100)
NRBC # FLD: 0 — SIGNIFICANT CHANGE UP
PLATELET # BLD AUTO: 195 K/UL — SIGNIFICANT CHANGE UP (ref 150–400)
PMV BLD: 10.9 FL — SIGNIFICANT CHANGE UP (ref 7–13)
POTASSIUM SERPL-MCNC: 4.8 MMOL/L — SIGNIFICANT CHANGE UP (ref 3.5–5.3)
POTASSIUM SERPL-SCNC: 4.8 MMOL/L — SIGNIFICANT CHANGE UP (ref 3.5–5.3)
RBC # BLD: 3.57 M/UL — LOW (ref 3.8–5.2)
RBC # FLD: 16.8 % — HIGH (ref 10.3–14.5)
SODIUM SERPL-SCNC: 133 MMOL/L — LOW (ref 135–145)
WBC # BLD: 10.07 K/UL — SIGNIFICANT CHANGE UP (ref 3.8–10.5)
WBC # FLD AUTO: 10.07 K/UL — SIGNIFICANT CHANGE UP (ref 3.8–10.5)

## 2018-07-06 PROCEDURE — 99239 HOSP IP/OBS DSCHRG MGMT >30: CPT

## 2018-07-06 RX ORDER — IPRATROPIUM/ALBUTEROL SULFATE 18-103MCG
3 AEROSOL WITH ADAPTER (GRAM) INHALATION ONCE
Qty: 0 | Refills: 0 | Status: COMPLETED | OUTPATIENT
Start: 2018-07-06 | End: 2018-07-06

## 2018-07-06 RX ORDER — ACETAMINOPHEN 500 MG
650 TABLET ORAL ONCE
Qty: 0 | Refills: 0 | Status: COMPLETED | OUTPATIENT
Start: 2018-07-06 | End: 2018-07-06

## 2018-07-06 RX ADMIN — PANTOPRAZOLE SODIUM 40 MILLIGRAM(S): 20 TABLET, DELAYED RELEASE ORAL at 05:39

## 2018-07-06 RX ADMIN — Medication 650 MILLIGRAM(S): at 21:39

## 2018-07-06 RX ADMIN — ATORVASTATIN CALCIUM 40 MILLIGRAM(S): 80 TABLET, FILM COATED ORAL at 21:25

## 2018-07-06 RX ADMIN — BUDESONIDE AND FORMOTEROL FUMARATE DIHYDRATE 2 PUFF(S): 160; 4.5 AEROSOL RESPIRATORY (INHALATION) at 21:26

## 2018-07-06 RX ADMIN — Medication 1 DROP(S): at 05:38

## 2018-07-06 RX ADMIN — Medication 1: at 22:26

## 2018-07-06 RX ADMIN — Medication 1 DROP(S): at 21:25

## 2018-07-06 RX ADMIN — GABAPENTIN 100 MILLIGRAM(S): 400 CAPSULE ORAL at 21:25

## 2018-07-06 RX ADMIN — BRIMONIDINE TARTRATE 1 DROP(S): 2 SOLUTION/ DROPS OPHTHALMIC at 21:26

## 2018-07-06 RX ADMIN — ERYTHROPOIETIN 10000 UNIT(S): 10000 INJECTION, SOLUTION INTRAVENOUS; SUBCUTANEOUS at 19:15

## 2018-07-06 RX ADMIN — BRIMONIDINE TARTRATE 1 DROP(S): 2 SOLUTION/ DROPS OPHTHALMIC at 13:19

## 2018-07-06 RX ADMIN — Medication 3 MILLILITER(S): at 22:30

## 2018-07-06 RX ADMIN — GABAPENTIN 100 MILLIGRAM(S): 400 CAPSULE ORAL at 05:38

## 2018-07-06 RX ADMIN — Medication 2: at 09:08

## 2018-07-06 RX ADMIN — TIOTROPIUM BROMIDE 1 CAPSULE(S): 18 CAPSULE ORAL; RESPIRATORY (INHALATION) at 09:08

## 2018-07-06 RX ADMIN — Medication 3 MILLILITER(S): at 04:41

## 2018-07-06 RX ADMIN — Medication 8 UNIT(S): at 13:18

## 2018-07-06 RX ADMIN — HEPARIN SODIUM 5000 UNIT(S): 5000 INJECTION INTRAVENOUS; SUBCUTANEOUS at 05:39

## 2018-07-06 RX ADMIN — INSULIN GLARGINE 30 UNIT(S): 100 INJECTION, SOLUTION SUBCUTANEOUS at 22:26

## 2018-07-06 RX ADMIN — BUDESONIDE AND FORMOTEROL FUMARATE DIHYDRATE 2 PUFF(S): 160; 4.5 AEROSOL RESPIRATORY (INHALATION) at 09:08

## 2018-07-06 RX ADMIN — BRIMONIDINE TARTRATE 1 DROP(S): 2 SOLUTION/ DROPS OPHTHALMIC at 05:38

## 2018-07-06 RX ADMIN — CHLORHEXIDINE GLUCONATE 1 APPLICATION(S): 213 SOLUTION TOPICAL at 10:26

## 2018-07-06 RX ADMIN — Medication 5: at 13:18

## 2018-07-06 RX ADMIN — HEPARIN SODIUM 5000 UNIT(S): 5000 INJECTION INTRAVENOUS; SUBCUTANEOUS at 21:25

## 2018-07-06 RX ADMIN — Medication 3 MILLILITER(S): at 11:41

## 2018-07-06 NOTE — CHART NOTE - NSCHARTNOTEFT_GEN_A_CORE
pt seen and examined by me  feeling much better  for HD today  if ok with nephrology, can dc home after HD today   35 min spent with dc planning

## 2018-07-06 NOTE — PROGRESS NOTE ADULT - SUBJECTIVE AND OBJECTIVE BOX
SUBJECTIVE:  Patient this morning sitting up in her bed with her family mmebers in the room. Alert and in good spirits. Dyspnea now improved.    ROS Otherwise negative       MEDICATIONS  (STANDING):  aspirin enteric coated 81 milliGRAM(s) Oral daily  atorvastatin 40 milliGRAM(s) Oral at bedtime  brimonidine 0.2% Ophthalmic Solution 1 Drop(s) Both EYES three times a day  buDESOnide  80 MICROgram(s)/formoterol 4.5 MICROgram(s) Inhaler 2 Puff(s) Inhalation two times a day  chlorhexidine 4% Liquid 1 Application(s) Topical <User Schedule>  dextrose 5%. 1000 milliLiter(s) (50 mL/Hr) IV Continuous <Continuous>  dextrose 50% Injectable 12.5 Gram(s) IV Push once  dextrose 50% Injectable 25 Gram(s) IV Push once  dextrose 50% Injectable 25 Gram(s) IV Push once  epoetin randy Injectable 25711 Unit(s) IV Push <User Schedule>  ergocalciferol 29053 Unit(s) Oral <User Schedule>  gabapentin 100 milliGRAM(s) Oral two times a day  heparin  Injectable 5000 Unit(s) SubCutaneous every 8 hours  insulin glargine Injectable (LANTUS) 30 Unit(s) SubCutaneous at bedtime  insulin lispro (HumaLOG) corrective regimen sliding scale   SubCutaneous three times a day before meals  insulin lispro (HumaLOG) corrective regimen sliding scale   SubCutaneous at bedtime  insulin lispro Injectable (HumaLOG) 8 Unit(s) SubCutaneous before lunch  insulin lispro Injectable (HumaLOG) 8 Unit(s) SubCutaneous before dinner  metoprolol tartrate 50 milliGRAM(s) Oral two times a day  pantoprazole   Suspension 40 milliGRAM(s) Oral before breakfast  timolol 0.5% Solution 1 Drop(s) Both EYES two times a day  tiotropium 18 MICROgram(s) Capsule 1 Capsule(s) Inhalation daily    MEDICATIONS  (PRN):  acetaminophen   Tablet. 650 milliGRAM(s) Oral every 6 hours PRN Mild and moderate pain  dextrose 40% Gel 15 Gram(s) Oral once PRN Blood Glucose LESS THAN 70 milliGRAM(s)/deciliter  furosemide    Tablet 80 milliGRAM(s) Oral <User Schedule> PRN give on days of non HD  glucagon  Injectable 1 milliGRAM(s) IntraMuscular once PRN Glucose LESS THAN 70 milligrams/deciliter  guaiFENesin   Syrup  (Sugar-Free) 200 milliGRAM(s) Oral every 6 hours PRN Cough      LABS:                        9.5    10.07 )-----------( 195      ( 06 Jul 2018 04:15 )             32.8     Hemoglobin: 9.5 g/dL (07-06 @ 04:15)  Hemoglobin: 9.5 g/dL (07-05 @ 06:53)  Hemoglobin: 9.2 g/dL (07-04 @ 06:50)  Hemoglobin: 9.9 g/dL (07-03 @ 12:01)    07-06    133<L>  |  93<L>  |  50<H>  ----------------------------<  243<H>  4.8   |  26  |  3.65<H>    Ca    9.0      06 Jul 2018 04:15  Mg     2.0     07-05      Creatinine Trend: 3.65<--, 2.72<--, 3.25<--, 2.90<--     CARDIAC MARKERS ( 04 Jul 2018 06:50 )  x     / x     / 48 u/L / 3.08 ng/mL / x      CARDIAC MARKERS ( 03 Jul 2018 18:00 )  x     / x     / 61 u/L / 3.53 ng/mL / x            PHYSICAL EXAM  Vital Signs Last 24 Hrs  T(C): 37.5 (06 Jul 2018 12:52), Max: 37.7 (06 Jul 2018 05:43)  T(F): 99.5 (06 Jul 2018 12:52), Max: 99.9 (06 Jul 2018 05:43)  HR: 97 (06 Jul 2018 12:52) (81 - 100)  BP: 156/60 (06 Jul 2018 12:52) (106/69 - 156/60)  BP(mean): --  RR: 18 (06 Jul 2018 12:52) (18 - 18)  SpO2: 99% (06 Jul 2018 12:52) (94% - 99%)      Cardiovascular: Normal S1S2, No JVD, 1/6 HODA, Peripheral pulses palpable 2+ B/L  Respiratory: Lungs clear to auscultation, normal effort  Gastrointestinal: Abdomen soft, ND, NT, +BS  Extremities no c/c/e b/l le's       DIAGNOSTIC DATA    TTE:  Transthoracic Echocardiogram (05.08.18 @ 07:08) >  ------------------------------------------------------------------------  CONCLUSIONS: EF 5%%  1.  Peak mitral valve gradient equals 16 mm Hg, mean  transmitral valve gradient equals 7.5 mm Hg, consistent  with moderate mitral stenosis.  2. Moderate left atrial enlargement.  3. Moderate concentric left ventricular hypertrophy.  4. Normal Left Ventricular Systolic Function,  (EF = 55 to  60%)  5. Grade II diastolic dysfunction.  6. RV systolic pressure is moderately increased at  49 mm  Hg.  7. There is mild-moderate tricuspid regurgitation.    < end of copied text >    NST:   < from: Nuclear Stress Test-Pharmacologic (12.01.17 @ 08:43) >  IMPRESSIONS:Normal Study  * Negative ECG evidence of ischemia after IV  administartion of Regadenoson.  * Myocardial Perfusion SPECT results are normal.  * No clear evidence of ischemia or infarct.  * Post-stress resting myocardial perfusion gated SPECT  imaging was performed (LVEF = 53 %) with no regional wall  motion abnormalities.    < end of copied text >    RADIOLOGY:    < from: Xray Chest 1 View- PORTABLE-Urgent (07.03.18 @ 12:43) >    IMPRESSION:  Mild interstitial edema.         ASSESSMENT AND PLAN: 77y/o  Female well known to our service with HTN DM ESRD on HD  diastolic CHF s/p Cardio MEMS implantation, asthma, anemia (on weekly procrit), breast CA s/p Left lumpectomy and radiation in 2005, uterine CA s/p JACLYN,  with no known h/o CAD/MI with no ischemia on NST 12/17 at [facility],with recent TTE 5/18 with normal LV function, moderate LVH with mod mitral stenosis,  recently admitted with respiratory failure 2/2 PNA 5/18 now readmitted with recurrent non productive cough, progressively worsening dyspnea (there is a chronic component though she states its worse in the last several days) and associated weakness. She states she was so weak today she could not go to HD so she came to the ER. She denies any chest pain, palpitations, syncope or near syncope. She denies any medication or dietary  non compliance .     -- elevated high sensitivity troponin non specific given ESRD with no acute ischemia on EKG  -- CPK /CKMB - negatve   -- recent TTE with normal LV function and moderate mitral stenosis  -- recent NST with no evidence of ischemia  -- given fever overnight , recent PNA , weakness and dyspnea/cough, RVP neg   -- Mild acute on chronic diastolic CHF exacerbation         - c/w Lasix and HD per renal for fluid removal   -- c/w asa / statin / bb Lasix  -- would interrogate cardiomems on this admission  -- No futher inpatient  cardiac  work-up needed at this time  --  Patient has a  f/u with Dr Salas scheduled for  upon dc 7/9 @ 1130am ( [address] [place] [address])    Kiana Schmid PA-C

## 2018-07-06 NOTE — PROGRESS NOTE ADULT - SUBJECTIVE AND OBJECTIVE BOX
[facility] - Nicole / Juvenal FENTON /Linette/ ELICEO Isaac/ ELICEO Rojas/ Regan Castanon / TRACY Njeru  ---------------------------------------------------------------------------------------------------------------    Patient seen and examined bedside    Subjective and Objective: No overnight events. sob better. No complaints today.     Allergies: No Known Allergies      Hospital Medications:   MEDICATIONS  (STANDING):  aspirin enteric coated 81 milliGRAM(s) Oral daily  atorvastatin 40 milliGRAM(s) Oral at bedtime  brimonidine 0.2% Ophthalmic Solution 1 Drop(s) Both EYES three times a day  buDESOnide  80 MICROgram(s)/formoterol 4.5 MICROgram(s) Inhaler 2 Puff(s) Inhalation two times a day  chlorhexidine 4% Liquid 1 Application(s) Topical <User Schedule>  dextrose 5%. 1000 milliLiter(s) (50 mL/Hr) IV Continuous <Continuous>  dextrose 50% Injectable 12.5 Gram(s) IV Push once  dextrose 50% Injectable 25 Gram(s) IV Push once  dextrose 50% Injectable 25 Gram(s) IV Push once  epoetin randy Injectable 55025 Unit(s) IV Push <User Schedule>  ergocalciferol 44552 Unit(s) Oral <User Schedule>  gabapentin 100 milliGRAM(s) Oral two times a day  heparin  Injectable 5000 Unit(s) SubCutaneous every 8 hours  insulin glargine Injectable (LANTUS) 30 Unit(s) SubCutaneous at bedtime  insulin lispro (HumaLOG) corrective regimen sliding scale   SubCutaneous three times a day before meals  insulin lispro (HumaLOG) corrective regimen sliding scale   SubCutaneous at bedtime  insulin lispro Injectable (HumaLOG) 8 Unit(s) SubCutaneous before lunch  insulin lispro Injectable (HumaLOG) 8 Unit(s) SubCutaneous before dinner  metoprolol tartrate 50 milliGRAM(s) Oral two times a day  pantoprazole   Suspension 40 milliGRAM(s) Oral before breakfast  timolol 0.5% Solution 1 Drop(s) Both EYES two times a day  tiotropium 18 MICROgram(s) Capsule 1 Capsule(s) Inhalation daily      VITALS:  T(F): 99.5 (07-06-18 @ 12:52), Max: 99.9 (07-06-18 @ 05:43)  HR: 97 (07-06-18 @ 12:52)  BP: 156/60 (07-06-18 @ 12:52)  RR: 18 (07-06-18 @ 12:52)  SpO2: 99% (07-06-18 @ 12:52)  Wt(kg): --    07-06 @ 07:01  -  07-06 @ 16:11  --------------------------------------------------------  IN: 720 mL / OUT: 0 mL / NET: 720 mL      PHYSICAL EXAM:  Constitutional: NAD, obese, on NC02  HEENT: anicteric sclera, oropharynx clear  Neck: No JVD  Respiratory: dec bibasilar BS, no wheezes, rales or rhonchi  Cardiovascular: S1, S2, RRR  Gastrointestinal: BS+, soft, NT, obese  Extremities: No cyanosis or clubbing. trace peripheral edema  Neurological: A/O x 3, no focal deficits  Psychiatric: Normal mood, normal affect  : No CVA tenderness. No haas.   Skin: No rashes  Vascular Access: AVF+thrill    LABS:  07-06    133<L>  |  93<L>  |  50<H>  ----------------------------<  243<H>  4.8   |  26  |  3.65<H>    Ca    9.0      06 Jul 2018 04:15  Mg     2.0     07-05      Creatinine Trend: 3.65 <--, 2.72 <--, 3.25 <--, 2.90 <--                        9.5    10.07 )-----------( 195      ( 06 Jul 2018 04:15 )             32.8     Urine Studies:        RADIOLOGY & ADDITIONAL STUDIES:

## 2018-07-07 ENCOUNTER — TRANSCRIPTION ENCOUNTER (OUTPATIENT)
Age: 78
End: 2018-07-07

## 2018-07-07 VITALS
TEMPERATURE: 99 F | SYSTOLIC BLOOD PRESSURE: 105 MMHG | RESPIRATION RATE: 18 BRPM | HEART RATE: 65 BPM | OXYGEN SATURATION: 94 % | DIASTOLIC BLOOD PRESSURE: 50 MMHG

## 2018-07-07 LAB
BUN SERPL-MCNC: 30 MG/DL — HIGH (ref 7–23)
CALCIUM SERPL-MCNC: 8.8 MG/DL — SIGNIFICANT CHANGE UP (ref 8.4–10.5)
CHLORIDE SERPL-SCNC: 94 MMOL/L — LOW (ref 98–107)
CO2 SERPL-SCNC: 26 MMOL/L — SIGNIFICANT CHANGE UP (ref 22–31)
CREAT SERPL-MCNC: 2.76 MG/DL — HIGH (ref 0.5–1.3)
GLUCOSE BLDC GLUCOMTR-MCNC: 177 MG/DL — HIGH (ref 70–99)
GLUCOSE BLDC GLUCOMTR-MCNC: 234 MG/DL — HIGH (ref 70–99)
GLUCOSE BLDC GLUCOMTR-MCNC: 266 MG/DL — HIGH (ref 70–99)
GLUCOSE BLDC GLUCOMTR-MCNC: 330 MG/DL — HIGH (ref 70–99)
GLUCOSE SERPL-MCNC: 173 MG/DL — HIGH (ref 70–99)
HCT VFR BLD CALC: 31.8 % — LOW (ref 34.5–45)
HGB BLD-MCNC: 9.5 G/DL — LOW (ref 11.5–15.5)
MCHC RBC-ENTMCNC: 26.4 PG — LOW (ref 27–34)
MCHC RBC-ENTMCNC: 29.9 % — LOW (ref 32–36)
MCV RBC AUTO: 88.3 FL — SIGNIFICANT CHANGE UP (ref 80–100)
NRBC # FLD: 0.04 — SIGNIFICANT CHANGE UP
PLATELET # BLD AUTO: 217 K/UL — SIGNIFICANT CHANGE UP (ref 150–400)
PMV BLD: 11.2 FL — SIGNIFICANT CHANGE UP (ref 7–13)
POTASSIUM SERPL-MCNC: 5 MMOL/L — SIGNIFICANT CHANGE UP (ref 3.5–5.3)
POTASSIUM SERPL-SCNC: 5 MMOL/L — SIGNIFICANT CHANGE UP (ref 3.5–5.3)
RBC # BLD: 3.6 M/UL — LOW (ref 3.8–5.2)
RBC # FLD: 17.1 % — HIGH (ref 10.3–14.5)
SODIUM SERPL-SCNC: 136 MMOL/L — SIGNIFICANT CHANGE UP (ref 135–145)
WBC # BLD: 11.57 K/UL — HIGH (ref 3.8–10.5)
WBC # FLD AUTO: 11.57 K/UL — HIGH (ref 3.8–10.5)

## 2018-07-07 RX ORDER — ERYTHROPOIETIN 10000 [IU]/ML
10000 INJECTION, SOLUTION INTRAVENOUS; SUBCUTANEOUS
Qty: 0 | Refills: 0 | Status: DISCONTINUED | OUTPATIENT
Start: 2018-07-07 | End: 2018-07-07

## 2018-07-07 RX ORDER — FUROSEMIDE 40 MG
1 TABLET ORAL
Qty: 13 | Refills: 0 | OUTPATIENT
Start: 2018-07-07 | End: 2018-08-05

## 2018-07-07 RX ORDER — ASPIRIN/CALCIUM CARB/MAGNESIUM 324 MG
1 TABLET ORAL
Qty: 0 | Refills: 0 | DISCHARGE
Start: 2018-07-07

## 2018-07-07 RX ORDER — FUROSEMIDE 40 MG
1 TABLET ORAL
Qty: 17 | Refills: 0
Start: 2018-07-07 | End: 2018-08-05

## 2018-07-07 RX ORDER — FUROSEMIDE 40 MG
1 TABLET ORAL
Qty: 0 | Refills: 0 | COMMUNITY
Start: 2018-07-07

## 2018-07-07 RX ADMIN — INSULIN GLARGINE 30 UNIT(S): 100 INJECTION, SOLUTION SUBCUTANEOUS at 21:36

## 2018-07-07 RX ADMIN — HEPARIN SODIUM 5000 UNIT(S): 5000 INJECTION INTRAVENOUS; SUBCUTANEOUS at 21:36

## 2018-07-07 RX ADMIN — CHLORHEXIDINE GLUCONATE 1 APPLICATION(S): 213 SOLUTION TOPICAL at 09:17

## 2018-07-07 RX ADMIN — GABAPENTIN 100 MILLIGRAM(S): 400 CAPSULE ORAL at 17:18

## 2018-07-07 RX ADMIN — BRIMONIDINE TARTRATE 1 DROP(S): 2 SOLUTION/ DROPS OPHTHALMIC at 12:34

## 2018-07-07 RX ADMIN — BRIMONIDINE TARTRATE 1 DROP(S): 2 SOLUTION/ DROPS OPHTHALMIC at 21:36

## 2018-07-07 RX ADMIN — Medication 1 DROP(S): at 05:53

## 2018-07-07 RX ADMIN — BUDESONIDE AND FORMOTEROL FUMARATE DIHYDRATE 2 PUFF(S): 160; 4.5 AEROSOL RESPIRATORY (INHALATION) at 21:40

## 2018-07-07 RX ADMIN — Medication 4: at 14:26

## 2018-07-07 RX ADMIN — Medication 50 MILLIGRAM(S): at 17:18

## 2018-07-07 RX ADMIN — Medication 1: at 18:00

## 2018-07-07 RX ADMIN — Medication 1 DROP(S): at 17:18

## 2018-07-07 RX ADMIN — HEPARIN SODIUM 5000 UNIT(S): 5000 INJECTION INTRAVENOUS; SUBCUTANEOUS at 05:53

## 2018-07-07 RX ADMIN — BRIMONIDINE TARTRATE 1 DROP(S): 2 SOLUTION/ DROPS OPHTHALMIC at 05:53

## 2018-07-07 RX ADMIN — BUDESONIDE AND FORMOTEROL FUMARATE DIHYDRATE 2 PUFF(S): 160; 4.5 AEROSOL RESPIRATORY (INHALATION) at 09:18

## 2018-07-07 RX ADMIN — Medication 8 UNIT(S): at 17:59

## 2018-07-07 RX ADMIN — HEPARIN SODIUM 5000 UNIT(S): 5000 INJECTION INTRAVENOUS; SUBCUTANEOUS at 12:35

## 2018-07-07 RX ADMIN — PANTOPRAZOLE SODIUM 40 MILLIGRAM(S): 20 TABLET, DELAYED RELEASE ORAL at 05:53

## 2018-07-07 RX ADMIN — ATORVASTATIN CALCIUM 40 MILLIGRAM(S): 80 TABLET, FILM COATED ORAL at 21:35

## 2018-07-07 RX ADMIN — Medication 50 MILLIGRAM(S): at 05:53

## 2018-07-07 RX ADMIN — Medication 2: at 09:18

## 2018-07-07 RX ADMIN — GABAPENTIN 100 MILLIGRAM(S): 400 CAPSULE ORAL at 05:53

## 2018-07-07 RX ADMIN — Medication 8 UNIT(S): at 14:27

## 2018-07-07 RX ADMIN — TIOTROPIUM BROMIDE 1 CAPSULE(S): 18 CAPSULE ORAL; RESPIRATORY (INHALATION) at 09:18

## 2018-07-07 NOTE — PROGRESS NOTE ADULT - PROBLEM SELECTOR PROBLEM 2
HTN (hypertension)
ESRD (end stage renal disease) on dialysis
ESRD (end stage renal disease) on dialysis
HTN (hypertension)
HTN (hypertension)

## 2018-07-07 NOTE — PROGRESS NOTE ADULT - ASSESSMENT
79 y/o female with a PMHx of chronic diastolic heart failure (stage II), ESRD on HD T/T/S, moderate MS, COPD on home oxygen, HTN, HLD, DM, GERD, and anemia of chronic diease presents to ED with fatigue, dyspnea on exertion, orthopnea and lower extremity edema in the setting of fluid overload. Renal consulted for HD, fluid management    ESRD on HD T/T/S, missed HD yesterday   w/hypervolemic but not in resp distress. K, bicarb acceptable    HTN, controlled  Anemia in CKD  DM    labs, chart, rad reviewed
77 y/o female with a PMHx of chronic diastolic heart failure (stage II), ESRD on HD T/T/S, moderate MS, COPD on home oxygen, HTN, HLD, DM, GERD, and anemia of chronic diease presents to ED with fatigue, dyspnea on exertion, orthopnea and lower extremity edema in the setting of fluid overload. Renal consulted for HD, fluid management, s/p missed HD    ESRD on HD T/T/S outpt. off schedule   w/hypervolemic but not in resp distress. K, bicarb acceptable    HTN, controlled  Anemia in CKD  DM
79 yo female with ho ESRD on HD, COPD on home O2, HTN, chronic diastolic HF a/w fluid overload due to missed HD
79 yo female with ho ESRD on HD, COPD on home O2, HTN, chronic diastolic HF a/w fluid overload due to missed HD. D/C 40 minutes.
77 y/o female with a PMHx of chronic diastolic heart failure (stage II), ESRD on HD T/T/S, moderate MS, COPD on home oxygen, HTN, HLD, DM, GERD, and anemia of chronic diease presents to ED with fatigue, dyspnea on exertion, orthopnea and lower extremity edema in the setting of fluid overload. Renal consulted for HD, fluid management, s/p missed HD    ESRD on HD T/T/S outpt. off schedule   w/hypervolemic but not in resp distress. K, bicarb acceptable    HTN, controlled  Anemia in CKD  DM    labs, chart, reviewed

## 2018-07-07 NOTE — DISCHARGE NOTE ADULT - CARE PROVIDER_API CALL
Geena Gant), Cardiovascular Disease; Internal Medicine  [address] [facility] [address] [place] [address]  Phone: [phone]  Fax: [phone]

## 2018-07-07 NOTE — PROGRESS NOTE ADULT - SUBJECTIVE AND OBJECTIVE BOX
Patient is a 78y old  Female who presents with a chief complaint of Shortness of breath (03 Jul 2018 15:48)      SUBJECTIVE / OVERNIGHT EVENTS:  Patient has no new complaints. Denies cp, SOB, abdominal pain, N/V/D     MEDICATIONS  (STANDING):  aspirin enteric coated 81 milliGRAM(s) Oral daily  atorvastatin 40 milliGRAM(s) Oral at bedtime  brimonidine 0.2% Ophthalmic Solution 1 Drop(s) Both EYES three times a day  buDESOnide  80 MICROgram(s)/formoterol 4.5 MICROgram(s) Inhaler 2 Puff(s) Inhalation two times a day  chlorhexidine 4% Liquid 1 Application(s) Topical <User Schedule>  dextrose 5%. 1000 milliLiter(s) (50 mL/Hr) IV Continuous <Continuous>  dextrose 50% Injectable 12.5 Gram(s) IV Push once  dextrose 50% Injectable 25 Gram(s) IV Push once  dextrose 50% Injectable 25 Gram(s) IV Push once  epoetin randy Injectable 55325 Unit(s) IV Push <User Schedule>  ergocalciferol 89478 Unit(s) Oral <User Schedule>  gabapentin 100 milliGRAM(s) Oral two times a day  heparin  Injectable 5000 Unit(s) SubCutaneous every 8 hours  insulin glargine Injectable (LANTUS) 30 Unit(s) SubCutaneous at bedtime  insulin lispro (HumaLOG) corrective regimen sliding scale   SubCutaneous three times a day before meals  insulin lispro (HumaLOG) corrective regimen sliding scale   SubCutaneous at bedtime  insulin lispro Injectable (HumaLOG) 8 Unit(s) SubCutaneous before lunch  insulin lispro Injectable (HumaLOG) 8 Unit(s) SubCutaneous before dinner  metoprolol tartrate 50 milliGRAM(s) Oral two times a day  pantoprazole   Suspension 40 milliGRAM(s) Oral before breakfast  timolol 0.5% Solution 1 Drop(s) Both EYES two times a day  tiotropium 18 MICROgram(s) Capsule 1 Capsule(s) Inhalation daily    MEDICATIONS  (PRN):  acetaminophen   Tablet. 650 milliGRAM(s) Oral every 6 hours PRN Mild and moderate pain  dextrose 40% Gel 15 Gram(s) Oral once PRN Blood Glucose LESS THAN 70 milliGRAM(s)/deciliter  furosemide    Tablet 80 milliGRAM(s) Oral <User Schedule> PRN give on days of non HD  glucagon  Injectable 1 milliGRAM(s) IntraMuscular once PRN Glucose LESS THAN 70 milligrams/deciliter  guaiFENesin   Syrup  (Sugar-Free) 200 milliGRAM(s) Oral every 6 hours PRN Cough      Vital Signs Last 24 Hrs  T(C): 37.1 (07 Jul 2018 12:46), Max: 39.3 (06 Jul 2018 21:21)  T(F): 98.8 (07 Jul 2018 12:46), Max: 102.7 (06 Jul 2018 21:21)  HR: 76 (07 Jul 2018 12:46) (69 - 105)  BP: 122/50 (07 Jul 2018 12:46) (110/60 - 150/63)  BP(mean): --  RR: 17 (07 Jul 2018 12:46) (17 - 18)  SpO2: 95% (07 Jul 2018 12:46) (95% - 100%)  CAPILLARY BLOOD GLUCOSE      POCT Blood Glucose.: 330 mg/dL (07 Jul 2018 14:15)  POCT Blood Glucose.: 266 mg/dL (07 Jul 2018 12:56)  POCT Blood Glucose.: 234 mg/dL (07 Jul 2018 09:06)  POCT Blood Glucose.: 257 mg/dL (06 Jul 2018 21:48)  POCT Blood Glucose.: 135 mg/dL (06 Jul 2018 18:35)    I&O's Summary    06 Jul 2018 07:01  -  07 Jul 2018 07:00  --------------------------------------------------------  IN: 1240 mL / OUT: 2900 mL / NET: -1660 mL        PHYSICAL EXAM:  GENERAL: NAD, well-developed  HEAD:  Atraumatic, Normocephalic  EYES: EOMI, PERRLA, conjunctiva and sclera clear  NECK: Supple, No JVD  CHEST/LUNG: Clear to auscultation bilaterally; No wheeze  HEART: Regular rate and rhythm; No murmurs, rubs, or gallops  ABDOMEN: Soft, Nontender, Nondistended; Bowel sounds present  EXTREMITIES:  2+ Peripheral Pulses, No clubbing, cyanosis, or edema  PSYCH: AAOx3  NEUROLOGY: non-focal  SKIN: No rashes or lesions    LABS:                        9.5    11.57 )-----------( 217      ( 07 Jul 2018 06:42 )             31.8     07-07    136  |  94<L>  |  30<H>  ----------------------------<  173<H>  5.0   |  26  |  2.76<H>    Ca    8.8      07 Jul 2018 06:42                RADIOLOGY & ADDITIONAL TESTS:    Imaging Personally Reviewed:    Consultant(s) Notes Reviewed:      Care Discussed with Consultants/Other Providers: PA

## 2018-07-07 NOTE — PROGRESS NOTE ADULT - PROBLEM SELECTOR PROBLEM 3
Anemia of chronic disease
Anemia of chronic disease
DM (diabetes mellitus)
DM (diabetes mellitus)
Anemia of chronic disease

## 2018-07-07 NOTE — PROGRESS NOTE ADULT - PROBLEM SELECTOR PROBLEM 1
ESRD (end stage renal disease) on dialysis
Acute on chronic diastolic (congestive) heart failure
Acute on chronic diastolic (congestive) heart failure
ESRD (end stage renal disease) on dialysis
ESRD (end stage renal disease) on dialysis

## 2018-07-07 NOTE — DISCHARGE NOTE ADULT - PLAN OF CARE
To relieve and prevent worsening symptoms associated with congestive heart failure, to improve quality of life, and to treat underlying conditions such as coronary heart disease, high blood pressure, or diabetes, and to maintain euvolemia. Continue Lasix, current medications.   Low salt diet, fluid restriction to 1500 ml daily, monitor your fluid intake and weight daily, exercise as tolerated 30 minutes daily, and follow up with your physician within 1 to 2 weeks. To maintain a normal blood glucose level and HgA1C level < 5.7 and to prevent diabetic complications such as uncontrolled diabetes, diabetic coma, blindness, renal failure, and amputations. Monitor finger sticks pre-meal and bedtime, low salt, fat and carbohydrate diet, minimize glucose intake.  Exercise daily for at least 30 minutes and weight loss.  Follow up with primary care physician and endocrinologist for routine Hemoglobin A1C checks and management.  Follow up with your ophthalmologist for routine yearly vision exams. Continue to monitor. Continue Routine Hemodialysis as scheduled. To slow down the progression of the disease by quitting smoking and avoiding triggers, such as air pollution.  Continue current medications as prescribed to prevent as shortness of breath, COPD exacerbation and to improve your overall health with regular activity and quality of life.  Follow up routine appointment. Smoking cessation, continue current medications as prescribed. Follow up with your routine physician's appointments.

## 2018-07-07 NOTE — DISCHARGE NOTE ADULT - PATIENT PORTAL LINK FT
You can access the [email or web address] Patient Portal, offered by [facility], by registering with the following website: [email or web address]

## 2018-07-07 NOTE — DISCHARGE NOTE ADULT - HOSPITAL COURSE
77 y/o female with a PMHx of chronic diastolic heart failure (stage II), ESRD on HD T/R/S, moderate MS, COPD on home oxygen, HTN, HLD, DM, GERD, and anemia of chronic diease presents to ED with fatigue, dyspnea on exertion, orthopnea and lower extremity edema in the setting of fluid overload likely cardiorenal in origin.     + Acute on chronic diastolic heart failure- cards (Dr. Jaimes) and renal (Dr. Rivera) following, HD  (likely UF)  + Elevated troponin levels- likely related to fluid overload and ESRD, no acute ischemia on EKG, monitor on tele    EKG: Sinus tachycardia at 103 bpm, RBBB, LAFB, LVH  CE: Trop 97-->104  H/H: 9.9/32.2  BUN/Cr: 49/2.90  Glucose: 155  AST: 40  TSH: 0.19  ProBNP: 70476  RVP neg   7/3 CXR: Mild interstitial edema.  7/3 RLE doppler: No evidence of right lower extremity deep venous thrombosis.  CK 61 -->48    7/3 Cards: elevated high sensitivity troponin non specific given ESRD with no acute ischemia on EKG- could trend CE and check CPK. recent TTE with normal LV function and moderate mitral stenosis. recent NST with no evidence of ischemia. given recent PNA , weakness and dyspnea/cough, consider checking RVP. HD per renal for fluid removal. will interrogate cardiomems on this admission. unlikely to need repeat cardiac testing   7/4 cards: elevated high sensitivity troponin non specific given ESRD with no acute ischemia on EKG. CKMB neg, recent NST no ischemia. recheck RVP, interrogate cardiomem on this admission.  7/4 renal: HD today. increase lasix to 80mg on nonHD day. still overhydrated, crepts rt base.  7/5 renal: plan to do dialysis in AM, attempt to remove more fluids. add lasix 80 daily.   7/5 cards: would interrogate cardiomems on this admission, No need for further inpatient cardiac work-up. 77 y/o female with a PMHx of chronic diastolic heart failure (stage II), ESRD on HD T/R/S, moderate MS, COPD on home oxygen, HTN, HLD, DM, GERD, and anemia of chronic diease presents to ED with fatigue, dyspnea on exertion, orthopnea and lower extremity edema in the setting of fluid overload likely cardiorenal in origin.     + Acute on chronic diastolic heart failure- cards (Dr. Jaimes) and renal (Dr. Rivera) following, HD  (likely UF)  + Elevated troponin levels- likely related to fluid overload and ESRD, no acute ischemia on EKG, monitor on tele    EKG: Sinus tachycardia at 103 bpm, RBBB, LAFB, LVH  CE: Trop 97-->104  H/H: 9.9/32.2  BUN/Cr: 49/2.90  Glucose: 155  AST: 40  TSH: 0.19  ProBNP: 97473  RVP neg   7/3 CXR: Mild interstitial edema.  7/3 RLE doppler: No evidence of right lower extremity deep venous thrombosis.  CK 61 -->48    7/3 Cards: elevated high sensitivity troponin non specific given ESRD with no acute ischemia on EKG- could trend CE and check CPK. recent TTE with normal LV function and moderate mitral stenosis. recent NST with no evidence of ischemia. given recent PNA , weakness and dyspnea/cough, consider checking RVP. HD per renal for fluid removal. will interrogate cardiomems on this admission. unlikely to need repeat cardiac testing   7/4 cards: elevated high sensitivity troponin non specific given ESRD with no acute ischemia on EKG. CKMB neg, recent NST no ischemia. recheck RVP, interrogate cardiomem on this admission.  7/4 renal: HD today. increase lasix to 80mg on nonHD day. still overhydrated, crepts rt base.  7/5 renal: plan to do dialysis in AM, attempt to remove more fluids. add lasix 80 daily.   7/5 cards: would interrogate cardiomems on this admission, No need for further inpatient cardiac work-up.  7/7/18 Pt refused HD as per Dr. Sue today, continue HD as routinely scheduled.     7/7/18 Pt is medically stable for discharge home today as per Dr. Loja with outpatient follow-up.

## 2018-07-07 NOTE — PROGRESS NOTE ADULT - SUBJECTIVE AND OBJECTIVE BOX
[facility] - Nicole / Juvenal FENTON /Linette/ ELICEO Isaac/ ELICEO Rojas/ Regan Castanon / TRACY Njyvetteu  ---------------------------------------------------------------------------------------------------------------  seen and examined today for End Stage Renal Disease on Dialysis   Interval : s/p hemodialysis yesterday,   VITALS:  T(F): 98.8 (07-07-18 @ 05:50), Max: 102.7 (07-06-18 @ 21:21)  HR: 73 (07-07-18 @ 05:50)  BP: 147/82 (07-07-18 @ 05:50)  RR: 18 (07-07-18 @ 05:50)  SpO2: 100% (07-07-18 @ 05:50)    07-06 @ 07:01  -  07-07 @ 07:00  --------------------------------------------------------  IN: 1240 mL / OUT: 2900 mL / NET: -1660 mL    Physical Exam :-  Constitutional: NAD  Neck: Supple.  Respiratory: Bilateral equal breath sounds, few Crackles present.  Cardiovascular: S1, S2 normal, positive Murmur  Gastrointestinal: Bowel Sounds present, soft, non tender.  Extremities: trace Edema Feet  Neurological: Alert and Oriented x 3, no focal deficits  Psychiatric: Normal mood, normal affect  Data:-  Allergies :   No Known Allergies    Hospital Medications:   MEDICATIONS  (STANDING):  aspirin enteric coated 81 milliGRAM(s) Oral daily  atorvastatin 40 milliGRAM(s) Oral at bedtime  brimonidine 0.2% Ophthalmic Solution 1 Drop(s) Both EYES three times a day  buDESOnide  80 MICROgram(s)/formoterol 4.5 MICROgram(s) Inhaler 2 Puff(s) Inhalation two times a day  chlorhexidine 4% Liquid 1 Application(s) Topical <User Schedule>  dextrose 5%. 1000 milliLiter(s) (50 mL/Hr) IV Continuous <Continuous>  dextrose 50% Injectable 12.5 Gram(s) IV Push once  dextrose 50% Injectable 25 Gram(s) IV Push once  dextrose 50% Injectable 25 Gram(s) IV Push once  epoetin randy Injectable 58209 Unit(s) IV Push <User Schedule>  ergocalciferol 52096 Unit(s) Oral <User Schedule>  gabapentin 100 milliGRAM(s) Oral two times a day  heparin  Injectable 5000 Unit(s) SubCutaneous every 8 hours  insulin glargine Injectable (LANTUS) 30 Unit(s) SubCutaneous at bedtime  insulin lispro (HumaLOG) corrective regimen sliding scale   SubCutaneous three times a day before meals  insulin lispro (HumaLOG) corrective regimen sliding scale   SubCutaneous at bedtime  insulin lispro Injectable (HumaLOG) 8 Unit(s) SubCutaneous before lunch  insulin lispro Injectable (HumaLOG) 8 Unit(s) SubCutaneous before dinner  metoprolol tartrate 50 milliGRAM(s) Oral two times a day  pantoprazole   Suspension 40 milliGRAM(s) Oral before breakfast  timolol 0.5% Solution 1 Drop(s) Both EYES two times a day  tiotropium 18 MICROgram(s) Capsule 1 Capsule(s) Inhalation daily    07-07    136  |  94<L>  |  30<H>  ----------------------------<  173<H>  5.0   |  26  |  2.76<H>    Ca    8.8      07 Jul 2018 06:42      Creatinine Trend: 2.76 <--, 3.65 <--, 2.72 <--, 3.25 <--, 2.90 <--                        9.5    11.57 )-----------( 217      ( 07 Jul 2018 06:42 )             31.8

## 2018-07-07 NOTE — PROGRESS NOTE ADULT - ATTENDING COMMENTS
Agree with above assessment and plan as outlined above.    - No need for further inpatient cardiac work up.    Kyler Jaimes MD, FACC
CARDIOLOGY ATTENDING    Patient seen and examined. Agree with above. No further inpatient cardiac workup needed. Volume removal as per renal via HD.
Patient seen and examined, agree with above assessment and plan as transcribed above.    - Still with wheezing at times  - Bronchodilators per primary team    Kyler Jaimes MD, FACC
CARDIOLOGY ATTENDING    Patient seen and examined. Agree with above. D/C home when euvolemic. F/U renal. No further inpatient cardiac testing needed. May d/c tele.
dc plan as per team

## 2018-07-07 NOTE — PROGRESS NOTE ADULT - SUBJECTIVE AND OBJECTIVE BOX
SUBJECTIVE:  Patient denies any chest pain  Dyspnea has improved + intermittent cough today     MEDICATIONS  (STANDING):  aspirin enteric coated 81 milliGRAM(s) Oral daily  atorvastatin 40 milliGRAM(s) Oral at bedtime  brimonidine 0.2% Ophthalmic Solution 1 Drop(s) Both EYES three times a day  buDESOnide  80 MICROgram(s)/formoterol 4.5 MICROgram(s) Inhaler 2 Puff(s) Inhalation two times a day  chlorhexidine 4% Liquid 1 Application(s) Topical <User Schedule>  dextrose 5%. 1000 milliLiter(s) (50 mL/Hr) IV Continuous <Continuous>  dextrose 50% Injectable 12.5 Gram(s) IV Push once  dextrose 50% Injectable 25 Gram(s) IV Push once  dextrose 50% Injectable 25 Gram(s) IV Push once  epoetin randy Injectable 79153 Unit(s) IV Push <User Schedule>  ergocalciferol 25878 Unit(s) Oral <User Schedule>  gabapentin 100 milliGRAM(s) Oral two times a day  heparin  Injectable 5000 Unit(s) SubCutaneous every 8 hours  insulin glargine Injectable (LANTUS) 30 Unit(s) SubCutaneous at bedtime  insulin lispro (HumaLOG) corrective regimen sliding scale   SubCutaneous three times a day before meals  insulin lispro (HumaLOG) corrective regimen sliding scale   SubCutaneous at bedtime  insulin lispro Injectable (HumaLOG) 8 Unit(s) SubCutaneous before lunch  insulin lispro Injectable (HumaLOG) 8 Unit(s) SubCutaneous before dinner  metoprolol tartrate 50 milliGRAM(s) Oral two times a day  pantoprazole   Suspension 40 milliGRAM(s) Oral before breakfast  timolol 0.5% Solution 1 Drop(s) Both EYES two times a day  tiotropium 18 MICROgram(s) Capsule 1 Capsule(s) Inhalation daily    MEDICATIONS  (PRN):  acetaminophen   Tablet. 650 milliGRAM(s) Oral every 6 hours PRN Mild and moderate pain  dextrose 40% Gel 15 Gram(s) Oral once PRN Blood Glucose LESS THAN 70 milliGRAM(s)/deciliter  furosemide    Tablet 80 milliGRAM(s) Oral <User Schedule> PRN give on days of non HD  glucagon  Injectable 1 milliGRAM(s) IntraMuscular once PRN Glucose LESS THAN 70 milligrams/deciliter  guaiFENesin   Syrup  (Sugar-Free) 200 milliGRAM(s) Oral every 6 hours PRN Cough      LABS:                        9.5    11.57 )-----------( 217      ( 07 Jul 2018 06:42 )             31.8     Hemoglobin: 9.5 g/dL (07-07 @ 06:42)  Hemoglobin: 9.5 g/dL (07-06 @ 04:15)  Hemoglobin: 9.5 g/dL (07-05 @ 06:53)  Hemoglobin: 9.2 g/dL (07-04 @ 06:50)  Hemoglobin: 9.9 g/dL (07-03 @ 12:01)    07-07    136  |  94<L>  |  30<H>  ----------------------------<  173<H>  5.0   |  26  |  2.76<H>    Ca    8.8      07 Jul 2018 06:42      Creatinine Trend: 2.76<--, 3.65<--, 2.72<--, 3.25<--, 2.90<--           PHYSICAL EXAM  Vital Signs Last 24 Hrs  T(C): 37.1 (07 Jul 2018 05:50), Max: 39.3 (06 Jul 2018 21:21)  T(F): 98.8 (07 Jul 2018 05:50), Max: 102.7 (06 Jul 2018 21:21)  HR: 73 (07 Jul 2018 05:50) (69 - 105)  BP: 147/82 (07 Jul 2018 05:50) (110/60 - 156/60)  BP(mean): --  RR: 18 (07 Jul 2018 05:50) (18 - 18)  SpO2: 100% (07 Jul 2018 05:50) (95% - 100%)         Cardiovascular: Normal S1S2, No JVD, 1/6 HODA,   Respiratory: Lungs clear to auscultation, normal effort  Gastrointestinal: Abdomen soft, ND, NT, +BS  Extremities no c/c/e b/l le's   Peripheral pulses palpable 2+ B/L    DIAGNOSTIC DATA    TTE:  Transthoracic Echocardiogram (05.08.18 @ 07:08) >  ------------------------------------------------------------------------  CONCLUSIONS: EF 5%%  1.  Peak mitral valve gradient equals 16 mm Hg, mean  transmitral valve gradient equals 7.5 mm Hg, consistent  with moderate mitral stenosis.  2. Moderate left atrial enlargement.  3. Moderate concentric left ventricular hypertrophy.  4. Normal Left Ventricular Systolic Function,  (EF = 55 to  60%)  5. Grade II diastolic dysfunction.  6. RV systolic pressure is moderately increased at  49 mm  Hg.  7. There is mild-moderate tricuspid regurgitation.    < end of copied text >    NST:   < from: Nuclear Stress Test-Pharmacologic (12.01.17 @ 08:43) >  IMPRESSIONS:Normal Study  * Negative ECG evidence of ischemia after IV  administartion of Regadenoson.  * Myocardial Perfusion SPECT results are normal.  * No clear evidence of ischemia or infarct.  * Post-stress resting myocardial perfusion gated SPECT  imaging was performed (LVEF = 53 %) with no regional wall  motion abnormalities.    < end of copied text >    RADIOLOGY:    < from: Xray Chest 1 View- PORTABLE-Urgent (07.03.18 @ 12:43) >    IMPRESSION:  Mild interstitial edema.         ASSESSMENT AND PLAN: 77y/o  Female well known to our service with HTN DM ESRD on HD  diastolic CHF s/p Cardio MEMS implantation, asthma, anemia (on weekly procrit), breast CA s/p Left lumpectomy and radiation in 2005, uterine CA s/p JACLYN,  with no known h/o CAD/MI with no ischemia on NST 12/17 at [facility], with recent TTE 5/18 with normal LV function, moderate LVH with mod mitral stenosis,  recently admitted with respiratory failure 2/2 PNA 5/18 now readmitted with recurrent non productive cough, progressively worsening dyspnea (there is a chronic component though she states its worse in the last several days) and associated weakness. She states she was so weak today she could not go to HD so she came to the ER. She denies any chest pain, palpitations, syncope or near syncope. She denies any medication or dietary  non compliance .     -- elevated high sensitivity troponin non specific given ESRD with no acute ischemia on EKG  -- CPK /CKMB - negative   -- recent TTE with normal LV function and moderate mitral stenosis  -- recent NST with no evidence of ischemia  -- given fever overnight , recent PNA , weakness and dyspnea/cough, RVP neg   -- Mild acute on chronic diastolic CHF exacerbation         - c/w Lasix and HD per renal for fluid removal   -- c/w asa / statin / bb Lasix  -- would interrogate cardio mems on this admission  -- No further inpatient  cardiac  work-up needed at this time  --  Patient has a  f/u with Dr Salas scheduled for  upon dc 7/9 @ 1130am ( [address] [place] [address])

## 2018-07-07 NOTE — PROGRESS NOTE ADULT - PROBLEM SELECTOR PROBLEM 4
DM (diabetes mellitus)
HTN (hypertension)
HTN (hypertension)

## 2018-07-07 NOTE — PROVIDER CONTACT NOTE (OTHER) - SITUATION
Called to get report on pt and pts. nurse Gaby states pt is refusing to come for treatment today  Dr Sue notified

## 2018-07-07 NOTE — DISCHARGE NOTE ADULT - SECONDARY DIAGNOSIS.
DM (diabetes mellitus) ESRD (end stage renal disease) on dialysis COPD (chronic obstructive pulmonary disease)

## 2018-07-07 NOTE — DISCHARGE NOTE ADULT - CARE PLAN
Principal Discharge DX:	Acute on chronic diastolic (congestive) heart failure  Goal:	To relieve and prevent worsening symptoms associated with congestive heart failure, to improve quality of life, and to treat underlying conditions such as coronary heart disease, high blood pressure, or diabetes, and to maintain euvolemia.  Assessment and plan of treatment:	Continue Lasix, current medications.   Low salt diet, fluid restriction to 1500 ml daily, monitor your fluid intake and weight daily, exercise as tolerated 30 minutes daily, and follow up with your physician within 1 to 2 weeks.  Secondary Diagnosis:	DM (diabetes mellitus)  Goal:	To maintain a normal blood glucose level and HgA1C level < 5.7 and to prevent diabetic complications such as uncontrolled diabetes, diabetic coma, blindness, renal failure, and amputations.  Assessment and plan of treatment:	Monitor finger sticks pre-meal and bedtime, low salt, fat and carbohydrate diet, minimize glucose intake.  Exercise daily for at least 30 minutes and weight loss.  Follow up with primary care physician and endocrinologist for routine Hemoglobin A1C checks and management.  Follow up with your ophthalmologist for routine yearly vision exams.  Secondary Diagnosis:	ESRD (end stage renal disease) on dialysis  Goal:	Continue to monitor.  Assessment and plan of treatment:	Continue Routine Hemodialysis as scheduled.  Secondary Diagnosis:	COPD (chronic obstructive pulmonary disease)  Goal:	To slow down the progression of the disease by quitting smoking and avoiding triggers, such as air pollution.  Continue current medications as prescribed to prevent as shortness of breath, COPD exacerbation and to improve your overall health with regular activity and quality of life.  Follow up routine appointment.  Assessment and plan of treatment:	Smoking cessation, continue current medications as prescribed. Follow up with your routine physician's appointments.

## 2018-07-07 NOTE — DISCHARGE NOTE ADULT - MEDICATION SUMMARY - MEDICATIONS TO TAKE
I will START or STAY ON the medications listed below when I get home from the hospital:    acetaminophen 325 mg oral tablet  -- 2 tab(s) by mouth every 6 hours, As needed, Moderate Pain (4 - 6)  -- Indication: For Pain    aspirin 81 mg oral delayed release tablet  -- 1 tab(s) by mouth once a day  -- Indication: For Heart     gabapentin 100 mg oral capsule  -- 1 cap(s) by mouth 2 times a day  -- Indication: For CNS agent     Lantus 100 units/mL subcutaneous solution  -- 30 unit(s) subcutaneous once a day (at bedtime)   -- Do not drink alcoholic beverages when taking this medication.  It is very important that you take or use this exactly as directed.  Do not skip doses or discontinue unless directed by your doctor.  Keep in refrigerator.  Do not freeze.    -- Indication: For DM (diabetes mellitus)    HumaLOG 100 units/mL subcutaneous solution  -- 8 unit(s) subcutaneous 2 times a day  with lunch and dinner.   -- Do not drink alcoholic beverages when taking this medication.  It is very important that you take or use this exactly as directed.  Do not skip doses or discontinue unless directed by your doctor.  Keep in refrigerator.  Do not freeze.    -- Indication: For DM (diabetes mellitus)    atorvastatin 40 mg oral tablet  -- 1 tab(s) by mouth once a day (at bedtime)  -- Indication: For HLD (hyperlipidemia)    metoprolol tartrate 50 mg oral tablet  -- 1 tab(s) by mouth 2 times a day  -- Indication: For HTN (hypertension)    tiotropium 18 mcg inhalation capsule  -- 1 cap(s) inhaled once a day  -- Indication: For Bronchodilator     Advair Diskus 100 mcg-50 mcg inhalation powder  -- 1 puff(s) inhaled 2 times a day  -- Indication: For Bronchodilator     furosemide 80 mg oral tablet  -- 1 tab(s) by mouth , As needed, give on days of non HD  -- Indication: For ChF    guaiFENesin 100 mg/5 mL oral liquid  -- 10 milliliter(s) by mouth every 6 hours, As needed, Cough  -- Indication: For Expectorant     Alphagan P 0.1% ophthalmic solution  -- 1 drop(s) to each affected eye every 8 hours  -- Indication: For Eye drop    timolol hemihydrate 0.5% ophthalmic solution  -- 1 drop(s) to each affected eye 2 times a day  -- Indication: For Eye drop    pantoprazole 40 mg oral granule, delayed release  -- 1 each by mouth once a day   -- It is very important that you take or use this exactly as directed.  Do not skip doses or discontinue unless directed by your doctor.  Obtain medical advice before taking any non-prescription drugs as some may affect the action of this medication.    -- Indication: For GERD (gastroesophageal reflux disease)    Drisdol 50,000 intl units (1.25 mg) oral capsule  -- 1 cap(s) by mouth once a week  -- Indication: For Vitamin I will START or STAY ON the medications listed below when I get home from the hospital:    acetaminophen 325 mg oral tablet  -- 2 tab(s) by mouth every 6 hours, As needed, Moderate Pain (4 - 6)  -- Indication: For Pain    aspirin 81 mg oral delayed release tablet  -- 1 tab(s) by mouth once a day  -- Indication: For Heart     gabapentin 100 mg oral capsule  -- 1 cap(s) by mouth 2 times a day  -- Indication: For CNS agent     Lantus 100 units/mL subcutaneous solution  -- 30 unit(s) subcutaneous once a day (at bedtime)   -- Do not drink alcoholic beverages when taking this medication.  It is very important that you take or use this exactly as directed.  Do not skip doses or discontinue unless directed by your doctor.  Keep in refrigerator.  Do not freeze.    -- Indication: For DM (diabetes mellitus)    HumaLOG 100 units/mL subcutaneous solution  -- 8 unit(s) subcutaneous 2 times a day  with lunch and dinner.   -- Do not drink alcoholic beverages when taking this medication.  It is very important that you take or use this exactly as directed.  Do not skip doses or discontinue unless directed by your doctor.  Keep in refrigerator.  Do not freeze.    -- Indication: For DM (diabetes mellitus)    atorvastatin 40 mg oral tablet  -- 1 tab(s) by mouth once a day (at bedtime)  -- Indication: For HLD (hyperlipidemia)    metoprolol tartrate 50 mg oral tablet  -- 1 tab(s) by mouth 2 times a day  -- Indication: For HTN (hypertension)    tiotropium 18 mcg inhalation capsule  -- 1 cap(s) inhaled once a day  -- Indication: For Bronchodilator     Advair Diskus 100 mcg-50 mcg inhalation powder  -- 1 puff(s) inhaled 2 times a day  -- Indication: For Bronchodilator     furosemide 80 mg oral tablet  -- 1 tab(s) by mouth 3 times a week, As Needed -give on days of non HD   -- Indication: For ChF    guaiFENesin 100 mg/5 mL oral liquid  -- 10 milliliter(s) by mouth every 6 hours, As needed, Cough  -- Indication: For Expectorant     Alphagan P 0.1% ophthalmic solution  -- 1 drop(s) to each affected eye every 8 hours  -- Indication: For Eye drop    timolol hemihydrate 0.5% ophthalmic solution  -- 1 drop(s) to each affected eye 2 times a day  -- Indication: For Eye drop    pantoprazole 40 mg oral granule, delayed release  -- 1 each by mouth once a day   -- It is very important that you take or use this exactly as directed.  Do not skip doses or discontinue unless directed by your doctor.  Obtain medical advice before taking any non-prescription drugs as some may affect the action of this medication.    -- Indication: For GERD (gastroesophageal reflux disease)    Drisdol 50,000 intl units (1.25 mg) oral capsule  -- 1 cap(s) by mouth once a week  -- Indication: For Vitamin I will START or STAY ON the medications listed below when I get home from the hospital:    acetaminophen 325 mg oral tablet  -- 2 tab(s) by mouth every 6 hours, As needed, Moderate Pain (4 - 6)  -- Indication: For Pain    aspirin 81 mg oral delayed release tablet  -- 1 tab(s) by mouth once a day  -- Indication: For Heart     gabapentin 100 mg oral capsule  -- 1 cap(s) by mouth 2 times a day  -- Indication: For CNS agent     Lantus 100 units/mL subcutaneous solution  -- 30 unit(s) subcutaneous once a day (at bedtime)   -- Do not drink alcoholic beverages when taking this medication.  It is very important that you take or use this exactly as directed.  Do not skip doses or discontinue unless directed by your doctor.  Keep in refrigerator.  Do not freeze.    -- Indication: For DM (diabetes mellitus)    HumaLOG 100 units/mL subcutaneous solution  -- 8 unit(s) subcutaneous 2 times a day  with lunch and dinner.   -- Do not drink alcoholic beverages when taking this medication.  It is very important that you take or use this exactly as directed.  Do not skip doses or discontinue unless directed by your doctor.  Keep in refrigerator.  Do not freeze.    -- Indication: For DM (diabetes mellitus)    atorvastatin 40 mg oral tablet  -- 1 tab(s) by mouth once a day (at bedtime)  -- Indication: For HLD (hyperlipidemia)    metoprolol tartrate 50 mg oral tablet  -- 1 tab(s) by mouth 2 times a day  -- Indication: For HTN (hypertension)    tiotropium 18 mcg inhalation capsule  -- 1 cap(s) inhaled once a day  -- Indication: For Bronchodilator     Advair Diskus 100 mcg-50 mcg inhalation powder  -- 1 puff(s) inhaled 2 times a day  -- Indication: For Bronchodilator     furosemide 80 mg oral tablet  -- 1 tab(s) by mouth 4 times a week, As Needed -give on days of non HD   -- Indication: For ChF    guaiFENesin 100 mg/5 mL oral liquid  -- 10 milliliter(s) by mouth every 6 hours, As needed, Cough  -- Indication: For Expectorant     Alphagan P 0.1% ophthalmic solution  -- 1 drop(s) to each affected eye every 8 hours  -- Indication: For Eye drop    timolol hemihydrate 0.5% ophthalmic solution  -- 1 drop(s) to each affected eye 2 times a day  -- Indication: For Eye drop    pantoprazole 40 mg oral granule, delayed release  -- 1 each by mouth once a day   -- It is very important that you take or use this exactly as directed.  Do not skip doses or discontinue unless directed by your doctor.  Obtain medical advice before taking any non-prescription drugs as some may affect the action of this medication.    -- Indication: For GERD (gastroesophageal reflux disease)    Drisdol 50,000 intl units (1.25 mg) oral capsule  -- 1 cap(s) by mouth once a week  -- Indication: For Vitamin

## 2018-07-07 NOTE — PROGRESS NOTE ADULT - PROBLEM SELECTOR PLAN 1
had HD yesterday. 2 litres removed  still overhydrtaed, crepts rt base  plan to do dialysis in AM, attempt to remove more fluids  add lasix 80 od
HD per nephrology  lasix added qOD  cont to monitor fluid status
s/p HD yesterday  c/w lasix qOD  cont to monitor fluid status
s/p hemodialysis yesterday, regular hemodialysis on Tuesday, Thursday and Saturday   - change hemodialysis schedule today,- hemodialysis today 3 hrs, 2k bath, Ultrafiltration on Dialysis as tolerated
scheduled for dialysis today, attempt to remove more fluid w/hd as tolearted  add lasix 80 po daily  renal diet, fluid restriction

## 2018-07-07 NOTE — PROGRESS NOTE ADULT - SUBJECTIVE AND OBJECTIVE BOX
As per discussion with GABINO Shahid,   >> HHA was reinstated for tomorrow, as HD was reinstated for Tuesday, July 10.   >> as per Dr Loja, pt is medically cleared for discharge home today.

## 2018-07-07 NOTE — PROGRESS NOTE ADULT - PROBLEM SELECTOR PLAN 2
good control
blood pressure acceptable
per nephrology
s/p HD yesterday per nephrology
good control  c/w BB

## 2018-07-07 NOTE — PROGRESS NOTE ADULT - PROBLEM SELECTOR PLAN 3
Hb below goal  c/w Epogen w/hd tiw as ordered  watch H/H
below goal  on Epogen
hb less than 10, epogen on hemodialysis,
lantus/NISS  NCS  FS QID
lantus/NISS  NCS  FS QID

## 2018-07-08 ENCOUNTER — INPATIENT (INPATIENT)
Facility: HOSPITAL | Age: 78
LOS: 4 days | Discharge: HOME | End: 2018-07-13
Attending: HOSPITALIST | Admitting: HOSPITALIST
Payer: MEDICARE

## 2018-07-08 VITALS
TEMPERATURE: 99 F | RESPIRATION RATE: 18 BRPM | DIASTOLIC BLOOD PRESSURE: 87 MMHG | HEART RATE: 96 BPM | SYSTOLIC BLOOD PRESSURE: 179 MMHG | OXYGEN SATURATION: 93 %

## 2018-07-08 DIAGNOSIS — I10 ESSENTIAL (PRIMARY) HYPERTENSION: ICD-10-CM

## 2018-07-08 DIAGNOSIS — K21.9 GASTRO-ESOPHAGEAL REFLUX DISEASE WITHOUT ESOPHAGITIS: ICD-10-CM

## 2018-07-08 DIAGNOSIS — N18.6 END STAGE RENAL DISEASE: ICD-10-CM

## 2018-07-08 DIAGNOSIS — E11.21 TYPE 2 DIABETES MELLITUS WITH DIABETIC NEPHROPATHY: ICD-10-CM

## 2018-07-08 DIAGNOSIS — A41.9 SEPSIS, UNSPECIFIED ORGANISM: ICD-10-CM

## 2018-07-08 DIAGNOSIS — Z90.711 ACQUIRED ABSENCE OF UTERUS WITH REMAINING CERVICAL STUMP: Chronic | ICD-10-CM

## 2018-07-08 DIAGNOSIS — I50.32 CHRONIC DIASTOLIC (CONGESTIVE) HEART FAILURE: ICD-10-CM

## 2018-07-08 DIAGNOSIS — A49.1 STREPTOCOCCAL INFECTION, UNSPECIFIED SITE: ICD-10-CM

## 2018-07-08 DIAGNOSIS — D63.8 ANEMIA IN OTHER CHRONIC DISEASES CLASSIFIED ELSEWHERE: ICD-10-CM

## 2018-07-08 DIAGNOSIS — Z29.9 ENCOUNTER FOR PROPHYLACTIC MEASURES, UNSPECIFIED: ICD-10-CM

## 2018-07-08 LAB
ALBUMIN SERPL ELPH-MCNC: 3.5 G/DL — SIGNIFICANT CHANGE UP (ref 3.3–5)
ALP SERPL-CCNC: 123 U/L — HIGH (ref 40–120)
ALT FLD-CCNC: 18 U/L — SIGNIFICANT CHANGE UP (ref 4–33)
AST SERPL-CCNC: 19 U/L — SIGNIFICANT CHANGE UP (ref 4–32)
B PERT DNA SPEC QL NAA+PROBE: SIGNIFICANT CHANGE UP
BASE EXCESS BLDV CALC-SCNC: 2.6 MMOL/L — SIGNIFICANT CHANGE UP
BASOPHILS # BLD AUTO: 0.04 K/UL — SIGNIFICANT CHANGE UP (ref 0–0.2)
BASOPHILS NFR BLD AUTO: 0.4 % — SIGNIFICANT CHANGE UP (ref 0–2)
BILIRUB SERPL-MCNC: 0.4 MG/DL — SIGNIFICANT CHANGE UP (ref 0.2–1.2)
BLOOD GAS VENOUS - CREATININE: 4.08 MG/DL — HIGH (ref 0.5–1.3)
BUN SERPL-MCNC: 51 MG/DL — HIGH (ref 7–23)
C PNEUM DNA SPEC QL NAA+PROBE: NOT DETECTED — SIGNIFICANT CHANGE UP
CALCIUM SERPL-MCNC: 8.9 MG/DL — SIGNIFICANT CHANGE UP (ref 8.4–10.5)
CHLORIDE BLDV-SCNC: 98 MMOL/L — SIGNIFICANT CHANGE UP (ref 96–108)
CHLORIDE SERPL-SCNC: 92 MMOL/L — LOW (ref 98–107)
CO2 SERPL-SCNC: 27 MMOL/L — SIGNIFICANT CHANGE UP (ref 22–31)
CREAT SERPL-MCNC: 3.94 MG/DL — HIGH (ref 0.5–1.3)
EOSINOPHIL # BLD AUTO: 0.29 K/UL — SIGNIFICANT CHANGE UP (ref 0–0.5)
EOSINOPHIL NFR BLD AUTO: 2.7 % — SIGNIFICANT CHANGE UP (ref 0–6)
FLUAV H1 2009 PAND RNA SPEC QL NAA+PROBE: NOT DETECTED — SIGNIFICANT CHANGE UP
FLUAV H1 RNA SPEC QL NAA+PROBE: NOT DETECTED — SIGNIFICANT CHANGE UP
FLUAV H3 RNA SPEC QL NAA+PROBE: NOT DETECTED — SIGNIFICANT CHANGE UP
FLUAV SUBTYP SPEC NAA+PROBE: SIGNIFICANT CHANGE UP
FLUBV RNA SPEC QL NAA+PROBE: NOT DETECTED — SIGNIFICANT CHANGE UP
GAS PNL BLDV: 134 MMOL/L — LOW (ref 136–146)
GLUCOSE BLDV-MCNC: 347 — HIGH (ref 70–99)
GLUCOSE SERPL-MCNC: 379 MG/DL — HIGH (ref 70–99)
HADV DNA SPEC QL NAA+PROBE: NOT DETECTED — SIGNIFICANT CHANGE UP
HCO3 BLDV-SCNC: 25 MMOL/L — SIGNIFICANT CHANGE UP (ref 20–27)
HCOV 229E RNA SPEC QL NAA+PROBE: NOT DETECTED — SIGNIFICANT CHANGE UP
HCOV HKU1 RNA SPEC QL NAA+PROBE: NOT DETECTED — SIGNIFICANT CHANGE UP
HCOV NL63 RNA SPEC QL NAA+PROBE: NOT DETECTED — SIGNIFICANT CHANGE UP
HCOV OC43 RNA SPEC QL NAA+PROBE: NOT DETECTED — SIGNIFICANT CHANGE UP
HCT VFR BLD CALC: 32.2 % — LOW (ref 34.5–45)
HCT VFR BLDV CALC: 31.7 % — LOW (ref 34.5–45)
HGB BLD-MCNC: 9.7 G/DL — LOW (ref 11.5–15.5)
HGB BLDV-MCNC: 10.3 G/DL — LOW (ref 11.5–15.5)
HMPV RNA SPEC QL NAA+PROBE: NOT DETECTED — SIGNIFICANT CHANGE UP
HPIV1 RNA SPEC QL NAA+PROBE: NOT DETECTED — SIGNIFICANT CHANGE UP
HPIV2 RNA SPEC QL NAA+PROBE: NOT DETECTED — SIGNIFICANT CHANGE UP
HPIV3 RNA SPEC QL NAA+PROBE: NOT DETECTED — SIGNIFICANT CHANGE UP
HPIV4 RNA SPEC QL NAA+PROBE: NOT DETECTED — SIGNIFICANT CHANGE UP
IMM GRANULOCYTES # BLD AUTO: 0.07 # — SIGNIFICANT CHANGE UP
IMM GRANULOCYTES NFR BLD AUTO: 0.6 % — SIGNIFICANT CHANGE UP (ref 0–1.5)
LACTATE BLDV-MCNC: 1.2 MMOL/L — SIGNIFICANT CHANGE UP (ref 0.5–2)
LYMPHOCYTES # BLD AUTO: 1.56 K/UL — SIGNIFICANT CHANGE UP (ref 1–3.3)
LYMPHOCYTES # BLD AUTO: 14.4 % — SIGNIFICANT CHANGE UP (ref 13–44)
M PNEUMO DNA SPEC QL NAA+PROBE: NOT DETECTED — SIGNIFICANT CHANGE UP
MCHC RBC-ENTMCNC: 27.1 PG — SIGNIFICANT CHANGE UP (ref 27–34)
MCHC RBC-ENTMCNC: 30.1 % — LOW (ref 32–36)
MCV RBC AUTO: 89.9 FL — SIGNIFICANT CHANGE UP (ref 80–100)
METHOD TYPE: SIGNIFICANT CHANGE UP
MONOCYTES # BLD AUTO: 0.65 K/UL — SIGNIFICANT CHANGE UP (ref 0–0.9)
MONOCYTES NFR BLD AUTO: 6 % — SIGNIFICANT CHANGE UP (ref 2–14)
NEUTROPHILS # BLD AUTO: 8.25 K/UL — HIGH (ref 1.8–7.4)
NEUTROPHILS NFR BLD AUTO: 75.9 % — SIGNIFICANT CHANGE UP (ref 43–77)
NRBC # FLD: 0.05 — SIGNIFICANT CHANGE UP
NT-PROBNP SERPL-SCNC: SIGNIFICANT CHANGE UP PG/ML
ORGANISM # SPEC MICROSCOPIC CNT: SIGNIFICANT CHANGE UP
ORGANISM # SPEC MICROSCOPIC CNT: SIGNIFICANT CHANGE UP
PCO2 BLDV: 67 MMHG — HIGH (ref 41–51)
PH BLDV: 7.26 PH — LOW (ref 7.32–7.43)
PLATELET # BLD AUTO: 241 K/UL — SIGNIFICANT CHANGE UP (ref 150–400)
PMV BLD: 10.7 FL — SIGNIFICANT CHANGE UP (ref 7–13)
PO2 BLDV: 35 MMHG — SIGNIFICANT CHANGE UP (ref 35–40)
POTASSIUM BLDV-SCNC: 4.4 MMOL/L — SIGNIFICANT CHANGE UP (ref 3.4–4.5)
POTASSIUM SERPL-MCNC: 4.4 MMOL/L — SIGNIFICANT CHANGE UP (ref 3.5–5.3)
POTASSIUM SERPL-SCNC: 4.4 MMOL/L — SIGNIFICANT CHANGE UP (ref 3.5–5.3)
PROT SERPL-MCNC: 7.5 G/DL — SIGNIFICANT CHANGE UP (ref 6–8.3)
RBC # BLD: 3.58 M/UL — LOW (ref 3.8–5.2)
RBC # FLD: 17.2 % — HIGH (ref 10.3–14.5)
RSV RNA SPEC QL NAA+PROBE: NOT DETECTED — SIGNIFICANT CHANGE UP
RV+EV RNA SPEC QL NAA+PROBE: NOT DETECTED — SIGNIFICANT CHANGE UP
SAO2 % BLDV: 51.6 % — LOW (ref 60–85)
SODIUM SERPL-SCNC: 133 MMOL/L — LOW (ref 135–145)
SPECIMEN SOURCE: SIGNIFICANT CHANGE UP
SPECIMEN SOURCE: SIGNIFICANT CHANGE UP
WBC # BLD: 10.86 K/UL — HIGH (ref 3.8–10.5)
WBC # FLD AUTO: 10.86 K/UL — HIGH (ref 3.8–10.5)

## 2018-07-08 PROCEDURE — 99223 1ST HOSP IP/OBS HIGH 75: CPT | Mod: AI

## 2018-07-08 PROCEDURE — 71045 X-RAY EXAM CHEST 1 VIEW: CPT | Mod: 26

## 2018-07-08 RX ORDER — ATORVASTATIN CALCIUM 80 MG/1
40 TABLET, FILM COATED ORAL AT BEDTIME
Qty: 0 | Refills: 0 | Status: DISCONTINUED | OUTPATIENT
Start: 2018-07-08 | End: 2018-07-13

## 2018-07-08 RX ORDER — INSULIN LISPRO 100/ML
8 VIAL (ML) SUBCUTANEOUS
Qty: 0 | Refills: 0 | Status: DISCONTINUED | OUTPATIENT
Start: 2018-07-08 | End: 2018-07-13

## 2018-07-08 RX ORDER — HEPARIN SODIUM 5000 [USP'U]/ML
5000 INJECTION INTRAVENOUS; SUBCUTANEOUS EVERY 8 HOURS
Qty: 0 | Refills: 0 | Status: DISCONTINUED | OUTPATIENT
Start: 2018-07-08 | End: 2018-07-12

## 2018-07-08 RX ORDER — ASPIRIN/CALCIUM CARB/MAGNESIUM 324 MG
81 TABLET ORAL DAILY
Qty: 0 | Refills: 0 | Status: DISCONTINUED | OUTPATIENT
Start: 2018-07-08 | End: 2018-07-13

## 2018-07-08 RX ORDER — DEXTROSE 50 % IN WATER 50 %
25 SYRINGE (ML) INTRAVENOUS ONCE
Qty: 0 | Refills: 0 | Status: DISCONTINUED | OUTPATIENT
Start: 2018-07-08 | End: 2018-07-13

## 2018-07-08 RX ORDER — GABAPENTIN 400 MG/1
100 CAPSULE ORAL DAILY
Qty: 0 | Refills: 0 | Status: DISCONTINUED | OUTPATIENT
Start: 2018-07-08 | End: 2018-07-13

## 2018-07-08 RX ORDER — PANTOPRAZOLE SODIUM 20 MG/1
40 TABLET, DELAYED RELEASE ORAL
Qty: 0 | Refills: 0 | Status: DISCONTINUED | OUTPATIENT
Start: 2018-07-08 | End: 2018-07-13

## 2018-07-08 RX ORDER — VANCOMYCIN HCL 1 G
1000 VIAL (EA) INTRAVENOUS ONCE
Qty: 0 | Refills: 0 | Status: COMPLETED | OUTPATIENT
Start: 2018-07-08 | End: 2018-07-08

## 2018-07-08 RX ORDER — ACETAMINOPHEN 500 MG
650 TABLET ORAL EVERY 6 HOURS
Qty: 0 | Refills: 0 | Status: DISCONTINUED | OUTPATIENT
Start: 2018-07-08 | End: 2018-07-13

## 2018-07-08 RX ORDER — LIDOCAINE AND PRILOCAINE CREAM 25; 25 MG/G; MG/G
1 CREAM TOPICAL
Qty: 0 | Refills: 0 | Status: DISCONTINUED | OUTPATIENT
Start: 2018-07-08 | End: 2018-07-13

## 2018-07-08 RX ORDER — INSULIN GLARGINE 100 [IU]/ML
30 INJECTION, SOLUTION SUBCUTANEOUS AT BEDTIME
Qty: 0 | Refills: 0 | Status: DISCONTINUED | OUTPATIENT
Start: 2018-07-08 | End: 2018-07-10

## 2018-07-08 RX ORDER — INSULIN LISPRO 100/ML
VIAL (ML) SUBCUTANEOUS AT BEDTIME
Qty: 0 | Refills: 0 | Status: DISCONTINUED | OUTPATIENT
Start: 2018-07-08 | End: 2018-07-13

## 2018-07-08 RX ORDER — FUROSEMIDE 40 MG
40 TABLET ORAL ONCE
Qty: 0 | Refills: 0 | Status: COMPLETED | OUTPATIENT
Start: 2018-07-08 | End: 2018-07-08

## 2018-07-08 RX ORDER — GLUCAGON INJECTION, SOLUTION 0.5 MG/.1ML
1 INJECTION, SOLUTION SUBCUTANEOUS ONCE
Qty: 0 | Refills: 0 | Status: DISCONTINUED | OUTPATIENT
Start: 2018-07-08 | End: 2018-07-13

## 2018-07-08 RX ORDER — INSULIN LISPRO 100/ML
VIAL (ML) SUBCUTANEOUS
Qty: 0 | Refills: 0 | Status: DISCONTINUED | OUTPATIENT
Start: 2018-07-08 | End: 2018-07-13

## 2018-07-08 RX ORDER — DEXTROSE 50 % IN WATER 50 %
15 SYRINGE (ML) INTRAVENOUS ONCE
Qty: 0 | Refills: 0 | Status: DISCONTINUED | OUTPATIENT
Start: 2018-07-08 | End: 2018-07-13

## 2018-07-08 RX ORDER — BRIMONIDINE TARTRATE 2 MG/MG
1 SOLUTION/ DROPS OPHTHALMIC EVERY 8 HOURS
Qty: 0 | Refills: 0 | Status: DISCONTINUED | OUTPATIENT
Start: 2018-07-08 | End: 2018-07-13

## 2018-07-08 RX ORDER — BUDESONIDE AND FORMOTEROL FUMARATE DIHYDRATE 160; 4.5 UG/1; UG/1
2 AEROSOL RESPIRATORY (INHALATION)
Qty: 0 | Refills: 0 | Status: DISCONTINUED | OUTPATIENT
Start: 2018-07-08 | End: 2018-07-13

## 2018-07-08 RX ORDER — ERYTHROPOIETIN 10000 [IU]/ML
10000 INJECTION, SOLUTION INTRAVENOUS; SUBCUTANEOUS
Qty: 0 | Refills: 0 | Status: DISCONTINUED | OUTPATIENT
Start: 2018-07-08 | End: 2018-07-13

## 2018-07-08 RX ORDER — METOPROLOL TARTRATE 50 MG
50 TABLET ORAL
Qty: 0 | Refills: 0 | Status: DISCONTINUED | OUTPATIENT
Start: 2018-07-08 | End: 2018-07-09

## 2018-07-08 RX ORDER — TIMOLOL 0.5 %
1 DROPS OPHTHALMIC (EYE)
Qty: 0 | Refills: 0 | Status: DISCONTINUED | OUTPATIENT
Start: 2018-07-08 | End: 2018-07-13

## 2018-07-08 RX ORDER — TIOTROPIUM BROMIDE 18 UG/1
1 CAPSULE ORAL; RESPIRATORY (INHALATION) DAILY
Qty: 0 | Refills: 0 | Status: DISCONTINUED | OUTPATIENT
Start: 2018-07-08 | End: 2018-07-13

## 2018-07-08 RX ORDER — SODIUM CHLORIDE 9 MG/ML
1000 INJECTION, SOLUTION INTRAVENOUS
Qty: 0 | Refills: 0 | Status: DISCONTINUED | OUTPATIENT
Start: 2018-07-08 | End: 2018-07-13

## 2018-07-08 RX ORDER — FUROSEMIDE 40 MG
80 TABLET ORAL
Qty: 0 | Refills: 0 | Status: DISCONTINUED | OUTPATIENT
Start: 2018-07-08 | End: 2018-07-09

## 2018-07-08 RX ORDER — DEXTROSE 50 % IN WATER 50 %
12.5 SYRINGE (ML) INTRAVENOUS ONCE
Qty: 0 | Refills: 0 | Status: DISCONTINUED | OUTPATIENT
Start: 2018-07-08 | End: 2018-07-13

## 2018-07-08 RX ADMIN — GABAPENTIN 100 MILLIGRAM(S): 400 CAPSULE ORAL at 18:10

## 2018-07-08 RX ADMIN — BUDESONIDE AND FORMOTEROL FUMARATE DIHYDRATE 2 PUFF(S): 160; 4.5 AEROSOL RESPIRATORY (INHALATION) at 22:43

## 2018-07-08 RX ADMIN — Medication 250 MILLIGRAM(S): at 11:21

## 2018-07-08 RX ADMIN — ATORVASTATIN CALCIUM 40 MILLIGRAM(S): 80 TABLET, FILM COATED ORAL at 21:32

## 2018-07-08 RX ADMIN — INSULIN GLARGINE 30 UNIT(S): 100 INJECTION, SOLUTION SUBCUTANEOUS at 22:43

## 2018-07-08 RX ADMIN — Medication 40 MILLIGRAM(S): at 14:50

## 2018-07-08 RX ADMIN — HEPARIN SODIUM 5000 UNIT(S): 5000 INJECTION INTRAVENOUS; SUBCUTANEOUS at 21:32

## 2018-07-08 RX ADMIN — Medication 50 MILLIGRAM(S): at 18:10

## 2018-07-08 RX ADMIN — Medication 1: at 22:42

## 2018-07-08 RX ADMIN — Medication 100 MILLIGRAM(S): at 21:43

## 2018-07-08 RX ADMIN — BRIMONIDINE TARTRATE 1 DROP(S): 2 SOLUTION/ DROPS OPHTHALMIC at 22:43

## 2018-07-08 NOTE — H&P ADULT - HISTORY OF PRESENT ILLNESS
78F HTN DM2 ESRD on HD diastolic CHF s/p Cardio MEMS implantation, asthma, anemia (on weekly procrit), breast CA s/p Left lumpectomy and radiation in 2005, uterine CA s/p JACLYN, with no known h/o CAD/MI with no ischemia on NST 12/17 at [facility], with recent TTE 5/18 with normal LV function, moderate LVH with mod mitral stenosis, admitted 7/3/18 after missing HD due to fatigue, treated for volume overload with fluid removal at HD.  Was febrile to 102.7 on 7/6/18 at 9pm, blood cultures drawn peripheral and from HD catheter.  7/7/18 patient was discharged.  Peripheral blood cx positive for enterococcus by PCR and patient called back for treatment.  Denies further fevers, chills, weakness. She is upset about staying in uncomfortable stretcher.  She was treated with Vancomycin 1gm.

## 2018-07-08 NOTE — CONSULT NOTE ADULT - PROBLEM SELECTOR RECOMMENDATION 9
no indication for hd today  has RRF. give lasix 40mg ivp x1  renal diet, fluid restriction 1L/day  will arrange for HD tomorrow w/2k bath, uf 2.5-3kg as tolerated  Informed consent obtained from pt for HD

## 2018-07-08 NOTE — ED PROVIDER NOTE - OBJECTIVE STATEMENT
Pt is a 79 y/o F w/ a PMHX of ESRD on HD, COPD on home O2, HTN, chronic diastolic HF who presents as call back for Enterococcus bacteremia. Patient states only complaints of coughing that started today and yellow and urinary frequency. Patient denies nasal congestion, abdominal pain, nausea, vomiting, recent antibiotic use, diarrhea, dysuria, new rashes or injuries, headaches, neck stiffness, photophobia, and sick contacts.     ROS: Denies fevers, chills, CP, Abdominal pain, rhinorrhea, new rashes, new LE edema, new visual changes, confusion, headaches, blood in stools, N/V, dysuria, and hematuria.

## 2018-07-08 NOTE — H&P ADULT - NSHPREVIEWOFSYSTEMS_GEN_ALL_CORE
CONSTITUTIONAL: No fever, weight loss, + fatigue  EYES: No eye pain, visual disturbances, or discharge  ENMT:  No difficulty hearing, tinnitus, vertigo; No sinus or throat pain  NECK: No pain or stiffness  RESPIRATORY: No cough, wheezing, chills or hemoptysis; No shortness of breath  CARDIOVASCULAR: No chest pain, palpitations, dizziness, or leg swelling  GASTROINTESTINAL: No abdominal or epigastric pain. No nausea, vomiting, or hematemesis; No diarrhea or constipation. No melena or hematochezia.  GENITOURINARY: No dysuria, frequency, hematuria, or incontinence  NEUROLOGICAL: No headaches, memory loss, loss of strength, numbness, or tremors  SKIN: No itching, burning, rashes, or lesions   LYMPH NODES: No enlarged glands  ENDOCRINE: No heat or cold intolerance; No hair loss  MUSCULOSKELETAL: No joint pain or swelling; No muscle, back, or extremity pain  PSYCHIATRIC: No depression, anxiety, mood swings, or difficulty sleeping  HEME/LYMPH: No easy bruising, or bleeding gums  ALLERY AND IMMUNOLOGIC: No hives or eczema

## 2018-07-08 NOTE — ED ADULT NURSE NOTE - CHIEF COMPLAINT QUOTE
A&OX3 was called last night at 10pm and told to come back to ED for infection in blood, pt was discharged from [facility] last night for asthma, pt actively coughing in triage PMH DM COPD

## 2018-07-08 NOTE — PROGRESS NOTE ADULT - SUBJECTIVE AND OBJECTIVE BOX
SUBJECTIVE:  Patient denies any chest pain  + SOB  + productive  cough " I want to get upstairs into a real  bed "     MEDICATIONS  (STANDING):    MEDICATIONS  (PRN):      LABS:                        9.7    10.86 )-----------( 241      ( 08 Jul 2018 10:32 )             32.2     Hemoglobin: 9.7 g/dL (07-08 @ 10:32)  Hemoglobin: 9.5 g/dL (07-07 @ 06:42)  Hemoglobin: 9.5 g/dL (07-06 @ 04:15)  Hemoglobin: 9.5 g/dL (07-05 @ 06:53)  Hemoglobin: 9.2 g/dL (07-04 @ 06:50)    07-08    133<L>  |  92<L>  |  51<H>  ----------------------------<  379<H>  4.4   |  27  |  3.94<H>    Ca    8.9      08 Jul 2018 10:32    TPro  7.5  /  Alb  3.5  /  TBili  0.4  /  DBili  x   /  AST  19  /  ALT  18  /  AlkPhos  123<H>  07-08    Creatinine Trend: 3.94<--, 2.76<--, 3.65<--, 2.72<--, 3.25<--, 2.90<--     PHYSICAL EXAM  Vital Signs Last 24 Hrs  T(C): 37.1 (08 Jul 2018 09:06), Max: 37.1 (07 Jul 2018 12:46)  T(F): 98.8 (08 Jul 2018 09:06), Max: 98.8 (07 Jul 2018 12:46)  HR: 96 (08 Jul 2018 09:06) (65 - 96)  BP: 179/87 (08 Jul 2018 09:06) (105/50 - 179/87)  BP(mean): --  RR: 18 (08 Jul 2018 09:06) (17 - 18)  SpO2: 95% (08 Jul 2018 10:31) (93% - 97%)    Cardiovascular: Normal S1S2, No JVD, 1/6 HODA,   Respiratory: course breath sounds + rales   Gastrointestinal: Abdomen soft, ND, NT, +BS  Extremities no c/c/e b/l le's   Peripheral pulses palpable 2+ B/L    DIAGNOSTIC DATA    TTE:  Transthoracic Echocardiogram (05.08.18 @ 07:08) >  ------------------------------------------------------------------------  CONCLUSIONS: EF 5%%  1.  Peak mitral valve gradient equals 16 mm Hg, mean  transmitral valve gradient equals 7.5 mm Hg, consistent  with moderate mitral stenosis.  2. Moderate left atrial enlargement.  3. Moderate concentric left ventricular hypertrophy.  4. Normal Left Ventricular Systolic Function,  (EF = 55 to  60%)  5. Grade II diastolic dysfunction.  6. RV systolic pressure is moderately increased at  49 mm  Hg.  7. There is mild-moderate tricuspid regurgitation.    < end of copied text >    NST:   < from: Nuclear Stress Test-Pharmacologic (12.01.17 @ 08:43) >  IMPRESSIONS:Normal Study  * Negative ECG evidence of ischemia after IV  administartion of Regadenoson.  * Myocardial Perfusion SPECT results are normal.  * No clear evidence of ischemia or infarct.  * Post-stress resting myocardial perfusion gated SPECT  imaging was performed (LVEF = 53 %) with no regional wall  motion abnormalities.    < end of copied text >    RADIOLOGY:    < from: Xray Chest 1 View- PORTABLE-Urgent (07.03.18 @ 12:43) >    IMPRESSION:  Mild interstitial edema.         ASSESSMENT AND PLAN: 79y/o  Female well known to our service with HTN DM ESRD on HD  diastolic CHF s/p Cardio MEMS implantation, asthma, anemia (on weekly procrit), breast CA s/p Left lumpectomy and radiation in 2005, uterine CA s/p JACLYN,  with no known h/o CAD/MI with no ischemia on NST 12/17 at [facility], with recent TTE 5/18 with normal LV function, moderate LVH with mod mitral stenosis,  recently admitted with respiratory failure 2/2 PNA 5/18 now readmitted with recurrent non productive cough, progressively worsening dyspnea (there is a chronic component though she states its worse in the last several days) and associated weakness. She states she was so weak today she could not go to HD so she came to the ER. She denies any chest pain, palpitations, syncope or near syncope. She denies any medication or dietary  non compliance .     --pt discharged yesterday call by Marlyn to return to the hospital for IV abx  due to Enterococcal infection  -- elevated high sensitivity troponin non specific given ESRD with no acute ischemia on EKG  -- CPK /CKMB - negative   -- recent TTE with normal LV function and moderate mitral stenosis  -- recent NST with no evidence of ischemia  -- Mild acute on chronic diastolic CHF exacerbation         - c/w Lasix and HD per renal for fluid removal   -- c/w asa / statin / bb / Lasix  -- would interrogate cardio mems on this admission  -- No further inpatient  cardiac  work-up needed at this time  --  Patient has a  f/u with Dr Salas scheduled for  upon dc 7/9 @ 1130am ( [address] [place] [address])

## 2018-07-08 NOTE — PROGRESS NOTE ADULT - PROVIDER SPECIALTY LIST ADULT
Cardiology
Hospitalist
Hospitalist
Nephrology

## 2018-07-08 NOTE — ED PROVIDER NOTE - ATTENDING CONTRIBUTION TO CARE
I performed a face-to-face evaluation of the patient and performed a history and physical examination. I agree with the history and physical examination.    Dialysis permacath. Called back for enterococcus bacteremia. No urinary Sx. Mild cough started today. No F. Admit for Abx.

## 2018-07-08 NOTE — H&P ADULT - ASSESSMENT
78F HTN DM2 ESRD on HD diastolic CHF s/p Cardio MEMS implantation, asthma, anemia (on weekly procrit), breast CA s/p Left lumpectomy and radiation in 2005, uterine CA s/p JACLYN, with no known h/o CAD/MI with no ischemia on NST 12/17 at [facility], with recent TTE 5/18 with normal LV function, moderate LVH with mod mitral stenosis with fever 7/6/18 and enterococcus bacteremia in peripheral blood culture

## 2018-07-08 NOTE — H&P ADULT - NSHPPHYSICALEXAM_GEN_ALL_CORE
Vital Signs Last 24 Hrs  T(C): 36.9 (08 Jul 2018 13:20), Max: 37.1 (08 Jul 2018 09:06)  T(F): 98.5 (08 Jul 2018 13:20), Max: 98.8 (08 Jul 2018 09:06)  HR: 93 (08 Jul 2018 13:20) (65 - 96)  BP: 137/65 (08 Jul 2018 14:49) (105/50 - 179/87)  BP(mean): --  RR: 14 (08 Jul 2018 13:20) (14 - 18)  SpO2: 98% (08 Jul 2018 13:20) (93% - 98%)  GENERAL: NAD, well-developed  HEAD:  Atraumatic, Normocephalic  EYES: EOMI, PERRLA, conjunctiva and sclera clear  NECK: Supple, No JVD  CHEST/LUNG: Clear to auscultation bilaterally; No wheeze. HD cather in place  HEART: Regular rate and rhythm; No murmurs, rubs, or gallops  ABDOMEN: Soft, Nontender, Nondistended; Bowel sounds present  EXTREMITIES:  2+ Peripheral Pulses, No clubbing, cyanosis, or edema. right forearm AVF with thrill  PSYCH: AAOx3  NEUROLOGY: non-focal  SKIN: No rashes or lesions

## 2018-07-08 NOTE — ED PROVIDER NOTE - MEDICAL DECISION MAKING DETAILS
Pt is a 77 y/o F w/ a PMHX of ESRD on HD, COPD on home O2, HTN, chronic diastolic HF who presents as call back for Enterococcus bacteremia and appears well. Possible urine source. Will get CBC, CMP, VBG w/ ;lactate, repeat blood Cxs, start vacomycin, CXR, UA, URine culture. Admit.

## 2018-07-08 NOTE — PROGRESS NOTE ADULT - SUBJECTIVE AND OBJECTIVE BOX
Was called by the microbiology lab re: one of the anaerobic blood cxs from 07/07/18 (sent 16 hrs ago)-- G+C in pairs, with identification of Enterococcus,   >> discussed with Swapnil Rosario-- hospitalist on call,   >> called and notified pt, Reyna Miranda, about the findings and recommended her to come back to the hospital today to be treated with IV Abx,   >> pt expressed understanding and stated that she will return to the hospital today to be treated, Was called by the microbiology lab re: one of the anaerobic blood cxs from 07/07/18 (sent 16 hrs ago)-- G+C in pairs, with identification of Enterococcus,   >> discussed with Swapnil Rosario-- hospitalist on call,   >> called and notified pt, Reyna Miranda, about the findings and recommended her to come back to the hospital today to be treated with IV Abx,   >> pt expressed understanding, repeated back to me stated above, and stated that she will return to the hospital today to be treated, Was called by the microbiology lab re: one of the anaerobic blood cxs from 07/07/18 (sent 16 hrs ago)-- G+C in pairs, with identification of Enterococcus,   >> discussed with Swapnil Rosario-- hospitalist on call,   >> called and notified pt, Reyna Miranda, about the findings and recommended her to come back to the hospital today to be treated with IV Abx,   >> pt expressed understanding, repeated back to me stated above, and stated that she will return to the hospital today to be treated,   >> discussed with TELE PA in charge

## 2018-07-08 NOTE — ED ADULT TRIAGE NOTE - CHIEF COMPLAINT QUOTE
A&OX3 was called last night at 10pm and told to come back to ED for infection in blood, pt was discharged from [facility] last night for asthma, pt actively coughing in triage PMH DM COPD  A&OX3 was called last night at 10pm and told to come back to ED for infection in blood, pt was admitted to [facility] for asthma and discharged last night, pt actively coughing in triage dialysis patient M,W,F states unable to use right arm harvesting for fistula, PMH DM COPD  A&OX3 was called last night at 10pm and told to come back to ED for infection in blood, pt was admitted to [facility] for asthma and discharged last night, pt actively coughing in triage dialysis patient T, Th, S states unable to use right arm harvesting for fistula, PMH DM COPD

## 2018-07-08 NOTE — ED ADULT NURSE NOTE - OBJECTIVE STATEMENT
Pt received a&ox3, sent in for + blood cultures admitted here for weakness/dyspnea no exertion/congestion/non productive cough, pt states symptoms are still persistent, cough noted, denies chest pain/sob at present time, on home 02, 2L NC, pt with right sided chest shiley, restrictions to right arm for future fistula placement, 20 gauge IV placed in left ac, labs drawn and sent, will continue to monitor. Pt received a&ox3, sent in for + blood cultures admitted here for weakness/dyspnea no exertion/congestion/non productive cough, pt states symptoms are still persistent, cough noted, denies chest pain/sob at present time, on home 02, 2L NC, pt with right sided chest shiley, restrictions to right arm for future fistula placement, pt states normally gets dialysis T/TH/S, states went to dialysis yesterday and was told she didn't need treatment, 20 gauge IV placed in left ac, labs drawn and sent, will continue to monitor.

## 2018-07-08 NOTE — H&P ADULT - PROBLEM SELECTOR PLAN 1
ID consulted  Vancomycin given, next dose after HD  Monitor blood cultures from HD catheter  Cardio MEMs in place also  TTE, consider JAYSON

## 2018-07-08 NOTE — ED PROVIDER NOTE - PROGRESS NOTE DETAILS
Spoke to Hospitalist and will admit to Dr. Darrel Sarabia. Paged MAR.   -Duane Murrieta PGY4 EMIM Pager#[phone]/Spectra#[phone]

## 2018-07-08 NOTE — H&P ADULT - PMH
Anemia of chronic disease    Breast carcinoma  Lt side s/p lumpectomy and radiation in 2004  Chronic diastolic (congestive) heart failure    COPD (chronic obstructive pulmonary disease)  On home oxygen  DM (diabetes mellitus)  type 2  ESRD (end stage renal disease) on dialysis    GERD (gastroesophageal reflux disease)    HLD (hyperlipidemia)    HTN (hypertension)    MS (mitral stenosis)

## 2018-07-09 DIAGNOSIS — E11.9 TYPE 2 DIABETES MELLITUS WITHOUT COMPLICATIONS: ICD-10-CM

## 2018-07-09 LAB
ALBUMIN SERPL ELPH-MCNC: 3.1 G/DL — LOW (ref 3.3–5)
ALP SERPL-CCNC: 110 U/L — SIGNIFICANT CHANGE UP (ref 40–120)
ALT FLD-CCNC: 16 U/L — SIGNIFICANT CHANGE UP (ref 4–33)
AST SERPL-CCNC: 17 U/L — SIGNIFICANT CHANGE UP (ref 4–32)
BASOPHILS # BLD AUTO: 0.03 K/UL — SIGNIFICANT CHANGE UP (ref 0–0.2)
BASOPHILS NFR BLD AUTO: 0.3 % — SIGNIFICANT CHANGE UP (ref 0–2)
BILIRUB SERPL-MCNC: 0.3 MG/DL — SIGNIFICANT CHANGE UP (ref 0.2–1.2)
BUN SERPL-MCNC: 65 MG/DL — HIGH (ref 7–23)
CALCIUM SERPL-MCNC: 9.2 MG/DL — SIGNIFICANT CHANGE UP (ref 8.4–10.5)
CHLORIDE SERPL-SCNC: 95 MMOL/L — LOW (ref 98–107)
CO2 SERPL-SCNC: 21 MMOL/L — LOW (ref 22–31)
CREAT SERPL-MCNC: 3.92 MG/DL — HIGH (ref 0.5–1.3)
EOSINOPHIL # BLD AUTO: 0.32 K/UL — SIGNIFICANT CHANGE UP (ref 0–0.5)
EOSINOPHIL NFR BLD AUTO: 3.3 % — SIGNIFICANT CHANGE UP (ref 0–6)
GLUCOSE SERPL-MCNC: 250 MG/DL — HIGH (ref 70–99)
HCT VFR BLD CALC: 30 % — LOW (ref 34.5–45)
HGB BLD-MCNC: 9 G/DL — LOW (ref 11.5–15.5)
IMM GRANULOCYTES # BLD AUTO: 0.05 # — SIGNIFICANT CHANGE UP
IMM GRANULOCYTES NFR BLD AUTO: 0.5 % — SIGNIFICANT CHANGE UP (ref 0–1.5)
LYMPHOCYTES # BLD AUTO: 1.79 K/UL — SIGNIFICANT CHANGE UP (ref 1–3.3)
LYMPHOCYTES # BLD AUTO: 18.2 % — SIGNIFICANT CHANGE UP (ref 13–44)
MAGNESIUM SERPL-MCNC: 2.1 MG/DL — SIGNIFICANT CHANGE UP (ref 1.6–2.6)
MCHC RBC-ENTMCNC: 26.1 PG — LOW (ref 27–34)
MCHC RBC-ENTMCNC: 30 % — LOW (ref 32–36)
MCV RBC AUTO: 87 FL — SIGNIFICANT CHANGE UP (ref 80–100)
METHOD TYPE: SIGNIFICANT CHANGE UP
MONOCYTES # BLD AUTO: 0.62 K/UL — SIGNIFICANT CHANGE UP (ref 0–0.9)
MONOCYTES NFR BLD AUTO: 6.3 % — SIGNIFICANT CHANGE UP (ref 2–14)
NEUTROPHILS # BLD AUTO: 7 K/UL — SIGNIFICANT CHANGE UP (ref 1.8–7.4)
NEUTROPHILS NFR BLD AUTO: 71.4 % — SIGNIFICANT CHANGE UP (ref 43–77)
NRBC # FLD: 0.03 — SIGNIFICANT CHANGE UP
ORGANISM # SPEC MICROSCOPIC CNT: SIGNIFICANT CHANGE UP
PHOSPHATE SERPL-MCNC: 5.4 MG/DL — HIGH (ref 2.5–4.5)
PLATELET # BLD AUTO: 229 K/UL — SIGNIFICANT CHANGE UP (ref 150–400)
PMV BLD: 11.4 FL — SIGNIFICANT CHANGE UP (ref 7–13)
POTASSIUM SERPL-MCNC: 4.4 MMOL/L — SIGNIFICANT CHANGE UP (ref 3.5–5.3)
POTASSIUM SERPL-SCNC: 4.4 MMOL/L — SIGNIFICANT CHANGE UP (ref 3.5–5.3)
PROT SERPL-MCNC: 7.3 G/DL — SIGNIFICANT CHANGE UP (ref 6–8.3)
RBC # BLD: 3.45 M/UL — LOW (ref 3.8–5.2)
RBC # FLD: 17.2 % — HIGH (ref 10.3–14.5)
SODIUM SERPL-SCNC: 135 MMOL/L — SIGNIFICANT CHANGE UP (ref 135–145)
SPECIMEN SOURCE: SIGNIFICANT CHANGE UP
SPECIMEN SOURCE: SIGNIFICANT CHANGE UP
VANCOMYCIN FLD-MCNC: 11.7 UG/ML — SIGNIFICANT CHANGE UP
VANCOMYCIN FLD-MCNC: 7.3 UG/ML — SIGNIFICANT CHANGE UP
WBC # BLD: 9.81 K/UL — SIGNIFICANT CHANGE UP (ref 3.8–10.5)
WBC # FLD AUTO: 9.81 K/UL — SIGNIFICANT CHANGE UP (ref 3.8–10.5)

## 2018-07-09 PROCEDURE — 99233 SBSQ HOSP IP/OBS HIGH 50: CPT

## 2018-07-09 PROCEDURE — 99222 1ST HOSP IP/OBS MODERATE 55: CPT

## 2018-07-09 RX ORDER — INSULIN LISPRO 100/ML
5 VIAL (ML) SUBCUTANEOUS
Qty: 0 | Refills: 0 | Status: DISCONTINUED | OUTPATIENT
Start: 2018-07-09 | End: 2018-07-13

## 2018-07-09 RX ORDER — METOPROLOL TARTRATE 50 MG
50 TABLET ORAL
Qty: 0 | Refills: 0 | Status: DISCONTINUED | OUTPATIENT
Start: 2018-07-09 | End: 2018-07-13

## 2018-07-09 RX ORDER — VANCOMYCIN HCL 1 G
VIAL (EA) INTRAVENOUS
Qty: 0 | Refills: 0 | Status: DISCONTINUED | OUTPATIENT
Start: 2018-07-09 | End: 2018-07-09

## 2018-07-09 RX ORDER — FUROSEMIDE 40 MG
80 TABLET ORAL
Qty: 0 | Refills: 0 | Status: DISCONTINUED | OUTPATIENT
Start: 2018-07-09 | End: 2018-07-13

## 2018-07-09 RX ORDER — VANCOMYCIN HCL 1 G
1000 VIAL (EA) INTRAVENOUS ONCE
Qty: 0 | Refills: 0 | Status: COMPLETED | OUTPATIENT
Start: 2018-07-09 | End: 2018-07-09

## 2018-07-09 RX ADMIN — PANTOPRAZOLE SODIUM 40 MILLIGRAM(S): 20 TABLET, DELAYED RELEASE ORAL at 06:09

## 2018-07-09 RX ADMIN — Medication 1 DROP(S): at 08:33

## 2018-07-09 RX ADMIN — LIDOCAINE AND PRILOCAINE CREAM 1 APPLICATION(S): 25; 25 CREAM TOPICAL at 11:10

## 2018-07-09 RX ADMIN — Medication 1: at 08:32

## 2018-07-09 RX ADMIN — HEPARIN SODIUM 5000 UNIT(S): 5000 INJECTION INTRAVENOUS; SUBCUTANEOUS at 18:06

## 2018-07-09 RX ADMIN — Medication 8 UNIT(S): at 18:06

## 2018-07-09 RX ADMIN — Medication 100 MILLIGRAM(S): at 18:13

## 2018-07-09 RX ADMIN — BUDESONIDE AND FORMOTEROL FUMARATE DIHYDRATE 2 PUFF(S): 160; 4.5 AEROSOL RESPIRATORY (INHALATION) at 22:32

## 2018-07-09 RX ADMIN — Medication 1 DROP(S): at 22:32

## 2018-07-09 RX ADMIN — Medication 3: at 18:08

## 2018-07-09 RX ADMIN — Medication 250 MILLIGRAM(S): at 18:07

## 2018-07-09 RX ADMIN — ATORVASTATIN CALCIUM 40 MILLIGRAM(S): 80 TABLET, FILM COATED ORAL at 22:34

## 2018-07-09 RX ADMIN — BRIMONIDINE TARTRATE 1 DROP(S): 2 SOLUTION/ DROPS OPHTHALMIC at 18:05

## 2018-07-09 RX ADMIN — Medication 50 MILLIGRAM(S): at 06:09

## 2018-07-09 RX ADMIN — HEPARIN SODIUM 5000 UNIT(S): 5000 INJECTION INTRAVENOUS; SUBCUTANEOUS at 06:09

## 2018-07-09 RX ADMIN — Medication 650 MILLIGRAM(S): at 12:44

## 2018-07-09 RX ADMIN — Medication 1: at 22:34

## 2018-07-09 RX ADMIN — ERYTHROPOIETIN 10000 UNIT(S): 10000 INJECTION, SOLUTION INTRAVENOUS; SUBCUTANEOUS at 14:00

## 2018-07-09 RX ADMIN — BRIMONIDINE TARTRATE 1 DROP(S): 2 SOLUTION/ DROPS OPHTHALMIC at 22:33

## 2018-07-09 RX ADMIN — BUDESONIDE AND FORMOTEROL FUMARATE DIHYDRATE 2 PUFF(S): 160; 4.5 AEROSOL RESPIRATORY (INHALATION) at 08:33

## 2018-07-09 RX ADMIN — Medication 8 UNIT(S): at 14:02

## 2018-07-09 RX ADMIN — INSULIN GLARGINE 30 UNIT(S): 100 INJECTION, SOLUTION SUBCUTANEOUS at 22:33

## 2018-07-09 RX ADMIN — Medication 50 MILLIGRAM(S): at 18:07

## 2018-07-09 RX ADMIN — Medication 1 DROP(S): at 18:08

## 2018-07-09 RX ADMIN — TIOTROPIUM BROMIDE 1 CAPSULE(S): 18 CAPSULE ORAL; RESPIRATORY (INHALATION) at 09:34

## 2018-07-09 RX ADMIN — GABAPENTIN 100 MILLIGRAM(S): 400 CAPSULE ORAL at 11:11

## 2018-07-09 RX ADMIN — Medication 2: at 14:01

## 2018-07-09 RX ADMIN — BRIMONIDINE TARTRATE 1 DROP(S): 2 SOLUTION/ DROPS OPHTHALMIC at 06:09

## 2018-07-09 NOTE — PROGRESS NOTE ADULT - SUBJECTIVE AND OBJECTIVE BOX
Patient is a 78y old  Female who presents with a chief complaint of positive cx    SUBJECTIVE / OVERNIGHT EVENTS:    Feels OK  tolerating diet  attempting AVF cannulation  denies SOB/CP, n/v  +cough    MEDICATIONS  (STANDING):  aspirin enteric coated 81 milliGRAM(s) Oral daily  atorvastatin 40 milliGRAM(s) Oral at bedtime  brimonidine 0.2% Ophthalmic Solution 1 Drop(s) Both EYES every 8 hours  buDESOnide  80 MICROgram(s)/formoterol 4.5 MICROgram(s) Inhaler 2 Puff(s) Inhalation two times a day  dextrose 5%. 1000 milliLiter(s) (50 mL/Hr) IV Continuous <Continuous>  dextrose 50% Injectable 12.5 Gram(s) IV Push once  dextrose 50% Injectable 25 Gram(s) IV Push once  dextrose 50% Injectable 25 Gram(s) IV Push once  epoetin randy Injectable 83021 Unit(s) IV Push <User Schedule>  gabapentin 100 milliGRAM(s) Oral daily  heparin  Injectable 5000 Unit(s) SubCutaneous every 8 hours  insulin glargine Injectable (LANTUS) 30 Unit(s) SubCutaneous at bedtime  insulin lispro (HumaLOG) corrective regimen sliding scale   SubCutaneous three times a day before meals  insulin lispro (HumaLOG) corrective regimen sliding scale   SubCutaneous at bedtime  insulin lispro Injectable (HumaLOG) 8 Unit(s) SubCutaneous before lunch  insulin lispro Injectable (HumaLOG) 8 Unit(s) SubCutaneous before dinner  lidocaine/prilocaine Cream 1 Application(s) Topical <User Schedule>  metoprolol tartrate 50 milliGRAM(s) Oral two times a day  pantoprazole    Tablet 40 milliGRAM(s) Oral before breakfast  timolol 0.5% Solution 1 Drop(s) Both EYES two times a day  tiotropium 18 MICROgram(s) Capsule 1 Capsule(s) Inhalation daily  vancomycin  IVPB 1000 milliGRAM(s) IV Intermittent once    MEDICATIONS  (PRN):  acetaminophen   Tablet 650 milliGRAM(s) Oral every 6 hours PRN mild moderate and severe pain  dextrose 40% Gel 15 Gram(s) Oral once PRN Blood Glucose LESS THAN 70 milliGRAM(s)/deciliter  furosemide    Tablet 80 milliGRAM(s) Oral <User Schedule> PRN give on days of non HD  glucagon  Injectable 1 milliGRAM(s) IntraMuscular once PRN Glucose LESS THAN 70 milligrams/deciliter  guaiFENesin    Syrup 100 milliGRAM(s) Oral every 6 hours PRN Cough      T(C): 37.6 (07-09-18 @ 15:47), Max: 37.7 (07-09-18 @ 12:47)  HR: 68 (07-09-18 @ 15:47) (68 - 93)  BP: 113/57 (07-09-18 @ 15:47) (113/57 - 160/80)  RR: 18 (07-09-18 @ 15:47) (13 - 20)  SpO2: 95% (07-09-18 @ 05:56) (95% - 100%)    CAPILLARY BLOOD GLUCOSE  POCT Blood Glucose.: 207 mg/dL (09 Jul 2018 13:56)  POCT Blood Glucose.: 190 mg/dL (09 Jul 2018 08:17)  POCT Blood Glucose.: 268 mg/dL (08 Jul 2018 22:16)  POCT Blood Glucose.: 247 mg/dL (08 Jul 2018 18:29)      I&O's Summary    09 Jul 2018 07:01  -  09 Jul 2018 17:31  --------------------------------------------------------  IN: 800 mL / OUT: 3152 mL / NET: -2352 mL    PHYSICAL EXAM:  GENERAL: NAD  HEAD:  Atraumatic, Normocephalic  EYES: EOMI, PERRLA, conjunctiva and sclera clear  NECK: Supple, No JVD  CHEST/LUNG: Clear to auscultation bilaterally; No wheeze  HEART: Regular rate and rhythm; No murmurs, rubs, or gallops  ABDOMEN: Soft, Nontender, Nondistended; Bowel sounds present  EXTREMITIES:   warm and well perfused, No clubbing, cyanosis, or edema  PSYCH: AAOx3  NEUROLOGY: non-focal  SKIN: + permcath looks good c/d/i, R AVF     LABS:                        9.0    9.81  )-----------( 229      ( 09 Jul 2018 05:04 )             30.0     07-09    135  |  95<L>  |  65<H>  ----------------------------<  250<H>  4.4   |  21<L>  |  3.92<H>    Ca    9.2      09 Jul 2018 05:04  Phos  5.4     07-09  Mg     2.1     07-09    TPro  7.3  /  Alb  3.1<L>  /  TBili  0.3  /  DBili  x   /  AST  17  /  ALT  16  /  AlkPhos  110  07-09      Microbiology:   VBG 07-08 @ 10:32  pH: 7.26/pCO2: 67/pO2: 35/HCO3: 25/lactate: 1.2    Blood Cx: Enterococcus       Consultant(s) Notes Reviewed:  renal, ID    Care Discussed with Consultants/Other Providers:

## 2018-07-09 NOTE — PROGRESS NOTE ADULT - SUBJECTIVE AND OBJECTIVE BOX
[facility] - Nicole / Juvenal FENTON /Linette/ ELICEO Isaac/ ELICEO Rojas/ Regan Castanon / TRACY Njeru  ---------------------------------------------------------------------------------------------------------------    Patient seen and examined in HD unit    Subjective and Objective: No overnight events, sob better, on NC 02. No new complaints today.  spoke to HD RN, 7/7 pt was scheduled for hd prior to d/c home but pt refused to come for hd    Allergies: No Known Allergies      Hospital Medications:   MEDICATIONS  (STANDING):  aspirin enteric coated 81 milliGRAM(s) Oral daily  atorvastatin 40 milliGRAM(s) Oral at bedtime  brimonidine 0.2% Ophthalmic Solution 1 Drop(s) Both EYES every 8 hours  buDESOnide  80 MICROgram(s)/formoterol 4.5 MICROgram(s) Inhaler 2 Puff(s) Inhalation two times a day  dextrose 5%. 1000 milliLiter(s) (50 mL/Hr) IV Continuous <Continuous>  dextrose 50% Injectable 12.5 Gram(s) IV Push once  dextrose 50% Injectable 25 Gram(s) IV Push once  dextrose 50% Injectable 25 Gram(s) IV Push once  epoetin randy Injectable 18669 Unit(s) IV Push <User Schedule>  gabapentin 100 milliGRAM(s) Oral daily  heparin  Injectable 5000 Unit(s) SubCutaneous every 8 hours  insulin glargine Injectable (LANTUS) 30 Unit(s) SubCutaneous at bedtime  insulin lispro (HumaLOG) corrective regimen sliding scale   SubCutaneous three times a day before meals  insulin lispro (HumaLOG) corrective regimen sliding scale   SubCutaneous at bedtime  insulin lispro Injectable (HumaLOG) 8 Unit(s) SubCutaneous before lunch  insulin lispro Injectable (HumaLOG) 8 Unit(s) SubCutaneous before dinner  lidocaine/prilocaine Cream 1 Application(s) Topical <User Schedule>  metoprolol tartrate 50 milliGRAM(s) Oral two times a day  pantoprazole    Tablet 40 milliGRAM(s) Oral before breakfast  timolol 0.5% Solution 1 Drop(s) Both EYES two times a day  tiotropium 18 MICROgram(s) Capsule 1 Capsule(s) Inhalation daily      VITALS:  T(F): 99.9 (07-09-18 @ 12:47), Max: 99.9 (07-09-18 @ 12:47)  HR: 72 (07-09-18 @ 12:47)  BP: 156/71 (07-09-18 @ 12:47)  RR: 20 (07-09-18 @ 12:47)  SpO2: 95% (07-09-18 @ 05:56)  Wt(kg): --    Height (cm): 165.1 (07-08 @ 20:12)  Weight (kg): 86.2 (07-08 @ 20:12)  BMI (kg/m2): 31.6 (07-08 @ 20:12)  BSA (m2): 1.94 (07-08 @ 20:12)    PHYSICAL EXAM:  Constitutional: NAD  HEENT: anicteric sclera, oropharynx clear  Neck: No JVD  Respiratory: bibasilar crepts, no wheezes  Cardiovascular: S1, S2, RRR  Gastrointestinal: BS+, soft, NT/ND  Extremities: No cyanosis or clubbing. + peripheral edema  Neurological: A/O x 3, no focal deficits  Psychiatric: Normal mood, normal affect  : No CVA tenderness. No haas.   Skin: No rashes, RFA purpura from infiltration  Vascular Access: RFA AVF+thrill, Rt IJ PC    LABS:  07-09    135  |  95<L>  |  65<H>  ----------------------------<  250<H>  4.4   |  21<L>  |  3.92<H>    Ca    9.2      09 Jul 2018 05:04  Phos  5.4     07-09  Mg     2.1     07-09    TPro  7.3  /  Alb  3.1<L>  /  TBili  0.3  /  DBili      /  AST  17  /  ALT  16  /  AlkPhos  110  07-09    Creatinine Trend: 3.92 <--, 3.94 <--, 2.76 <--, 3.65 <--, 2.72 <--, 3.25 <--, 2.90 <--                        9.0    9.81  )-----------( 229      ( 09 Jul 2018 05:04 )             30.0     Urine Studies:        RADIOLOGY & ADDITIONAL STUDIES:

## 2018-07-09 NOTE — PROGRESS NOTE ADULT - PROBLEM SELECTOR PLAN 1
ID consulted  vanco s/p HD 7, dose vanco again today  repeat blood cultures  TTE pending  Cardio MEMs in place also  TTE, consider JAYSON  attempting to use fistula so permcath can be removed (as it may be source)

## 2018-07-09 NOTE — PROGRESS NOTE ADULT - PROBLEM SELECTOR PLAN 1
canulated AVF w/2 needles for 1st time now using 17G needles, able to achieve only bfr 200ml/min. avoiding to use PC but AVF would need maturation angioplasty once surv bl cxs neg  c/w HD now w/2k bath, uf 2.5-3kg as tolerated  renal diet, fluid restriction 1L/day  will plan for next HD Thursday

## 2018-07-09 NOTE — CONSULT NOTE ADULT - ASSESSMENT
79 y/o F w/ a PMHX of ESRD on HD, COPD on home O2, HTN, chronic diastolic HF who presents as call back for Enterococcus bacteremia. Renal consulted for HD    ESRD on HD outpt TTS. last hd 7/6 per pt, supposed to get HD 7/7 prior to d/c but pt denies it. will clarify w/hd unit tomorrow AM  pulm vas congestion on CXR, not in resp distress. K, bicarb acceptable    HTN, uncontrolled  Anemia in CKD  Enterococcus sps + bl cx 7/7 1/2 bottles afebrile. ?source. clinically no s/o PC tunnel infection or AVF site     labs, chart, rad reviewed
78 F HTN DM2 ESRD on HD diastolic CHF s/p Cardio MEMS implantation, admitted 7/3/18 after missing HD due to fatigue, treated for volume overload with fluid removal at HD, had cultures drawn which are grew Enterococcus, so patient called back for further care.  Patient with enterococcus bacteremia  Appears well with no leukocytosis, no fevers  No abd pain, no symptoms at permacath  Sources--intra-abd vs permacath related vs other occult  Overall, enterococcus bacteremia, ESRD through permacath  - Vancomycin 1g now (post HD), then would continue depending on future HD timing  - F/U BCX, repeat until clear  - Check TTE  - Check CT A/P--rule out abd source  - If bacteremia persists, will need to remove permacath (if signs overt sepsis and instability, would remove permacath urgently)    Willam Nolasco MD  Pager [phone]  After 5pm and on weekends call [phone]

## 2018-07-09 NOTE — PROGRESS NOTE ADULT - SUBJECTIVE AND OBJECTIVE BOX
SUBJECTIVE:  Denies CP or SOB      MEDICATIONS  (STANDING):  aspirin enteric coated 81 milliGRAM(s) Oral daily  atorvastatin 40 milliGRAM(s) Oral at bedtime  brimonidine 0.2% Ophthalmic Solution 1 Drop(s) Both EYES every 8 hours  buDESOnide  80 MICROgram(s)/formoterol 4.5 MICROgram(s) Inhaler 2 Puff(s) Inhalation two times a day  dextrose 5%. 1000 milliLiter(s) (50 mL/Hr) IV Continuous <Continuous>  dextrose 50% Injectable 12.5 Gram(s) IV Push once  dextrose 50% Injectable 25 Gram(s) IV Push once  dextrose 50% Injectable 25 Gram(s) IV Push once  epoetin randy Injectable 90370 Unit(s) IV Push <User Schedule>  gabapentin 100 milliGRAM(s) Oral daily  heparin  Injectable 5000 Unit(s) SubCutaneous every 8 hours  insulin glargine Injectable (LANTUS) 30 Unit(s) SubCutaneous at bedtime  insulin lispro (HumaLOG) corrective regimen sliding scale   SubCutaneous three times a day before meals  insulin lispro (HumaLOG) corrective regimen sliding scale   SubCutaneous at bedtime  insulin lispro Injectable (HumaLOG) 8 Unit(s) SubCutaneous before lunch  insulin lispro Injectable (HumaLOG) 8 Unit(s) SubCutaneous before dinner  lidocaine/prilocaine Cream 1 Application(s) Topical <User Schedule>  metoprolol tartrate 50 milliGRAM(s) Oral two times a day  pantoprazole    Tablet 40 milliGRAM(s) Oral before breakfast  timolol 0.5% Solution 1 Drop(s) Both EYES two times a day  tiotropium 18 MICROgram(s) Capsule 1 Capsule(s) Inhalation daily    MEDICATIONS  (PRN):  acetaminophen   Tablet 650 milliGRAM(s) Oral every 6 hours PRN mild moderate and severe pain  dextrose 40% Gel 15 Gram(s) Oral once PRN Blood Glucose LESS THAN 70 milliGRAM(s)/deciliter  furosemide    Tablet 80 milliGRAM(s) Oral <User Schedule> PRN give on days of non HD  glucagon  Injectable 1 milliGRAM(s) IntraMuscular once PRN Glucose LESS THAN 70 milligrams/deciliter  guaiFENesin    Syrup 100 milliGRAM(s) Oral every 6 hours PRN Cough      LABS:                        9.0    9.81  )-----------( 229      ( 09 Jul 2018 05:04 )             30.0     135  |  95<L>  |  65<H>  ----------------------------<  250<H>  4.4   |  21<L>  |  3.92<H>    Ca    9.2      09 Jul 2018 05:04  Phos  5.4     07-09  Mg     2.1     07-09    TPro  7.3  /  Alb  3.1<L>  /  TBili  0.3  /  DBili  x   /  AST  17  /  ALT  16  /  AlkPhos  110  07-09    Creatinine Trend: 3.92<--, 3.94<--, 2.76<--, 3.65<--, 2.72<--, 3.25<--       PHYSICAL EXAM  Vital Signs Last 24 Hrs  T(C): 37.7 (09 Jul 2018 12:47), Max: 37.7 (09 Jul 2018 12:47)  T(F): 99.9 (09 Jul 2018 12:47), Max: 99.9 (09 Jul 2018 12:47)  HR: 72 (09 Jul 2018 12:47) (72 - 93)  BP: 156/71 (09 Jul 2018 12:47) (137/65 - 160/80)  RR: 20 (09 Jul 2018 12:47) (13 - 20)  SpO2: 95% (09 Jul 2018 05:56) (95% - 100%)    Cardiovascular: Normal S1S2, No JVD, 1/6 HODA,   Respiratory: course breath sounds + rales   Gastrointestinal: Abdomen soft, ND, NT, +BS  Extremities no c/c/e b/l le's   Peripheral pulses palpable 2+ B/L    DIAGNOSTIC DATA    TTE:  Transthoracic Echocardiogram (05.08.18 @ 07:08) >  ------------------------------------------------------------------------  CONCLUSIONS: EF 5%%  1.  Peak mitral valve gradient equals 16 mm Hg, mean  transmitral valve gradient equals 7.5 mm Hg, consistent  with moderate mitral stenosis.  2. Moderate left atrial enlargement.  3. Moderate concentric left ventricular hypertrophy.  4. Normal Left Ventricular Systolic Function,  (EF = 55 to  60%)  5. Grade II diastolic dysfunction.  6. RV systolic pressure is moderately increased at  49 mm  Hg.  7. There is mild-moderate tricuspid regurgitation.    < end of copied text >    NST:   < from: Nuclear Stress Test-Pharmacologic (12.01.17 @ 08:43) >  IMPRESSIONS:Normal Study  * Negative ECG evidence of ischemia after IV  administartion of Regadenoson.  * Myocardial Perfusion SPECT results are normal.  * No clear evidence of ischemia or infarct.  * Post-stress resting myocardial perfusion gated SPECT  imaging was performed (LVEF = 53 %) with no regional wall  motion abnormalities.    < end of copied text >    RADIOLOGY:    < from: Xray Chest 1 View- PORTABLE-Urgent (07.03.18 @ 12:43) >    IMPRESSION:  Mild interstitial edema.         ASSESSMENT AND PLAN: 79y/o  Female well known to our service with HTN DM ESRD on HD  diastolic CHF s/p Cardio MEMS implantation, asthma, anemia (on weekly procrit), breast CA s/p Left lumpectomy and radiation in 2005, uterine CA s/p JACLYN,  with no known h/o CAD/MI with no ischemia on NST 12/17 at [facility], with recent TTE 5/18 with normal LV function, moderate LVH with mod mitral stenosis,  recently admitted with respiratory failure 2/2 PNA 5/18 now readmitted with recurrent non productive cough, progressively worsening dyspnea (there is a chronic component though she states its worse in the last several days) and associated weakness. now re-admitted with Enterococcus bacteremia    --Abx per ID/primary team.  F/U ID if any further cardiac work up needed.  -- elevated high sensitivity troponin non specific given ESRD with no acute ischemia on EKG  -- CPK /CKMB - negative   -- recent TTE with normal LV function and moderate mitral stenosis  -- recent NST with no evidence of ischemia  -- Mild acute on chronic diastolic CHF exacerbation         - c/w Lasix and HD per renal for fluid removal   -- c/w asa / statin / bb / Lasix  -- would interrogate cardio mems on this admission  --f/u Dr Salas in 2 weeks    Smita Solis PA-C

## 2018-07-09 NOTE — PROGRESS NOTE ADULT - PROBLEM SELECTOR PLAN 4
c/w lantus 30  c/w lispro 8 w/ lunch and dinner  c/w AISHA Remains poorly controlled   c/w lantus 30  c/w lispro 8 w/ lunch and dinner  add 5 w/ breakfast   c/w AISHA

## 2018-07-10 LAB
-  AMPICILLIN: SIGNIFICANT CHANGE UP
-  GENTAMICIN 500: SIGNIFICANT CHANGE UP
-  STREPTOMYCIN 1000: SIGNIFICANT CHANGE UP
-  VANCOMYCIN: SIGNIFICANT CHANGE UP
ALBUMIN SERPL ELPH-MCNC: 3.2 G/DL — LOW (ref 3.3–5)
ALP SERPL-CCNC: 95 U/L — SIGNIFICANT CHANGE UP (ref 40–120)
ALT FLD-CCNC: 17 U/L — SIGNIFICANT CHANGE UP (ref 4–33)
AST SERPL-CCNC: 20 U/L — SIGNIFICANT CHANGE UP (ref 4–32)
BACTERIA BLD CULT: SIGNIFICANT CHANGE UP
BASOPHILS # BLD AUTO: 0.05 K/UL — SIGNIFICANT CHANGE UP (ref 0–0.2)
BASOPHILS NFR BLD AUTO: 0.5 % — SIGNIFICANT CHANGE UP (ref 0–2)
BILIRUB SERPL-MCNC: 0.4 MG/DL — SIGNIFICANT CHANGE UP (ref 0.2–1.2)
BUN SERPL-MCNC: 45 MG/DL — HIGH (ref 7–23)
CALCIUM SERPL-MCNC: 8.8 MG/DL — SIGNIFICANT CHANGE UP (ref 8.4–10.5)
CHLORIDE SERPL-SCNC: 95 MMOL/L — LOW (ref 98–107)
CO2 SERPL-SCNC: 27 MMOL/L — SIGNIFICANT CHANGE UP (ref 22–31)
CREAT SERPL-MCNC: 3.33 MG/DL — HIGH (ref 0.5–1.3)
ENTEROCOC DNA BLD POS QL NAA+NON-PROBE: SIGNIFICANT CHANGE UP
EOSINOPHIL # BLD AUTO: 0.38 K/UL — SIGNIFICANT CHANGE UP (ref 0–0.5)
EOSINOPHIL NFR BLD AUTO: 3.9 % — SIGNIFICANT CHANGE UP (ref 0–6)
GLUCOSE SERPL-MCNC: 146 MG/DL — HIGH (ref 70–99)
HCT VFR BLD CALC: 31.8 % — LOW (ref 34.5–45)
HGB BLD-MCNC: 9.5 G/DL — LOW (ref 11.5–15.5)
IMM GRANULOCYTES # BLD AUTO: 0.08 # — SIGNIFICANT CHANGE UP
IMM GRANULOCYTES NFR BLD AUTO: 0.8 % — SIGNIFICANT CHANGE UP (ref 0–1.5)
LYMPHOCYTES # BLD AUTO: 1.51 K/UL — SIGNIFICANT CHANGE UP (ref 1–3.3)
LYMPHOCYTES # BLD AUTO: 15.3 % — SIGNIFICANT CHANGE UP (ref 13–44)
MAGNESIUM SERPL-MCNC: 2 MG/DL — SIGNIFICANT CHANGE UP (ref 1.6–2.6)
MCHC RBC-ENTMCNC: 26.9 PG — LOW (ref 27–34)
MCHC RBC-ENTMCNC: 29.9 % — LOW (ref 32–36)
MCV RBC AUTO: 90.1 FL — SIGNIFICANT CHANGE UP (ref 80–100)
METHOD TYPE: SIGNIFICANT CHANGE UP
MONOCYTES # BLD AUTO: 0.72 K/UL — SIGNIFICANT CHANGE UP (ref 0–0.9)
MONOCYTES NFR BLD AUTO: 7.3 % — SIGNIFICANT CHANGE UP (ref 2–14)
NEUTROPHILS # BLD AUTO: 7.11 K/UL — SIGNIFICANT CHANGE UP (ref 1.8–7.4)
NEUTROPHILS NFR BLD AUTO: 72.2 % — SIGNIFICANT CHANGE UP (ref 43–77)
NRBC # FLD: 0.06 — SIGNIFICANT CHANGE UP
ORGANISM # SPEC MICROSCOPIC CNT: SIGNIFICANT CHANGE UP
PHOSPHATE SERPL-MCNC: 4.1 MG/DL — SIGNIFICANT CHANGE UP (ref 2.5–4.5)
PLATELET # BLD AUTO: 217 K/UL — SIGNIFICANT CHANGE UP (ref 150–400)
PMV BLD: 10.7 FL — SIGNIFICANT CHANGE UP (ref 7–13)
POTASSIUM SERPL-MCNC: 4.2 MMOL/L — SIGNIFICANT CHANGE UP (ref 3.5–5.3)
POTASSIUM SERPL-SCNC: 4.2 MMOL/L — SIGNIFICANT CHANGE UP (ref 3.5–5.3)
PROT SERPL-MCNC: 7.2 G/DL — SIGNIFICANT CHANGE UP (ref 6–8.3)
RBC # BLD: 3.53 M/UL — LOW (ref 3.8–5.2)
RBC # FLD: 17.1 % — HIGH (ref 10.3–14.5)
SODIUM SERPL-SCNC: 135 MMOL/L — SIGNIFICANT CHANGE UP (ref 135–145)
WBC # BLD: 9.85 K/UL — SIGNIFICANT CHANGE UP (ref 3.8–10.5)
WBC # FLD AUTO: 9.85 K/UL — SIGNIFICANT CHANGE UP (ref 3.8–10.5)

## 2018-07-10 PROCEDURE — 93306 TTE W/DOPPLER COMPLETE: CPT | Mod: 26

## 2018-07-10 PROCEDURE — 99233 SBSQ HOSP IP/OBS HIGH 50: CPT

## 2018-07-10 PROCEDURE — 99232 SBSQ HOSP IP/OBS MODERATE 35: CPT

## 2018-07-10 RX ORDER — INSULIN GLARGINE 100 [IU]/ML
25 INJECTION, SOLUTION SUBCUTANEOUS AT BEDTIME
Qty: 0 | Refills: 0 | Status: DISCONTINUED | OUTPATIENT
Start: 2018-07-10 | End: 2018-07-11

## 2018-07-10 RX ORDER — AMPICILLIN TRIHYDRATE 250 MG
2 CAPSULE ORAL EVERY 12 HOURS
Qty: 0 | Refills: 0 | Status: DISCONTINUED | OUTPATIENT
Start: 2018-07-10 | End: 2018-07-13

## 2018-07-10 RX ADMIN — Medication 1: at 14:02

## 2018-07-10 RX ADMIN — TIOTROPIUM BROMIDE 1 CAPSULE(S): 18 CAPSULE ORAL; RESPIRATORY (INHALATION) at 08:48

## 2018-07-10 RX ADMIN — Medication 1 DROP(S): at 05:23

## 2018-07-10 RX ADMIN — HEPARIN SODIUM 5000 UNIT(S): 5000 INJECTION INTRAVENOUS; SUBCUTANEOUS at 14:03

## 2018-07-10 RX ADMIN — Medication 1 DROP(S): at 17:24

## 2018-07-10 RX ADMIN — Medication 8 UNIT(S): at 14:02

## 2018-07-10 RX ADMIN — BUDESONIDE AND FORMOTEROL FUMARATE DIHYDRATE 2 PUFF(S): 160; 4.5 AEROSOL RESPIRATORY (INHALATION) at 10:34

## 2018-07-10 RX ADMIN — Medication 100 MILLIGRAM(S): at 23:31

## 2018-07-10 RX ADMIN — Medication 100 MILLIGRAM(S): at 17:24

## 2018-07-10 RX ADMIN — GABAPENTIN 100 MILLIGRAM(S): 400 CAPSULE ORAL at 14:02

## 2018-07-10 RX ADMIN — BRIMONIDINE TARTRATE 1 DROP(S): 2 SOLUTION/ DROPS OPHTHALMIC at 05:23

## 2018-07-10 RX ADMIN — BRIMONIDINE TARTRATE 1 DROP(S): 2 SOLUTION/ DROPS OPHTHALMIC at 21:26

## 2018-07-10 RX ADMIN — Medication 5 UNIT(S): at 08:45

## 2018-07-10 RX ADMIN — Medication 216 GRAM(S): at 17:23

## 2018-07-10 RX ADMIN — BUDESONIDE AND FORMOTEROL FUMARATE DIHYDRATE 2 PUFF(S): 160; 4.5 AEROSOL RESPIRATORY (INHALATION) at 21:26

## 2018-07-10 RX ADMIN — ATORVASTATIN CALCIUM 40 MILLIGRAM(S): 80 TABLET, FILM COATED ORAL at 21:26

## 2018-07-10 RX ADMIN — INSULIN GLARGINE 25 UNIT(S): 100 INJECTION, SOLUTION SUBCUTANEOUS at 21:26

## 2018-07-10 RX ADMIN — PANTOPRAZOLE SODIUM 40 MILLIGRAM(S): 20 TABLET, DELAYED RELEASE ORAL at 05:23

## 2018-07-10 RX ADMIN — HEPARIN SODIUM 5000 UNIT(S): 5000 INJECTION INTRAVENOUS; SUBCUTANEOUS at 05:23

## 2018-07-10 RX ADMIN — Medication 81 MILLIGRAM(S): at 14:02

## 2018-07-10 RX ADMIN — Medication 100 MILLIGRAM(S): at 05:25

## 2018-07-10 RX ADMIN — BRIMONIDINE TARTRATE 1 DROP(S): 2 SOLUTION/ DROPS OPHTHALMIC at 14:03

## 2018-07-10 RX ADMIN — HEPARIN SODIUM 5000 UNIT(S): 5000 INJECTION INTRAVENOUS; SUBCUTANEOUS at 21:27

## 2018-07-10 RX ADMIN — Medication 8 UNIT(S): at 17:24

## 2018-07-10 RX ADMIN — Medication 50 MILLIGRAM(S): at 17:26

## 2018-07-10 NOTE — PROGRESS NOTE ADULT - PROBLEM SELECTOR PLAN 1
E faecalis bacteremia from July 7 both bottles, amp sensitive, repeat cultures on 7/8 + 2/4 bottles (however 7/7 culture took very long to grow)  - ID consult appreciated   - per ID change from vanco to ampicillin   - repeat blood cultures 7/10 in lab, will order repeats in am  - TTE w/ no change  - Cardio MEMs in place also, unclear if any relevance   - given clinical stability, line removal not urgent, will d/w IR in am re: removal and possible angioplasty of AVF given slow flow at HD (first used 2 cannulations on 7/9)--NPO at mn incase IR has availability tomorrow   - CT abdomen/pelvis per ID, will do chest as patient with persistent cough

## 2018-07-10 NOTE — PROGRESS NOTE ADULT - PROBLEM SELECTOR PLAN 4
BG much improved today  - c/w lantus 30--> decrease to 25 units for NPO status  - c/w lispro 8 w/ lunch and dinner  - c/w lispro 5 w/ breakfast   - c/w AISHA

## 2018-07-10 NOTE — PROGRESS NOTE ADULT - SUBJECTIVE AND OBJECTIVE BOX
[facility] - Nicole / Juvenal FENTON /Linette/ ELICEO Isaac/ ELICEO Rojas/ Regan Castanon / TRACY Ryderu  ---------------------------------------------------------------------------------------------------------------  seen and examined today for ESRD on HD, concerned for infected PC  Interval : No signs of acute infection at this time but tolerated HD via AVF yesterday  VITALS:  T(F): 99 (07-10-18 @ 05:21), Max: 99.7 (07-09-18 @ 15:47)  HR: 87 (07-10-18 @ 05:21)  BP: 98/52 (07-10-18 @ 05:21)  RR: 18 (07-10-18 @ 05:21)  SpO2: 94% (07-10-18 @ 05:21)  Wt(kg): --    07-09 @ 07:01  -  07-10 @ 07:00  --------------------------------------------------------  IN: 800 mL / OUT: 3152 mL / NET: -2352 mL      Physical Exam :-  Constitutional: NAD  Neck: Supple.  Respiratory: Bilateral equal breath sounds,  Crackles present.  Cardiovascular: S1, S2 normal, positive Murmur  Gastrointestinal: Bowel Sounds present, soft, non tender.  Extremities: no Edema Feet  Neurological: Alert and Oriented x 3, no focal deficits  Psychiatric: Normal mood, normal affect  Data:-  Allergies :   No Known Allergies    Hospital Medications:   MEDICATIONS  (STANDING):  aspirin enteric coated 81 milliGRAM(s) Oral daily  atorvastatin 40 milliGRAM(s) Oral at bedtime  brimonidine 0.2% Ophthalmic Solution 1 Drop(s) Both EYES every 8 hours  buDESOnide  80 MICROgram(s)/formoterol 4.5 MICROgram(s) Inhaler 2 Puff(s) Inhalation two times a day  dextrose 5%. 1000 milliLiter(s) (50 mL/Hr) IV Continuous <Continuous>  dextrose 50% Injectable 12.5 Gram(s) IV Push once  dextrose 50% Injectable 25 Gram(s) IV Push once  dextrose 50% Injectable 25 Gram(s) IV Push once  epoetin randy Injectable 01954 Unit(s) IV Push <User Schedule>  furosemide    Tablet 80 milliGRAM(s) Oral <User Schedule>  gabapentin 100 milliGRAM(s) Oral daily  guaiFENesin    Syrup 100 milliGRAM(s) Oral every 6 hours  heparin  Injectable 5000 Unit(s) SubCutaneous every 8 hours  insulin glargine Injectable (LANTUS) 30 Unit(s) SubCutaneous at bedtime  insulin lispro (HumaLOG) corrective regimen sliding scale   SubCutaneous three times a day before meals  insulin lispro (HumaLOG) corrective regimen sliding scale   SubCutaneous at bedtime  insulin lispro Injectable (HumaLOG) 8 Unit(s) SubCutaneous before lunch  insulin lispro Injectable (HumaLOG) 8 Unit(s) SubCutaneous before dinner  insulin lispro Injectable (HumaLOG) 5 Unit(s) SubCutaneous before breakfast  lidocaine/prilocaine Cream 1 Application(s) Topical <User Schedule>  metoprolol tartrate 50 milliGRAM(s) Oral two times a day  pantoprazole    Tablet 40 milliGRAM(s) Oral before breakfast  timolol 0.5% Solution 1 Drop(s) Both EYES two times a day  tiotropium 18 MICROgram(s) Capsule 1 Capsule(s) Inhalation daily    07-10    135  |  95<L>  |  45<H>  ----------------------------<  146<H>  4.2   |  27  |  3.33<H>    Ca    8.8      10 Jul 2018 06:39  Phos  4.1     07-10  Mg     2.0     07-10    TPro  7.2  /  Alb  3.2<L>  /  TBili  0.4  /  DBili      /  AST  20  /  ALT  17  /  AlkPhos  95  07-10    Creatinine Trend: 3.33 <--, 3.92 <--, 3.94 <--, 2.76 <--, 3.65 <--, 2.72 <--, 3.25 <--                        9.5    9.85  )-----------( 217      ( 10 Jul 2018 06:39 )             31.8

## 2018-07-10 NOTE — PROGRESS NOTE ADULT - SUBJECTIVE AND OBJECTIVE BOX
SUBJECTIVE:  Denies CP or SOB      MEDICATIONS  (STANDING):  aspirin enteric coated 81 milliGRAM(s) Oral daily  atorvastatin 40 milliGRAM(s) Oral at bedtime  brimonidine 0.2% Ophthalmic Solution 1 Drop(s) Both EYES every 8 hours  buDESOnide  80 MICROgram(s)/formoterol 4.5 MICROgram(s) Inhaler 2 Puff(s) Inhalation two times a day  epoetin randy Injectable 78094 Unit(s) IV Push <User Schedule>  furosemide    Tablet 80 milliGRAM(s) Oral <User Schedule>  gabapentin 100 milliGRAM(s) Oral daily  guaiFENesin    Syrup 100 milliGRAM(s) Oral every 6 hours  heparin  Injectable 5000 Unit(s) SubCutaneous every 8 hours  insulin glargine Injectable (LANTUS) 30 Unit(s) SubCutaneous at bedtime  insulin lispro (HumaLOG) corrective regimen sliding scale   SubCutaneous three times a day before meals  lidocaine/prilocaine Cream 1 Application(s) Topical <User Schedule>  metoprolol tartrate 50 milliGRAM(s) Oral two times a day  pantoprazole    Tablet 40 milliGRAM(s) Oral before breakfast  timolol 0.5% Solution 1 Drop(s) Both EYES two times a day  tiotropium 18 MICROgram(s) Capsule 1 Capsule(s) Inhalation daily    MEDICATIONS  (PRN):  acetaminophen   Tablet 650 milliGRAM(s) Oral every 6 hours PRN mild moderate and severe pain  dextrose 40% Gel 15 Gram(s) Oral once PRN Blood Glucose LESS THAN 70 milliGRAM(s)/deciliter  glucagon  Injectable 1 milliGRAM(s) IntraMuscular once PRN Glucose LESS THAN 70 milligrams/deciliter      LABS:                        9.5    9.85  )-----------( 217      ( 10 Jul 2018 06:39 )             31.8     135  |  95<L>  |  45<H>  ----------------------------<  146<H>  4.2   |  27  |  3.33<H>    Ca    8.8      10 Jul 2018 06:39  Phos  4.1     07-10  Mg     2.0     07-10    TPro  7.2  /  Alb  3.2<L>  /  TBili  0.4  /  DBili  x   /  AST  20  /  ALT  17  /  AlkPhos  95  07-10    Creatinine Trend: 3.33<--, 3.92<--, 3.94<--, 2.76<--, 3.65<--, 2.72<--    PHYSICAL EXAM  Vital Signs Last 24 Hrs  T(C): 37.2 (10 Jul 2018 05:21), Max: 37.6 (09 Jul 2018 15:47)  T(F): 99 (10 Jul 2018 05:21), Max: 99.7 (09 Jul 2018 15:47)  HR: 87 (10 Jul 2018 05:21) (68 - 92)  BP: 98/52 (10 Jul 2018 05:21) (98/52 - 133/60)  RR: 18 (10 Jul 2018 05:21) (18 - 18)  SpO2: 94% (10 Jul 2018 05:21) (93% - 94%)    Cardiovascular: Normal S1S2, No JVD, 1/6 HODA,   Respiratory: course breath sounds + rales   Gastrointestinal: Abdomen soft, ND, NT, +BS  Extremities no c/c/e b/l le's   Peripheral pulses palpable 2+ B/L    DIAGNOSTIC DATA    TTE:  Transthoracic Echocardiogram (05.08.18 @ 07:08) >  ------------------------------------------------------------------------  CONCLUSIONS: EF 5%%  1.  Peak mitral valve gradient equals 16 mm Hg, mean  transmitral valve gradient equals 7.5 mm Hg, consistent  with moderate mitral stenosis.  2. Moderate left atrial enlargement.  3. Moderate concentric left ventricular hypertrophy.  4. Normal Left Ventricular Systolic Function,  (EF = 55 to  60%)  5. Grade II diastolic dysfunction.  6. RV systolic pressure is moderately increased at  49 mm  Hg.  7. There is mild-moderate tricuspid regurgitation.    < end of copied text >    NST:   < from: Nuclear Stress Test-Pharmacologic (12.01.17 @ 08:43) >  IMPRESSIONS:Normal Study  * Negative ECG evidence of ischemia after IV  administartion of Regadenoson.  * Myocardial Perfusion SPECT results are normal.  * No clear evidence of ischemia or infarct.  * Post-stress resting myocardial perfusion gated SPECT  imaging was performed (LVEF = 53 %) with no regional wall  motion abnormalities.    < end of copied text >    RADIOLOGY:    < from: Xray Chest 1 View- PORTABLE-Urgent (07.03.18 @ 12:43) >    IMPRESSION:  Mild interstitial edema.         ASSESSMENT AND PLAN: 77y/o  Female well known to our service with HTN DM ESRD on HD  diastolic CHF s/p Cardio MEMS implantation, asthma, anemia (on weekly procrit), breast CA s/p Left lumpectomy and radiation in 2005, uterine CA s/p JACLYN,  with no known h/o CAD/MI with no ischemia on NST 12/17 at [facility], with recent TTE 5/18 with normal LV function, moderate LVH with mod mitral stenosis,  recently admitted with respiratory failure 2/2 PNA 5/18 now readmitted with recurrent non productive cough, progressively worsening dyspnea (there is a chronic component though she states its worse in the last several days) and associated weakness. now re-admitted with Enterococcus bacteremia    --Abx per ID/primary team.  F/U ID if any further cardiac work up needed.  -- elevated high sensitivity troponin non specific given ESRD with no acute ischemia on EKG  -- CPK /CKMB - negative   -- recent TTE with normal LV function and moderate mitral stenosis  -- recent NST with no evidence of ischemia  -- Mild acute on chronic diastolic CHF exacerbation         - c/w Lasix and HD per renal for fluid removal   -- c/w asa / statin / bb / Lasix  --We will call for interrogation of cardiomems on this admission  --f/u Dr Salas in 2 weeks    Smita Solis PA-C

## 2018-07-10 NOTE — PROGRESS NOTE ADULT - SUBJECTIVE AND OBJECTIVE BOX
Patient is a 78y old  Female who presents with a chief complaint of positive cx    SUBJECTIVE / OVERNIGHT EVENTS:    Complains of nonproductive cough  No CP, no SOB (although was placed on O2 yesterday)  States wants to go home so she can sleep better  Tolerating diet  no nausea/vomiting    MEDICATIONS  (STANDING):  ampicillin  IVPB 2 Gram(s) IV Intermittent every 12 hours  aspirin enteric coated 81 milliGRAM(s) Oral daily  atorvastatin 40 milliGRAM(s) Oral at bedtime  brimonidine 0.2% Ophthalmic Solution 1 Drop(s) Both EYES every 8 hours  buDESOnide  80 MICROgram(s)/formoterol 4.5 MICROgram(s) Inhaler 2 Puff(s) Inhalation two times a day  dextrose 5%. 1000 milliLiter(s) (50 mL/Hr) IV Continuous <Continuous>  dextrose 50% Injectable 12.5 Gram(s) IV Push once  dextrose 50% Injectable 25 Gram(s) IV Push once  dextrose 50% Injectable 25 Gram(s) IV Push once  epoetin randy Injectable 58800 Unit(s) IV Push <User Schedule>  furosemide    Tablet 80 milliGRAM(s) Oral <User Schedule>  gabapentin 100 milliGRAM(s) Oral daily  guaiFENesin    Syrup 100 milliGRAM(s) Oral every 6 hours  heparin  Injectable 5000 Unit(s) SubCutaneous every 8 hours  insulin glargine Injectable (LANTUS) 30 Unit(s) SubCutaneous at bedtime  insulin lispro (HumaLOG) corrective regimen sliding scale   SubCutaneous three times a day before meals  insulin lispro (HumaLOG) corrective regimen sliding scale   SubCutaneous at bedtime  insulin lispro Injectable (HumaLOG) 8 Unit(s) SubCutaneous before lunch  insulin lispro Injectable (HumaLOG) 8 Unit(s) SubCutaneous before dinner  insulin lispro Injectable (HumaLOG) 5 Unit(s) SubCutaneous before breakfast  lidocaine/prilocaine Cream 1 Application(s) Topical <User Schedule>  metoprolol tartrate 50 milliGRAM(s) Oral two times a day  pantoprazole    Tablet 40 milliGRAM(s) Oral before breakfast  timolol 0.5% Solution 1 Drop(s) Both EYES two times a day  tiotropium 18 MICROgram(s) Capsule 1 Capsule(s) Inhalation daily    MEDICATIONS  (PRN):  acetaminophen   Tablet 650 milliGRAM(s) Oral every 6 hours PRN mild moderate and severe pain  dextrose 40% Gel 15 Gram(s) Oral once PRN Blood Glucose LESS THAN 70 milliGRAM(s)/deciliter  glucagon  Injectable 1 milliGRAM(s) IntraMuscular once PRN Glucose LESS THAN 70 milligrams/deciliter    T(C): 37.2 (07-10-18 @ 15:48), Max: 37.2 (07-10-18 @ 05:21)  HR: 80 (07-10-18 @ 17:26) (80 - 92)  BP: 145/59 (07-10-18 @ 17:26) (98/52 - 145/59)  RR: 18 (07-10-18 @ 15:48) (18 - 18)  SpO2: 93% (07-10-18 @ 15:48) (93% - 94%)    CAPILLARY BLOOD GLUCOSE  POCT Blood Glucose.: 150 mg/dL (10 Jul 2018 17:21)  POCT Blood Glucose.: 157 mg/dL (10 Jul 2018 13:59)  POCT Blood Glucose.: 132 mg/dL (10 Jul 2018 08:33)  POCT Blood Glucose.: 254 mg/dL (09 Jul 2018 22:12)      I&O's Summary    09 Jul 2018 07:01  -  10 Jul 2018 07:00  --------------------------------------------------------  IN: 800 mL / OUT: 3152 mL / NET: -2352 mL    PHYSICAL EXAM:  GENERAL: NAD  HEAD:  Atraumatic, Normocephalic  EYES: EOMI, PERRLA, conjunctiva and sclera clear  NECK: Supple, No JVD  CHEST/LUNG: Clear to auscultation bilaterally; No wheeze  HEART: Regular rate and rhythm; No murmurs, rubs, or gallops  ABDOMEN: Soft, Nontender, Nondistended; Bowel sounds present  EXTREMITIES:   warm and well perfused, No clubbing, cyanosis, or edema  PSYCH: AAOx3  NEUROLOGY: non-focal  SKIN: + permcath looks good c/d/i, R AVF     LABS:                        9.5    9.85  )-----------( 217      ( 10 Jul 2018 06:39 )             31.8     07-10    135  |  95<L>  |  45<H>  ----------------------------<  146<H>  4.2   |  27  |  3.33<H>    Ca    8.8      10 Jul 2018 06:39  Phos  4.1     07-10  Mg     2.0     07-10    TPro  7.2  /  Alb  3.2<L>  /  TBili  0.4  /  DBili  x   /  AST  20  /  ALT  17  /  AlkPhos  95  07-10      Consultant(s) Notes Reviewed:  ID, renal, cards     Care Discussed with Consultants/Other Providers:

## 2018-07-10 NOTE — PROGRESS NOTE ADULT - PROBLEM SELECTOR PLAN 1
canulated AVF w/2 needles for 1st time now using 17G needles, able to achieve only bfr 200ml/min.   Should have PC removed  Please call Interventional Radiology, patient will need angioplasty of AVF to speed maturation, ideally to be scheduled S/P HD to leave patient off HD for 2-3 days to aid in AVF recovery  renal diet, fluid restriction 1L/day  will plan for next HD Thursday

## 2018-07-10 NOTE — PROGRESS NOTE ADULT - SUBJECTIVE AND OBJECTIVE BOX
CC: F/U for Bacteremia    Saw/spoke to patient. No fevers, no chills. Appears well. No new complaints.    Allergies  No Known Allergies    ANTIMICROBIALS:  ampicillin  IVPB 2 every 12 hours    PE:    Vital Signs Last 24 Hrs  T(C): 37.2 (10 Jul 2018 15:48), Max: 37.2 (10 Jul 2018 05:21)  T(F): 99 (10 Jul 2018 15:48), Max: 99 (10 Jul 2018 05:21)  HR: 84 (10 Jul 2018 15:48) (84 - 92)  BP: 144/63 (10 Jul 2018 15:48) (98/52 - 144/63)  RR: 18 (10 Jul 2018 15:48) (18 - 18)  SpO2: 93% (10 Jul 2018 15:48) (93% - 94%)    Gen: AOx3, NAD, non-toxic, pleasant  CV: S1+S2 normal, no murmurs, nontachycardic  Resp: Clear bilat, no resp distress, no crackles/wheezes  Abd: Soft, nontender, +BS  Ext: No LE edema, no wounds  Lines: R sided permacath; RUE fistula    LABS:                        9.5    9.85  )-----------( 217      ( 10 Jul 2018 06:39 )             31.8     07-10    135  |  95<L>  |  45<H>  ----------------------------<  146<H>  4.2   |  27  |  3.33<H>    Ca    8.8      10 Jul 2018 06:39  Phos  4.1     07-10  Mg     2.0     07-10    TPro  7.2  /  Alb  3.2<L>  /  TBili  0.4  /  DBili  x   /  AST  20  /  ALT  17  /  AlkPhos  95  07-10    MICROBIOLOGY:    BLOOD PERIPHERAL  07-08-18 NGTD    BLOOD VENOUS  07-08-18 --  --  BLOOD CULTURE PCR  Enterococcus faecalis    BLOOD VENOUS  07-07-18 --  --  BLOOD CULTURE PCR  Enterococcus faecalis    (otherwise reviewed)    RADIOLOGY:    No new available

## 2018-07-11 DIAGNOSIS — J44.9 CHRONIC OBSTRUCTIVE PULMONARY DISEASE, UNSPECIFIED: ICD-10-CM

## 2018-07-11 DIAGNOSIS — E04.1 NONTOXIC SINGLE THYROID NODULE: ICD-10-CM

## 2018-07-11 DIAGNOSIS — M81.0 AGE-RELATED OSTEOPOROSIS WITHOUT CURRENT PATHOLOGICAL FRACTURE: ICD-10-CM

## 2018-07-11 LAB
-  AMPICILLIN: SIGNIFICANT CHANGE UP
-  GENTAMICIN 500: SIGNIFICANT CHANGE UP
-  LINEZOLID: SIGNIFICANT CHANGE UP
-  STREPTOMYCIN 1000: SIGNIFICANT CHANGE UP
-  VANCOMYCIN: SIGNIFICANT CHANGE UP
ALBUMIN SERPL ELPH-MCNC: 3.1 G/DL — LOW (ref 3.3–5)
ALP SERPL-CCNC: 85 U/L — SIGNIFICANT CHANGE UP (ref 40–120)
ALT FLD-CCNC: 18 U/L — SIGNIFICANT CHANGE UP (ref 4–33)
APTT BLD: 33.6 SEC — SIGNIFICANT CHANGE UP (ref 27.5–37.4)
AST SERPL-CCNC: 21 U/L — SIGNIFICANT CHANGE UP (ref 4–32)
BACTERIA BLD CULT: SIGNIFICANT CHANGE UP
BACTERIA BLD CULT: SIGNIFICANT CHANGE UP
BASOPHILS # BLD AUTO: 0.02 K/UL — SIGNIFICANT CHANGE UP (ref 0–0.2)
BASOPHILS NFR BLD AUTO: 0.2 % — SIGNIFICANT CHANGE UP (ref 0–2)
BILIRUB SERPL-MCNC: 0.3 MG/DL — SIGNIFICANT CHANGE UP (ref 0.2–1.2)
BUN SERPL-MCNC: 63 MG/DL — HIGH (ref 7–23)
CALCIUM SERPL-MCNC: 9 MG/DL — SIGNIFICANT CHANGE UP (ref 8.4–10.5)
CHLORIDE SERPL-SCNC: 98 MMOL/L — SIGNIFICANT CHANGE UP (ref 98–107)
CO2 SERPL-SCNC: 26 MMOL/L — SIGNIFICANT CHANGE UP (ref 22–31)
CREAT SERPL-MCNC: 3.89 MG/DL — HIGH (ref 0.5–1.3)
ENTEROCOC DNA BLD POS QL NAA+NON-PROBE: SIGNIFICANT CHANGE UP
EOSINOPHIL # BLD AUTO: 0.34 K/UL — SIGNIFICANT CHANGE UP (ref 0–0.5)
EOSINOPHIL NFR BLD AUTO: 3.4 % — SIGNIFICANT CHANGE UP (ref 0–6)
GLUCOSE SERPL-MCNC: 104 MG/DL — HIGH (ref 70–99)
HCT VFR BLD CALC: 31 % — LOW (ref 34.5–45)
HGB BLD-MCNC: 9.6 G/DL — LOW (ref 11.5–15.5)
IMM GRANULOCYTES # BLD AUTO: 0.05 # — SIGNIFICANT CHANGE UP
IMM GRANULOCYTES NFR BLD AUTO: 0.5 % — SIGNIFICANT CHANGE UP (ref 0–1.5)
INR BLD: 1.11 — SIGNIFICANT CHANGE UP (ref 0.88–1.17)
LYMPHOCYTES # BLD AUTO: 1.48 K/UL — SIGNIFICANT CHANGE UP (ref 1–3.3)
LYMPHOCYTES # BLD AUTO: 14.9 % — SIGNIFICANT CHANGE UP (ref 13–44)
MCHC RBC-ENTMCNC: 26.7 PG — LOW (ref 27–34)
MCHC RBC-ENTMCNC: 31 % — LOW (ref 32–36)
MCV RBC AUTO: 86.4 FL — SIGNIFICANT CHANGE UP (ref 80–100)
METHOD TYPE: SIGNIFICANT CHANGE UP
MONOCYTES # BLD AUTO: 0.73 K/UL — SIGNIFICANT CHANGE UP (ref 0–0.9)
MONOCYTES NFR BLD AUTO: 7.4 % — SIGNIFICANT CHANGE UP (ref 2–14)
NEUTROPHILS # BLD AUTO: 7.29 K/UL — SIGNIFICANT CHANGE UP (ref 1.8–7.4)
NEUTROPHILS NFR BLD AUTO: 73.6 % — SIGNIFICANT CHANGE UP (ref 43–77)
NRBC # FLD: 0 — SIGNIFICANT CHANGE UP
ORGANISM # SPEC MICROSCOPIC CNT: SIGNIFICANT CHANGE UP
PLATELET # BLD AUTO: 227 K/UL — SIGNIFICANT CHANGE UP (ref 150–400)
PMV BLD: 10.7 FL — SIGNIFICANT CHANGE UP (ref 7–13)
POTASSIUM SERPL-MCNC: 4.3 MMOL/L — SIGNIFICANT CHANGE UP (ref 3.5–5.3)
POTASSIUM SERPL-SCNC: 4.3 MMOL/L — SIGNIFICANT CHANGE UP (ref 3.5–5.3)
PROT SERPL-MCNC: 7.1 G/DL — SIGNIFICANT CHANGE UP (ref 6–8.3)
PROTHROM AB SERPL-ACNC: 12.3 SEC — SIGNIFICANT CHANGE UP (ref 9.8–13.1)
RBC # BLD: 3.59 M/UL — LOW (ref 3.8–5.2)
RBC # FLD: 17.4 % — HIGH (ref 10.3–14.5)
SODIUM SERPL-SCNC: 138 MMOL/L — SIGNIFICANT CHANGE UP (ref 135–145)
SPECIMEN SOURCE: SIGNIFICANT CHANGE UP
WBC # BLD: 9.91 K/UL — SIGNIFICANT CHANGE UP (ref 3.8–10.5)
WBC # FLD AUTO: 9.91 K/UL — SIGNIFICANT CHANGE UP (ref 3.8–10.5)

## 2018-07-11 PROCEDURE — 74177 CT ABD & PELVIS W/CONTRAST: CPT | Mod: 26

## 2018-07-11 PROCEDURE — 99233 SBSQ HOSP IP/OBS HIGH 50: CPT

## 2018-07-11 PROCEDURE — 36589 REMOVAL TUNNELED CV CATH: CPT

## 2018-07-11 PROCEDURE — 71260 CT THORAX DX C+: CPT | Mod: 26

## 2018-07-11 PROCEDURE — 99232 SBSQ HOSP IP/OBS MODERATE 35: CPT

## 2018-07-11 RX ORDER — INSULIN GLARGINE 100 [IU]/ML
30 INJECTION, SOLUTION SUBCUTANEOUS AT BEDTIME
Qty: 0 | Refills: 0 | Status: DISCONTINUED | OUTPATIENT
Start: 2018-07-11 | End: 2018-07-13

## 2018-07-11 RX ADMIN — Medication 216 GRAM(S): at 18:34

## 2018-07-11 RX ADMIN — Medication 1 DROP(S): at 05:48

## 2018-07-11 RX ADMIN — BUDESONIDE AND FORMOTEROL FUMARATE DIHYDRATE 2 PUFF(S): 160; 4.5 AEROSOL RESPIRATORY (INHALATION) at 08:59

## 2018-07-11 RX ADMIN — BUDESONIDE AND FORMOTEROL FUMARATE DIHYDRATE 2 PUFF(S): 160; 4.5 AEROSOL RESPIRATORY (INHALATION) at 21:08

## 2018-07-11 RX ADMIN — Medication 216 GRAM(S): at 05:49

## 2018-07-11 RX ADMIN — BRIMONIDINE TARTRATE 1 DROP(S): 2 SOLUTION/ DROPS OPHTHALMIC at 05:48

## 2018-07-11 RX ADMIN — BRIMONIDINE TARTRATE 1 DROP(S): 2 SOLUTION/ DROPS OPHTHALMIC at 13:35

## 2018-07-11 RX ADMIN — BRIMONIDINE TARTRATE 1 DROP(S): 2 SOLUTION/ DROPS OPHTHALMIC at 21:09

## 2018-07-11 RX ADMIN — Medication 8 UNIT(S): at 18:36

## 2018-07-11 RX ADMIN — TIOTROPIUM BROMIDE 1 CAPSULE(S): 18 CAPSULE ORAL; RESPIRATORY (INHALATION) at 08:59

## 2018-07-11 RX ADMIN — HEPARIN SODIUM 5000 UNIT(S): 5000 INJECTION INTRAVENOUS; SUBCUTANEOUS at 21:09

## 2018-07-11 RX ADMIN — Medication 80 MILLIGRAM(S): at 08:59

## 2018-07-11 RX ADMIN — INSULIN GLARGINE 30 UNIT(S): 100 INJECTION, SOLUTION SUBCUTANEOUS at 22:14

## 2018-07-11 RX ADMIN — Medication 2: at 18:36

## 2018-07-11 RX ADMIN — HEPARIN SODIUM 5000 UNIT(S): 5000 INJECTION INTRAVENOUS; SUBCUTANEOUS at 05:48

## 2018-07-11 RX ADMIN — Medication 1 DROP(S): at 18:36

## 2018-07-11 RX ADMIN — ATORVASTATIN CALCIUM 40 MILLIGRAM(S): 80 TABLET, FILM COATED ORAL at 21:09

## 2018-07-11 RX ADMIN — Medication 50 MILLIGRAM(S): at 18:37

## 2018-07-11 NOTE — PROGRESS NOTE ADULT - SUBJECTIVE AND OBJECTIVE BOX
SUBJECTIVE:  Denies CP or SOB    MEDICATIONS  (STANDING):  ampicillin  IVPB 2 Gram(s) IV Intermittent every 12 hours  aspirin enteric coated 81 milliGRAM(s) Oral daily  atorvastatin 40 milliGRAM(s) Oral at bedtime  brimonidine 0.2% Ophthalmic Solution 1 Drop(s) Both EYES every 8 hours  buDESOnide  80 MICROgram(s)/formoterol 4.5 MICROgram(s) Inhaler 2 Puff(s) Inhalation two times a day  epoetin randy Injectable 05118 Unit(s) IV Push <User Schedule>  furosemide    Tablet 80 milliGRAM(s) Oral <User Schedule>  gabapentin 100 milliGRAM(s) Oral daily  guaiFENesin    Syrup 100 milliGRAM(s) Oral every 6 hours  heparin  Injectable 5000 Unit(s) SubCutaneous every 8 hours  insulin glargine Injectable (LANTUS) 25 Unit(s) SubCutaneous at bedtime  insulin lispro (HumaLOG) corrective regimen sliding scale   SubCutaneous three times a day before meals  insulin lispro (HumaLOG) corrective regimen sliding scale   SubCutaneous at bedtime  insulin lispro Injectable (HumaLOG) 8 Unit(s) SubCutaneous before lunch  insulin lispro Injectable (HumaLOG) 8 Unit(s) SubCutaneous before dinner  insulin lispro Injectable (HumaLOG) 5 Unit(s) SubCutaneous before breakfast  lidocaine/prilocaine Cream 1 Application(s) Topical <User Schedule>  metoprolol tartrate 50 milliGRAM(s) Oral two times a day  pantoprazole    Tablet 40 milliGRAM(s) Oral before breakfast  timolol 0.5% Solution 1 Drop(s) Both EYES two times a day  tiotropium 18 MICROgram(s) Capsule 1 Capsule(s) Inhalation daily    MEDICATIONS  (PRN):  acetaminophen   Tablet 650 milliGRAM(s) Oral every 6 hours PRN mild moderate and severe pain  dextrose 40% Gel 15 Gram(s) Oral once PRN Blood Glucose LESS THAN 70 milliGRAM(s)/deciliter  glucagon  Injectable 1 milliGRAM(s) IntraMuscular once PRN Glucose LESS THAN 70 milligrams/deciliter    LABS:                        9.6    9.91  )-----------( 227      ( 11 Jul 2018 05:40 )             31.0     138  |  98  |  63<H>  ----------------------------<  104<H>  4.3   |  26  |  3.89<H>    Ca    9.0      11 Jul 2018 05:40  Phos  4.1     07-10  Mg     2.0     07-10    TPro  7.1  /  Alb  3.1<L>  /  TBili  0.3  /  DBili  x   /  AST  21  /  ALT  18  /  AlkPhos  85  07-11    Creatinine Trend: 3.89<--, 3.33<--, 3.92<--, 3.94<--, 2.76<--, 3.65<--     PHYSICAL EXAM  Vital Signs Last 24 Hrs  T(C): 37.1 (11 Jul 2018 05:44), Max: 37.4 (10 Jul 2018 21:17)  T(F): 98.7 (11 Jul 2018 05:44), Max: 99.4 (10 Jul 2018 21:17)  HR: 73 (11 Jul 2018 05:44) (73 - 85)  BP: 118/50 (11 Jul 2018 05:44) (118/50 - 145/59)  RR: 17 (11 Jul 2018 05:44) (17 - 18)  SpO2: 100% (11 Jul 2018 05:44) (93% - 100%)    Cardiovascular: Normal S1S2, No JVD, 1/6 HODA,   Respiratory: course breath sounds + rales   Gastrointestinal: Abdomen soft, ND, NT, +BS  Extremities no c/c/e b/l le's   Peripheral pulses palpable 2+ B/L    DIAGNOSTIC DATA    TTE:  Transthoracic Echocardiogram (05.08.18 @ 07:08) >  ------------------------------------------------------------------------  CONCLUSIONS: EF 5%%  1.  Peak mitral valve gradient equals 16 mm Hg, mean  transmitral valve gradient equals 7.5 mm Hg, consistent  with moderate mitral stenosis.  2. Moderate left atrial enlargement.  3. Moderate concentric left ventricular hypertrophy.  4. Normal Left Ventricular Systolic Function,  (EF = 55 to  60%)  5. Grade II diastolic dysfunction.  6. RV systolic pressure is moderately increased at  49 mm  Hg.  7. There is mild-moderate tricuspid regurgitation.    < end of copied text >    NST:   < from: Nuclear Stress Test-Pharmacologic (12.01.17 @ 08:43) >  IMPRESSIONS:Normal Study  * Negative ECG evidence of ischemia after IV  administartion of Regadenoson.  * Myocardial Perfusion SPECT results are normal.  * No clear evidence of ischemia or infarct.  * Post-stress resting myocardial perfusion gated SPECT  imaging was performed (LVEF = 53 %) with no regional wall  motion abnormalities.    < end of copied text >    RADIOLOGY:    < from: Xray Chest 1 View- PORTABLE-Urgent (07.03.18 @ 12:43) >    IMPRESSION:  Mild interstitial edema.      ASSESSMENT AND PLAN: 79y/o  Female well known to our service with HTN DM ESRD on HD  diastolic CHF s/p Cardio MEMS implantation, asthma, anemia (on weekly procrit), breast CA s/p Left lumpectomy and radiation in 2005, uterine CA s/p JACLYN,  with no known h/o CAD/MI with no ischemia on NST 12/17 at [facility], with recent TTE 5/18 with normal LV function, moderate LVH with mod mitral stenosis,  recently admitted with respiratory failure 2/2 PNA 5/18 now readmitted with recurrent non productive cough, progressively worsening dyspnea (there is a chronic component though she states its worse in the last several days) and associated weakness. Now re-admitted with Enterococcus bacteremia    --planned for angioplasty of AVF (reported slow flow with HD, and given her bacteremia her PC needs to be removed)  -- Abx per ID.  ID reccs repeat TTE, will order.    -- elevated high sensitivity troponin non specific given ESRD with no acute ischemia on EKG  -- recent TTE with normal LV function and moderate mitral stenosis  -- recent NST with no evidence of ischemia  -- Current not volume overloaded, no orthopnea or ALFORD.  Cont PO Lasix/HD per renal.    -- c/w asa / statin / bb / Lasix  --We will call for interrogation of cardiomems on this admission  --f/u Dr Salas in 2 weeks    Smita Solis PA-C SUBJECTIVE:  Denies CP or SOB    MEDICATIONS  (STANDING):  ampicillin  IVPB 2 Gram(s) IV Intermittent every 12 hours  aspirin enteric coated 81 milliGRAM(s) Oral daily  atorvastatin 40 milliGRAM(s) Oral at bedtime  brimonidine 0.2% Ophthalmic Solution 1 Drop(s) Both EYES every 8 hours  buDESOnide  80 MICROgram(s)/formoterol 4.5 MICROgram(s) Inhaler 2 Puff(s) Inhalation two times a day  epoetin randy Injectable 60766 Unit(s) IV Push <User Schedule>  furosemide    Tablet 80 milliGRAM(s) Oral <User Schedule>  gabapentin 100 milliGRAM(s) Oral daily  guaiFENesin    Syrup 100 milliGRAM(s) Oral every 6 hours  heparin  Injectable 5000 Unit(s) SubCutaneous every 8 hours  insulin glargine Injectable (LANTUS) 25 Unit(s) SubCutaneous at bedtime  insulin lispro (HumaLOG) corrective regimen sliding scale   SubCutaneous three times a day before meals  insulin lispro (HumaLOG) corrective regimen sliding scale   SubCutaneous at bedtime  insulin lispro Injectable (HumaLOG) 8 Unit(s) SubCutaneous before lunch  insulin lispro Injectable (HumaLOG) 8 Unit(s) SubCutaneous before dinner  insulin lispro Injectable (HumaLOG) 5 Unit(s) SubCutaneous before breakfast  lidocaine/prilocaine Cream 1 Application(s) Topical <User Schedule>  metoprolol tartrate 50 milliGRAM(s) Oral two times a day  pantoprazole    Tablet 40 milliGRAM(s) Oral before breakfast  timolol 0.5% Solution 1 Drop(s) Both EYES two times a day  tiotropium 18 MICROgram(s) Capsule 1 Capsule(s) Inhalation daily    MEDICATIONS  (PRN):  acetaminophen   Tablet 650 milliGRAM(s) Oral every 6 hours PRN mild moderate and severe pain  dextrose 40% Gel 15 Gram(s) Oral once PRN Blood Glucose LESS THAN 70 milliGRAM(s)/deciliter  glucagon  Injectable 1 milliGRAM(s) IntraMuscular once PRN Glucose LESS THAN 70 milligrams/deciliter    LABS:                        9.6    9.91  )-----------( 227      ( 11 Jul 2018 05:40 )             31.0     138  |  98  |  63<H>  ----------------------------<  104<H>  4.3   |  26  |  3.89<H>    Ca    9.0      11 Jul 2018 05:40  Phos  4.1     07-10  Mg     2.0     07-10    TPro  7.1  /  Alb  3.1<L>  /  TBili  0.3  /  DBili  x   /  AST  21  /  ALT  18  /  AlkPhos  85  07-11    Creatinine Trend: 3.89<--, 3.33<--, 3.92<--, 3.94<--, 2.76<--, 3.65<--     PHYSICAL EXAM  Vital Signs Last 24 Hrs  T(C): 37.1 (11 Jul 2018 05:44), Max: 37.4 (10 Jul 2018 21:17)  T(F): 98.7 (11 Jul 2018 05:44), Max: 99.4 (10 Jul 2018 21:17)  HR: 73 (11 Jul 2018 05:44) (73 - 85)  BP: 118/50 (11 Jul 2018 05:44) (118/50 - 145/59)  RR: 17 (11 Jul 2018 05:44) (17 - 18)  SpO2: 100% (11 Jul 2018 05:44) (93% - 100%)    Cardiovascular: Normal S1S2, No JVD, 1/6 HODA,   Respiratory: course breath sounds + rales   Gastrointestinal: Abdomen soft, ND, NT, +BS  Extremities no c/c/e b/l le's   Peripheral pulses palpable 2+ B/L    DIAGNOSTIC DATA    TTE:  Transthoracic Echocardiogram (05.08.18 @ 07:08) >  ------------------------------------------------------------------------  CONCLUSIONS: EF 5%%  1.  Peak mitral valve gradient equals 16 mm Hg, mean  transmitral valve gradient equals 7.5 mm Hg, consistent  with moderate mitral stenosis.  2. Moderate left atrial enlargement.  3. Moderate concentric left ventricular hypertrophy.  4. Normal Left Ventricular Systolic Function,  (EF = 55 to  60%)  5. Grade II diastolic dysfunction.  6. RV systolic pressure is moderately increased at  49 mm  Hg.  7. There is mild-moderate tricuspid regurgitation.    < end of copied text >    NST:   < from: Nuclear Stress Test-Pharmacologic (12.01.17 @ 08:43) >  IMPRESSIONS:Normal Study  * Negative ECG evidence of ischemia after IV  administartion of Regadenoson.  * Myocardial Perfusion SPECT results are normal.  * No clear evidence of ischemia or infarct.  * Post-stress resting myocardial perfusion gated SPECT  imaging was performed (LVEF = 53 %) with no regional wall  motion abnormalities.    < end of copied text >    RADIOLOGY:    < from: Xray Chest 1 View- PORTABLE-Urgent (07.03.18 @ 12:43) >    IMPRESSION:  Mild interstitial edema.    < from: Transthoracic Echocardiogram (07.10.18 @ 13:29) >  CONCLUSIONS:  1. Mitral annular calcification, otherwise normal mitral  valve. Mild mitral regurgitation.  2. Mildly dilated left atrium.  LA volume index = 36 cc/m2.  3. Mild concentric left ventricular hypertrophy.  4. Endocardium not well visualized; grossly normal left  ventricular systolic function.  5. Normal right ventricular size and function.  6. Estimated right ventricular systolic pressure equals 57  mm Hg, assuming right atrial pressure equals 10 mm Hg,  consistent with moderate pulmonary hypertension.  ------------------------------------------------------------------------  Confirmed on  7/10/2018 - 14:37:21 by Ayo Guzman M.D.    < end of copied text >        ASSESSMENT AND PLAN: 79y/o  Female well known to our service with HTN DM ESRD on HD  diastolic CHF s/p Cardio MEMS implantation, asthma, anemia (on weekly procrit), breast CA s/p Left lumpectomy and radiation in 2005, uterine CA s/p JACLYN,  with no known h/o CAD/MI with no ischemia on NST 12/17 at [facility], with recent TTE 5/18 with normal LV function, moderate LVH with mod mitral stenosis,  recently admitted with respiratory failure 2/2 PNA 5/18 now readmitted with recurrent non productive cough, progressively worsening dyspnea (there is a chronic component though she states its worse in the last several days) and associated weakness. Now re-admitted with Enterococcus bacteremia    --planned for angioplasty of AVF (reported slow flow with HD, and given her bacteremia her PC needs to be removed)  -- Abx per ID.  Repeat TTE noted above, no vegetations noted.   -- elevated high sensitivity troponin non specific given ESRD with no acute ischemia on EKG  -- recent TTE with normal LV function and moderate mitral stenosis  -- recent NST with no evidence of ischemia  -- Current not volume overloaded, no orthopnea or ALFORD.  Cont PO Lasix/HD per renal.    -- c/w asa / statin / bb / Lasix  --We will call for interrogation of cardiomems on this admission  --f/u Dr Salas in 2 weeks    Smita Solis PA-C

## 2018-07-11 NOTE — PROGRESS NOTE ADULT - SUBJECTIVE AND OBJECTIVE BOX
[facility] - Nicole / Juvenal S /Linette/ ELICEO Isaac/ ELICEO Rojas/ Regan Castanon / TRACY Njeru  ---------------------------------------------------------------------------------------------------------------  seen and examined today for ESRD on HD  Interval : Positive Bx growth for Enterococus, due to PC removal today  VITALS:  T(F): 98.7 (07-11-18 @ 05:44), Max: 99.4 (07-10-18 @ 21:17)  HR: 73 (07-11-18 @ 05:44)  BP: 118/50 (07-11-18 @ 05:44)  RR: 17 (07-11-18 @ 05:44)  SpO2: 100% (07-11-18 @ 05:44)  Wt(kg): --    Physical Exam :-  Constitutional: NAD  Neck: Supple.  Respiratory: Bilateral equal breath sounds,  Crackles present.  Cardiovascular: S1, S2 normal, positive Murmur  Gastrointestinal: Bowel Sounds present, soft, non tender.  Extremities: no Edema Feet  Neurological: Alert and Oriented x 3, no focal deficits  Psychiatric: Normal mood, normal affect  Data:-  Allergies :   No Known Allergies    Hospital Medications:   MEDICATIONS  (STANDING):  ampicillin  IVPB 2 Gram(s) IV Intermittent every 12 hours  aspirin enteric coated 81 milliGRAM(s) Oral daily  atorvastatin 40 milliGRAM(s) Oral at bedtime  brimonidine 0.2% Ophthalmic Solution 1 Drop(s) Both EYES every 8 hours  buDESOnide  80 MICROgram(s)/formoterol 4.5 MICROgram(s) Inhaler 2 Puff(s) Inhalation two times a day  dextrose 5%. 1000 milliLiter(s) (50 mL/Hr) IV Continuous <Continuous>  dextrose 50% Injectable 12.5 Gram(s) IV Push once  dextrose 50% Injectable 25 Gram(s) IV Push once  dextrose 50% Injectable 25 Gram(s) IV Push once  epoetin randy Injectable 74649 Unit(s) IV Push <User Schedule>  furosemide    Tablet 80 milliGRAM(s) Oral <User Schedule>  gabapentin 100 milliGRAM(s) Oral daily  guaiFENesin    Syrup 100 milliGRAM(s) Oral every 6 hours  heparin  Injectable 5000 Unit(s) SubCutaneous every 8 hours  insulin glargine Injectable (LANTUS) 25 Unit(s) SubCutaneous at bedtime  insulin lispro (HumaLOG) corrective regimen sliding scale   SubCutaneous three times a day before meals  insulin lispro (HumaLOG) corrective regimen sliding scale   SubCutaneous at bedtime  insulin lispro Injectable (HumaLOG) 8 Unit(s) SubCutaneous before lunch  insulin lispro Injectable (HumaLOG) 8 Unit(s) SubCutaneous before dinner  insulin lispro Injectable (HumaLOG) 5 Unit(s) SubCutaneous before breakfast  lidocaine/prilocaine Cream 1 Application(s) Topical <User Schedule>  metoprolol tartrate 50 milliGRAM(s) Oral two times a day  pantoprazole    Tablet 40 milliGRAM(s) Oral before breakfast  timolol 0.5% Solution 1 Drop(s) Both EYES two times a day  tiotropium 18 MICROgram(s) Capsule 1 Capsule(s) Inhalation daily    07-11    138  |  98  |  63<H>  ----------------------------<  104<H>  4.3   |  26  |  3.89<H>    Ca    9.0      11 Jul 2018 05:40  Phos  4.1     07-10  Mg     2.0     07-10    TPro  7.1  /  Alb  3.1<L>  /  TBili  0.3  /  DBili      /  AST  21  /  ALT  18  /  AlkPhos  85  07-11    Creatinine Trend: 3.89 <--, 3.33 <--, 3.92 <--, 3.94 <--, 2.76 <--, 3.65 <--, 2.72 <--                        9.6    9.91  )-----------( 227      ( 11 Jul 2018 05:40 )             31.0

## 2018-07-11 NOTE — PROGRESS NOTE ADULT - PROBLEM SELECTOR PLAN 1
E faecalis bacteremia from July 7 both bottles, amp sensitive, repeat cultures on 7/8 + 2/4 bottles (however 7/7 culture took very long to grow)  - ID consult appreciated   - per ID change from vanco to ampicillin   - repeat blood cultures 7/10 NG x24H, 7/11 in lab  - TTE w/ no change, no vegetations noted   - Cardio MEMs in place also, unclear if any relevance, d/w cards need (pt refusing interrogation of it thus what is point of having it) and whether is retrievable   - for IR removal of permcath today  - CT abdomen/pelvis negative for intra-abdominal source of infection

## 2018-07-11 NOTE — PROGRESS NOTE ADULT - PROBLEM SELECTOR PLAN 4
BG much improved today  - c/w lantus decreased to 25 as NPO today, will increase back to 30  - c/w lispro 8 w/ lunch and dinner  - c/w lispro 5 w/ breakfast   - c/w AISHA

## 2018-07-11 NOTE — CHART NOTE - NSCHARTNOTEFT_GEN_A_CORE
PRE-INTERVENTIONAL RADIOLOGY PROCEDURE NOTE    Patient Age: 78 years old  Patient Gender: Female   Procedure (including site / side if known): Permacath removal and AVF angioplasty for increased maturation   Diagnosis / Indication: ESRD/HD  Interventional Radiology Attending Physician: Dr. Hendrix  Ordering Attending Physician: Dr. Aisha Ashley  Pertinent medical history: ESRD/HD  Pertinent labs:                           9.6    9.91  )-----------( 227      ( 11 Jul 2018 05:40 )             31.0     07-11    138  |  98  |  63<H>  ----------------------------<  104<H>  4.3   |  26  |  3.89<H>    Ca    9.0      11 Jul 2018 05:40  Phos  4.1     07-10  Mg     2.0     07-10    TPro  7.1  /  Alb  3.1<L>  /  TBili  0.3  /  DBili  x   /  AST  21  /  ALT  18  /  AlkPhos  85  07-11    *** Coags to be added    Patient and Family aware? Yes      Princess Mann PA-C [id number] PRE-INTERVENTIONAL RADIOLOGY PROCEDURE NOTE    Patient Age: 78 years old  Patient Gender: Female   Procedure (including site / side if known): Permacath removal  Diagnosis / Indication: ESRD/HD  Interventional Radiology Attending Physician: Dr. Hendrix  Ordering Attending Physician: Dr. Aisha Ashley  Pertinent medical history: ESRD/HD  Pertinent labs:                           9.6    9.91  )-----------( 227      ( 11 Jul 2018 05:40 )             31.0     07-11    138  |  98  |  63<H>  ----------------------------<  104<H>  4.3   |  26  |  3.89<H>    Ca    9.0      11 Jul 2018 05:40  Phos  4.1     07-10  Mg     2.0     07-10    TPro  7.1  /  Alb  3.1<L>  /  TBili  0.3  /  DBili  x   /  AST  21  /  ALT  18  /  AlkPhos  85  07-11    PT/INR - ( 11 Jul 2018 13:30 )   PT: 12.3 SEC;   INR: 1.11          PTT - ( 11 Jul 2018 13:30 )  PTT:33.6 SEC    Patient and Family aware? Yes      Princess Mann PA-C [id number]

## 2018-07-11 NOTE — PROGRESS NOTE ADULT - SUBJECTIVE AND OBJECTIVE BOX
CC: F/U for Bacteremia    Saw/spoke to patient. No fevers, no chills. Overall well. Slightly agitated because hungry from NPO.    Allergies  No Known Allergies    ANTIMICROBIALS:  ampicillin  IVPB 2 every 12 hours    PE:    Vital Signs Last 24 Hrs  T(C): 37.1 (11 Jul 2018 05:44), Max: 37.4 (10 Jul 2018 21:17)  T(F): 98.7 (11 Jul 2018 05:44), Max: 99.4 (10 Jul 2018 21:17)  HR: 73 (11 Jul 2018 05:44) (73 - 85)  BP: 118/50 (11 Jul 2018 05:44) (118/50 - 145/59)  RR: 17 (11 Jul 2018 05:44) (17 - 18)  SpO2: 100% (11 Jul 2018 05:44) (93% - 100%)    Gen: AOx3, NAD, non-toxic, pleasant  CV: S1+S2 normal, no murmurs, nontachycardic  Resp: Clear bilat, no resp distress, no crackles/wheezes  Abd: Soft, nontender, +BS  Ext: No LE edema, no wounds  Lines: R sided permacath    LABS:                        9.6    9.91  )-----------( 227      ( 11 Jul 2018 05:40 )             31.0     07-11    138  |  98  |  63<H>  ----------------------------<  104<H>  4.3   |  26  |  3.89<H>    Ca    9.0      11 Jul 2018 05:40  Phos  4.1     07-10  Mg     2.0     07-10    TPro  7.1  /  Alb  3.1<L>  /  TBili  0.3  /  DBili  x   /  AST  21  /  ALT  18  /  AlkPhos  85  07-11    MICROBIOLOGY:    BLOOD  07-10-18 NGTD    BLOOD VENOUS  07-08-18 --  --  BLOOD CULTURE PCR  Enterococcus faecalis    BLOOD VENOUS  07-07-18 --  --  BLOOD CULTURE PCR  Enterococcus faecalis    (otherwise reviewed)    RADIOLOGY:    7/10 TTE:    CONCLUSIONS:  1. Mitral annular calcification, otherwise normal mitral  valve. Mild mitral regurgitation.  2. Mildly dilated left atrium.  LA volume index = 36 cc/m2.  3. Mild concentric left ventricular hypertrophy.  4. Endocardium not well visualized; grossly normal left  ventricular systolic function.  5. Normal right ventricular size and function.  6. Estimated right ventricular systolic pressure equals 57  mm Hg, assuming right atrial pressure equals 10 mm Hg,  consistent with moderate pulmonary hypertension.

## 2018-07-11 NOTE — PROGRESS NOTE ADULT - PROBLEM SELECTOR PROBLEM 9
Prophylactic measure Osteoporosis, unspecified osteoporosis type, unspecified pathological fracture presence

## 2018-07-11 NOTE — PROGRESS NOTE ADULT - PROBLEM SELECTOR PLAN 1
canulated AVF w/2 needles for 1st time using 17G needles yesterday, able to achieve only bfr 200ml/min.   Please call Interventional Radiology, patient will need angioplasty of AVF to speed maturation  Scheduled for HD tomorrow  renal diet, fluid restriction 1L/day  if any issues w/AVF, will need a shiley for next hd.

## 2018-07-11 NOTE — PROGRESS NOTE ADULT - PROBLEM SELECTOR PROBLEM 8
Osteoporosis, unspecified osteoporosis type, unspecified pathological fracture presence Thyroid nodule

## 2018-07-12 ENCOUNTER — TRANSCRIPTION ENCOUNTER (OUTPATIENT)
Age: 78
End: 2018-07-12

## 2018-07-12 LAB
24R-OH-CALCIDIOL SERPL-MCNC: 20 NG/ML — LOW (ref 30–80)
SPECIMEN SOURCE: SIGNIFICANT CHANGE UP
SPECIMEN SOURCE: SIGNIFICANT CHANGE UP

## 2018-07-12 PROCEDURE — 99232 SBSQ HOSP IP/OBS MODERATE 35: CPT

## 2018-07-12 PROCEDURE — 99233 SBSQ HOSP IP/OBS HIGH 50: CPT

## 2018-07-12 RX ORDER — CHOLECALCIFEROL (VITAMIN D3) 125 MCG
1000 CAPSULE ORAL DAILY
Qty: 0 | Refills: 0 | Status: DISCONTINUED | OUTPATIENT
Start: 2018-07-12 | End: 2018-07-13

## 2018-07-12 RX ORDER — VANCOMYCIN HCL 1 G
1 VIAL (EA) INTRAVENOUS
Qty: 5 | Refills: 0
Start: 2018-07-12

## 2018-07-12 RX ORDER — ERYTHROPOIETIN 10000 [IU]/ML
10000 INJECTION, SOLUTION INTRAVENOUS; SUBCUTANEOUS
Qty: 0 | Refills: 0 | DISCHARGE
Start: 2018-07-12

## 2018-07-12 RX ORDER — HEPARIN SODIUM 5000 [USP'U]/ML
5000 INJECTION INTRAVENOUS; SUBCUTANEOUS EVERY 12 HOURS
Qty: 0 | Refills: 0 | Status: DISCONTINUED | OUTPATIENT
Start: 2018-07-12 | End: 2018-07-13

## 2018-07-12 RX ADMIN — Medication 1 DROP(S): at 21:48

## 2018-07-12 RX ADMIN — Medication 1: at 12:57

## 2018-07-12 RX ADMIN — GABAPENTIN 100 MILLIGRAM(S): 400 CAPSULE ORAL at 12:56

## 2018-07-12 RX ADMIN — Medication 100 MILLIGRAM(S): at 00:41

## 2018-07-12 RX ADMIN — Medication 216 GRAM(S): at 05:25

## 2018-07-12 RX ADMIN — HEPARIN SODIUM 5000 UNIT(S): 5000 INJECTION INTRAVENOUS; SUBCUTANEOUS at 21:48

## 2018-07-12 RX ADMIN — ATORVASTATIN CALCIUM 40 MILLIGRAM(S): 80 TABLET, FILM COATED ORAL at 21:50

## 2018-07-12 RX ADMIN — Medication 100 MILLIGRAM(S): at 05:24

## 2018-07-12 RX ADMIN — BRIMONIDINE TARTRATE 1 DROP(S): 2 SOLUTION/ DROPS OPHTHALMIC at 12:59

## 2018-07-12 RX ADMIN — Medication 5 UNIT(S): at 09:07

## 2018-07-12 RX ADMIN — PANTOPRAZOLE SODIUM 40 MILLIGRAM(S): 20 TABLET, DELAYED RELEASE ORAL at 05:25

## 2018-07-12 RX ADMIN — Medication 8 UNIT(S): at 16:43

## 2018-07-12 RX ADMIN — TIOTROPIUM BROMIDE 1 CAPSULE(S): 18 CAPSULE ORAL; RESPIRATORY (INHALATION) at 09:13

## 2018-07-12 RX ADMIN — Medication 1 DROP(S): at 05:24

## 2018-07-12 RX ADMIN — BUDESONIDE AND FORMOTEROL FUMARATE DIHYDRATE 2 PUFF(S): 160; 4.5 AEROSOL RESPIRATORY (INHALATION) at 21:49

## 2018-07-12 RX ADMIN — Medication 8 UNIT(S): at 12:57

## 2018-07-12 RX ADMIN — Medication 216 GRAM(S): at 19:31

## 2018-07-12 RX ADMIN — Medication 100 MILLIGRAM(S): at 12:56

## 2018-07-12 RX ADMIN — BUDESONIDE AND FORMOTEROL FUMARATE DIHYDRATE 2 PUFF(S): 160; 4.5 AEROSOL RESPIRATORY (INHALATION) at 09:09

## 2018-07-12 RX ADMIN — LIDOCAINE AND PRILOCAINE CREAM 1 APPLICATION(S): 25; 25 CREAM TOPICAL at 14:24

## 2018-07-12 RX ADMIN — BRIMONIDINE TARTRATE 1 DROP(S): 2 SOLUTION/ DROPS OPHTHALMIC at 21:48

## 2018-07-12 RX ADMIN — Medication 650 MILLIGRAM(S): at 00:41

## 2018-07-12 RX ADMIN — INSULIN GLARGINE 30 UNIT(S): 100 INJECTION, SOLUTION SUBCUTANEOUS at 22:07

## 2018-07-12 RX ADMIN — ERYTHROPOIETIN 10000 UNIT(S): 10000 INJECTION, SOLUTION INTRAVENOUS; SUBCUTANEOUS at 15:54

## 2018-07-12 RX ADMIN — HEPARIN SODIUM 5000 UNIT(S): 5000 INJECTION INTRAVENOUS; SUBCUTANEOUS at 05:23

## 2018-07-12 RX ADMIN — BRIMONIDINE TARTRATE 1 DROP(S): 2 SOLUTION/ DROPS OPHTHALMIC at 05:24

## 2018-07-12 NOTE — DISCHARGE NOTE ADULT - PLAN OF CARE
Resolution and return to baseline Please continue to follow your dialysis schedule and refer to your primary provider for further care/recommendations. Continue your medications and supplementation as directed. Continue consistent carbohydrate diet.  Monitor blood glucose levels throughout the day before meals and at bedtime.  Record blood sugars and bring to outpatient providers appointment in order to be reviewed by your doctor for management modifications.  Be aware of diabetes management symptoms including feeling cool and clammy may be related to low glucose levels.  Feeling hot and dry may indicate high glucose levels. If you feel these symptoms, check your blood sugar.  Make regular podiatry appointments in order to have feet checked for wounds and toe nails cut by a doctor to prevent infections, as well as, appointments with an ophthalmologist to monitor your vision. Continue blood pressure medication regimen as directed. Monitor for any visual changes, headaches or dizziness.  Monitor blood pressure regularly.  Follow up with your PCP for further management for high blood pressure. Continue your inhalers and annual pulmonary function tests with your outpatient provider. Monitor for exacerbation, such as, shortness of breath, hyperventilation, increased cough/sputum or any other difficulties breathing and report to the emergency room in the event that your rescue inhaler has not resolved your symptoms. Continue recommended medication regimen. Monitor for signs/symptoms of fluid overload and electrolyte abnormalities, such as, shortness of breath, cough, swelling, chest discomfort, changes in heart rate, dizziness, fainting, or changes in mental status. Follow-up with your PCP/cardiologist outpatient after you've been discharged from the hospital. Follow-up with your outpatient provider if further treatment is warranted. Monitor for signs/symptoms indicating worsening of disease, such as, easy bleeding/bruising, pale skin, fatigue, dizziness, increased heart rate, or chest pain. You will be given antibiotics with hemodialysis three time per week.    IT IS VERY IMPORTANT THAT YOU DO NOT MISS HEMODIALYSIS BECAUSE YOU WILL ALSO MISS YOU ANTIBIOTIC DOSING

## 2018-07-12 NOTE — PROGRESS NOTE ADULT - PROBLEM SELECTOR PLAN 1
Plan for HD today  Plan for PC removal 2/2 bactermia  AVF was used tuesday with low bfr- will monitor- needs IR eval for angio  if flows not adequate may need temp shiley which ideally should be avoided given bactermia  trend bmp

## 2018-07-12 NOTE — DISCHARGE NOTE ADULT - HOME CARE AGENCY
Aide reinstatement through [facility] () for SAT 7/14 Aide reinstatement through [facility]/ANSELMO () for SAT 7/14; confirmed with ANSELMO

## 2018-07-12 NOTE — PROGRESS NOTE ADULT - SUBJECTIVE AND OBJECTIVE BOX
SUBJECTIVE:  Denies CP or SOB    MEDICATIONS  (STANDING):  ampicillin  IVPB 2 Gram(s) IV Intermittent every 12 hours  aspirin enteric coated 81 milliGRAM(s) Oral daily  atorvastatin 40 milliGRAM(s) Oral at bedtime  brimonidine 0.2% Ophthalmic Solution 1 Drop(s) Both EYES every 8 hours  buDESOnide  80 MICROgram(s)/formoterol 4.5 MICROgram(s) Inhaler 2 Puff(s) Inhalation two times a day  dextrose 5%. 1000 milliLiter(s) (50 mL/Hr) IV Continuous <Continuous>  dextrose 50% Injectable 12.5 Gram(s) IV Push once  dextrose 50% Injectable 25 Gram(s) IV Push once  dextrose 50% Injectable 25 Gram(s) IV Push once  epoetin randy Injectable 88896 Unit(s) IV Push <User Schedule>  furosemide    Tablet 80 milliGRAM(s) Oral <User Schedule>  gabapentin 100 milliGRAM(s) Oral daily  guaiFENesin    Syrup 100 milliGRAM(s) Oral every 6 hours  heparin  Injectable 5000 Unit(s) SubCutaneous every 8 hours  insulin glargine Injectable (LANTUS) 30 Unit(s) SubCutaneous at bedtime  insulin lispro (HumaLOG) corrective regimen sliding scale   SubCutaneous three times a day before meals  insulin lispro (HumaLOG) corrective regimen sliding scale   SubCutaneous at bedtime  insulin lispro Injectable (HumaLOG) 8 Unit(s) SubCutaneous before lunch  insulin lispro Injectable (HumaLOG) 8 Unit(s) SubCutaneous before dinner  insulin lispro Injectable (HumaLOG) 5 Unit(s) SubCutaneous before breakfast  lidocaine/prilocaine Cream 1 Application(s) Topical <User Schedule>  metoprolol tartrate 50 milliGRAM(s) Oral two times a day  pantoprazole    Tablet 40 milliGRAM(s) Oral before breakfast  timolol 0.5% Solution 1 Drop(s) Both EYES two times a day  tiotropium 18 MICROgram(s) Capsule 1 Capsule(s) Inhalation daily    MEDICATIONS  (PRN):  acetaminophen   Tablet 650 milliGRAM(s) Oral every 6 hours PRN mild moderate and severe pain  benzonatate 100 milliGRAM(s) Oral three times a day PRN Cough  dextrose 40% Gel 15 Gram(s) Oral once PRN Blood Glucose LESS THAN 70 milliGRAM(s)/deciliter  glucagon  Injectable 1 milliGRAM(s) IntraMuscular once PRN Glucose LESS THAN 70 milligrams/deciliter      LABS:                        9.6    9.91  )-----------( 227      ( 11 Jul 2018 05:40 )             31.0     Hemoglobin: 9.6 g/dL (07-11 @ 05:40)  Hemoglobin: 9.5 g/dL (07-10 @ 06:39)  Hemoglobin: 9.0 g/dL (07-09 @ 05:04)  Hemoglobin: 9.7 g/dL (07-08 @ 10:32)    07-11    138  |  98  |  63<H>  ----------------------------<  104<H>  4.3   |  26  |  3.89<H>    Ca    9.0      11 Jul 2018 05:40    TPro  7.1  /  Alb  3.1<L>  /  TBili  0.3  /  DBili  x   /  AST  21  /  ALT  18  /  AlkPhos  85  07-11    Creatinine Trend: 3.89<--, 3.33<--, 3.92<--, 3.94<--, 2.76<--, 3.65<--           PHYSICAL EXAM  Vital Signs Last 24 Hrs  T(C): 36.2 (12 Jul 2018 05:04), Max: 37.1 (11 Jul 2018 20:52)  T(F): 97.2 (12 Jul 2018 05:04), Max: 98.7 (11 Jul 2018 20:52)  HR: 70 (12 Jul 2018 05:04) (70 - 87)  BP: 142/50 (12 Jul 2018 05:04) (142/50 - 153/56)  BP(mean): --  RR: 17 (12 Jul 2018 05:04) (17 - 18)  SpO2: 98% (12 Jul 2018 05:04) (97% - 100%)    Cardiovascular: Normal S1S2, No JVD, 1/6 HODA,   Respiratory: course breath sounds + rales   Gastrointestinal: Abdomen soft, ND, NT, +BS  Extremities no c/c/e b/l le's   Peripheral pulses palpable 2+ B/L    DIAGNOSTIC DATA    TTE:  Transthoracic Echocardiogram (05.08.18 @ 07:08) >  ------------------------------------------------------------------------  CONCLUSIONS: EF 5%%  1.  Peak mitral valve gradient equals 16 mm Hg, mean  transmitral valve gradient equals 7.5 mm Hg, consistent  with moderate mitral stenosis.  2. Moderate left atrial enlargement.  3. Moderate concentric left ventricular hypertrophy.  4. Normal Left Ventricular Systolic Function,  (EF = 55 to  60%)  5. Grade II diastolic dysfunction.  6. RV systolic pressure is moderately increased at  49 mm  Hg.  7. There is mild-moderate tricuspid regurgitation.    < end of copied text >    NST:   < from: Nuclear Stress Test-Pharmacologic (12.01.17 @ 08:43) >  IMPRESSIONS:Normal Study  * Negative ECG evidence of ischemia after IV  administartion of Regadenoson.  * Myocardial Perfusion SPECT results are normal.  * No clear evidence of ischemia or infarct.  * Post-stress resting myocardial perfusion gated SPECT  imaging was performed (LVEF = 53 %) with no regional wall  motion abnormalities.    < end of copied text >    RADIOLOGY:    < from: Xray Chest 1 View- PORTABLE-Urgent (07.03.18 @ 12:43) >    IMPRESSION:  Mild interstitial edema.    < from: Transthoracic Echocardiogram (07.10.18 @ 13:29) >  CONCLUSIONS:  1. Mitral annular calcification, otherwise normal mitral  valve. Mild mitral regurgitation.  2. Mildly dilated left atrium.  LA volume index = 36 cc/m2.  3. Mild concentric left ventricular hypertrophy.  4. Endocardium not well visualized; grossly normal left  ventricular systolic function.  5. Normal right ventricular size and function.  6. Estimated right ventricular systolic pressure equals 57  mm Hg, assuming right atrial pressure equals 10 mm Hg,  consistent with moderate pulmonary hypertension.  ------------------------------------------------------------------------  Confirmed on  7/10/2018 - 14:37:21 by Ayo Guzman M.D.    < end of copied text >        ASSESSMENT AND PLAN: 79y/o  Female well known to our service with HTN DM ESRD on HD  diastolic CHF s/p Cardio MEMS implantation, asthma, anemia (on weekly procrit), breast CA s/p Left lumpectomy and radiation in 2005, uterine CA s/p JACLYN,  with no known h/o CAD/MI with no ischemia on NST 12/17 at [facility], with recent TTE 5/18 with normal LV function, moderate LVH with mod mitral stenosis,  recently admitted with respiratory failure 2/2 PNA 5/18 now readmitted with recurrent non productive cough, progressively worsening dyspnea (there is a chronic component though she states its worse in the last several days) and associated weakness. Now re-admitted with Enterococcus bacteremia    --planned for angioplasty of AVF (reported slow flow with HD, and given her bacteremia her PC needs to be removed)  -- Abx per ID.  Repeat TTE noted above, no vegetations noted.   -- elevated high sensitivity troponin non specific given ESRD with no acute ischemia on EKG  -- recent TTE with normal LV function and moderate mitral stenosis  -- recent NST with no evidence of ischemia  -- Current not volume overloaded, no orthopnea or ALFORD.  Cont PO Lasix/HD per renal.    -- c/w asa / statin / bb / Lasix  --Interrogate cardio mems   --f/u Dr Salas in 2 weeks

## 2018-07-12 NOTE — DISCHARGE NOTE ADULT - SECONDARY DIAGNOSIS.
ESRD (end stage renal disease) Type 2 diabetes mellitus with diabetic nephropathy, with long-term current use of insulin HTN (hypertension) Chronic obstructive pulmonary disease, unspecified COPD type Chronic diastolic (congestive) heart failure Anemia of chronic disease

## 2018-07-12 NOTE — DISCHARGE NOTE ADULT - ADDITIONAL INSTRUCTIONS
Continue your medications as directed and please follow-up as an outpatient with your primary care provider for further care and recommendations. Continue your medications as directed and please follow-up as an outpatient with your primary care provider for further care and recommendations.    ***** YOUR RIGHT ARM FISTULA IS NOT WORKING COMPLETELY APPROPRIATELY --- YOU ARE SCHEDULE FOR A FISTULOGRAM ON FRIDAY JULY20TH AT 10 AM AT [address] [place] [address] M9 WITH DR. SILVA*******************

## 2018-07-12 NOTE — PROGRESS NOTE ADULT - SUBJECTIVE AND OBJECTIVE BOX
Patient seen and examined  no complaints   denies any complaints    No Known Allergies    Hospital Medications:   MEDICATIONS  (STANDING):  ampicillin  IVPB 2 Gram(s) IV Intermittent every 12 hours  aspirin enteric coated 81 milliGRAM(s) Oral daily  atorvastatin 40 milliGRAM(s) Oral at bedtime  brimonidine 0.2% Ophthalmic Solution 1 Drop(s) Both EYES every 8 hours  buDESOnide  80 MICROgram(s)/formoterol 4.5 MICROgram(s) Inhaler 2 Puff(s) Inhalation two times a day  dextrose 5%. 1000 milliLiter(s) (50 mL/Hr) IV Continuous <Continuous>  dextrose 50% Injectable 12.5 Gram(s) IV Push once  dextrose 50% Injectable 25 Gram(s) IV Push once  dextrose 50% Injectable 25 Gram(s) IV Push once  epoetin randy Injectable 43547 Unit(s) IV Push <User Schedule>  furosemide    Tablet 80 milliGRAM(s) Oral <User Schedule>  gabapentin 100 milliGRAM(s) Oral daily  guaiFENesin    Syrup 100 milliGRAM(s) Oral every 6 hours  heparin  Injectable 5000 Unit(s) SubCutaneous every 8 hours  insulin glargine Injectable (LANTUS) 30 Unit(s) SubCutaneous at bedtime  insulin lispro (HumaLOG) corrective regimen sliding scale   SubCutaneous three times a day before meals  insulin lispro (HumaLOG) corrective regimen sliding scale   SubCutaneous at bedtime  insulin lispro Injectable (HumaLOG) 8 Unit(s) SubCutaneous before lunch  insulin lispro Injectable (HumaLOG) 8 Unit(s) SubCutaneous before dinner  insulin lispro Injectable (HumaLOG) 5 Unit(s) SubCutaneous before breakfast  lidocaine/prilocaine Cream 1 Application(s) Topical <User Schedule>  metoprolol tartrate 50 milliGRAM(s) Oral two times a day  pantoprazole    Tablet 40 milliGRAM(s) Oral before breakfast  timolol 0.5% Solution 1 Drop(s) Both EYES two times a day  tiotropium 18 MICROgram(s) Capsule 1 Capsule(s) Inhalation daily        VITALS:  T(F): 97.2 (07-12-18 @ 05:04), Max: 98.7 (07-11-18 @ 20:52)  HR: 70 (07-12-18 @ 05:04)  BP: 142/50 (07-12-18 @ 05:04)  RR: 17 (07-12-18 @ 05:04)  SpO2: 98% (07-12-18 @ 05:04)  Wt(kg): --      PHYSICAL EXAM:  Constitutional: NAD  HEENT: anicteric sclera, oropharynx clear, MMM  Neck: No JVD  Respiratory: CTAB, no wheezes, rales or rhonchi  Cardiovascular: S1, S2, RRR  Gastrointestinal: BS+, soft, NT/ND  Extremities: No cyanosis or clubbing. No peripheral edema  Vascular Access: + PC + avf    LABS:  07-11    138  |  98  |  63<H>  ----------------------------<  104<H>  4.3   |  26  |  3.89<H>    Ca    9.0      11 Jul 2018 05:40    TPro  7.1  /  Alb  3.1<L>  /  TBili  0.3  /  DBili      /  AST  21  /  ALT  18  /  AlkPhos  85  07-11    Creatinine Trend: 3.89 <--, 3.33 <--, 3.92 <--, 3.94 <--, 2.76 <--, 3.65 <--                        9.6    9.91  )-----------( 227      ( 11 Jul 2018 05:40 )             31.0     Urine Studies:      RADIOLOGY & ADDITIONAL STUDIES:

## 2018-07-12 NOTE — PROGRESS NOTE ADULT - PROBLEM SELECTOR PLAN 4
BG much improved today  - c/w lantus 30 qhs  - c/w lispro 8 w/ lunch and dinner  - c/w lispro 5 w/ breakfast   - c/w AISHA

## 2018-07-12 NOTE — PROGRESS NOTE ADULT - SUBJECTIVE AND OBJECTIVE BOX
Patient is a 78y old  Female who presents with a chief complaint of positive cx    SUBJECTIVE / OVERNIGHT EVENTS:    Feels better  mild pain at permcath removal site  no CP, SOB, f/c  still some cough    MEDICATIONS  (STANDING):  ampicillin  IVPB 2 Gram(s) IV Intermittent every 12 hours  aspirin enteric coated 81 milliGRAM(s) Oral daily  atorvastatin 40 milliGRAM(s) Oral at bedtime  brimonidine 0.2% Ophthalmic Solution 1 Drop(s) Both EYES every 8 hours  buDESOnide  80 MICROgram(s)/formoterol 4.5 MICROgram(s) Inhaler 2 Puff(s) Inhalation two times a day  epoetin randy Injectable 89389 Unit(s) IV Push <User Schedule>  furosemide    Tablet 80 milliGRAM(s) Oral <User Schedule>  gabapentin 100 milliGRAM(s) Oral daily  guaiFENesin    Syrup 100 milliGRAM(s) Oral every 6 hours  heparin  Injectable 5000 Unit(s) SubCutaneous every 8 hours  insulin glargine Injectable (LANTUS) 30 Unit(s) SubCutaneous at bedtime  insulin lispro (HumaLOG) corrective regimen sliding scale   SubCutaneous three times a day before meals  insulin lispro (HumaLOG) corrective regimen sliding scale   SubCutaneous at bedtime  insulin lispro Injectable (HumaLOG) 8 Unit(s) SubCutaneous before lunch  insulin lispro Injectable (HumaLOG) 8 Unit(s) SubCutaneous before dinner  insulin lispro Injectable (HumaLOG) 5 Unit(s) SubCutaneous before breakfast  lidocaine/prilocaine Cream 1 Application(s) Topical <User Schedule>  metoprolol tartrate 50 milliGRAM(s) Oral two times a day  pantoprazole    Tablet 40 milliGRAM(s) Oral before breakfast  timolol 0.5% Solution 1 Drop(s) Both EYES two times a day  tiotropium 18 MICROgram(s) Capsule 1 Capsule(s) Inhalation daily    MEDICATIONS  (PRN):  acetaminophen   Tablet 650 milliGRAM(s) Oral every 6 hours PRN mild moderate and severe pain  benzonatate 100 milliGRAM(s) Oral three times a day PRN Cough  dextrose 40% Gel 15 Gram(s) Oral once PRN Blood Glucose LESS THAN 70 milliGRAM(s)/deciliter  glucagon  Injectable 1 milliGRAM(s) IntraMuscular once PRN Glucose LESS THAN 70 milligrams/deciliter    T(C): 36.8 (07-12-18 @ 18:15), Max: 37.1 (07-11-18 @ 20:52)  HR: 76 (07-12-18 @ 18:15) (68 - 87)  BP: 158/78 (07-12-18 @ 18:15) (116/43 - 158/78)  RR: 20 (07-12-18 @ 18:15) (17 - 20)  SpO2: 91% (07-12-18 @ 13:41) (91% - 98%)    CAPILLARY BLOOD GLUCOSE  POCT Blood Glucose.: 134 mg/dL (12 Jul 2018 16:33)  POCT Blood Glucose.: 156 mg/dL (12 Jul 2018 12:04)  POCT Blood Glucose.: 132 mg/dL (12 Jul 2018 09:06)  POCT Blood Glucose.: 171 mg/dL (11 Jul 2018 21:55)    PHYSICAL EXAM:  GENERAL: NAD  HEAD:  Atraumatic, Normocephalic  EYES: EOMI, PERRLA, conjunctiva and sclera clear  NECK: Supple, No JVD  CHEST/LUNG: Clear to auscultation bilaterally; No wheeze  HEART: Regular rate and rhythm; No murmurs, rubs, or gallops  ABDOMEN: Soft, Nontender, Nondistended; Bowel sounds present  EXTREMITIES:   warm and well perfused, No clubbing, cyanosis, or edema, LUE w/ lymphedema (prior LN dissection)   PSYCH: AAOx3  NEUROLOGY: non-focal  SKIN: permcath removed site dressing c/d/i, R AVF     LABS:                        9.6    9.91  )-----------( 227      ( 11 Jul 2018 05:40 )             31.0     07-11    138  |  98  |  63<H>  ----------------------------<  104<H>  4.3   |  26  |  3.89<H>    Ca    9.0      11 Jul 2018 05:40    TPro  7.1  /  Alb  3.1<L>  /  TBili  0.3  /  DBili  x   /  AST  21  /  ALT  18  /  AlkPhos  85  07-11    PT/INR - ( 11 Jul 2018 13:30 )   PT: 12.3 SEC;   INR: 1.11     PTT - ( 11 Jul 2018 13:30 )  PTT:33.6 SEC      Consultant(s) Notes Reviewed:  ID, renal     Care Discussed with Consultants/Other Providers:

## 2018-07-12 NOTE — DISCHARGE NOTE ADULT - PROVIDER TOKENS
FREE:[LAST:[Dudley],FIRST:[Jose],PHONE:[(   )    -],FAX:[(   )    -],ADDRESS:[Primary Care Provider]] FREE:[LAST:[Dudley],FIRST:[Jose],PHONE:[(   )    -],FAX:[(   )    -],ADDRESS:[Primary Care Provider]],TOKEN:'[id number]',TOKEN:'[id number]'

## 2018-07-12 NOTE — DISCHARGE NOTE ADULT - HOSPITAL COURSE
78F HTN DM2 ESRD on HD diastolic CHF s/p Cardio MEMS implantation, asthma, anemia (on weekly procrit), breast CA s/p Left lumpectomy and radiation in 2005, uterine CA s/p JACLYN, with no known h/o CAD/MI with no ischemia on NST 12/17 at [facility], with recent TTE 5/18 with normal LV function, moderate LVH with mod mitral stenosis, admitted 7/3/18 after missing HD due to fatigue, treated for volume overload with fluid removal at HD.  Was febrile to 102.7 on 7/6/18 at 9pm, blood cultures drawn peripheral and from HD catheter.  7/7/18 patient was discharged.  Peripheral blood cx positive for enterococcus by PCR and patient called back for treatment.  Denies further fevers, chills, weakness. She is upset about staying in uncomfortable stretcher.  She was treated with Vancomycin 1gm.     HOSPITAL COURSE____ 78F HTN DM2 ESRD on HD diastolic CHF s/p Cardio MEMS implantation, asthma, anemia (on weekly procrit), breast CA s/p Left lumpectomy and radiation in 2005, uterine CA s/p JACLYN, with no known h/o CAD/MI with no ischemia on NST 12/17 at [facility], with recent TTE 5/18 with normal LV function, moderate LVH with mod mitral stenosis, admitted 7/3/18 after missing HD due to fatigue, treated for volume overload with fluid removal at HD.  Was febrile to 102.7 on 7/6/18 at 9pm, blood cultures drawn peripheral and from HD catheter.  7/7/18 patient was discharged.  Peripheral blood cx positive for enterococcus by PCR and patient called back for treatment.  Denies further fevers, chills, weakness. She is upset about staying in uncomfortable stretcher.  She was treated with Vancomycin 1gm.  ID consulted on patient, cultured reviewed.  Initially on admission vancomycin started, as cultures progressed abx changed to ampicillin then changed to vancomycin upon discharge with HD.  Permacath removed during admission. Fistula started to be used with slow flow.  Vascular consulted,  vasculat set up for outpatient fistulogram with outpatient provider next week.    Dispo: home with outpatient HD set up 78F HTN DM2 ESRD on HD diastolic CHF s/p Cardio MEMS implantation, asthma, anemia (on weekly procrit), breast CA s/p Left lumpectomy and radiation in 2005, uterine CA s/p JACLYN, with no known h/o CAD/MI with no ischemia on NST 12/17 at [facility], with recent TTE 5/18 with normal LV function, moderate LVH with mod mitral stenosis, admitted 7/3/18 after missing HD due to fatigue, treated for volume overload with fluid removal at HD.  Was febrile to 102.7 on 7/6/18 at 9pm, blood cultures drawn peripheral and from HD catheter.  7/7/18 patient was discharged.  Peripheral blood cx positive for enterococcus by PCR and patient called back for treatment.  Denies further fevers, chills, weakness. She is upset about staying in uncomfortable stretcher.  She was treated with Vancomycin 1gm.  ID consulted on patient, cultured reviewed.  Initially on admission vancomycin started, as cultures progressed abx changed to ampicillin then changed to vancomycin upon discharge with HD.  Permacath removed during admission. Fistula started to be used with slow flow.  Vascular consulted,  vasculat set up for outpatient fistulogram with outpatient provider next week.    To complete 2 wk course of abx until 7/23, per ID needs surveillance cx 1 week s/p completion of treatment     Dispo: home with outpatient HD set up

## 2018-07-12 NOTE — DISCHARGE NOTE ADULT - CARE PROVIDERS DIRECT ADDRESSES
,DirectAddress_Unknown ,DirectAddress_Unknown,DirectAddress_Unknown,[email or web address]

## 2018-07-12 NOTE — DISCHARGE NOTE ADULT - MEDICATION SUMMARY - MEDICATIONS TO TAKE
I will START or STAY ON the medications listed below when I get home from the hospital:    acetaminophen 325 mg oral tablet  -- 2 tab(s) by mouth every 6 hours, As needed, Moderate Pain (4 - 6)  -- Indication: For mild pain    aspirin 81 mg oral delayed release tablet  -- 1 tab(s) by mouth once a day  -- Indication: For CAD ppx    gabapentin 100 mg oral capsule  -- 1 cap(s) by mouth 2 times a day  -- Indication: For neuropathic pain    Lantus 100 units/mL subcutaneous solution  -- 30 unit(s) subcutaneous once a day (at bedtime)   -- Do not drink alcoholic beverages when taking this medication.  It is very important that you take or use this exactly as directed.  Do not skip doses or discontinue unless directed by your doctor.  Keep in refrigerator.  Do not freeze.    -- Indication: For Type 2 diabetes mellitus with diabetic nephropathy, with long-term current use of insulin    HumaLOG 100 units/mL subcutaneous solution  -- 5 units subcutaneoud before breakfast and 8 unit(s) subcutaneous 2 times a day  before lunch and dinner.   -- Do not drink alcoholic beverages when taking this medication.  It is very important that you take or use this exactly as directed.  Do not skip doses or discontinue unless directed by your doctor.  Keep in refrigerator.  Do not freeze.    -- Indication: For Type 2 diabetes mellitus with diabetic nephropathy, with long-term current use of insulin    atorvastatin 40 mg oral tablet  -- 1 tab(s) by mouth once a day (at bedtime)  -- Indication: For Hyperlipidemia    metoprolol tartrate 50 mg oral tablet  -- 1 tab(s) by mouth 2 times a day  -- Indication: For Hypertension    tiotropium 18 mcg inhalation capsule  -- 1 cap(s) inhaled once a day  -- Indication: For COPD    Advair Diskus 100 mcg-50 mcg inhalation powder  -- 1 puff(s) inhaled 2 times a day  -- Indication: For COPD    lidocaine-prilocaine 2.5%-2.5% topical cream  -- 1 application on skin   -- Indication: For Topical     furosemide 80 mg oral tablet  -- 1 tab(s) by mouth 4 times a week, As Needed -give on days of non HD   -- Indication: For fluid management    epoetin randy  -- 60045 unit(s) intravenous 3 times a week with dialysis  -- Indication: For ESRD (end stage renal disease)    guaiFENesin 100 mg/5 mL oral liquid  -- 10 milliliter(s) by mouth every 6 hours, As needed, Cough  -- Indication: For Cough    vancomycin 1 g intravenous injection  -- 1 gram(s) intravenously 3 times a week post-dialysis until 7/23  -- Indication: For bacteremia    Alphagan P 0.1% ophthalmic solution  -- 1 drop(s) to each affected eye every 8 hours  -- Indication: For Eye drops    timolol hemihydrate 0.5% ophthalmic solution  -- 1 drop(s) to each affected eye 2 times a day  -- Indication: For Eye drop    pantoprazole 40 mg oral granule, delayed release  -- 1 each by mouth once a day   -- It is very important that you take or use this exactly as directed.  Do not skip doses or discontinue unless directed by your doctor.  Obtain medical advice before taking any non-prescription drugs as some may affect the action of this medication.    -- Indication: For GERD    Drisdol 50,000 intl units (1.25 mg) oral capsule  -- 1 cap(s) by mouth once a week  -- Indication: For Supplement

## 2018-07-12 NOTE — PROGRESS NOTE ADULT - SUBJECTIVE AND OBJECTIVE BOX
CC: F/U bacteremia    Saw/spoke to patient. No fevers, no chills. Permacath removed. Doing well.    Allergies  No Known Allergies    ANTIMICROBIALS:  ampicillin  IVPB 2 every 12 hours    PE:    Vital Signs Last 24 Hrs  T(C): 36.2 (12 Jul 2018 05:04), Max: 37.1 (11 Jul 2018 20:52)  T(F): 97.2 (12 Jul 2018 05:04), Max: 98.7 (11 Jul 2018 20:52)  HR: 70 (12 Jul 2018 05:04) (70 - 87)  BP: 142/50 (12 Jul 2018 05:04) (142/50 - 153/56)  RR: 17 (12 Jul 2018 05:04) (17 - 18)  SpO2: 98% (12 Jul 2018 05:04) (97% - 100%)    Gen: AOx3, NAD, non-toxic, pleasant  CV: S1+S2 normal, no murmurs, nontachycardic  Resp: Clear bilat, no resp distress, no crackles/wheezes  Abd: Soft, nontender, +BS  Ext: No LE edema, no wounds  Lines: R sided permacath removed    LABS:                        9.6    9.91  )-----------( 227      ( 11 Jul 2018 05:40 )             31.0     07-11    138  |  98  |  63<H>  ----------------------------<  104<H>  4.3   |  26  |  3.89<H>    Ca    9.0      11 Jul 2018 05:40    TPro  7.1  /  Alb  3.1<L>  /  TBili  0.3  /  DBili  x   /  AST  21  /  ALT  18  /  AlkPhos  85  07-11    MICROBIOLOGY:    CATHETER TIP  07-11-18 [facility]    BLOOD PERIPHERAL  07-08-18 --  --  --      BLOOD VENOUS  07-08-18 --  --  BLOOD CULTURE PCR  Enterococcus faecalis      BLOOD VENOUS  07-07-18 --  --  BLOOD CULTURE PCR  Enterococcus faecalis    (otherwise reviewed)    RADIOLOGY:    7/11 CT:    CHEST:     LINES AND TUBES: A right IJ approach central venous catheter terminates   in the right atrium.  LUNGS AND LARGE AIRWAYS: Patent central airways. Mild interlobular septal   thickening. Few peripheral tree-in-bud opacities, likely representing   distal airway mucoid impaction. Partial lingular atelectasis.  PLEURA: Small left pleural effusion with associated compressive   atelectasis.  VESSELS: A CardioMEMS device is within the left basilar subsegmental   pulmonary artery. Atherosclerotic calcifications of the coronary arteries   and aorta.  HEART: Mild cardiomegaly. No pericardial effusion.  MEDIASTINUM AND DAMIÁN: A top normal in size anterior mediastinal lymph   node measures 1.2 x 1.1 cm without change.  CHEST WALL AND LOWER NECK: Multiple bilateral thyroid nodules, with a   dominant 2.8 cm right nodule, and additional left thyroid nodules with   peripheral calcifications, unchanged. Right anterior chest wall surgical   clips.    ABDOMEN AND PELVIS:    LIVER: Within normal limits.  BILE DUCTS: Normal caliber.  GALLBLADDER: Within normal limits.  SPLEEN: Within normal limits.  PANCREAS: Within normal limits.  ADRENALS: Within normal limits.  KIDNEYS/URETERS: Numerous bilateral simple renal cysts, and subcentimeter   hypodense foci too small to characterize. A left mid pole cyst has   peripheral calcifications.    BLADDER: Within normal limits.  REPRODUCTIVE ORGANS: Hysterectomy. No adnexal masses.    BOWEL: Diverticulosis without diverticulitis. No bowel obstruction.   Appendix normal.  PERITONEUM: No ascites.  VESSELS:  Extensive atherosclerotic disease. 3.5 cm infrarenal abdominal   aortic aneurysm.  RETROPERITONEUM: No lymphadenopathy.    ABDOMINAL WALL: Right anterior skin thickening, likely secondary to   subcutaneous medication administration.  BONES: Mild, age indeterminant anterior compression deformity of the L2   vertebral body, unchanged from May 11, 2018.    IMPRESSION:     The source of infection is not elucidated.

## 2018-07-12 NOTE — DISCHARGE NOTE ADULT - CARE PLAN
Principal Discharge DX:	Enterococcal infection  Goal:	Resolution and return to baseline  Secondary Diagnosis:	ESRD (end stage renal disease)  Assessment and plan of treatment:	Please continue to follow your dialysis schedule and refer to your primary provider for further care/recommendations. Continue your medications and supplementation as directed.  Secondary Diagnosis:	Type 2 diabetes mellitus with diabetic nephropathy, with long-term current use of insulin  Assessment and plan of treatment:	Continue consistent carbohydrate diet.  Monitor blood glucose levels throughout the day before meals and at bedtime.  Record blood sugars and bring to outpatient providers appointment in order to be reviewed by your doctor for management modifications.  Be aware of diabetes management symptoms including feeling cool and clammy may be related to low glucose levels.  Feeling hot and dry may indicate high glucose levels. If you feel these symptoms, check your blood sugar.  Make regular podiatry appointments in order to have feet checked for wounds and toe nails cut by a doctor to prevent infections, as well as, appointments with an ophthalmologist to monitor your vision.  Secondary Diagnosis:	HTN (hypertension)  Assessment and plan of treatment:	Continue blood pressure medication regimen as directed. Monitor for any visual changes, headaches or dizziness.  Monitor blood pressure regularly.  Follow up with your PCP for further management for high blood pressure.  Secondary Diagnosis:	Chronic obstructive pulmonary disease, unspecified COPD type  Assessment and plan of treatment:	Continue your inhalers and annual pulmonary function tests with your outpatient provider. Monitor for exacerbation, such as, shortness of breath, hyperventilation, increased cough/sputum or any other difficulties breathing and report to the emergency room in the event that your rescue inhaler has not resolved your symptoms.  Secondary Diagnosis:	Chronic diastolic (congestive) heart failure  Assessment and plan of treatment:	Continue recommended medication regimen. Monitor for signs/symptoms of fluid overload and electrolyte abnormalities, such as, shortness of breath, cough, swelling, chest discomfort, changes in heart rate, dizziness, fainting, or changes in mental status. Follow-up with your PCP/cardiologist outpatient after you've been discharged from the hospital.  Secondary Diagnosis:	Anemia of chronic disease  Assessment and plan of treatment:	Follow-up with your outpatient provider if further treatment is warranted. Monitor for signs/symptoms indicating worsening of disease, such as, easy bleeding/bruising, pale skin, fatigue, dizziness, increased heart rate, or chest pain. Principal Discharge DX:	Enterococcal infection  Goal:	Resolution and return to baseline  Assessment and plan of treatment:	You will be given antibiotics with hemodialysis three time per week.    IT IS VERY IMPORTANT THAT YOU DO NOT MISS HEMODIALYSIS BECAUSE YOU WILL ALSO MISS YOU ANTIBIOTIC DOSING  Secondary Diagnosis:	ESRD (end stage renal disease)  Assessment and plan of treatment:	Please continue to follow your dialysis schedule and refer to your primary provider for further care/recommendations. Continue your medications and supplementation as directed.  Secondary Diagnosis:	Type 2 diabetes mellitus with diabetic nephropathy, with long-term current use of insulin  Assessment and plan of treatment:	Continue consistent carbohydrate diet.  Monitor blood glucose levels throughout the day before meals and at bedtime.  Record blood sugars and bring to outpatient providers appointment in order to be reviewed by your doctor for management modifications.  Be aware of diabetes management symptoms including feeling cool and clammy may be related to low glucose levels.  Feeling hot and dry may indicate high glucose levels. If you feel these symptoms, check your blood sugar.  Make regular podiatry appointments in order to have feet checked for wounds and toe nails cut by a doctor to prevent infections, as well as, appointments with an ophthalmologist to monitor your vision.  Secondary Diagnosis:	HTN (hypertension)  Assessment and plan of treatment:	Continue blood pressure medication regimen as directed. Monitor for any visual changes, headaches or dizziness.  Monitor blood pressure regularly.  Follow up with your PCP for further management for high blood pressure.  Secondary Diagnosis:	Chronic obstructive pulmonary disease, unspecified COPD type  Assessment and plan of treatment:	Continue your inhalers and annual pulmonary function tests with your outpatient provider. Monitor for exacerbation, such as, shortness of breath, hyperventilation, increased cough/sputum or any other difficulties breathing and report to the emergency room in the event that your rescue inhaler has not resolved your symptoms.  Secondary Diagnosis:	Chronic diastolic (congestive) heart failure  Assessment and plan of treatment:	Continue recommended medication regimen. Monitor for signs/symptoms of fluid overload and electrolyte abnormalities, such as, shortness of breath, cough, swelling, chest discomfort, changes in heart rate, dizziness, fainting, or changes in mental status. Follow-up with your PCP/cardiologist outpatient after you've been discharged from the hospital.  Secondary Diagnosis:	Anemia of chronic disease  Assessment and plan of treatment:	Follow-up with your outpatient provider if further treatment is warranted. Monitor for signs/symptoms indicating worsening of disease, such as, easy bleeding/bruising, pale skin, fatigue, dizziness, increased heart rate, or chest pain.

## 2018-07-12 NOTE — DISCHARGE NOTE ADULT - PATIENT PORTAL LINK FT
You can access the [facility] Patient Portal, offered by [facility], by registering with the following website: [email or web address]

## 2018-07-12 NOTE — DISCHARGE NOTE ADULT - MEDICATION SUMMARY - MEDICATIONS TO CHANGE
I will SWITCH the dose or number of times a day I take the medications listed below when I get home from the hospital:    HumaLOG 100 units/mL subcutaneous solution  -- 8 unit(s) subcutaneous 2 times a day  with lunch and dinner.   -- Do not drink alcoholic beverages when taking this medication.  It is very important that you take or use this exactly as directed.  Do not skip doses or discontinue unless directed by your doctor.  Keep in refrigerator.  Do not freeze.

## 2018-07-12 NOTE — PROGRESS NOTE ADULT - PROBLEM SELECTOR PLAN 1
E faecalis bacteremia from July 7 both bottles, amp sensitive, repeat cultures on 7/8  + 2/4 bottles (however 7/7 culture took very long to grow) this is due to permcath infection.  CTAP negative for source of infection.  Permcath removed 7/11.   - ID consult appreciated   - per ID change from vanco to ampicillin while in house, on dc will do vanco post HD x 2 wk course  (through 7/23)  - s/p course completed will need surveillance blood cultures 1 week later per ID  - repeat blood cultures 7/10 NG x 48H, 7/11 NG x 24H, permcath removed tip + for E fecalis thus likely source   - TTE w/ no change, no vegetations noted   - Cardio MEMs in place also, unclear if any relevance, d/w cards need (pt refusing interrogation of it thus what is point of having it) and whether is retrievable

## 2018-07-12 NOTE — DISCHARGE NOTE ADULT - CARE PROVIDER_API CALL
Jose Buckner  Primary Care Provider  Phone: (   )    -  Fax: (   )    - Jose Buckner  Primary Care Provider  Phone: (   )    -  Fax: (   )    -    Dariana Rivera), Internal Medicine; Nephrology  [facility] [address] [place] [address]  Phone: [phone]  Fax: [phone]    Mikhail Finn), Vascular Surgery  [address] [facility]  [address]  [place] [address]  Phone: [phone]  Fax: [phone]

## 2018-07-13 VITALS
DIASTOLIC BLOOD PRESSURE: 78 MMHG | RESPIRATION RATE: 18 BRPM | SYSTOLIC BLOOD PRESSURE: 100 MMHG | HEART RATE: 76 BPM | OXYGEN SATURATION: 94 %

## 2018-07-13 LAB
ALBUMIN SERPL ELPH-MCNC: 3.1 G/DL — LOW (ref 3.3–5)
ALP SERPL-CCNC: 80 U/L — SIGNIFICANT CHANGE UP (ref 40–120)
ALT FLD-CCNC: 14 U/L — SIGNIFICANT CHANGE UP (ref 4–33)
AST SERPL-CCNC: 20 U/L — SIGNIFICANT CHANGE UP (ref 4–32)
BACTERIA BLD CULT: SIGNIFICANT CHANGE UP
BILIRUB SERPL-MCNC: 0.4 MG/DL — SIGNIFICANT CHANGE UP (ref 0.2–1.2)
BUN SERPL-MCNC: 40 MG/DL — HIGH (ref 7–23)
CALCIUM SERPL-MCNC: 8.8 MG/DL — SIGNIFICANT CHANGE UP (ref 8.4–10.5)
CHLORIDE SERPL-SCNC: 98 MMOL/L — SIGNIFICANT CHANGE UP (ref 98–107)
CO2 SERPL-SCNC: 26 MMOL/L — SIGNIFICANT CHANGE UP (ref 22–31)
CREAT SERPL-MCNC: 3.53 MG/DL — HIGH (ref 0.5–1.3)
GLUCOSE SERPL-MCNC: 112 MG/DL — HIGH (ref 70–99)
HCT VFR BLD CALC: 30.7 % — LOW (ref 34.5–45)
HGB BLD-MCNC: 9.2 G/DL — LOW (ref 11.5–15.5)
MAGNESIUM SERPL-MCNC: 1.9 MG/DL — SIGNIFICANT CHANGE UP (ref 1.6–2.6)
MCHC RBC-ENTMCNC: 26.8 PG — LOW (ref 27–34)
MCHC RBC-ENTMCNC: 30 % — LOW (ref 32–36)
MCV RBC AUTO: 89.5 FL — SIGNIFICANT CHANGE UP (ref 80–100)
NRBC # FLD: 0 — SIGNIFICANT CHANGE UP
PHOSPHATE SERPL-MCNC: 4.4 MG/DL — SIGNIFICANT CHANGE UP (ref 2.5–4.5)
PLATELET # BLD AUTO: 236 K/UL — SIGNIFICANT CHANGE UP (ref 150–400)
PMV BLD: 10.2 FL — SIGNIFICANT CHANGE UP (ref 7–13)
POTASSIUM SERPL-MCNC: 4.2 MMOL/L — SIGNIFICANT CHANGE UP (ref 3.5–5.3)
POTASSIUM SERPL-SCNC: 4.2 MMOL/L — SIGNIFICANT CHANGE UP (ref 3.5–5.3)
PROT SERPL-MCNC: 7.3 G/DL — SIGNIFICANT CHANGE UP (ref 6–8.3)
RBC # BLD: 3.43 M/UL — LOW (ref 3.8–5.2)
RBC # FLD: 17.2 % — HIGH (ref 10.3–14.5)
SODIUM SERPL-SCNC: 136 MMOL/L — SIGNIFICANT CHANGE UP (ref 135–145)
SPECIMEN SOURCE: SIGNIFICANT CHANGE UP
WBC # BLD: 8.41 K/UL — SIGNIFICANT CHANGE UP (ref 3.8–10.5)
WBC # FLD AUTO: 8.41 K/UL — SIGNIFICANT CHANGE UP (ref 3.8–10.5)

## 2018-07-13 PROCEDURE — 99239 HOSP IP/OBS DSCHRG MGMT >30: CPT

## 2018-07-13 PROCEDURE — 99232 SBSQ HOSP IP/OBS MODERATE 35: CPT

## 2018-07-13 PROCEDURE — 93990 DOPPLER FLOW TESTING: CPT | Mod: 26

## 2018-07-13 RX ORDER — INSULIN LISPRO 100/ML
8 VIAL (ML) SUBCUTANEOUS
Qty: 4 | Refills: 0
Start: 2018-07-13 | End: 2018-08-11

## 2018-07-13 RX ORDER — LIDOCAINE AND PRILOCAINE CREAM 25; 25 MG/G; MG/G
1 CREAM TOPICAL
Qty: 0 | Refills: 0 | DISCHARGE
Start: 2018-07-13

## 2018-07-13 RX ORDER — VANCOMYCIN HCL 1 G
1000 VIAL (EA) INTRAVENOUS ONCE
Qty: 0 | Refills: 0 | Status: COMPLETED | OUTPATIENT
Start: 2018-07-13 | End: 2018-07-13

## 2018-07-13 RX ADMIN — PANTOPRAZOLE SODIUM 40 MILLIGRAM(S): 20 TABLET, DELAYED RELEASE ORAL at 05:14

## 2018-07-13 RX ADMIN — Medication 1 DROP(S): at 05:15

## 2018-07-13 RX ADMIN — Medication 8 UNIT(S): at 13:32

## 2018-07-13 RX ADMIN — Medication 5 UNIT(S): at 09:01

## 2018-07-13 RX ADMIN — BRIMONIDINE TARTRATE 1 DROP(S): 2 SOLUTION/ DROPS OPHTHALMIC at 13:30

## 2018-07-13 RX ADMIN — Medication 250 MILLIGRAM(S): at 15:52

## 2018-07-13 RX ADMIN — Medication 216 GRAM(S): at 05:15

## 2018-07-13 RX ADMIN — TIOTROPIUM BROMIDE 1 CAPSULE(S): 18 CAPSULE ORAL; RESPIRATORY (INHALATION) at 09:01

## 2018-07-13 RX ADMIN — BRIMONIDINE TARTRATE 1 DROP(S): 2 SOLUTION/ DROPS OPHTHALMIC at 05:14

## 2018-07-13 RX ADMIN — Medication 1000 UNIT(S): at 13:32

## 2018-07-13 RX ADMIN — Medication 80 MILLIGRAM(S): at 09:01

## 2018-07-13 RX ADMIN — BUDESONIDE AND FORMOTEROL FUMARATE DIHYDRATE 2 PUFF(S): 160; 4.5 AEROSOL RESPIRATORY (INHALATION) at 09:01

## 2018-07-13 RX ADMIN — Medication 50 MILLIGRAM(S): at 05:14

## 2018-07-13 RX ADMIN — Medication 100 MILLIGRAM(S): at 13:32

## 2018-07-13 RX ADMIN — GABAPENTIN 100 MILLIGRAM(S): 400 CAPSULE ORAL at 13:31

## 2018-07-13 NOTE — PROGRESS NOTE ADULT - NSHPATTENDINGPLANDISCUSS_GEN_ALL_CORE
Medicine NP (reports renal concerned for [facility]ABSI)
pt and primary team
pt, HD RN
pt and primary team
pt

## 2018-07-13 NOTE — PROGRESS NOTE ADULT - SUBJECTIVE AND OBJECTIVE BOX
Patient is a 78y old  Female who presents with a chief complaint of positive cx    SUBJECTIVE / OVERNIGHT EVENTS:    feels better  minimal/no pain at permcath site  still coughing intermittently   no CP, SOB  wants to leave    MEDICATIONS  (STANDING):  ampicillin  IVPB 2 Gram(s) IV Intermittent every 12 hours  aspirin enteric coated 81 milliGRAM(s) Oral daily  atorvastatin 40 milliGRAM(s) Oral at bedtime  brimonidine 0.2% Ophthalmic Solution 1 Drop(s) Both EYES every 8 hours  buDESOnide  80 MICROgram(s)/formoterol 4.5 MICROgram(s) Inhaler 2 Puff(s) Inhalation two times a day  cholecalciferol 1000 Unit(s) Oral daily  dextrose 5%. 1000 milliLiter(s) (50 mL/Hr) IV Continuous <Continuous>  dextrose 50% Injectable 12.5 Gram(s) IV Push once  dextrose 50% Injectable 25 Gram(s) IV Push once  dextrose 50% Injectable 25 Gram(s) IV Push once  epoetin randy Injectable 81291 Unit(s) IV Push <User Schedule>  furosemide    Tablet 80 milliGRAM(s) Oral <User Schedule>  gabapentin 100 milliGRAM(s) Oral daily  guaiFENesin    Syrup 100 milliGRAM(s) Oral every 6 hours  heparin  Injectable 5000 Unit(s) SubCutaneous every 12 hours  insulin glargine Injectable (LANTUS) 30 Unit(s) SubCutaneous at bedtime  insulin lispro (HumaLOG) corrective regimen sliding scale   SubCutaneous three times a day before meals  insulin lispro (HumaLOG) corrective regimen sliding scale   SubCutaneous at bedtime  insulin lispro Injectable (HumaLOG) 8 Unit(s) SubCutaneous before lunch  insulin lispro Injectable (HumaLOG) 8 Unit(s) SubCutaneous before dinner  insulin lispro Injectable (HumaLOG) 5 Unit(s) SubCutaneous before breakfast  lidocaine/prilocaine Cream 1 Application(s) Topical <User Schedule>  metoprolol tartrate 50 milliGRAM(s) Oral two times a day  pantoprazole    Tablet 40 milliGRAM(s) Oral before breakfast  timolol 0.5% Solution 1 Drop(s) Both EYES two times a day  tiotropium 18 MICROgram(s) Capsule 1 Capsule(s) Inhalation daily    MEDICATIONS  (PRN):  acetaminophen   Tablet 650 milliGRAM(s) Oral every 6 hours PRN mild moderate and severe pain  benzonatate 100 milliGRAM(s) Oral three times a day PRN Cough  dextrose 40% Gel 15 Gram(s) Oral once PRN Blood Glucose LESS THAN 70 milliGRAM(s)/deciliter  glucagon  Injectable 1 milliGRAM(s) IntraMuscular once PRN Glucose LESS THAN 70 milligrams/deciliter    T(C): 37.1 (07-13-18 @ 05:12), Max: 37.1 (07-13-18 @ 05:12)  HR: 76 (07-13-18 @ 13:38) (68 - 86)  BP: 100/78 (07-13-18 @ 13:38) (100/78 - 158/78)  RR: 18 (07-13-18 @ 13:38) (18 - 20)  SpO2: 94% (07-13-18 @ 13:38) (92% - 94%)    CAPILLARY BLOOD GLUCOSE  POCT Blood Glucose.: 130 mg/dL (13 Jul 2018 13:30)  POCT Blood Glucose.: 180 mg/dL (13 Jul 2018 12:20)  POCT Blood Glucose.: 150 mg/dL (13 Jul 2018 08:50)  POCT Blood Glucose.: 140 mg/dL (12 Jul 2018 21:54)  POCT Blood Glucose.: 134 mg/dL (12 Jul 2018 16:33)    I&O's Summary    12 Jul 2018 07:01  -  13 Jul 2018 07:00  --------------------------------------------------------  IN: 400 mL / OUT: 2200 mL / NET: -1800 mL    PHYSICAL EXAM:  GENERAL: NAD  HEAD:  Atraumatic, Normocephalic  EYES: EOMI, PERRLA, conjunctiva and sclera clear  NECK: Supple, No JVD  CHEST/LUNG: Clear to auscultation bilaterally; No wheeze  HEART: Regular rate and rhythm; No murmurs, rubs, or gallops  ABDOMEN: Soft, Nontender, Nondistended; Bowel sounds present  EXTREMITIES:   warm and well perfused, No clubbing, cyanosis, or edema, LUE w/ lymphedema (prior LN dissection)   PSYCH: AAOx3  NEUROLOGY: non-focal  SKIN: permcath removed site dressing c/d/i, R AVF     LABS:                        9.2    8.41  )-----------( 236      ( 13 Jul 2018 06:15 )             30.7     07-13    136  |  98  |  40<H>  ----------------------------<  112<H>  4.2   |  26  |  3.53<H>    Ca    8.8      13 Jul 2018 06:15  Phos  4.4     07-13  Mg     1.9     07-13    TPro  7.3  /  Alb  3.1<L>  /  TBili  0.4  /  DBili  x   /  AST  20  /  ALT  14  /  AlkPhos  80  07-13      Consultant(s) Notes Reviewed:  vascular, renal, ID    Care Discussed with Consultants/Other Providers: Dr. Nolasco

## 2018-07-13 NOTE — PROGRESS NOTE ADULT - SUBJECTIVE AND OBJECTIVE BOX
SUBJECTIVE:  Denies CP or SOB. States she is significantly better and wants to go home today.         MEDICATIONS  (STANDING):  ampicillin  IVPB 2 Gram(s) IV Intermittent every 12 hours  aspirin enteric coated 81 milliGRAM(s) Oral daily  atorvastatin 40 milliGRAM(s) Oral at bedtime  brimonidine 0.2% Ophthalmic Solution 1 Drop(s) Both EYES every 8 hours  buDESOnide  80 MICROgram(s)/formoterol 4.5 MICROgram(s) Inhaler 2 Puff(s) Inhalation two times a day  cholecalciferol 1000 Unit(s) Oral daily  dextrose 5%. 1000 milliLiter(s) (50 mL/Hr) IV Continuous <Continuous>  dextrose 50% Injectable 12.5 Gram(s) IV Push once  dextrose 50% Injectable 25 Gram(s) IV Push once  dextrose 50% Injectable 25 Gram(s) IV Push once  epoetin randy Injectable 28807 Unit(s) IV Push <User Schedule>  furosemide    Tablet 80 milliGRAM(s) Oral <User Schedule>  gabapentin 100 milliGRAM(s) Oral daily  guaiFENesin    Syrup 100 milliGRAM(s) Oral every 6 hours  heparin  Injectable 5000 Unit(s) SubCutaneous every 12 hours  insulin glargine Injectable (LANTUS) 30 Unit(s) SubCutaneous at bedtime  insulin lispro (HumaLOG) corrective regimen sliding scale   SubCutaneous three times a day before meals  insulin lispro (HumaLOG) corrective regimen sliding scale   SubCutaneous at bedtime  insulin lispro Injectable (HumaLOG) 8 Unit(s) SubCutaneous before lunch  insulin lispro Injectable (HumaLOG) 8 Unit(s) SubCutaneous before dinner  insulin lispro Injectable (HumaLOG) 5 Unit(s) SubCutaneous before breakfast  lidocaine/prilocaine Cream 1 Application(s) Topical <User Schedule>  metoprolol tartrate 50 milliGRAM(s) Oral two times a day  pantoprazole    Tablet 40 milliGRAM(s) Oral before breakfast  timolol 0.5% Solution 1 Drop(s) Both EYES two times a day  tiotropium 18 MICROgram(s) Capsule 1 Capsule(s) Inhalation daily    MEDICATIONS  (PRN):  acetaminophen   Tablet 650 milliGRAM(s) Oral every 6 hours PRN mild moderate and severe pain  benzonatate 100 milliGRAM(s) Oral three times a day PRN Cough  dextrose 40% Gel 15 Gram(s) Oral once PRN Blood Glucose LESS THAN 70 milliGRAM(s)/deciliter  glucagon  Injectable 1 milliGRAM(s) IntraMuscular once PRN Glucose LESS THAN 70 milligrams/deciliter      LABS:                        9.2    8.41  )-----------( 236      ( 13 Jul 2018 06:15 )             30.7     Hemoglobin: 9.2 g/dL (07-13 @ 06:15)  Hemoglobin: 9.6 g/dL (07-11 @ 05:40)  Hemoglobin: 9.5 g/dL (07-10 @ 06:39)  Hemoglobin: 9.0 g/dL (07-09 @ 05:04)    07-13    136  |  98  |  40<H>  ----------------------------<  112<H>  4.2   |  26  |  3.53<H>    Ca    8.8      13 Jul 2018 06:15  Phos  4.4     07-13  Mg     1.9     07-13    TPro  7.3  /  Alb  3.1<L>  /  TBili  0.4  /  DBili  x   /  AST  20  /  ALT  14  /  AlkPhos  80  07-13    Creatinine Trend: 3.53<--, 3.89<--, 3.33<--, 3.92<--, 3.94<--, 2.76<--           PHYSICAL EXAM  Vital Signs Last 24 Hrs  T(C): 37.1 (13 Jul 2018 05:12), Max: 37.1 (13 Jul 2018 05:12)  T(F): 98.8 (13 Jul 2018 05:12), Max: 98.8 (13 Jul 2018 05:12)  HR: 82 (13 Jul 2018 05:12) (68 - 86)  BP: 120/52 (13 Jul 2018 05:12) (116/43 - 158/78)  BP(mean): --  RR: 19 (13 Jul 2018 05:12) (18 - 20)  SpO2: 92% (13 Jul 2018 05:12) (91% - 93%)      Cardiovascular: Normal S1S2, No JVD, 1/6 HODA,   Respiratory: course breath sounds + rales   Gastrointestinal: Abdomen soft, ND, NT, +BS  Extremities no c/c/e b/l le's   Peripheral pulses palpable 2+ B/L    DIAGNOSTIC DATA    TTE:  Transthoracic Echocardiogram (05.08.18 @ 07:08) >  ------------------------------------------------------------------------  CONCLUSIONS: EF 5%%  1.  Peak mitral valve gradient equals 16 mm Hg, mean  transmitral valve gradient equals 7.5 mm Hg, consistent  with moderate mitral stenosis.  2. Moderate left atrial enlargement.  3. Moderate concentric left ventricular hypertrophy.  4. Normal Left Ventricular Systolic Function,  (EF = 55 to  60%)  5. Grade II diastolic dysfunction.  6. RV systolic pressure is moderately increased at  49 mm  Hg.  7. There is mild-moderate tricuspid regurgitation.    < end of copied text >    TTE:  Transthoracic Echocardiogram (07.10.18 @ 13:29) >  CONCLUSIONS:  1. Mitral annular calcification, otherwise normal mitral  valve. Mild mitral regurgitation.  2. Mildly dilated left atrium.  LA volume index = 36 cc/m2.  3. Mild concentric left ventricular hypertrophy.  4. Endocardium not well visualized; grossly normal left  ventricular systolic function.  5. Normal right ventricular size and function.  6. Estimated right ventricular systolic pressure equals 57  mm Hg, assuming right atrial pressure equals 10 mm Hg,  consistent with moderate pulmonary hypertension.  ------------------------------------------------------------------------  Confirmed on  7/10/2018 - 14:37:21 by Ayo Guzman M.D.    < end of copied text >    NST:   < from: Nuclear Stress Test-Pharmacologic (12.01.17 @ 08:43) >  IMPRESSIONS:Normal Study  * Negative ECG evidence of ischemia after IV  administartion of Regadenoson.  * Myocardial Perfusion SPECT results are normal.  * No clear evidence of ischemia or infarct.  * Post-stress resting myocardial perfusion gated SPECT  imaging was performed (LVEF = 53 %) with no regional wall  motion abnormalities.    < end of copied text >    RADIOLOGY:         Xray Chest 1 View- PORTABLE-Urgent (07.03.18 @ 12:43) >    IMPRESSION:  Mild interstitial edema.            ASSESSMENT AND PLAN: 79y/o  Female well known to our service with HTN DM ESRD on HD  diastolic CHF s/p Cardio MEMS implantation, asthma, anemia (on weekly procrit), breast CA s/p Left lumpectomy and radiation in 2005, uterine CA s/p JACLYN,  with no known h/o CAD/MI with no ischemia on NST 12/17 at [facility], with recent TTE 5/18 with normal LV function, moderate LVH with mod mitral stenosis,  recently admitted with respiratory failure 2/2 PNA 5/18 now readmitted with recurrent non productive cough, progressively worsening dyspnea (there is a chronic component though she states its worse in the last several days) and associated weakness. Now re-admitted with Enterococcus bacteremia    -- Per Vascular, if patient is to be discharged she may have Fistulogram with  on Tuesday as outpatient, if NOT then should consult with IR to have it done inpatient.  -- Continue with ABX as per ID  -- No vegetations noted on JAYSON as noted above  -- Elevated high sensitivity troponin non specific given ESRD with no acute ischemia on EKG  -- recent TTE with normal LV function and moderate mitral stenosis  -- recent NST with no evidence of ischemia  -- Current not volume overloaded, no orthopnea or ALFORD.  Cont PO Lasix/HD per renal.    -- c/w asa / statin / bb / Lasix  --Interrogate cardio mems   --f/u Dr Salas in 2 weeks      Kiana Schmid PA-C

## 2018-07-13 NOTE — PROGRESS NOTE ADULT - PROVIDER SPECIALTY LIST ADULT
Cardiology
Hospitalist
Infectious Disease
Nephrology
Infectious Disease
Nephrology
Hospitalist
Nephrology

## 2018-07-13 NOTE — PROGRESS NOTE ADULT - ASSESSMENT
78 F HTN DM2 ESRD on HD diastolic CHF s/p Cardio MEMS implantation, admitted 7/3/18 after missing HD due to fatigue, treated for volume overload with fluid removal at HD, had cultures drawn which are grew Enterococcus, so patient called back for further care.  Patient with enterococcus bacteremia S to Amp  Appears well with no leukocytosis, no fevers  CTs negative for alternate source; permacath likely source  Overall, enterococcus bacteremia, ESRD through permacath  - Ampicillin 2g q 12  - F/U BCX  - As long as BCX remain clear, plan to treat with post HD vancomycin to complete 2 week course from negative BCX (through 7/23)  - After course completed will need surveillance blood cultures 1 week later    Willam Nolasco MD  Pager [phone]  After 5pm and on weekends call [phone]
79 y/o F w/ a PMHX of ESRD on HD, COPD on home O2, HTN, chronic diastolic HF who presents as call back for Enterococcus bacteremia. Renal consulted for HD    ESRD on HD outpt TTS. pulm vas congestion on CXR, not in resp distress. K, bicarb acceptable. vol acceptable now  HTN, uncontrolled  Anemia in CKD  Enterococcus sps + bl cx 7/7 and 7/8. s/p PC removed    labs, chart, reviewed
77 y/o F w/ a PMHX of ESRD on HD, COPD on home O2, HTN, chronic diastolic HF who presents as call back for Enterococcus bacteremia. Renal consulted for HD    ESRD on HD outpt TTS. pulm vas congestion on CXR, not in resp distress. K, bicarb acceptable. s/p lasix 40mg ivp x1 7/8 PM  HTN, uncontrolled  Anemia in CKD  Enterococcus sps + bl cx 7/7 and 7/8 pre abx- 1/2 bottles afebrile. ?source. clinically no s/o PC tunnel infection or AVF site but had HD via AVF, should have PC removed  labs, chart, rad reviewed
78 F HTN DM2 ESRD on HD diastolic CHF s/p Cardio MEMS implantation, admitted 7/3/18 after missing HD due to fatigue, treated for volume overload with fluid removal at HD, had cultures drawn which are grew Enterococcus, so patient called back for further care.  Patient with enterococcus bacteremia S to Amp  Appears well with no leukocytosis, no fevers  No abd pain, no symptoms at permacath  Sources--intra-abd vs permacath related vs other occult  Overall, enterococcus bacteremia, ESRD through permacath  - Ampicillin 2g q 12  - DC Vanco  - F/U BCX, repeat until clear  - Check TTE, Check CT A/P  - Would DC Permacath (BCX on re-presentation positive; no clear source)    Willam Nolasco MD  Pager [phone]  After 5pm and on weekends call [phone]
78 F HTN DM2 ESRD on HD diastolic CHF s/p Cardio MEMS implantation, admitted 7/3/18 after missing HD due to fatigue, treated for volume overload with fluid removal at HD, had cultures drawn which are grew Enterococcus, so patient called back for further care.  Patient with enterococcus bacteremia S to Amp  Appears well with no leukocytosis, no fevers  No abd pain, no symptoms at permacath  Sources--intra-abd vs permacath related vs other occult  Overall, enterococcus bacteremia, ESRD through permacath  - Ampicillin 2g q 12  - F/U BCX  - TTE reassuring, hold on JAYSON unless BCX persistently positive  - F/U CT A/P read  - Would DC Permacath (BCX on re-presentation positive; no clear source)  - Would have cardiology comment on need for cardiac MEMS device--if not necessary would remove. It seems more likely that the infection is related to the permacath, but cannot prove/disprove that cardiac MEMS device has not been seeded. In the literature, there has been a case report of Candida tropicalis fungemia thought related to cardiac mems (otherwise few data). If bacteremia becomes persistent, would recommend removal.    Willam Nolasco MD  Pager [phone]  After 5pm and on weekends call [phone]
78 F HTN DM2 ESRD on HD diastolic CHF s/p Cardio MEMS implantation, admitted 7/3/18 after missing HD due to fatigue, treated for volume overload with fluid removal at HD, had cultures drawn which are grew Enterococcus, so patient called back for further care.  Patient with enterococcus bacteremia S to Amp, Vanco  Appears well with no leukocytosis, no fevers  CTs negative for alternate source; permacath likely source  Overall, enterococcus bacteremia, ESRD through permacath  - Vancomycin 1g now, then 1g post HD; check levels post HD  - As long as BCX remain clear, plan to treat with post HD vancomycin to complete 2 week course from negative BCX (through 7/23)  - Monitor while on abx per HD center  - After course completed will need surveillance blood cultures 1 week later--can follow in ID clinic if prefers to obtain F/U cultures with ID  - Discussed case with primary cardiologist--MEMs device becomes endothelialized after 1 month implant--less likely as source  - Will sign off. Please call with questions or change in status    Willam Nolasco MD  Pager [phone]  After 5pm and on weekends call [phone]
78F HTN, DM2, ESRD on HD (T/Th/Sat), diastolic CHF s/p Cardio MEMS implantation, asthma, anemia (on weekly procrit), breast CA s/p Left lumpectomy and radiation in 2005, uterine CA s/p JACLYN, with no known h/o CAD/MI with no ischemia on NST 12/17 at [facility], with recent TTE 5/18 with normal LV function, moderate LVH with mod mitral stenosis with fever 7/6/18 and enterococcus bacteremia in peripheral blood culture thought possible CLABSI 2//2 permcath infection--will d/w IR in am re: cath removal.
78F HTN, DM2, ESRD on HD (T/Th/Sat), diastolic CHF s/p Cardio MEMS implantation, asthma, anemia (on weekly procrit), breast CA s/p Left lumpectomy and radiation in 2005, uterine CA s/p JACLYN, with no known h/o CAD/MI with no ischemia on NST 12/17 at [facility], with recent TTE 5/18 with normal LV function, moderate LVH with mod mitral stenosis with fever 7/6/18 and enterococcus bacteremia in peripheral blood culture thought possible CLABSI 2//2 permcath infection for removal today.
78F HTN, DM2, ESRD on HD (T/Th/Sat), diastolic CHF s/p Cardio MEMS implantation, asthma, anemia (on weekly procrit), breast CA s/p Left lumpectomy and radiation in 2005, uterine CA s/p JACLYN, with no known h/o CAD/MI with no ischemia on NST 12/17 at [facility], with recent TTE 5/18 with normal LV function, moderate LVH with mod mitral stenosis with fever 7/6/18 and enterococcus bacteremia due to CLABSI from permcath infection, removed on 7/11.
78F HTN, DM2, ESRD on HD (T/Th/Sat), diastolic CHF s/p Cardio MEMS implantation, asthma, anemia (on weekly procrit), breast CA s/p Left lumpectomy and radiation in 2005, uterine CA s/p JACLYN, with no known h/o CAD/MI with no ischemia on NST 12/17 at [facility], with recent TTE 5/18 with normal LV function, moderate LVH with mod mitral stenosis with fever 7/6/18 and enterococcus bacteremia in peripheral blood culture
77 y/o F w/ a PMHX of ESRD on HD, COPD on home O2, HTN, chronic diastolic HF who presents as call back for Enterococcus bacteremia. Renal consulted for HD    ESRD on HD outpt TTS. pulm vas congestion on CXR, not in resp distress. K, bicarb acceptable. s/p lasix 40mg ivp x1 7/8 PM  HTN, uncontrolled  Anemia in CKD  Enterococcus sps + bl cx 7/7 and 7/8 pre abx- 1/2 bottles afebrile. ?source. clinically no s/o PC tunnel infection or AVF site but had HD via AVF, should have PC removed  labs, chart, rad reviewed
78F HTN, DM2, ESRD on HD (T/Th/Sat), diastolic CHF s/p Cardio MEMS implantation, asthma, anemia (on weekly procrit), breast CA s/p Left lumpectomy and radiation in 2005, uterine CA s/p JACLYN, with no known h/o CAD/MI with no ischemia on NST 12/17 at [facility], with recent TTE 5/18 with normal LV function, moderate LVH with mod mitral stenosis with fever 7/6/18 and enterococcus bacteremia due to CLABSI from permcath infection, removed on 7/11.
77 y/o F w/ a PMHX of ESRD on HD, COPD on home O2, HTN, chronic diastolic HF who presents as call back for Enterococcus bacteremia. Renal consulted for HD    ESRD on HD outpt TTS. pulm vas congestion on CXR, not in resp distress. K, bicarb acceptable. s/p lasix 40mg ivp x1 7/8 PM  HTN, uncontrolled  Anemia in CKD  Enterococcus sps + bl cx 7/7 and 7/8 pre abx- 1/2 bottles afebrile. ?source. clinically no s/o PC tunnel infection or AVF site     labs, chart, rad reviewed
79 y/o F w/ a PMHX of ESRD on HD, COPD on home O2, HTN, chronic diastolic HF who presents as call back for Enterococcus bacteremia. Renal consulted for HD    ESRD on HD outpt TTS. pulm vas congestion on CXR, not in resp distress. K, bicarb acceptable. s/p lasix 40mg ivp x1 7/8 PM  HTN, uncontrolled  Anemia in CKD  Enterococcus sps + bl cx 7/7 and 7/8 pre abx- 1/2 bottles afebrile. ?source. clinically no s/o PC tunnel infection or AVF site but had HD via AVF, should have PC removed  labs, chart, rad reviewed

## 2018-07-13 NOTE — PROGRESS NOTE ADULT - PROBLEM SELECTOR PROBLEM 3
Enterococcal infection
ESRD (end stage renal disease)
Enterococcal infection
Enterococcal infection
ESRD (end stage renal disease)

## 2018-07-13 NOTE — PROGRESS NOTE ADULT - PROBLEM SELECTOR PLAN 2
c/w BB  bp drops during hd  hold Lopressor AM of HD days
- c/w HD  - c/w lasix on non HD days (of note doesn't make much urine per report)
c/w BB  bp drops during hd  hold Lopressor AM of HD days
c/w HD  c/w lasix (doesn't make much urine per report)
c/w BB  bp drops during hd  hold Lopressor AM of HD days
- c/w HD  - c/w lasix on non HD days (of note doesn't make much urine per report)

## 2018-07-13 NOTE — PROGRESS NOTE ADULT - SUBJECTIVE AND OBJECTIVE BOX
CC: F/U for Bacteremia    Saw/spoke to patient. No fevers, no chills. Overall well.    Allergies  No Known Allergies    ANTIMICROBIALS:  ampicillin  IVPB 2 every 12 hours    PE:    Vital Signs Last 24 Hrs  T(C): 37.1 (13 Jul 2018 05:12), Max: 37.1 (13 Jul 2018 05:12)  T(F): 98.8 (13 Jul 2018 05:12), Max: 98.8 (13 Jul 2018 05:12)  HR: 82 (13 Jul 2018 05:12) (68 - 86)  BP: 120/52 (13 Jul 2018 05:12) (116/43 - 158/78)  RR: 19 (13 Jul 2018 05:12) (18 - 20)  SpO2: 92% (13 Jul 2018 05:12) (91% - 93%)    Gen: AOx3, NAD, non-toxic, pleasant  CV: S1+S2 normal, no murmurs, nontachycardic  Resp: Clear bilat, no resp distress, no crackles/wheezes  Abd: Soft, nontender, +BS  Ext: No LE edema, no wounds  Lines: RUE fistula    LABS:                        9.2    8.41  )-----------( 236      ( 13 Jul 2018 06:15 )             30.7     07-13    136  |  98  |  40<H>  ----------------------------<  112<H>  4.2   |  26  |  3.53<H>    Ca    8.8      13 Jul 2018 06:15  Phos  4.4     07-13  Mg     1.9     07-13    TPro  7.3  /  Alb  3.1<L>  /  TBili  0.4  /  DBili  x   /  AST  20  /  ALT  14  /  AlkPhos  80  07-13    MICROBIOLOGY:    CATHETER TIP  07-11-18 Enterococcus    BLOOD  07-11-18 NGTD    BLOOD VENOUS  07-08-18 --  --  BLOOD CULTURE PCR  Enterococcus faecalis    BLOOD VENOUS  07-07-18 --  --  BLOOD CULTURE PCR  Enterococcus faecalis    (otherwise reviewed)    RADIOLOGY:    No new available

## 2018-07-13 NOTE — PROGRESS NOTE ADULT - PROBLEM SELECTOR PROBLEM 4
Anemia of chronic disease
Type 2 diabetes mellitus with diabetic nephropathy, with long-term current use of insulin
Anemia of chronic disease
Type 2 diabetes mellitus with diabetic nephropathy, with long-term current use of insulin

## 2018-07-13 NOTE — PROGRESS NOTE ADULT - PROBLEM SELECTOR PROBLEM 2
Essential hypertension
Chronic diastolic (congestive) heart failure
Essential hypertension
Essential hypertension
Chronic diastolic (congestive) heart failure

## 2018-07-13 NOTE — PROGRESS NOTE ADULT - SUBJECTIVE AND OBJECTIVE BOX
[facility] - Nicole / Juvenal FENTON /Linette/ ELICEO Isaac/ ELICEO Rojas/ Regan Castanon / TRACY Njeru  ---------------------------------------------------------------------------------------------------------------    Patient seen and examined bedside    Subjective and Objective: No overnight events. denies fever/chills/sob. No complaints today. feeling better. wants to go home    Allergies: No Known Allergies      Hospital Medications:   MEDICATIONS  (STANDING):  ampicillin  IVPB 2 Gram(s) IV Intermittent every 12 hours  aspirin enteric coated 81 milliGRAM(s) Oral daily  atorvastatin 40 milliGRAM(s) Oral at bedtime  brimonidine 0.2% Ophthalmic Solution 1 Drop(s) Both EYES every 8 hours  buDESOnide  80 MICROgram(s)/formoterol 4.5 MICROgram(s) Inhaler 2 Puff(s) Inhalation two times a day  cholecalciferol 1000 Unit(s) Oral daily  dextrose 5%. 1000 milliLiter(s) (50 mL/Hr) IV Continuous <Continuous>  dextrose 50% Injectable 12.5 Gram(s) IV Push once  dextrose 50% Injectable 25 Gram(s) IV Push once  dextrose 50% Injectable 25 Gram(s) IV Push once  epoetin randy Injectable 73793 Unit(s) IV Push <User Schedule>  furosemide    Tablet 80 milliGRAM(s) Oral <User Schedule>  gabapentin 100 milliGRAM(s) Oral daily  guaiFENesin    Syrup 100 milliGRAM(s) Oral every 6 hours  heparin  Injectable 5000 Unit(s) SubCutaneous every 12 hours  insulin glargine Injectable (LANTUS) 30 Unit(s) SubCutaneous at bedtime  insulin lispro (HumaLOG) corrective regimen sliding scale   SubCutaneous three times a day before meals  insulin lispro (HumaLOG) corrective regimen sliding scale   SubCutaneous at bedtime  insulin lispro Injectable (HumaLOG) 8 Unit(s) SubCutaneous before lunch  insulin lispro Injectable (HumaLOG) 8 Unit(s) SubCutaneous before dinner  insulin lispro Injectable (HumaLOG) 5 Unit(s) SubCutaneous before breakfast  lidocaine/prilocaine Cream 1 Application(s) Topical <User Schedule>  metoprolol tartrate 50 milliGRAM(s) Oral two times a day  pantoprazole    Tablet 40 milliGRAM(s) Oral before breakfast  timolol 0.5% Solution 1 Drop(s) Both EYES two times a day  tiotropium 18 MICROgram(s) Capsule 1 Capsule(s) Inhalation daily      VITALS:  T(F): 98.8 (07-13-18 @ 05:12), Max: 98.8 (07-13-18 @ 05:12)  HR: 82 (07-13-18 @ 05:12)  BP: 120/52 (07-13-18 @ 05:12)  RR: 19 (07-13-18 @ 05:12)  SpO2: 92% (07-13-18 @ 05:12)  Wt(kg): --    07-12 @ 07:01  -  07-13 @ 07:00  --------------------------------------------------------  IN: 400 mL / OUT: 2200 mL / NET: -1800 mL      PHYSICAL EXAM:  Constitutional: NAD  HEENT: anicteric sclera, oropharynx clear  Neck: No JVD  Respiratory: CTAB, no wheezes, rales or rhonchi  Cardiovascular: S1, S2, RRR  Gastrointestinal: BS+, soft, NT/ND  Extremities: No cyanosis or clubbing. No peripheral edema  Neurological: A/O x 3, no focal deficits  Psychiatric: Normal mood, normal affect  : No CVA tenderness. No haas.   Skin: No rashes  Vascular Access: RFA AVF+thrill     LABS:  07-13    136  |  98  |  40<H>  ----------------------------<  112<H>  4.2   |  26  |  3.53<H>    Ca    8.8      13 Jul 2018 06:15  Phos  4.4     07-13  Mg     1.9     07-13    TPro  7.3  /  Alb  3.1<L>  /  TBili  0.4  /  DBili      /  AST  20  /  ALT  14  /  AlkPhos  80  07-13    Creatinine Trend: 3.53 <--, 3.89 <--, 3.33 <--, 3.92 <--, 3.94 <--, 2.76 <--                        9.2    8.41  )-----------( 236      ( 13 Jul 2018 06:15 )             30.7     Urine Studies:        RADIOLOGY & ADDITIONAL STUDIES:

## 2018-07-13 NOTE — PROGRESS NOTE ADULT - PROBLEM SELECTOR PLAN 4
BG much improved   - c/w lantus 30 qhs  - c/w lispro 8 w/ lunch and dinner  - c/w lispro 5 w/ breakfast   - c/w AISHA

## 2018-07-13 NOTE — PROGRESS NOTE ADULT - PROBLEM SELECTOR PLAN 1
E faecalis bacteremia from July 7 both bottles, amp sensitive, repeat cultures on 7/8  + 2/4 bottles (however 7/7 culture took very long to grow) this is due to permcath infection.  CTAP negative for source of infection.  Permcath removed 7/11.   - ID consult appreciated   - per ID change from vanco to ampicillin while in house  - on dc vanco 1g post HD x 2 wk course, will need trough checks with HD  (through 7/23)  - s/p course completed will need surveillance blood cultures 1 week later per ID  - repeat blood cultures 7/10 NG x 72H, 7/11 NG x 48H, permcath removed tip + for E fecalis thus likely source   - TTE w/ no change, no vegetations noted   - Cardio MEMs in place also, per cards endothelialized, less likely an issue at this point E faecalis bacteremia from July 7 both bottles, amp sensitive, repeat cultures on 7/8  + 2/4 bottles (however 7/7 culture took very long to grow) this is due to permcath infection.  CTAP negative for source of infection.  Permcath removed 7/11.   - ID consult appreciated   - per ID change from vanco to ampicillin while in house  - on dc vanco 1g post HD x 2 wk course, will need trough checks with HD  (through 7/23), to be given one dose today 7/13 prior to dc home  - s/p course completed will need surveillance blood cultures 1 week later per ID  - repeat blood cultures 7/10 NG x 72H, 7/11 NG x 48H, permcath removed tip + for E fecalis thus likely source   - TTE w/ no change, no vegetations noted   - Cardio MEMs in place also, per cards endothelialized, less likely an issue at this point

## 2018-07-13 NOTE — PROGRESS NOTE ADULT - PROBLEM SELECTOR PROBLEM 5
DM (diabetes mellitus)
Essential hypertension

## 2018-07-13 NOTE — CONSULT NOTE ADULT - SUBJECTIVE AND OBJECTIVE BOX
"HPI: 78F HTN DM2 ESRD on HD diastolic CHF s/p Cardio MEMS implantation, asthma, anemia (on weekly procrit), breast CA s/p Left lumpectomy and radiation in 2005, uterine CA s/p JACLYN, with no known h/o CAD/MI with no ischemia on NST 12/17 at [facility], with recent TTE 5/18 with normal LV function, moderate LVH with mod mitral stenosis, admitted 7/3/18 after missing HD due to fatigue, treated for volume overload with fluid removal at HD.  Was febrile to 102.7 on 7/6/18 at 9pm, blood cultures drawn peripheral and from HD catheter.  7/7/18 patient was discharged.  Peripheral blood cx positive for enterococcus by PCR and patient called back for treatment.  Denies further fevers, chills, weakness. She is upset about staying in uncomfortable stretcher.  She was treated with Vancomycin 1gm. (08 Jul 2018 16:41)"    Above reviewed. Patient initially presented with episode of fever. Recently admitted with fever, as well as missed HD session. Patient brought back to hospital when found to have positive blood cultures with enterococcus. Today patient appears overall well. No fevers, no chills, no new complaints. Has both fistula and R IJ permcath (placed in 1/2018). No other focal complaints. No dysuria, no pyuria. No abd pain, does not make urine. Overall well. Permacath has not been painful, no discharge, no change.    PAST MEDICAL & SURGICAL HISTORY:  MS (mitral stenosis)  GERD (gastroesophageal reflux disease)  COPD (chronic obstructive pulmonary disease): On home oxygen  HLD (hyperlipidemia)  HTN (hypertension)  ESRD (end stage renal disease) on dialysis  Chronic diastolic (congestive) heart failure  Anemia of chronic disease  Breast carcinoma: Lt side s/p lumpectomy and radiation in 2004  DM (diabetes mellitus): type 2  S/P subtotal hysterectomy  S/P lumpectomy, left breast: 2004    Allergies    No Known Allergies    ANTIMICROBIALS:  Vanco by level    OTHER MEDS:  acetaminophen   Tablet 650 milliGRAM(s) Oral every 6 hours PRN  aspirin enteric coated 81 milliGRAM(s) Oral daily  atorvastatin 40 milliGRAM(s) Oral at bedtime  brimonidine 0.2% Ophthalmic Solution 1 Drop(s) Both EYES every 8 hours  buDESOnide  80 MICROgram(s)/formoterol 4.5 MICROgram(s) Inhaler 2 Puff(s) Inhalation two times a day  dextrose 40% Gel 15 Gram(s) Oral once PRN  dextrose 5%. 1000 milliLiter(s) IV Continuous <Continuous>  dextrose 50% Injectable 12.5 Gram(s) IV Push once  dextrose 50% Injectable 25 Gram(s) IV Push once  dextrose 50% Injectable 25 Gram(s) IV Push once  epoetin randy Injectable 11836 Unit(s) IV Push <User Schedule>  furosemide    Tablet 80 milliGRAM(s) Oral <User Schedule> PRN  gabapentin 100 milliGRAM(s) Oral daily  glucagon  Injectable 1 milliGRAM(s) IntraMuscular once PRN  guaiFENesin    Syrup 100 milliGRAM(s) Oral every 6 hours PRN  heparin  Injectable 5000 Unit(s) SubCutaneous every 8 hours  insulin glargine Injectable (LANTUS) 30 Unit(s) SubCutaneous at bedtime  insulin lispro (HumaLOG) corrective regimen sliding scale   SubCutaneous three times a day before meals  insulin lispro (HumaLOG) corrective regimen sliding scale   SubCutaneous at bedtime  insulin lispro Injectable (HumaLOG) 8 Unit(s) SubCutaneous before lunch  insulin lispro Injectable (HumaLOG) 8 Unit(s) SubCutaneous before dinner  lidocaine/prilocaine Cream 1 Application(s) Topical <User Schedule>  metoprolol tartrate 50 milliGRAM(s) Oral two times a day  pantoprazole    Tablet 40 milliGRAM(s) Oral before breakfast  timolol 0.5% Solution 1 Drop(s) Both EYES two times a day  tiotropium 18 MICROgram(s) Capsule 1 Capsule(s) Inhalation daily    SOCIAL HISTORY: No tobacco, no alcohol, no illicit drugs    FAMILY HISTORY:  Diabetes mellitus  Family history of breast cancer (Aunt)    Drug Dosing Weight  Height (cm): 165.1 (08 Jul 2018 20:12)  Weight (kg): 86.2 (08 Jul 2018 20:12)  BMI (kg/m2): 31.6 (08 Jul 2018 20:12)  BSA (m2): 1.94 (08 Jul 2018 20:12)    PE:    Vital Signs Last 24 Hrs  T(C): 37.7 (09 Jul 2018 12:47), Max: 37.7 (09 Jul 2018 12:47)  T(F): 99.9 (09 Jul 2018 12:47), Max: 99.9 (09 Jul 2018 12:47)  HR: 72 (09 Jul 2018 12:47) (72 - 93)  BP: 156/71 (09 Jul 2018 12:47) (150/76 - 160/80)  RR: 20 (09 Jul 2018 12:47) (13 - 20)  SpO2: 95% (09 Jul 2018 05:56) (95% - 100%)    Gen: AOx3, NAD, non-toxic, pleasant  CV: S1+S2 normal, no murmurs, nontachycardic  Resp: Clear bilat, no resp distress, no crackles/wheezes  Abd: Soft, nontender, +BS  Ext: RUE fistula  : No Campbell, no suprapubic tenderness  IV/Skin: R IJ permacath  Msk: No low back pain, no arthralgias, no joint swelling  Neuro: No sensory deficits, no motor deficits    LABS:                        9.0    9.81  )-----------( 229      ( 09 Jul 2018 05:04 )             30.0     07-09    135  |  95<L>  |  65<H>  ----------------------------<  250<H>  4.4   |  21<L>  |  3.92<H>    Ca    9.2      09 Jul 2018 05:04  Phos  5.4     07-09  Mg     2.1     07-09    TPro  7.3  /  Alb  3.1<L>  /  TBili  0.3  /  DBili  x   /  AST  17  /  ALT  16  /  AlkPhos  110  07-09    MICROBIOLOGY:  Vancomycin Level, Random: 11.7 ug/mL (07-09-18 @ 05:04)    BLOOD PERIPHERAL  07-08-18 NGTD    BLOOD VENOUS  07-08-18 --  --  BLOOD CULTURE PCR Enterococcus    BLOOD VENOUS  07-07-18 --  --  BLOOD CULTURE PCR  Enterococcus faecalis    BLOOD PERIPHERAL  07-07-18 NGTD    (otherwise reviewed)    RADIOLOGY:    7/8 CXR:    IMPRESSION:    The patient is rotated, limiting the evaluation, particularly of the left   lung. Right-sided double-lumen central venous line is unchanged in   position. The cardiomems device is noted.    The cardiac silhouette is enlarged, similar to the prior study. There are   dense calcifications of the mitral valve annulus. There is stable mild   dilatation of the main pulmonary artery.    There is mild pulmonary vascular congestion. There is blunting of both   costophrenic angles, which may be secondary to small pleural effusions.   There are nodular opacities bilaterally which may be related to pulmonary   vascular congestion or may represent superimposed infection. Chest x-ray   follow-up is advised.
[facility] - Nicole / Juvenal S /Linette/ S Poncho/ ELICEO Rojas/ Regan Castanon / TRACY Njeru  ---------------------------------------------------------------------------------------------------------------  Patient seen and examined in ER    79 y/o F w/ a PMHX of ESRD on HD TTS from [facility] unit, well known to me, COPD on home O2, HTN, chronic diastolic HF who presented as called back today for Enterococcus bacteremia. Patient was recently admitted to [facility] w/fluid overload/SOB, was d/c last night home. blood cxs drawn yesterday for unclear reason. Pt denied any fever/chills during last hd 7/6 (used AVF for 1 needle and returned to ) or yesterday. Pt states only complaints of coughing that started today and yellow and urinary frequency. Patient denies nasal congestion, abdominal pain, nausea, vomiting, recent antibiotic use, diarrhea, dysuria, new rashes or injuries, headaches, neck stiffness, photophobia, and sick contacts. currently c/o only cough. no sob.    PAST MEDICAL & SURGICAL HISTORY:  MS (mitral stenosis)  GERD (gastroesophageal reflux disease)  COPD (chronic obstructive pulmonary disease): On home oxygen  HLD (hyperlipidemia)  HTN (hypertension)  ESRD (end stage renal disease) on dialysis  Chronic diastolic (congestive) heart failure  Anemia of chronic disease  Breast carcinoma: Lt side s/p lumpectomy and radiation in 2004  DM (diabetes mellitus)  S/P subtotal hysterectomy  S/P lumpectomy, left breast: 2004      Allergies: No Known Allergies    Home Medications Reviewed  Hospital Medications:   MEDICATIONS  (STANDING):    SOCIAL HISTORY:  Denies ETOh,Smoking, illicit drug use  FAMILY HISTORY:  Diabetes mellitus  Family history of breast cancer (Aunt)      REVIEW OF SYSTEMS:  CONSTITUTIONAL: No weakness, fevers or chills  EYES/ENT: No visual changes;  No vertigo or throat pain   NECK: No pain or stiffness  RESPIRATORY: +cough, no wheezing, hemoptysis; No shortness of breath  CARDIOVASCULAR: No chest pain or palpitations.  GASTROINTESTINAL: No abdominal or epigastric pain. No nausea, vomiting, or hematemesis; No diarrhea or constipation. No melena or hematochezia.  NEUROLOGICAL: No numbness or weakness  SKIN: No itching, burning, rashes, or lesions   VASCULAR: No bilateral lower extremity edema.   All other review of systems is negative unless indicated above.    VITALS:  T(F): 98.8 (07-08-18 @ 09:06), Max: 98.8 (07-08-18 @ 09:06)  HR: 96 (07-08-18 @ 09:06)  BP: 179/87 (07-08-18 @ 09:06)  RR: 18 (07-08-18 @ 09:06)  SpO2: 95% (07-08-18 @ 10:31)  Wt(kg): --      PHYSICAL EXAM:  Constitutional: NAD  HEENT: anicteric sclera, oropharynx clear, MMM  Neck: No JVD  Respiratory: CTAB, no wheezes, rales or rhonchi  Cardiovascular: S1, S2, RRR  Gastrointestinal: BS+, soft, NT/ND  Extremities: No cyanosis or clubbing. No peripheral edema  Neurological: A/O x 3, no focal deficits  Psychiatric: Normal mood, normal affect  : No CVA tenderness. No haas.   Skin: No rashes  Vascular Access: RFA AVF+thrill; Rt IJ PC- no redness/tenderness over tunnel    LABS:  07-08    133<L>  |  92<L>  |  51<H>  ----------------------------<  379<H>  4.4   |  27  |  3.94<H>    Ca    8.9      08 Jul 2018 10:32    TPro  7.5  /  Alb  3.5  /  TBili  0.4  /  DBili      /  AST  19  /  ALT  18  /  AlkPhos  123<H>  07-08    Creatinine Trend: 3.94 <--, 2.76 <--, 3.65 <--, 2.72 <--, 3.25 <--, 2.90 <--                        9.7    10.86 )-----------( 241      ( 08 Jul 2018 10:32 )             32.2     Urine Studies:        RADIOLOGY & ADDITIONAL STUDIES:      < from: Xray Chest 1 View AP/PA (07.08.18 @ 12:34) >    IMPRESSION:    The patient is rotated, limiting the evaluation, particularly of the left   lung. Right-sided double-lumen central venous line is unchanged in   position. The cardiomems device is noted.    The cardiac silhouette is enlarged, similar to the prior study. There are   dense calcifications of the mitral valve annulus. There is stable mild   dilatation of the main pulmonary artery.    There is mild pulmonary vascular congestion. There is blunting of both   costophrenic angles, which may be secondary to small pleural effusions.   There are nodular opacities bilaterally which may be related to pulmonary   vascular congestion or may represent superimposed infection. Chest x-ray   follow-up is advised.
[facility] Vascular Surgery Consultation   HPI:  78F HTN DM2 ESRD on HD diastolic CHF s/p Cardio MEMS implantation, asthma, anemia (on weekly procrit), breast CA s/p Left lumpectomy and radiation in , uterine CA s/p JACLYN, with no known h/o CAD/MI  admitted 7/3/18 after missing HD due to fatigue, treated for volume overload with fluid removal at HD.  Was febrile to 102.7 on 18 at 9pm, blood cultures drawn peripheral and from HD catheter.  18 patient was discharged.  Peripheral blood cx positive for enterococcus by PCR and patient called back for treatment. Her permcath was removed. She has a right AVF performed by Dr. Finn. She had a  fistulogram and BAM in 2018. Her fistula has been used twice with low flow state.       PAST MEDICAL & SURGICAL HISTORY:  MS (mitral stenosis)  GERD (gastroesophageal reflux disease)  COPD (chronic obstructive pulmonary disease): On home oxygen  HLD (hyperlipidemia)  HTN (hypertension)  ESRD (end stage renal disease) on dialysis  Chronic diastolic (congestive) heart failure  Anemia of chronic disease  Breast carcinoma: Lt side s/p lumpectomy and radiation in   DM (diabetes mellitus): type 2  S/P subtotal hysterectomy  S/P lumpectomy, left breast:       FAMILY HISTORY:  Diabetes mellitus  Family history of breast cancer (Aunt)      SOCIAL HISTORY: denies etoh tob idu    MEDICATIONS  (STANDING):  ampicillin  IVPB 2 Gram(s) IV Intermittent every 12 hours  aspirin enteric coated 81 milliGRAM(s) Oral daily  atorvastatin 40 milliGRAM(s) Oral at bedtime  brimonidine 0.2% Ophthalmic Solution 1 Drop(s) Both EYES every 8 hours  buDESOnide  80 MICROgram(s)/formoterol 4.5 MICROgram(s) Inhaler 2 Puff(s) Inhalation two times a day  cholecalciferol 1000 Unit(s) Oral daily  dextrose 5%. 1000 milliLiter(s) (50 mL/Hr) IV Continuous <Continuous>  dextrose 50% Injectable 12.5 Gram(s) IV Push once  dextrose 50% Injectable 25 Gram(s) IV Push once  dextrose 50% Injectable 25 Gram(s) IV Push once  epoetin randy Injectable 36939 Unit(s) IV Push <User Schedule>  furosemide    Tablet 80 milliGRAM(s) Oral <User Schedule>  gabapentin 100 milliGRAM(s) Oral daily  guaiFENesin    Syrup 100 milliGRAM(s) Oral every 6 hours  heparin  Injectable 5000 Unit(s) SubCutaneous every 12 hours  insulin glargine Injectable (LANTUS) 30 Unit(s) SubCutaneous at bedtime  insulin lispro (HumaLOG) corrective regimen sliding scale   SubCutaneous three times a day before meals  insulin lispro (HumaLOG) corrective regimen sliding scale   SubCutaneous at bedtime  insulin lispro Injectable (HumaLOG) 8 Unit(s) SubCutaneous before lunch  insulin lispro Injectable (HumaLOG) 8 Unit(s) SubCutaneous before dinner  insulin lispro Injectable (HumaLOG) 5 Unit(s) SubCutaneous before breakfast  lidocaine/prilocaine Cream 1 Application(s) Topical <User Schedule>  metoprolol tartrate 50 milliGRAM(s) Oral two times a day  pantoprazole    Tablet 40 milliGRAM(s) Oral before breakfast  timolol 0.5% Solution 1 Drop(s) Both EYES two times a day  tiotropium 18 MICROgram(s) Capsule 1 Capsule(s) Inhalation daily    MEDICATIONS  (PRN):  acetaminophen   Tablet 650 milliGRAM(s) Oral every 6 hours PRN mild moderate and severe pain  benzonatate 100 milliGRAM(s) Oral three times a day PRN Cough  dextrose 40% Gel 15 Gram(s) Oral once PRN Blood Glucose LESS THAN 70 milliGRAM(s)/deciliter  glucagon  Injectable 1 milliGRAM(s) IntraMuscular once PRN Glucose LESS THAN 70 milligrams/deciliter    Allergies    No Known Allergies    Intolerances        Vital Signs Last 24 Hrs  T(C): 37.1 (2018 05:12), Max: 37.1 (2018 05:12)  T(F): 98.8 (2018 05:12), Max: 98.8 (2018 05:12)  HR: 82 (2018 05:12) (68 - 86)  BP: 120/52 (2018 05:12) (116/43 - 158/78)  BP(mean): --  RR: 19 (2018 05:12) (18 - 20)  SpO2: 92% (2018 05:12) (91% - 93%)  Daily     Daily Weight in k (2018 14:00)    Gen: NAD  Resp: CTA b/l   Vascular: palp radial b/l, + Thrill in R AVF                         9.2    8.41  )-----------( 236      ( 2018 06:15 )             30.7     07-13    136  |  98  |  40<H>  ----------------------------<  112<H>  4.2   |  26  |  3.53<H>    Ca    8.8      2018 06:15  Phos  4.4     07-13  Mg     1.9     -13    TPro  7.3  /  Alb  3.1<L>  /  TBili  0.4  /  DBili  x   /  AST  20  /  ALT  14  /  AlkPhos  80  07-13    PT/INR - ( 2018 13:30 )   PT: 12.3 SEC;   INR: 1.11          PTT - ( 2018 13:30 )  PTT:33.6 SEC        Assessment:   77 y/o F w/ a PMHX of ESRD on HD, COPD on home O2, HTN, chronic diastolic HF who presents as call back for Enterococcus bacteremia. Has angiogram in 2018 for BAM.     - If patient can be discharged, she can have a fistulogram as outpatient with Dr. Finn on Tuesday. Due to unavailability of surgeon and OR room inpatient, fistulogram can not be performed inpatient. If primary team/nephrology feels that the fistulogram should performed prior to discharge, please obtain IR consultation for fistulogram, possibly a permcath if bacteremia cleared. No objection from vascular surgery stand point to have fistulogram performed by IR. Discussed at length with the patient, the primary team, and vascular surgery fellow Dr. Fields, Dr. Finn agrees with the plan.   - Please call [phone] (vascular surgery) with any questions

## 2018-07-13 NOTE — PROGRESS NOTE ADULT - PROBLEM SELECTOR PLAN 5
per medicine team
Mx per medicine
Mx per medicine
- c/w metoprolol
Continue metoprolol
per medicine team
per medicine team
- c/w metoprolol

## 2018-07-13 NOTE — PROGRESS NOTE ADULT - PROBLEM SELECTOR PROBLEM 1
ESRD (end stage renal disease)
ESRD (end stage renal disease)
Enterococcal infection
ESRD (end stage renal disease)
Enterococcal infection
ESRD (end stage renal disease)
ESRD (end stage renal disease)

## 2018-07-13 NOTE — PROGRESS NOTE ADULT - PROBLEM SELECTOR PLAN 1
Plan for HD tomorrow AM w/2k bath, using AVF, for bfr 300ml/min, uf 2kg as tolearted  s/p PC removal 2/2 bactermia  await IR angiogram of AVF

## 2018-07-13 NOTE — PROGRESS NOTE ADULT - ATTENDING COMMENTS
Agree with above  tte with normal lv function  cardiomems interrogation can be done as outpatient  abx per id  follow up id    Keysha Ellis MD
Patient seen and examined.  Agree with above.   -cardiomems interrogation can be done as outpatient  -spoke with ID attending; low suspicion for endocarditis and since cardiomems endothelializes, low suspicion for intracardiac infection  -no further cardiac workup needed at this time  -abx per ID    Keysha Ellis MD
Patient seen and examined.  Agree with above.   -interrogate cardiomems  -follow up id    Keysha Ellis MD
Patient seen and examined.  Agree with above.   follow up id  check cardiomems interrogation    Keysha Ellis MD
agree with above   -interrogate cardiomems  -id follow up     Keysha Ellis MD
For any question, call:  Cell # [phone]  Pager # [phone]  Callback # [phone]
home today, reports son can pick her up  needs HD reinstated, due tomorrow  vanco w/ HD, scripts sent per SW  OP vascular f/u arranged   dispo time 41 min
For any question, call:  Cell # [phone]  Pager # [phone]  Callback # [phone]

## 2018-07-14 LAB
-  AMPICILLIN: SIGNIFICANT CHANGE UP
-  CIPROFLOXACIN: SIGNIFICANT CHANGE UP
-  TETRACYCLINE: SIGNIFICANT CHANGE UP
-  VANCOMYCIN: SIGNIFICANT CHANGE UP
BACTERIA UR CULT: SIGNIFICANT CHANGE UP
METHOD TYPE: SIGNIFICANT CHANGE UP
ORGANISM # SPEC MICROSCOPIC CNT: SIGNIFICANT CHANGE UP
ORGANISM # SPEC MICROSCOPIC CNT: SIGNIFICANT CHANGE UP

## 2018-07-15 LAB — BACTERIA BLD CULT: SIGNIFICANT CHANGE UP

## 2018-07-16 PROBLEM — N18.9 CHRONIC KIDNEY DISEASE, UNSPECIFIED: Chronic | Status: INACTIVE | Noted: 2017-11-29 | Resolved: 2018-07-03

## 2018-07-16 PROBLEM — I10 ESSENTIAL (PRIMARY) HYPERTENSION: Chronic | Status: INACTIVE | Noted: 2017-11-29 | Resolved: 2018-07-03

## 2018-07-16 PROBLEM — I50.9 HEART FAILURE, UNSPECIFIED: Chronic | Status: INACTIVE | Noted: 2017-11-29 | Resolved: 2018-07-03

## 2018-07-16 PROBLEM — D64.9 ANEMIA, UNSPECIFIED: Chronic | Status: INACTIVE | Noted: 2017-11-29 | Resolved: 2018-07-03

## 2018-07-16 LAB — BACTERIA BLD CULT: SIGNIFICANT CHANGE UP

## 2018-07-26 ENCOUNTER — FORM ENCOUNTER (OUTPATIENT)
Age: 78
End: 2018-07-26

## 2018-07-27 ENCOUNTER — APPOINTMENT (OUTPATIENT)
Dept: ENDOVASCULAR SURGERY | Facility: CLINIC | Age: 78
End: 2018-07-27
Payer: MEDICARE

## 2018-07-27 PROBLEM — I50.32 CHRONIC DIASTOLIC (CONGESTIVE) HEART FAILURE: Chronic | Status: ACTIVE | Noted: 2018-07-03

## 2018-07-27 PROBLEM — D63.8 ANEMIA IN OTHER CHRONIC DISEASES CLASSIFIED ELSEWHERE: Chronic | Status: ACTIVE | Noted: 2018-07-03

## 2018-07-27 PROBLEM — I10 ESSENTIAL (PRIMARY) HYPERTENSION: Chronic | Status: ACTIVE | Noted: 2018-07-03

## 2018-07-27 PROBLEM — K21.9 GASTRO-ESOPHAGEAL REFLUX DISEASE WITHOUT ESOPHAGITIS: Chronic | Status: ACTIVE | Noted: 2018-07-03

## 2018-07-27 PROBLEM — J44.9 CHRONIC OBSTRUCTIVE PULMONARY DISEASE, UNSPECIFIED: Chronic | Status: ACTIVE | Noted: 2018-07-03

## 2018-07-27 PROBLEM — N18.6 END STAGE RENAL DISEASE: Chronic | Status: ACTIVE | Noted: 2018-07-03

## 2018-07-27 PROBLEM — I05.0 RHEUMATIC MITRAL STENOSIS: Chronic | Status: ACTIVE | Noted: 2018-07-03

## 2018-07-27 PROCEDURE — 36902Z: CUSTOM

## 2018-08-13 LAB — FUNGUS SPEC QL CULT: SIGNIFICANT CHANGE UP

## 2018-08-14 NOTE — PROGRESS NOTE ADULT - PROBLEM/PLAN-6
[FreeTextEntry1] : Biopsy site healing well  patient stable
DISPLAY PLAN FREE TEXT

## 2018-10-12 ENCOUNTER — APPOINTMENT (OUTPATIENT)
Dept: VASCULAR SURGERY | Facility: CLINIC | Age: 78
End: 2018-10-12

## 2018-11-29 ENCOUNTER — FORM ENCOUNTER (OUTPATIENT)
Age: 78
End: 2018-11-29

## 2018-11-30 ENCOUNTER — APPOINTMENT (OUTPATIENT)
Dept: ENDOVASCULAR SURGERY | Facility: CLINIC | Age: 78
End: 2018-11-30
Payer: MEDICARE

## 2018-11-30 PROCEDURE — 36902Z: CUSTOM

## 2018-11-30 PROCEDURE — 36215Z: CUSTOM | Mod: 59

## 2019-02-26 ENCOUNTER — APPOINTMENT (OUTPATIENT)
Dept: VASCULAR SURGERY | Facility: CLINIC | Age: 79
End: 2019-02-26

## 2019-04-17 ENCOUNTER — APPOINTMENT (OUTPATIENT)
Dept: VASCULAR SURGERY | Facility: CLINIC | Age: 79
End: 2019-04-17
Payer: MEDICARE

## 2019-04-17 VITALS
HEART RATE: 89 BPM | DIASTOLIC BLOOD PRESSURE: 63 MMHG | TEMPERATURE: 98 F | SYSTOLIC BLOOD PRESSURE: 128 MMHG | HEIGHT: 65 IN | BODY MASS INDEX: 30.49 KG/M2 | WEIGHT: 183 LBS

## 2019-04-17 DIAGNOSIS — T82.898A OTHER SPECIFIED COMPLICATION OF VASCULAR PROSTHETIC DEVICES, IMPLANTS AND GRAFTS, INITIAL ENCOUNTER: ICD-10-CM

## 2019-04-17 PROCEDURE — XXXXX: CPT

## 2019-04-17 PROCEDURE — 99213 OFFICE O/P EST LOW 20 MIN: CPT

## 2019-04-17 PROCEDURE — 93922 UPR/L XTREMITY ART 2 LEVELS: CPT

## 2019-04-17 NOTE — DISCUSSION/SUMMARY
[FreeTextEntry1] : 77 yo female with history of esrd on hd presents for follow up of right upper extremity radiocephalic avf with cramping pain of the right hand after hd \par duplex shows patent avf without any evidence of stenosis flow rate is 803 with retrograde flow in the radial artery and patent ulnar artery\par pvr confirms a steal of the right hand \par given symptoms will arrange for radial artery coil embolization \par \par above discussed with dr mcqueen \par

## 2019-04-17 NOTE — HISTORY OF PRESENT ILLNESS
[] : right radiocephalic fistula [FreeTextEntry1] : 79 yo female with history of esrd on hd presents for follow up of right upper extremity radiocephalic avf.  pt reports right hand cramping only after hd that she states is severe that causes her to cry in pain.  pt denies any difficulty using the hand or pain on non-hd days.

## 2019-04-17 NOTE — PHYSICAL EXAM
[Thrill] : thrill [Warm Extremities] : warm extremities [2+] : right 2+ [0] : right 0 [Normal] : coordination grossly intact, no focal deficits [Aneurysm] : no aneurysm [Bleeding] : no bleeding [Ulcer] : no ulcer [Gangrene] : no gangrene [de-identified] :  strength 5/5 b/l upper extremities [de-identified] : intact

## 2019-05-07 ENCOUNTER — FORM ENCOUNTER (OUTPATIENT)
Age: 79
End: 2019-05-07

## 2019-05-07 PROBLEM — I12.0 END STAGE RENAL DISEASE DUE TO HYPERTENSION: Status: ACTIVE | Noted: 2019-05-07

## 2019-05-08 ENCOUNTER — APPOINTMENT (OUTPATIENT)
Dept: ENDOVASCULAR SURGERY | Facility: CLINIC | Age: 79
End: 2019-05-08
Payer: MEDICARE

## 2019-05-08 VITALS
HEIGHT: 65 IN | TEMPERATURE: 98.4 F | SYSTOLIC BLOOD PRESSURE: 160 MMHG | OXYGEN SATURATION: 97 % | HEART RATE: 97 BPM | DIASTOLIC BLOOD PRESSURE: 84 MMHG | RESPIRATION RATE: 19 BRPM

## 2019-05-08 DIAGNOSIS — N18.6 HYPERTENSIVE CHRONIC KIDNEY DISEASE WITH STAGE 5 CHRONIC KIDNEY DISEASE OR END STAGE RENAL DISEASE: ICD-10-CM

## 2019-05-08 DIAGNOSIS — I12.0 HYPERTENSIVE CHRONIC KIDNEY DISEASE WITH STAGE 5 CHRONIC KIDNEY DISEASE OR END STAGE RENAL DISEASE: ICD-10-CM

## 2019-05-08 PROCEDURE — 36216Z: CUSTOM | Mod: 59

## 2019-05-08 PROCEDURE — 36901 INTRO CATH DIALYSIS CIRCUIT: CPT | Mod: 59

## 2019-05-08 PROCEDURE — 37242Z: CUSTOM

## 2019-05-08 RX ORDER — CEFDINIR 300 MG/1
300 CAPSULE ORAL
Qty: 14 | Refills: 0 | Status: DISCONTINUED | COMMUNITY
Start: 2017-09-21 | End: 2019-05-08

## 2019-05-08 RX ORDER — CALCITRIOL 0.5 UG/1
0.5 CAPSULE, LIQUID FILLED ORAL
Qty: 30 | Refills: 0 | Status: DISCONTINUED | COMMUNITY
Start: 2017-09-13 | End: 2019-05-08

## 2019-05-08 RX ORDER — TRAZODONE HYDROCHLORIDE 50 MG/1
50 TABLET ORAL
Qty: 90 | Refills: 0 | Status: DISCONTINUED | COMMUNITY
Start: 2018-01-09 | End: 2019-05-08

## 2019-05-08 RX ORDER — SIMVASTATIN 10 MG/1
10 TABLET, FILM COATED ORAL
Qty: 30 | Refills: 0 | Status: DISCONTINUED | COMMUNITY
Start: 2018-01-15 | End: 2019-05-08

## 2019-05-08 RX ORDER — FUROSEMIDE 40 MG/1
40 TABLET ORAL
Qty: 60 | Refills: 0 | Status: DISCONTINUED | COMMUNITY
Start: 2018-01-09 | End: 2019-05-08

## 2019-05-08 RX ORDER — ATORVASTATIN CALCIUM 40 MG/1
40 TABLET, FILM COATED ORAL
Qty: 90 | Refills: 0 | Status: DISCONTINUED | COMMUNITY
Start: 2018-01-09 | End: 2019-05-08

## 2019-05-08 NOTE — HISTORY OF PRESENT ILLNESS
[] : right radiocephalic fistula [FreeTextEntry1] : alert and oriented x 3\par accompanied by an aid \par no medications taken today\par reports blood sugar 168\par

## 2019-05-08 NOTE — PROCEDURE
[FSBS] : FSBS [Post Fistulogram/Intervention] : Post Fistulogram/Intervention: Site check for bleeding/hematoma, thrill/bruit. Vitals signs: On admission, then q15 mins x 2 [Resume diet] : resume diet [FreeTextEntry1] : right arm fistula/fistulogram/angioplasty

## 2019-05-08 NOTE — REASON FOR VISIT
[Other ___] : a [unfilled] visit for [Inadequate Flow within AVF] : inadequate flow within AVF [Steal Syndrome] : steal syndrome [Other: _____] : [unfilled] [FreeTextEntry2] : stenosis on duplex

## 2019-05-08 NOTE — PAST MEDICAL HISTORY
[Increasing age ( >40 years old)] : Increasing age ( >40 years old) [No therapy indicated for cases scheduled for less than one hour] : No therapy indicated for cases scheduled for less than one hour. [FreeTextEntry1] : Malignant Hyperthermia Screening Tool and Risk of Bleeding Assessment \par \par \par \par Ms. SHIKHA REDMAN denies family of unexpected death following Anesthesia or Exercise.\par Denies Family history of Malignant Hyperthermia, Muscle or Neuromuscular disorder and High Temperature  following exercise.\par \par Ms. SHIKHA REDMAN denies history of Muscle Spasm, Dark or Chocolate- Colored and Unanticipated fever immediately following anaesthesia or serious exercise.\par Ms. SHIKHA REDMAN also denies bleeding tendencies/Risks of Bleeding.\par

## 2019-06-21 ENCOUNTER — APPOINTMENT (OUTPATIENT)
Dept: VASCULAR SURGERY | Facility: CLINIC | Age: 79
End: 2019-06-21
Payer: MEDICARE

## 2019-06-21 VITALS
SYSTOLIC BLOOD PRESSURE: 144 MMHG | WEIGHT: 185 LBS | HEIGHT: 65 IN | BODY MASS INDEX: 30.82 KG/M2 | HEART RATE: 89 BPM | DIASTOLIC BLOOD PRESSURE: 87 MMHG

## 2019-06-21 PROCEDURE — 99214 OFFICE O/P EST MOD 30 MIN: CPT

## 2019-06-21 PROCEDURE — 93926 LOWER EXTREMITY STUDY: CPT

## 2019-06-21 NOTE — HISTORY OF PRESENT ILLNESS
[FreeTextEntry1] : Patient with renal failure with a right radiocephalic fistula. Patient had steal syndrome and was treated with coil embolization of distal radial artery. Patient's symptoms have not improved much. No evidence of tissue loss [] : right radiocephalic fistula

## 2019-06-21 NOTE — PHYSICAL EXAM
[Thrill] : thrill [Aneurysm] : no aneurysm [Pulsatile Thrill] : no pulsatile thrill [Bleeding] : no bleeding [Normal] : normoactive bowel sounds, soft and nontender, no hepatosplenomegaly or masses appreciated [2+] : left 2+

## 2019-07-09 ENCOUNTER — FORM ENCOUNTER (OUTPATIENT)
Age: 79
End: 2019-07-09

## 2019-07-10 ENCOUNTER — OUTPATIENT (OUTPATIENT)
Dept: OUTPATIENT SERVICES | Facility: HOSPITAL | Age: 79
LOS: 1 days | End: 2019-07-10
Payer: COMMERCIAL

## 2019-07-10 VITALS
RESPIRATION RATE: 18 BRPM | SYSTOLIC BLOOD PRESSURE: 115 MMHG | HEIGHT: 65 IN | DIASTOLIC BLOOD PRESSURE: 61 MMHG | OXYGEN SATURATION: 95 % | WEIGHT: 182.98 LBS | TEMPERATURE: 97 F | HEART RATE: 86 BPM

## 2019-07-10 DIAGNOSIS — I10 ESSENTIAL (PRIMARY) HYPERTENSION: ICD-10-CM

## 2019-07-10 DIAGNOSIS — E11.9 TYPE 2 DIABETES MELLITUS WITHOUT COMPLICATIONS: ICD-10-CM

## 2019-07-10 DIAGNOSIS — T82.898A OTHER SPECIFIED COMPLICATION OF VASCULAR PROSTHETIC DEVICES, IMPLANTS AND GRAFTS, INITIAL ENCOUNTER: ICD-10-CM

## 2019-07-10 DIAGNOSIS — Z86.79 PERSONAL HISTORY OF OTHER DISEASES OF THE CIRCULATORY SYSTEM: ICD-10-CM

## 2019-07-10 DIAGNOSIS — N18.6 END STAGE RENAL DISEASE: ICD-10-CM

## 2019-07-10 DIAGNOSIS — I77.0 ARTERIOVENOUS FISTULA, ACQUIRED: ICD-10-CM

## 2019-07-10 DIAGNOSIS — Z01.818 ENCOUNTER FOR OTHER PREPROCEDURAL EXAMINATION: ICD-10-CM

## 2019-07-10 DIAGNOSIS — Z90.711 ACQUIRED ABSENCE OF UTERUS WITH REMAINING CERVICAL STUMP: Chronic | ICD-10-CM

## 2019-07-10 PROCEDURE — 71045 X-RAY EXAM CHEST 1 VIEW: CPT | Mod: 26

## 2019-07-10 PROCEDURE — G0463: CPT

## 2019-07-10 PROCEDURE — 93005 ELECTROCARDIOGRAM TRACING: CPT

## 2019-07-10 PROCEDURE — 93010 ELECTROCARDIOGRAM REPORT: CPT

## 2019-07-10 PROCEDURE — 71045 X-RAY EXAM CHEST 1 VIEW: CPT

## 2019-07-10 NOTE — H&P PST ADULT - NSANTHOSAYNRD_GEN_A_CORE
No. XUAN screening performed.  STOP BANG Legend: 0-2 = LOW Risk; 3-4 = INTERMEDIATE Risk; 5-8 = HIGH Risk

## 2019-07-10 NOTE — H&P PST ADULT - ACTIVITY
walks with the cane, can walk a few steps before gets SOB walks with the cane, can walk a few steps only due to SOB

## 2019-07-10 NOTE — H&P PST ADULT - HISTORY OF PRESENT ILLNESS
ESRD on hemodialysis (TTS)HTN, DM on insulin, breast and uterus cancer, CHF, PVD, carotid artery stenosis, cardiomems device in place, 79 year old female with PMHx of ESRD on hemodialysis (TTS), COPD, HTN, HLD ( but refuses to take statins and doesn't want to take Aspirin due to nose bleeding in the past), DM on insulin, breast and uterus cancer (2004), radiation, left breast lumpectomy, CHF with EF 67%, PVD, carotid artery stenosis. Patient was hospitalized for respiratory failure and intubated for a day in 2018. Patient has a  CardioMEMS device in place. Patient presents today with c/o AV fistula site pain during dialysis. Patient is here today for presurgical testing for scheduled revision of right arm AV fistula creation with right arm AV graft placement on 7/18/19.

## 2019-07-10 NOTE — H&P PST ADULT - ASSESSMENT
79 year old female with PMHx of ESRD on hemodialysis (TTS), COPD, HTN, HLD ( but refuses to take statins and doesn't want to take Aspirin due to nose bleeding in the past), DM on insulin, breast and uterus cancer (2004), radiation, left breast lumpectomy, CHF with EF 67%, PVD, carotid artery stenosis. Patient was hospitalized for a respiratory failure and intubated in 2018. Patient has a  CardioMEMS device in place. Patient scheduled for revision of right arm AV fistula creation with right arm AV graft placement on 7/18/19.

## 2019-07-10 NOTE — H&P PST ADULT - VENOUS THROMBOEMBOLISM CURRENT STATUS
(1) varicose veins/(1) other risk factor (includes escalating BMI, pack-years of smoking, diabetes requiring insulin, chemotherapy, female gender and length of surgery)/(1) congestive heart failure (within 1 month)/(2) malignancy (present or previous)

## 2019-07-10 NOTE — H&P PST ADULT - NSICDXFAMILYHX_GEN_ALL_CORE_FT
FAMILY HISTORY:  Diabetes mellitus    Aunt  Still living? Unknown  Family history of breast cancer, Age at diagnosis: Age Unknown

## 2019-07-10 NOTE — H&P PST ADULT - NSICDXPROBLEM_GEN_ALL_CORE_FT
PROBLEM DIAGNOSES  Problem: AV fistula  Assessment and Plan: Scheduled for revision of right arm AV fistula on 7/18/19. Preop instructions given, patient verbalized understanding.    Problem: History of carotid artery stenosis  Assessment and Plan: Patient refuses to take Aspirin d/t nonbleeding in the past    Problem: Diabetes mellitus  Assessment and Plan: Patient instructed not to take Insulin on the day of surgery.    Problem: HTN (hypertension)  Assessment and Plan: Patient instructed to take Metoprolol in the morning with sips of water on the day of surgery.    Problem: ESRD on dialysis  Assessment and Plan: Patient instructed to have dialysis on Wednesday , 7/17/19 day before surgery

## 2019-07-10 NOTE — H&P PST ADULT - NSICDXPASTMEDICALHX_GEN_ALL_CORE_FT
PAST MEDICAL HISTORY:  Anemia of chronic disease     Breast carcinoma Lt side s/p lumpectomy and radiation in 2004    Chronic diastolic (congestive) heart failure     COPD (chronic obstructive pulmonary disease) On home oxygen    DM (diabetes mellitus) type 2    ESRD (end stage renal disease) on dialysis     GERD (gastroesophageal reflux disease)     HLD (hyperlipidemia)     HTN (hypertension)     MS (mitral stenosis) PAST MEDICAL HISTORY:  Anemia of chronic disease     Breast carcinoma Lt side s/p lumpectomy and radiation in 2004    Chronic CHF     Chronic diastolic (congestive) heart failure     COPD (chronic obstructive pulmonary disease) On home oxygen    DM (diabetes mellitus) type 2    ESRD (end stage renal disease) on dialysis     GERD (gastroesophageal reflux disease)     HLD (hyperlipidemia) not taking statins    HTN (hypertension)     MS (mitral stenosis)

## 2019-07-17 ENCOUNTER — TRANSCRIPTION ENCOUNTER (OUTPATIENT)
Age: 79
End: 2019-07-17

## 2019-07-18 ENCOUNTER — OUTPATIENT (OUTPATIENT)
Dept: OUTPATIENT SERVICES | Facility: HOSPITAL | Age: 79
LOS: 1 days | End: 2019-07-18
Payer: COMMERCIAL

## 2019-07-18 ENCOUNTER — APPOINTMENT (OUTPATIENT)
Dept: VASCULAR SURGERY | Facility: HOSPITAL | Age: 79
End: 2019-07-18
Payer: MEDICARE

## 2019-07-18 VITALS
HEART RATE: 85 BPM | SYSTOLIC BLOOD PRESSURE: 178 MMHG | RESPIRATION RATE: 16 BRPM | WEIGHT: 182.98 LBS | OXYGEN SATURATION: 98 % | HEIGHT: 65 IN | TEMPERATURE: 98 F | DIASTOLIC BLOOD PRESSURE: 72 MMHG

## 2019-07-18 VITALS
TEMPERATURE: 98 F | HEART RATE: 73 BPM | RESPIRATION RATE: 18 BRPM | DIASTOLIC BLOOD PRESSURE: 75 MMHG | SYSTOLIC BLOOD PRESSURE: 147 MMHG | OXYGEN SATURATION: 96 %

## 2019-07-18 DIAGNOSIS — N18.6 END STAGE RENAL DISEASE: ICD-10-CM

## 2019-07-18 DIAGNOSIS — T82.898A OTHER SPECIFIED COMPLICATION OF VASCULAR PROSTHETIC DEVICES, IMPLANTS AND GRAFTS, INITIAL ENCOUNTER: ICD-10-CM

## 2019-07-18 DIAGNOSIS — Z90.711 ACQUIRED ABSENCE OF UTERUS WITH REMAINING CERVICAL STUMP: Chronic | ICD-10-CM

## 2019-07-18 LAB
ANION GAP SERPL CALC-SCNC: 9 MMOL/L — SIGNIFICANT CHANGE UP (ref 5–17)
BUN SERPL-MCNC: 32 MG/DL — HIGH (ref 7–18)
CALCIUM SERPL-MCNC: 8.1 MG/DL — LOW (ref 8.4–10.5)
CHLORIDE SERPL-SCNC: 97 MMOL/L — SIGNIFICANT CHANGE UP (ref 96–108)
CO2 SERPL-SCNC: 30 MMOL/L — SIGNIFICANT CHANGE UP (ref 22–31)
CREAT SERPL-MCNC: 3.19 MG/DL — HIGH (ref 0.5–1.3)
GLUCOSE BLDC GLUCOMTR-MCNC: 269 MG/DL — HIGH (ref 70–99)
GLUCOSE BLDC GLUCOMTR-MCNC: 279 MG/DL — HIGH (ref 70–99)
GLUCOSE SERPL-MCNC: 282 MG/DL — HIGH (ref 70–99)
POTASSIUM SERPL-MCNC: 3.6 MMOL/L — SIGNIFICANT CHANGE UP (ref 3.5–5.3)
POTASSIUM SERPL-SCNC: 3.6 MMOL/L — SIGNIFICANT CHANGE UP (ref 3.5–5.3)
SODIUM SERPL-SCNC: 136 MMOL/L — SIGNIFICANT CHANGE UP (ref 135–145)

## 2019-07-18 PROCEDURE — 82962 GLUCOSE BLOOD TEST: CPT

## 2019-07-18 PROCEDURE — C1768: CPT

## 2019-07-18 PROCEDURE — 80048 BASIC METABOLIC PNL TOTAL CA: CPT

## 2019-07-18 PROCEDURE — 36838 DIST REVAS LIGATION HEMO: CPT

## 2019-07-18 PROCEDURE — 36838 DIST REVAS LIGATION HEMO: CPT | Mod: RT

## 2019-07-18 PROCEDURE — 36415 COLL VENOUS BLD VENIPUNCTURE: CPT

## 2019-07-18 RX ORDER — ONDANSETRON 8 MG/1
4 TABLET, FILM COATED ORAL ONCE
Refills: 0 | Status: DISCONTINUED | OUTPATIENT
Start: 2019-07-18 | End: 2019-07-18

## 2019-07-18 RX ORDER — CHLORHEXIDINE GLUCONATE 213 G/1000ML
1 SOLUTION TOPICAL ONCE
Refills: 0 | Status: COMPLETED | OUTPATIENT
Start: 2019-07-18 | End: 2019-07-18

## 2019-07-18 RX ORDER — OXYCODONE AND ACETAMINOPHEN 5; 325 MG/1; MG/1
1 TABLET ORAL EVERY 4 HOURS
Refills: 0 | Status: DISCONTINUED | OUTPATIENT
Start: 2019-07-18 | End: 2019-07-18

## 2019-07-18 RX ORDER — SODIUM CHLORIDE 9 MG/ML
3 INJECTION INTRAMUSCULAR; INTRAVENOUS; SUBCUTANEOUS EVERY 8 HOURS
Refills: 0 | Status: DISCONTINUED | OUTPATIENT
Start: 2019-07-18 | End: 2019-07-18

## 2019-07-18 RX ORDER — FENTANYL CITRATE 50 UG/ML
25 INJECTION INTRAVENOUS
Refills: 0 | Status: DISCONTINUED | OUTPATIENT
Start: 2019-07-18 | End: 2019-07-18

## 2019-07-18 RX ADMIN — CHLORHEXIDINE GLUCONATE 1 APPLICATION(S): 213 SOLUTION TOPICAL at 10:25

## 2019-07-18 RX ADMIN — SODIUM CHLORIDE 3 MILLILITER(S): 9 INJECTION INTRAMUSCULAR; INTRAVENOUS; SUBCUTANEOUS at 10:25

## 2019-07-18 NOTE — ASU DISCHARGE PLAN (ADULT/PEDIATRIC) - CARE PROVIDER_API CALL
Colin Bowie)  Vascular Surgery  [address] [facility], [address]  [place] [address]  Phone: [phone]  Fax: [phone]  Follow Up Time: Colin Bowie)  Vascular Surgery  [address], [address]  [place] [address]  Phone: [phone]  Fax: [phone]  Follow Up Time:

## 2019-07-18 NOTE — ASU DISCHARGE PLAN (ADULT/PEDIATRIC) - CALL YOUR DOCTOR IF YOU HAVE ANY OF THE FOLLOWING:
Swelling that gets worse/Numbness, tingling, color or temperature change to extremity/Bleeding that does not stop/Pain not relieved by Medications/Fever greater than (need to indicate Fahrenheit or Celsius)/Wound/Surgical Site with redness, or foul smelling discharge or pus

## 2019-07-18 NOTE — ASU PREOP CHECKLIST - SELECT TESTS ORDERED
POCT Blood Glucose/CBC/EKG/PT/PTT/INR/CXR/BMP/Fingerstick 279 CBC/INR/EKG/POCT Blood Glucose/PT/PTT/CXR/BMP/Fingerstick 279 Dr. Stearns notified ; no action needed

## 2019-07-23 PROBLEM — I50.9 HEART FAILURE, UNSPECIFIED: Chronic | Status: ACTIVE | Noted: 2019-07-10

## 2019-07-23 PROBLEM — E78.5 HYPERLIPIDEMIA, UNSPECIFIED: Chronic | Status: ACTIVE | Noted: 2018-07-03

## 2019-07-23 RX ORDER — OXYCODONE AND ACETAMINOPHEN 5; 325 MG/1; MG/1
5-325 TABLET ORAL
Qty: 15 | Refills: 0 | Status: ACTIVE | COMMUNITY
Start: 2019-07-23 | End: 1900-01-01

## 2019-07-24 ENCOUNTER — APPOINTMENT (OUTPATIENT)
Dept: ENDOVASCULAR SURGERY | Facility: CLINIC | Age: 79
End: 2019-07-24

## 2019-08-02 ENCOUNTER — APPOINTMENT (OUTPATIENT)
Dept: VASCULAR SURGERY | Facility: CLINIC | Age: 79
End: 2019-08-02

## 2019-08-09 ENCOUNTER — APPOINTMENT (OUTPATIENT)
Dept: VASCULAR SURGERY | Facility: CLINIC | Age: 79
End: 2019-08-09
Payer: MEDICARE

## 2019-08-09 ENCOUNTER — APPOINTMENT (OUTPATIENT)
Dept: VASCULAR SURGERY | Facility: CLINIC | Age: 79
End: 2019-08-09

## 2019-08-09 PROCEDURE — 99024 POSTOP FOLLOW-UP VISIT: CPT

## 2019-08-09 PROCEDURE — 93990 DOPPLER FLOW TESTING: CPT

## 2019-08-09 NOTE — REASON FOR VISIT
[de-identified] : Status post revision of right arm AV fistula for treatment of steal syndrome. Right hand symptoms have improved significantly. Incision is clean and dry. Sutures were removed. Followup in 3 months for duplex the

## 2019-10-04 ENCOUNTER — APPOINTMENT (OUTPATIENT)
Dept: VASCULAR SURGERY | Facility: CLINIC | Age: 79
End: 2019-10-04

## 2019-11-08 ENCOUNTER — APPOINTMENT (OUTPATIENT)
Dept: VASCULAR SURGERY | Facility: CLINIC | Age: 79
End: 2019-11-08

## 2019-11-22 ENCOUNTER — INPATIENT (INPATIENT)
Facility: HOSPITAL | Age: 79
LOS: 9 days | Discharge: HOME | DRG: 640 | End: 2019-12-02
Attending: INTERNAL MEDICINE | Admitting: INTERNAL MEDICINE
Payer: COMMERCIAL

## 2019-11-22 VITALS
OXYGEN SATURATION: 92 % | HEIGHT: 65 IN | RESPIRATION RATE: 17 BRPM | TEMPERATURE: 98 F | WEIGHT: 186.95 LBS | SYSTOLIC BLOOD PRESSURE: 141 MMHG | DIASTOLIC BLOOD PRESSURE: 76 MMHG | HEART RATE: 67 BPM

## 2019-11-22 DIAGNOSIS — D64.9 ANEMIA, UNSPECIFIED: ICD-10-CM

## 2019-11-22 DIAGNOSIS — Z29.9 ENCOUNTER FOR PROPHYLACTIC MEASURES, UNSPECIFIED: ICD-10-CM

## 2019-11-22 DIAGNOSIS — J44.9 CHRONIC OBSTRUCTIVE PULMONARY DISEASE, UNSPECIFIED: ICD-10-CM

## 2019-11-22 DIAGNOSIS — N18.6 END STAGE RENAL DISEASE: ICD-10-CM

## 2019-11-22 DIAGNOSIS — R79.89 OTHER SPECIFIED ABNORMAL FINDINGS OF BLOOD CHEMISTRY: ICD-10-CM

## 2019-11-22 DIAGNOSIS — Z90.711 ACQUIRED ABSENCE OF UTERUS WITH REMAINING CERVICAL STUMP: Chronic | ICD-10-CM

## 2019-11-22 DIAGNOSIS — I50.32 CHRONIC DIASTOLIC (CONGESTIVE) HEART FAILURE: ICD-10-CM

## 2019-11-22 LAB
ALBUMIN SERPL ELPH-MCNC: 2.6 G/DL — LOW (ref 3.5–5)
ALP SERPL-CCNC: 80 U/L — SIGNIFICANT CHANGE UP (ref 40–120)
ALT FLD-CCNC: 20 U/L DA — SIGNIFICANT CHANGE UP (ref 10–60)
ANION GAP SERPL CALC-SCNC: 10 MMOL/L — SIGNIFICANT CHANGE UP (ref 5–17)
AST SERPL-CCNC: 13 U/L — SIGNIFICANT CHANGE UP (ref 10–40)
BASOPHILS # BLD AUTO: 0.04 K/UL — SIGNIFICANT CHANGE UP (ref 0–0.2)
BASOPHILS NFR BLD AUTO: 0.5 % — SIGNIFICANT CHANGE UP (ref 0–2)
BILIRUB SERPL-MCNC: 0.4 MG/DL — SIGNIFICANT CHANGE UP (ref 0.2–1.2)
BUN SERPL-MCNC: 64 MG/DL — HIGH (ref 7–18)
CALCIUM SERPL-MCNC: 8.9 MG/DL — SIGNIFICANT CHANGE UP (ref 8.4–10.5)
CHLORIDE SERPL-SCNC: 99 MMOL/L — SIGNIFICANT CHANGE UP (ref 96–108)
CO2 SERPL-SCNC: 27 MMOL/L — SIGNIFICANT CHANGE UP (ref 22–31)
CREAT SERPL-MCNC: 5.81 MG/DL — HIGH (ref 0.5–1.3)
EOSINOPHIL # BLD AUTO: 0.39 K/UL — SIGNIFICANT CHANGE UP (ref 0–0.5)
EOSINOPHIL NFR BLD AUTO: 4.7 % — SIGNIFICANT CHANGE UP (ref 0–6)
GLUCOSE BLDC GLUCOMTR-MCNC: 197 MG/DL — HIGH (ref 70–99)
GLUCOSE SERPL-MCNC: 161 MG/DL — HIGH (ref 70–99)
HCT VFR BLD CALC: 26 % — LOW (ref 34.5–45)
HGB BLD-MCNC: 7.5 G/DL — LOW (ref 11.5–15.5)
IMM GRANULOCYTES NFR BLD AUTO: 0.4 % — SIGNIFICANT CHANGE UP (ref 0–1.5)
LYMPHOCYTES # BLD AUTO: 1.02 K/UL — SIGNIFICANT CHANGE UP (ref 1–3.3)
LYMPHOCYTES # BLD AUTO: 12.2 % — LOW (ref 13–44)
MAGNESIUM SERPL-MCNC: 2.5 MG/DL — SIGNIFICANT CHANGE UP (ref 1.6–2.6)
MCHC RBC-ENTMCNC: 28.8 GM/DL — LOW (ref 32–36)
MCHC RBC-ENTMCNC: 29.3 PG — SIGNIFICANT CHANGE UP (ref 27–34)
MCV RBC AUTO: 101.6 FL — HIGH (ref 80–100)
MONOCYTES # BLD AUTO: 0.6 K/UL — SIGNIFICANT CHANGE UP (ref 0–0.9)
MONOCYTES NFR BLD AUTO: 7.2 % — SIGNIFICANT CHANGE UP (ref 2–14)
NEUTROPHILS # BLD AUTO: 6.29 K/UL — SIGNIFICANT CHANGE UP (ref 1.8–7.4)
NEUTROPHILS NFR BLD AUTO: 75 % — SIGNIFICANT CHANGE UP (ref 43–77)
NRBC # BLD: 0 /100 WBCS — SIGNIFICANT CHANGE UP (ref 0–0)
NT-PROBNP SERPL-SCNC: 9669 PG/ML — HIGH (ref 0–450)
PLATELET # BLD AUTO: 178 K/UL — SIGNIFICANT CHANGE UP (ref 150–400)
POTASSIUM SERPL-MCNC: 4.6 MMOL/L — SIGNIFICANT CHANGE UP (ref 3.5–5.3)
POTASSIUM SERPL-SCNC: 4.6 MMOL/L — SIGNIFICANT CHANGE UP (ref 3.5–5.3)
PROT SERPL-MCNC: 7.1 G/DL — SIGNIFICANT CHANGE UP (ref 6–8.3)
RBC # BLD: 2.56 M/UL — LOW (ref 3.8–5.2)
RBC # FLD: 17 % — HIGH (ref 10.3–14.5)
SODIUM SERPL-SCNC: 136 MMOL/L — SIGNIFICANT CHANGE UP (ref 135–145)
TROPONIN I SERPL-MCNC: 0.08 NG/ML — HIGH (ref 0–0.04)
WBC # BLD: 8.37 K/UL — SIGNIFICANT CHANGE UP (ref 3.8–10.5)
WBC # FLD AUTO: 8.37 K/UL — SIGNIFICANT CHANGE UP (ref 3.8–10.5)

## 2019-11-22 PROCEDURE — 99285 EMERGENCY DEPT VISIT HI MDM: CPT

## 2019-11-22 PROCEDURE — 71045 X-RAY EXAM CHEST 1 VIEW: CPT | Mod: 26

## 2019-11-22 RX ORDER — FERROUS SULFATE 325(65) MG
325 TABLET ORAL DAILY
Refills: 0 | Status: DISCONTINUED | OUTPATIENT
Start: 2019-11-22 | End: 2019-12-02

## 2019-11-22 RX ORDER — GABAPENTIN 400 MG/1
100 CAPSULE ORAL THREE TIMES A DAY
Refills: 0 | Status: DISCONTINUED | OUTPATIENT
Start: 2019-11-22 | End: 2019-11-24

## 2019-11-22 RX ORDER — AMLODIPINE BESYLATE 2.5 MG/1
5 TABLET ORAL DAILY
Refills: 0 | Status: DISCONTINUED | OUTPATIENT
Start: 2019-11-22 | End: 2019-11-23

## 2019-11-22 RX ORDER — FUROSEMIDE 40 MG
40 TABLET ORAL DAILY
Refills: 0 | Status: DISCONTINUED | OUTPATIENT
Start: 2019-11-22 | End: 2019-11-23

## 2019-11-22 RX ORDER — DEXTROSE 50 % IN WATER 50 %
12.5 SYRINGE (ML) INTRAVENOUS ONCE
Refills: 0 | Status: DISCONTINUED | OUTPATIENT
Start: 2019-11-22 | End: 2019-12-02

## 2019-11-22 RX ORDER — TRAZODONE HCL 50 MG
50 TABLET ORAL AT BEDTIME
Refills: 0 | Status: DISCONTINUED | OUTPATIENT
Start: 2019-11-22 | End: 2019-12-02

## 2019-11-22 RX ORDER — SODIUM CHLORIDE 9 MG/ML
1000 INJECTION, SOLUTION INTRAVENOUS
Refills: 0 | Status: DISCONTINUED | OUTPATIENT
Start: 2019-11-22 | End: 2019-12-02

## 2019-11-22 RX ORDER — INSULIN LISPRO 100/ML
5 VIAL (ML) SUBCUTANEOUS
Refills: 0 | Status: DISCONTINUED | OUTPATIENT
Start: 2019-11-22 | End: 2019-11-26

## 2019-11-22 RX ORDER — GLUCAGON INJECTION, SOLUTION 0.5 MG/.1ML
1 INJECTION, SOLUTION SUBCUTANEOUS ONCE
Refills: 0 | Status: DISCONTINUED | OUTPATIENT
Start: 2019-11-22 | End: 2019-12-02

## 2019-11-22 RX ORDER — DEXTROSE 50 % IN WATER 50 %
15 SYRINGE (ML) INTRAVENOUS ONCE
Refills: 0 | Status: DISCONTINUED | OUTPATIENT
Start: 2019-11-22 | End: 2019-12-02

## 2019-11-22 RX ORDER — INSULIN GLARGINE 100 [IU]/ML
30 INJECTION, SOLUTION SUBCUTANEOUS EVERY MORNING
Refills: 0 | Status: DISCONTINUED | OUTPATIENT
Start: 2019-11-23 | End: 2019-11-23

## 2019-11-22 RX ORDER — HEPARIN SODIUM 5000 [USP'U]/ML
5000 INJECTION INTRAVENOUS; SUBCUTANEOUS EVERY 12 HOURS
Refills: 0 | Status: DISCONTINUED | OUTPATIENT
Start: 2019-11-22 | End: 2019-12-02

## 2019-11-22 RX ORDER — INSULIN LISPRO 100/ML
VIAL (ML) SUBCUTANEOUS
Refills: 0 | Status: DISCONTINUED | OUTPATIENT
Start: 2019-11-22 | End: 2019-12-02

## 2019-11-22 RX ORDER — PANTOPRAZOLE SODIUM 20 MG/1
40 TABLET, DELAYED RELEASE ORAL
Refills: 0 | Status: DISCONTINUED | OUTPATIENT
Start: 2019-11-22 | End: 2019-11-25

## 2019-11-22 RX ORDER — ASPIRIN/CALCIUM CARB/MAGNESIUM 324 MG
325 TABLET ORAL ONCE
Refills: 0 | Status: COMPLETED | OUTPATIENT
Start: 2019-11-22 | End: 2019-11-22

## 2019-11-22 RX ORDER — DEXTROSE 50 % IN WATER 50 %
25 SYRINGE (ML) INTRAVENOUS ONCE
Refills: 0 | Status: DISCONTINUED | OUTPATIENT
Start: 2019-11-22 | End: 2019-12-02

## 2019-11-22 RX ORDER — INSULIN GLARGINE 100 [IU]/ML
30 INJECTION, SOLUTION SUBCUTANEOUS AT BEDTIME
Refills: 0 | Status: DISCONTINUED | OUTPATIENT
Start: 2019-11-22 | End: 2019-11-23

## 2019-11-22 RX ORDER — ACETAMINOPHEN 500 MG
650 TABLET ORAL EVERY 6 HOURS
Refills: 0 | Status: DISCONTINUED | OUTPATIENT
Start: 2019-11-22 | End: 2019-12-02

## 2019-11-22 RX ORDER — METOPROLOL TARTRATE 50 MG
50 TABLET ORAL DAILY
Refills: 0 | Status: DISCONTINUED | OUTPATIENT
Start: 2019-11-22 | End: 2019-11-23

## 2019-11-22 RX ORDER — ATORVASTATIN CALCIUM 80 MG/1
10 TABLET, FILM COATED ORAL AT BEDTIME
Refills: 0 | Status: DISCONTINUED | OUTPATIENT
Start: 2019-11-22 | End: 2019-12-02

## 2019-11-22 RX ADMIN — Medication 650 MILLIGRAM(S): at 19:04

## 2019-11-22 RX ADMIN — GABAPENTIN 100 MILLIGRAM(S): 400 CAPSULE ORAL at 22:21

## 2019-11-22 RX ADMIN — ATORVASTATIN CALCIUM 10 MILLIGRAM(S): 80 TABLET, FILM COATED ORAL at 22:21

## 2019-11-22 RX ADMIN — Medication 50 MILLIGRAM(S): at 22:20

## 2019-11-22 RX ADMIN — Medication 325 MILLIGRAM(S): at 17:03

## 2019-11-22 RX ADMIN — INSULIN GLARGINE 30 UNIT(S): 100 INJECTION, SOLUTION SUBCUTANEOUS at 22:51

## 2019-11-22 RX ADMIN — HEPARIN SODIUM 5000 UNIT(S): 5000 INJECTION INTRAVENOUS; SUBCUTANEOUS at 20:27

## 2019-11-22 NOTE — H&P ADULT - HISTORY OF PRESENT ILLNESS
Pt is a 80 y/o F, from home walks with cane at baseline, with a PMH of chronic diastolic heart failure (stage II), ESRD on HD T/Th/S, moderate MS, COPD on home oxygen, HTN, HLD, DM, and GERD  who presented with generalized weakness, generalized body pain and shortness of breath on exertion since 1 day.  According to the patient, since yesterday she has been feeling weak and has generalized body pain, more on her shoulders. She mentioned that she had minor fall from her bed yesterday and was not able to sleep whole night. She has shortness of breath on mild exertion limiting her ambulation.   She denied fever, chest pain, nausea, vomiting, abdominal pain and all other review of systems except mentioned above.

## 2019-11-22 NOTE — H&P ADULT - NEUROLOGICAL DETAILS
normal strength/alert and oriented x 3/sensation intact/cranial nerves intact/superficial reflexes intact/responds to pain/responds to verbal commands/deep reflexes intact

## 2019-11-22 NOTE — H&P ADULT - PROBLEM SELECTOR PLAN 6
IMPROVE VTE Individual Risk Assessment   RISK                                                          Points  [  ] Previous VTE                                                3  [  ] Thrombophilia                                             2  [  ] Lower limb paralysis                                    2        (unable to hold up >15 seconds)    [  ] Current Cancer                                             2         (within 6 months)  [  x] Immobilization > 24 hrs                              1  [  ] ICU/CCU stay > 24 hours                            1  [x  ] Age > 60                                                    1  IMPROVE VTE Score _________2  Heparin s/c  for DVT prophylaxis

## 2019-11-22 NOTE — H&P ADULT - PROBLEM SELECTOR PLAN 2
Pt has ESRD and has dialysis schedule Tuesday/thursday/ saturday  Pt missed dialysis today  Plan for dialysis tomorrow  Avoid nephrotoxic medication  ED provider consulted Nephrology Dr. Angela

## 2019-11-22 NOTE — H&P ADULT - PROBLEM SELECTOR PLAN 3
Likely diastolic heart failure as echocardiogram on 2018  shows normal e.f with left ventricular hypertrophy.   Pt takes Lasix 40 daily ay home   Continue home dose of Lasix Pt has h/o COPD and uses O2 at home. Former smoker  Continue Bronchodilator  Continue oxygen supplementation

## 2019-11-22 NOTE — ED PROVIDER NOTE - OBJECTIVE STATEMENT
78 y/o woman, h/o COPD, ESRD on HD (M, W, F) most recently on 11/20/19, DM, CHF, anemia, breast cancer, c/o generalized weakness today, to the point where it was difficult for her to stand or walk.  She notes that she is concerned that she may have recurrent anemia and notes she has previously needed blood transfusion.  No recognized bleeding of any type.  No CP/SOB/focal weakness/fever.  No fall/injury/syncope.

## 2019-11-22 NOTE — ED PROVIDER NOTE - CLINICAL SUMMARY MEDICAL DECISION MAKING FREE TEXT BOX
80 y/o woman, h/o COPD, ESRD on HD (M, W, F) most recently on 11/20/19, DM, CHF, anemia, breast cancer, c/o generalized weakness today, to the point where it was difficult for her to stand or walk--labs, EKG, will attempt to arrange Pt's regular dialysis.

## 2019-11-22 NOTE — H&P ADULT - PROBLEM SELECTOR PLAN 4
IMPROVE VTE Individual Risk Assessment   RISK                                                          Points  [  ] Previous VTE                                                3  [  ] Thrombophilia                                             2  [  ] Lower limb paralysis                                    2        (unable to hold up >15 seconds)    [  ] Current Cancer                                             2         (within 6 months)  [  x] Immobilization > 24 hrs                              1  [  ] ICU/CCU stay > 24 hours                            1  [x  ] Age > 60                                                    1  IMPROVE VTE Score _________2  Heparin s/c  for DVT prophylaxis Likely diastolic heart failure as echocardiogram on 2018  shows normal e.f with left ventricular hypertrophy.   Pt takes Lasix 40 daily ay home   Continue home dose of Lasix Elevated troponin to 0.078  Likely demand ischemia and secondary to ESRD  Monitor serial cardiac enzymes

## 2019-11-22 NOTE — ED ADULT NURSE NOTE - ED STAT RN HANDOFF DETAILS 2
Pt admited to tele, 5N 509B.  report given to Maribell JONES.  Pt in stable condition with no c/o pain.  PT transported to floor with RN and Transport on cardiac monitor.

## 2019-11-22 NOTE — H&P ADULT - PROBLEM SELECTOR PLAN 5
IMPROVE VTE Individual Risk Assessment   RISK                                                          Points  [  ] Previous VTE                                                3  [  ] Thrombophilia                                             2  [  ] Lower limb paralysis                                    2        (unable to hold up >15 seconds)    [  ] Current Cancer                                             2         (within 6 months)  [  x] Immobilization > 24 hrs                              1  [  ] ICU/CCU stay > 24 hours                            1  [x  ] Age > 60                                                    1  IMPROVE VTE Score _________2  Heparin s/c  for DVT prophylaxis Likely diastolic heart failure as echocardiogram on 2018  shows normal e.f with left ventricular hypertrophy.   Pt takes Lasix 40 daily ay home   Continue home dose of Lasix

## 2019-11-22 NOTE — H&P ADULT - NSICDXPASTMEDICALHX_GEN_ALL_CORE_FT
PAST MEDICAL HISTORY:  Anemia of chronic disease     Breast carcinoma Lt side s/p lumpectomy and radiation in 2004    Chronic CHF     Chronic diastolic (congestive) heart failure     COPD (chronic obstructive pulmonary disease) On home oxygen    DM (diabetes mellitus) type 2    ESRD (end stage renal disease) on dialysis     GERD (gastroesophageal reflux disease)     HLD (hyperlipidemia) not taking statins    HTN (hypertension)     MS (mitral stenosis)

## 2019-11-22 NOTE — ED PROVIDER NOTE - PROGRESS NOTE DETAILS
Informed Dr. Carlos for admission, unattached.  Pt's nephrologist is Dr. Goldberg who does not come here.  Pt missed HD today.  Consulted Dr. Angela, on-call nephrologist, and he will see Pt and arrange dialysis, which is anticipated to be done tomorrow as Pt is currently stable with normal potassium and no fluid overload.  He agrees with blood transfusion to be considered for tomorrow with dialysis since Pt has no sign of bleeding and hemodynamically stable.

## 2019-11-22 NOTE — ED ADULT NURSE REASSESSMENT NOTE - NS ED NURSE REASSESS COMMENT FT1
received pt awake alert and oriented x 3 not in acute distress,hooked to cardiac monitor,with saline lock intact,waiting for telebed.

## 2019-11-22 NOTE — ED PROVIDER NOTE - CARE PLAN
Principal Discharge DX:	Severe anemia  Secondary Diagnosis:	Generalized weakness  Secondary Diagnosis:	Elevated troponin

## 2019-11-22 NOTE — ED PROVIDER NOTE - PMH
Anemia of chronic disease    Breast carcinoma  Lt side s/p lumpectomy and radiation in 2004  Chronic CHF    Chronic diastolic (congestive) heart failure    COPD (chronic obstructive pulmonary disease)  On home oxygen  DM (diabetes mellitus)  type 2  ESRD (end stage renal disease) on dialysis    GERD (gastroesophageal reflux disease)    HLD (hyperlipidemia)  not taking statins  HTN (hypertension)    MS (mitral stenosis)

## 2019-11-22 NOTE — ED ADULT NURSE NOTE - NSIMPLEMENTINTERV_GEN_ALL_ED
Implemented All Fall Risk Interventions:  Jeff to call system. Call bell, personal items and telephone within reach. Instruct patient to call for assistance. Room bathroom lighting operational. Non-slip footwear when patient is off stretcher. Physically safe environment: no spills, clutter or unnecessary equipment. Stretcher in lowest position, wheels locked, appropriate side rails in place. Provide visual cue, wrist band, yellow gown, etc. Monitor gait and stability. Monitor for mental status changes and reorient to person, place, and time. Review medications for side effects contributing to fall risk. Reinforce activity limits and safety measures with patient and family.

## 2019-11-22 NOTE — H&P ADULT - ASSESSMENT
Pt is a 80 y/o F, from home walks with cane at baseline, with a PMH of chronic diastolic heart failure (stage II), ESRD on HD T/Th/S, moderate MS, COPD on home oxygen, HTN, HLD, DM, and GERD  who presented with generalized weakness, generalized body pain and shortness of breath on exertion since 1 day.                          7.5    8.37  )-----------( 178      ( 22 Nov 2019 12:17 )             26.0     11-22    136  |  99  |  64<H>  ----------------------------<  161<H>  4.6   |  27  |  5.81<H>    Ca    8.9      22 Nov 2019 12:17  Mg     2.5     11-22    TPro  7.1  /  Alb  2.6<L>  /  TBili  0.4  /  DBili  x   /  AST  13  /  ALT  20  /  AlkPhos  80  11-22

## 2019-11-22 NOTE — H&P ADULT - NSHPSOCIALHISTORY_GEN_ALL_CORE
Pt lives with her . has HHA for 7 days. She is a former smoker. Used to smoke about 1 pack a day for many years. Quit about 9 years back.

## 2019-11-22 NOTE — ED ADULT NURSE NOTE - OBJECTIVE STATEMENT
states difficulty ambulation for 3 days and shortness of breath, scheduled for dialysis at 2 pm today.

## 2019-11-23 LAB
24R-OH-CALCIDIOL SERPL-MCNC: 22.8 NG/ML — LOW (ref 30–80)
ABO RH CONFIRMATION: SIGNIFICANT CHANGE UP
ANION GAP SERPL CALC-SCNC: 12 MMOL/L — SIGNIFICANT CHANGE UP (ref 5–17)
ANION GAP SERPL CALC-SCNC: 9 MMOL/L — SIGNIFICANT CHANGE UP (ref 5–17)
BUN SERPL-MCNC: 47 MG/DL — HIGH (ref 7–18)
BUN SERPL-MCNC: 73 MG/DL — HIGH (ref 7–18)
CALCIUM SERPL-MCNC: 8.4 MG/DL — SIGNIFICANT CHANGE UP (ref 8.4–10.5)
CALCIUM SERPL-MCNC: 8.4 MG/DL — SIGNIFICANT CHANGE UP (ref 8.4–10.5)
CHLORIDE SERPL-SCNC: 100 MMOL/L — SIGNIFICANT CHANGE UP (ref 96–108)
CHLORIDE SERPL-SCNC: 101 MMOL/L — SIGNIFICANT CHANGE UP (ref 96–108)
CHOLEST SERPL-MCNC: 167 MG/DL — SIGNIFICANT CHANGE UP (ref 10–199)
CK MB BLD-MCNC: 4.7 % — HIGH (ref 0–3.5)
CK MB CFR SERPL CALC: 2.8 NG/ML — SIGNIFICANT CHANGE UP (ref 0–3.6)
CK SERPL-CCNC: 59 U/L — SIGNIFICANT CHANGE UP (ref 21–215)
CO2 SERPL-SCNC: 22 MMOL/L — SIGNIFICANT CHANGE UP (ref 22–31)
CO2 SERPL-SCNC: 27 MMOL/L — SIGNIFICANT CHANGE UP (ref 22–31)
CREAT SERPL-MCNC: 4.34 MG/DL — HIGH (ref 0.5–1.3)
CREAT SERPL-MCNC: 6.5 MG/DL — HIGH (ref 0.5–1.3)
FERRITIN SERPL-MCNC: 821 NG/ML — HIGH (ref 15–150)
GLUCOSE BLDC GLUCOMTR-MCNC: 150 MG/DL — HIGH (ref 70–99)
GLUCOSE BLDC GLUCOMTR-MCNC: 163 MG/DL — HIGH (ref 70–99)
GLUCOSE BLDC GLUCOMTR-MCNC: 166 MG/DL — HIGH (ref 70–99)
GLUCOSE BLDC GLUCOMTR-MCNC: 185 MG/DL — HIGH (ref 70–99)
GLUCOSE BLDC GLUCOMTR-MCNC: 198 MG/DL — HIGH (ref 70–99)
GLUCOSE BLDC GLUCOMTR-MCNC: 202 MG/DL — HIGH (ref 70–99)
GLUCOSE SERPL-MCNC: 179 MG/DL — HIGH (ref 70–99)
GLUCOSE SERPL-MCNC: 219 MG/DL — HIGH (ref 70–99)
HBA1C BLD-MCNC: 7.7 % — HIGH (ref 4–5.6)
HCT VFR BLD CALC: 25.9 % — LOW (ref 34.5–45)
HCT VFR BLD CALC: 33 % — LOW (ref 34.5–45)
HCT VFR BLD CALC: 33.3 % — LOW (ref 34.5–45)
HDLC SERPL-MCNC: 43 MG/DL — LOW
HGB BLD-MCNC: 10.2 G/DL — LOW (ref 11.5–15.5)
HGB BLD-MCNC: 10.4 G/DL — LOW (ref 11.5–15.5)
HGB BLD-MCNC: 7.4 G/DL — LOW (ref 11.5–15.5)
IRON SATN MFR SERPL: 25 % — SIGNIFICANT CHANGE UP (ref 15–50)
IRON SATN MFR SERPL: 54 UG/DL — SIGNIFICANT CHANGE UP (ref 40–150)
LIPID PNL WITH DIRECT LDL SERPL: 103 MG/DL — SIGNIFICANT CHANGE UP
MAGNESIUM SERPL-MCNC: 2.7 MG/DL — HIGH (ref 1.6–2.6)
MCHC RBC-ENTMCNC: 28.6 GM/DL — LOW (ref 32–36)
MCHC RBC-ENTMCNC: 29.2 PG — SIGNIFICANT CHANGE UP (ref 27–34)
MCHC RBC-ENTMCNC: 29.3 PG — SIGNIFICANT CHANGE UP (ref 27–34)
MCHC RBC-ENTMCNC: 29.8 PG — SIGNIFICANT CHANGE UP (ref 27–34)
MCHC RBC-ENTMCNC: 30.9 GM/DL — LOW (ref 32–36)
MCHC RBC-ENTMCNC: 31.2 GM/DL — LOW (ref 32–36)
MCV RBC AUTO: 102.4 FL — HIGH (ref 80–100)
MCV RBC AUTO: 94.8 FL — SIGNIFICANT CHANGE UP (ref 80–100)
MCV RBC AUTO: 95.4 FL — SIGNIFICANT CHANGE UP (ref 80–100)
NRBC # BLD: 0 /100 WBCS — SIGNIFICANT CHANGE UP (ref 0–0)
PHOSPHATE SERPL-MCNC: 7.9 MG/DL — HIGH (ref 2.5–4.5)
PLATELET # BLD AUTO: 170 K/UL — SIGNIFICANT CHANGE UP (ref 150–400)
PLATELET # BLD AUTO: 171 K/UL — SIGNIFICANT CHANGE UP (ref 150–400)
PLATELET # BLD AUTO: 178 K/UL — SIGNIFICANT CHANGE UP (ref 150–400)
POTASSIUM SERPL-MCNC: 4.9 MMOL/L — SIGNIFICANT CHANGE UP (ref 3.5–5.3)
POTASSIUM SERPL-MCNC: 6.1 MMOL/L — HIGH (ref 3.5–5.3)
POTASSIUM SERPL-SCNC: 4.9 MMOL/L — SIGNIFICANT CHANGE UP (ref 3.5–5.3)
POTASSIUM SERPL-SCNC: 6.1 MMOL/L — HIGH (ref 3.5–5.3)
RBC # BLD: 2.53 M/UL — LOW (ref 3.8–5.2)
RBC # BLD: 3.48 M/UL — LOW (ref 3.8–5.2)
RBC # BLD: 3.49 M/UL — LOW (ref 3.8–5.2)
RBC # FLD: 16.9 % — HIGH (ref 10.3–14.5)
RBC # FLD: 17.1 % — HIGH (ref 10.3–14.5)
RBC # FLD: 17.8 % — HIGH (ref 10.3–14.5)
SODIUM SERPL-SCNC: 135 MMOL/L — SIGNIFICANT CHANGE UP (ref 135–145)
SODIUM SERPL-SCNC: 136 MMOL/L — SIGNIFICANT CHANGE UP (ref 135–145)
TIBC SERPL-MCNC: 216 UG/DL — LOW (ref 250–450)
TOTAL CHOLESTEROL/HDL RATIO MEASUREMENT: 3.9 RATIO — SIGNIFICANT CHANGE UP (ref 3.3–7.1)
TRANSFERRIN SERPL-MCNC: 197 MG/DL — LOW (ref 200–360)
TRIGL SERPL-MCNC: 106 MG/DL — SIGNIFICANT CHANGE UP (ref 10–149)
TROPONIN I SERPL-MCNC: 0.1 NG/ML — HIGH (ref 0–0.04)
TSH SERPL-MCNC: 2.57 UU/ML — SIGNIFICANT CHANGE UP (ref 0.34–4.82)
UIBC SERPL-MCNC: 162 UG/DL — SIGNIFICANT CHANGE UP (ref 110–370)
VIT B12 SERPL-MCNC: 433 PG/ML — SIGNIFICANT CHANGE UP (ref 232–1245)
WBC # BLD: 10.01 K/UL — SIGNIFICANT CHANGE UP (ref 3.8–10.5)
WBC # BLD: 10.02 K/UL — SIGNIFICANT CHANGE UP (ref 3.8–10.5)
WBC # BLD: 9.18 K/UL — SIGNIFICANT CHANGE UP (ref 3.8–10.5)
WBC # FLD AUTO: 10.01 K/UL — SIGNIFICANT CHANGE UP (ref 3.8–10.5)
WBC # FLD AUTO: 10.02 K/UL — SIGNIFICANT CHANGE UP (ref 3.8–10.5)
WBC # FLD AUTO: 9.18 K/UL — SIGNIFICANT CHANGE UP (ref 3.8–10.5)

## 2019-11-23 PROCEDURE — 99291 CRITICAL CARE FIRST HOUR: CPT

## 2019-11-23 RX ORDER — INSULIN GLARGINE 100 [IU]/ML
15 INJECTION, SOLUTION SUBCUTANEOUS AT BEDTIME
Refills: 0 | Status: DISCONTINUED | OUTPATIENT
Start: 2019-11-23 | End: 2019-12-02

## 2019-11-23 RX ORDER — EPINEPHRINE 0.3 MG/.3ML
0.5 INJECTION INTRAMUSCULAR; SUBCUTANEOUS ONCE
Refills: 0 | Status: COMPLETED | OUTPATIENT
Start: 2019-11-23 | End: 2019-11-23

## 2019-11-23 RX ORDER — CALCIUM GLUCONATE 100 MG/ML
1 VIAL (ML) INTRAVENOUS ONCE
Refills: 0 | Status: COMPLETED | OUTPATIENT
Start: 2019-11-23 | End: 2019-11-23

## 2019-11-23 RX ORDER — SODIUM CHLORIDE 9 MG/ML
500 INJECTION INTRAMUSCULAR; INTRAVENOUS; SUBCUTANEOUS ONCE
Refills: 0 | Status: COMPLETED | OUTPATIENT
Start: 2019-11-23 | End: 2019-11-23

## 2019-11-23 RX ADMIN — Medication 50 MILLIGRAM(S): at 06:21

## 2019-11-23 RX ADMIN — EPINEPHRINE 0.5 MILLIGRAM(S): 0.3 INJECTION INTRAMUSCULAR; SUBCUTANEOUS at 10:30

## 2019-11-23 RX ADMIN — PANTOPRAZOLE SODIUM 40 MILLIGRAM(S): 20 TABLET, DELAYED RELEASE ORAL at 06:22

## 2019-11-23 RX ADMIN — GABAPENTIN 100 MILLIGRAM(S): 400 CAPSULE ORAL at 06:21

## 2019-11-23 RX ADMIN — SODIUM CHLORIDE 1000 MILLILITER(S): 9 INJECTION INTRAMUSCULAR; INTRAVENOUS; SUBCUTANEOUS at 21:28

## 2019-11-23 RX ADMIN — Medication 40 MILLIGRAM(S): at 06:21

## 2019-11-23 RX ADMIN — Medication 1: at 16:51

## 2019-11-23 RX ADMIN — INSULIN GLARGINE 15 UNIT(S): 100 INJECTION, SOLUTION SUBCUTANEOUS at 23:47

## 2019-11-23 RX ADMIN — HEPARIN SODIUM 5000 UNIT(S): 5000 INJECTION INTRAVENOUS; SUBCUTANEOUS at 06:22

## 2019-11-23 RX ADMIN — AMLODIPINE BESYLATE 5 MILLIGRAM(S): 2.5 TABLET ORAL at 06:21

## 2019-11-23 RX ADMIN — Medication 5 UNIT(S): at 16:52

## 2019-11-23 RX ADMIN — GABAPENTIN 100 MILLIGRAM(S): 400 CAPSULE ORAL at 16:52

## 2019-11-23 RX ADMIN — GABAPENTIN 100 MILLIGRAM(S): 400 CAPSULE ORAL at 21:28

## 2019-11-23 RX ADMIN — Medication 50 MILLIGRAM(S): at 21:28

## 2019-11-23 RX ADMIN — Medication 5 UNIT(S): at 08:42

## 2019-11-23 RX ADMIN — Medication 325 MILLIGRAM(S): at 16:52

## 2019-11-23 RX ADMIN — Medication 200 GRAM(S): at 10:30

## 2019-11-23 RX ADMIN — Medication 1: at 08:41

## 2019-11-23 RX ADMIN — ATORVASTATIN CALCIUM 10 MILLIGRAM(S): 80 TABLET, FILM COATED ORAL at 21:28

## 2019-11-23 NOTE — CONSULT NOTE ADULT - NEGATIVE ENMT SYMPTOMS
no ear pain/no hearing difficulty/no vertigo/no dry mouth/no gum bleeding/no throat pain/no dysphagia

## 2019-11-23 NOTE — CONSULT NOTE ADULT - SUBJECTIVE AND OBJECTIVE BOX
[  ] STAT REQUEST              [ X ] ROUTINE REQUEST    Patient is a 79 year old female with anemia and rectal bleeding. GI consulted to evaluate.         HPI:  Pt is a 79 year old female with multiple medical problems including ESRD on HD presented with symptomatic anemia. Patient reports having constipation associated with intermittent rectal bleeding and bloating. Patient denies abdominal pain, nausea, vomiting, hematemesis, melena, fever, chills, chest pain, SOB, cough, palpitation, cough, epistaxis, hemoptysis, hematuria, or diarrhea.         PAIN MANAGEMENT:  Pain Scale:                0 /10  Pain Location:      Prior Colonoscopy:  No prior colonoscopy    PAST MEDICAL HISTORY  CHF  Mitral Stenosis)  GERD    COPD    Hyperlipidemia  HTN   ESRD on HD   Breast carcinoma  Renal mass  DM    Asthma      PAST SURGICAL HISTORY  Subtotal hysterectomy  Lumpectomy, left breast         Allergies    No Known Allergies          MEDICATIONS  (STANDING):  atorvastatin 10 milliGRAM(s) Oral at bedtime  dextrose 5%. 1000 milliLiter(s) (50 mL/Hr) IV Continuous <Continuous>  dextrose 50% Injectable 12.5 Gram(s) IV Push once  dextrose 50% Injectable 25 Gram(s) IV Push once  dextrose 50% Injectable 25 Gram(s) IV Push once  ferrous    sulfate 325 milliGRAM(s) Oral daily  gabapentin 100 milliGRAM(s) Oral three times a day  heparin  Injectable 5000 Unit(s) SubCutaneous every 12 hours  insulin glargine Injectable (LANTUS) 30 Unit(s) SubCutaneous every morning  insulin glargine Injectable (LANTUS) 30 Unit(s) SubCutaneous at bedtime  insulin lispro (HumaLOG) corrective regimen sliding scale   SubCutaneous three times a day before meals  insulin lispro Injectable (HumaLOG) 5 Unit(s) SubCutaneous three times a day before meals  pantoprazole   Suspension 40 milliGRAM(s) Oral before breakfast  traZODone 50 milliGRAM(s) Oral at bedtime    MEDICATIONS  (PRN):  acetaminophen   Tablet .. 650 milliGRAM(s) Oral every 6 hours PRN Mild Pain (1 - 3), Moderate Pain (4 - 6)  dextrose 40% Gel 15 Gram(s) Oral once PRN Blood Glucose LESS THAN 70 milliGRAM(s)/deciliter  glucagon  Injectable 1 milliGRAM(s) IntraMuscular once PRN Glucose LESS THAN 70 milligrams/deciliter      SOCIAL HISTORY  Advanced Directives:       [ X ] Full Code       [  ] DNR  Marital Status:         [  ] M      [ X ] S      [  ] D       [  ] W  Children:       [ X ] Yes      [  ] No  Occupation:        [  ] Employed       [X  ] Unemployed       [  ] Retired  Diet:       [ X ] Regular       [  ] PEG feeding          [  ] NG tube feeding  Drug Use:           [ X ] Patient denied          [  ] Yes  Alcohol:           [ X ] No             [  ] Yes (socially)         [  ] Yes (chronic)  Tobacco:           [  ] Yes           [X  ] No        FAMILY HISTORY  [ X ] Heart Disease            [  ] Diabetes             [  ] HTN             [  ] Colon Cancer             [  ] Stomach Cancer              [  ] Pancreatic Cancer        VITAL SIGNS   Vital Signs Last 24 Hrs  T(C): 36.4 (2019 15:24), Max: 36.7 (2019 20:52)  T(F): 97.5 (2019 15:24), Max: 98 (2019 20:52)  HR: 78 (2019 15:24) (41 - 78)  BP: 95/61 (2019 15:24) (52/31 - 146/76)   RR: 18 (2019 15:24) (14 - 22)  SpO2: 99% (2019 15:24) (89% - 100%)    Daily Weight in k.5 (2019 05:40)           CBC Full  -  ( 2019 10:36 )  WBC Count : 10.02 K/uL  RBC Count : 2.53 M/uL  Hemoglobin : 7.4 g/dL  Hematocrit : 25.9 %  Platelet Count - Automated : 170 K/uL  Mean Cell Volume : 102.4 fl  Mean Cell Hemoglobin : 29.2 pg  Mean Cell Hemoglobin Concentration : 28.6 gm/dL  Auto Neutrophil # : x  Auto Lymphocyte # : x  Auto Monocyte # : x  Auto Eosinophil # : x  Auto Basophil # : x  Auto Neutrophil % : x  Auto Lymphocyte % : x  Auto Monocyte % : x  Auto Eosinophil % : x  Auto Basophil % : x          135  |  101  |  73<H>  ----------------------------<  219<H>  6.1<H>   |  22  |  6.50<H>    Ca    8.4      2019 10:36  Phos  7.9       Mg     2.7         TPro  7.1  /  Alb  2.6<L>  /  TBili  0.4  /  DBili  x   /  AST  13  /  ALT  20  /  AlkPhos  80          Iron with Total Binding Capacity (..19 @ 10:36)    Iron - Total Binding Capacity.: 216 ug/dL    % Saturation, Iron: 25 %    Iron Total, Serum: 54 ug/dL    Unsaturated Iron Binding Capacity: 162 ug/dL    Urinalysis (18 @ 22:50)    Blood, Urine: Trace    Glucose Qualitative, Urine: 100 mg/dL    pH Urine: 8.0    Color: Yellow    Urine Appearance: Clear    Bilirubin: Negative    Ketone - Urine: Negative    Specific Gravity: 1.010    Protein, Urine: 100    Urobilinogen: Negative    Nitrite: Negative    Leukocyte Esterase Concentration: Trace      ECG  Ventricular Rate 96 BPM    Atrial Rate 113 BPM    QRS Duration 150 ms    Q-T Interval 412 ms    QTC Calculation(Bezet) 520 ms    R Axis -67 degrees    T Axis 81 degrees    Diagnosis Line Atrial fibrillation  Right bundle branch block  Left anteriorfascicular block  *** Bifascicular block ***  Abnormal ECG        RADIOLOGY/IMAGING                EXAM:  CT CHEST IC      EXAM:  CT ABDOMEN AND PELVIS IC        PROCEDURE DATE:  2018         INTERPRETATION:  CLINICAL INFORMATION: Bacteremia. Evaluate for source of   infection.    COMPARISON: Chest CT from 2018. CT abdomen pelvis from 3/5/2015.    PROCEDURE:   CT of the Chest, Abdomen and Pelvis was performed with intravenous   contrast.   Intravenous contrast: 90 ml Omnipaque 350. 10 ml discarded.  Oral contrast: None.  Sagittal and coronal reformats were performed.    FINDINGS:    CHEST:     LINES AND TUBES: A right IJ approach central venous catheter terminates   in the right atrium.  LUNGS AND LARGE AIRWAYS: Patent central airways. Mild interlobular septal   thickening. Few peripheral tree-in-bud opacities, likely representing   distal airway mucoid impaction. Partial lingular atelectasis.  PLEURA: Small left pleural effusion with associated compressive   atelectasis.  VESSELS: A CardioMEMS device is within the left basilar subsegmental   pulmonary artery. Atherosclerotic calcifications of the coronary arteries   and aorta.  HEART: Mild cardiomegaly. No pericardial effusion.  MEDIASTINUM AND DAMIÁN: A top normal in size anterior mediastinal lymph   node measures 1.2 x 1.1 cm without change.  CHEST WALL AND LOWER NECK: Multiple bilateral thyroid nodules, with a   dominant 2.8 cm right nodule, and additional left thyroid nodules with   peripheral calcifications, unchanged. Right anterior chest wall surgical   clips.    ABDOMEN AND PELVIS:    LIVER: Within normal limits.  BILE DUCTS: Normal caliber.  GALLBLADDER: Within normal limits.  SPLEEN: Within normal limits.  PANCREAS: Within normal limits.  ADRENALS: Within normal limits.  KIDNEYS/URETERS: Numerous bilateral simple renal cysts, and subcentimeter   hypodense foci too small to characterize. A left mid pole cyst has   peripheral calcifications.    BLADDER: Within normal limits.  REPRODUCTIVE ORGANS: Hysterectomy. No adnexal masses.    BOWEL: Diverticulosis without diverticulitis. No bowel obstruction.   Appendix normal.  PERITONEUM: No ascites.  VESSELS:  Extensive atherosclerotic disease. 3.5 cm infrarenal abdominal   aortic aneurysm.  RETROPERITONEUM: No lymphadenopathy.    ABDOMINAL WALL: Right anterior skin thickening, likely secondary to   subcutaneous medication administration.  BONES: Mild, age indeterminant anterior compression deformity of the L2   vertebral body, unchanged from May 11, 2018.    IMPRESSION:     The source of infection is not elucidated.

## 2019-11-23 NOTE — PROGRESS NOTE ADULT - SUBJECTIVE AND OBJECTIVE BOX
SUBJECTIVE / OVERNIGHT EVENTS: pt seen and examined       MEDICATIONS  (STANDING):  atorvastatin 10 milliGRAM(s) Oral at bedtime  dextrose 5%. 1000 milliLiter(s) (50 mL/Hr) IV Continuous <Continuous>  dextrose 50% Injectable 12.5 Gram(s) IV Push once  dextrose 50% Injectable 25 Gram(s) IV Push once  dextrose 50% Injectable 25 Gram(s) IV Push once  ferrous    sulfate 325 milliGRAM(s) Oral daily  gabapentin 100 milliGRAM(s) Oral three times a day  heparin  Injectable 5000 Unit(s) SubCutaneous every 12 hours  insulin glargine Injectable (LANTUS) 30 Unit(s) SubCutaneous every morning  insulin glargine Injectable (LANTUS) 30 Unit(s) SubCutaneous at bedtime  insulin lispro (HumaLOG) corrective regimen sliding scale   SubCutaneous three times a day before meals  insulin lispro Injectable (HumaLOG) 5 Unit(s) SubCutaneous three times a day before meals  pantoprazole   Suspension 40 milliGRAM(s) Oral before breakfast  traZODone 50 milliGRAM(s) Oral at bedtime    MEDICATIONS  (PRN):  acetaminophen   Tablet .. 650 milliGRAM(s) Oral every 6 hours PRN Mild Pain (1 - 3), Moderate Pain (4 - 6)  dextrose 40% Gel 15 Gram(s) Oral once PRN Blood Glucose LESS THAN 70 milliGRAM(s)/deciliter  glucagon  Injectable 1 milliGRAM(s) IntraMuscular once PRN Glucose LESS THAN 70 milligrams/deciliter    Vital Signs Last 24 Hrs  T(C): 36.4 (23 Nov 2019 15:24), Max: 36.7 (22 Nov 2019 20:52)  T(F): 97.5 (23 Nov 2019 15:24), Max: 98 (22 Nov 2019 20:52)  HR: 78 (23 Nov 2019 15:24) (41 - 78)  BP: 95/61 (23 Nov 2019 15:24) (52/31 - 146/76)  BP(mean): --  RR: 18 (23 Nov 2019 15:24) (14 - 22)  SpO2: 99% (23 Nov 2019 15:24) (89% - 100%)    CAPILLARY BLOOD GLUCOSE      POCT Blood Glucose.: 198 mg/dL (23 Nov 2019 16:26)  POCT Blood Glucose.: 150 mg/dL (23 Nov 2019 11:27)  POCT Blood Glucose.: 202 mg/dL (23 Nov 2019 10:25)  POCT Blood Glucose.: 185 mg/dL (23 Nov 2019 08:10)  POCT Blood Glucose.: 197 mg/dL (22 Nov 2019 22:04)    I&O's Summary      PHYSICAL EXAM:  GENERAL: NAD  EYES: EOMI, PERRLA  NECK: Supple, No JVD  CHEST/LUNG: dec breath sounds at bases   HEART:  S1 , S2 +  ABDOMEN: soft , bs+  EXTREMITIES:  edema+  NEUROLOGY: alert awake       LABS:                        10.4   10.01 )-----------( 178      ( 23 Nov 2019 17:29 )             33.3     11-23    135  |  101  |  73<H>  ----------------------------<  219<H>  6.1<H>   |  22  |  6.50<H>    Ca    8.4      23 Nov 2019 10:36  Phos  7.9     11-23  Mg     2.7     11-23    TPro  7.1  /  Alb  2.6<L>  /  TBili  0.4  /  DBili  x   /  AST  13  /  ALT  20  /  AlkPhos  80  11-22      CARDIAC MARKERS ( 23 Nov 2019 00:42 )  0.095 ng/mL / x     / 59 U/L / x     / 2.8 ng/mL  CARDIAC MARKERS ( 22 Nov 2019 12:17 )  0.078 ng/mL / x     / x     / x     / x              RADIOLOGY & ADDITIONAL TESTS:    Imaging Personally Reviewed:    Consultant(s) Notes Reviewed:      Care Discussed with Consultants/Other Providers:

## 2019-11-23 NOTE — CONSULT NOTE ADULT - SUBJECTIVE AND OBJECTIVE BOX
HISTORY OF PRESENT ILLNESS: Pt is a 80 y/o F, from home walks with cane at baseline, with a PMH of chronic diastolic heart failure (stage II), ESRD on HD T/Th/S, moderate MS, COPD on home oxygen, HTN, HLD, DM, and GERD  who presented with generalized weakness, generalized body pain and shortness of breath on exertion since 1 day.  According to the patient, since yesterday she has been feeling weak and has generalized body pain, more on her shoulders. She mentioned that she had minor fall from her bed yesterday and was not able to sleep whole night. She has shortness of breath on mild exertion limiting her ambulation.   She denied fever, chest pain, nausea, vomiting, abdominal pain and all other review of systems except mentioned above.    PAST MEDICAL & SURGICAL HISTORY:  Chronic CHF  MS (mitral stenosis)  GERD (gastroesophageal reflux disease)  COPD (chronic obstructive pulmonary disease): On home oxygen  HLD (hyperlipidemia): not taking statins  HTN (hypertension)  ESRD (end stage renal disease) on dialysis  Chronic diastolic (congestive) heart failure  Anemia of chronic disease  Breast carcinoma: Lt side s/p lumpectomy and radiation in 2004  DM (diabetes mellitus): type 2  S/P subtotal hysterectomy  S/P lumpectomy, left breast: 2004      [x ] Diabetes   [x ] Hypertension  [x ] Hyperlipidemia  [x ] CAD  [ ] PCI  [ ] CABG    PREVIOUS DIAGNOSTIC TESTING:    [x ] Echocardiogram: < from: Transthoracic Echocardiogram (07.10.18 @ 13:29) >  CONCLUSIONS:  1. Mitral annular calcification, otherwise normal mitral  valve. Mild mitral regurgitation.  2. Mildly dilated left atrium.  LA volume index = 36 cc/m2.  3. Mild concentric left ventricular hypertrophy.  4. Endocardium not well visualized; grossly normal left  ventricular systolic function.  5. Normal right ventricular size and function.  6. Estimated right ventricular systolic pressure equals 57  mm Hg, assuming right atrial pressure equals 10 mm Hg,  consistent with moderate pulmonary hypertension.  ------------------------------------------------------------------------  Confirmed on  7/10/2018 - 14:37:21 by Ayo Arverne, M.D.  ------------------------------------------------------------------------    < end of copied text >    [ ]  Catheterization:  [ ] Stress Test:  	< from: Nuclear Stress Test-Pharmacologic (12.01.17 @ 08:43) >  IMPRESSIONS:Normal Study  * Negative ECG evidence of ischemia after IV  administartion of Regadenoson.  * Myocardial Perfusion SPECT results are normal.  * No clear evidence of ischemia or infarct.  * Post-stress resting myocardial perfusion gated SPECT  imaging was performed (LVEF = 53 %) with no regional wall  motion abnormalities.    ------------------------------------------------------------------------      ------------------------------------------------------------------------    Confirmed on  12/1/2017 - 11:00:07 at [place] by  Kyler Jaimes MD    < end of copied text >      MEDICATIONS:  amLODIPine   Tablet 5 milliGRAM(s) Oral daily  furosemide    Tablet 40 milliGRAM(s) Oral daily  heparin  Injectable 5000 Unit(s) SubCutaneous every 12 hours  metoprolol tartrate 50 milliGRAM(s) Oral daily        acetaminophen   Tablet .. 650 milliGRAM(s) Oral every 6 hours PRN  gabapentin 100 milliGRAM(s) Oral three times a day  traZODone 50 milliGRAM(s) Oral at bedtime    pantoprazole   Suspension 40 milliGRAM(s) Oral before breakfast    atorvastatin 10 milliGRAM(s) Oral at bedtime  dextrose 40% Gel 15 Gram(s) Oral once PRN  dextrose 50% Injectable 12.5 Gram(s) IV Push once  dextrose 50% Injectable 25 Gram(s) IV Push once  dextrose 50% Injectable 25 Gram(s) IV Push once  glucagon  Injectable 1 milliGRAM(s) IntraMuscular once PRN  insulin glargine Injectable (LANTUS) 30 Unit(s) SubCutaneous every morning  insulin glargine Injectable (LANTUS) 30 Unit(s) SubCutaneous at bedtime  insulin lispro (HumaLOG) corrective regimen sliding scale   SubCutaneous three times a day before meals  insulin lispro Injectable (HumaLOG) 5 Unit(s) SubCutaneous three times a day before meals    dextrose 5%. 1000 milliLiter(s) IV Continuous <Continuous>  ferrous    sulfate 325 milliGRAM(s) Oral daily      Allergies    No Known Allergies    Intolerances        FAMILY HISTORY:  Diabetes mellitus  Family history of breast cancer (Aunt)      SOCIAL HISTORY:    [x ] Non-smoker  [ ] Smoker  [ ] Alcohol      REVIEW OF SYSTEMS:  [ ]chest pain  [ x ]shortness of breath  [  ]palpitations  [  ]syncope  [ ]near syncope [  ]diplopia  [  ]altered mental status [  ]fevers  [ ]chills [ ]nausea  [ ]vomitting  [ ]abdominal pain  [ ]melena  [ ]BRBPR  [  ]epistaxis  [  ]rash  [  ]lower extremity edema      CONSTITUTIONAL: No fever, weight loss, or fatigue  EYES: No eye pain, visual disturbances, or discharge  ENMT:  No difficulty hearing, tinnitus, vertigo; No sinus or throat pain  NECK: No pain or stiffness  RESPIRATORY: No cough  CARDIOVASCULAR: No chest pain, palpitations, passing out, dizziness, or leg swelling  GASTROINTESTINAL: No abdominal or epigastric pain. No nausea, vomiting, or hematemesis; No diarrhea or constipation. No melena or hematochezia.  GENITOURINARY: No dysuria, frequency, hematuria, or incontinence  NEUROLOGICAL: No headaches, memory loss, loss of strength, numbness, or tremors  SKIN: No itching, burning, rashes, or lesions   LYMPH Nodes: No enlarged glands  ENDOCRINE: No heat or cold intolerance; No hair loss  MUSCULOSKELETAL: No joint pain or swelling; No muscle, back, or extremity pain  PSYCHIATRIC: No depression, anxiety, mood swings, or difficulty sleeping  HEME/LYMPH: No easy bruising, or bleeding gums  ALLERY AND IMMUNOLOGIC: No hives or eczema	    [x ] All others negative	  [ ] Unable to obtain    PHYSICAL EXAM:  T(C): 36.5 (11-23-19 @ 05:40), Max: 36.7 (11-22-19 @ 20:52)  HR: 68 (11-23-19 @ 05:40) (67 - 76)  BP: 101/58 (11-23-19 @ 06:19) (84/53 - 141/76)  RR: 20 (11-23-19 @ 05:40) (17 - 22)  SpO2: 100% (11-23-19 @ 05:40) (92% - 100%)  Wt(kg): --  I&O's Summary      Appearance: Normal	  HEENT:   Normal oral mucosa, PERRL, EOMI	  Lymphatic: No lymphadenopathy  Cardiovascular: Normal S1 S2, No JVD, No murmurs, +++ edema  Respiratory: Lungs clear to auscultation	  Psychiatry: A & O x 3, Mood & affect appropriate  Gastrointestinal:  Soft, Non-tender, + BS	  Skin: No rashes, No ecchymoses, No cyanosis	  Neurologic: Non-focal  Extremities: Normal range of motion, No clubbing, cyanosis or edema  Vascular: Peripheral pulses palpable 2+ bilaterally    TELEMETRY:  	 NSR   ECG:  	NSR , BBB, NAD, no acute ischemic finding   RADIOLOGY: < from: Xray Chest 1 View AP/PA (11.22.19 @ 18:16) >    IMPRESSION: Increased pulmonaryvascular congestion noted. No focal   consolidation is seen. Prominent cardiac silhouette.    < end of copied text >    OTHER: 	  	  LABS:	 	    CARDIAC MARKERS:  Troponin I, Serum: 0.095 ng/mL (11-23 @ 00:42)  Troponin I, Serum: 0.078 ng/mL (11-22 @ 12:17)                                  7.5    8.37  )-----------( 178      ( 22 Nov 2019 12:17 )             26.0     11-22    136  |  99  |  64<H>  ----------------------------<  161<H>  4.6   |  27  |  5.81<H>    Ca    8.9      22 Nov 2019 12:17  Mg     2.5     11-22    TPro  7.1  /  Alb  2.6<L>  /  TBili  0.4  /  DBili  x   /  AST  13  /  ALT  20  /  AlkPhos  80  11-22    proBNP: Serum Pro-Brain Natriuretic Peptide: 9669 pg/mL (11-22 @ 12:17)    Lipid Profile:   HgA1c:   TSH:     ASSESSMENT/PLAN: 	79 yr female hx of ESRD on HD, HTN, ANemia, copd , chf, PAF, no A/C documented , now with anemia, SOB , + troponin on admission labs     ECG with no acute ischemia finding   HD today with PRBC per renal   BP stable on BB/ hr stable   Troponin most likely elevated in setting of ESRD ,  CK neg with troponin elevation   supp o2    GI / DVT prophylaxis.   keep K>4, mag >2.0   pt is in NSR   would obtain echo to evaluate LV fx   D/W Dr Chavez

## 2019-11-23 NOTE — CONSULT NOTE ADULT - SUBJECTIVE AND OBJECTIVE BOX
Patient is a 79y old  Female who presents with a chief complaint of weakness (23 Nov 2019 07:44)    Initial HPI on admission: Pt is a 80 y/o F, from home walks with cane at baseline, with a PMH of chronic diastolic heart failure (stage II), ESRD on HD T/Th/S, moderate MS, COPD on home oxygen, HTN, HLD, DM, and GERD  who presented with generalized weakness, generalized body pain and shortness of breath on exertion since 1 day.  According to the patient, since yesterday she has been feeling weak and has generalized body pain, more on her shoulders. She mentioned that she had minor fall from her bed yesterday and was not able to sleep whole night. She has shortness of breath on mild exertion limiting her ambulation.     BRIEF HOSPITAL COURSE: 79 Female with history of HTN, Chronic diastolic heart failure, ESRD on hemodialysis, anemia, COPD on chronic home oxygen, Diabetes and GERD came to hospital for weakness and was being worked up for her worsening anemia. ICU was consulted while she was in dialysis room for hypotension and lethargy. Pt got morning meds of blood pressure (amlodipine, metoprolol and Lasix) and right before starting dialysis her blood pressure was 50/40. Dr Tamayo was present as well.       PAST MEDICAL & SURGICAL HISTORY:  Chronic CHF  MS (mitral stenosis)  GERD (gastroesophageal reflux disease)  COPD (chronic obstructive pulmonary disease): On home oxygen  HLD (hyperlipidemia): not taking statins  HTN (hypertension)  ESRD (end stage renal disease) on dialysis  Chronic diastolic (congestive) heart failure  Anemia of chronic disease  Breast carcinoma: Lt side s/p lumpectomy and radiation in 2004  DM (diabetes mellitus): type 2  S/P subtotal hysterectomy  S/P lumpectomy, left breast: 2004      Allergies  No Known Allergies      FAMILY HISTORY:  Diabetes mellitus  Family history of breast cancer (Aunt)          Medications:  acetaminophen   Tablet .. 650 milliGRAM(s) Oral every 6 hours PRN  atorvastatin 10 milliGRAM(s) Oral at bedtime  dextrose 40% Gel 15 Gram(s) Oral once PRN  dextrose 5%. 1000 milliLiter(s) IV Continuous <Continuous>  dextrose 50% Injectable 12.5 Gram(s) IV Push once  dextrose 50% Injectable 25 Gram(s) IV Push once  dextrose 50% Injectable 25 Gram(s) IV Push once  ferrous    sulfate 325 milliGRAM(s) Oral daily  gabapentin 100 milliGRAM(s) Oral three times a day  glucagon  Injectable 1 milliGRAM(s) IntraMuscular once PRN  heparin  Injectable 5000 Unit(s) SubCutaneous every 12 hours  insulin glargine Injectable (LANTUS) 30 Unit(s) SubCutaneous every morning  insulin glargine Injectable (LANTUS) 30 Unit(s) SubCutaneous at bedtime  insulin lispro (HumaLOG) corrective regimen sliding scale   SubCutaneous three times a day before meals  insulin lispro Injectable (HumaLOG) 5 Unit(s) SubCutaneous three times a day before meals  pantoprazole   Suspension 40 milliGRAM(s) Oral before breakfast  traZODone 50 milliGRAM(s) Oral at bedtime      Vital Signs Last 24 Hrs  T(C): 36.4 (23 Nov 2019 10:40), Max: 36.7 (22 Nov 2019 20:52)  T(F): 97.6 (23 Nov 2019 10:40), Max: 98 (22 Nov 2019 20:52)  HR: 54 (23 Nov 2019 10:40) (41 - 76)  BP: 146/76 (23 Nov 2019 10:40) (52/31 - 146/76)  BP(mean): --  RR: 16 (23 Nov 2019 10:40) (14 - 22)  SpO2: 100% (23 Nov 2019 10:40) (89% - 100%)          LABS:                        7.4    10.02 )-----------( 170      ( 23 Nov 2019 10:36 )             25.9     11-23    135  |  101  |  73<H>  ----------------------------<  219<H>  6.1<H>   |  22  |  6.50<H>    Ca    8.4      23 Nov 2019 10:36  Phos  7.9     11-23  Mg     2.7     11-23    TPro  7.1  /  Alb  2.6<L>  /  TBili  0.4  /  DBili  x   /  AST  13  /  ALT  20  /  AlkPhos  80  11-22      CARDIAC MARKERS ( 23 Nov 2019 00:42 )  0.095 ng/mL / x     / 59 U/L / x     / 2.8 ng/mL  CARDIAC MARKERS ( 22 Nov 2019 12:17 )  0.078 ng/mL / x     / x     / x     / x          CAPILLARY BLOOD GLUCOSE  POCT Blood Glucose.: 150 mg/dL (23 Nov 2019 11:27)        Physical Examination:  GENERAL: Well developed, Elderly female, NAD  HEENT:  Normocephalic/Atraumatic, reactive light reflex, dry mucous membranes  NECK: Supple, no JVD  RESP: Symmetric movement of the chest, bilateral crackles   CVS: S1 and S2 audible, no murmur, rubs or gallops noted  GI: Normal active bowel sounds present, abdomen soft, non tender, non distended  EXTREMITIES:  No edema, no clubbing, cyanosis , +RUE fistula  MSK: 5/5 strength bilateral upper and lower extremities, intact sensations to light & crude touch  PSYCH: Normal mood, normal affect observed  NEURO: Alert and oriented x 3          ASSESSMENT AND PLAN:  79 Female with history of HTN, Chronic diastolic heart failure, ESRD on hemodialysis, anemia, COPD on chronic home oxygen, Diabetes and GERD came to hospital for weakness/anemia. ICU was consulted in dialysis room for her hypotension after getting morning antihypertensives and before starting HD.      Neuro  -    Cardiovascular  -    Pulmonary  -    Infections  -    Nephro  -    Gastrointestinal  -    Heme  -    Endocrine  -    Skin/Catheters  -    FEN  -    Prophylaxis   - Patient is a 79y old  Female who presents with a chief complaint of weakness (23 Nov 2019 07:44)    Initial HPI on admission: Pt is a 78 y/o F, from home walks with cane at baseline, with a PMH of chronic diastolic heart failure (stage II), ESRD on HD T/Th/S, moderate MS, COPD on home oxygen, HTN, HLD, DM, and GERD  who presented with generalized weakness, generalized body pain and shortness of breath on exertion since 1 day.  According to the patient, since yesterday she has been feeling weak and has generalized body pain, more on her shoulders. She mentioned that she had minor fall from her bed yesterday and was not able to sleep whole night. She has shortness of breath on mild exertion limiting her ambulation.     BRIEF HOSPITAL COURSE: 79 Female with history of HTN, Chronic diastolic heart failure, ESRD on hemodialysis, anemia, COPD on chronic home oxygen, Diabetes and GERD came to hospital for weakness and was being worked up for her worsening anemia. ICU was consulted while she was in dialysis room for hypotension and lethargy. Pt got morning meds of blood pressure (amlodipine, metoprolol and Lasix) and right before starting dialysis her blood pressure was 50/40. Dr Tamayo was present as well.       PAST MEDICAL & SURGICAL HISTORY:  Chronic CHF  MS (mitral stenosis)  GERD (gastroesophageal reflux disease)  COPD (chronic obstructive pulmonary disease): On home oxygen  HLD (hyperlipidemia): not taking statins  HTN (hypertension)  ESRD (end stage renal disease) on dialysis  Chronic diastolic (congestive) heart failure  Anemia of chronic disease  Breast carcinoma: Lt side s/p lumpectomy and radiation in 2004  DM (diabetes mellitus): type 2  S/P subtotal hysterectomy  S/P lumpectomy, left breast: 2004      Allergies  No Known Allergies      FAMILY HISTORY:  Diabetes mellitus  Family history of breast cancer (Aunt)          Medications:  acetaminophen   Tablet .. 650 milliGRAM(s) Oral every 6 hours PRN  atorvastatin 10 milliGRAM(s) Oral at bedtime  dextrose 40% Gel 15 Gram(s) Oral once PRN  dextrose 5%. 1000 milliLiter(s) IV Continuous <Continuous>  dextrose 50% Injectable 12.5 Gram(s) IV Push once  dextrose 50% Injectable 25 Gram(s) IV Push once  dextrose 50% Injectable 25 Gram(s) IV Push once  ferrous    sulfate 325 milliGRAM(s) Oral daily  gabapentin 100 milliGRAM(s) Oral three times a day  glucagon  Injectable 1 milliGRAM(s) IntraMuscular once PRN  heparin  Injectable 5000 Unit(s) SubCutaneous every 12 hours  insulin glargine Injectable (LANTUS) 30 Unit(s) SubCutaneous every morning  insulin glargine Injectable (LANTUS) 30 Unit(s) SubCutaneous at bedtime  insulin lispro (HumaLOG) corrective regimen sliding scale   SubCutaneous three times a day before meals  insulin lispro Injectable (HumaLOG) 5 Unit(s) SubCutaneous three times a day before meals  pantoprazole   Suspension 40 milliGRAM(s) Oral before breakfast  traZODone 50 milliGRAM(s) Oral at bedtime      Vital Signs Last 24 Hrs  T(C): 36.4 (23 Nov 2019 10:40), Max: 36.7 (22 Nov 2019 20:52)  T(F): 97.6 (23 Nov 2019 10:40), Max: 98 (22 Nov 2019 20:52)  HR: 54 (23 Nov 2019 10:40) (41 - 76)  BP: 146/76 (23 Nov 2019 10:40) (52/31 - 146/76)  BP(mean): --  RR: 16 (23 Nov 2019 10:40) (14 - 22)  SpO2: 100% (23 Nov 2019 10:40) (89% - 100%)          LABS:                        7.4    10.02 )-----------( 170      ( 23 Nov 2019 10:36 )             25.9     11-23    135  |  101  |  73<H>  ----------------------------<  219<H>  6.1<H>   |  22  |  6.50<H>    Ca    8.4      23 Nov 2019 10:36  Phos  7.9     11-23  Mg     2.7     11-23    TPro  7.1  /  Alb  2.6<L>  /  TBili  0.4  /  DBili  x   /  AST  13  /  ALT  20  /  AlkPhos  80  11-22      CARDIAC MARKERS ( 23 Nov 2019 00:42 )  0.095 ng/mL / x     / 59 U/L / x     / 2.8 ng/mL  CARDIAC MARKERS ( 22 Nov 2019 12:17 )  0.078 ng/mL / x     / x     / x     / x          CAPILLARY BLOOD GLUCOSE  POCT Blood Glucose.: 150 mg/dL (23 Nov 2019 11:27)        Physical Examination:  GENERAL: Well developed, Elderly female, NAD  HEENT:  Normocephalic/Atraumatic, reactive light reflex, dry mucous membranes  NECK: Supple, no JVD  RESP: Symmetric movement of the chest, bilateral crackles   CVS: S1 and S2 audible, no murmur, rubs or gallops noted  GI: Normal active bowel sounds present, abdomen soft, non tender, non distended  EXTREMITIES:  No edema, no clubbing, cyanosis , +RUE fistula  MSK: 5/5 strength bilateral upper and lower extremities, intact sensations to light & crude touch  PSYCH: Normal mood, normal affect observed  NEURO: Alert and oriented x 3          ASSESSMENT AND PLAN:  79 Female with history of HTN, Chronic diastolic heart failure, ESRD on hemodialysis, anemia, COPD on chronic home oxygen, Diabetes and GERD came to hospital for weakness/anemia. ICU was consulted in dialysis room for her hypotension after getting morning antihypertensives and before starting HD.      Neuro  - Alert and oriented x 3 with lethargy. Mental status improved after gaining MAP of 65. Back to baseline.     Cardiovascular  - Hypotension due to medications. She got amlodipine, lasix and metoprolol in the morning. S/p 1gm of calcium gluconate, 0.5 of epinephrine and 0.5 lit ringer lactate bolus her systolic blood pressure improved from 50 to 120. She was monitored multiple times during her dialysis session and her vitals stayed stable with improvement in mental status.   - Bradycardia. S/p 0.5 epinephrine push improved her heart rate but antagonizing Norvasc and metoprolol.     Pulmonary  -    Infections  -    Nephro  -    Gastrointestinal  -    Heme  -    Endocrine  -    Skin/Catheters  -    FEN  -    Prophylaxis   - Patient is a 79y old  Female who presents with a chief complaint of weakness (23 Nov 2019 07:44)    Initial HPI on admission: Pt is a 78 y/o F, from home walks with cane at baseline, with a PMH of chronic diastolic heart failure (stage II), ESRD on HD T/Th/S, moderate MS, COPD on home oxygen, HTN, HLD, DM, and GERD  who presented with generalized weakness, generalized body pain and shortness of breath on exertion since 1 day.  According to the patient, since yesterday she has been feeling weak and has generalized body pain, more on her shoulders. She mentioned that she had minor fall from her bed yesterday and was not able to sleep whole night. She has shortness of breath on mild exertion limiting her ambulation.     BRIEF HOSPITAL COURSE: 79 Female with history of HTN, Chronic diastolic heart failure, ESRD on hemodialysis, anemia, COPD on chronic home oxygen, Diabetes and GERD came to hospital for weakness and was being worked up for her worsening anemia. ICU was consulted while she was in dialysis room for hypotension and lethargy. Pt got morning meds of blood pressure (amlodipine, metoprolol and Lasix) and right before starting dialysis her blood pressure was 50/40. Dr Tamayo was present as well.       PAST MEDICAL & SURGICAL HISTORY:  Chronic CHF  MS (mitral stenosis)  GERD (gastroesophageal reflux disease)  COPD (chronic obstructive pulmonary disease): On home oxygen  HLD (hyperlipidemia): not taking statins  HTN (hypertension)  ESRD (end stage renal disease) on dialysis  Chronic diastolic (congestive) heart failure  Anemia of chronic disease  Breast carcinoma: Lt side s/p lumpectomy and radiation in 2004  DM (diabetes mellitus): type 2  S/P subtotal hysterectomy  S/P lumpectomy, left breast: 2004      Allergies  No Known Allergies      FAMILY HISTORY:  Diabetes mellitus  Family history of breast cancer (Aunt)          Medications:  acetaminophen   Tablet .. 650 milliGRAM(s) Oral every 6 hours PRN  atorvastatin 10 milliGRAM(s) Oral at bedtime  dextrose 40% Gel 15 Gram(s) Oral once PRN  dextrose 5%. 1000 milliLiter(s) IV Continuous <Continuous>  dextrose 50% Injectable 12.5 Gram(s) IV Push once  dextrose 50% Injectable 25 Gram(s) IV Push once  dextrose 50% Injectable 25 Gram(s) IV Push once  ferrous    sulfate 325 milliGRAM(s) Oral daily  gabapentin 100 milliGRAM(s) Oral three times a day  glucagon  Injectable 1 milliGRAM(s) IntraMuscular once PRN  heparin  Injectable 5000 Unit(s) SubCutaneous every 12 hours  insulin glargine Injectable (LANTUS) 30 Unit(s) SubCutaneous every morning  insulin glargine Injectable (LANTUS) 30 Unit(s) SubCutaneous at bedtime  insulin lispro (HumaLOG) corrective regimen sliding scale   SubCutaneous three times a day before meals  insulin lispro Injectable (HumaLOG) 5 Unit(s) SubCutaneous three times a day before meals  pantoprazole   Suspension 40 milliGRAM(s) Oral before breakfast  traZODone 50 milliGRAM(s) Oral at bedtime      Vital Signs Last 24 Hrs  T(C): 36.4 (23 Nov 2019 10:40), Max: 36.7 (22 Nov 2019 20:52)  T(F): 97.6 (23 Nov 2019 10:40), Max: 98 (22 Nov 2019 20:52)  HR: 54 (23 Nov 2019 10:40) (41 - 76)  BP: 146/76 (23 Nov 2019 10:40) (52/31 - 146/76)  BP(mean): --  RR: 16 (23 Nov 2019 10:40) (14 - 22)  SpO2: 100% (23 Nov 2019 10:40) (89% - 100%)          LABS:                        7.4    10.02 )-----------( 170      ( 23 Nov 2019 10:36 )             25.9     11-23    135  |  101  |  73<H>  ----------------------------<  219<H>  6.1<H>   |  22  |  6.50<H>    Ca    8.4      23 Nov 2019 10:36  Phos  7.9     11-23  Mg     2.7     11-23    TPro  7.1  /  Alb  2.6<L>  /  TBili  0.4  /  DBili  x   /  AST  13  /  ALT  20  /  AlkPhos  80  11-22      CARDIAC MARKERS ( 23 Nov 2019 00:42 )  0.095 ng/mL / x     / 59 U/L / x     / 2.8 ng/mL  CARDIAC MARKERS ( 22 Nov 2019 12:17 )  0.078 ng/mL / x     / x     / x     / x          CAPILLARY BLOOD GLUCOSE  POCT Blood Glucose.: 150 mg/dL (23 Nov 2019 11:27)        Physical Examination:  GENERAL: Well developed, Elderly female, NAD  HEENT:  Normocephalic/Atraumatic, reactive light reflex, dry mucous membranes  NECK: Supple, no JVD  RESP: Symmetric movement of the chest, bilateral crackles   CVS: S1 and S2 audible, no murmur, rubs or gallops noted  GI: Normal active bowel sounds present, abdomen soft, non tender, non distended  EXTREMITIES:  No edema, no clubbing, cyanosis , +RUE fistula  MSK: 5/5 strength bilateral upper and lower extremities, intact sensations to light & crude touch  PSYCH: Normal mood, normal affect observed  NEURO: Alert and oriented x 3          ASSESSMENT AND PLAN:  79 Female with history of HTN, Chronic diastolic heart failure, ESRD on hemodialysis, anemia, COPD on chronic home oxygen, Diabetes and GERD came to hospital for weakness/anemia. ICU was consulted in dialysis room for her hypotension after getting morning antihypertensives and before starting HD.      Neuro  - Alert and oriented x 3 with lethargy. Mental status improved after gaining MAP of 65. Back to baseline.     Cardiovascular  - Hypotension due to medications. She got amlodipine, lasix and metoprolol in the morning. S/p 1gm of calcium gluconate, 0.5 of epinephrine and 0.5 lit ringer lactate bolus her systolic blood pressure improved from 50 to 120. She was monitored multiple times during her dialysis session and her vitals stayed stable with improvement in mental status.   - Bradycardia. S/p 0.5 epinephrine push improved her heart rate buy antagonizing Norvasc and metoprolol. Hold all antihypertensives for now.    Pulmonary  - Bilateral pulmonary edema. Secondary to end stage renal disease. Saturating well on nasal canula. Hemodialysis will improve her fluid overload.   - COPD. Currently not wheezing. Chronic hypoxic failure and dependent on home oxygen.     Infections  - No fever or leukocytosis. Monitor    Nephro  - End stage renal disease. Hemodialysis as per Dr Tamayo.     Gastrointestinal  - GERD. Continue PPI.     Heme  - Chronic blood loss anemia. Will need occult testing and GI consult for r/o refractory anemia. Pending anemia panel.      Endocrine  - Diabetes.  Continue basal insulin and monitor FS to prevent hypoglycemia.     Skin/Catheters  - RUE fistula.     Prophylaxis   - Improve score 2, Heparin sq for VTE ppx. Patient is a 79y old  Female who presents with a chief complaint of weakness (23 Nov 2019 07:44)    Initial HPI on admission: Pt is a 80 y/o F, from home walks with cane at baseline, with a PMH of chronic diastolic heart failure (stage II), ESRD on HD T/Th/S, moderate MS, COPD on home oxygen, HTN, HLD, DM, and GERD  who presented with generalized weakness, generalized body pain and shortness of breath on exertion since 1 day.  According to the patient, since yesterday she has been feeling weak and has generalized body pain, more on her shoulders. She mentioned that she had minor fall from her bed yesterday and was not able to sleep whole night. She has shortness of breath on mild exertion limiting her ambulation.     BRIEF HOSPITAL COURSE: 79 Female with history of HTN, Chronic diastolic heart failure, ESRD on hemodialysis, anemia, COPD on chronic home oxygen, Diabetes and GERD came to hospital for weakness and was being worked up for her worsening anemia. ICU was consulted while she was in dialysis room for hypotension and lethargy. Pt got morning meds of blood pressure (amlodipine, metoprolol and Lasix) and right before starting dialysis her blood pressure was 50/40. Dr Tamayo was present as well.       PAST MEDICAL & SURGICAL HISTORY:  Chronic CHF  MS (mitral stenosis)  GERD (gastroesophageal reflux disease)  COPD (chronic obstructive pulmonary disease): On home oxygen  HLD (hyperlipidemia): not taking statins  HTN (hypertension)  ESRD (end stage renal disease) on dialysis  Chronic diastolic (congestive) heart failure  Anemia of chronic disease  Breast carcinoma: Lt side s/p lumpectomy and radiation in 2004  DM (diabetes mellitus): type 2  S/P subtotal hysterectomy  S/P lumpectomy, left breast: 2004      Allergies  No Known Allergies      FAMILY HISTORY:  Diabetes mellitus  Family history of breast cancer (Aunt)          Medications:  acetaminophen   Tablet .. 650 milliGRAM(s) Oral every 6 hours PRN  atorvastatin 10 milliGRAM(s) Oral at bedtime  dextrose 40% Gel 15 Gram(s) Oral once PRN  dextrose 5%. 1000 milliLiter(s) IV Continuous <Continuous>  dextrose 50% Injectable 12.5 Gram(s) IV Push once  dextrose 50% Injectable 25 Gram(s) IV Push once  dextrose 50% Injectable 25 Gram(s) IV Push once  ferrous    sulfate 325 milliGRAM(s) Oral daily  gabapentin 100 milliGRAM(s) Oral three times a day  glucagon  Injectable 1 milliGRAM(s) IntraMuscular once PRN  heparin  Injectable 5000 Unit(s) SubCutaneous every 12 hours  insulin glargine Injectable (LANTUS) 30 Unit(s) SubCutaneous every morning  insulin glargine Injectable (LANTUS) 30 Unit(s) SubCutaneous at bedtime  insulin lispro (HumaLOG) corrective regimen sliding scale   SubCutaneous three times a day before meals  insulin lispro Injectable (HumaLOG) 5 Unit(s) SubCutaneous three times a day before meals  pantoprazole   Suspension 40 milliGRAM(s) Oral before breakfast  traZODone 50 milliGRAM(s) Oral at bedtime      Vital Signs Last 24 Hrs  T(C): 36.4 (23 Nov 2019 10:40), Max: 36.7 (22 Nov 2019 20:52)  T(F): 97.6 (23 Nov 2019 10:40), Max: 98 (22 Nov 2019 20:52)  HR: 54 (23 Nov 2019 10:40) (41 - 76)  BP: 146/76 (23 Nov 2019 10:40) (52/31 - 146/76)  BP(mean): --  RR: 16 (23 Nov 2019 10:40) (14 - 22)  SpO2: 100% (23 Nov 2019 10:40) (89% - 100%)          LABS:                        7.4    10.02 )-----------( 170      ( 23 Nov 2019 10:36 )             25.9     11-23    135  |  101  |  73<H>  ----------------------------<  219<H>  6.1<H>   |  22  |  6.50<H>    Ca    8.4      23 Nov 2019 10:36  Phos  7.9     11-23  Mg     2.7     11-23    TPro  7.1  /  Alb  2.6<L>  /  TBili  0.4  /  DBili  x   /  AST  13  /  ALT  20  /  AlkPhos  80  11-22      CARDIAC MARKERS ( 23 Nov 2019 00:42 )  0.095 ng/mL / x     / 59 U/L / x     / 2.8 ng/mL  CARDIAC MARKERS ( 22 Nov 2019 12:17 )  0.078 ng/mL / x     / x     / x     / x          CAPILLARY BLOOD GLUCOSE  POCT Blood Glucose.: 150 mg/dL (23 Nov 2019 11:27)        Physical Examination:  GENERAL: Well developed, Elderly female, NAD  HEENT:  Normocephalic/Atraumatic, reactive light reflex, dry mucous membranes  NECK: Supple, no JVD  RESP: Symmetric movement of the chest, bilateral crackles   CVS: S1 and S2 audible, no murmur, rubs or gallops noted  GI: Normal active bowel sounds present, abdomen soft, non tender, non distended  EXTREMITIES:  No edema, no clubbing, cyanosis , +RUE fistula  MSK: 5/5 strength bilateral upper and lower extremities, intact sensations to light & crude touch  PSYCH: Normal mood, normal affect observed  NEURO: Alert and oriented x 3          ASSESSMENT AND PLAN:  79 Female with history of HTN, Chronic diastolic heart failure, ESRD on hemodialysis, anemia, COPD on chronic home oxygen, Diabetes and GERD came to hospital for weakness/anemia. ICU was consulted in dialysis room for her hypotension after getting morning antihypertensives and before starting HD.      Neuro  - Alert and oriented x 3 with lethargy. Mental status improved after gaining MAP of 65. Back to baseline.     Cardiovascular  - Hypotension due to medications. She got amlodipine, lasix and metoprolol in the morning. S/p 1gm of calcium gluconate, 0.5 of epinephrine and 0.5 lit ringer lactate bolus her systolic blood pressure improved from 50 to 120. She was monitored multiple times during her dialysis session and her vitals stayed stable with improvement in mental status.   - Bradycardia. S/p 0.5 epinephrine push improved her heart rate buy antagonizing Norvasc and metoprolol. Hold all antihypertensives for now.    Pulmonary  - Bilateral pulmonary edema. Secondary to end stage renal disease. Saturating well on nasal canula. Hemodialysis will improve her fluid overload.   - COPD. Currently not wheezing. Chronic hypoxic failure and dependent on home oxygen.     Infections  - No fever or leukocytosis. Monitor    Nephro  - End stage renal disease. Hemodialysis as per Dr Tamayo.     Gastrointestinal  - GERD. Continue PPI.     Heme  - Chronic blood loss anemia. Will need occult testing and GI consult for r/o refractory anemia. Pending anemia panel. S/p 2 units PRBC.     Endocrine  - Diabetes.  Continue basal insulin and monitor FS to prevent hypoglycemia.     Skin/Catheters  - RUE fistula.     Prophylaxis   - Improve score 2, Heparin sq for VTE ppx. Patient is a 79y old  Female who presents with a chief complaint of weakness (23 Nov 2019 07:44)    Initial HPI on admission: Pt is a 78 y/o F, from home walks with cane at baseline, with a PMH of chronic diastolic heart failure (stage II), ESRD on HD T/Th/S, moderate MS, COPD on home oxygen, HTN, HLD, DM, and GERD  who presented with generalized weakness, generalized body pain and shortness of breath on exertion since 1 day.  According to the patient, since yesterday she has been feeling weak and has generalized body pain, more on her shoulders. She mentioned that she had minor fall from her bed yesterday and was not able to sleep whole night. She has shortness of breath on mild exertion limiting her ambulation.     BRIEF HOSPITAL COURSE: 79 Female with history of HTN, Chronic diastolic heart failure, ESRD on hemodialysis, anemia, COPD on chronic home oxygen, Diabetes and GERD came to hospital for weakness and was being worked up for her worsening anemia. ICU was consulted while she was in dialysis room for hypotension and lethargy. Pt got morning meds of blood pressure (amlodipine, metoprolol and Lasix) and right before starting dialysis her blood pressure was 50/40. Dr Tamayo was present as well.       PAST MEDICAL & SURGICAL HISTORY:  Chronic CHF  MS (mitral stenosis)  GERD (gastroesophageal reflux disease)  COPD (chronic obstructive pulmonary disease): On home oxygen  HLD (hyperlipidemia): not taking statins  HTN (hypertension)  ESRD (end stage renal disease) on dialysis  Chronic diastolic (congestive) heart failure  Anemia of chronic disease  Breast carcinoma: Lt side s/p lumpectomy and radiation in 2004  DM (diabetes mellitus): type 2  S/P subtotal hysterectomy  S/P lumpectomy, left breast: 2004      Allergies  No Known Allergies      FAMILY HISTORY:  Diabetes mellitus  Family history of breast cancer (Aunt)          Medications:  acetaminophen   Tablet .. 650 milliGRAM(s) Oral every 6 hours PRN  atorvastatin 10 milliGRAM(s) Oral at bedtime  dextrose 40% Gel 15 Gram(s) Oral once PRN  dextrose 5%. 1000 milliLiter(s) IV Continuous <Continuous>  dextrose 50% Injectable 12.5 Gram(s) IV Push once  dextrose 50% Injectable 25 Gram(s) IV Push once  dextrose 50% Injectable 25 Gram(s) IV Push once  ferrous    sulfate 325 milliGRAM(s) Oral daily  gabapentin 100 milliGRAM(s) Oral three times a day  glucagon  Injectable 1 milliGRAM(s) IntraMuscular once PRN  heparin  Injectable 5000 Unit(s) SubCutaneous every 12 hours  insulin glargine Injectable (LANTUS) 30 Unit(s) SubCutaneous every morning  insulin glargine Injectable (LANTUS) 30 Unit(s) SubCutaneous at bedtime  insulin lispro (HumaLOG) corrective regimen sliding scale   SubCutaneous three times a day before meals  insulin lispro Injectable (HumaLOG) 5 Unit(s) SubCutaneous three times a day before meals  pantoprazole   Suspension 40 milliGRAM(s) Oral before breakfast  traZODone 50 milliGRAM(s) Oral at bedtime      Vital Signs Last 24 Hrs  T(C): 36.4 (23 Nov 2019 10:40), Max: 36.7 (22 Nov 2019 20:52)  T(F): 97.6 (23 Nov 2019 10:40), Max: 98 (22 Nov 2019 20:52)  HR: 54 (23 Nov 2019 10:40) (41 - 76)  BP: 146/76 (23 Nov 2019 10:40) (52/31 - 146/76)  BP(mean): --  RR: 16 (23 Nov 2019 10:40) (14 - 22)  SpO2: 100% (23 Nov 2019 10:40) (89% - 100%)          LABS:                        7.4    10.02 )-----------( 170      ( 23 Nov 2019 10:36 )             25.9     11-23    135  |  101  |  73<H>  ----------------------------<  219<H>  6.1<H>   |  22  |  6.50<H>    Ca    8.4      23 Nov 2019 10:36  Phos  7.9     11-23  Mg     2.7     11-23    TPro  7.1  /  Alb  2.6<L>  /  TBili  0.4  /  DBili  x   /  AST  13  /  ALT  20  /  AlkPhos  80  11-22      CARDIAC MARKERS ( 23 Nov 2019 00:42 )  0.095 ng/mL / x     / 59 U/L / x     / 2.8 ng/mL  CARDIAC MARKERS ( 22 Nov 2019 12:17 )  0.078 ng/mL / x     / x     / x     / x          CAPILLARY BLOOD GLUCOSE  POCT Blood Glucose.: 150 mg/dL (23 Nov 2019 11:27)        Physical Examination:  GENERAL: Well developed, Elderly female, NAD  HEENT:  Normocephalic/Atraumatic, reactive light reflex, dry mucous membranes  NECK: Supple, no JVD  RESP: Symmetric movement of the chest, bilateral crackles   CVS: S1 and S2 audible, no murmur, rubs or gallops noted  GI: Normal active bowel sounds present, abdomen soft, non tender, non distended  EXTREMITIES:  No edema, no clubbing, cyanosis , +RUE fistula  MSK: 5/5 strength bilateral upper and lower extremities, intact sensations to light & crude touch  PSYCH: Normal mood, normal affect observed  NEURO: Alert and oriented x 3          ASSESSMENT AND PLAN:  79 Female with history of HTN, Chronic diastolic heart failure, ESRD on hemodialysis, anemia, COPD on chronic home oxygen, Diabetes and GERD came to hospital for weakness/anemia. ICU was consulted in dialysis room for her hypotension after getting morning antihypertensives and before starting HD.      Neuro  - Alert and oriented x 3 with lethargy. Mental status improved after gaining MAP of 65. Back to baseline.     Cardiovascular  - Hypotension due to medications. She got amlodipine, lasix and metoprolol in the morning. S/p 1gm of calcium gluconate, 0.5 of epinephrine and 0.5 lit ringer lactate bolus her systolic blood pressure improved from 50 to 120. She was monitored multiple times during her dialysis session and her vitals stayed stable with improvement in mental status.   - Bradycardia. S/p 0.5 epinephrine push improved her heart rate buy antagonizing Norvasc and metoprolol. Hold all antihypertensives for now.  - Type 2 NSTEMI. Trend cardiac enzymes until the peak.   - Chronic diastolic heart failure. Keep Euvolemic and restart HF meds as tolerated by vitals.     Pulmonary  - Bilateral pulmonary edema. Secondary to end stage renal disease. Saturating well on nasal canula. Hemodialysis will improve her fluid overload.   - COPD. Currently not wheezing. Chronic hypoxic failure and dependent on home oxygen.     Infections  - No fever or leukocytosis. Monitor    Nephro  - End stage renal disease. Hemodialysis as per Dr Tamayo.     Gastrointestinal  - GERD. Continue PPI.     Heme  - Chronic blood loss anemia. Will need occult testing and GI consult for r/o refractory anemia. Pending anemia panel. S/p 2 units PRBC.     Endocrine  - Diabetes.  Continue basal insulin and monitor FS to prevent hypoglycemia.     Skin/Catheters  - RUE fistula.     Prophylaxis   - Improve score 2, Heparin sq for VTE ppx.

## 2019-11-23 NOTE — PROGRESS NOTE ADULT - PROBLEM SELECTOR PLAN 1
H/H: 7.5/26  Shortness of breath is likely secondary to anemia and chronic chf and  or copd  transfuse during HD

## 2019-11-23 NOTE — CONSULT NOTE ADULT - SUBJECTIVE AND OBJECTIVE BOX
Chief complain/HPI  ESRD  Anemia .    PAST MEDICAL & SURGICAL HISTORY:  Chronic CHF  MS (mitral stenosis)  GERD (gastroesophageal reflux disease)  COPD (chronic obstructive pulmonary disease): On home oxygen  HLD (hyperlipidemia): not taking statins  HTN (hypertension)  ESRD (end stage renal disease) on dialysis  Chronic diastolic (congestive) heart failure  Anemia of chronic disease  Breast carcinoma: Lt side s/p lumpectomy and radiation in 2004  DM (diabetes mellitus): type 2  S/P subtotal hysterectomy  S/P lumpectomy, left breast: 2004      Home Medications Reviewed    Hospital Medications:   MEDICATIONS  (STANDING):  amLODIPine   Tablet 5 milliGRAM(s) Oral daily  atorvastatin 10 milliGRAM(s) Oral at bedtime  dextrose 5%. 1000 milliLiter(s) (50 mL/Hr) IV Continuous <Continuous>  dextrose 50% Injectable 12.5 Gram(s) IV Push once  dextrose 50% Injectable 25 Gram(s) IV Push once  dextrose 50% Injectable 25 Gram(s) IV Push once  ferrous    sulfate 325 milliGRAM(s) Oral daily  furosemide    Tablet 40 milliGRAM(s) Oral daily  gabapentin 100 milliGRAM(s) Oral three times a day  heparin  Injectable 5000 Unit(s) SubCutaneous every 12 hours  insulin glargine Injectable (LANTUS) 30 Unit(s) SubCutaneous every morning  insulin glargine Injectable (LANTUS) 30 Unit(s) SubCutaneous at bedtime  insulin lispro (HumaLOG) corrective regimen sliding scale   SubCutaneous three times a day before meals  insulin lispro Injectable (HumaLOG) 5 Unit(s) SubCutaneous three times a day before meals  pantoprazole   Suspension 40 milliGRAM(s) Oral before breakfast  traZODone 50 milliGRAM(s) Oral at bedtime    MEDICATIONS  (PRN):  acetaminophen   Tablet .. 650 milliGRAM(s) Oral every 6 hours PRN Mild Pain (1 - 3), Moderate Pain (4 - 6)  dextrose 40% Gel 15 Gram(s) Oral once PRN Blood Glucose LESS THAN 70 milliGRAM(s)/deciliter  glucagon  Injectable 1 milliGRAM(s) IntraMuscular once PRN Glucose LESS THAN 70 milligrams/deciliter      Allergies    No Known Allergies    Intolerances                              7.5    8.37  )-----------( 178      ( 22 Nov 2019 12:17 )             26.0     11-22    136  |  99  |  64<H>  ----------------------------<  161<H>  4.6   |  27  |  5.81<H>    Ca    8.9      22 Nov 2019 12:17  Mg     2.5     11-22    TPro  7.1  /  Alb  2.6<L>  /  TBili  0.4  /  DBili  x   /  AST  13  /  ALT  20  /  AlkPhos  80  11-22              RADIOLOGY & ADDITIONAL STUDIES:    SOCIAL HISTORY: Denies ETOh,Smoking,     FAMILY HISTORY:  Diabetes mellitus  Family history of breast cancer (Aunt)      REVIEW OF SYSTEMS:  CONSTITUTIONAL: No malaise, No fatigue, No fevers or chills, well developed, no diaphoresis  EYES/ENT: No visual changes;  No vertigo or throat pain   NECK: No pain or stiffness  RESPIRATORY: No cough, wheezing, hemoptysis; No shortness of breath  CARDIOVASCULAR: No chest pain or palpitations. No edema  GASTROINTESTINAL: No abdominal or epigastric pain. No nausea, vomiting, or hematemesis; No diarrhea or constipation. No melena or hematochezia.  GENITOURINARY: No dysuria, frequency, foamy urine, urinary urgency, incontinence or hematuria  NEUROLOGICAL: No numbness or weakness, No tremor  SKIN: No itching, burning, rashes, or lesions   VASCULAR: No claudication  Musculoskeletal: no arthralgia, no myalgia  All other review of systems is negative unless indicated above.    VITALS:  Vital Signs Last 24 Hrs  T(C): 36.3 (23 Nov 2019 08:36), Max: 36.7 (22 Nov 2019 20:52)  T(F): 97.3 (23 Nov 2019 08:36), Max: 98 (22 Nov 2019 20:52)  HR: 50 (23 Nov 2019 08:36) (50 - 76)  BP: 92/38 (23 Nov 2019 08:36) (84/53 - 141/76)  BP(mean): --  RR: 16 (23 Nov 2019 08:36) (16 - 22)  SpO2: 100% (23 Nov 2019 08:36) (92% - 100%)    Height (cm): 165.1 (11-22 @ 10:11)  Weight (kg): 84.8 (11-22 @ 10:11)  BMI (kg/m2): 31.1 (11-22 @ 10:11)  BSA (m2): 1.92 (11-22 @ 10:11)    PHYSICAL EXAM:  Constitutional: NAD  HEENT: anicteric sclera, oropharynx clear, MMM  Neck: No JVD  Respiratory: good air entrance B/L, no wheezes, rales or rhonchi  Cardiovascular: S1, S2, RRR, no pericardial rub, no murmur  Gastrointestinal: BS+, soft, no tenderness, no distension, no bruit  Pelvis: bladder non-distended, no CVA tenderness  Extremities: No cyanosis or clubbing. No peripheral edema  Neurological: A/O x 3, no focal deficits  Psychiatric: Normal mood, normal affect  : No CVA tenderness. No haas.   Skin: No rashes  Vascular: all pulses present  Access: Chief complain/HPI  Increased weakness in the last week, she came to the ER C/O incraesed weakness forr one weak with no sob and no chest pain  Her H/H started to drop in september of 2019.  10.9 to 10 to 9.5 , and this month 7.5  He JACKELINE was incraesed from September from 10 to 20 , 30 and novmber 50 mcg weekly  Her iron sat was 38%    History of ESRD due to diabetes and hypertension    ESRD  Anemia .    PAST MEDICAL & SURGICAL HISTORY:  Chronic CHF  MS (mitral stenosis)  GERD (gastroesophageal reflux disease)  COPD (chronic obstructive pulmonary disease): On home oxygen  HLD (hyperlipidemia): not taking statins  HTN (hypertension)  ESRD (end stage renal disease) on dialysis  Chronic diastolic (congestive) heart failure  Anemia of chronic disease  Breast carcinoma: Lt side s/p lumpectomy and radiation in 2004  DM (diabetes mellitus): type 2  S/P subtotal hysterectomy  S/P lumpectomy, left breast: 2004      Home Medications Reviewed    Hospital Medications:   MEDICATIONS  (STANDING):  amLODIPine   Tablet 5 milliGRAM(s) Oral daily  atorvastatin 10 milliGRAM(s) Oral at bedtime  dextrose 5%. 1000 milliLiter(s) (50 mL/Hr) IV Continuous <Continuous>  dextrose 50% Injectable 12.5 Gram(s) IV Push once  dextrose 50% Injectable 25 Gram(s) IV Push once  dextrose 50% Injectable 25 Gram(s) IV Push once  ferrous    sulfate 325 milliGRAM(s) Oral daily  furosemide    Tablet 40 milliGRAM(s) Oral daily  gabapentin 100 milliGRAM(s) Oral three times a day  heparin  Injectable 5000 Unit(s) SubCutaneous every 12 hours  insulin glargine Injectable (LANTUS) 30 Unit(s) SubCutaneous every morning  insulin glargine Injectable (LANTUS) 30 Unit(s) SubCutaneous at bedtime  insulin lispro (HumaLOG) corrective regimen sliding scale   SubCutaneous three times a day before meals  insulin lispro Injectable (HumaLOG) 5 Unit(s) SubCutaneous three times a day before meals  pantoprazole   Suspension 40 milliGRAM(s) Oral before breakfast  traZODone 50 milliGRAM(s) Oral at bedtime    MEDICATIONS  (PRN):  acetaminophen   Tablet .. 650 milliGRAM(s) Oral every 6 hours PRN Mild Pain (1 - 3), Moderate Pain (4 - 6)  dextrose 40% Gel 15 Gram(s) Oral once PRN Blood Glucose LESS THAN 70 milliGRAM(s)/deciliter  glucagon  Injectable 1 milliGRAM(s) IntraMuscular once PRN Glucose LESS THAN 70 milligrams/deciliter      Allergies    No Known Allergies    Intolerances                              7.5    8.37  )-----------( 178      ( 22 Nov 2019 12:17 )             26.0     11-22    136  |  99  |  64<H>  ----------------------------<  161<H>  4.6   |  27  |  5.81<H>    Ca    8.9      22 Nov 2019 12:17  Mg     2.5     11-22    TPro  7.1  /  Alb  2.6<L>  /  TBili  0.4  /  DBili  x   /  AST  13  /  ALT  20  /  AlkPhos  80  11-22              RADIOLOGY & ADDITIONAL STUDIES:    SOCIAL HISTORY: Denies ETOh,Smoking,     FAMILY HISTORY:  Diabetes mellitus  Family history of breast cancer (Aunt)      REVIEW OF SYSTEMS:  CONSTITUTIONAL: No malaise, No fatigue, No fevers or chills, well developed, no diaphoresis  EYES/ENT: No visual changes;  No vertigo or throat pain   NECK: No pain or stiffness  RESPIRATORY: No cough, wheezing, hemoptysis; No shortness of breath  CARDIOVASCULAR: No chest pain or palpitations. No edema  GASTROINTESTINAL: No abdominal or epigastric pain. No nausea, vomiting, or hematemesis; No diarrhea or constipation. No melena or hematochezia.  GENITOURINARY: No dysuria, frequency, foamy urine, urinary urgency, incontinence or hematuria  NEUROLOGICAL: No numbness or weakness, No tremor  SKIN: No itching, burning, rashes, or lesions   VASCULAR: No claudication  Musculoskeletal: no arthralgia, no myalgia  All other review of systems is negative unless indicated above.    VITALS:  Vital Signs Last 24 Hrs  T(C): 36.3 (23 Nov 2019 08:36), Max: 36.7 (22 Nov 2019 20:52)  T(F): 97.3 (23 Nov 2019 08:36), Max: 98 (22 Nov 2019 20:52)  HR: 50 (23 Nov 2019 08:36) (50 - 76)  BP: 92/38 (23 Nov 2019 08:36) (84/53 - 141/76)  BP(mean): --  RR: 16 (23 Nov 2019 08:36) (16 - 22)  SpO2: 100% (23 Nov 2019 08:36) (92% - 100%)    Height (cm): 165.1 (11-22 @ 10:11)  Weight (kg): 84.8 (11-22 @ 10:11)  BMI (kg/m2): 31.1 (11-22 @ 10:11)  BSA (m2): 1.92 (11-22 @ 10:11)    PHYSICAL EXAM:  Constitutional: NAD  HEENT: anicteric sclera, oropharynx clear, MMM  Neck: No JVD  Respiratory: good air entrance B/L, no wheezes, rales or rhonchi  Cardiovascular: S1, S2, RRR, no pericardial rub, no murmur  Gastrointestinal: BS+, soft, no tenderness, no distension, no bruit  Pelvis: bladder non-distended, no CVA tenderness  Extremities: No cyanosis or clubbing. No peripheral edema  Neurological: A/O x 3, no focal deficits  Psychiatric: Normal mood, normal affect  : No CVA tenderness. No haas.   Skin: No rashes  Vascular: all pulses present  Access: Chief complain/HPI  Increased weakness in the last week, she came to the ER C/O incraesed weakness forr one weak with no sob and no chest pain  Her H/H started to drop in september of 2019.  10.9 to 10 to 9.5 , and this month 7.5  He JACKELINE was incraesed from September from 10 to 20 , 30 and novmber 50 mcg weekly  Her iron sat was 38%.  As per the unit her H/H did not increase.  As per her today PCA after going to the bathroom there was fresh blood at a small amount when they clean the area.    History of ESRD due to diabetes and hypertension    ESRD due to daibetes and hypertension  Anemia .  Brease cancer    PAST MEDICAL & SURGICAL HISTORY:  Chronic CHF  MS (mitral stenosis)  GERD (gastroesophageal reflux disease)  COPD (chronic obstructive pulmonary disease): On home oxygen  HLD (hyperlipidemia): not taking statins  HTN (hypertension)  ESRD (end stage renal disease) on dialysis  Chronic diastolic (congestive) heart failure  Anemia of chronic disease  Breast carcinoma: Lt side s/p lumpectomy and radiation in 2004  DM (diabetes mellitus): type 2  S/P subtotal hysterectomy  S/P lumpectomy, left breast: 2004      Home Medications Reviewed    Hospital Medications:   MEDICATIONS  (STANDING):  amLODIPine   Tablet 5 milliGRAM(s) Oral daily  atorvastatin 10 milliGRAM(s) Oral at bedtime  dextrose 5%. 1000 milliLiter(s) (50 mL/Hr) IV Continuous <Continuous>  dextrose 50% Injectable 12.5 Gram(s) IV Push once  dextrose 50% Injectable 25 Gram(s) IV Push once  dextrose 50% Injectable 25 Gram(s) IV Push once  ferrous    sulfate 325 milliGRAM(s) Oral daily  furosemide    Tablet 40 milliGRAM(s) Oral daily  gabapentin 100 milliGRAM(s) Oral three times a day  heparin  Injectable 5000 Unit(s) SubCutaneous every 12 hours  insulin glargine Injectable (LANTUS) 30 Unit(s) SubCutaneous every morning  insulin glargine Injectable (LANTUS) 30 Unit(s) SubCutaneous at bedtime  insulin lispro (HumaLOG) corrective regimen sliding scale   SubCutaneous three times a day before meals  insulin lispro Injectable (HumaLOG) 5 Unit(s) SubCutaneous three times a day before meals  pantoprazole   Suspension 40 milliGRAM(s) Oral before breakfast  traZODone 50 milliGRAM(s) Oral at bedtime    MEDICATIONS  (PRN):  acetaminophen   Tablet .. 650 milliGRAM(s) Oral every 6 hours PRN Mild Pain (1 - 3), Moderate Pain (4 - 6)  dextrose 40% Gel 15 Gram(s) Oral once PRN Blood Glucose LESS THAN 70 milliGRAM(s)/deciliter  glucagon  Injectable 1 milliGRAM(s) IntraMuscular once PRN Glucose LESS THAN 70 milligrams/deciliter      Allergies    No Known Allergies    Intolerances                              7.5    8.37  )-----------( 178      ( 22 Nov 2019 12:17 )             26.0     11-22    136  |  99  |  64<H>  ----------------------------<  161<H>  4.6   |  27  |  5.81<H>    Ca    8.9      22 Nov 2019 12:17  Mg     2.5     11-22    TPro  7.1  /  Alb  2.6<L>  /  TBili  0.4  /  DBili  x   /  AST  13  /  ALT  20  /  AlkPhos  80  11-22              RADIOLOGY & ADDITIONAL STUDIES:    SOCIAL HISTORY: Denies ETOh,Smoking,     FAMILY HISTORY:  Diabetes mellitus  Family history of breast cancer (Aunt)      REVIEW OF SYSTEMS:  CONSTITUTIONAL: Increased fatigue  EYES/ENT: No visual changes;  No vertigo or throat pain   Pain in shoulder area bialteral chronic from arthritis  RESPIRATORY: No cough, wheezing, hemoptysis; No shortness of breath  CARDIOVASCULAR: No chest pain or palpitations. No edema  GASTROINTESTINAL: No abdominal or epigastric pain  No changes in stool color  No vomit, c/o reduced appetite in the last few weeks.  GENITOURINARY: No dysuria, reduced urine output.    VITALS:  Vital Signs Last 24 Hrs  T(C): 36.3 (23 Nov 2019 08:36), Max: 36.7 (22 Nov 2019 20:52)  T(F): 97.3 (23 Nov 2019 08:36), Max: 98 (22 Nov 2019 20:52)  HR: 50 (23 Nov 2019 08:36) (50 - 76)  BP: 92/38 (23 Nov 2019 08:36) (84/53 - 141/76)  BP(mean): --  RR: 16 (23 Nov 2019 08:36) (16 - 22)  SpO2: 100% (23 Nov 2019 08:36) (92% - 100%)    Height (cm): 165.1 (11-22 @ 10:11)  Weight (kg): 84.8 (11-22 @ 10:11)  BMI (kg/m2): 31.1 (11-22 @ 10:11)  BSA (m2): 1.92 (11-22 @ 10:11)    PHYSICAL EXAM:  Constitutional: appears pale and tired.  HEENT: anicteric sclera, oropharynx clear, MMM  Neck: No JVD  Respiratory:  air entrance B/L, no wheezes, rales or rhonchi  Cardiovascular: S1, S2, RRR, no pericardial rub, no murmur  Gastrointestinal: BS+, soft, no tenderness, no distension, no bruit  Pelvis: bladder non-distended, no CVA tenderness  Extremities: No cyanosis or clubbing. No peripheral edema  Neurological: A/O x 3, no focal deficits  Psychiatric: Normal mood, normal affect  : No CVA tenderness. No haas.   Skin: No rashes  Vascular: all pulses present  Access: Chief complain/HPI  Increased weakness in the last week, she came to the ER C/O incraesed weakness forr one weak with no sob and no chest pain  Her H/H started to drop in september of 2019.  10.9 to 10 to 9.5 , and this month 7.5  He JACKELINE was incraesed from September from 10 to 20 , 30 and novmber 50 mcg weekly  Her iron sat was 38%.  As per the unit her H/H did not increase.  As per her today PCA after going to the bathroom there was fresh blood at a small amount when they clean the area.    History of ESRD due to diabetes and hypertension    ESRD due to daibetes and hypertension  Anemia .  Brease cancer    PAST MEDICAL & SURGICAL HISTORY:  Chronic CHF  MS (mitral stenosis)  GERD (gastroesophageal reflux disease)  COPD (chronic obstructive pulmonary disease): On home oxygen  HLD (hyperlipidemia): not taking statins  HTN (hypertension)  ESRD (end stage renal disease) on dialysis  Chronic diastolic (congestive) heart failure  Anemia of chronic disease  Breast carcinoma: Lt side s/p lumpectomy and radiation in 2004  DM (diabetes mellitus): type 2  S/P subtotal hysterectomy  S/P lumpectomy, left breast: 2004      Home Medications  Metoprolol 50 mg daily  Pantoprazole 40 daily  Aranesp 50 mcg weekly  Simvastatin 20 daily  Plavix 50 mg daily    Hospital Medications:   MEDICATIONS  (STANDING):  amLODIPine   Tablet 5 milliGRAM(s) Oral daily  atorvastatin 10 milliGRAM(s) Oral at bedtime  dextrose 5%. 1000 milliLiter(s) (50 mL/Hr) IV Continuous <Continuous>  dextrose 50% Injectable 12.5 Gram(s) IV Push once  dextrose 50% Injectable 25 Gram(s) IV Push once  dextrose 50% Injectable 25 Gram(s) IV Push once  ferrous    sulfate 325 milliGRAM(s) Oral daily  furosemide    Tablet 40 milliGRAM(s) Oral daily  gabapentin 100 milliGRAM(s) Oral three times a day  heparin  Injectable 5000 Unit(s) SubCutaneous every 12 hours  insulin glargine Injectable (LANTUS) 30 Unit(s) SubCutaneous every morning  insulin glargine Injectable (LANTUS) 30 Unit(s) SubCutaneous at bedtime  insulin lispro (HumaLOG) corrective regimen sliding scale   SubCutaneous three times a day before meals  insulin lispro Injectable (HumaLOG) 5 Unit(s) SubCutaneous three times a day before meals  pantoprazole   Suspension 40 milliGRAM(s) Oral before breakfast  traZODone 50 milliGRAM(s) Oral at bedtime    MEDICATIONS  (PRN):  acetaminophen   Tablet .. 650 milliGRAM(s) Oral every 6 hours PRN Mild Pain (1 - 3), Moderate Pain (4 - 6)  dextrose 40% Gel 15 Gram(s) Oral once PRN Blood Glucose LESS THAN 70 milliGRAM(s)/deciliter  glucagon  Injectable 1 milliGRAM(s) IntraMuscular once PRN Glucose LESS THAN 70 milligrams/deciliter      Allergies    No Known Allergies    Intolerances                              7.5    8.37  )-----------( 178      ( 22 Nov 2019 12:17 )             26.0     11-22    136  |  99  |  64<H>  ----------------------------<  161<H>  4.6   |  27  |  5.81<H>    Ca    8.9      22 Nov 2019 12:17  Mg     2.5     11-22    TPro  7.1  /  Alb  2.6<L>  /  TBili  0.4  /  DBili  x   /  AST  13  /  ALT  20  /  AlkPhos  80  11-22              RADIOLOGY & ADDITIONAL STUDIES:    SOCIAL HISTORY: Denies ETOh,Smoking,     FAMILY HISTORY:  Diabetes mellitus  Family history of breast cancer (Aunt)      REVIEW OF SYSTEMS:  CONSTITUTIONAL: Increased fatigue  EYES/ENT: No visual changes;  No vertigo or throat pain   Pain in shoulder area bialteral chronic from arthritis  RESPIRATORY: No cough, wheezing, hemoptysis; No shortness of breath  CARDIOVASCULAR: No chest pain or palpitations. No edema  GASTROINTESTINAL: No abdominal or epigastric pain  No changes in stool color  No vomit, c/o reduced appetite in the last few weeks.  GENITOURINARY: No dysuria, reduced urine output.    VITALS:  Vital Signs Last 24 Hrs  T(C): 36.3 (23 Nov 2019 08:36), Max: 36.7 (22 Nov 2019 20:52)  T(F): 97.3 (23 Nov 2019 08:36), Max: 98 (22 Nov 2019 20:52)  HR: 50 (23 Nov 2019 08:36) (50 - 76)  BP: 92/38 (23 Nov 2019 08:36) (84/53 - 141/76)  BP(mean): --  RR: 16 (23 Nov 2019 08:36) (16 - 22)  SpO2: 100% (23 Nov 2019 08:36) (92% - 100%)    Height (cm): 165.1 (11-22 @ 10:11)  Weight (kg): 84.8 (11-22 @ 10:11)  BMI (kg/m2): 31.1 (11-22 @ 10:11)  BSA (m2): 1.92 (11-22 @ 10:11)    PHYSICAL EXAM:  Constitutional: appears pale and tired.  During the exam she c/o increased weakness and was unable to sign the consent.  Patient seen several times.  Few minute later in dialysis her BP was 50 systolic and HR was 42.  Called the ICU and Epinephrine was given and calcium gluconate.  Mental status  ane weakness improved  HR at present 60 and  systolic.    Neck: No JVD  Respiratory:  reduced air entrance not fully cooperative and craepitations at base  Cardiovascular: S1, S2, RRR, no pericardial rub, no murmur  Gastrointestinal: BS+, soft, no tenderness, no distension, no bruit  Pelvis: bladder non-distended, no CVA tenderness  Extremities: No edema  Neurological: A/O x 3, no focal deficits    Vascular: right AVF with bruit and thrill.

## 2019-11-23 NOTE — CHART NOTE - NSCHARTNOTEFT_GEN_A_CORE
Paged by nurse patient's BP 88/40, had one episode of diarrhea with mild blood on toilet paper. Patient seen at bedside, reports of feeling ok, denies dizziness. Stat CBC done that showed stable hb of 10.2, Patient was given 250 NS bolus and BP improved to 120/80.     Her sugars were low in 100S. Decreased bedtime Lantus from 30 Units to 15 UNits.     Will monitor CBC and BP.     Discussed goals of care with Patient. Patient is AXO=3, states she wants DNR/DNI, PGY1 Dr. Soto filled Molst form, but patient's son wants to talk later with patient regarding goals of care. He think patient is not able to make decisions now. Will hold to put the order of DNR/DNI. Primary team please follow goals of care Paged by nurse patient's BP 88/40, had one episode of diarrhea with mild blood on toilet paper. Patient seen at bedside, reports of feeling ok, denies dizziness. Stat CBC done that showed stable hb of 10.2, Patient was given 250 NS bolus and BP improved to 120/80.     Her sugars were low in 100S. Decreased bedtime Lantus from 30 Units to 15 UNits.     Will monitor CBC and BP.       I, PGY1 Dr. Soto filled Molst form with patient who wants to be DNR/DNI, but patient's son wants to talk later with patient regarding goals of care. He think patient is not able to make decisions.   Later patient's daughter called to ask her condition, I updated her. Asked regarding goals of care, patient's daughter states she has expressed her wishes of DNR/DNI to her. On my assessment, patient is able to make her decisions and fully alert. She even remember that she was intubated one time in 2018. She said she has filled the MOLST form in [facility] as well, she fully understand the meaning of resuscitation and intubation.  Rediscussed MOLST form with her. Patient is DNR/DNI

## 2019-11-23 NOTE — PROGRESS NOTE ADULT - ASSESSMENT
Pt is a 80 y/o F, from home walks with cane at baseline, with a PMH of chronic diastolic heart failure (stage II), ESRD on HD T/Th/S, moderate MS, COPD on home oxygen, HTN, HLD, DM, and GERD  who presented with generalized weakness, generalized body pain and shortness of breath on exertion since 1 day.

## 2019-11-24 LAB
ANION GAP SERPL CALC-SCNC: 8 MMOL/L — SIGNIFICANT CHANGE UP (ref 5–17)
BUN SERPL-MCNC: 49 MG/DL — HIGH (ref 7–18)
CALCIUM SERPL-MCNC: 8.3 MG/DL — LOW (ref 8.4–10.5)
CHLORIDE SERPL-SCNC: 101 MMOL/L — SIGNIFICANT CHANGE UP (ref 96–108)
CO2 SERPL-SCNC: 28 MMOL/L — SIGNIFICANT CHANGE UP (ref 22–31)
CREAT SERPL-MCNC: 4.71 MG/DL — HIGH (ref 0.5–1.3)
GLUCOSE BLDC GLUCOMTR-MCNC: 141 MG/DL — HIGH (ref 70–99)
GLUCOSE BLDC GLUCOMTR-MCNC: 155 MG/DL — HIGH (ref 70–99)
GLUCOSE BLDC GLUCOMTR-MCNC: 197 MG/DL — HIGH (ref 70–99)
GLUCOSE BLDC GLUCOMTR-MCNC: 69 MG/DL — LOW (ref 70–99)
GLUCOSE SERPL-MCNC: 120 MG/DL — HIGH (ref 70–99)
HCT VFR BLD CALC: 32.8 % — LOW (ref 34.5–45)
HGB BLD-MCNC: 10.2 G/DL — LOW (ref 11.5–15.5)
MCHC RBC-ENTMCNC: 29.4 PG — SIGNIFICANT CHANGE UP (ref 27–34)
MCHC RBC-ENTMCNC: 31.1 GM/DL — LOW (ref 32–36)
MCV RBC AUTO: 94.5 FL — SIGNIFICANT CHANGE UP (ref 80–100)
NRBC # BLD: 0 /100 WBCS — SIGNIFICANT CHANGE UP (ref 0–0)
PLATELET # BLD AUTO: 184 K/UL — SIGNIFICANT CHANGE UP (ref 150–400)
POTASSIUM SERPL-MCNC: 4.7 MMOL/L — SIGNIFICANT CHANGE UP (ref 3.5–5.3)
POTASSIUM SERPL-SCNC: 4.7 MMOL/L — SIGNIFICANT CHANGE UP (ref 3.5–5.3)
RBC # BLD: 3.47 M/UL — LOW (ref 3.8–5.2)
RBC # FLD: 18 % — HIGH (ref 10.3–14.5)
SODIUM SERPL-SCNC: 137 MMOL/L — SIGNIFICANT CHANGE UP (ref 135–145)
WBC # BLD: 7.7 K/UL — SIGNIFICANT CHANGE UP (ref 3.8–10.5)
WBC # FLD AUTO: 7.7 K/UL — SIGNIFICANT CHANGE UP (ref 3.8–10.5)

## 2019-11-24 RX ORDER — ERYTHROPOIETIN 10000 [IU]/ML
10000 INJECTION, SOLUTION INTRAVENOUS; SUBCUTANEOUS
Refills: 0 | Status: DISCONTINUED | OUTPATIENT
Start: 2019-11-24 | End: 2019-11-24

## 2019-11-24 RX ORDER — BUDESONIDE AND FORMOTEROL FUMARATE DIHYDRATE 160; 4.5 UG/1; UG/1
2 AEROSOL RESPIRATORY (INHALATION)
Refills: 0 | Status: DISCONTINUED | OUTPATIENT
Start: 2019-11-24 | End: 2019-12-02

## 2019-11-24 RX ORDER — SODIUM CHLORIDE 9 MG/ML
250 INJECTION INTRAMUSCULAR; INTRAVENOUS; SUBCUTANEOUS ONCE
Refills: 0 | Status: COMPLETED | OUTPATIENT
Start: 2019-11-24 | End: 2019-11-24

## 2019-11-24 RX ORDER — SOD SULF/SODIUM/NAHCO3/KCL/PEG
4000 SOLUTION, RECONSTITUTED, ORAL ORAL ONCE
Refills: 0 | Status: COMPLETED | OUTPATIENT
Start: 2019-11-24 | End: 2019-11-24

## 2019-11-24 RX ORDER — MIDODRINE HYDROCHLORIDE 2.5 MG/1
5 TABLET ORAL THREE TIMES A DAY
Refills: 0 | Status: DISCONTINUED | OUTPATIENT
Start: 2019-11-24 | End: 2019-11-27

## 2019-11-24 RX ORDER — SEVELAMER CARBONATE 2400 MG/1
1600 POWDER, FOR SUSPENSION ORAL
Refills: 0 | Status: DISCONTINUED | OUTPATIENT
Start: 2019-11-24 | End: 2019-12-02

## 2019-11-24 RX ORDER — ERYTHROPOIETIN 10000 [IU]/ML
10000 INJECTION, SOLUTION INTRAVENOUS; SUBCUTANEOUS
Refills: 0 | Status: DISCONTINUED | OUTPATIENT
Start: 2019-11-24 | End: 2019-12-02

## 2019-11-24 RX ADMIN — BUDESONIDE AND FORMOTEROL FUMARATE DIHYDRATE 2 PUFF(S): 160; 4.5 AEROSOL RESPIRATORY (INHALATION) at 22:27

## 2019-11-24 RX ADMIN — INSULIN GLARGINE 15 UNIT(S): 100 INJECTION, SOLUTION SUBCUTANEOUS at 22:32

## 2019-11-24 RX ADMIN — SODIUM CHLORIDE 83.33 MILLILITER(S): 9 INJECTION INTRAMUSCULAR; INTRAVENOUS; SUBCUTANEOUS at 18:25

## 2019-11-24 RX ADMIN — HEPARIN SODIUM 5000 UNIT(S): 5000 INJECTION INTRAVENOUS; SUBCUTANEOUS at 05:55

## 2019-11-24 RX ADMIN — GABAPENTIN 100 MILLIGRAM(S): 400 CAPSULE ORAL at 17:21

## 2019-11-24 RX ADMIN — SEVELAMER CARBONATE 1600 MILLIGRAM(S): 2400 POWDER, FOR SUSPENSION ORAL at 11:54

## 2019-11-24 RX ADMIN — SODIUM CHLORIDE 500 MILLILITER(S): 9 INJECTION INTRAMUSCULAR; INTRAVENOUS; SUBCUTANEOUS at 05:58

## 2019-11-24 RX ADMIN — Medication 325 MILLIGRAM(S): at 11:54

## 2019-11-24 RX ADMIN — SEVELAMER CARBONATE 1600 MILLIGRAM(S): 2400 POWDER, FOR SUSPENSION ORAL at 17:21

## 2019-11-24 RX ADMIN — Medication 1: at 11:52

## 2019-11-24 RX ADMIN — GABAPENTIN 100 MILLIGRAM(S): 400 CAPSULE ORAL at 05:55

## 2019-11-24 RX ADMIN — BUDESONIDE AND FORMOTEROL FUMARATE DIHYDRATE 2 PUFF(S): 160; 4.5 AEROSOL RESPIRATORY (INHALATION) at 12:05

## 2019-11-24 RX ADMIN — Medication 50 MILLIGRAM(S): at 22:27

## 2019-11-24 RX ADMIN — MIDODRINE HYDROCHLORIDE 5 MILLIGRAM(S): 2.5 TABLET ORAL at 18:23

## 2019-11-24 RX ADMIN — PANTOPRAZOLE SODIUM 40 MILLIGRAM(S): 20 TABLET, DELAYED RELEASE ORAL at 05:57

## 2019-11-24 RX ADMIN — Medication 5 UNIT(S): at 11:53

## 2019-11-24 RX ADMIN — ATORVASTATIN CALCIUM 10 MILLIGRAM(S): 80 TABLET, FILM COATED ORAL at 22:27

## 2019-11-24 NOTE — PROGRESS NOTE ADULT - SUBJECTIVE AND OBJECTIVE BOX
Subjective: pt seen and examined, no complaints, ROS - .          acetaminophen   Tablet .. 650 milliGRAM(s) Oral every 6 hours PRN  atorvastatin 10 milliGRAM(s) Oral at bedtime  dextrose 40% Gel 15 Gram(s) Oral once PRN  dextrose 5%. 1000 milliLiter(s) IV Continuous <Continuous>  dextrose 50% Injectable 12.5 Gram(s) IV Push once  dextrose 50% Injectable 25 Gram(s) IV Push once  dextrose 50% Injectable 25 Gram(s) IV Push once  ferrous    sulfate 325 milliGRAM(s) Oral daily  gabapentin 100 milliGRAM(s) Oral three times a day  glucagon  Injectable 1 milliGRAM(s) IntraMuscular once PRN  heparin  Injectable 5000 Unit(s) SubCutaneous every 12 hours  insulin glargine Injectable (LANTUS) 15 Unit(s) SubCutaneous at bedtime  insulin lispro (HumaLOG) corrective regimen sliding scale   SubCutaneous three times a day before meals  insulin lispro Injectable (HumaLOG) 5 Unit(s) SubCutaneous three times a day before meals  pantoprazole   Suspension 40 milliGRAM(s) Oral before breakfast  traZODone 50 milliGRAM(s) Oral at bedtime                            10.2   7.70  )-----------( 184      ( 24 Nov 2019 05:48 )             32.8       Hemoglobin: 10.2 g/dL (11-24 @ 05:48)  Hemoglobin: 10.2 g/dL (11-23 @ 22:04)  Hemoglobin: 10.4 g/dL (11-23 @ 17:29)  Hemoglobin: 7.4 g/dL (11-23 @ 10:36)  Hemoglobin: 7.5 g/dL (11-22 @ 12:17)      11-23    136  |  100  |  47<H>  ----------------------------<  179<H>  4.9   |  27  |  4.34<H>    Ca    8.4      23 Nov 2019 22:04  Phos  7.9     11-23  Mg     2.7     11-23    TPro  7.1  /  Alb  2.6<L>  /  TBili  0.4  /  DBili  x   /  AST  13  /  ALT  20  /  AlkPhos  80  11-22    Creatinine Trend: 4.34<--, 6.50<--, 5.81<--    COAGS:     CARDIAC MARKERS ( 23 Nov 2019 00:42 )  0.095 ng/mL / x     / 59 U/L / x     / 2.8 ng/mL  CARDIAC MARKERS ( 22 Nov 2019 12:17 )  0.078 ng/mL / x     / x     / x     / x            T(C): 36.8 (11-24-19 @ 04:59), Max: 36.8 (11-24-19 @ 04:59)  HR: 71 (11-24-19 @ 04:59) (41 - 79)  BP: 91/49 (11-24-19 @ 04:59) (52/31 - 146/76)  RR: 19 (11-24-19 @ 04:59) (14 - 20)  SpO2: 98% (11-24-19 @ 04:59) (89% - 100%)  Wt(kg): --    I&O's Summary    Appearance: Normal	  HEENT:   Normal oral mucosa, PERRL, EOMI	  Lymphatic: No lymphadenopathy , no edema  Cardiovascular: Normal S1 S2, No JVD, No murmurs , Peripheral pulses palpable 2+ bilaterally  Respiratory: Lungs clear to auscultation, normal effort 	  Gastrointestinal:  Soft, Non-tender, + BS	  Skin: No rashes, No ecchymoses, No cyanosis, warm to touch  Musculoskeletal: Normal range of motion, normal strength  Psychiatry:  Mood & affect appropriate    TELEMETRY: 	  NSR      DIAGNOSTIC TESTING:  [ ] Echocardiogram:  [ ]  Catheterization:  [ ] Stress Test:    OTHER: 	        ASSESSMENT/PLAN: 	79y Female female hx of ESRD on HD, HTN, ANemia, copd , chf, PAF, no A/C documented , now with anemia, SOB , + troponin on admission labs     Tele stable   HD per renal    H/H stable    GI / DVT prophylaxis.,  keep K>4, mag >2.0    Echo pending to evaluate LV fx

## 2019-11-24 NOTE — PROGRESS NOTE ADULT - SUBJECTIVE AND OBJECTIVE BOX
SUBJECTIVE / OVERNIGHT EVENTS: pt seen and examined     MEDICATIONS  (STANDING):  atorvastatin 10 milliGRAM(s) Oral at bedtime  budesonide  80 MICROgram(s)/formoterol 4.5 MICROgram(s) Inhaler 2 Puff(s) Inhalation two times a day  dextrose 5%. 1000 milliLiter(s) (50 mL/Hr) IV Continuous <Continuous>  dextrose 50% Injectable 12.5 Gram(s) IV Push once  dextrose 50% Injectable 25 Gram(s) IV Push once  dextrose 50% Injectable 25 Gram(s) IV Push once  epoetin randy Injectable 24888 Unit(s) IV Push <User Schedule>  ferrous    sulfate 325 milliGRAM(s) Oral daily  gabapentin 100 milliGRAM(s) Oral three times a day  heparin  Injectable 5000 Unit(s) SubCutaneous every 12 hours  insulin glargine Injectable (LANTUS) 15 Unit(s) SubCutaneous at bedtime  insulin lispro (HumaLOG) corrective regimen sliding scale   SubCutaneous three times a day before meals  insulin lispro Injectable (HumaLOG) 5 Unit(s) SubCutaneous three times a day before meals  midodrine. 5 milliGRAM(s) Oral three times a day  pantoprazole   Suspension 40 milliGRAM(s) Oral before breakfast  sevelamer carbonate 1600 milliGRAM(s) Oral three times a day with meals  traZODone 50 milliGRAM(s) Oral at bedtime    MEDICATIONS  (PRN):  acetaminophen   Tablet .. 650 milliGRAM(s) Oral every 6 hours PRN Mild Pain (1 - 3), Moderate Pain (4 - 6)  dextrose 40% Gel 15 Gram(s) Oral once PRN Blood Glucose LESS THAN 70 milliGRAM(s)/deciliter  glucagon  Injectable 1 milliGRAM(s) IntraMuscular once PRN Glucose LESS THAN 70 milligrams/deciliter    Vital Signs Last 24 Hrs  T(C): 36.6 (24 Nov 2019 19:12), Max: 36.8 (24 Nov 2019 04:59)  T(F): 97.8 (24 Nov 2019 19:12), Max: 98.3 (24 Nov 2019 04:59)  HR: 69 (24 Nov 2019 19:12) (67 - 75)  BP: 98/71 (24 Nov 2019 19:12) (90/52 - 138/46)  BP(mean): --  RR: 18 (24 Nov 2019 19:12) (18 - 20)  SpO2: 100% (24 Nov 2019 19:12) (95% - 100%)    PHYSICAL EXAM:  GENERAL: NAD  EYES: EOMI, PERRLA  NECK: Supple, No JVD  CHEST/LUNG: dec breath sounds at bases   HEART:  S1 , S2 +  ABDOMEN: soft , bs+  EXTREMITIES:  edema+  NEUROLOGY: alert awake       LABS:  11-24    137  |  101  |  49<H>  ----------------------------<  120<H>  4.7   |  28  |  4.71<H>    Ca    8.3<L>      24 Nov 2019 05:48  Phos  7.9     11-23  Mg     2.7     11-23      Creatinine Trend: 4.71 <--, 4.34 <--, 6.50 <--, 5.81 <--                        10.2   7.70  )-----------( 184      ( 24 Nov 2019 05:48 )             32.8     Urine Studies:      CARDIAC MARKERS ( 23 Nov 2019 00:42 )  0.095 ng/mL / x     / 59 U/L / x     / 2.8 ng/mL              Imaging Personally Reviewed:    Consultant(s) Notes Reviewed:      Care Discussed with Consultants/Other Providers:

## 2019-11-24 NOTE — CONSULT NOTE ADULT - SUBJECTIVE AND OBJECTIVE BOX
Time of visit:    CHIEF COMPLAINT: Patient is a 79y old  Female who presents with a chief complaint of ESRD  WEakness  Anemia (24 Nov 2019 08:42)      HPI:  Pt is a 80 y/o F, from home walks with cane at baseline, with a PMH of chronic diastolic heart failure (stage II), ESRD on HD T/Th/S, moderate MS, COPD on home oxygen, HTN, HLD, DM, and GERD  who presented with generalized weakness, generalized body pain and shortness of breath on exertion since 1 day.  According to the patient, since yesterday she has been feeling weak and has generalized body pain, more on her shoulders. She mentioned that she had minor fall from her bed yesterday and was not able to sleep whole night. She has shortness of breath on mild exertion limiting her ambulation.   She denied fever, chest pain, nausea, vomiting, abdominal pain and all other review of systems except mentioned above. (22 Nov 2019 17:38)   Patient seen and examined.     PAST MEDICAL & SURGICAL HISTORY:  Chronic CHF  MS (mitral stenosis)  GERD (gastroesophageal reflux disease)  COPD (chronic obstructive pulmonary disease): On home oxygen  HLD (hyperlipidemia): not taking statins  HTN (hypertension)  ESRD (end stage renal disease) on dialysis  Chronic diastolic (congestive) heart failure  Anemia of chronic disease  Breast carcinoma: Lt side s/p lumpectomy and radiation in 2004  DM (diabetes mellitus): type 2  S/P subtotal hysterectomy  S/P lumpectomy, left breast: 2004      Allergies    No Known Allergies    Intolerances        MEDICATIONS  (STANDING):  atorvastatin 10 milliGRAM(s) Oral at bedtime  budesonide  80 MICROgram(s)/formoterol 4.5 MICROgram(s) Inhaler 2 Puff(s) Inhalation two times a day  dextrose 5%. 1000 milliLiter(s) (50 mL/Hr) IV Continuous <Continuous>  dextrose 50% Injectable 12.5 Gram(s) IV Push once  dextrose 50% Injectable 25 Gram(s) IV Push once  dextrose 50% Injectable 25 Gram(s) IV Push once  epoetin randy Injectable 48831 Unit(s) IV Push <User Schedule>  ferrous    sulfate 325 milliGRAM(s) Oral daily  gabapentin 100 milliGRAM(s) Oral three times a day  heparin  Injectable 5000 Unit(s) SubCutaneous every 12 hours  insulin glargine Injectable (LANTUS) 15 Unit(s) SubCutaneous at bedtime  insulin lispro (HumaLOG) corrective regimen sliding scale   SubCutaneous three times a day before meals  insulin lispro Injectable (HumaLOG) 5 Unit(s) SubCutaneous three times a day before meals  pantoprazole   Suspension 40 milliGRAM(s) Oral before breakfast  polyethylene glycol/electrolyte Solution. 4000 milliLiter(s) Oral once  sevelamer carbonate 1600 milliGRAM(s) Oral three times a day with meals  traZODone 50 milliGRAM(s) Oral at bedtime      MEDICATIONS  (PRN):  acetaminophen   Tablet .. 650 milliGRAM(s) Oral every 6 hours PRN Mild Pain (1 - 3), Moderate Pain (4 - 6)  dextrose 40% Gel 15 Gram(s) Oral once PRN Blood Glucose LESS THAN 70 milliGRAM(s)/deciliter  glucagon  Injectable 1 milliGRAM(s) IntraMuscular once PRN Glucose LESS THAN 70 milligrams/deciliter   Medications up to date at time of exam.    Medications up to date at time of exam.    FAMILY HISTORY:  Diabetes mellitus  Family history of breast cancer (Aunt)      SOCIAL HISTORY  Smoking History: [   ] smoking/smoke exposure, [   ] former smoker  Living Condition: [   ] apartment, [   ] private house  Work History:   Travel History: denies recent travel  Illicit Substance Use: denies  Alcohol Use: denies    REVIEW OF SYSTEMS:    CONSTITUTIONAL:  denies fevers, chills, sweats, weight loss    HEENT:  denies diplopia or blurred vision, sore throat or runny nose.    CARDIOVASCULAR:  denies pressure, squeezing, tightness, or heaviness about the chest; no palpitations.    RESPIRATORY:  denies SOB, cough, ALFORD, wheezing.    GASTROINTESTINAL:  denies abdominal pain, nausea, vomiting or diarrhea.    GENITOURINARY: denies dysuria, frequency or urgency.    NEUROLOGIC:  denies numbness, tingling, seizures or weakness.    PSYCHIATRIC:  denies disorder of thought or mood.    MSK: denies swelling, redness      PHYSICAL EXAMINATION:    GENERAL: The patient is a well-developed, well-nourished, in no apparent distress.     Vital Signs Last 24 Hrs  T(C): 36.6 (24 Nov 2019 11:07), Max: 36.8 (24 Nov 2019 04:59)  T(F): 97.8 (24 Nov 2019 11:07), Max: 98.3 (24 Nov 2019 04:59)  HR: 69 (24 Nov 2019 11:07) (69 - 79)  BP: 120/88 (24 Nov 2019 11:07) (88/50 - 138/46)  BP(mean): --  RR: 18 (24 Nov 2019 11:07) (16 - 20)  SpO2: 100% (24 Nov 2019 11:07) (95% - 100%)   (if applicable)    Chest Tube (if applicable)    HEENT: Head is normocephalic and atraumatic. Extraocular muscles are intact. Mucous membranes are moist.     NECK: Supple, no palpable adenopathy.    LUNGS: Clear to auscultation, no wheezing, rales, or rhonchi.    HEART: Regular rate and rhythm without murmur.    ABDOMEN: Soft, nontender, and nondistended.  No hepatosplenomegaly is noted.    RENAL: No difficulty voiding, no pelvic pain    EXTREMITIES: Without any cyanosis, clubbing, rash, lesions or edema.    NEUROLOGIC: Awake, alert, oriented, grossly intact    SKIN: Warm, dry, good turgor.      LABS:                        10.2   7.70  )-----------( 184      ( 24 Nov 2019 05:48 )             32.8     11-24    137  |  101  |  49<H>  ----------------------------<  120<H>  4.7   |  28  |  4.71<H>    Ca    8.3<L>      24 Nov 2019 05:48  Phos  7.9     11-23  Mg     2.7     11-23    TPro  7.1  /  Alb  2.6<L>  /  TBili  0.4  /  DBili  x   /  AST  13  /  ALT  20  /  AlkPhos  80  11-22          CARDIAC MARKERS ( 23 Nov 2019 00:42 )  0.095 ng/mL / x     / 59 U/L / x     / 2.8 ng/mL  CARDIAC MARKERS ( 22 Nov 2019 12:17 )  0.078 ng/mL / x     / x     / x     / x            Serum Pro-Brain Natriuretic Peptide: 9669 pg/mL (11-22-19 @ 12:17)          MICROBIOLOGY: (if applicable)    RADIOLOGY & ADDITIONAL STUDIES:  EKG:   CXR:  ECHO:    IMPRESSION: 79y Female PAST MEDICAL & SURGICAL HISTORY:  Chronic CHF  MS (mitral stenosis)  GERD (gastroesophageal reflux disease)  COPD (chronic obstructive pulmonary disease): On home oxygen  HLD (hyperlipidemia): not taking statins  HTN (hypertension)  ESRD (end stage renal disease) on dialysis  Chronic diastolic (congestive) heart failure  Anemia of chronic disease  Breast carcinoma: Lt side s/p lumpectomy and radiation in 2004  DM (diabetes mellitus): type 2  S/P subtotal hysterectomy  S/P lumpectomy, left breast: 2004   p/w                   RECOMMENDATIONS: Time of visit:    CHIEF COMPLAINT: Patient is a 79y old  Female who presents with a chief complaint of ESRD  WEakness  Anemia (24 Nov 2019 08:42)      HPI:  Pt is a 78 y/o F, from home walks with cane at baseline, with a PMH of chronic diastolic heart failure (stage II), ESRD on HD T/Th/S, moderate MS, COPD on home oxygen, HTN, HLD, DM, and GERD  who presented with generalized weakness, generalized body pain and shortness of breath on exertion since 1 day.  According to the patient, since yesterday she has been feeling weak and has generalized body pain, more on her shoulders. She mentioned that she had minor fall from her bed yesterday and was not able to sleep whole night. She has shortness of breath on mild exertion limiting her ambulation.   She denied fever, chest pain, nausea, vomiting, abdominal pain and all other review of systems except mentioned above. (22 Nov 2019 17:38)   Patient seen and examined.     PAST MEDICAL & SURGICAL HISTORY:  Chronic CHF  MS (mitral stenosis)  GERD (gastroesophageal reflux disease)  COPD (chronic obstructive pulmonary disease): On home oxygen  HLD (hyperlipidemia): not taking statins  HTN (hypertension)  ESRD (end stage renal disease) on dialysis  Chronic diastolic (congestive) heart failure  Anemia of chronic disease  Breast carcinoma: Lt side s/p lumpectomy and radiation in 2004  DM (diabetes mellitus): type 2  S/P subtotal hysterectomy  S/P lumpectomy, left breast: 2004      Allergies    No Known Allergies    Intolerances        MEDICATIONS  (STANDING):  atorvastatin 10 milliGRAM(s) Oral at bedtime  budesonide  80 MICROgram(s)/formoterol 4.5 MICROgram(s) Inhaler 2 Puff(s) Inhalation two times a day  dextrose 5%. 1000 milliLiter(s) (50 mL/Hr) IV Continuous <Continuous>  dextrose 50% Injectable 12.5 Gram(s) IV Push once  dextrose 50% Injectable 25 Gram(s) IV Push once  dextrose 50% Injectable 25 Gram(s) IV Push once  epoetin randy Injectable 76118 Unit(s) IV Push <User Schedule>  ferrous    sulfate 325 milliGRAM(s) Oral daily  gabapentin 100 milliGRAM(s) Oral three times a day  heparin  Injectable 5000 Unit(s) SubCutaneous every 12 hours  insulin glargine Injectable (LANTUS) 15 Unit(s) SubCutaneous at bedtime  insulin lispro (HumaLOG) corrective regimen sliding scale   SubCutaneous three times a day before meals  insulin lispro Injectable (HumaLOG) 5 Unit(s) SubCutaneous three times a day before meals  pantoprazole   Suspension 40 milliGRAM(s) Oral before breakfast  polyethylene glycol/electrolyte Solution. 4000 milliLiter(s) Oral once  sevelamer carbonate 1600 milliGRAM(s) Oral three times a day with meals  traZODone 50 milliGRAM(s) Oral at bedtime      MEDICATIONS  (PRN):  acetaminophen   Tablet .. 650 milliGRAM(s) Oral every 6 hours PRN Mild Pain (1 - 3), Moderate Pain (4 - 6)  dextrose 40% Gel 15 Gram(s) Oral once PRN Blood Glucose LESS THAN 70 milliGRAM(s)/deciliter  glucagon  Injectable 1 milliGRAM(s) IntraMuscular once PRN Glucose LESS THAN 70 milligrams/deciliter   Medications up to date at time of exam.    Medications up to date at time of exam.    FAMILY HISTORY:  Diabetes mellitus  Family history of breast cancer (Aunt)      SOCIAL HISTORY  Smoking History: [   ] smoking/smoke exposure, [  x ] former smoker 1 PPD for many years, quit 9 yr ago  Living Condition: [   ] apartment, [   ] private house  Work History:   Travel History: denies recent travel  Illicit Substance Use: denies  Alcohol Use: denies    REVIEW OF SYSTEMS:    CONSTITUTIONAL:  denies fevers, chills, sweats, weight loss    HEENT:  denies diplopia or blurred vision, sore throat or runny nose.    CARDIOVASCULAR:  denies pressure, squeezing, tightness, or heaviness about the chest; no palpitations.    RESPIRATORY:  denies SOB, cough, ALFORD, wheezing.    GASTROINTESTINAL:  denies abdominal pain, nausea, vomiting or diarrhea.    GENITOURINARY: denies dysuria, frequency or urgency.    NEUROLOGIC:  denies numbness, tingling, seizures or weakness.    PSYCHIATRIC:  denies disorder of thought or mood.    MSK: denies swelling, redness      PHYSICAL EXAMINATION:    GENERAL: The patient is a well-developed, well-nourished, in no apparent distress.     Vital Signs Last 24 Hrs  T(C): 36.6 (24 Nov 2019 11:07), Max: 36.8 (24 Nov 2019 04:59)  T(F): 97.8 (24 Nov 2019 11:07), Max: 98.3 (24 Nov 2019 04:59)  HR: 69 (24 Nov 2019 11:07) (69 - 79)  BP: 120/88 (24 Nov 2019 11:07) (88/50 - 138/46)  BP(mean): --  RR: 18 (24 Nov 2019 11:07) (16 - 20)  SpO2: 100% (24 Nov 2019 11:07) (95% - 100%)   (if applicable)    Chest Tube (if applicable)    HEENT: Head is normocephalic and atraumatic. Extraocular muscles are intact. Mucous membranes are moist.     NECK: Supple, no palpable adenopathy.    LUNGS: Clear to auscultation, no wheezing, rales, or rhonchi.    HEART: Regular rate and rhythm without murmur.    ABDOMEN: Soft, nontender, and nondistended.  No hepatosplenomegaly is noted.    RENAL: No difficulty voiding, no pelvic pain    EXTREMITIES: Without any cyanosis, clubbing, rash, lesions or edema.    NEUROLOGIC: Awake, alert, oriented, grossly intact    SKIN: Warm, dry, good turgor.      LABS:                        10.2   7.70  )-----------( 184      ( 24 Nov 2019 05:48 )             32.8     11-24    137  |  101  |  49<H>  ----------------------------<  120<H>  4.7   |  28  |  4.71<H>    Ca    8.3<L>      24 Nov 2019 05:48  Phos  7.9     11-23  Mg     2.7     11-23    TPro  7.1  /  Alb  2.6<L>  /  TBili  0.4  /  DBili  x   /  AST  13  /  ALT  20  /  AlkPhos  80  11-22          CARDIAC MARKERS ( 23 Nov 2019 00:42 )  0.095 ng/mL / x     / 59 U/L / x     / 2.8 ng/mL  CARDIAC MARKERS ( 22 Nov 2019 12:17 )  0.078 ng/mL / x     / x     / x     / x            Serum Pro-Brain Natriuretic Peptide: 9669 pg/mL (11-22-19 @ 12:17)          MICROBIOLOGY: (if applicable)    RADIOLOGY & ADDITIONAL STUDIES:  EKG:   CXR:< from: Xray Chest 1 View AP/PA (11.22.19 @ 18:16) >    EXAM:  XR CHEST AP OR PA 1V                            PROCEDURE DATE:  11/22/2019          INTERPRETATION:  Chest radiograph (one view)     CPT 97516    CLINICAL INFORMATION:  Patient is unable to communicate.  Short of   breath.     TECHNIQUE:  Single frontal view of the chest was obtained.    FINDINGS:  Prior study dated 7/10/2019 was available for review.    The lungs demonstrate increased pulmonary vascular congestion. No focal   consolidation is seen. No pleural effusion is seen. The heart is   prominent in size. A radiopaque clip is seen overlying the cardiac   silhouette. Soft tissue fullness is seen in the right paratracheal region   unchanged from prior examination, likely vascular in etiology.      IMPRESSION: Increased pulmonaryvascular congestion noted. No focal   consolidation is seen. Prominent cardiac silhouette.                CONCEPCION CHAVEZ M.D., ATTENDING RADIOLOGIST  This document has been electronically signed. Nov 22 2019  7:07PM                < end of copied text >    ECHO:    IMPRESSION: 79y Female PAST MEDICAL & SURGICAL HISTORY:  Chronic CHF  MS (mitral stenosis)  GERD (gastroesophageal reflux disease)  COPD (chronic obstructive pulmonary disease): On home oxygen  HLD (hyperlipidemia): not taking statins  HTN (hypertension)  ESRD (end stage renal disease) on dialysis  Chronic diastolic (congestive) heart failure  Anemia of chronic disease  Breast carcinoma: Lt side s/p lumpectomy and radiation in 2004  DM (diabetes mellitus): type 2  S/P subtotal hysterectomy  S/P lumpectomy, left breast: 2004   p/w           Imp: This is a 79 yr old woman for smoker with prior mentioned medical condition condition presented with SOB due to acute on chronic hypoxic hypercapnic resp failure due to Acute exacerbation of COPD with CHF and anemia.      SUGG:  -continue dialysis as per rnephro  -continue meds  -o2 supp to keep O2 Sat>90%  -duoneb  -symbicort  -out pat pul f/u Time of visit:    CHIEF COMPLAINT: Patient is a 79y old  Female who presents with a chief complaint of ESRD  WEakness  Anemia (24 Nov 2019 08:42)      HPI:  Pt is a 80 y/o F, from home walks with cane at baseline, with a PMH of chronic diastolic heart failure (stage II), ESRD on HD T/Th/S, moderate MS, COPD on home oxygen, HTN, HLD, DM, and GERD  who presented with generalized weakness, generalized body pain and shortness of breath on exertion since 1 day.  According to the patient, since yesterday she has been feeling weak and has generalized body pain, more on her shoulders. She mentioned that she had minor fall from her bed yesterday and was not able to sleep whole night. She has shortness of breath on mild exertion limiting her ambulation.   She denied fever, chest pain, nausea, vomiting, abdominal pain and all other review of systems except mentioned above. (22 Nov 2019 17:38)   Patient seen and examined.     PAST MEDICAL & SURGICAL HISTORY:  Chronic CHF  MS (mitral stenosis)  GERD (gastroesophageal reflux disease)  COPD (chronic obstructive pulmonary disease): On home oxygen  HLD (hyperlipidemia): not taking statins  HTN (hypertension)  ESRD (end stage renal disease) on dialysis  Chronic diastolic (congestive) heart failure  Anemia of chronic disease  Breast carcinoma: Lt side s/p lumpectomy and radiation in 2004  DM (diabetes mellitus): type 2  S/P subtotal hysterectomy  S/P lumpectomy, left breast: 2004      Allergies    No Known Allergies    Intolerances        MEDICATIONS  (STANDING):  atorvastatin 10 milliGRAM(s) Oral at bedtime  budesonide  80 MICROgram(s)/formoterol 4.5 MICROgram(s) Inhaler 2 Puff(s) Inhalation two times a day  dextrose 5%. 1000 milliLiter(s) (50 mL/Hr) IV Continuous <Continuous>  dextrose 50% Injectable 12.5 Gram(s) IV Push once  dextrose 50% Injectable 25 Gram(s) IV Push once  dextrose 50% Injectable 25 Gram(s) IV Push once  epoetin randy Injectable 77429 Unit(s) IV Push <User Schedule>  ferrous    sulfate 325 milliGRAM(s) Oral daily  gabapentin 100 milliGRAM(s) Oral three times a day  heparin  Injectable 5000 Unit(s) SubCutaneous every 12 hours  insulin glargine Injectable (LANTUS) 15 Unit(s) SubCutaneous at bedtime  insulin lispro (HumaLOG) corrective regimen sliding scale   SubCutaneous three times a day before meals  insulin lispro Injectable (HumaLOG) 5 Unit(s) SubCutaneous three times a day before meals  pantoprazole   Suspension 40 milliGRAM(s) Oral before breakfast  polyethylene glycol/electrolyte Solution. 4000 milliLiter(s) Oral once  sevelamer carbonate 1600 milliGRAM(s) Oral three times a day with meals  traZODone 50 milliGRAM(s) Oral at bedtime      MEDICATIONS  (PRN):  acetaminophen   Tablet .. 650 milliGRAM(s) Oral every 6 hours PRN Mild Pain (1 - 3), Moderate Pain (4 - 6)  dextrose 40% Gel 15 Gram(s) Oral once PRN Blood Glucose LESS THAN 70 milliGRAM(s)/deciliter  glucagon  Injectable 1 milliGRAM(s) IntraMuscular once PRN Glucose LESS THAN 70 milligrams/deciliter   Medications up to date at time of exam.    Medications up to date at time of exam.    FAMILY HISTORY:  Diabetes mellitus  Family history of breast cancer (Aunt)      SOCIAL HISTORY  Smoking History: [   ] smoking/smoke exposure, [  x ] former smoker 1 PPD for many years, quit 9 yr ago  Living Condition: [   ] apartment, [   ] private house  Work History:   Travel History: denies recent travel  Illicit Substance Use: denies  Alcohol Use: denies    REVIEW OF SYSTEMS:    CONSTITUTIONAL:  denies fevers, chills, sweats, weight loss    HEENT:  denies diplopia or blurred vision, sore throat or runny nose.    CARDIOVASCULAR:  denies pressure, squeezing, tightness, or heaviness about the chest; no palpitations.    RESPIRATORY:  denies SOB, cough, ALFORD, wheezing.    GASTROINTESTINAL:  denies abdominal pain, nausea, vomiting or diarrhea.    GENITOURINARY: denies dysuria, frequency or urgency.    NEUROLOGIC:  denies numbness, tingling, seizures or weakness.    PSYCHIATRIC:  denies disorder of thought or mood.    MSK: denies swelling, redness      PHYSICAL EXAMINATION:    GENERAL: The patient is a well-developed, well-nourished, in no apparent distress.     Vital Signs Last 24 Hrs  T(C): 36.6 (24 Nov 2019 11:07), Max: 36.8 (24 Nov 2019 04:59)  T(F): 97.8 (24 Nov 2019 11:07), Max: 98.3 (24 Nov 2019 04:59)  HR: 69 (24 Nov 2019 11:07) (69 - 79)  BP: 120/88 (24 Nov 2019 11:07) (88/50 - 138/46)  BP(mean): --  RR: 18 (24 Nov 2019 11:07) (16 - 20)  SpO2: 100% (24 Nov 2019 11:07) (95% - 100%)   (if applicable)    Chest Tube (if applicable)    HEENT: Head is normocephalic and atraumatic. Extraocular muscles are intact. Mucous membranes are moist.     NECK: Supple, no palpable adenopathy.    LUNGS: Clear to auscultation, no wheezing, rales, or rhonchi.    HEART: Regular rate and rhythm without murmur.    ABDOMEN: Soft, nontender, and nondistended.  No hepatosplenomegaly is noted.    RENAL: No difficulty voiding, no pelvic pain    EXTREMITIES: Without any cyanosis, clubbing, rash, lesions or edema. Right fore arm AVF    NEUROLOGIC: Awake, alert, oriented, grossly intact    SKIN: Warm, dry, good turgor.      LABS:                        10.2   7.70  )-----------( 184      ( 24 Nov 2019 05:48 )             32.8     11-24    137  |  101  |  49<H>  ----------------------------<  120<H>  4.7   |  28  |  4.71<H>    Ca    8.3<L>      24 Nov 2019 05:48  Phos  7.9     11-23  Mg     2.7     11-23    TPro  7.1  /  Alb  2.6<L>  /  TBili  0.4  /  DBili  x   /  AST  13  /  ALT  20  /  AlkPhos  80  11-22          CARDIAC MARKERS ( 23 Nov 2019 00:42 )  0.095 ng/mL / x     / 59 U/L / x     / 2.8 ng/mL  CARDIAC MARKERS ( 22 Nov 2019 12:17 )  0.078 ng/mL / x     / x     / x     / x            Serum Pro-Brain Natriuretic Peptide: 9669 pg/mL (11-22-19 @ 12:17)          MICROBIOLOGY: (if applicable)    RADIOLOGY & ADDITIONAL STUDIES:  EKG:   CXR:< from: Xray Chest 1 View AP/PA (11.22.19 @ 18:16) >    EXAM:  XR CHEST AP OR PA 1V                            PROCEDURE DATE:  11/22/2019          INTERPRETATION:  Chest radiograph (one view)     CPT 30366    CLINICAL INFORMATION:  Patient is unable to communicate.  Short of   breath.     TECHNIQUE:  Single frontal view of the chest was obtained.    FINDINGS:  Prior study dated 7/10/2019 was available for review.    The lungs demonstrate increased pulmonary vascular congestion. No focal   consolidation is seen. No pleural effusion is seen. The heart is   prominent in size. A radiopaque clip is seen overlying the cardiac   silhouette. Soft tissue fullness is seen in the right paratracheal region   unchanged from prior examination, likely vascular in etiology.      IMPRESSION: Increased pulmonaryvascular congestion noted. No focal   consolidation is seen. Prominent cardiac silhouette.                CONCEPCION CHAVEZ M.D., ATTENDING RADIOLOGIST  This document has been electronically signed. Nov 22 2019  7:07PM                < end of copied text >    ECHO:    IMPRESSION: 79y Female PAST MEDICAL & SURGICAL HISTORY:  Chronic CHF  MS (mitral stenosis)  GERD (gastroesophageal reflux disease)  COPD (chronic obstructive pulmonary disease): On home oxygen  HLD (hyperlipidemia): not taking statins  HTN (hypertension)  ESRD (end stage renal disease) on dialysis  Chronic diastolic (congestive) heart failure  Anemia of chronic disease  Breast carcinoma: Lt side s/p lumpectomy and radiation in 2004  DM (diabetes mellitus): type 2  S/P subtotal hysterectomy  S/P lumpectomy, left breast: 2004   p/w           Imp: This is a 79 yr old woman for smoker with prior mentioned medical condition condition presented with SOB due to acute on chronic hypoxic hypercapnic resp failure due to Acute exacerbation of COPD with CHF and anemia.      SUGG:  -continue dialysis as per rnephro  -continue meds  -o2 supp to keep O2 Sat>90%  -duoneb  -symbicort  -out pat pul f/u

## 2019-11-24 NOTE — PROGRESS NOTE ADULT - SUBJECTIVE AND OBJECTIVE BOX
[   ] ICU                                          [   ] CCU                                      [ X  ] Medical Floor      Patient is comfortable. No new complaints reported, No abdominal pain, N/V, hematemesis, hematochezia, melena, fever, chills, chest pain, SOB, cough or diarrhea reported.    VITALS  Vital Signs Last 24 Hrs  T(C): 36.4 (24 Nov 2019 08:04), Max: 36.8 (24 Nov 2019 04:59)  T(F): 97.6 (24 Nov 2019 08:04), Max: 98.3 (24 Nov 2019 04:59)  HR: 69 (24 Nov 2019 08:04) (41 - 79)  BP: 106/61 (24 Nov 2019 08:04) (52/31 - 146/76)   RR: 20 (24 Nov 2019 08:04) (14 - 20)  SpO2: 96% (24 Nov 2019 08:04) (89% - 100%)       MEDICATIONS  (STANDING):  atorvastatin 10 milliGRAM(s) Oral at bedtime  budesonide  80 MICROgram(s)/formoterol 4.5 MICROgram(s) Inhaler 2 Puff(s) Inhalation two times a day  dextrose 5%. 1000 milliLiter(s) (50 mL/Hr) IV Continuous <Continuous>  dextrose 50% Injectable 12.5 Gram(s) IV Push once  dextrose 50% Injectable 25 Gram(s) IV Push once  dextrose 50% Injectable 25 Gram(s) IV Push once  epoetin randy Injectable 99512 Unit(s) IV Push <User Schedule>  ferrous    sulfate 325 milliGRAM(s) Oral daily  gabapentin 100 milliGRAM(s) Oral three times a day  heparin  Injectable 5000 Unit(s) SubCutaneous every 12 hours  insulin glargine Injectable (LANTUS) 15 Unit(s) SubCutaneous at bedtime  insulin lispro (HumaLOG) corrective regimen sliding scale   SubCutaneous three times a day before meals  insulin lispro Injectable (HumaLOG) 5 Unit(s) SubCutaneous three times a day before meals  pantoprazole   Suspension 40 milliGRAM(s) Oral before breakfast  polyethylene glycol/electrolyte Solution. 4000 milliLiter(s) Oral once  sevelamer carbonate 1600 milliGRAM(s) Oral three times a day with meals  traZODone 50 milliGRAM(s) Oral at bedtime    MEDICATIONS  (PRN):  acetaminophen   Tablet .. 650 milliGRAM(s) Oral every 6 hours PRN Mild Pain (1 - 3), Moderate Pain (4 - 6)  dextrose 40% Gel 15 Gram(s) Oral once PRN Blood Glucose LESS THAN 70 milliGRAM(s)/deciliter  glucagon  Injectable 1 milliGRAM(s) IntraMuscular once PRN Glucose LESS THAN 70 milligrams/deciliter                            10.2   7.70  )-----------( 184      ( 24 Nov 2019 05:48 )             32.8       11-24    137  |  101  |  49<H>  ----------------------------<  120<H>  4.7   |  28  |  4.71<H>    Ca    8.3<L>      24 Nov 2019 05:48  Phos  7.9     11-23  Mg     2.7     11-23    TPro  7.1  /  Alb  2.6<L>  /  TBili  0.4  /  DBili  x   /  AST  13  /  ALT  20  /  AlkPhos  80  11-22

## 2019-11-24 NOTE — PROGRESS NOTE ADULT - SUBJECTIVE AND OBJECTIVE BOX
Problem List:  ESRD   Anemia post blood transfusion, ETIOLOGY ? possible GI bleed.  Bradycardia and hypotension in the last 24 hours  Lowest BP lower extremity yesterday AN 43, was given Epinephrine abnd calcium gluconate  All BP meds were DC  Episode one of diarrhea in the night   BP was 88  was given IV bolus.          PAST MEDICAL & SURGICAL HISTORY:  Chronic CHF  MS (mitral stenosis)  GERD (gastroesophageal reflux disease)  COPD (chronic obstructive pulmonary disease): On home oxygen  HLD (hyperlipidemia): not taking statins  HTN (hypertension)  ESRD (end stage renal disease) on dialysis  Chronic diastolic (congestive) heart failure  Anemia of chronic disease  Breast carcinoma: Lt side s/p lumpectomy and radiation in 2004  DM (diabetes mellitus): type 2  S/P subtotal hysterectomy  S/P lumpectomy, left breast: 2004      No Known Allergies      MEDICATIONS  (STANDING):  atorvastatin 10 milliGRAM(s) Oral at bedtime  dextrose 5%. 1000 milliLiter(s) (50 mL/Hr) IV Continuous <Continuous>  dextrose 50% Injectable 12.5 Gram(s) IV Push once  dextrose 50% Injectable 25 Gram(s) IV Push once  dextrose 50% Injectable 25 Gram(s) IV Push once  epoetin randy Injectable 17367 Unit(s) IV Push <User Schedule>  ferrous    sulfate 325 milliGRAM(s) Oral daily  gabapentin 100 milliGRAM(s) Oral three times a day  heparin  Injectable 5000 Unit(s) SubCutaneous every 12 hours  insulin glargine Injectable (LANTUS) 15 Unit(s) SubCutaneous at bedtime  insulin lispro (HumaLOG) corrective regimen sliding scale   SubCutaneous three times a day before meals  insulin lispro Injectable (HumaLOG) 5 Unit(s) SubCutaneous three times a day before meals  pantoprazole   Suspension 40 milliGRAM(s) Oral before breakfast  polyethylene glycol/electrolyte Solution. 4000 milliLiter(s) Oral once  traZODone 50 milliGRAM(s) Oral at bedtime    MEDICATIONS  (PRN):  acetaminophen   Tablet .. 650 milliGRAM(s) Oral every 6 hours PRN Mild Pain (1 - 3), Moderate Pain (4 - 6)  dextrose 40% Gel 15 Gram(s) Oral once PRN Blood Glucose LESS THAN 70 milliGRAM(s)/deciliter  glucagon  Injectable 1 milliGRAM(s) IntraMuscular once PRN Glucose LESS THAN 70 milligrams/deciliter                            10.2   7.70  )-----------( 184      ( 24 Nov 2019 05:48 )             32.8     11-24    137  |  101  |  49<H>  ----------------------------<  120<H>  4.7   |  28  |  4.71<H>    Ca    8.3<L>      24 Nov 2019 05:48  Phos  7.9     11-23  Mg     2.7     11-23    TPro  7.1  /  Alb  2.6<L>  /  TBili  0.4  /  DBili  x   /  AST  13  /  ALT  20  /  AlkPhos  80  11-22            REVIEW OF SYSTEMS:  c/o weakness  Denies SOB      VITALS:  T(F): 97.6 (11-24-19 @ 08:04), Max: 98.3 (11-24-19 @ 04:59)  HR: 69 (11-24-19 @ 08:04)  BP: 106/61 (11-24-19 @ 08:04)  RR: 20 (11-24-19 @ 08:04)  SpO2: 96% (11-24-19 @ 08:04)  Wt(kg): --      PHYSICAL EXAM:  Constitutional:, no diaphoresis, no distress.  Neck: No JVD, no carotid bruit, supple, no adenopathy  Respiratory:  air entrance B/L, crepitation eft base  Cardiovascular: S1, S2, RRR, no pericardial rub, no murmur  Abdomen: BS+, soft, no tenderness, no bruit  Extremities: No edema  Alert and awake oriented by 3.  Vascular right AVF

## 2019-11-24 NOTE — PROGRESS NOTE ADULT - NEGATIVE ENMT SYMPTOMS
no gum bleeding/no dry mouth/no throat pain/no ear pain/no hearing difficulty/no vertigo/no dysphagia

## 2019-11-24 NOTE — CHART NOTE - NSCHARTNOTEFT_GEN_A_CORE
Paged by RN patient refuses colonoscopy tomorrow.  No longer NPO for procedure.  Primary team to follow

## 2019-11-25 ENCOUNTER — TRANSCRIPTION ENCOUNTER (OUTPATIENT)
Age: 79
End: 2019-11-25

## 2019-11-25 LAB
GLUCOSE BLDC GLUCOMTR-MCNC: 109 MG/DL — HIGH (ref 70–99)
GLUCOSE BLDC GLUCOMTR-MCNC: 109 MG/DL — HIGH (ref 70–99)
GLUCOSE BLDC GLUCOMTR-MCNC: 113 MG/DL — HIGH (ref 70–99)
GLUCOSE BLDC GLUCOMTR-MCNC: 118 MG/DL — HIGH (ref 70–99)
GLUCOSE BLDC GLUCOMTR-MCNC: 169 MG/DL — HIGH (ref 70–99)

## 2019-11-25 PROCEDURE — 74176 CT ABD & PELVIS W/O CONTRAST: CPT | Mod: 26

## 2019-11-25 RX ORDER — PANTOPRAZOLE SODIUM 20 MG/1
40 TABLET, DELAYED RELEASE ORAL
Refills: 0 | Status: DISCONTINUED | OUTPATIENT
Start: 2019-11-25 | End: 2019-12-02

## 2019-11-25 RX ADMIN — Medication 1: at 17:21

## 2019-11-25 RX ADMIN — BUDESONIDE AND FORMOTEROL FUMARATE DIHYDRATE 2 PUFF(S): 160; 4.5 AEROSOL RESPIRATORY (INHALATION) at 10:58

## 2019-11-25 RX ADMIN — SEVELAMER CARBONATE 1600 MILLIGRAM(S): 2400 POWDER, FOR SUSPENSION ORAL at 08:24

## 2019-11-25 RX ADMIN — SEVELAMER CARBONATE 1600 MILLIGRAM(S): 2400 POWDER, FOR SUSPENSION ORAL at 12:11

## 2019-11-25 RX ADMIN — BUDESONIDE AND FORMOTEROL FUMARATE DIHYDRATE 2 PUFF(S): 160; 4.5 AEROSOL RESPIRATORY (INHALATION) at 22:25

## 2019-11-25 RX ADMIN — INSULIN GLARGINE 15 UNIT(S): 100 INJECTION, SOLUTION SUBCUTANEOUS at 22:25

## 2019-11-25 RX ADMIN — PANTOPRAZOLE SODIUM 40 MILLIGRAM(S): 20 TABLET, DELAYED RELEASE ORAL at 06:00

## 2019-11-25 RX ADMIN — HEPARIN SODIUM 5000 UNIT(S): 5000 INJECTION INTRAVENOUS; SUBCUTANEOUS at 17:22

## 2019-11-25 RX ADMIN — Medication 5 UNIT(S): at 12:10

## 2019-11-25 RX ADMIN — PANTOPRAZOLE SODIUM 40 MILLIGRAM(S): 20 TABLET, DELAYED RELEASE ORAL at 17:21

## 2019-11-25 RX ADMIN — SEVELAMER CARBONATE 1600 MILLIGRAM(S): 2400 POWDER, FOR SUSPENSION ORAL at 17:21

## 2019-11-25 RX ADMIN — HEPARIN SODIUM 5000 UNIT(S): 5000 INJECTION INTRAVENOUS; SUBCUTANEOUS at 05:52

## 2019-11-25 RX ADMIN — MIDODRINE HYDROCHLORIDE 5 MILLIGRAM(S): 2.5 TABLET ORAL at 11:22

## 2019-11-25 RX ADMIN — MIDODRINE HYDROCHLORIDE 5 MILLIGRAM(S): 2.5 TABLET ORAL at 05:53

## 2019-11-25 RX ADMIN — Medication 50 MILLIGRAM(S): at 22:24

## 2019-11-25 RX ADMIN — Medication 5 UNIT(S): at 17:21

## 2019-11-25 RX ADMIN — Medication 5 UNIT(S): at 08:24

## 2019-11-25 RX ADMIN — MIDODRINE HYDROCHLORIDE 5 MILLIGRAM(S): 2.5 TABLET ORAL at 17:21

## 2019-11-25 RX ADMIN — Medication 325 MILLIGRAM(S): at 11:22

## 2019-11-25 RX ADMIN — ATORVASTATIN CALCIUM 10 MILLIGRAM(S): 80 TABLET, FILM COATED ORAL at 22:24

## 2019-11-25 NOTE — PROGRESS NOTE ADULT - SUBJECTIVE AND OBJECTIVE BOX
Time of Visit:  Patient seen and examined.     MEDICATIONS  (STANDING):  atorvastatin 10 milliGRAM(s) Oral at bedtime  budesonide  80 MICROgram(s)/formoterol 4.5 MICROgram(s) Inhaler 2 Puff(s) Inhalation two times a day  dextrose 5%. 1000 milliLiter(s) (50 mL/Hr) IV Continuous <Continuous>  dextrose 50% Injectable 12.5 Gram(s) IV Push once  dextrose 50% Injectable 25 Gram(s) IV Push once  dextrose 50% Injectable 25 Gram(s) IV Push once  epoetin randy Injectable 87508 Unit(s) IV Push <User Schedule>  ferrous    sulfate 325 milliGRAM(s) Oral daily  heparin  Injectable 5000 Unit(s) SubCutaneous every 12 hours  insulin glargine Injectable (LANTUS) 15 Unit(s) SubCutaneous at bedtime  insulin lispro (HumaLOG) corrective regimen sliding scale   SubCutaneous three times a day before meals  insulin lispro Injectable (HumaLOG) 5 Unit(s) SubCutaneous three times a day before meals  midodrine. 5 milliGRAM(s) Oral three times a day  pantoprazole    Tablet 40 milliGRAM(s) Oral before breakfast  sevelamer carbonate 1600 milliGRAM(s) Oral three times a day with meals  traZODone 50 milliGRAM(s) Oral at bedtime      MEDICATIONS  (PRN):  acetaminophen   Tablet .. 650 milliGRAM(s) Oral every 6 hours PRN Mild Pain (1 - 3), Moderate Pain (4 - 6)  dextrose 40% Gel 15 Gram(s) Oral once PRN Blood Glucose LESS THAN 70 milliGRAM(s)/deciliter  glucagon  Injectable 1 milliGRAM(s) IntraMuscular once PRN Glucose LESS THAN 70 milligrams/deciliter       Medications up to date at time of exam.      PHYSICAL EXAMINATION:  Patient has no new complaints.  GENERAL: The patient is a well-developed, well-nourished, in no apparent distress.     Vital Signs Last 24 Hrs  T(C): 36.7 (25 Nov 2019 11:03), Max: 36.8 (25 Nov 2019 07:44)  T(F): 98.1 (25 Nov 2019 11:03), Max: 98.2 (25 Nov 2019 07:44)  HR: 89 (25 Nov 2019 11:03) (63 - 89)  BP: 137/78 (25 Nov 2019 11:03) (90/52 - 152/88)  BP(mean): --  RR: 18 (25 Nov 2019 11:03) (18 - 18)  SpO2: 94% (25 Nov 2019 11:03) (94% - 100%)   (if applicable)    Chest Tube (if applicable)    HEENT: Head is normocephalic and atraumatic. Extraocular muscles are intact. Mucous membranes are moist.     NECK: Supple, no palpable adenopathy.    LUNGS: Clear to auscultation, no wheezing, rales, or rhonchi.    HEART: Regular rate and rhythm without murmur.    ABDOMEN: Soft, nontender, and nondistended.  No hepatosplenomegaly is noted.    : No painful voiding, no pelvic pain    EXTREMITIES: Without any cyanosis, clubbing, rash, lesions or edema. R forearm AVF    NEUROLOGIC: Awake, alert, oriented, grossly intact    SKIN: Warm, dry, good turgor.      LABS:                        10.2   7.70  )-----------( 184      ( 24 Nov 2019 05:48 )             32.8     11-24    137  |  101  |  49<H>  ----------------------------<  120<H>  4.7   |  28  |  4.71<H>    Ca    8.3<L>      24 Nov 2019 05:48                          MICROBIOLOGY: (if applicable)    RADIOLOGY & ADDITIONAL STUDIES:  EKG:   CXR:  ECHO:    IMPRESSION: 79y Female PAST MEDICAL & SURGICAL HISTORY:  Chronic CHF  MS (mitral stenosis)  GERD (gastroesophageal reflux disease)  COPD (chronic obstructive pulmonary disease): On home oxygen  HLD (hyperlipidemia): not taking statins  HTN (hypertension)  ESRD (end stage renal disease) on dialysis  Chronic diastolic (congestive) heart failure  Anemia of chronic disease  Breast carcinoma: Lt side s/p lumpectomy and radiation in 2004  DM (diabetes mellitus): type 2  S/P subtotal hysterectomy  S/P lumpectomy, left breast: 2004   p/w           Imp: This is a 79 yr old woman for smoker with prior mentioned medical condition condition presented with SOB due to acute on chronic hypoxic hypercapnic resp failure due to Acute exacerbation of COPD with CHF and anemia.      SUGG:  -continue dialysis as per rnephro  -continue meds  -o2 supp to keep O2 Sat>90%  -duoneb  -symbicort  -out pat pul f/u

## 2019-11-25 NOTE — DISCHARGE NOTE PROVIDER - HOSPITAL COURSE
Pt is a 78 y/o F, from home walks with cane at baseline, with a PMH of chronic diastolic heart failure (stage II), ESRD on HD T/Th/S, moderate MS, COPD on home oxygen, HTN, HLD, DM, and GERD  who presented with generalized weakness, generalized body pain and shortness of breath on exertion since 1 day.    According to the patient, since yesterday she has been feeling weak and has generalized body pain, more on her shoulders. She mentioned that she had minor fall from her bed yesterday and was not able to sleep whole night. She has shortness of breath on mild exertion limiting her ambulation.     She denied fever, chest pain, nausea, vomiting, abdominal pain and all other review of systems except mentioned above.     Patient was admitted for symptomatic anemia with hemoglobin of 7.5 and missed dialysis.    Nephro Dr. Chadwick was consulted. Patient received dialysis as scheduled on Saturday after admissiona nd received 2 units pRBC. Patient's hemoglobin corrected to 10 and remained stable.     During dialysis, patient was hypotensive with SBP 50. ICU was consulted, epinephrine .5 was given and patients BP stabilized. This event likely due to polypharmacy, and patient's home BP meds were discontinued,    Patient's trops were elvated and cardio, Dr. Chavez was consulted. patients troponinemia likely due to missed dialysis. Pt is a 78 y/o F, from home walks with cane at baseline, with a PMH of chronic diastolic heart failure (stage II), ESRD on HD T/Th/S, moderate MS, COPD on home oxygen, HTN, HLD, DM, and GERD  who presented with generalized weakness, generalized body pain and shortness of breath on exertion since 1 day.    According to the patient, since yesterday she has been feeling weak and has generalized body pain, more on her shoulders. She mentioned that she had minor fall from her bed yesterday and was not able to sleep whole night. She has shortness of breath on mild exertion limiting her ambulation.     She denied fever, chest pain, nausea, vomiting, abdominal pain and all other review of systems except mentioned above.     Patient was admitted for symptomatic anemia with hemoglobin of 7.5 and missed dialysis.    Nephro Dr. Chadwick was consulted. Patient received dialysis as scheduled on Saturday after admissiona nd received 2 units pRBC. Patient's hemoglobin corrected to 10 and remained stable.     During dialysis, patient was hypotensive with SBP 50. ICU was consulted, epinephrine .5 was given and patients BP stabilized. This event likely due to polypharmacy, and patient's home BP meds were discontinued,    Patient's trops were elvated and cardio, Dr. Chavez was consulted. patients troponinemia likely due to missed dialysis.     GI Dr almonte consulted, patient scheduled for colonoscopy however patient declined    Patient resumed on dialysis as scheduled.     Patient is stable for discharge per attending and is advised to follow up with PCP as outpatient    Please refer to patient's complete medical chart with documents for a full hospital course, for this is only a brief summary. Pt is a 78 y/o F, from home walks with cane at baseline, with a PMH of chronic diastolic heart failure (stage II), ESRD on HD T/Th/S, moderate MS, COPD on home oxygen, HTN, HLD, DM, and GERD  who presented with generalized weakness, generalized body pain and shortness of breath on exertion since 1 day.        Patient was admitted for symptomatic anemia with hemoglobin of 7.5 and missed dialysis.    Nephro Dr. Chadwick was consulted. Patient received dialysis as scheduled on Saturday after admissiona nd received 2 units pRBC. Patient's hemoglobin corrected to 10 and remained stable.     During dialysis, patient was hypotensive with SBP 50. ICU was consulted, epinephrine .5 was given and patients BP stabilized. This event likely due to polypharmacy, and patient's home BP meds were discontinued,    Patient's trops were elvated and cardio, Dr. Chavez was consulted. patients troponinemia likely due to missed dialysis.     GI Dr almonte consulted, patient scheduled for colonoscopy however patient declined    Patient resumed on dialysis as scheduled.     Patient was found to have Rt foot cellulitis, was on zosyn IV x 7days during this admission, switching over to P.O doxycycline 100mg BID x 4 more days as per ID Dr. Barbour.     Also, podiatry was following pt as well, recommended betadine dressing which pt has been refusing. Pt agreed to follow up with podiatry as out-patient next week at 95-25.    Patient is stable for discharge per attending and is advised to follow up with PCP as outpatient    Please refer to patient's complete medical chart with documents for a full hospital course, for this is only a brief summary. Pt is a 78 y/o F, from home walks with cane at baseline, with a PMH of chronic diastolic heart failure (stage II), ESRD on HD T/Th/S, moderate MS, COPD on home oxygen, HTN, HLD, DM, and GERD  who presented with generalized weakness, generalized body pain and shortness of breath on exertion since 1 day.        Patient was admitted for symptomatic anemia with hemoglobin of 7.5 and missed dialysis.    Nephro Dr. Chadwick was consulted. Patient received dialysis as scheduled on Saturday after admissiona nd received 2 units pRBC. Patient's hemoglobin corrected to 10 and remained stable.     During dialysis, patient was hypotensive with SBP 50. ICU was consulted, epinephrine .5 was given and patients BP stabilized. This event likely due to polypharmacy, and patient's home BP meds were discontinued, started midodrine 5mg TID for hypotension.    Patient's trops were elvated and cardio, Dr. Chavez was consulted. patients troponinemia likely due to missed dialysis.     GI Dr almonte consulted, patient scheduled for colonoscopy however patient declined    Patient resumed on dialysis as scheduled.     Patient was found to have Rt foot cellulitis, was on zosyn IV x 7days during this admission, switching over to P.O doxycycline 100mg BID x 4 more days as per ID Dr. Barbour.     Also, podiatry was following pt as well, recommended betadine dressing which pt has been refusing. Pt agreed to follow up with podiatry as out-patient next week at 95-25.    Pt refuses betadine dressing during this admission but agreed to do it at home, arranged home care at home for dressing.    PT evaluated pt and recommended home PT.     Patient is stable for discharge per attending and is advised to follow up with PCP as outpatient

## 2019-11-25 NOTE — PROGRESS NOTE ADULT - SUBJECTIVE AND OBJECTIVE BOX
[   ] ICU                                          [   ] CCU                                      [ X  ] Medical Floor      Patient is comfortable. No new complaints reported, No abdominal pain, N/V, hematemesis, hematochezia, melena, fever, chills, chest pain, SOB, cough or diarrhea reported.    VITALS  Vital Signs Last 24 Hrs  T(C): 36.7 (25 Nov 2019 15:41), Max: 36.8 (25 Nov 2019 07:44)  T(F): 98.1 (25 Nov 2019 15:41), Max: 98.2 (25 Nov 2019 07:44)  HR: 70 (25 Nov 2019 15:41) (63 - 89)  BP: 102/56 (25 Nov 2019 15:41) (95/51 - 152/88)   RR: 18 (25 Nov 2019 15:41) (18 - 18)  SpO2: 100% (25 Nov 2019 15:41) (94% - 100%)       MEDICATIONS  (STANDING):  atorvastatin 10 milliGRAM(s) Oral at bedtime  budesonide  80 MICROgram(s)/formoterol 4.5 MICROgram(s) Inhaler 2 Puff(s) Inhalation two times a day  dextrose 5%. 1000 milliLiter(s) (50 mL/Hr) IV Continuous <Continuous>  dextrose 50% Injectable 12.5 Gram(s) IV Push once  dextrose 50% Injectable 25 Gram(s) IV Push once  dextrose 50% Injectable 25 Gram(s) IV Push once  epoetin randy Injectable 62899 Unit(s) IV Push <User Schedule>  ferrous    sulfate 325 milliGRAM(s) Oral daily  heparin  Injectable 5000 Unit(s) SubCutaneous every 12 hours  insulin glargine Injectable (LANTUS) 15 Unit(s) SubCutaneous at bedtime  insulin lispro (HumaLOG) corrective regimen sliding scale   SubCutaneous three times a day before meals  insulin lispro Injectable (HumaLOG) 5 Unit(s) SubCutaneous three times a day before meals  midodrine. 5 milliGRAM(s) Oral three times a day  pantoprazole    Tablet 40 milliGRAM(s) Oral before breakfast  sevelamer carbonate 1600 milliGRAM(s) Oral three times a day with meals  traZODone 50 milliGRAM(s) Oral at bedtime    MEDICATIONS  (PRN):  acetaminophen   Tablet .. 650 milliGRAM(s) Oral every 6 hours PRN Mild Pain (1 - 3), Moderate Pain (4 - 6)  dextrose 40% Gel 15 Gram(s) Oral once PRN Blood Glucose LESS THAN 70 milliGRAM(s)/deciliter  glucagon  Injectable 1 milliGRAM(s) IntraMuscular once PRN Glucose LESS THAN 70 milligrams/deciliter                            10.2   7.70  )-----------( 184      ( 24 Nov 2019 05:48 )             32.8       11-24    137  |  101  |  49<H>  ----------------------------<  120<H>  4.7   |  28  |  4.71<H>    Ca    8.3<L>      24 Nov 2019 05:48

## 2019-11-25 NOTE — DISCHARGE NOTE PROVIDER - PROVIDER TOKENS
FREE:[LAST:[Jose],FIRST:[Hernandez],PHONE:[phone]],FAX:[(   )    -]],PROVIDER:[TOKEN:[[id number]]],PROVIDER:[TOKEN:[[id number]]] PROVIDER:[TOKEN:[[id number]]],PROVIDER:[TOKEN:[[id number]]],FREE:[LAST:[Jose],FIRST:[Hernandez],PHONE:[phone]],FAX:[(   )    -]]

## 2019-11-25 NOTE — PROGRESS NOTE ADULT - PROBLEM SELECTOR PLAN 1
hemoglobin stable at 10 after 2 units during dialysis 2 days ago  Shortness of breath is likely secondary to anemia and chronic chf and  or copd  No labs this morning, unable to be drawn. To monitor tomorrow during dialysis  -Patient refused colonoscopy

## 2019-11-25 NOTE — DISCHARGE NOTE PROVIDER - NSDCFUADDINST_GEN_ALL_CORE_FT
Follow up GI for possible causes of anemia.  Continue with dialysis as scheduled. Due to Thanksgiving holiday, your dialysis scheduled for Thursday, 11/28/2019 will occur on Friday, 11/29/2019

## 2019-11-25 NOTE — DISCHARGE NOTE PROVIDER - NSDCMRMEDTOKEN_GEN_ALL_CORE_FT
Advair Diskus 100 mcg-50 mcg inhalation powder: 1 puff(s) inhaled 2 times a day  amLODIPine 5 mg oral tablet: 1 tab(s) orally once a day  atorvastatin 10 mg oral tablet: 1 tab(s) orally once a day  Drisdol 50,000 intl units (1.25 mg) oral capsule: 1 cap(s) orally once a week  epoetin randy: 98101 unit(s) intravenous 3 times a week with dialysis  ferrous sulfate 325 mg (65 mg elemental iron) oral delayed release tablet: 1 tab(s) orally once a day  gabapentin 100 mg oral capsule: 1 cap(s) orally 3 times a day  Lasix 40 mg oral tablet: 1 tab(s) orally once a day -give on days of non HD   Levemir 100 units/mL subcutaneous solution: 40 unit(s) subcutaneous 2 times a day  lidocaine-prilocaine 2.5%-2.5% topical cream: 1 application topically   metoprolol tartrate 50 mg oral tablet: 1 tab(s) orally once a day  NovoLOG FlexPen 100 units/mL injectable solution: 8 unit(s) injectable 3 times a day  pantoprazole 40 mg oral granule, delayed release: 1 each orally once a day   timolol hemihydrate 0.5% ophthalmic solution: 1 drop(s) to each affected eye 2 times a day  tiotropium 18 mcg inhalation capsule: 1 cap(s) inhaled once a day  traZODone 50 mg oral tablet: 1 tab(s) orally once a day (at bedtime) Advair Diskus 100 mcg-50 mcg inhalation powder: 1 puff(s) inhaled 2 times a day  amLODIPine 5 mg oral tablet: 1 tab(s) orally once a day  atorvastatin 10 mg oral tablet: 1 tab(s) orally once a day  Drisdol 50,000 intl units (1.25 mg) oral capsule: 1 cap(s) orally once a week  epoetin randy: 06516 unit(s) intravenous 3 times a week with dialysis  ferrous sulfate 325 mg (65 mg elemental iron) oral delayed release tablet: 1 tab(s) orally once a day  gabapentin 100 mg oral capsule: 1 cap(s) orally 3 times a day  Lasix 40 mg oral tablet: 1 tab(s) orally once a day -give on days of non HD   Levemir 100 units/mL subcutaneous solution: 40 unit(s) subcutaneous 2 times a day  lidocaine-prilocaine 2.5%-2.5% topical cream: 1 application topically   metoprolol tartrate 50 mg oral tablet: 1 tab(s) orally once a day  NovoLOG FlexPen 100 units/mL injectable solution: 8 unit(s) injectable 3 times a day  pantoprazole 40 mg oral granule, delayed release: 1 each orally once a day   timolol hemihydrate 0.5% ophthalmic solution: 1 drop(s) to each affected eye 2 times a day  tiotropium 18 mcg inhalation capsule: 1 cap(s) inhaled once a day  traZODone 50 mg oral tablet: 1 tab(s) orally once a day (at bedtime) Advair Diskus 100 mcg-50 mcg inhalation powder: 1 puff(s) inhaled 2 times a day  amLODIPine 5 mg oral tablet: 1 tab(s) orally once a day  atorvastatin 10 mg oral tablet: 1 tab(s) orally once a day  Drisdol 50,000 intl units (1.25 mg) oral capsule: 1 cap(s) orally once a week  epoetin randy: 08565 unit(s) intravenous 3 times a week with dialysis  ferrous sulfate 325 mg (65 mg elemental iron) oral delayed release tablet: 1 tab(s) orally once a day  gabapentin 100 mg oral capsule: 1 cap(s) orally 3 times a day  Lasix 40 mg oral tablet: 1 tab(s) orally once a day -give on days of non HD   Levemir 100 units/mL subcutaneous solution: 40 unit(s) subcutaneous 2 times a day  lidocaine-prilocaine 2.5%-2.5% topical cream: 1 application topically   metoprolol tartrate 50 mg oral tablet: 1 tab(s) orally once a day  NovoLOG FlexPen 100 units/mL injectable solution: 8 unit(s) injectable 3 times a day  pantoprazole 40 mg oral granule, delayed release: 1 each orally once a day   timolol hemihydrate 0.5% ophthalmic solution: 1 drop(s) to each affected eye 2 times a day  tiotropium 18 mcg inhalation capsule: 1 cap(s) inhaled once a day  traZODone 50 mg oral tablet: 1 tab(s) orally once a day (at bedtime) Advair Diskus 100 mcg-50 mcg inhalation powder: 1 puff(s) inhaled 2 times a day  atorvastatin 10 mg oral tablet: 1 tab(s) orally once a day  Drisdol 50,000 intl units (1.25 mg) oral capsule: 1 cap(s) orally once a week  epoetin randy: 75394 unit(s) intravenous 3 times a week with dialysis  ferrous sulfate 325 mg (65 mg elemental iron) oral delayed release tablet: 1 tab(s) orally once a day  Levemir 100 units/mL subcutaneous solution: 40 unit(s) subcutaneous 2 times a day  lidocaine-prilocaine 2.5%-2.5% topical cream: 1 application topically   NovoLOG FlexPen 100 units/mL injectable solution: 8 unit(s) injectable 3 times a day  pantoprazole 40 mg oral granule, delayed release: 1 each orally once a day   timolol hemihydrate 0.5% ophthalmic solution: 1 drop(s) to each affected eye 2 times a day  tiotropium 18 mcg inhalation capsule: 1 cap(s) inhaled once a day  traZODone 50 mg oral tablet: 1 tab(s) orally once a day (at bedtime) Advair Diskus 100 mcg-50 mcg inhalation powder: 1 puff(s) inhaled 2 times a day  atorvastatin 10 mg oral tablet: 1 tab(s) orally once a day  Drisdol 50,000 intl units (1.25 mg) oral capsule: 1 cap(s) orally once a week  epoetin randy: 03091 unit(s) intravenous 3 times a week with dialysis  ferrous sulfate 325 mg (65 mg elemental iron) oral delayed release tablet: 1 tab(s) orally once a day  Levemir 100 units/mL subcutaneous solution: 15 unit(s) subcutaneous once a day   lidocaine-prilocaine 2.5%-2.5% topical cream: 1 application topically   midodrine 5 mg oral tablet: 1 tab(s) orally 3 times a day  NovoLOG FlexPen 100 units/mL injectable solution: 5 unit(s) injectable 3 times a day, WITH MEALS  pantoprazole 40 mg oral granule, delayed release: 1 each orally once a day   sevelamer carbonate 800 mg oral tablet: 2 tab(s) orally 3 times a day (with meals)  timolol hemihydrate 0.5% ophthalmic solution: 1 drop(s) to each affected eye 2 times a day  tiotropium 18 mcg inhalation capsule: 1 cap(s) inhaled once a day  traZODone 50 mg oral tablet: 1 tab(s) orally once a day (at bedtime) Advair Diskus 100 mcg-50 mcg inhalation powder: 1 puff(s) inhaled 2 times a day  atorvastatin 10 mg oral tablet: 1 tab(s) orally once a day  Drisdol 50,000 intl units (1.25 mg) oral capsule: 1 cap(s) orally once a week  epoetin randy: 25642 unit(s) intravenous 3 times a week with dialysis  ferrous sulfate 325 mg (65 mg elemental iron) oral delayed release tablet: 1 tab(s) orally once a day  Levemir 100 units/mL subcutaneous solution: 15 unit(s) subcutaneous once a day   lidocaine-prilocaine 2.5%-2.5% topical cream: 1 application topically   midodrine 5 mg oral tablet: 1 tab(s) orally 3 times a day  NovoLOG FlexPen 100 units/mL injectable solution: 5 unit(s) injectable 3 times a day, WITH MEALS  pantoprazole 40 mg oral granule, delayed release: 1 each orally once a day   sevelamer carbonate 800 mg oral tablet: 2 tab(s) orally 3 times a day (with meals)  timolol hemihydrate 0.5% ophthalmic solution: 1 drop(s) to each affected eye 2 times a day  tiotropium 18 mcg inhalation capsule: 1 cap(s) inhaled once a day  traZODone 50 mg oral tablet: 1 tab(s) orally once a day (at bedtime) Advair Diskus 100 mcg-50 mcg inhalation powder: 1 puff(s) inhaled 2 times a day  atorvastatin 10 mg oral tablet: 1 tab(s) orally once a day  doxycycline hyclate 100 mg oral capsule: 1 cap(s) orally 2 times a day   Drisdol 50,000 intl units (1.25 mg) oral capsule: 1 cap(s) orally once a week  epoetin randy: 53504 unit(s) intravenous 3 times a week with dialysis  ferrous sulfate 325 mg (65 mg elemental iron) oral delayed release tablet: 1 tab(s) orally once a day  Levemir 100 units/mL subcutaneous solution: 15 unit(s) subcutaneous once a day   lidocaine-prilocaine 2.5%-2.5% topical cream: 1 application topically   midodrine 5 mg oral tablet: 1 tab(s) orally 3 times a day  NovoLOG FlexPen 100 units/mL injectable solution: 5 unit(s) injectable 3 times a day, WITH MEALS  pantoprazole 40 mg oral granule, delayed release: 1 each orally once a day   sevelamer carbonate 800 mg oral tablet: 2 tab(s) orally 3 times a day (with meals)  timolol hemihydrate 0.5% ophthalmic solution: 1 drop(s) to each affected eye 2 times a day  tiotropium 18 mcg inhalation capsule: 1 cap(s) inhaled once a day  traZODone 50 mg oral tablet: 1 tab(s) orally once a day (at bedtime) Advair Diskus 100 mcg-50 mcg inhalation powder: 1 puff(s) inhaled 2 times a day  atorvastatin 10 mg oral tablet: 1 tab(s) orally once a day  Betadine 10% topical solution: Apply topically to affected area once a day   doxycycline hyclate 100 mg oral capsule: 1 cap(s) orally 2 times a day   Drisdol 50,000 intl units (1.25 mg) oral capsule: 1 cap(s) orally once a week  epoetin randy: 74414 unit(s) intravenous 3 times a week with dialysis  ferrous sulfate 325 mg (65 mg elemental iron) oral delayed release tablet: 1 tab(s) orally once a day  Levemir 100 units/mL subcutaneous solution: 15 unit(s) subcutaneous once a day   lidocaine-prilocaine 2.5%-2.5% topical cream: 1 application topically   midodrine 5 mg oral tablet: 1 tab(s) orally 3 times a day  NovoLOG FlexPen 100 units/mL injectable solution: 5 unit(s) injectable 3 times a day, WITH MEALS  pantoprazole 40 mg oral granule, delayed release: 1 each orally once a day   sevelamer carbonate 800 mg oral tablet: 2 tab(s) orally 3 times a day (with meals)  timolol hemihydrate 0.5% ophthalmic solution: 1 drop(s) to each affected eye 2 times a day  tiotropium 18 mcg inhalation capsule: 1 cap(s) inhaled once a day  traZODone 50 mg oral tablet: 1 tab(s) orally once a day (at bedtime) Advair Diskus 100 mcg-50 mcg inhalation powder: 1 puff(s) inhaled 2 times a day  atorvastatin 10 mg oral tablet: 1 tab(s) orally once a day  Betadine 10% topical solution: Apply topically to affected area once a day   doxycycline hyclate 100 mg oral capsule: 1 cap(s) orally 2 times a day   Drisdol 50,000 intl units (1.25 mg) oral capsule: 1 cap(s) orally once a week  epoetin randy: 50861 unit(s) intravenous 3 times a week with dialysis  ferrous sulfate 325 mg (65 mg elemental iron) oral delayed release tablet: 1 tab(s) orally once a day  Levemir 100 units/mL subcutaneous solution: 15 unit(s) subcutaneous once a day   lidocaine-prilocaine 2.5%-2.5% topical cream: 1 application topically   midodrine 5 mg oral tablet: 1 tab(s) orally 3 times a day  NovoLOG FlexPen 100 units/mL injectable solution: 5 unit(s) injectable 3 times a day, WITH MEALS  pantoprazole 40 mg oral granule, delayed release: 1 each orally once a day   sevelamer carbonate 800 mg oral tablet: 2 tab(s) orally 3 times a day (with meals)  timolol hemihydrate 0.5% ophthalmic solution: 1 drop(s) to each affected eye 2 times a day  tiotropium 18 mcg inhalation capsule: 1 cap(s) inhaled once a day  traZODone 50 mg oral tablet: 1 tab(s) orally once a day (at bedtime) Advair Diskus 100 mcg-50 mcg inhalation powder: 1 puff(s) inhaled 2 times a day  atorvastatin 10 mg oral tablet: 1 tab(s) orally once a day  Betadine 10% topical solution: Apply topically to affected area once a day   doxycycline hyclate 100 mg oral capsule: 1 cap(s) orally 2 times a day   Drisdol 50,000 intl units (1.25 mg) oral capsule: 1 cap(s) orally once a week  epoetin randy: 46204 unit(s) intravenous 3 times a week with dialysis  ferrous sulfate 325 mg (65 mg elemental iron) oral delayed release tablet: 1 tab(s) orally once a day  Levemir 100 units/mL subcutaneous solution: 15 unit(s) subcutaneous once a day   lidocaine-prilocaine 2.5%-2.5% topical cream: 1 application topically   midodrine 5 mg oral tablet: 1 tab(s) orally 3 times a day  NovoLOG FlexPen 100 units/mL injectable solution: 5 unit(s) injectable 3 times a day, WITH MEALS  pantoprazole 40 mg oral granule, delayed release: 1 each orally once a day   sevelamer carbonate 800 mg oral tablet: 2 tab(s) orally 3 times a day (with meals)  timolol hemihydrate 0.5% ophthalmic solution: 1 drop(s) to each affected eye 2 times a day  tiotropium 18 mcg inhalation capsule: 1 cap(s) inhaled once a day  traZODone 50 mg oral tablet: 1 tab(s) orally once a day (at bedtime)

## 2019-11-25 NOTE — PROGRESS NOTE ADULT - ASSESSMENT
ESRD , dialysis days TTS.  Anemia with no response to increased JACKELINE , post 2 units of PRBC and continue Epogen.  She is refusing GI work up.   Hypotension patient is off diuretics and hypotensive meds.  BP is taken in lower extremities, on Midodrine.  Pulmonary hypertension and diastolic dysfunction, follow with cardiology , need for repeat Echocardiogram.    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  Next dialysis tomorrow.

## 2019-11-25 NOTE — PROGRESS NOTE ADULT - ASSESSMENT
1. Anemia  2. Acute drop in H/H  3. Rectal bleeding       Suggestions:    1. Monitor H/H  2. Transfuse PRBC as needed  3. Protonix daily  4. Avoid NSAID  5. Colonoscopy   6. CT-Scan of abdomen and pelvis  7. DVT prophylaxis 1. Anemia  2. Acute drop in H/H  3. Rectal bleeding       Suggestions:    1. Monitor H/H  2. Transfuse PRBC as needed  3. Protonix daily  4. Avoid NSAID  5. Colonoscopy (pt refusing)  6. CT-Scan of abdomen and pelvis  7. DVT prophylaxis

## 2019-11-25 NOTE — PROGRESS NOTE ADULT - SUBJECTIVE AND OBJECTIVE BOX
PGY 1 Note discussed with supervising resident and primary attending    Patient is a 79y old  Female who presents with a chief complaint of SOB (25 Nov 2019 15:41)      INTERVAL HPI/OVERNIGHT EVENTS: offers no new complaints; current symptoms resolving. No complaints this AM. Denies SOB, however patient noted to get SOB after speak 3-4 words. Patient has hx of COPD, on 3l NC.     MEDICATIONS  (STANDING):  atorvastatin 10 milliGRAM(s) Oral at bedtime  budesonide  80 MICROgram(s)/formoterol 4.5 MICROgram(s) Inhaler 2 Puff(s) Inhalation two times a day  dextrose 5%. 1000 milliLiter(s) (50 mL/Hr) IV Continuous <Continuous>  dextrose 50% Injectable 12.5 Gram(s) IV Push once  dextrose 50% Injectable 25 Gram(s) IV Push once  dextrose 50% Injectable 25 Gram(s) IV Push once  epoetin randy Injectable 92534 Unit(s) IV Push <User Schedule>  ferrous    sulfate 325 milliGRAM(s) Oral daily  heparin  Injectable 5000 Unit(s) SubCutaneous every 12 hours  insulin glargine Injectable (LANTUS) 15 Unit(s) SubCutaneous at bedtime  insulin lispro (HumaLOG) corrective regimen sliding scale   SubCutaneous three times a day before meals  insulin lispro Injectable (HumaLOG) 5 Unit(s) SubCutaneous three times a day before meals  midodrine. 5 milliGRAM(s) Oral three times a day  pantoprazole    Tablet 40 milliGRAM(s) Oral before breakfast  sevelamer carbonate 1600 milliGRAM(s) Oral three times a day with meals  traZODone 50 milliGRAM(s) Oral at bedtime    MEDICATIONS  (PRN):  acetaminophen   Tablet .. 650 milliGRAM(s) Oral every 6 hours PRN Mild Pain (1 - 3), Moderate Pain (4 - 6)  dextrose 40% Gel 15 Gram(s) Oral once PRN Blood Glucose LESS THAN 70 milliGRAM(s)/deciliter  glucagon  Injectable 1 milliGRAM(s) IntraMuscular once PRN Glucose LESS THAN 70 milligrams/deciliter      __________________________________________________  REVIEW OF SYSTEMS:    CONSTITUTIONAL: No fever, chills +weakness  RESPIRATORY: No cough; No shortness of breath  CARDIOVASCULAR: No chest pain, no palpitations  GASTROINTESTINAL: No pain. No nausea or vomiting; No diarrhea     ALL OTHER  ROS negative        Vital Signs Last 24 Hrs  T(C): 36.7 (25 Nov 2019 15:41), Max: 36.8 (25 Nov 2019 07:44)  T(F): 98.1 (25 Nov 2019 15:41), Max: 98.2 (25 Nov 2019 07:44)  HR: 70 (25 Nov 2019 15:41) (63 - 89)  BP: 102/56 (25 Nov 2019 15:41) (95/51 - 152/88)  BP(mean): --  RR: 18 (25 Nov 2019 15:41) (18 - 18)  SpO2: 100% (25 Nov 2019 15:41) (94% - 100%)    ________________________________________________  PHYSICAL EXAM:  GENERAL: NAD, appears sob on nc  HEENT: Normocephalic;  conjunctivae and sclerae clear; moist mucous membranes;   CHEST/LUNG: Clear to auscultation bilaterally with good air entry, decreased breath sounds  HEART: S1 S2  regular; no murmurs, gallops or rubs  ABDOMEN: Soft, Nontender, Nondistended, obese; Bowel sounds present  EXTREMITIES: no cyanosis; no edema; no calf tenderness  SKIN: warm and dry; no rash  NERVOUS SYSTEM:  Awake and alert; Oriented  to place, person and time ; no new deficits    _________________________________________________  LABS:                        10.2   7.70  )-----------( 184      ( 24 Nov 2019 05:48 )             32.8     11-24    137  |  101  |  49<H>  ----------------------------<  120<H>  4.7   |  28  |  4.71<H>    Ca    8.3<L>      24 Nov 2019 05:48          CAPILLARY BLOOD GLUCOSE      POCT Blood Glucose.: 118 mg/dL (25 Nov 2019 11:50)  POCT Blood Glucose.: 113 mg/dL (25 Nov 2019 08:02)  POCT Blood Glucose.: 197 mg/dL (24 Nov 2019 22:21)  POCT Blood Glucose.: 69 mg/dL (24 Nov 2019 16:43)        RADIOLOGY & ADDITIONAL TESTS:    Imaging Personally Reviewed:  YES/NO    Consultant(s) Notes Reviewed:   YES/ No    Care Discussed with Consultants :     Plan of care was discussed with patient and /or primary care giver; all questions and concerns were addressed and care was aligned with patient's wishes.

## 2019-11-25 NOTE — PROGRESS NOTE ADULT - NEGATIVE ENMT SYMPTOMS
no hearing difficulty/no dry mouth/no ear pain/no vertigo/no gum bleeding/no throat pain/no dysphagia

## 2019-11-25 NOTE — DISCHARGE NOTE PROVIDER - NSDCCPCAREPLAN_GEN_ALL_CORE_FT
PRINCIPAL DISCHARGE DIAGNOSIS  Diagnosis: Severe anemia  Assessment and Plan of Treatment: You presented with symptoms of anemia. Your hemoglobin was low and we replaced with 2 units of pRBC during dialysis and your hemoglobin stabilized. GI was consulted regarding colonoscopy for a suspected GI bleed, however you refused. Please continue with dialysis as scheduled. Follow up with outpatient GI for further workup of anemia. Please follow up with your primary care physician in one week to inform them of your recent hospitalization.      SECONDARY DISCHARGE DIAGNOSES  Diagnosis: Hypertension  Assessment and Plan of Treatment: You have a history of hypertension, however your blood pressure was low during dialysis requiring medication for blood pressure support. At the moment, we have discontinued your blood pressure medications and your blood pressures are stable. Please follow up with outpatient provider to determine the blood pressure medications you should take. Please follow up with your primary care physician in one week to inform them of your recent hospitalization.    Diagnosis: ESRD (end stage renal disease) on dialysis  Assessment and Plan of Treatment: You have a history of ESRD on dialysis TTS. Your symptoms on presentation may be attributed to your missed dialysis and history of ESRD. Please continue with hemodialysis as scheduled. It is imperative that you do not miss any sessions. Continue with medications as prescribed by outpatient provider. Please follow up with your primary care physician in one week to inform them of your recent hospitalization.    Diagnosis: Elevated troponin  Assessment and Plan of Treatment:     Diagnosis: Generalized weakness  Assessment and Plan of Treatment: PRINCIPAL DISCHARGE DIAGNOSIS  Diagnosis: Severe anemia  Assessment and Plan of Treatment: You presented with symptoms of anemia. Your hemoglobin was low and we replaced with 2 units of pRBC during dialysis and your hemoglobin stabilized. GI was consulted regarding colonoscopy for a suspected GI bleed, however you refused. Please continue with dialysis as scheduled. Follow up with outpatient GI for further workup of anemia. Please follow up with your primary care physician in one week to inform them of your recent hospitalization.      SECONDARY DISCHARGE DIAGNOSES  Diagnosis: Hypertension  Assessment and Plan of Treatment: You have a history of hypertension, however your blood pressure was low during dialysis requiring medication for blood pressure support. At the moment, we have discontinued your blood pressure medications and started you on midodrine for blood pressure support. Please follow up with outpatient providers to monitor your blood pressure. Please follow up with your primary care physician in one week to inform them of your recent hospitalization.    Diagnosis: ESRD (end stage renal disease) on dialysis  Assessment and Plan of Treatment: You have a history of ESRD on dialysis TTS. Your symptoms on presentation may be attributed to your missed dialysis and history of ESRD. Please continue with hemodialysis as scheduled. It is imperative that you do not miss any sessions. Continue with medications as prescribed by outpatient provider. Please follow up with your primary care physician in one week to inform them of your recent hospitalization.    Diagnosis: COPD (chronic obstructive pulmonary disease)  Assessment and Plan of Treatment: You have a history of COPD, currently not in any exacerbation. Continue with your inhalers as prescribed by your outpatient providers. Please follow up with your primary care physician in one week to inform them of your recent hospitalization. PRINCIPAL DISCHARGE DIAGNOSIS  Diagnosis: Severe anemia  Assessment and Plan of Treatment: You presented with symptoms of anemia. Your hemoglobin was low and we replaced with 2 units of pRBC during dialysis and your hemoglobin stabilized. GI was consulted regarding colonoscopy for a suspected GI bleed, however you refused. Please continue with dialysis as scheduled. Follow up with outpatient GI for further workup of anemia. Please follow up with your primary care physician in one week to inform them of your recent hospitalization.      SECONDARY DISCHARGE DIAGNOSES  Diagnosis: Hypotension  Assessment and Plan of Treatment: During your hospitalization you had episodes of hypotension. Please discontinue all blood pressure medications. Continue with midodrine for blood pressure support. Follow up with your primary care physician in one week to continue to monitor blood pressure to determine if it is stable.    Diagnosis: Diabetes mellitus  Assessment and Plan of Treatment: Your A1C on admission was 7.7. We had to decrease your insulin doses due to low blood sugars. Please continue with insulin as instructed in the discharge paper work. You must follow up with podiatry and a eye doctor at least once a year. Maintain a diet low in sweets and carbohydrates. Please follow up with your primary care physician in one week to inform them of your recent hospitalization.    Diagnosis: Hypertension  Assessment and Plan of Treatment: You have a history of hypertension, however your blood pressure was low during dialysis requiring medication for blood pressure support. At the moment, we have discontinued your blood pressure medications and started you on midodrine for blood pressure support. Please follow up with outpatient providers to monitor your blood pressure. Please follow up with your primary care physician in one week to inform them of your recent hospitalization.    Diagnosis: ESRD (end stage renal disease) on dialysis  Assessment and Plan of Treatment: You have a history of ESRD on dialysis TTS. Your symptoms on presentation may be attributed to your missed dialysis and history of ESRD. Please continue with hemodialysis as scheduled. It is imperative that you do not miss any sessions. Continue with medications as prescribed by outpatient provider. Please follow up with your primary care physician in one week to inform them of your recent hospitalization.    Diagnosis: COPD (chronic obstructive pulmonary disease)  Assessment and Plan of Treatment: You have a history of COPD, currently not in any exacerbation. Continue with home oxygen. Continue with your inhalers as prescribed by your outpatient providers. Please follow up with your primary care physician in one week to inform them of your recent hospitalization. PRINCIPAL DISCHARGE DIAGNOSIS  Diagnosis: Severe anemia  Assessment and Plan of Treatment: You presented with symptoms of anemia. Your hemoglobin was low and we replaced with 2 units of pRBC during dialysis and your hemoglobin stabilized. GI was consulted regarding colonoscopy for a suspected GI bleed, however you refused. Please continue with dialysis as scheduled. Follow up with outpatient GI for further workup of anemia. Please follow up with your primary care physician in one week to inform them of your recent hospitalization.      SECONDARY DISCHARGE DIAGNOSES  Diagnosis: Hypotension  Assessment and Plan of Treatment: During your hospitalization you had episodes of hypotension. Please discontinue all blood pressure medications. Continue with midodrine for blood pressure support. Follow up with your primary care physician in one week to continue to monitor blood pressure to determine if it is stable.    Diagnosis: Diabetes mellitus  Assessment and Plan of Treatment: Your A1C on admission was 7.7. We had to decrease your insulin doses due to low blood sugars. Please continue with insulin as instructed in the discharge paper work. You must follow up with podiatry and a eye doctor at least once a year. Maintain a diet low in sweets and carbohydrates. Please follow up with your primary care physician in one week to inform them of your recent hospitalization.    Diagnosis: COPD (chronic obstructive pulmonary disease)  Assessment and Plan of Treatment: You have a history of COPD, currently not in any exacerbation. Continue with home oxygen. Continue with your inhalers as prescribed by your outpatient providers. Please follow up with your primary care physician in one week to inform them of your recent hospitalization.    Diagnosis: Cellulitis of right foot  Assessment and Plan of Treatment: Take all of your antibiotics as ordered - doxycyline 100mg two times dialy x 4 more days   Call your Health Care Provider within two days of arriving home to make a follow up appointment within one week.  follow up with podiatry next week   If the affected cellulitic area increases in redness, warmth, pain or swelling call your Health Care Provider.  If you develop fever, chills, and/or malaise, call your Health Care Provider.      Diagnosis: Hypertension  Assessment and Plan of Treatment: You have a history of hypertension, however your blood pressure was low during dialysis requiring medication for blood pressure support. At the moment, we have discontinued your blood pressure medications and started you on midodrine for blood pressure support. Please follow up with outpatient providers to monitor your blood pressure. Please follow up with your primary care physician in one week to inform them of your recent hospitalization.    Diagnosis: ESRD (end stage renal disease) on dialysis  Assessment and Plan of Treatment: You have a history of ESRD on dialysis TTS. Your symptoms on presentation may be attributed to your missed dialysis and history of ESRD. Please continue with hemodialysis as scheduled. It is imperative that you do not miss any sessions. Continue with medications as prescribed by outpatient provider. Please follow up with your primary care physician in one week to inform them of your recent hospitalization.

## 2019-11-25 NOTE — DISCHARGE NOTE PROVIDER - CARE PROVIDER_API CALL
Hernandez Garcia  Phone: [phone]  Fax: (   )    -  Follow Up Time:     Kyler Jaimes)  Cardiovascular Disease  [facility] [place] [address]  Phone: [phone]  Fax: [phone]  Follow Up Time:     Hany [place] [address]  Phone: [phone]  Fax: [phone]  Follow Up Time: Kyler Jaimes)  Cardiovascular Disease  [address] [facility], [address]  [place] [address]  Phone: [phone]  Fax: [phone]  Follow Up Time:     Hany Keating)  Medicine  [address] [place] [address]  Phone: [phone]  Fax: [phone]  Follow Up Time:     Hernandez Garcia  Phone: [phone]  Fax: (   )    -  Follow Up Time:

## 2019-11-25 NOTE — PROGRESS NOTE ADULT - SUBJECTIVE AND OBJECTIVE BOX
Subjective: pt seen and examined, no complaints, ROS - .     acetaminophen   Tablet .. 650 milliGRAM(s) Oral every 6 hours PRN  atorvastatin 10 milliGRAM(s) Oral at bedtime  budesonide  80 MICROgram(s)/formoterol 4.5 MICROgram(s) Inhaler 2 Puff(s) Inhalation two times a day  dextrose 40% Gel 15 Gram(s) Oral once PRN  dextrose 5%. 1000 milliLiter(s) IV Continuous <Continuous>  dextrose 50% Injectable 12.5 Gram(s) IV Push once  dextrose 50% Injectable 25 Gram(s) IV Push once  dextrose 50% Injectable 25 Gram(s) IV Push once  epoetin randy Injectable 96317 Unit(s) IV Push <User Schedule>  ferrous    sulfate 325 milliGRAM(s) Oral daily  glucagon  Injectable 1 milliGRAM(s) IntraMuscular once PRN  heparin  Injectable 5000 Unit(s) SubCutaneous every 12 hours  insulin glargine Injectable (LANTUS) 15 Unit(s) SubCutaneous at bedtime  insulin lispro (HumaLOG) corrective regimen sliding scale   SubCutaneous three times a day before meals  insulin lispro Injectable (HumaLOG) 5 Unit(s) SubCutaneous three times a day before meals  midodrine. 5 milliGRAM(s) Oral three times a day  pantoprazole    Tablet 40 milliGRAM(s) Oral before breakfast  sevelamer carbonate 1600 milliGRAM(s) Oral three times a day with meals  traZODone 50 milliGRAM(s) Oral at bedtime                            10.2   7.70  )-----------( 184      ( 24 Nov 2019 05:48 )             32.8       Hemoglobin: 10.2 g/dL (11-24 @ 05:48)  Hemoglobin: 10.2 g/dL (11-23 @ 22:04)  Hemoglobin: 10.4 g/dL (11-23 @ 17:29)  Hemoglobin: 7.4 g/dL (11-23 @ 10:36)  Hemoglobin: 7.5 g/dL (11-22 @ 12:17)      11-24    137  |  101  |  49<H>  ----------------------------<  120<H>  4.7   |  28  |  4.71<H>    Ca    8.3<L>      24 Nov 2019 05:48      Creatinine Trend: 4.71<--, 4.34<--, 6.50<--, 5.81<--    COAGS:     CARDIAC MARKERS ( 23 Nov 2019 00:42 )  0.095 ng/mL / x     / 59 U/L / x     / 2.8 ng/mL        T(C): 36.7 (11-25-19 @ 11:03), Max: 36.8 (11-25-19 @ 07:44)  HR: 89 (11-25-19 @ 11:03) (63 - 89)  BP: 137/78 (11-25-19 @ 11:03) (90/52 - 152/88)  RR: 18 (11-25-19 @ 11:03) (18 - 18)  SpO2: 94% (11-25-19 @ 11:03) (94% - 100%)  Wt(kg): --    I&O's Summary    Appearance: Normal	  HEENT:   Normal oral mucosa, PERRL, EOMI	  Lymphatic: No lymphadenopathy , no edema  Cardiovascular: Normal S1 S2, No JVD, No murmurs , Peripheral pulses palpable 2+ bilaterally  Respiratory: Lungs clear to auscultation, normal effort 	  Gastrointestinal:  Soft, Non-tender, + BS	  Skin: No rashes, No ecchymoses, No cyanosis, warm to touch  Musculoskeletal: Normal range of motion, normal strength  Psychiatry:  Mood & affect appropriate    TELEMETRY: 	  NSR        ASSESSMENT/PLAN: 	79y Female female hx of ESRD on HD, HTN, ANemia, copd , chf, PAF, no A/C documented , now with anemia, SOB , + troponin on admission labs     - Off AV hattie blockers  -  tele without pertinent findings  - HD per renal    Kyler Jaimes MD, [facility]  BEEPER [phone]

## 2019-11-25 NOTE — PROGRESS NOTE ADULT - PROBLEM SELECTOR PLAN 2
Dialysis as schedueld TTS, however due to upcoming holiday, patient's scheduled may be off.  Patient's BP meds and diuretics held due to hypotension  Plan for dialysis tomorrow and dc if stable

## 2019-11-25 NOTE — DISCHARGE NOTE PROVIDER - NSFOLLOWUPCLINICS_GEN_ALL_ED_FT
[facility]ll[facility]  Podiatry/Wound Care  [address] [place] [address]  Phone: [phone]  Fax: [phone]  Follow Up Time: 1 week

## 2019-11-25 NOTE — PROGRESS NOTE ADULT - SUBJECTIVE AND OBJECTIVE BOX
Problem List:  ESRD - DKD and Hypertension  Anemia etiology not clear  Refusing Colonoscopy  CHF on admission and anemia associated with weakness  Hypotension and bradycardia due to medications resolving  COPD  Pulmonary hypertension  Breast cancer removed    Patient feels better today  More awake  Appetite improved and started to eat yesterday after noon.  SOB under exertion in need for O2,     PAST MEDICAL & SURGICAL HISTORY:  Chronic CHF  MS (mitral stenosis)  GERD (gastroesophageal reflux disease)  COPD (chronic obstructive pulmonary disease): On home oxygen  HLD (hyperlipidemia): not taking statins  HTN (hypertension)  ESRD (end stage renal disease) on dialysis  Chronic diastolic (congestive) heart failure  Anemia of chronic disease  Breast carcinoma: Lt side s/p lumpectomy and radiation in 2004  DM (diabetes mellitus): type 2  S/P subtotal hysterectomy  S/P lumpectomy, left breast: 2004      No Known Allergies      MEDICATIONS  (STANDING):  atorvastatin 10 milliGRAM(s) Oral at bedtime  budesonide  80 MICROgram(s)/formoterol 4.5 MICROgram(s) Inhaler 2 Puff(s) Inhalation two times a day  dextrose 5%. 1000 milliLiter(s) (50 mL/Hr) IV Continuous <Continuous>  dextrose 50% Injectable 12.5 Gram(s) IV Push once  dextrose 50% Injectable 25 Gram(s) IV Push once  dextrose 50% Injectable 25 Gram(s) IV Push once  epoetin randy Injectable 87770 Unit(s) IV Push <User Schedule>  ferrous    sulfate 325 milliGRAM(s) Oral daily  heparin  Injectable 5000 Unit(s) SubCutaneous every 12 hours  insulin glargine Injectable (LANTUS) 15 Unit(s) SubCutaneous at bedtime  insulin lispro (HumaLOG) corrective regimen sliding scale   SubCutaneous three times a day before meals  insulin lispro Injectable (HumaLOG) 5 Unit(s) SubCutaneous three times a day before meals  midodrine. 5 milliGRAM(s) Oral three times a day  pantoprazole   Suspension 40 milliGRAM(s) Oral before breakfast  sevelamer carbonate 1600 milliGRAM(s) Oral three times a day with meals  traZODone 50 milliGRAM(s) Oral at bedtime    MEDICATIONS  (PRN):  acetaminophen   Tablet .. 650 milliGRAM(s) Oral every 6 hours PRN Mild Pain (1 - 3), Moderate Pain (4 - 6)  dextrose 40% Gel 15 Gram(s) Oral once PRN Blood Glucose LESS THAN 70 milliGRAM(s)/deciliter  glucagon  Injectable 1 milliGRAM(s) IntraMuscular once PRN Glucose LESS THAN 70 milligrams/deciliter                            10.2   7.70  )-----------( 184      ( 24 Nov 2019 05:48 )             32.8     11-24    137  |  101  |  49<H>  ----------------------------<  120<H>  4.7   |  28  |  4.71<H>    Ca    8.3<L>      24 Nov 2019 05:48  Phos  7.9     11-23  Mg     2.7     11-23              REVIEW OF SYSTEMS:  General: no fever no chills, no weight loss.  EYES/ENT: No visual changes;  No vertigo, no headache.  NECK: No pain or stiffness  RESPIRATORY: No cough, wheezing, hemoptysis; No shortness of breath  CARDIOVASCULAR: No chest pain or palpitations. No Edema  GASTROINTESTINAL: No abdominal or epigastric pain. No nausea, vomiting. No diarrhea or constipation. No melena.  GENITOURINARY: No dysuria, frequency, foamy urine, urinary urgency, incontinence or hematuria  NEUROLOGICAL: No numbness or weakness, no tremor , no dizziness.   Muscle skeletal : no joint pain and no swelling of joints and limbs.  SKIN: No itching, burning, rashes.        VITALS:  T(F): 98.1 (11-25-19 @ 05:15), Max: 98.1 (11-24-19 @ 12:59)  HR: 69 (11-25-19 @ 05:46)  BP: 127/41 (11-25-19 @ 05:46)  RR: 18 (11-25-19 @ 05:15)  SpO2: 95% (11-25-19 @ 05:15)  Wt(kg): --      PHYSICAL EXAM:  Constitutional:  no diaphoresis, no distress.  Neck: No JVD, no carotid bruit, supple, no adenopathy  Respiratory: mild reduced air entrance B/L, no wheezes, rales or rhonchi, prolonged expiratory phase  Cardiovascular: S1, S2, RRR, no pericardial rub, no murmur  Abdomen: BS+, soft, no tenderness, no bruit  Pelvis: bladder nondistended  Extremities: No peripheral edema.   Neurological: A/O x 3, no focal deficits  Psychiatric: Normal mood, normal affect  Vascular Access: right AVF with bruit and thrill

## 2019-11-26 DIAGNOSIS — R79.89 OTHER SPECIFIED ABNORMAL FINDINGS OF BLOOD CHEMISTRY: ICD-10-CM

## 2019-11-26 LAB
ANION GAP SERPL CALC-SCNC: 8 MMOL/L — SIGNIFICANT CHANGE UP (ref 5–17)
BUN SERPL-MCNC: 67 MG/DL — HIGH (ref 7–18)
CALCIUM SERPL-MCNC: 8.3 MG/DL — LOW (ref 8.4–10.5)
CHLORIDE SERPL-SCNC: 102 MMOL/L — SIGNIFICANT CHANGE UP (ref 96–108)
CO2 SERPL-SCNC: 26 MMOL/L — SIGNIFICANT CHANGE UP (ref 22–31)
CREAT SERPL-MCNC: 5.8 MG/DL — HIGH (ref 0.5–1.3)
GLUCOSE BLDC GLUCOMTR-MCNC: 111 MG/DL — HIGH (ref 70–99)
GLUCOSE BLDC GLUCOMTR-MCNC: 115 MG/DL — HIGH (ref 70–99)
GLUCOSE BLDC GLUCOMTR-MCNC: 120 MG/DL — HIGH (ref 70–99)
GLUCOSE BLDC GLUCOMTR-MCNC: 121 MG/DL — HIGH (ref 70–99)
GLUCOSE BLDC GLUCOMTR-MCNC: 134 MG/DL — HIGH (ref 70–99)
GLUCOSE BLDC GLUCOMTR-MCNC: 139 MG/DL — HIGH (ref 70–99)
GLUCOSE BLDC GLUCOMTR-MCNC: 94 MG/DL — SIGNIFICANT CHANGE UP (ref 70–99)
GLUCOSE SERPL-MCNC: 140 MG/DL — HIGH (ref 70–99)
HCT VFR BLD CALC: 30.3 % — LOW (ref 34.5–45)
HCT VFR BLD CALC: 31.3 % — LOW (ref 34.5–45)
HGB BLD-MCNC: 9.1 G/DL — LOW (ref 11.5–15.5)
HGB BLD-MCNC: 9.6 G/DL — LOW (ref 11.5–15.5)
LACTATE SERPL-SCNC: 0.8 MMOL/L — SIGNIFICANT CHANGE UP (ref 0.7–2)
MCHC RBC-ENTMCNC: 29.3 PG — SIGNIFICANT CHANGE UP (ref 27–34)
MCHC RBC-ENTMCNC: 29.5 PG — SIGNIFICANT CHANGE UP (ref 27–34)
MCHC RBC-ENTMCNC: 30 GM/DL — LOW (ref 32–36)
MCHC RBC-ENTMCNC: 30.7 GM/DL — LOW (ref 32–36)
MCV RBC AUTO: 96.3 FL — SIGNIFICANT CHANGE UP (ref 80–100)
MCV RBC AUTO: 97.4 FL — SIGNIFICANT CHANGE UP (ref 80–100)
NRBC # BLD: 0 /100 WBCS — SIGNIFICANT CHANGE UP (ref 0–0)
NRBC # BLD: 0 /100 WBCS — SIGNIFICANT CHANGE UP (ref 0–0)
PLATELET # BLD AUTO: 168 K/UL — SIGNIFICANT CHANGE UP (ref 150–400)
PLATELET # BLD AUTO: 178 K/UL — SIGNIFICANT CHANGE UP (ref 150–400)
POTASSIUM SERPL-MCNC: 5 MMOL/L — SIGNIFICANT CHANGE UP (ref 3.5–5.3)
POTASSIUM SERPL-SCNC: 5 MMOL/L — SIGNIFICANT CHANGE UP (ref 3.5–5.3)
RBC # BLD: 3.11 M/UL — LOW (ref 3.8–5.2)
RBC # BLD: 3.25 M/UL — LOW (ref 3.8–5.2)
RBC # FLD: 16.7 % — HIGH (ref 10.3–14.5)
RBC # FLD: 16.9 % — HIGH (ref 10.3–14.5)
SODIUM SERPL-SCNC: 136 MMOL/L — SIGNIFICANT CHANGE UP (ref 135–145)
WBC # BLD: 10.21 K/UL — SIGNIFICANT CHANGE UP (ref 3.8–10.5)
WBC # BLD: 10.31 K/UL — SIGNIFICANT CHANGE UP (ref 3.8–10.5)
WBC # FLD AUTO: 10.21 K/UL — SIGNIFICANT CHANGE UP (ref 3.8–10.5)
WBC # FLD AUTO: 10.31 K/UL — SIGNIFICANT CHANGE UP (ref 3.8–10.5)

## 2019-11-26 PROCEDURE — 71045 X-RAY EXAM CHEST 1 VIEW: CPT | Mod: 26

## 2019-11-26 RX ORDER — AMLODIPINE BESYLATE 2.5 MG/1
1 TABLET ORAL
Qty: 0 | Refills: 0 | DISCHARGE

## 2019-11-26 RX ORDER — IPRATROPIUM/ALBUTEROL SULFATE 18-103MCG
3 AEROSOL WITH ADAPTER (GRAM) INHALATION EVERY 6 HOURS
Refills: 0 | Status: DISCONTINUED | OUTPATIENT
Start: 2019-11-26 | End: 2019-11-29

## 2019-11-26 RX ORDER — SEVELAMER CARBONATE 2400 MG/1
2 POWDER, FOR SUSPENSION ORAL
Qty: 84 | Refills: 0
Start: 2019-11-26 | End: 2019-12-09

## 2019-11-26 RX ORDER — INSULIN DETEMIR 100/ML (3)
40 INSULIN PEN (ML) SUBCUTANEOUS
Qty: 0 | Refills: 0 | DISCHARGE

## 2019-11-26 RX ORDER — PIPERACILLIN AND TAZOBACTAM 4; .5 G/20ML; G/20ML
3.38 INJECTION, POWDER, LYOPHILIZED, FOR SOLUTION INTRAVENOUS EVERY 12 HOURS
Refills: 0 | Status: DISCONTINUED | OUTPATIENT
Start: 2019-11-26 | End: 2019-12-02

## 2019-11-26 RX ORDER — INSULIN ASPART 100 [IU]/ML
8 INJECTION, SOLUTION SUBCUTANEOUS
Qty: 0 | Refills: 0 | DISCHARGE

## 2019-11-26 RX ORDER — INSULIN DETEMIR 100/ML (3)
15 INSULIN PEN (ML) SUBCUTANEOUS
Qty: 3.15 | Refills: 0
Start: 2019-11-26 | End: 2019-12-16

## 2019-11-26 RX ORDER — PIPERACILLIN AND TAZOBACTAM 4; .5 G/20ML; G/20ML
3.38 INJECTION, POWDER, LYOPHILIZED, FOR SOLUTION INTRAVENOUS ONCE
Refills: 0 | Status: COMPLETED | OUTPATIENT
Start: 2019-11-26 | End: 2019-11-26

## 2019-11-26 RX ORDER — INSULIN LISPRO 100/ML
3 VIAL (ML) SUBCUTANEOUS
Refills: 0 | Status: DISCONTINUED | OUTPATIENT
Start: 2019-11-26 | End: 2019-12-02

## 2019-11-26 RX ORDER — INSULIN ASPART 100 [IU]/ML
5 INJECTION, SOLUTION SUBCUTANEOUS
Qty: 3.15 | Refills: 0
Start: 2019-11-26 | End: 2019-12-16

## 2019-11-26 RX ORDER — MIDODRINE HYDROCHLORIDE 2.5 MG/1
1 TABLET ORAL
Qty: 63 | Refills: 0
Start: 2019-11-26 | End: 2019-12-16

## 2019-11-26 RX ORDER — FUROSEMIDE 40 MG
1 TABLET ORAL
Qty: 0 | Refills: 0 | DISCHARGE

## 2019-11-26 RX ADMIN — Medication 3 MILLILITER(S): at 20:36

## 2019-11-26 RX ADMIN — Medication 650 MILLIGRAM(S): at 15:15

## 2019-11-26 RX ADMIN — Medication 650 MILLIGRAM(S): at 14:39

## 2019-11-26 RX ADMIN — INSULIN GLARGINE 15 UNIT(S): 100 INJECTION, SOLUTION SUBCUTANEOUS at 23:09

## 2019-11-26 RX ADMIN — ERYTHROPOIETIN 10000 UNIT(S): 10000 INJECTION, SOLUTION INTRAVENOUS; SUBCUTANEOUS at 08:54

## 2019-11-26 RX ADMIN — MIDODRINE HYDROCHLORIDE 5 MILLIGRAM(S): 2.5 TABLET ORAL at 06:04

## 2019-11-26 RX ADMIN — Medication 50 MILLIGRAM(S): at 23:23

## 2019-11-26 RX ADMIN — MIDODRINE HYDROCHLORIDE 5 MILLIGRAM(S): 2.5 TABLET ORAL at 18:16

## 2019-11-26 RX ADMIN — MIDODRINE HYDROCHLORIDE 5 MILLIGRAM(S): 2.5 TABLET ORAL at 12:39

## 2019-11-26 RX ADMIN — HEPARIN SODIUM 5000 UNIT(S): 5000 INJECTION INTRAVENOUS; SUBCUTANEOUS at 06:04

## 2019-11-26 RX ADMIN — BUDESONIDE AND FORMOTEROL FUMARATE DIHYDRATE 2 PUFF(S): 160; 4.5 AEROSOL RESPIRATORY (INHALATION) at 12:40

## 2019-11-26 RX ADMIN — BUDESONIDE AND FORMOTEROL FUMARATE DIHYDRATE 2 PUFF(S): 160; 4.5 AEROSOL RESPIRATORY (INHALATION) at 23:23

## 2019-11-26 RX ADMIN — SEVELAMER CARBONATE 1600 MILLIGRAM(S): 2400 POWDER, FOR SUSPENSION ORAL at 18:16

## 2019-11-26 RX ADMIN — HEPARIN SODIUM 5000 UNIT(S): 5000 INJECTION INTRAVENOUS; SUBCUTANEOUS at 18:16

## 2019-11-26 RX ADMIN — Medication 325 MILLIGRAM(S): at 12:39

## 2019-11-26 RX ADMIN — Medication 5 UNIT(S): at 12:38

## 2019-11-26 RX ADMIN — PANTOPRAZOLE SODIUM 40 MILLIGRAM(S): 20 TABLET, DELAYED RELEASE ORAL at 06:04

## 2019-11-26 RX ADMIN — PIPERACILLIN AND TAZOBACTAM 200 GRAM(S): 4; .5 INJECTION, POWDER, LYOPHILIZED, FOR SOLUTION INTRAVENOUS at 18:15

## 2019-11-26 RX ADMIN — SEVELAMER CARBONATE 1600 MILLIGRAM(S): 2400 POWDER, FOR SUSPENSION ORAL at 12:39

## 2019-11-26 RX ADMIN — ATORVASTATIN CALCIUM 10 MILLIGRAM(S): 80 TABLET, FILM COATED ORAL at 23:23

## 2019-11-26 NOTE — PROGRESS NOTE ADULT - SKIN/BREAST
Hilario was seen today for cough and congestion.    Diagnoses and all orders for this visit:    Acute URI    Bronchiolitis  -     azithromycin (ZITHROMAX) 200 MG/5ML suspension; Take 4.5 mLs by mouth daily for 4 days.  -     prednisoLONE sod-phos (ORAPRED) 15 MG/5ML solution; Take 5 mLs by mouth daily for 3 days.        Pneumonia in Children  Pneumonia is a term that means lung infection. It can be caused by infection by germs, including bacteria, viruses, and parasites. Though most children are able to get better at home with treatment from their health care provider, pneumonia can be very serious and can require hospitalization. Untreated pneumonia can lead to serious illness and even death. So it is important for a child with pneumonia to get treatment.     Ask your health care provider whether your child should have a flu shot or a vaccination against pneumococcal pneumonia.   Symptoms of pneumonia    Pneumonia is caused by an infection that spreads to the lungs. The child often begins with symptoms of a cold or sore throat. Symptoms then get worse as pneumonia develops. Symptoms vary widely, but often include:  · Fever, chills  · Cough (either dry or producing thick phlegm)  · Wheezing or fast breathing  · Chest pain  · Tiredness  · Muscle pain  · Headache  Any child with cold or flu symptoms that don’t seem to be getting better should be checked by a health care provider.  Treating pneumonia  · Bacterial pneumonia: If the cause of the infection is found to be bacterial, antibiotics will be prescribed. Your child should start to feel better within 24 to 48 hours of starting this medication. It is very important that the child finish ALL of the antibiotic medication, even if he or she feels better.  · Viral pneumonia: Antibiotics will not help viral pneumonia. This infection will go away on its own. To help your child feel more comfortable, your health care provider may suggest medication for the child’s  symptoms.  Follow any instructions your provider gives you for treating your child’s illness. A very sick child may need to be admitted to the hospital for a short time. In the hospital, the child can be made comfortable and may be given fluids and oxygen.  Helping your child feel better  If your health care provider feels it is safe to treat the child at home, do the following to help him feel more comfortable and get better faster:  · Keep the child quiet and be sure he or she gets plenty of rest.  · Encourage your child to drink plenty of fluids, such as water or apple juice.  · To keep an infant’s nose clear, use a rubber bulb suction device to remove any mucus (sticky fluid).  · Elevate your child’s head slightly with pillows to make breathing easier.  · Don’t allow anyone to smoke in the house.  · Treat a fever and aches and pains with children’s acetaminophen. Do not give a child aspirin. Do not give ibuprofen to infants 6 months of age or younger.  · Do not use cough medicine unless your provider recommends it.  Preventing the spread of infection  · Wash your hands with warm water and soap often, especially before and after tending to your sick child.  · Limit contact between a sick child and other children.  · Do not let anyone smoke around a sick child.  When to seek medical care  Call your health care provider right away any time you see signs of distress in your otherwise healthy child, including:  · Harsh, persistent, or wheezy cough  · Trouble breathing  · In an infant under 3 months old, a rectal temperature of 100.4°F (38.0°C)  · In a child 3 to 36 months, a rectal temperature of 102°F (39.0°C) or higher  · In a child of any age who has a temperature of 103°F (39.4°C) or higher  · A fever that lasts more than 24-hours in a child under 2 years old, or for 3 days in a child 2 years or older  · A seizure caused by the fever  · Severe headache  © 0833-1297 [employer]. [address] [place] [address]  Road, [place] [address]. All rights reserved. This information is not intended as a substitute for professional medical care. Always follow your healthcare professional's instructions.         details…

## 2019-11-26 NOTE — PROGRESS NOTE ADULT - SUBJECTIVE AND OBJECTIVE BOX
Time of Visit:  Patient seen and examined.     MEDICATIONS  (STANDING):  atorvastatin 10 milliGRAM(s) Oral at bedtime  budesonide  80 MICROgram(s)/formoterol 4.5 MICROgram(s) Inhaler 2 Puff(s) Inhalation two times a day  dextrose 5%. 1000 milliLiter(s) (50 mL/Hr) IV Continuous <Continuous>  dextrose 50% Injectable 12.5 Gram(s) IV Push once  dextrose 50% Injectable 25 Gram(s) IV Push once  dextrose 50% Injectable 25 Gram(s) IV Push once  epoetin randy Injectable 04307 Unit(s) IV Push <User Schedule>  ferrous    sulfate 325 milliGRAM(s) Oral daily  heparin  Injectable 5000 Unit(s) SubCutaneous every 12 hours  insulin glargine Injectable (LANTUS) 15 Unit(s) SubCutaneous at bedtime  insulin lispro (HumaLOG) corrective regimen sliding scale   SubCutaneous three times a day before meals  insulin lispro Injectable (HumaLOG) 5 Unit(s) SubCutaneous three times a day before meals  midodrine. 5 milliGRAM(s) Oral three times a day  pantoprazole    Tablet 40 milliGRAM(s) Oral before breakfast  sevelamer carbonate 1600 milliGRAM(s) Oral three times a day with meals  traZODone 50 milliGRAM(s) Oral at bedtime      MEDICATIONS  (PRN):  acetaminophen   Tablet .. 650 milliGRAM(s) Oral every 6 hours PRN Mild Pain (1 - 3), Moderate Pain (4 - 6)  dextrose 40% Gel 15 Gram(s) Oral once PRN Blood Glucose LESS THAN 70 milliGRAM(s)/deciliter  glucagon  Injectable 1 milliGRAM(s) IntraMuscular once PRN Glucose LESS THAN 70 milligrams/deciliter       Medications up to date at time of exam.      PHYSICAL EXAMINATION:    Vital Signs Last 24 Hrs  T(C): 37.3 (26 Nov 2019 11:35), Max: 37.8 (26 Nov 2019 08:34)  T(F): 99.2 (26 Nov 2019 11:35), Max: 100.1 (26 Nov 2019 08:34)  HR: 100 (26 Nov 2019 11:35) (53 - 101)  BP: 121/62 (26 Nov 2019 11:35) (99/63 - 131/48)  BP(mean): --  RR: 20 (26 Nov 2019 11:35) (18 - 20)  SpO2: 100% (26 Nov 2019 11:35) (97% - 100%)   (if applicable)    General: Asleep but responsive to verbal and tactile stimuli. No acute distress . Obese.     HEENT: Head is normocephalic and atraumatic. No nasal tenderness. Moist mucosa.     NECK: Supple, no palpable adenopathy.    LUNGS: Fair air netrance, otherwise clear with no wheezing. No use of accessory muscle.     HEART: S1 S2 Regular rate and no click / rub.     ABDOMEN: Soft, nontender, and nondistended.  No abdominal guarding. Active bowel sounds.     EXTREMITIES: Without any cyanosis, clubbing, rash, lesions or edema.    NEUROLOGIC: Baseline Alert and oriented.     SKIN: Warm and moist. Non diaphoretic.     LABS:                        9.6    10.31 )-----------( 178      ( 26 Nov 2019 08:52 )             31.3     11-26    136  |  102  |  67<H>  ----------------------------<  140<H>  5.0   |  26  |  5.80<H>    Ca    8.3<L>      26 Nov 2019 08:52          RADIOLOGY & ADDITIONAL STUDIES:  EKG:   CXR: < from: Xray Chest 1 View AP/PA (11.22.19 @ 18:16) >  EXAM:  XR CHEST AP OR PA 1V                            PROCEDURE DATE:  11/22/2019          INTERPRETATION:  Chest radiograph (one view)     CPT 92990    CLINICAL INFORMATION:  Patient is unable to communicate.  Short of   breath.     TECHNIQUE:  Single frontal view of the chest was obtained.    FINDINGS:  Prior study dated 7/10/2019 was available for review.    The lungs demonstrate increased pulmonary vascular congestion. No focal   consolidation is seen. No pleural effusion is seen. The heart is   prominent in size. A radiopaque clip is seen overlying the cardiac   silhouette. Soft tissue fullness is seen in the right paratracheal region   unchanged from prior examination, likely vascular in etiology.      IMPRESSION: Increased pulmonaryvascular congestion noted. No focal   consolidation is seen. Prominent cardiac silhouette.          IMPRESSION: 79y Female PAST MEDICAL & SURGICAL HISTORY:  Chronic CHF  MS (mitral stenosis)  GERD (gastroesophageal reflux disease)  COPD (chronic obstructive pulmonary disease): On home oxygen  HLD (hyperlipidemia): not taking statins  HTN (hypertension)  ESRD (end stage renal disease) on dialysis  Chronic diastolic (congestive) heart failure  Anemia of chronic disease  Breast carcinoma: Lt side s/p lumpectomy and radiation in 2004  DM (diabetes mellitus): type 2  S/P subtotal hysterectomy  S/P lumpectomy, left breast: 2004     Impression; 78 Y/O female Former smoker. Presented with SOB due to acute on chronic hypoxic hypercapnic respiratory failure due to Acute exacerbation of COPD with CHF and anemia.    Suggestion;  O2 saturation 94% room air. Continue Oxygen supplementation NC at 2L. Per son at bedside , pt has portable Oxygen supplementation at Home.  Pulmonary oral hygiene care.   Continue Budesonide twice daily.   DVT/ GI prophylactic.   Renal follow up for HD due to ESRD. Time of Visit:  Patient seen and examined.     MEDICATIONS  (STANDING):  atorvastatin 10 milliGRAM(s) Oral at bedtime  budesonide  80 MICROgram(s)/formoterol 4.5 MICROgram(s) Inhaler 2 Puff(s) Inhalation two times a day  dextrose 5%. 1000 milliLiter(s) (50 mL/Hr) IV Continuous <Continuous>  dextrose 50% Injectable 12.5 Gram(s) IV Push once  dextrose 50% Injectable 25 Gram(s) IV Push once  dextrose 50% Injectable 25 Gram(s) IV Push once  epoetin randy Injectable 49014 Unit(s) IV Push <User Schedule>  ferrous    sulfate 325 milliGRAM(s) Oral daily  heparin  Injectable 5000 Unit(s) SubCutaneous every 12 hours  insulin glargine Injectable (LANTUS) 15 Unit(s) SubCutaneous at bedtime  insulin lispro (HumaLOG) corrective regimen sliding scale   SubCutaneous three times a day before meals  insulin lispro Injectable (HumaLOG) 5 Unit(s) SubCutaneous three times a day before meals  midodrine. 5 milliGRAM(s) Oral three times a day  pantoprazole    Tablet 40 milliGRAM(s) Oral before breakfast  sevelamer carbonate 1600 milliGRAM(s) Oral three times a day with meals  traZODone 50 milliGRAM(s) Oral at bedtime      MEDICATIONS  (PRN):  acetaminophen   Tablet .. 650 milliGRAM(s) Oral every 6 hours PRN Mild Pain (1 - 3), Moderate Pain (4 - 6)  dextrose 40% Gel 15 Gram(s) Oral once PRN Blood Glucose LESS THAN 70 milliGRAM(s)/deciliter  glucagon  Injectable 1 milliGRAM(s) IntraMuscular once PRN Glucose LESS THAN 70 milligrams/deciliter       Medications up to date at time of exam.      PHYSICAL EXAMINATION:    Vital Signs Last 24 Hrs  T(C): 37.3 (26 Nov 2019 11:35), Max: 37.8 (26 Nov 2019 08:34)  T(F): 99.2 (26 Nov 2019 11:35), Max: 100.1 (26 Nov 2019 08:34)  HR: 100 (26 Nov 2019 11:35) (53 - 101)  BP: 121/62 (26 Nov 2019 11:35) (99/63 - 131/48)  BP(mean): --  RR: 20 (26 Nov 2019 11:35) (18 - 20)  SpO2: 100% (26 Nov 2019 11:35) (97% - 100%)   (if applicable)    General: Asleep but responsive to verbal and tactile stimuli. No acute distress . Obese.     HEENT: Head is normocephalic and atraumatic. No nasal tenderness. Moist mucosa.     NECK: Supple, no palpable adenopathy.    LUNGS: Fair air netrance, otherwise clear with no wheezing. No use of accessory muscle.     HEART: S1 S2 Regular rate and no click / rub.     ABDOMEN: Soft, nontender, and nondistended.  No abdominal guarding. Active bowel sounds.     EXTREMITIES: Without any cyanosis, clubbing, rash, lesions or edema.    NEUROLOGIC: Baseline Alert and oriented.     SKIN: Warm and moist. Non diaphoretic.     LABS:                        9.6    10.31 )-----------( 178      ( 26 Nov 2019 08:52 )             31.3     11-26    136  |  102  |  67<H>  ----------------------------<  140<H>  5.0   |  26  |  5.80<H>    Ca    8.3<L>      26 Nov 2019 08:52          RADIOLOGY & ADDITIONAL STUDIES:  EKG:   CXR: < from: Xray Chest 1 View AP/PA (11.22.19 @ 18:16) >  EXAM:  XR CHEST AP OR PA 1V                            PROCEDURE DATE:  11/22/2019          INTERPRETATION:  Chest radiograph (one view)     CPT 74115    CLINICAL INFORMATION:  Patient is unable to communicate.  Short of   breath.     TECHNIQUE:  Single frontal view of the chest was obtained.    FINDINGS:  Prior study dated 7/10/2019 was available for review.    The lungs demonstrate increased pulmonary vascular congestion. No focal   consolidation is seen. No pleural effusion is seen. The heart is   prominent in size. A radiopaque clip is seen overlying the cardiac   silhouette. Soft tissue fullness is seen in the right paratracheal region   unchanged from prior examination, likely vascular in etiology.      IMPRESSION: Increased pulmonaryvascular congestion noted. No focal   consolidation is seen. Prominent cardiac silhouette.          IMPRESSION: 79y Female PAST MEDICAL & SURGICAL HISTORY:  Chronic CHF  MS (mitral stenosis)  GERD (gastroesophageal reflux disease)  COPD (chronic obstructive pulmonary disease): On home oxygen  HLD (hyperlipidemia): not taking statins  HTN (hypertension)  ESRD (end stage renal disease) on dialysis  Chronic diastolic (congestive) heart failure  Anemia of chronic disease  Breast carcinoma: Lt side s/p lumpectomy and radiation in 2004  DM (diabetes mellitus): type 2  S/P subtotal hysterectomy  S/P lumpectomy, left breast: 2004     Impression; 78 Y/O female Former smoker. Presented with SOB due to acute on chronic hypoxic hypercapnic respiratory failure due to Acute exacerbation of COPD with CHF and anemia.    Suggestion;  O2 saturation 94% room air. Continue Oxygen supplementation NC at 2L. Per son at bedside , pt has portable Oxygen supplementation at Home.  Pulmonary oral hygiene care.   Continue Budesonide twice daily.   DVT/ GI prophylactic.   Renal follow up for HD due to ESRD.       Agree with above assessment and plan as transcribed.

## 2019-11-26 NOTE — CHART NOTE - NSCHARTNOTEFT_GEN_A_CORE
Alerted by nurse, patient's temp 101.1. Patient has been SOB, feeling fatigued, weak. No leukocytosis. Will obtain CBC, lactate, blood, urine cultures. Alerted by nurse, patient's temp 101.1. Patient has been SOB, feeling fatigued, weak. No leukocytosis. Will obtain CBC, lactate, blood, urine cultures, chest xray.

## 2019-11-26 NOTE — PROGRESS NOTE ADULT - SUBJECTIVE AND OBJECTIVE BOX
Problem List:  ESRD due to DKD  copd  Hypotension  Pulmonary hypertension  Symptomatic anemia  Refusing GI work up    PAST MEDICAL & SURGICAL HISTORY:  Chronic CHF  MS (mitral stenosis)  GERD (gastroesophageal reflux disease)  COPD (chronic obstructive pulmonary disease): On home oxygen  HLD (hyperlipidemia): not taking statins  HTN (hypertension)  ESRD (end stage renal disease) on dialysis  Chronic diastolic (congestive) heart failure  Anemia of chronic disease  Breast carcinoma: Lt side s/p lumpectomy and radiation in 2004  DM (diabetes mellitus): type 2  S/P subtotal hysterectomy  S/P lumpectomy, left breast: 2004      No Known Allergies      MEDICATIONS  (STANDING):  atorvastatin 10 milliGRAM(s) Oral at bedtime  budesonide  80 MICROgram(s)/formoterol 4.5 MICROgram(s) Inhaler 2 Puff(s) Inhalation two times a day  dextrose 5%. 1000 milliLiter(s) (50 mL/Hr) IV Continuous <Continuous>  dextrose 50% Injectable 12.5 Gram(s) IV Push once  dextrose 50% Injectable 25 Gram(s) IV Push once  dextrose 50% Injectable 25 Gram(s) IV Push once  epoetin randy Injectable 72158 Unit(s) IV Push <User Schedule>  ferrous    sulfate 325 milliGRAM(s) Oral daily  heparin  Injectable 5000 Unit(s) SubCutaneous every 12 hours  insulin glargine Injectable (LANTUS) 15 Unit(s) SubCutaneous at bedtime  insulin lispro (HumaLOG) corrective regimen sliding scale   SubCutaneous three times a day before meals  insulin lispro Injectable (HumaLOG) 5 Unit(s) SubCutaneous three times a day before meals  midodrine. 5 milliGRAM(s) Oral three times a day  pantoprazole    Tablet 40 milliGRAM(s) Oral before breakfast  sevelamer carbonate 1600 milliGRAM(s) Oral three times a day with meals  traZODone 50 milliGRAM(s) Oral at bedtime    MEDICATIONS  (PRN):  acetaminophen   Tablet .. 650 milliGRAM(s) Oral every 6 hours PRN Mild Pain (1 - 3), Moderate Pain (4 - 6)  dextrose 40% Gel 15 Gram(s) Oral once PRN Blood Glucose LESS THAN 70 milliGRAM(s)/deciliter  glucagon  Injectable 1 milliGRAM(s) IntraMuscular once PRN Glucose LESS THAN 70 milligrams/deciliter                      REVIEW OF SYSTEMS:  c/o abdominal pain RUQ   No vomit and no nausea.  Last BM in am      TECHNIQUE: Multiple axial scans of the abdomen and pelvis were obtained   after administration of a small amount of bowel contrast material that   has not passed beyond the stomach.    Interpretation: Liver: No focal lesions in this noncontrast study.      Biliary tree: Not dilated. The gallbladder is collapsed.      Solid organs: Similar bilateral renal cysts.      Retroperitoneum: No evidence of lymphadenopathy. Similar ectasia of the   infrarenal abdominal aorta to a diameter of 3.7 cm.    Bowel: No discrete bowel mass is identified.      Appendix: Similar small appendicolith at its orifice.    Urinary bladder: Contains a small amount of air compatible with recent   instrumentation.      Pelvis: Shows diverticuli of the sigmoid colon without radiographic   evidence of diverticulitis. The patient is again noted to be status post   hysterectomy.    The inguinal regions are unremarkable.      The lung bases show similar small left effusion or pleural thickening.   The patient is status post left mastectomy    The bony structures show no gross destructive changes.      IMPRESSION: Limited by lack of oral contrast. No discrete bowel mass   identified.      VITALS:  T(F): 97.6 (11-26-19 @ 04:50), Max: 98.8 (11-25-19 @ 23:19)  HR: 101 (11-26-19 @ 04:50)  BP: 131/48 (11-26-19 @ 04:50)  RR: 18 (11-26-19 @ 04:50)  SpO2: 97% (11-26-19 @ 04:50)  Wt(kg): --      PHYSICAL EXAM:  Constitutional: well developed, no diaphoresis, no distress.  Neck: No JVD, no carotid bruit, supple, no adenopathy  Respiratory: Good air entrance B/L, no wheezes, rales or rhonchi  Cardiovascular: S1, S2, RRR, no pericardial rub, no murmur  Abdomen: BS+, soft, no tenderness, no bruit, RUQ tenderness with no rebound and guarding  Pelvis: bladder nondistended  Extremities: NO EDEMA  Vascular Access: right AVF

## 2019-11-26 NOTE — PROGRESS NOTE ADULT - SUBJECTIVE AND OBJECTIVE BOX
Patient denies CP, SOB Review of systems otherwise (-)    acetaminophen   Tablet .. 650 milliGRAM(s) Oral every 6 hours PRN  atorvastatin 10 milliGRAM(s) Oral at bedtime  budesonide  80 MICROgram(s)/formoterol 4.5 MICROgram(s) Inhaler 2 Puff(s) Inhalation two times a day  dextrose 40% Gel 15 Gram(s) Oral once PRN  dextrose 5%. 1000 milliLiter(s) IV Continuous <Continuous>  dextrose 50% Injectable 12.5 Gram(s) IV Push once  dextrose 50% Injectable 25 Gram(s) IV Push once  dextrose 50% Injectable 25 Gram(s) IV Push once  epoetin randy Injectable 43979 Unit(s) IV Push <User Schedule>  ferrous    sulfate 325 milliGRAM(s) Oral daily  glucagon  Injectable 1 milliGRAM(s) IntraMuscular once PRN  heparin  Injectable 5000 Unit(s) SubCutaneous every 12 hours  insulin glargine Injectable (LANTUS) 15 Unit(s) SubCutaneous at bedtime  insulin lispro (HumaLOG) corrective regimen sliding scale   SubCutaneous three times a day before meals  insulin lispro Injectable (HumaLOG) 5 Unit(s) SubCutaneous three times a day before meals  midodrine. 5 milliGRAM(s) Oral three times a day  pantoprazole    Tablet 40 milliGRAM(s) Oral before breakfast  sevelamer carbonate 1600 milliGRAM(s) Oral three times a day with meals  traZODone 50 milliGRAM(s) Oral at bedtime                            9.6    10.31 )-----------( 178      ( 26 Nov 2019 08:52 )             31.3       Hemoglobin: 9.6 g/dL (11-26 @ 08:52)  Hemoglobin: 10.2 g/dL (11-24 @ 05:48)  Hemoglobin: 10.2 g/dL (11-23 @ 22:04)  Hemoglobin: 10.4 g/dL (11-23 @ 17:29)  Hemoglobin: 7.4 g/dL (11-23 @ 10:36)      11-26    136  |  102  |  67<H>  ----------------------------<  140<H>  5.0   |  26  |  5.80<H>    Ca    8.3<L>      26 Nov 2019 08:52      Creatinine Trend: 5.80<--, 4.71<--, 4.34<--, 6.50<--, 5.81<--    COAGS:           T(C): 37.3 (11-26-19 @ 11:35), Max: 37.8 (11-26-19 @ 08:34)  HR: 100 (11-26-19 @ 11:35) (53 - 101)  BP: 121/62 (11-26-19 @ 11:35) (99/63 - 131/48)  RR: 20 (11-26-19 @ 11:35) (18 - 20)  SpO2: 100% (11-26-19 @ 11:35) (97% - 100%)  Wt(kg): --    I&O's Summary    Gen: Appears well in NAD  HEENT:  (-)icterus (-)pallor  CV: N S1 S2 1/6 HODA (+)2 Pulses B/l  Resp:  Clear to ausculatation B/L, normal effort  GI: (+) BS Soft, NT, ND  Lymph:  (-)Edema, (-)obvious lymphadenopathy  Skin: Warm to touch, Normal turgor  Psych: Appropriate mood and affect      TELEMETRY: 	  OFF       ASSESSMENT/PLAN: 	79y Female female hx of ESRD on HD, HTN, ANemia, copd , chf, PAF, no A/C documented , now with anemia, SOB , + troponin on admission labs     -  HD per renal  - f/u echo      Kyler Jaimes MD, [facility]  BEEPER [phone]

## 2019-11-26 NOTE — RAPID RESPONSE TEAM SUMMARY - NSSITUATIONBACKGROUNDRRT_GEN_ALL_CORE
79F w/ Hx DM, ESRD on HD, breast CA. Admitted for acute on chronic hypoxic hypercapnic respiratory failure due to COPD/CHF exacerbation w/ symptomatic anemia. RRT called for unresponsiveness. Patient seen and examined at bedside with team, as per RN recently returned from HD. She seems drowsy, but was arousable. Later irritated, AAOx4, asking to be left alone to rest. Vitals as follows BP 80/58mmHg (taken from LLE, not accurate). HR 84 bpm, T: 98.7F, RR: 16rpm, SpO2: 100% on 3L NC. Patient denies any symptoms, and is resting comfortably in bed. Patient not allowing to repeat blood pressure on LUE. 79F w/ Hx DM, ESRD on HD, breast CA. Admitted for acute on chronic hypoxic hypercapnic respiratory failure due to COPD/CHF exacerbation w/ symptomatic anemia. RRT called for unresponsiveness. Patient seen and examined at bedside with team, as per RN recently returned from HD. She seems drowsy, but was arousable. Later irritated, AAOx4, asking to be left alone to rest. Vitals as follows BP 80/58mmHg (taken from LLE, not accurate). HR 84 bpm, T: 98.7F, RR: 16rpm, SpO2: 100% on 3L NC. FS: 121. Patient denies any symptoms, and is resting comfortably in bed. Patient not allowing to repeat blood pressure on LUE and requesting to be left alone. 79F w/ Hx DM, ESRD on HD, breast CA. Admitted for acute on chronic hypoxic hypercapnic respiratory failure due to COPD/CHF exacerbation w/ symptomatic anemia. RRT called for unresponsiveness. Patient seen and examined at bedside with team, as per RN recently returned from HD. She was drowsy, but was arousable. Within seconds irritated, AAOx4, asking to be left alone to rest. Vitals as follows BP 80/58mmHg (taken from LLE, not accurate). HR 84 bpm, T: 98.7F, RR: 16rpm, SpO2: 100% on 3L NC. FS: 121. Patient denies any symptoms, and is resting comfortably in bed. Patient not allowing to repeat blood pressure on LUE, refusing blood draws and requesting to be left alone.

## 2019-11-26 NOTE — PROGRESS NOTE ADULT - PROBLEM SELECTOR PLAN 1
hemoglobin trending down  Shortness of breath is likely secondary to anemia and chronic chf and  or copd  -Original plan for colonoscopy, however, refused by patient  -F/u CBC AM

## 2019-11-26 NOTE — PROGRESS NOTE ADULT - ASSESSMENT
Pt is a 78 y/o F, from home walks with cane at baseline, with a PMH of chronic diastolic heart failure (stage II), ESRD on HD T/Th/S, moderate MS, COPD on home oxygen, HTN, HLD, DM, and GERD  who presented with generalized weakness, generalized body pain and shortness of breath on exertion since 1 day.

## 2019-11-26 NOTE — PROGRESS NOTE ADULT - NEGATIVE ENMT SYMPTOMS
no ear pain/no hearing difficulty/no vertigo/no gum bleeding/no dry mouth/no throat pain/no dysphagia

## 2019-11-26 NOTE — PROGRESS NOTE ADULT - SUBJECTIVE AND OBJECTIVE BOX
[   ] ICU                                          [   ] CCU                                      [  X ] Medical Floor      Patient is comfortable. No new complaints reported, No abdominal pain, N/V, hematemesis, hematochezia, melena, fever, chills, chest pain, SOB, cough or diarrhea reported.    VITALS  Vital Signs Last 24 Hrs  T(C): 37.6 (26 Nov 2019 16:00), Max: 38.4 (26 Nov 2019 15:22)  T(F): 99.7 (26 Nov 2019 16:00), Max: 101.1 (26 Nov 2019 15:22)  HR: 89 (26 Nov 2019 15:22) (53 - 101)  BP: 96/57 (26 Nov 2019 15:22) (96/57 - 131/48)     RR: 18 (26 Nov 2019 15:22) (18 - 20)  SpO2: 99% (26 Nov 2019 15:22) (97% - 100%)       MEDICATIONS  (STANDING):  albuterol/ipratropium for Nebulization 3 milliLiter(s) Nebulizer every 6 hours  atorvastatin 10 milliGRAM(s) Oral at bedtime  budesonide  80 MICROgram(s)/formoterol 4.5 MICROgram(s) Inhaler 2 Puff(s) Inhalation two times a day  dextrose 5%. 1000 milliLiter(s) (50 mL/Hr) IV Continuous <Continuous>  dextrose 50% Injectable 12.5 Gram(s) IV Push once  dextrose 50% Injectable 25 Gram(s) IV Push once  dextrose 50% Injectable 25 Gram(s) IV Push once  epoetin randy Injectable 95941 Unit(s) IV Push <User Schedule>  ferrous    sulfate 325 milliGRAM(s) Oral daily  heparin  Injectable 5000 Unit(s) SubCutaneous every 12 hours  insulin glargine Injectable (LANTUS) 15 Unit(s) SubCutaneous at bedtime  insulin lispro (HumaLOG) corrective regimen sliding scale   SubCutaneous three times a day before meals  insulin lispro Injectable (HumaLOG) 3 Unit(s) SubCutaneous three times a day before meals  midodrine. 5 milliGRAM(s) Oral three times a day  pantoprazole    Tablet 40 milliGRAM(s) Oral before breakfast  piperacillin/tazobactam IVPB. 3.375 Gram(s) IV Intermittent once  piperacillin/tazobactam IVPB.. 3.375 Gram(s) IV Intermittent every 12 hours  sevelamer carbonate 1600 milliGRAM(s) Oral three times a day with meals  traZODone 50 milliGRAM(s) Oral at bedtime    MEDICATIONS  (PRN):  acetaminophen   Tablet .. 650 milliGRAM(s) Oral every 6 hours PRN Mild Pain (1 - 3), Moderate Pain (4 - 6)  dextrose 40% Gel 15 Gram(s) Oral once PRN Blood Glucose LESS THAN 70 milliGRAM(s)/deciliter  glucagon  Injectable 1 milliGRAM(s) IntraMuscular once PRN Glucose LESS THAN 70 milligrams/deciliter                            9.6    10.31 )-----------( 178      ( 26 Nov 2019 08:52 )             31.3       11-26    136  |  102  |  67<H>  ----------------------------<  140<H>  5.0   |  26  |  5.80<H>    Ca    8.3<L>      26 Nov 2019 08:52

## 2019-11-26 NOTE — PROGRESS NOTE ADULT - ASSESSMENT
ESRD dialysis today  Hypotension on Midodrine  Pulmonary hypertension  Anemia refusing workup    Follow up with pulmonary and cardiology.

## 2019-11-26 NOTE — PROGRESS NOTE ADULT - SUBJECTIVE AND OBJECTIVE BOX
PGY 1 Note discussed with supervising resident and primary attending    Patient is a 79y old  Female who presents with a chief complaint of Symptomatic Anemia (26 Nov 2019 14:03)      INTERVAL HPI/OVERNIGHT EVENTS: Patient feeling generally unwell, weak, dizzy. Patient started developing SOB in the PM. Patient originally planned for dialysis and then dc however patient feeling unwell, unable to walk. To continue to monitor patient's respiratory, hemoglobin, and ambulatory status then reevaluate. No chest pain, fever, chills, nausea, vomiting, diarrhea.     MEDICATIONS  (STANDING):  atorvastatin 10 milliGRAM(s) Oral at bedtime  budesonide  80 MICROgram(s)/formoterol 4.5 MICROgram(s) Inhaler 2 Puff(s) Inhalation two times a day  dextrose 5%. 1000 milliLiter(s) (50 mL/Hr) IV Continuous <Continuous>  dextrose 50% Injectable 12.5 Gram(s) IV Push once  dextrose 50% Injectable 25 Gram(s) IV Push once  dextrose 50% Injectable 25 Gram(s) IV Push once  epoetin randy Injectable 01709 Unit(s) IV Push <User Schedule>  ferrous    sulfate 325 milliGRAM(s) Oral daily  heparin  Injectable 5000 Unit(s) SubCutaneous every 12 hours  insulin glargine Injectable (LANTUS) 15 Unit(s) SubCutaneous at bedtime  insulin lispro (HumaLOG) corrective regimen sliding scale   SubCutaneous three times a day before meals  insulin lispro Injectable (HumaLOG) 5 Unit(s) SubCutaneous three times a day before meals  midodrine. 5 milliGRAM(s) Oral three times a day  pantoprazole    Tablet 40 milliGRAM(s) Oral before breakfast  sevelamer carbonate 1600 milliGRAM(s) Oral three times a day with meals  traZODone 50 milliGRAM(s) Oral at bedtime    MEDICATIONS  (PRN):  acetaminophen   Tablet .. 650 milliGRAM(s) Oral every 6 hours PRN Mild Pain (1 - 3), Moderate Pain (4 - 6)  albuterol/ipratropium for Nebulization 3 milliLiter(s) Nebulizer every 6 hours PRN Shortness of Breath and/or Wheezing  dextrose 40% Gel 15 Gram(s) Oral once PRN Blood Glucose LESS THAN 70 milliGRAM(s)/deciliter  glucagon  Injectable 1 milliGRAM(s) IntraMuscular once PRN Glucose LESS THAN 70 milligrams/deciliter      __________________________________________________  REVIEW OF SYSTEMS:    All negative except as per HPI      Vital Signs Last 24 Hrs  T(C): 37.3 (26 Nov 2019 11:35), Max: 37.8 (26 Nov 2019 08:34)  T(F): 99.2 (26 Nov 2019 11:35), Max: 100.1 (26 Nov 2019 08:34)  HR: 94 (26 Nov 2019 14:50) (53 - 101)  BP: 97/25 (26 Nov 2019 14:50) (97/25 - 131/48)  BP(mean): --  RR: 20 (26 Nov 2019 14:50) (18 - 20)  SpO2: 100% (26 Nov 2019 14:50) (97% - 100%)    ________________________________________________  PHYSICAL EXAM:  GENERAL: NAD, obese, speaks in 3-4 words before stopping to breath  HEENT: Normocephalic;  conjunctivae and sclerae clear   CHEST/LUNG: Clear to auscultation bilaterally, diffuse wheezing   HEART: S1 S2  regular  ABDOMEN: Soft, Nontender, obese Nondistended; Bowel sounds present  EXTREMITIES: no cyanosis; no edema; no calf tenderness  SKIN: warm and dry; no rash  NERVOUS SYSTEM:  Awake and alert; Oriented  to place, person and time ; no new deficits    _________________________________________________  LABS:                        9.6    10.31 )-----------( 178      ( 26 Nov 2019 08:52 )             31.3     11-26    136  |  102  |  67<H>  ----------------------------<  140<H>  5.0   |  26  |  5.80<H>    Ca    8.3<L>      26 Nov 2019 08:52          CAPILLARY BLOOD GLUCOSE      POCT Blood Glucose.: 139 mg/dL (26 Nov 2019 14:51)  POCT Blood Glucose.: 111 mg/dL (26 Nov 2019 12:02)  POCT Blood Glucose.: 94 mg/dL (26 Nov 2019 10:56)  POCT Blood Glucose.: 134 mg/dL (26 Nov 2019 07:38)  POCT Blood Glucose.: 109 mg/dL (25 Nov 2019 22:20)  POCT Blood Glucose.: 109 mg/dL (25 Nov 2019 21:14)  POCT Blood Glucose.: 169 mg/dL (25 Nov 2019 16:52)        RADIOLOGY & ADDITIONAL TESTS:    Imaging Personally Reviewed:  YES/NO    Consultant(s) Notes Reviewed:   YES/ No    Care Discussed with Consultants :     Plan of care was discussed with patient and /or primary care giver; all questions and concerns were addressed and care was aligned with patient's wishes.

## 2019-11-26 NOTE — PROGRESS NOTE ADULT - ASSESSMENT
1. Anemia  2. Acute drop in H/H  3. Rectal bleeding       Suggestions:    1. Monitor H/H  2. Transfuse PRBC as needed  3. Protonix daily  4. Avoid NSAID  5. Colonoscopy (pt refusing)  6. CT-Scan of abdomen and pelvis  7. DVT prophylaxis

## 2019-11-27 DIAGNOSIS — R50.9 FEVER, UNSPECIFIED: ICD-10-CM

## 2019-11-27 LAB
ANION GAP SERPL CALC-SCNC: 11 MMOL/L — SIGNIFICANT CHANGE UP (ref 5–17)
APPEARANCE UR: ABNORMAL
BACTERIA # UR AUTO: ABNORMAL /HPF
BASE EXCESS BLDA CALC-SCNC: 3.3 MMOL/L — HIGH (ref -2–2)
BILIRUB UR-MCNC: NEGATIVE — SIGNIFICANT CHANGE UP
BLOOD GAS COMMENTS ARTERIAL: SIGNIFICANT CHANGE UP
BUN SERPL-MCNC: 41 MG/DL — HIGH (ref 7–18)
CALCIUM SERPL-MCNC: 8.6 MG/DL — SIGNIFICANT CHANGE UP (ref 8.4–10.5)
CHLORIDE SERPL-SCNC: 104 MMOL/L — SIGNIFICANT CHANGE UP (ref 96–108)
CO2 SERPL-SCNC: 26 MMOL/L — SIGNIFICANT CHANGE UP (ref 22–31)
COLOR SPEC: YELLOW — SIGNIFICANT CHANGE UP
CREAT SERPL-MCNC: 4.68 MG/DL — HIGH (ref 0.5–1.3)
DIFF PNL FLD: ABNORMAL
EPI CELLS # UR: SIGNIFICANT CHANGE UP /HPF
GLUCOSE BLDC GLUCOMTR-MCNC: 127 MG/DL — HIGH (ref 70–99)
GLUCOSE BLDC GLUCOMTR-MCNC: 202 MG/DL — HIGH (ref 70–99)
GLUCOSE BLDC GLUCOMTR-MCNC: 235 MG/DL — HIGH (ref 70–99)
GLUCOSE BLDC GLUCOMTR-MCNC: 244 MG/DL — HIGH (ref 70–99)
GLUCOSE SERPL-MCNC: 114 MG/DL — HIGH (ref 70–99)
GLUCOSE UR QL: NEGATIVE — SIGNIFICANT CHANGE UP
HCO3 BLDA-SCNC: 28 MMOL/L — HIGH (ref 23–27)
HCT VFR BLD CALC: 31.9 % — LOW (ref 34.5–45)
HGB BLD-MCNC: 9.3 G/DL — LOW (ref 11.5–15.5)
HOROWITZ INDEX BLDA+IHG-RTO: 21 — SIGNIFICANT CHANGE UP
KETONES UR-MCNC: NEGATIVE — SIGNIFICANT CHANGE UP
LEUKOCYTE ESTERASE UR-ACNC: ABNORMAL
MCHC RBC-ENTMCNC: 28.7 PG — SIGNIFICANT CHANGE UP (ref 27–34)
MCHC RBC-ENTMCNC: 29.2 GM/DL — LOW (ref 32–36)
MCV RBC AUTO: 98.5 FL — SIGNIFICANT CHANGE UP (ref 80–100)
NITRITE UR-MCNC: NEGATIVE — SIGNIFICANT CHANGE UP
NRBC # BLD: 0 /100 WBCS — SIGNIFICANT CHANGE UP (ref 0–0)
PCO2 BLDA: 45 MMHG — SIGNIFICANT CHANGE UP (ref 32–46)
PH BLDA: 7.41 — SIGNIFICANT CHANGE UP (ref 7.35–7.45)
PH UR: 6 — SIGNIFICANT CHANGE UP (ref 5–8)
PLATELET # BLD AUTO: 177 K/UL — SIGNIFICANT CHANGE UP (ref 150–400)
PO2 BLDA: 90 MMHG — SIGNIFICANT CHANGE UP (ref 74–108)
POTASSIUM SERPL-MCNC: 4.3 MMOL/L — SIGNIFICANT CHANGE UP (ref 3.5–5.3)
POTASSIUM SERPL-SCNC: 4.3 MMOL/L — SIGNIFICANT CHANGE UP (ref 3.5–5.3)
PROT UR-MCNC: 100
RBC # BLD: 3.24 M/UL — LOW (ref 3.8–5.2)
RBC # FLD: 16.8 % — HIGH (ref 10.3–14.5)
RBC CASTS # UR COMP ASSIST: ABNORMAL /HPF (ref 0–2)
SAO2 % BLDA: 98 % — HIGH (ref 92–96)
SODIUM SERPL-SCNC: 141 MMOL/L — SIGNIFICANT CHANGE UP (ref 135–145)
SP GR SPEC: 1.02 — SIGNIFICANT CHANGE UP (ref 1.01–1.02)
UROBILINOGEN FLD QL: NEGATIVE — SIGNIFICANT CHANGE UP
WBC # BLD: 9.39 K/UL — SIGNIFICANT CHANGE UP (ref 3.8–10.5)
WBC # FLD AUTO: 9.39 K/UL — SIGNIFICANT CHANGE UP (ref 3.8–10.5)
WBC UR QL: >50 /HPF (ref 0–5)

## 2019-11-27 RX ORDER — CHLORHEXIDINE GLUCONATE 213 G/1000ML
1 SOLUTION TOPICAL DAILY
Refills: 0 | Status: DISCONTINUED | OUTPATIENT
Start: 2019-11-27 | End: 2019-11-27

## 2019-11-27 RX ORDER — CHLORHEXIDINE GLUCONATE 213 G/1000ML
1 SOLUTION TOPICAL DAILY
Refills: 0 | Status: DISCONTINUED | OUTPATIENT
Start: 2019-11-27 | End: 2019-12-02

## 2019-11-27 RX ORDER — MIDODRINE HYDROCHLORIDE 2.5 MG/1
10 TABLET ORAL EVERY 8 HOURS
Refills: 0 | Status: DISCONTINUED | OUTPATIENT
Start: 2019-11-27 | End: 2019-12-02

## 2019-11-27 RX ADMIN — ATORVASTATIN CALCIUM 10 MILLIGRAM(S): 80 TABLET, FILM COATED ORAL at 21:54

## 2019-11-27 RX ADMIN — Medication 3 MILLILITER(S): at 10:22

## 2019-11-27 RX ADMIN — MIDODRINE HYDROCHLORIDE 10 MILLIGRAM(S): 2.5 TABLET ORAL at 21:54

## 2019-11-27 RX ADMIN — PIPERACILLIN AND TAZOBACTAM 25 GRAM(S): 4; .5 INJECTION, POWDER, LYOPHILIZED, FOR SOLUTION INTRAVENOUS at 18:18

## 2019-11-27 RX ADMIN — BUDESONIDE AND FORMOTEROL FUMARATE DIHYDRATE 2 PUFF(S): 160; 4.5 AEROSOL RESPIRATORY (INHALATION) at 21:55

## 2019-11-27 RX ADMIN — Medication 650 MILLIGRAM(S): at 10:22

## 2019-11-27 RX ADMIN — PIPERACILLIN AND TAZOBACTAM 25 GRAM(S): 4; .5 INJECTION, POWDER, LYOPHILIZED, FOR SOLUTION INTRAVENOUS at 07:35

## 2019-11-27 RX ADMIN — Medication 3 UNIT(S): at 16:53

## 2019-11-27 RX ADMIN — Medication 650 MILLIGRAM(S): at 21:00

## 2019-11-27 RX ADMIN — SEVELAMER CARBONATE 1600 MILLIGRAM(S): 2400 POWDER, FOR SUSPENSION ORAL at 12:29

## 2019-11-27 RX ADMIN — MIDODRINE HYDROCHLORIDE 10 MILLIGRAM(S): 2.5 TABLET ORAL at 03:45

## 2019-11-27 RX ADMIN — Medication 3 UNIT(S): at 12:28

## 2019-11-27 RX ADMIN — Medication 325 MILLIGRAM(S): at 12:29

## 2019-11-27 RX ADMIN — SEVELAMER CARBONATE 1600 MILLIGRAM(S): 2400 POWDER, FOR SUSPENSION ORAL at 08:13

## 2019-11-27 RX ADMIN — Medication 3 MILLILITER(S): at 20:29

## 2019-11-27 RX ADMIN — MIDODRINE HYDROCHLORIDE 10 MILLIGRAM(S): 2.5 TABLET ORAL at 15:17

## 2019-11-27 RX ADMIN — HEPARIN SODIUM 5000 UNIT(S): 5000 INJECTION INTRAVENOUS; SUBCUTANEOUS at 18:18

## 2019-11-27 RX ADMIN — BUDESONIDE AND FORMOTEROL FUMARATE DIHYDRATE 2 PUFF(S): 160; 4.5 AEROSOL RESPIRATORY (INHALATION) at 10:23

## 2019-11-27 RX ADMIN — SEVELAMER CARBONATE 1600 MILLIGRAM(S): 2400 POWDER, FOR SUSPENSION ORAL at 16:53

## 2019-11-27 RX ADMIN — Medication 3 UNIT(S): at 08:16

## 2019-11-27 RX ADMIN — MIDODRINE HYDROCHLORIDE 10 MILLIGRAM(S): 2.5 TABLET ORAL at 10:22

## 2019-11-27 RX ADMIN — Medication 2: at 12:28

## 2019-11-27 RX ADMIN — Medication 650 MILLIGRAM(S): at 10:52

## 2019-11-27 RX ADMIN — PANTOPRAZOLE SODIUM 40 MILLIGRAM(S): 20 TABLET, DELAYED RELEASE ORAL at 07:36

## 2019-11-27 RX ADMIN — Medication 650 MILLIGRAM(S): at 20:30

## 2019-11-27 RX ADMIN — INSULIN GLARGINE 15 UNIT(S): 100 INJECTION, SOLUTION SUBCUTANEOUS at 21:54

## 2019-11-27 RX ADMIN — Medication 2: at 16:52

## 2019-11-27 RX ADMIN — HEPARIN SODIUM 5000 UNIT(S): 5000 INJECTION INTRAVENOUS; SUBCUTANEOUS at 07:35

## 2019-11-27 NOTE — PROGRESS NOTE ADULT - SUBJECTIVE AND OBJECTIVE BOX
Pt is a 78 y/o F, from home walks with cane at baseline, with a PMH of chronic diastolic heart failure (stage II), ESRD on HD T/Th/S, moderate MS, COPD on home oxygen, HTN, HLD, DM, and GERD  who presented with generalized weakness, generalized body pain and shortness of breath on exertion since 1 day. Pt   Pt received 2 un of PRBC to correct symptomatic anemia. Pt had RRT for hypotension related to HD. Pt continues HD to prevent fluid overload. Spiked t 101 on 11/26, afebrile today. Cultures sent, pending results. Pt is on Zosyn IV empirically.  INTERVAL HPI/OVERNIGHT EVENTS: no new complaints    MEDICATIONS  (STANDING):  albuterol/ipratropium for Nebulization 3 milliLiter(s) Nebulizer every 6 hours  atorvastatin 10 milliGRAM(s) Oral at bedtime  budesonide  80 MICROgram(s)/formoterol 4.5 MICROgram(s) Inhaler 2 Puff(s) Inhalation two times a day  chlorhexidine 4% Liquid 1 Application(s) Topical daily  dextrose 5%. 1000 milliLiter(s) (50 mL/Hr) IV Continuous <Continuous>  dextrose 50% Injectable 12.5 Gram(s) IV Push once  dextrose 50% Injectable 25 Gram(s) IV Push once  dextrose 50% Injectable 25 Gram(s) IV Push once  epoetin randy Injectable 94375 Unit(s) IV Push <User Schedule>  ferrous    sulfate 325 milliGRAM(s) Oral daily  heparin  Injectable 5000 Unit(s) SubCutaneous every 12 hours  insulin glargine Injectable (LANTUS) 15 Unit(s) SubCutaneous at bedtime  insulin lispro (HumaLOG) corrective regimen sliding scale   SubCutaneous three times a day before meals  insulin lispro Injectable (HumaLOG) 3 Unit(s) SubCutaneous three times a day before meals  midodrine. 10 milliGRAM(s) Oral every 8 hours  pantoprazole    Tablet 40 milliGRAM(s) Oral before breakfast  piperacillin/tazobactam IVPB.. 3.375 Gram(s) IV Intermittent every 12 hours  sevelamer carbonate 1600 milliGRAM(s) Oral three times a day with meals  traZODone 50 milliGRAM(s) Oral at bedtime    MEDICATIONS  (PRN):  acetaminophen   Tablet .. 650 milliGRAM(s) Oral every 6 hours PRN Mild Pain (1 - 3), Moderate Pain (4 - 6)  dextrose 40% Gel 15 Gram(s) Oral once PRN Blood Glucose LESS THAN 70 milliGRAM(s)/deciliter  glucagon  Injectable 1 milliGRAM(s) IntraMuscular once PRN Glucose LESS THAN 70 milligrams/deciliter      __________________________________________________  REVIEW OF SYSTEMS:    CONSTITUTIONAL: No fever,   EYES: no acute visual disturbances  NECK: No pain or stiffness  RESPIRATORY: sob, on home oxygen  CARDIOVASCULAR: No chest pain, no palpitations  GASTROINTESTINAL: No pain. No nausea or vomiting; No diarrhea   NEUROLOGICAL: No headache or numbness, no tremors  MUSCULOSKELETAL: No joint pain, no muscle pain  GENITOURINARY: ESRD on HD  PSYCHIATRY: alert, oriented  ALL OTHER  ROS negative        Vital Signs Last 24 Hrs  T(C): 37.4 (27 Nov 2019 14:00), Max: 37.4 (26 Nov 2019 19:29)  T(F): 99.4 (27 Nov 2019 14:00), Max: 99.4 (27 Nov 2019 14:00)  HR: 78 (27 Nov 2019 14:00) (75 - 97)  BP: 97/47 (27 Nov 2019 14:00) (80/58 - 139/82)  BP(mean): --  RR: 18 (27 Nov 2019 14:00) (16 - 19)  SpO2: 94% (27 Nov 2019 14:00) (92% - 100%)    ________________________________________________  PHYSICAL EXAM:  GENERAL: NAD  HEENT: Normocephalic;  conjunctivae and sclerae clear; moist mucous membranes;   NECK : supple  CHEST/LUNG: Clear to auscultation bilaterally with good air entry   HEART: S1 S2  regular; no murmurs, gallops or rubs  ABDOMEN: Soft, Nontender, Nondistended; Bowel sounds present  EXTREMITIES: no cyanosis; no edema; no calf tenderness  SKIN: warm and dry; no rash  NERVOUS SYSTEM:  Awake and alert; Oriented  to place, person and time ; no new deficits    _________________________________________________  LABS:                        9.3    9.39  )-----------( 177      ( 27 Nov 2019 06:27 )             31.9     11-27    141  |  104  |  41<H>  ----------------------------<  114<H>  4.3   |  26  |  4.68<H>    Ca    8.6      27 Nov 2019 06:27          CAPILLARY BLOOD GLUCOSE      POCT Blood Glucose.: 202 mg/dL (27 Nov 2019 16:22)  POCT Blood Glucose.: 235 mg/dL (27 Nov 2019 11:57)  POCT Blood Glucose.: 127 mg/dL (27 Nov 2019 08:02)  POCT Blood Glucose.: 121 mg/dL (26 Nov 2019 23:27)  POCT Blood Glucose.: 120 mg/dL (26 Nov 2019 22:52)        RADIOLOGY & ADDITIONAL TESTS:    Imaging  Reviewed:  YES/NO    Consultant(s) Notes Reviewed:   YES/ No      Plan of care was discussed with patient and /or primary care giver; all questions and concerns were addressed Pt is a 80 y/o F, from home walks with cane at baseline, with a PMH of chronic diastolic heart failure (stage II), ESRD on HD T/Th/S, moderate MS, COPD on home oxygen, HTN, HLD, DM, and GERD  who presented with generalized weakness, generalized body pain and shortness of breath on exertion since 1 day.  Former smoker.  Pt received 2 un of PRBC to correct symptomatic anemia. Pt had RRT for hypotension related to HD. Pt continues HD to prevent fluid overload. Spiked t 101 on 11/26, afebrile today. Cultures sent, pending results. Pt is on Zosyn IV empirically.  INTERVAL HPI/OVERNIGHT EVENTS:   Pt seen and examined.   SOB improved  H/h stable  MEDICATIONS  (STANDING):  albuterol/ipratropium for Nebulization 3 milliLiter(s) Nebulizer every 6 hours  atorvastatin 10 milliGRAM(s) Oral at bedtime  budesonide  80 MICROgram(s)/formoterol 4.5 MICROgram(s) Inhaler 2 Puff(s) Inhalation two times a day  chlorhexidine 4% Liquid 1 Application(s) Topical daily  dextrose 5%. 1000 milliLiter(s) (50 mL/Hr) IV Continuous <Continuous>  dextrose 50% Injectable 12.5 Gram(s) IV Push once  dextrose 50% Injectable 25 Gram(s) IV Push once  dextrose 50% Injectable 25 Gram(s) IV Push once  epoetin randy Injectable 33887 Unit(s) IV Push <User Schedule>  ferrous    sulfate 325 milliGRAM(s) Oral daily  heparin  Injectable 5000 Unit(s) SubCutaneous every 12 hours  insulin glargine Injectable (LANTUS) 15 Unit(s) SubCutaneous at bedtime  insulin lispro (HumaLOG) corrective regimen sliding scale   SubCutaneous three times a day before meals  insulin lispro Injectable (HumaLOG) 3 Unit(s) SubCutaneous three times a day before meals  midodrine. 10 milliGRAM(s) Oral every 8 hours  pantoprazole    Tablet 40 milliGRAM(s) Oral before breakfast  piperacillin/tazobactam IVPB.. 3.375 Gram(s) IV Intermittent every 12 hours  sevelamer carbonate 1600 milliGRAM(s) Oral three times a day with meals  traZODone 50 milliGRAM(s) Oral at bedtime    MEDICATIONS  (PRN):  acetaminophen   Tablet .. 650 milliGRAM(s) Oral every 6 hours PRN Mild Pain (1 - 3), Moderate Pain (4 - 6)  dextrose 40% Gel 15 Gram(s) Oral once PRN Blood Glucose LESS THAN 70 milliGRAM(s)/deciliter  glucagon  Injectable 1 milliGRAM(s) IntraMuscular once PRN Glucose LESS THAN 70 milligrams/deciliter      __________________________________________________  REVIEW OF SYSTEMS:    CONSTITUTIONAL: No fever,   EYES: no acute visual disturbances  NECK: No pain or stiffness  RESPIRATORY: sob, on home oxygen  CARDIOVASCULAR: No chest pain, no palpitations  GASTROINTESTINAL: No pain. No nausea or vomiting; No diarrhea   NEUROLOGICAL: No headache or numbness, no tremors  MUSCULOSKELETAL: No joint pain, no muscle pain  GENITOURINARY: ESRD on HD  PSYCHIATRY: alert, oriented  ALL OTHER  ROS negative        Vital Signs Last 24 Hrs  T(C): 37.4 (27 Nov 2019 14:00), Max: 37.4 (26 Nov 2019 19:29)  T(F): 99.4 (27 Nov 2019 14:00), Max: 99.4 (27 Nov 2019 14:00)  HR: 78 (27 Nov 2019 14:00) (75 - 97)  BP: 97/47 (27 Nov 2019 14:00) (80/58 - 139/82)  BP(mean): --  RR: 18 (27 Nov 2019 14:00) (16 - 19)  SpO2: 94% (27 Nov 2019 14:00) (92% - 100%)    ________________________________________________  PHYSICAL EXAM:  GENERAL: NAD  HEENT: Normocephalic;  conjunctivae and sclerae clear; moist mucous membranes;   NECK : supple  CHEST/LUNG: Clear to auscultation bilaterally with good air entry   HEART: S1 S2  regular;   ABDOMEN: Soft, Nontender, Nondistended; Bowel sounds present  EXTREMITIES: no cyanosis; no edema; no calf tenderness R arm HD access ausc  SKIN: warm and dry; no rash  NERVOUS SYSTEM:  Awake and alert; Oriented  to place, person and time ; no new deficits    _________________________________________________  LABS:                        9.3    9.39  )-----------( 177      ( 27 Nov 2019 06:27 )             31.9     11-27    141  |  104  |  41<H>  ----------------------------<  114<H>  4.3   |  26  |  4.68<H>    Ca    8.6      27 Nov 2019 06:27          CAPILLARY BLOOD GLUCOSE      POCT Blood Glucose.: 202 mg/dL (27 Nov 2019 16:22)  POCT Blood Glucose.: 235 mg/dL (27 Nov 2019 11:57)  POCT Blood Glucose.: 127 mg/dL (27 Nov 2019 08:02)  POCT Blood Glucose.: 121 mg/dL (26 Nov 2019 23:27)  POCT Blood Glucose.: 120 mg/dL (26 Nov 2019 22:52)        RADIOLOGY & ADDITIONAL TESTS:    Imaging  Reviewed:  YES/  Consultant(s) Notes Reviewed:   YES    Plan of care was discussed with patient and /or primary care giver; all questions and concerns were addressed

## 2019-11-27 NOTE — CONSULT NOTE ADULT - SUBJECTIVE AND OBJECTIVE BOX
HPI:  ID consult was called to evaluate this patient for     As per H&P:  Pt is a 78 y/o F, from home walks with cane at baseline, with a PMH of chronic diastolic heart failure (stage II), ESRD on HD T/Th/S, moderate MS, COPD on home oxygen, HTN, HLD, DM, and GERD  who presented with generalized weakness, generalized body pain and shortness of breath on exertion since 1 day.  According to the patient, since yesterday she has been feeling weak and has generalized body pain, more on her shoulders. She mentioned that she had minor fall from her bed yesterday and was not able to sleep whole night. She has shortness of breath on mild exertion limiting her ambulation.   She denied fever, chest pain, nausea, vomiting, abdominal pain and all other review of systems except mentioned above. (22 Nov 2019 17:38)    REVIEW OF SYSTEMS:  [  ] Not able to illicit  General:	  Chest:	  GI:	  :  Skin:	  Musculoskeletal:	  Neuro:    PAST MEDICAL & SURGICAL HISTORY:  Chronic CHF  MS (mitral stenosis)  GERD (gastroesophageal reflux disease)  COPD (chronic obstructive pulmonary disease): On home oxygen  HLD (hyperlipidemia): not taking statins  HTN (hypertension)  ESRD (end stage renal disease) on dialysis  Chronic diastolic (congestive) heart failure  Anemia of chronic disease  Breast carcinoma: Lt side s/p lumpectomy and radiation in 2004  DM (diabetes mellitus): type 2  S/P subtotal hysterectomy  S/P lumpectomy, left breast: 2004    ALLERGIES: No Known Allergies    MEDS:  acetaminophen   Tablet .. 650 milliGRAM(s) Oral every 6 hours PRN  albuterol/ipratropium for Nebulization 3 milliLiter(s) Nebulizer every 6 hours  atorvastatin 10 milliGRAM(s) Oral at bedtime  budesonide  80 MICROgram(s)/formoterol 4.5 MICROgram(s) Inhaler 2 Puff(s) Inhalation two times a day  chlorhexidine 4% Liquid 1 Application(s) Topical daily  dextrose 40% Gel 15 Gram(s) Oral once PRN  dextrose 5%. 1000 milliLiter(s) IV Continuous <Continuous>  dextrose 50% Injectable 12.5 Gram(s) IV Push once  dextrose 50% Injectable 25 Gram(s) IV Push once  dextrose 50% Injectable 25 Gram(s) IV Push once  epoetin randy Injectable 23677 Unit(s) IV Push <User Schedule>  ferrous    sulfate 325 milliGRAM(s) Oral daily  glucagon  Injectable 1 milliGRAM(s) IntraMuscular once PRN  heparin  Injectable 5000 Unit(s) SubCutaneous every 12 hours  insulin glargine Injectable (LANTUS) 15 Unit(s) SubCutaneous at bedtime  insulin lispro (HumaLOG) corrective regimen sliding scale   SubCutaneous three times a day before meals  insulin lispro Injectable (HumaLOG) 3 Unit(s) SubCutaneous three times a day before meals  midodrine. 10 milliGRAM(s) Oral every 8 hours  pantoprazole    Tablet 40 milliGRAM(s) Oral before breakfast  piperacillin/tazobactam IVPB.. 3.375 Gram(s) IV Intermittent every 12 hours  sevelamer carbonate 1600 milliGRAM(s) Oral three times a day with meals  traZODone 50 milliGRAM(s) Oral at bedtime    SOCIAL HISTORY:  Smoker:  fomer smoker of 1ppd for many yrs    FAMILY HISTORY:  Diabetes mellitus  Family history of breast cancer (Aunt)    VITALS:  Vital Signs Last 24 Hrs  T(C): 36.9 (27 Nov 2019 05:05), Max: 38.4 (26 Nov 2019 15:22)  T(F): 98.5 (27 Nov 2019 05:05), Max: 101.1 (26 Nov 2019 15:22)  HR: 95 (27 Nov 2019 12:00) (75 - 97)  BP: 139/82 (27 Nov 2019 12:00) (80/58 - 139/82)  BP(mean): --  RR: 16 (27 Nov 2019 05:05) (16 - 20)  SpO2: 100% (27 Nov 2019 12:00) (92% - 100%)      PHYSICAL EXAM:  Constitutional:  HEENT:  Neck:  Respiratory:  Cardiovascular:  Gastrointestinal:  Extremities:  Skin:  Ortho:  Neuro:      LABS/DIAGNOSTIC TESTS:                        9.3    9.39  )-----------( 177      ( 27 Nov 2019 06:27 )             31.9     WBC Count: 9.39 K/uL (11-27 @ 06:27)  WBC Count: 10.21 K/uL (11-26 @ 18:51)  WBC Count: 10.31 K/uL (11-26 @ 08:52)    11-27    141  |  104  |  41<H>  ----------------------------<  114<H>  4.3   |  26  |  4.68<H>    Ca    8.6      27 Nov 2019 06:27      Lactate, Blood: 0.8 mmol/L (11-26 @ 18:51)    ABG - ABG - ( 27 Nov 2019 00:03 )  pH, Arterial: 7.41  pH, Blood: x     /  pCO2: 45    /  pO2: 90    / HCO3: 28    / Base Excess: 3.3   /  SaO2: 98            CULTURES:   11/26 BC - pending   11/26 UC - pending       RADIOLOGY:  EXAM:  XR CHEST PORTABLE URGENT 1V                            PROCEDURE DATE:  11/26/2019          INTERPRETATION:  Clinical indication:  fever    Technique: XR CHEST URGENT portable AP    Comparison:11/22/2019    Findings:  Lines: None    Heart/Mediastinum/Lungs: The heart size is enlarged.The lungs demonstrate   diffuse interstitial prominence, likely reflecting pulmonary vascular   congestion. There are no pleural effusions.    Impression:    As above.        EXAM:  CT ABDOMEN AND PELVIS                            PROCEDURE DATE:  11/25/2019          INTERPRETATION:  CT of the Abdomen without contrast and CT of the Pelvis   without contrast    HISTORY: Symptomatic anemia    Comparison: 7/11/2018    TECHNIQUE: Multiple axial scans of the abdomen and pelvis were obtained   after administration of a small amount of bowel contrast material that   has not passed beyond the stomach.    Interpretation: Liver: No focal lesions in this noncontrast study.      Biliary tree: Not dilated. The gallbladder is collapsed.      Solid organs: Similar bilateral renal cysts.      Retroperitoneum: No evidence of lymphadenopathy. Similar ectasia of the   infrarenal abdominal aorta to a diameter of 3.7 cm.    Bowel: No discrete bowel mass is identified.      Appendix: Similar small appendicolith at its orifice.    Urinary bladder: Contains a small amount of air compatible with recent   instrumentation.      Pelvis: Shows diverticuli of the sigmoid colon without radiographic   evidence of diverticulitis. The patient is again noted to be status post   hysterectomy.    The inguinal regions are unremarkable.      The lung bases show similar small left effusion or pleural thickening.   The patient is status post left mastectomy    The bony structures show no gross destructive changes.      IMPRESSION: Limited by lack of oral contrast. No discrete bowel mass   identified. HPI:  ID consult was called to evaluate this patient for fever. Patient was initially admitted for generalized weakness and myalgia and treated for symptomatic anemia. Began with fever spike of 101.1F last night.  Patient had RRT yesterday after patient was unresponsive after returning from HD. Patient eventual awake and became irritated and refused vitals. CXR just shows pulm congestion. BC and UC are testing.    As per H&P:  Pt is a 78 y/o F, from home walks with cane at baseline, with a PMH of chronic diastolic heart failure (stage II), ESRD on HD T/Th/S, moderate MS, COPD on home oxygen, HTN, HLD, DM, and GERD  who presented with generalized weakness, generalized body pain and shortness of breath on exertion since 1 day.  According to the patient, since yesterday she has been feeling weak and has generalized body pain, more on her shoulders. She mentioned that she had minor fall from her bed yesterday and was not able to sleep whole night. She has shortness of breath on mild exertion limiting her ambulation.   She denied fever, chest pain, nausea, vomiting, abdominal pain and all other review of systems except mentioned above. (22 Nov 2019 17:38)    REVIEW OF SYSTEMS:  [  ] Not able to illicit  General: no fevers no malaise   Chest: no cough no sob no CP  GI: no nvd no abdominal pain  : no urinary symptoms   Skin: no rashes no cyanosis  Musculoskeletal: no trauma no LBP +arm swelling  +foot pain  Neuro: no ha's no dizziness     PAST MEDICAL & SURGICAL HISTORY:  Chronic CHF  MS (mitral stenosis)  GERD (gastroesophageal reflux disease)  COPD (chronic obstructive pulmonary disease): On home oxygen  HLD (hyperlipidemia): not taking statins  HTN (hypertension)  ESRD (end stage renal disease) on dialysis  Chronic diastolic (congestive) heart failure  Anemia of chronic disease  Breast carcinoma: Lt side s/p lumpectomy and radiation in 2004  DM (diabetes mellitus): type 2  S/P subtotal hysterectomy  S/P lumpectomy, left breast: 2004    ALLERGIES: No Known Allergies    MEDS:  acetaminophen   Tablet .. 650 milliGRAM(s) Oral every 6 hours PRN  albuterol/ipratropium for Nebulization 3 milliLiter(s) Nebulizer every 6 hours  atorvastatin 10 milliGRAM(s) Oral at bedtime  budesonide  80 MICROgram(s)/formoterol 4.5 MICROgram(s) Inhaler 2 Puff(s) Inhalation two times a day  chlorhexidine 4% Liquid 1 Application(s) Topical daily  dextrose 40% Gel 15 Gram(s) Oral once PRN  dextrose 5%. 1000 milliLiter(s) IV Continuous <Continuous>  dextrose 50% Injectable 12.5 Gram(s) IV Push once  dextrose 50% Injectable 25 Gram(s) IV Push once  dextrose 50% Injectable 25 Gram(s) IV Push once  epoetin randy Injectable 29967 Unit(s) IV Push <User Schedule>  ferrous    sulfate 325 milliGRAM(s) Oral daily  glucagon  Injectable 1 milliGRAM(s) IntraMuscular once PRN  heparin  Injectable 5000 Unit(s) SubCutaneous every 12 hours  insulin glargine Injectable (LANTUS) 15 Unit(s) SubCutaneous at bedtime  insulin lispro (HumaLOG) corrective regimen sliding scale   SubCutaneous three times a day before meals  insulin lispro Injectable (HumaLOG) 3 Unit(s) SubCutaneous three times a day before meals  midodrine. 10 milliGRAM(s) Oral every 8 hours  pantoprazole    Tablet 40 milliGRAM(s) Oral before breakfast  piperacillin/tazobactam IVPB.. 3.375 Gram(s) IV Intermittent every 12 hours  sevelamer carbonate 1600 milliGRAM(s) Oral three times a day with meals  traZODone 50 milliGRAM(s) Oral at bedtime    SOCIAL HISTORY:  Smoker:  fomer smoker of 1ppd for many yrs    FAMILY HISTORY:  Diabetes mellitus  Family history of breast cancer (Aunt)    VITALS:  Vital Signs Last 24 Hrs  T(C): 36.9 (27 Nov 2019 05:05), Max: 38.4 (26 Nov 2019 15:22)  T(F): 98.5 (27 Nov 2019 05:05), Max: 101.1 (26 Nov 2019 15:22)  HR: 95 (27 Nov 2019 12:00) (75 - 97)  BP: 139/82 (27 Nov 2019 12:00) (80/58 - 139/82)  BP(mean): --  RR: 16 (27 Nov 2019 05:05) (16 - 20)  SpO2: 100% (27 Nov 2019 12:00) (92% - 100%)      PHYSICAL EXAM:  Constitutional: overweight, elderly female in NAD  HEENT: normocephalic with moist oral mucosa  Neck: supple no LN's no JVD  Respiratory: lungs clear no rales no rhonchi  +left mastectomy  Cardiovascular: S1 S2 reg no murmurs  Gastrointestinal: +BS with soft, nondistended abdomen; nontender  Extremities: Left arm edema no cyanosis  Skin: noted erythema of right dorsal aspect of foot with mild tenderness, patient denies any injury no rashes, no thrombophlebitis  Ortho: no jt swelling  Neuro: awake and alert      LABS/DIAGNOSTIC TESTS:                        9.3    9.39  )-----------( 177      ( 27 Nov 2019 06:27 )             31.9     WBC Count: 9.39 K/uL (11-27 @ 06:27)  WBC Count: 10.21 K/uL (11-26 @ 18:51)  WBC Count: 10.31 K/uL (11-26 @ 08:52)    11-27    141  |  104  |  41<H>  ----------------------------<  114<H>  4.3   |  26  |  4.68<H>    Ca    8.6      27 Nov 2019 06:27      Lactate, Blood: 0.8 mmol/L (11-26 @ 18:51)    ABG - ABG - ( 27 Nov 2019 00:03 )  pH, Arterial: 7.41  pH, Blood: x     /  pCO2: 45    /  pO2: 90    / HCO3: 28    / Base Excess: 3.3   /  SaO2: 98            CULTURES:   11/26 BC - pending   11/26 UC - pending       RADIOLOGY:  EXAM:  XR CHEST PORTABLE URGENT 1V                            PROCEDURE DATE:  11/26/2019          INTERPRETATION:  Clinical indication:  fever    Technique: XR CHEST URGENT portable AP    Comparison:11/22/2019    Findings:  Lines: None    Heart/Mediastinum/Lungs: The heart size is enlarged.The lungs demonstrate   diffuse interstitial prominence, likely reflecting pulmonary vascular   congestion. There are no pleural effusions.    Impression:    As above.        EXAM:  CT ABDOMEN AND PELVIS                            PROCEDURE DATE:  11/25/2019          INTERPRETATION:  CT of the Abdomen without contrast and CT of the Pelvis   without contrast    HISTORY: Symptomatic anemia    Comparison: 7/11/2018    TECHNIQUE: Multiple axial scans of the abdomen and pelvis were obtained   after administration of a small amount of bowel contrast material that   has not passed beyond the stomach.    Interpretation: Liver: No focal lesions in this noncontrast study.      Biliary tree: Not dilated. The gallbladder is collapsed.      Solid organs: Similar bilateral renal cysts.      Retroperitoneum: No evidence of lymphadenopathy. Similar ectasia of the   infrarenal abdominal aorta to a diameter of 3.7 cm.    Bowel: No discrete bowel mass is identified.      Appendix: Similar small appendicolith at its orifice.    Urinary bladder: Contains a small amount of air compatible with recent   instrumentation.      Pelvis: Shows diverticuli of the sigmoid colon without radiographic   evidence of diverticulitis. The patient is again noted to be status post   hysterectomy.    The inguinal regions are unremarkable.      The lung bases show similar small left effusion or pleural thickening.   The patient is status post left mastectomy    The bony structures show no gross destructive changes.      IMPRESSION: Limited by lack of oral contrast. No discrete bowel mass   identified. HPI:  ID consult was called to evaluate this patient for fever. Patient was initially admitted for generalized weakness and myalgia and treated for symptomatic anemia. Began with fever spike of 101.1F last night.  Patient had RRT yesterday after patient was unresponsive after returning from HD. Patient eventual awake and became irritated and refused vitals. CXR just shows pulm congestion. BC and UC are testing. At present, patient c/o left arm swelling and right foot pain. Right foot noted to be erythematous and warmth.     As per H&P:  Pt is a 80 y/o F, from home walks with cane at baseline, with a PMH of chronic diastolic heart failure (stage II), ESRD on HD T/Th/S, moderate MS, COPD on home oxygen, HTN, HLD, DM, and GERD  who presented with generalized weakness, generalized body pain and shortness of breath on exertion since 1 day.  According to the patient, since yesterday she has been feeling weak and has generalized body pain, more on her shoulders. She mentioned that she had minor fall from her bed yesterday and was not able to sleep whole night. She has shortness of breath on mild exertion limiting her ambulation.   She denied fever, chest pain, nausea, vomiting, abdominal pain and all other review of systems except mentioned above. (22 Nov 2019 17:38)    REVIEW OF SYSTEMS:  [  ] Not able to illicit  General: no fevers no malaise   Chest: no cough no sob no CP  GI: no nvd no abdominal pain  : no urinary symptoms   Skin: no rashes no cyanosis  Musculoskeletal: no trauma no LBP +arm swelling  +foot pain  Neuro: no ha's no dizziness     PAST MEDICAL & SURGICAL HISTORY:  Chronic CHF  MS (mitral stenosis)  GERD (gastroesophageal reflux disease)  COPD (chronic obstructive pulmonary disease): On home oxygen  HLD (hyperlipidemia): not taking statins  HTN (hypertension)  ESRD (end stage renal disease) on dialysis  Chronic diastolic (congestive) heart failure  Anemia of chronic disease  Breast carcinoma: Lt side s/p lumpectomy and radiation in 2004  DM (diabetes mellitus): type 2  S/P subtotal hysterectomy  S/P lumpectomy, left breast: 2004    ALLERGIES: No Known Allergies    MEDS:  acetaminophen   Tablet .. 650 milliGRAM(s) Oral every 6 hours PRN  albuterol/ipratropium for Nebulization 3 milliLiter(s) Nebulizer every 6 hours  atorvastatin 10 milliGRAM(s) Oral at bedtime  budesonide  80 MICROgram(s)/formoterol 4.5 MICROgram(s) Inhaler 2 Puff(s) Inhalation two times a day  chlorhexidine 4% Liquid 1 Application(s) Topical daily  dextrose 40% Gel 15 Gram(s) Oral once PRN  dextrose 5%. 1000 milliLiter(s) IV Continuous <Continuous>  dextrose 50% Injectable 12.5 Gram(s) IV Push once  dextrose 50% Injectable 25 Gram(s) IV Push once  dextrose 50% Injectable 25 Gram(s) IV Push once  epoetin randy Injectable 44511 Unit(s) IV Push <User Schedule>  ferrous    sulfate 325 milliGRAM(s) Oral daily  glucagon  Injectable 1 milliGRAM(s) IntraMuscular once PRN  heparin  Injectable 5000 Unit(s) SubCutaneous every 12 hours  insulin glargine Injectable (LANTUS) 15 Unit(s) SubCutaneous at bedtime  insulin lispro (HumaLOG) corrective regimen sliding scale   SubCutaneous three times a day before meals  insulin lispro Injectable (HumaLOG) 3 Unit(s) SubCutaneous three times a day before meals  midodrine. 10 milliGRAM(s) Oral every 8 hours  pantoprazole    Tablet 40 milliGRAM(s) Oral before breakfast  piperacillin/tazobactam IVPB.. 3.375 Gram(s) IV Intermittent every 12 hours  sevelamer carbonate 1600 milliGRAM(s) Oral three times a day with meals  traZODone 50 milliGRAM(s) Oral at bedtime    SOCIAL HISTORY:  Smoker:  fomer smoker of 1ppd for many yrs    FAMILY HISTORY:  Diabetes mellitus  Family history of breast cancer (Aunt)    VITALS:  Vital Signs Last 24 Hrs  T(C): 36.9 (27 Nov 2019 05:05), Max: 38.4 (26 Nov 2019 15:22)  T(F): 98.5 (27 Nov 2019 05:05), Max: 101.1 (26 Nov 2019 15:22)  HR: 95 (27 Nov 2019 12:00) (75 - 97)  BP: 139/82 (27 Nov 2019 12:00) (80/58 - 139/82)  BP(mean): --  RR: 16 (27 Nov 2019 05:05) (16 - 20)  SpO2: 100% (27 Nov 2019 12:00) (92% - 100%)      PHYSICAL EXAM:  Constitutional: overweight, elderly female in NAD  HEENT: normocephalic with moist oral mucosa  Neck: supple no LN's no JVD  Respiratory: lungs clear no rales no rhonchi  +left mastectomy  Cardiovascular: S1 S2 reg no murmurs  Gastrointestinal: +BS with soft, nondistended abdomen; nontender  Extremities: Left arm edema no cyanosis  Skin: noted cellulitis of right dorsal aspect of foot with mild tenderness, denies any injury no rashes, no thrombophlebitis  Ortho: no jt swelling  Neuro: awake and alert      LABS/DIAGNOSTIC TESTS:                        9.3    9.39  )-----------( 177      ( 27 Nov 2019 06:27 )             31.9     WBC Count: 9.39 K/uL (11-27 @ 06:27)  WBC Count: 10.21 K/uL (11-26 @ 18:51)  WBC Count: 10.31 K/uL (11-26 @ 08:52)    11-27    141  |  104  |  41<H>  ----------------------------<  114<H>  4.3   |  26  |  4.68<H>    Ca    8.6      27 Nov 2019 06:27      Lactate, Blood: 0.8 mmol/L (11-26 @ 18:51)    ABG - ABG - ( 27 Nov 2019 00:03 )  pH, Arterial: 7.41  pH, Blood: x     /  pCO2: 45    /  pO2: 90    / HCO3: 28    / Base Excess: 3.3   /  SaO2: 98            CULTURES:   11/26 BC - pending   11/26 UC - pending       RADIOLOGY:  EXAM:  XR CHEST PORTABLE URGENT 1V                            PROCEDURE DATE:  11/26/2019          INTERPRETATION:  Clinical indication:  fever    Technique: XR CHEST URGENT portable AP    Comparison:11/22/2019    Findings:  Lines: None    Heart/Mediastinum/Lungs: The heart size is enlarged.The lungs demonstrate   diffuse interstitial prominence, likely reflecting pulmonary vascular   congestion. There are no pleural effusions.    Impression:    As above.        EXAM:  CT ABDOMEN AND PELVIS                            PROCEDURE DATE:  11/25/2019          INTERPRETATION:  CT of the Abdomen without contrast and CT of the Pelvis   without contrast    HISTORY: Symptomatic anemia    Comparison: 7/11/2018    TECHNIQUE: Multiple axial scans of the abdomen and pelvis were obtained   after administration of a small amount of bowel contrast material that   has not passed beyond the stomach.    Interpretation: Liver: No focal lesions in this noncontrast study.      Biliary tree: Not dilated. The gallbladder is collapsed.      Solid organs: Similar bilateral renal cysts.      Retroperitoneum: No evidence of lymphadenopathy. Similar ectasia of the   infrarenal abdominal aorta to a diameter of 3.7 cm.    Bowel: No discrete bowel mass is identified.      Appendix: Similar small appendicolith at its orifice.    Urinary bladder: Contains a small amount of air compatible with recent   instrumentation.      Pelvis: Shows diverticuli of the sigmoid colon without radiographic   evidence of diverticulitis. The patient is again noted to be status post   hysterectomy.    The inguinal regions are unremarkable.      The lung bases show similar small left effusion or pleural thickening.   The patient is status post left mastectomy    The bony structures show no gross destructive changes.      IMPRESSION: Limited by lack of oral contrast. No discrete bowel mass   identified.

## 2019-11-27 NOTE — PROGRESS NOTE ADULT - ASSESSMENT
Pt presented with SOB due to acute on chronic hypoxic hypercapnic respiratory failure due to Acute exacerbation of COPD with CHF and anemia.

## 2019-11-27 NOTE — CONSULT NOTE ADULT - ASSESSMENT
1.	Fever likely from right foot cellulitis  ·	cont zosyn 3.375gm IV q12h  D2 for now while waiting on culture results
ESRD, patient missed her last dialysis treatment.    Hypotension and bradycardia   Anemia - possible GI bleed, to be r/o.  Failure of JACKELINE to increase her H/H in the last 3 month.    ICU was called and saw the patient in dialysis Epinephrine and calcium Gluconate was given with improvment.    CHF due to ESRD, DIALYSIS TODAY ATTEMPT FLUID REMOVAL AS PER BP  Transfuse 2 units of PRBC in dialysis  GI follow up Dr FLORINA Keating.  Cardiology follow up
The etiology for anemia and blood in stool in this patient can be due to:  1. Colorectal neoplasm.  2. Rectal ulcer  3. AVM's  4. Upper GI bleeding less likely    Suggestions:    1. Monitor H/H  2. Transfuse PRBC as needed  3. Protonix daily  4. Avoid NSAID  5. Colonoscopy   6. CT-Scan of abdomen and pelvis  7. DVT prophylaxis

## 2019-11-27 NOTE — PROGRESS NOTE ADULT - SUBJECTIVE AND OBJECTIVE BOX
Events noted patient had episode of decreased responses overnight, low BP, Febrile.  Denies CP, SOB this AM    acetaminophen   Tablet .. 650 milliGRAM(s) Oral every 6 hours PRN  albuterol/ipratropium for Nebulization 3 milliLiter(s) Nebulizer every 6 hours  atorvastatin 10 milliGRAM(s) Oral at bedtime  budesonide  80 MICROgram(s)/formoterol 4.5 MICROgram(s) Inhaler 2 Puff(s) Inhalation two times a day  dextrose 40% Gel 15 Gram(s) Oral once PRN  dextrose 5%. 1000 milliLiter(s) IV Continuous <Continuous>  dextrose 50% Injectable 12.5 Gram(s) IV Push once  dextrose 50% Injectable 25 Gram(s) IV Push once  dextrose 50% Injectable 25 Gram(s) IV Push once  epoetin randy Injectable 28557 Unit(s) IV Push <User Schedule>  ferrous    sulfate 325 milliGRAM(s) Oral daily  glucagon  Injectable 1 milliGRAM(s) IntraMuscular once PRN  heparin  Injectable 5000 Unit(s) SubCutaneous every 12 hours  insulin glargine Injectable (LANTUS) 15 Unit(s) SubCutaneous at bedtime  insulin lispro (HumaLOG) corrective regimen sliding scale   SubCutaneous three times a day before meals  insulin lispro Injectable (HumaLOG) 3 Unit(s) SubCutaneous three times a day before meals  midodrine. 10 milliGRAM(s) Oral every 8 hours  pantoprazole    Tablet 40 milliGRAM(s) Oral before breakfast  piperacillin/tazobactam IVPB.. 3.375 Gram(s) IV Intermittent every 12 hours  sevelamer carbonate 1600 milliGRAM(s) Oral three times a day with meals  traZODone 50 milliGRAM(s) Oral at bedtime                            9.3    9.39  )-----------( 177      ( 27 Nov 2019 06:27 )             31.9       Hemoglobin: 9.3 g/dL (11-27 @ 06:27)  Hemoglobin: 9.1 g/dL (11-26 @ 18:51)  Hemoglobin: 9.6 g/dL (11-26 @ 08:52)  Hemoglobin: 10.2 g/dL (11-24 @ 05:48)  Hemoglobin: 10.2 g/dL (11-23 @ 22:04)      11-27    141  |  104  |  41<H>  ----------------------------<  114<H>  4.3   |  26  |  4.68<H>    Ca    8.6      27 Nov 2019 06:27      Creatinine Trend: 4.68<--, 5.80<--, 4.71<--, 4.34<--, 6.50<--, 5.81<--    COAGS:           T(C): 36.9 (11-27-19 @ 05:05), Max: 38.4 (11-26-19 @ 15:22)  HR: 75 (11-27-19 @ 05:05) (75 - 100)  BP: 92/50 (11-27-19 @ 05:05) (80/58 - 121/62)  RR: 16 (11-27-19 @ 05:05) (16 - 20)  SpO2: 100% (11-27-19 @ 05:05) (92% - 100%)  Wt(kg): --    I&O's Summary    Gen: Appears well in NAD  HEENT:  (-)icterus (-)pallor  CV: N S1 S2 1/6 HODA (+)2 Pulses B/l  Resp:  Clear to ausculatation B/L, normal effort  GI: (+) BS Soft, NT, ND  Lymph:  (-)Edema, (-)obvious lymphadenopathy  Skin: Warm to touch, Normal turgor  Psych: Appropriate mood and affect      TELEMETRY: 	  OFF       ASSESSMENT/PLAN: 	79y Female female hx of ESRD on HD, HTN, ANemia, copd , chf, PAF, no A/C documented , now with anemia, SOB , + troponin on admission labs     -  HD per renal  - Fever w/u per Med  - Abx per primary team  - cont midodrine for BP support  - (+) trop will plan for stress test once stable      Kyler Jaimes MD, [facility]  BEEPER [phone]

## 2019-11-27 NOTE — PROGRESS NOTE ADULT - NEGATIVE ENMT SYMPTOMS
no hearing difficulty/no vertigo/no ear pain/no dry mouth/no throat pain/no dysphagia/no gum bleeding

## 2019-11-27 NOTE — PROGRESS NOTE ADULT - PROBLEM SELECTOR PLAN 1
acute drop on h/h  Received 2 un of PRBC for hb 7.4  H/h improved   Recommended colonoscopy  GI on board acute drop on h/h  Received 2 un of PRBC for hb 7.4  H/h improved   Recommended colonoscopy (patient refused)  GI on board

## 2019-11-27 NOTE — PHYSICAL THERAPY INITIAL EVALUATION ADULT - ADDITIONAL COMMENTS
Pt reports HHA 7days/7hrs who helps with showering and other ADLs as needed. Pt is able to transfer and ambulate independently with RW. Pt reports she has shower chair and wheelchair at home.

## 2019-11-27 NOTE — PROGRESS NOTE ADULT - SUBJECTIVE AND OBJECTIVE BOX
Time of Visit:  Patient seen and examined.     MEDICATIONS  (STANDING):  albuterol/ipratropium for Nebulization 3 milliLiter(s) Nebulizer every 6 hours  atorvastatin 10 milliGRAM(s) Oral at bedtime  budesonide  80 MICROgram(s)/formoterol 4.5 MICROgram(s) Inhaler 2 Puff(s) Inhalation two times a day  chlorhexidine 4% Liquid 1 Application(s) Topical daily  dextrose 5%. 1000 milliLiter(s) (50 mL/Hr) IV Continuous <Continuous>  dextrose 50% Injectable 12.5 Gram(s) IV Push once  dextrose 50% Injectable 25 Gram(s) IV Push once  dextrose 50% Injectable 25 Gram(s) IV Push once  epoetin randy Injectable 73665 Unit(s) IV Push <User Schedule>  ferrous    sulfate 325 milliGRAM(s) Oral daily  heparin  Injectable 5000 Unit(s) SubCutaneous every 12 hours  insulin glargine Injectable (LANTUS) 15 Unit(s) SubCutaneous at bedtime  insulin lispro (HumaLOG) corrective regimen sliding scale   SubCutaneous three times a day before meals  insulin lispro Injectable (HumaLOG) 3 Unit(s) SubCutaneous three times a day before meals  midodrine. 10 milliGRAM(s) Oral every 8 hours  pantoprazole    Tablet 40 milliGRAM(s) Oral before breakfast  piperacillin/tazobactam IVPB.. 3.375 Gram(s) IV Intermittent every 12 hours  sevelamer carbonate 1600 milliGRAM(s) Oral three times a day with meals  traZODone 50 milliGRAM(s) Oral at bedtime      MEDICATIONS  (PRN):  acetaminophen   Tablet .. 650 milliGRAM(s) Oral every 6 hours PRN Mild Pain (1 - 3), Moderate Pain (4 - 6)  dextrose 40% Gel 15 Gram(s) Oral once PRN Blood Glucose LESS THAN 70 milliGRAM(s)/deciliter  glucagon  Injectable 1 milliGRAM(s) IntraMuscular once PRN Glucose LESS THAN 70 milligrams/deciliter       Medications up to date at time of exam.      PHYSICAL EXAMINATION:  Patient has no new complaints.  GENERAL: The patient is a well-developed, well-nourished, in no apparent distress.     Vital Signs Last 24 Hrs  T(C): 37.4 (27 Nov 2019 14:00), Max: 37.4 (27 Nov 2019 14:00)  T(F): 99.4 (27 Nov 2019 14:00), Max: 99.4 (27 Nov 2019 14:00)  HR: 78 (27 Nov 2019 14:00) (75 - 97)  BP: 97/47 (27 Nov 2019 14:00) (80/58 - 139/82)  BP(mean): --  RR: 18 (27 Nov 2019 14:00) (16 - 19)  SpO2: 94% (27 Nov 2019 14:00) (92% - 100%)   (if applicable)    Chest Tube (if applicable)    HEENT: Head is normocephalic and atraumatic. Extraocular muscles are intact. Mucous membranes are moist.     NECK: Supple, no palpable adenopathy.    LUNGS: Clear to auscultation, no wheezing, rales, or rhonchi.    HEART: Regular rate and rhythm without murmur.    ABDOMEN: Soft, nontender, and nondistended.  No hepatosplenomegaly is noted.    : No painful voiding, no pelvic pain    EXTREMITIES: Without any cyanosis, clubbing, rash, lesions or edema. RUE + AVF    NEUROLOGIC: Awake, alert,     SKIN: Warm, dry, good turgor.      LABS:                        9.3    9.39  )-----------( 177      ( 27 Nov 2019 06:27 )             31.9     11-27    141  |  104  |  41<H>  ----------------------------<  114<H>  4.3   |  26  |  4.68<H>    Ca    8.6      27 Nov 2019 06:27          ABG - ( 27 Nov 2019 00:03 )  pH, Arterial: 7.41  pH, Blood: x     /  pCO2: 45    /  pO2: 90    / HCO3: 28    / Base Excess: 3.3   /  SaO2: 98                              MICROBIOLOGY: (if applicable)    RADIOLOGY & ADDITIONAL STUDIES:  EKG:   CXR:  ECHO:    IMPRESSION: 79y Female PAST MEDICAL & SURGICAL HISTORY:  Chronic CHF  MS (mitral stenosis)  GERD (gastroesophageal reflux disease)  COPD (chronic obstructive pulmonary disease): On home oxygen  HLD (hyperlipidemia): not taking statins  HTN (hypertension)  ESRD (end stage renal disease) on dialysis  Chronic diastolic (congestive) heart failure  Anemia of chronic disease  Breast carcinoma: Lt side s/p lumpectomy and radiation in 2004  DM (diabetes mellitus): type 2  S/P subtotal hysterectomy  S/P lumpectomy, left breast: 2004   p/w         Imp: This is a 79 yr old woman for smoker with prior mentioned medical condition condition presented with SOB due to acute on chronic hypoxic hypercapnic resp failure due to Acute exacerbation of COPD with CHF and anemia.      SUGG:  -continue dialysis as per rnephro  -continue meds  -o2 supp to keep O2 Sat>90%  -duoneb  -symbicort  -out pat pul f/u Time of Visit:  Patient seen and examined.     MEDICATIONS  (STANDING):  albuterol/ipratropium for Nebulization 3 milliLiter(s) Nebulizer every 6 hours  atorvastatin 10 milliGRAM(s) Oral at bedtime  budesonide  80 MICROgram(s)/formoterol 4.5 MICROgram(s) Inhaler 2 Puff(s) Inhalation two times a day  chlorhexidine 4% Liquid 1 Application(s) Topical daily  dextrose 5%. 1000 milliLiter(s) (50 mL/Hr) IV Continuous <Continuous>  dextrose 50% Injectable 12.5 Gram(s) IV Push once  dextrose 50% Injectable 25 Gram(s) IV Push once  dextrose 50% Injectable 25 Gram(s) IV Push once  epoetin randy Injectable 51348 Unit(s) IV Push <User Schedule>  ferrous    sulfate 325 milliGRAM(s) Oral daily  heparin  Injectable 5000 Unit(s) SubCutaneous every 12 hours  insulin glargine Injectable (LANTUS) 15 Unit(s) SubCutaneous at bedtime  insulin lispro (HumaLOG) corrective regimen sliding scale   SubCutaneous three times a day before meals  insulin lispro Injectable (HumaLOG) 3 Unit(s) SubCutaneous three times a day before meals  midodrine. 10 milliGRAM(s) Oral every 8 hours  pantoprazole    Tablet 40 milliGRAM(s) Oral before breakfast  piperacillin/tazobactam IVPB.. 3.375 Gram(s) IV Intermittent every 12 hours  sevelamer carbonate 1600 milliGRAM(s) Oral three times a day with meals  traZODone 50 milliGRAM(s) Oral at bedtime      MEDICATIONS  (PRN):  acetaminophen   Tablet .. 650 milliGRAM(s) Oral every 6 hours PRN Mild Pain (1 - 3), Moderate Pain (4 - 6)  dextrose 40% Gel 15 Gram(s) Oral once PRN Blood Glucose LESS THAN 70 milliGRAM(s)/deciliter  glucagon  Injectable 1 milliGRAM(s) IntraMuscular once PRN Glucose LESS THAN 70 milligrams/deciliter       Medications up to date at time of exam.      PHYSICAL EXAMINATION:  Patient has no new complaints.  GENERAL: The patient is a well-developed, well-nourished, in no apparent distress.     Vital Signs Last 24 Hrs  T(C): 37.4 (27 Nov 2019 14:00), Max: 37.4 (27 Nov 2019 14:00)  T(F): 99.4 (27 Nov 2019 14:00), Max: 99.4 (27 Nov 2019 14:00)  HR: 78 (27 Nov 2019 14:00) (75 - 97)  BP: 97/47 (27 Nov 2019 14:00) (80/58 - 139/82)  BP(mean): --  RR: 18 (27 Nov 2019 14:00) (16 - 19)  SpO2: 94% (27 Nov 2019 14:00) (92% - 100%)   (if applicable)    Chest Tube (if applicable)    HEENT: Head is normocephalic and atraumatic. Extraocular muscles are intact. Mucous membranes are moist.     NECK: Supple, no palpable adenopathy.    LUNGS: Clear to auscultation, no wheezing, rales, or rhonchi.    HEART: Regular rate and rhythm without murmur.    ABDOMEN: Soft, nontender, and nondistended.  No hepatosplenomegaly is noted.    : No painful voiding, no pelvic pain    EXTREMITIES: Without any cyanosis, clubbing, rash, lesions or edema. RUE + AVF    NEUROLOGIC: Awake, alert,     SKIN: Warm, dry, good turgor.      LABS:                        9.3    9.39  )-----------( 177      ( 27 Nov 2019 06:27 )             31.9     11-27    141  |  104  |  41<H>  ----------------------------<  114<H>  4.3   |  26  |  4.68<H>    Ca    8.6      27 Nov 2019 06:27          ABG - ( 27 Nov 2019 00:03 )  pH, Arterial: 7.41  pH, Blood: x     /  pCO2: 45    /  pO2: 90    / HCO3: 28    / Base Excess: 3.3   /  SaO2: 98                              MICROBIOLOGY: (if applicable)    RADIOLOGY & ADDITIONAL STUDIES:  EKG:   CXR:  ECHO:    IMPRESSION: 79y Female PAST MEDICAL & SURGICAL HISTORY:  Chronic CHF  MS (mitral stenosis)  GERD (gastroesophageal reflux disease)  COPD (chronic obstructive pulmonary disease): On home oxygen  HLD (hyperlipidemia): not taking statins  HTN (hypertension)  ESRD (end stage renal disease) on dialysis  Chronic diastolic (congestive) heart failure  Anemia of chronic disease  Breast carcinoma: Lt side s/p lumpectomy and radiation in 2004  DM (diabetes mellitus): type 2  S/P subtotal hysterectomy  S/P lumpectomy, left breast: 2004   p/w         Imp: This is a 79 yr old woman for smoker with prior mentioned medical condition condition presented with SOB due to acute on chronic hypoxic hypercapnic resp failure due to Acute exacerbation of COPD with CHF and anemia.      SUGG:  -continue dialysis as per rnephro  -add mitidrone for low BP  -continue meds  -o2 supp to keep O2 Sat>90%  -duoneb  -symbicort  -out pat pul f/u

## 2019-11-27 NOTE — PROGRESS NOTE ADULT - SUBJECTIVE AND OBJECTIVE BOX
[   ] ICU                                          [   ] CCU                                      [ x  ] Medical Floor      Patient is comfortable. No new complaints reported, No abdominal pain, N/V, hematemesis, hematochezia, melena, fever, chills, chest pain, SOB, cough or diarrhea reported.    VITALS  Vital Signs Last 24 Hrs  T(C): 37.4 (27 Nov 2019 14:00), Max: 37.4 (26 Nov 2019 19:29)  T(F): 99.4 (27 Nov 2019 14:00), Max: 99.4 (27 Nov 2019 14:00)  HR: 78 (27 Nov 2019 14:00) (75 - 97)  BP: 97/47 (27 Nov 2019 14:00) (80/58 - 139/82)   RR: 18 (27 Nov 2019 14:00) (16 - 19)  SpO2: 94% (27 Nov 2019 14:00) (92% - 100%)       MEDICATIONS  (STANDING):  albuterol/ipratropium for Nebulization 3 milliLiter(s) Nebulizer every 6 hours  atorvastatin 10 milliGRAM(s) Oral at bedtime  budesonide  80 MICROgram(s)/formoterol 4.5 MICROgram(s) Inhaler 2 Puff(s) Inhalation two times a day  chlorhexidine 4% Liquid 1 Application(s) Topical daily  dextrose 5%. 1000 milliLiter(s) (50 mL/Hr) IV Continuous <Continuous>  dextrose 50% Injectable 12.5 Gram(s) IV Push once  dextrose 50% Injectable 25 Gram(s) IV Push once  dextrose 50% Injectable 25 Gram(s) IV Push once  epoetin randy Injectable 73569 Unit(s) IV Push <User Schedule>  ferrous    sulfate 325 milliGRAM(s) Oral daily  heparin  Injectable 5000 Unit(s) SubCutaneous every 12 hours  insulin glargine Injectable (LANTUS) 15 Unit(s) SubCutaneous at bedtime  insulin lispro (HumaLOG) corrective regimen sliding scale   SubCutaneous three times a day before meals  insulin lispro Injectable (HumaLOG) 3 Unit(s) SubCutaneous three times a day before meals  midodrine. 10 milliGRAM(s) Oral every 8 hours  pantoprazole    Tablet 40 milliGRAM(s) Oral before breakfast  piperacillin/tazobactam IVPB.. 3.375 Gram(s) IV Intermittent every 12 hours  sevelamer carbonate 1600 milliGRAM(s) Oral three times a day with meals  traZODone 50 milliGRAM(s) Oral at bedtime    MEDICATIONS  (PRN):  acetaminophen   Tablet .. 650 milliGRAM(s) Oral every 6 hours PRN Mild Pain (1 - 3), Moderate Pain (4 - 6)  dextrose 40% Gel 15 Gram(s) Oral once PRN Blood Glucose LESS THAN 70 milliGRAM(s)/deciliter  glucagon  Injectable 1 milliGRAM(s) IntraMuscular once PRN Glucose LESS THAN 70 milligrams/deciliter                            9.3    9.39  )-----------( 177      ( 27 Nov 2019 06:27 )             31.9       11-27    141  |  104  |  41<H>  ----------------------------<  114<H>  4.3   |  26  |  4.68<H>    Ca    8.6      27 Nov 2019 06:27

## 2019-11-27 NOTE — CONSULT NOTE ADULT - ATTENDING COMMENTS
CARDIOLOGY ATTENDING    Agree with above. Admitted with volume overload. Get echo, f/u renal regarding volume removal via HD
I agree with above

## 2019-11-27 NOTE — PHYSICAL THERAPY INITIAL EVALUATION ADULT - ASSISTIVE DEVICE:SUPINE/SIT, REHAB EVAL
Patient at this time refusing to be D/C. pt reports unable to walk comfortably. wants to see urologist here. MD aware. will talk to patient bed rails

## 2019-11-27 NOTE — PROGRESS NOTE ADULT - PROBLEM SELECTOR PLAN 2
Echo results following by Cardiology  Hd to prevent fluid overload Echo showing Left Ventricle: Normal Left Ventricular Systolic Function,  (EF = 55 to 60%) Mild concentric left ventricular  hypertrophy. Grade I diastolic dysfunction (Impaired  relaxation).  Cardiology (Dr. Puga) following  Hd to prevent fluid overload

## 2019-11-27 NOTE — PHYSICAL THERAPY INITIAL EVALUATION ADULT - CRITERIA FOR SKILLED THERAPEUTIC INTERVENTIONS
impairments found/therapy frequency/anticipated discharge recommendation/rehab potential/functional limitations in following categories/predicted duration of therapy intervention

## 2019-11-28 LAB
ANION GAP SERPL CALC-SCNC: 9 MMOL/L — SIGNIFICANT CHANGE UP (ref 5–17)
BUN SERPL-MCNC: 52 MG/DL — HIGH (ref 7–18)
CALCIUM SERPL-MCNC: 8.3 MG/DL — LOW (ref 8.4–10.5)
CHLORIDE SERPL-SCNC: 103 MMOL/L — SIGNIFICANT CHANGE UP (ref 96–108)
CO2 SERPL-SCNC: 25 MMOL/L — SIGNIFICANT CHANGE UP (ref 22–31)
CREAT SERPL-MCNC: 5.31 MG/DL — HIGH (ref 0.5–1.3)
GLUCOSE BLDC GLUCOMTR-MCNC: 154 MG/DL — HIGH (ref 70–99)
GLUCOSE BLDC GLUCOMTR-MCNC: 252 MG/DL — HIGH (ref 70–99)
GLUCOSE BLDC GLUCOMTR-MCNC: 273 MG/DL — HIGH (ref 70–99)
GLUCOSE BLDC GLUCOMTR-MCNC: 82 MG/DL — SIGNIFICANT CHANGE UP (ref 70–99)
GLUCOSE SERPL-MCNC: 120 MG/DL — HIGH (ref 70–99)
HCT VFR BLD CALC: 30.9 % — LOW (ref 34.5–45)
HGB BLD-MCNC: 9.4 G/DL — LOW (ref 11.5–15.5)
MCHC RBC-ENTMCNC: 29.2 PG — SIGNIFICANT CHANGE UP (ref 27–34)
MCHC RBC-ENTMCNC: 30.4 GM/DL — LOW (ref 32–36)
MCV RBC AUTO: 96 FL — SIGNIFICANT CHANGE UP (ref 80–100)
NRBC # BLD: 0 /100 WBCS — SIGNIFICANT CHANGE UP (ref 0–0)
PLATELET # BLD AUTO: 195 K/UL — SIGNIFICANT CHANGE UP (ref 150–400)
POTASSIUM SERPL-MCNC: 4.2 MMOL/L — SIGNIFICANT CHANGE UP (ref 3.5–5.3)
POTASSIUM SERPL-SCNC: 4.2 MMOL/L — SIGNIFICANT CHANGE UP (ref 3.5–5.3)
RBC # BLD: 3.22 M/UL — LOW (ref 3.8–5.2)
RBC # FLD: 16.1 % — HIGH (ref 10.3–14.5)
SODIUM SERPL-SCNC: 137 MMOL/L — SIGNIFICANT CHANGE UP (ref 135–145)
WBC # BLD: 9.63 K/UL — SIGNIFICANT CHANGE UP (ref 3.8–10.5)
WBC # FLD AUTO: 9.63 K/UL — SIGNIFICANT CHANGE UP (ref 3.8–10.5)

## 2019-11-28 RX ADMIN — ERYTHROPOIETIN 10000 UNIT(S): 10000 INJECTION, SOLUTION INTRAVENOUS; SUBCUTANEOUS at 17:40

## 2019-11-28 RX ADMIN — PANTOPRAZOLE SODIUM 40 MILLIGRAM(S): 20 TABLET, DELAYED RELEASE ORAL at 05:38

## 2019-11-28 RX ADMIN — SEVELAMER CARBONATE 1600 MILLIGRAM(S): 2400 POWDER, FOR SUSPENSION ORAL at 18:13

## 2019-11-28 RX ADMIN — Medication 3: at 11:39

## 2019-11-28 RX ADMIN — CHLORHEXIDINE GLUCONATE 1 APPLICATION(S): 213 SOLUTION TOPICAL at 11:40

## 2019-11-28 RX ADMIN — PIPERACILLIN AND TAZOBACTAM 25 GRAM(S): 4; .5 INJECTION, POWDER, LYOPHILIZED, FOR SOLUTION INTRAVENOUS at 05:39

## 2019-11-28 RX ADMIN — HEPARIN SODIUM 5000 UNIT(S): 5000 INJECTION INTRAVENOUS; SUBCUTANEOUS at 05:38

## 2019-11-28 RX ADMIN — Medication 3: at 08:12

## 2019-11-28 RX ADMIN — INSULIN GLARGINE 15 UNIT(S): 100 INJECTION, SOLUTION SUBCUTANEOUS at 21:45

## 2019-11-28 RX ADMIN — Medication 50 MILLIGRAM(S): at 21:43

## 2019-11-28 RX ADMIN — Medication 325 MILLIGRAM(S): at 11:41

## 2019-11-28 RX ADMIN — BUDESONIDE AND FORMOTEROL FUMARATE DIHYDRATE 2 PUFF(S): 160; 4.5 AEROSOL RESPIRATORY (INHALATION) at 21:44

## 2019-11-28 RX ADMIN — Medication 3 UNIT(S): at 08:13

## 2019-11-28 RX ADMIN — Medication 3 MILLILITER(S): at 03:46

## 2019-11-28 RX ADMIN — HEPARIN SODIUM 5000 UNIT(S): 5000 INJECTION INTRAVENOUS; SUBCUTANEOUS at 18:12

## 2019-11-28 RX ADMIN — Medication 3 UNIT(S): at 11:39

## 2019-11-28 RX ADMIN — MIDODRINE HYDROCHLORIDE 10 MILLIGRAM(S): 2.5 TABLET ORAL at 21:43

## 2019-11-28 RX ADMIN — SEVELAMER CARBONATE 1600 MILLIGRAM(S): 2400 POWDER, FOR SUSPENSION ORAL at 11:41

## 2019-11-28 RX ADMIN — ATORVASTATIN CALCIUM 10 MILLIGRAM(S): 80 TABLET, FILM COATED ORAL at 21:43

## 2019-11-28 RX ADMIN — SEVELAMER CARBONATE 1600 MILLIGRAM(S): 2400 POWDER, FOR SUSPENSION ORAL at 08:13

## 2019-11-28 RX ADMIN — PIPERACILLIN AND TAZOBACTAM 25 GRAM(S): 4; .5 INJECTION, POWDER, LYOPHILIZED, FOR SOLUTION INTRAVENOUS at 18:13

## 2019-11-28 RX ADMIN — BUDESONIDE AND FORMOTEROL FUMARATE DIHYDRATE 2 PUFF(S): 160; 4.5 AEROSOL RESPIRATORY (INHALATION) at 11:40

## 2019-11-28 NOTE — DIETITIAN INITIAL EVALUATION ADULT. - PROBLEM SELECTOR PLAN 4
Elevated troponin to 0.078  Likely demand ischemia and secondary to ESRD  Monitor serial cardiac enzymes

## 2019-11-28 NOTE — PROGRESS NOTE ADULT - SUBJECTIVE AND OBJECTIVE BOX
[   ] ICU                                          [   ] CCU                                      [ X  ] Medical Floor      Patient is comfortable. No new complaints reported, No abdominal pain, N/V, hematemesis, hematochezia, melena, fever, chills, chest pain, SOB, cough or diarrhea reported.    VITALS  Vital Signs Last 24 Hrs  T(C): 37 (28 Nov 2019 05:05), Max: 37.4 (27 Nov 2019 14:00)  T(F): 98.6 (28 Nov 2019 05:05), Max: 99.4 (27 Nov 2019 14:00)  HR: 72 (28 Nov 2019 05:05) (72 - 96)  BP: 132/47 (28 Nov 2019 05:05) (96/47 - 132/47)   RR: 19 (28 Nov 2019 05:05) (17 - 19)  SpO2: 100% (28 Nov 2019 05:05) (94% - 100%)       MEDICATIONS  (STANDING):  albuterol/ipratropium for Nebulization 3 milliLiter(s) Nebulizer every 6 hours  atorvastatin 10 milliGRAM(s) Oral at bedtime  budesonide  80 MICROgram(s)/formoterol 4.5 MICROgram(s) Inhaler 2 Puff(s) Inhalation two times a day  chlorhexidine 4% Liquid 1 Application(s) Topical daily  dextrose 5%. 1000 milliLiter(s) (50 mL/Hr) IV Continuous <Continuous>  dextrose 50% Injectable 12.5 Gram(s) IV Push once  dextrose 50% Injectable 25 Gram(s) IV Push once  dextrose 50% Injectable 25 Gram(s) IV Push once  epoetin randy Injectable 21910 Unit(s) IV Push <User Schedule>  ferrous    sulfate 325 milliGRAM(s) Oral daily  heparin  Injectable 5000 Unit(s) SubCutaneous every 12 hours  insulin glargine Injectable (LANTUS) 15 Unit(s) SubCutaneous at bedtime  insulin lispro (HumaLOG) corrective regimen sliding scale   SubCutaneous three times a day before meals  insulin lispro Injectable (HumaLOG) 3 Unit(s) SubCutaneous three times a day before meals  midodrine. 10 milliGRAM(s) Oral every 8 hours  pantoprazole    Tablet 40 milliGRAM(s) Oral before breakfast  piperacillin/tazobactam IVPB.. 3.375 Gram(s) IV Intermittent every 12 hours  sevelamer carbonate 1600 milliGRAM(s) Oral three times a day with meals  traZODone 50 milliGRAM(s) Oral at bedtime    MEDICATIONS  (PRN):  acetaminophen   Tablet .. 650 milliGRAM(s) Oral every 6 hours PRN Mild Pain (1 - 3), Moderate Pain (4 - 6)  dextrose 40% Gel 15 Gram(s) Oral once PRN Blood Glucose LESS THAN 70 milliGRAM(s)/deciliter  glucagon  Injectable 1 milliGRAM(s) IntraMuscular once PRN Glucose LESS THAN 70 milligrams/deciliter                            9.3    9.39  )-----------( 177      ( 27 Nov 2019 06:27 )             31.9       11-27    141  |  104  |  41<H>  ----------------------------<  114<H>  4.3   |  26  |  4.68<H>    Ca    8.6      27 Nov 2019 06:27      EXAM:  CT ABDOMEN AND PELVIS                            PROCEDURE DATE:  11/25/2019          INTERPRETATION:  CT of the Abdomen without contrast and CT of the Pelvis   without contrast    HISTORY: Symptomatic anemia    Comparison: 7/11/2018    TECHNIQUE: Multiple axial scans of the abdomen and pelvis were obtained   after administration of a small amount of bowel contrast material that   has not passed beyond the stomach.    Interpretation: Liver: No focal lesions in this noncontrast study.      Biliary tree: Not dilated. The gallbladder is collapsed.      Solid organs: Similar bilateral renal cysts.      Retroperitoneum: No evidence of lymphadenopathy. Similar ectasia of the   infrarenal abdominal aorta to a diameter of 3.7 cm.    Bowel: No discrete bowel mass is identified.      Appendix: Similar small appendicolith at its orifice.    Urinary bladder: Contains a small amount of air compatible with recent   instrumentation.      Pelvis: Shows diverticuli of the sigmoid colon without radiographic   evidence of diverticulitis. The patient is again noted to be status post   hysterectomy.    The inguinal regions are unremarkable.      The lung bases show similar small left effusion or pleural thickening.   The patient is status post left mastectomy    The bony structures show no gross destructive changes.      IMPRESSION: Limited by lack of oral contrast. No discrete bowel mass   identified.

## 2019-11-28 NOTE — DIETITIAN INITIAL EVALUATION ADULT. - PROBLEM SELECTOR PLAN 1
H/H: 7.5/26  Shortness of breath is likely secondary to anemia and chronic chf and  or copd  Will give 1 unit of blood tomorrow along with dialysis

## 2019-11-28 NOTE — DIETITIAN INITIAL EVALUATION ADULT. - PERTINENT LABORATORY DATA
11-27 Na141 mmol/L Glu 114 mg/dL<H> K+ 4.3 mmol/L Cr  4.68 mg/dL<H> BUN 41 mg/dL<H> 11-23 Phos 7.9 mg/dL<H> 11-22 Alb 2.6 g/dL<L> 11-23 [id number] 7.7 %<H> 11-23 Chol 167 mg/dL  mg/dL HDL 43 mg/dL<L> Trig 106 mg/dL

## 2019-11-28 NOTE — DIETITIAN INITIAL EVALUATION ADULT. - PROBLEM SELECTOR PLAN 3
Pt has h/o COPD and uses O2 at home. Former smoker  Continue Bronchodilator  Continue oxygen supplementation

## 2019-11-28 NOTE — PROGRESS NOTE ADULT - SUBJECTIVE AND OBJECTIVE BOX
S: no chest pain or sob; ROS otherwise negative.     acetaminophen   Tablet .. 650 milliGRAM(s) Oral every 6 hours PRN  albuterol/ipratropium for Nebulization 3 milliLiter(s) Nebulizer every 6 hours  atorvastatin 10 milliGRAM(s) Oral at bedtime  budesonide  80 MICROgram(s)/formoterol 4.5 MICROgram(s) Inhaler 2 Puff(s) Inhalation two times a day  chlorhexidine 4% Liquid 1 Application(s) Topical daily  dextrose 40% Gel 15 Gram(s) Oral once PRN  dextrose 5%. 1000 milliLiter(s) IV Continuous <Continuous>  dextrose 50% Injectable 12.5 Gram(s) IV Push once  dextrose 50% Injectable 25 Gram(s) IV Push once  dextrose 50% Injectable 25 Gram(s) IV Push once  epoetin randy Injectable 95327 Unit(s) IV Push <User Schedule>  ferrous    sulfate 325 milliGRAM(s) Oral daily  glucagon  Injectable 1 milliGRAM(s) IntraMuscular once PRN  heparin  Injectable 5000 Unit(s) SubCutaneous every 12 hours  insulin glargine Injectable (LANTUS) 15 Unit(s) SubCutaneous at bedtime  insulin lispro (HumaLOG) corrective regimen sliding scale   SubCutaneous three times a day before meals  insulin lispro Injectable (HumaLOG) 3 Unit(s) SubCutaneous three times a day before meals  midodrine. 10 milliGRAM(s) Oral every 8 hours  pantoprazole    Tablet 40 milliGRAM(s) Oral before breakfast  piperacillin/tazobactam IVPB.. 3.375 Gram(s) IV Intermittent every 12 hours  sevelamer carbonate 1600 milliGRAM(s) Oral three times a day with meals  traZODone 50 milliGRAM(s) Oral at bedtime                            9.3    9.39  )-----------( 177      ( 27 Nov 2019 06:27 )             31.9       11-27    141  |  104  |  41<H>  ----------------------------<  114<H>  4.3   |  26  |  4.68<H>    Ca    8.6      27 Nov 2019 06:27              T(C): 37 (11-28-19 @ 05:05), Max: 37.4 (11-27-19 @ 14:00)  HR: 72 (11-28-19 @ 05:05) (72 - 96)  BP: 132/47 (11-28-19 @ 05:05) (96/47 - 132/47)  RR: 19 (11-28-19 @ 05:05) (17 - 19)  SpO2: 100% (11-28-19 @ 05:05) (94% - 100%)  Wt(kg): --    I&O's Summary      Gen: Appears well in NAD  HEENT:  (-)icterus (-)pallor  CV: N S1 S2 1/6 HODA (+)2 Pulses B/l  Resp:  Clear to ausculatation B/L, normal effort  GI: (+) BS Soft, NT, ND  Lymph:  (-)Edema, (-)obvious lymphadenopathy  Skin: Warm to touch, Normal turgor  Psych: Appropriate mood and affect      TELEMETRY: 	  OFF       ASSESSMENT/PLAN: 	79y Female female hx of ESRD on HD, HTN, ANemia, copd , chf, PAF, no A/C documented , now with anemia, SOB , + troponin on admission labs     -  HD per renal  - Fever w/u per Med  - Abx per primary team  - cont midodrine for BP support  - (+) trop  - will plan on NST when medically optimized if within GOC      Keysha Ellis MD

## 2019-11-28 NOTE — PROGRESS NOTE ADULT - SUBJECTIVE AND OBJECTIVE BOX
Time of Visit:  Patient seen and examined.     MEDICATIONS  (STANDING):  albuterol/ipratropium for Nebulization 3 milliLiter(s) Nebulizer every 6 hours  atorvastatin 10 milliGRAM(s) Oral at bedtime  budesonide  80 MICROgram(s)/formoterol 4.5 MICROgram(s) Inhaler 2 Puff(s) Inhalation two times a day  chlorhexidine 4% Liquid 1 Application(s) Topical daily  dextrose 5%. 1000 milliLiter(s) (50 mL/Hr) IV Continuous <Continuous>  dextrose 50% Injectable 12.5 Gram(s) IV Push once  dextrose 50% Injectable 25 Gram(s) IV Push once  dextrose 50% Injectable 25 Gram(s) IV Push once  epoetin randy Injectable 56591 Unit(s) IV Push <User Schedule>  ferrous    sulfate 325 milliGRAM(s) Oral daily  heparin  Injectable 5000 Unit(s) SubCutaneous every 12 hours  insulin glargine Injectable (LANTUS) 15 Unit(s) SubCutaneous at bedtime  insulin lispro (HumaLOG) corrective regimen sliding scale   SubCutaneous three times a day before meals  insulin lispro Injectable (HumaLOG) 3 Unit(s) SubCutaneous three times a day before meals  midodrine. 10 milliGRAM(s) Oral every 8 hours  pantoprazole    Tablet 40 milliGRAM(s) Oral before breakfast  piperacillin/tazobactam IVPB.. 3.375 Gram(s) IV Intermittent every 12 hours  sevelamer carbonate 1600 milliGRAM(s) Oral three times a day with meals  traZODone 50 milliGRAM(s) Oral at bedtime      MEDICATIONS  (PRN):  acetaminophen   Tablet .. 650 milliGRAM(s) Oral every 6 hours PRN Mild Pain (1 - 3), Moderate Pain (4 - 6)  dextrose 40% Gel 15 Gram(s) Oral once PRN Blood Glucose LESS THAN 70 milliGRAM(s)/deciliter  glucagon  Injectable 1 milliGRAM(s) IntraMuscular once PRN Glucose LESS THAN 70 milligrams/deciliter       Medications up to date at time of exam.      PHYSICAL EXAMINATION:  Patient has no new complaints.  GENERAL: The patient is a well-developed, well-nourished, in no apparent distress.     Vital Signs Last 24 Hrs  T(C): 37 (2019 05:05), Max: 37 (2019 05:05)  T(F): 98.6 (2019 05:05), Max: 98.6 (2019 05:05)  HR: 72 (2019 05:05) (72 - 96)  BP: 132/47 (2019 05:05) (96/47 - 132/47)  BP(mean): --  RR: 19 (2019 05:05) (17 - 19)  SpO2: 100% (2019 05:05) (95% - 100%)   (if applicable)    Chest Tube (if applicable)    HEENT: Head is normocephalic and atraumatic. Extraocular muscles are intact. Mucous membranes are moist.     NECK: Supple, no palpable adenopathy.    LUNGS: Clear to auscultation, no wheezing, rales, or rhonchi.    HEART: Regular rate and rhythm without murmur.    ABDOMEN: Soft, nontender, and nondistended.  No hepatosplenomegaly is noted.    : No painful voiding, no pelvic pain    EXTREMITIES: Without any cyanosis, clubbing, rash, lesions or edema.    NEUROLOGIC: Awake, alert, oriented, grossly intact    SKIN: Warm, dry, good turgor.      LABS:                        9.3    9.39  )-----------( 177      ( 2019 06:27 )             31.9     11-27    141  |  104  |  41<H>  ----------------------------<  114<H>  4.3   |  26  |  4.68<H>    Ca    8.6      2019 06:27        Urinalysis Basic - ( 2019 22:14 )    Color: Yellow / Appearance: very cloudy / S.020 / pH: x  Gluc: x / Ketone: Negative  / Bili: Negative / Urobili: Negative   Blood: x / Protein: 100 / Nitrite: Negative   Leuk Esterase: Moderate / RBC: 5-10 /HPF / WBC >50 /HPF   Sq Epi: x / Non Sq Epi: Few /HPF / Bacteria: TNTC /HPF      ABG - ( 2019 00:03 )  pH, Arterial: 7.41  pH, Blood: x     /  pCO2: 45    /  pO2: 90    / HCO3: 28    / Base Excess: 3.3   /  SaO2: 98                              MICROBIOLOGY: (if applicable)    RADIOLOGY & ADDITIONAL STUDIES:  EKG:   CXR:  ECHO:    IMPRESSION: 79y Female PAST MEDICAL & SURGICAL HISTORY:  Chronic CHF  MS (mitral stenosis)  GERD (gastroesophageal reflux disease)  COPD (chronic obstructive pulmonary disease): On home oxygen  HLD (hyperlipidemia): not taking statins  HTN (hypertension)  ESRD (end stage renal disease) on dialysis  Chronic diastolic (congestive) heart failure  Anemia of chronic disease  Breast carcinoma: Lt side s/p lumpectomy and radiation in   DM (diabetes mellitus): type 2  S/P subtotal hysterectomy  S/P lumpectomy, left breast:    p/w           RECOMMENDATIONS: Time of Visit:  Patient seen and examined.     MEDICATIONS  (STANDING):  albuterol/ipratropium for Nebulization 3 milliLiter(s) Nebulizer every 6 hours  atorvastatin 10 milliGRAM(s) Oral at bedtime  budesonide  80 MICROgram(s)/formoterol 4.5 MICROgram(s) Inhaler 2 Puff(s) Inhalation two times a day  chlorhexidine 4% Liquid 1 Application(s) Topical daily  dextrose 5%. 1000 milliLiter(s) (50 mL/Hr) IV Continuous <Continuous>  dextrose 50% Injectable 12.5 Gram(s) IV Push once  dextrose 50% Injectable 25 Gram(s) IV Push once  dextrose 50% Injectable 25 Gram(s) IV Push once  epoetin randy Injectable 04403 Unit(s) IV Push <User Schedule>  ferrous    sulfate 325 milliGRAM(s) Oral daily  heparin  Injectable 5000 Unit(s) SubCutaneous every 12 hours  insulin glargine Injectable (LANTUS) 15 Unit(s) SubCutaneous at bedtime  insulin lispro (HumaLOG) corrective regimen sliding scale   SubCutaneous three times a day before meals  insulin lispro Injectable (HumaLOG) 3 Unit(s) SubCutaneous three times a day before meals  midodrine. 10 milliGRAM(s) Oral every 8 hours  pantoprazole    Tablet 40 milliGRAM(s) Oral before breakfast  piperacillin/tazobactam IVPB.. 3.375 Gram(s) IV Intermittent every 12 hours  sevelamer carbonate 1600 milliGRAM(s) Oral three times a day with meals  traZODone 50 milliGRAM(s) Oral at bedtime      MEDICATIONS  (PRN):  acetaminophen   Tablet .. 650 milliGRAM(s) Oral every 6 hours PRN Mild Pain (1 - 3), Moderate Pain (4 - 6)  dextrose 40% Gel 15 Gram(s) Oral once PRN Blood Glucose LESS THAN 70 milliGRAM(s)/deciliter  glucagon  Injectable 1 milliGRAM(s) IntraMuscular once PRN Glucose LESS THAN 70 milligrams/deciliter       Medications up to date at time of exam.      PHYSICAL EXAMINATION:  Patient has no new complaints.  GENERAL: The patient is a well-developed, well-nourished, in no apparent distress.     Vital Signs Last 24 Hrs  T(C): 37 (2019 05:05), Max: 37 (2019 05:05)  T(F): 98.6 (2019 05:05), Max: 98.6 (2019 05:05)  HR: 72 (2019 05:05) (72 - 96)  BP: 132/47 (2019 05:05) (96/47 - 132/47)  BP(mean): --  RR: 19 (2019 05:05) (17 - 19)  SpO2: 100% (2019 05:05) (95% - 100%)   (if applicable)    Chest Tube (if applicable)    HEENT: Head is normocephalic and atraumatic. Extraocular muscles are intact. Mucous membranes are moist.     NECK: Supple, no palpable adenopathy.    LUNGS: Clear to auscultation, no wheezing, rales, or rhonchi.    HEART: Regular rate and rhythm without murmur.    ABDOMEN: Soft, nontender, and nondistended.  No hepatosplenomegaly is noted.    : No painful voiding, no pelvic pain    EXTREMITIES: Without any cyanosis, clubbing, rash, lesions or  L arm lymphedema edema.    NEUROLOGIC: Awake, alert, oriented, grossly intact    SKIN: Warm, dry, good turgor.      LABS:                        9.3    9.39  )-----------( 177      ( 2019 06:27 )             31.9     11-27    141  |  104  |  41<H>  ----------------------------<  114<H>  4.3   |  26  |  4.68<H>    Ca    8.6      2019 06:27        Urinalysis Basic - ( 2019 22:14 )    Color: Yellow / Appearance: very cloudy / S.020 / pH: x  Gluc: x / Ketone: Negative  / Bili: Negative / Urobili: Negative   Blood: x / Protein: 100 / Nitrite: Negative   Leuk Esterase: Moderate / RBC: 5-10 /HPF / WBC >50 /HPF   Sq Epi: x / Non Sq Epi: Few /HPF / Bacteria: TNTC /HPF      ABG - ( 2019 00:03 )  pH, Arterial: 7.41  pH, Blood: x     /  pCO2: 45    /  pO2: 90    / HCO3: 28    / Base Excess: 3.3   /  SaO2: 98                              MICROBIOLOGY: (if applicable)    RADIOLOGY & ADDITIONAL STUDIES:  EKG:   CXR:  ECHO:    IMPRESSION: 79y Female PAST MEDICAL & SURGICAL HISTORY:  Chronic CHF  MS (mitral stenosis)  GERD (gastroesophageal reflux disease)  COPD (chronic obstructive pulmonary disease): On home oxygen  HLD (hyperlipidemia): not taking statins  HTN (hypertension)  ESRD (end stage renal disease) on dialysis  Chronic diastolic (congestive) heart failure  Anemia of chronic disease  Breast carcinoma: Lt side s/p lumpectomy and radiation in   DM (diabetes mellitus): type 2  S/P subtotal hysterectomy  S/P lumpectomy, left breast:    p/w           Imp: This is a 79 yr old woman for smoker with prior mentioned medical condition condition presented with SOB due to acute on chronic hypoxic hypercapnic resp failure due to Acute exacerbation of COPD with CHF and anemia. BP improved with mitodrine      SUGG:  -continue dialysis as per nephro   - continue  mitidrone for low BP  -continue meds  -o2 supp to keep O2 Sat>90%  -duoneb  -symbicort  -out pat pul f/u

## 2019-11-28 NOTE — PROGRESS NOTE ADULT - SUBJECTIVE AND OBJECTIVE BOX
Problem List:  ESRD  COPD  Anemeia      PAST MEDICAL & SURGICAL HISTORY:  Chronic CHF  MS (mitral stenosis)  GERD (gastroesophageal reflux disease)  COPD (chronic obstructive pulmonary disease): On home oxygen  HLD (hyperlipidemia): not taking statins  HTN (hypertension)  ESRD (end stage renal disease) on dialysis  Chronic diastolic (congestive) heart failure  Anemia of chronic disease  Breast carcinoma: Lt side s/p lumpectomy and radiation in 2004  DM (diabetes mellitus): type 2  S/P subtotal hysterectomy  S/P lumpectomy, left breast: 2004      No Known Allergies      MEDICATIONS  (STANDING):  albuterol/ipratropium for Nebulization 3 milliLiter(s) Nebulizer every 6 hours  atorvastatin 10 milliGRAM(s) Oral at bedtime  budesonide  80 MICROgram(s)/formoterol 4.5 MICROgram(s) Inhaler 2 Puff(s) Inhalation two times a day  chlorhexidine 4% Liquid 1 Application(s) Topical daily  dextrose 5%. 1000 milliLiter(s) (50 mL/Hr) IV Continuous <Continuous>  dextrose 50% Injectable 12.5 Gram(s) IV Push once  dextrose 50% Injectable 25 Gram(s) IV Push once  dextrose 50% Injectable 25 Gram(s) IV Push once  epoetin randy Injectable 64842 Unit(s) IV Push <User Schedule>  ferrous    sulfate 325 milliGRAM(s) Oral daily  heparin  Injectable 5000 Unit(s) SubCutaneous every 12 hours  insulin glargine Injectable (LANTUS) 15 Unit(s) SubCutaneous at bedtime  insulin lispro (HumaLOG) corrective regimen sliding scale   SubCutaneous three times a day before meals  insulin lispro Injectable (HumaLOG) 3 Unit(s) SubCutaneous three times a day before meals  midodrine. 10 milliGRAM(s) Oral every 8 hours  pantoprazole    Tablet 40 milliGRAM(s) Oral before breakfast  piperacillin/tazobactam IVPB.. 3.375 Gram(s) IV Intermittent every 12 hours  sevelamer carbonate 1600 milliGRAM(s) Oral three times a day with meals  traZODone 50 milliGRAM(s) Oral at bedtime    MEDICATIONS  (PRN):  acetaminophen   Tablet .. 650 milliGRAM(s) Oral every 6 hours PRN Mild Pain (1 - 3), Moderate Pain (4 - 6)  dextrose 40% Gel 15 Gram(s) Oral once PRN Blood Glucose LESS THAN 70 milliGRAM(s)/deciliter  glucagon  Injectable 1 milliGRAM(s) IntraMuscular once PRN Glucose LESS THAN 70 milligrams/deciliter                            9.3    9.39  )-----------( 177      ( 27 Nov 2019 06:27 )             31.9     11-27    141  |  104  |  41<H>  ----------------------------<  114<H>  4.3   |  26  |  4.68<H>    Ca    8.6      27 Nov 2019 06:27              REVIEW OF SYSTEMS:  feels bettre  c/o increased swelling in LUE    VITALS:  T(F): 98.6 (11-28-19 @ 05:05), Max: 99.4 (11-27-19 @ 14:00)  HR: 72 (11-28-19 @ 05:05)  BP: 132/47 (11-28-19 @ 05:05)  RR: 19 (11-28-19 @ 05:05)  SpO2: 100% (11-28-19 @ 05:05)  Wt(kg): --      PHYSICAL EXAM:  Constitutional: well developed, no diaphoresis, no distress.  Neck: No JVD, no carotid bruit, supple, no adenopathy  Respiratory: reduced air entrance  Cardiovascular: S1, S2, RRR, no pericardial rub, no murmur  Abdomen: BS+, soft, no tenderness, no bruit  Pelvis: bladder nondistended  Extremities: trace edema lower extremities  Neurological: A/O x 3, no focal deficits  Psychiatric: Normal mood, normal affect  Skin: No rashes  Vascular Access: RIGHT avf NO EDEMA  LUE increased edema in upper arm

## 2019-11-28 NOTE — PROGRESS NOTE ADULT - NEGATIVE ENMT SYMPTOMS
no gum bleeding/no throat pain/no dysphagia/no hearing difficulty/no vertigo/no ear pain/no dry mouth

## 2019-11-28 NOTE — PROGRESS NOTE ADULT - ASSESSMENT
ESRD dialysis days today  Increased in ECFV and increased edema in BENYE  Richieot to remove 2-2.5 kg in dialysis  Patient on midodrine , follow BP in dialysis.

## 2019-11-28 NOTE — DIETITIAN INITIAL EVALUATION ADULT. - PROBLEM SELECTOR PLAN 5
Likely diastolic heart failure as echocardiogram on 2018  shows normal e.f with left ventricular hypertrophy.   Pt takes Lasix 40 daily ay home   Continue home dose of Lasix

## 2019-11-28 NOTE — DIETITIAN INITIAL EVALUATION ADULT. - OTHER INFO
Pt seen for LOS and diet order. Pt ESRD/ HD (+ hx CHF). Pt reports eating ~ 1/2 tray (100% noted). Reports 10# weight loss when asked. Reports she cannot provide any further details. Provides limited info, seems distracted. D/C of 11/27 noted as cancelled. Discussed briefly with covering PGY 1, Diet order changed to include renal replacement +DASH/ TLC

## 2019-11-28 NOTE — PROGRESS NOTE ADULT - PROBLEM SELECTOR PLAN 1
acute drop on h/h  Received 2 un of PRBC for hb 7.4  H/h improved   Recommended colonoscopy (patient refused)  GI on board

## 2019-11-28 NOTE — PROGRESS NOTE ADULT - SUBJECTIVE AND OBJECTIVE BOX
INTERVAL HPI/OVERNIGHT EVENTS:   Pt seen and examined.   SOB improved    MEDICATIONS  (STANDING):  albuterol/ipratropium for Nebulization 3 milliLiter(s) Nebulizer every 6 hours  atorvastatin 10 milliGRAM(s) Oral at bedtime  budesonide  80 MICROgram(s)/formoterol 4.5 MICROgram(s) Inhaler 2 Puff(s) Inhalation two times a day  chlorhexidine 4% Liquid 1 Application(s) Topical daily  dextrose 5%. 1000 milliLiter(s) (50 mL/Hr) IV Continuous <Continuous>  dextrose 50% Injectable 12.5 Gram(s) IV Push once  dextrose 50% Injectable 25 Gram(s) IV Push once  dextrose 50% Injectable 25 Gram(s) IV Push once  epoetin randy Injectable 00633 Unit(s) IV Push <User Schedule>  ferrous    sulfate 325 milliGRAM(s) Oral daily  heparin  Injectable 5000 Unit(s) SubCutaneous every 12 hours  insulin glargine Injectable (LANTUS) 15 Unit(s) SubCutaneous at bedtime  insulin lispro (HumaLOG) corrective regimen sliding scale   SubCutaneous three times a day before meals  insulin lispro Injectable (HumaLOG) 3 Unit(s) SubCutaneous three times a day before meals  midodrine. 10 milliGRAM(s) Oral every 8 hours  pantoprazole    Tablet 40 milliGRAM(s) Oral before breakfast  piperacillin/tazobactam IVPB.. 3.375 Gram(s) IV Intermittent every 12 hours  sevelamer carbonate 1600 milliGRAM(s) Oral three times a day with meals  traZODone 50 milliGRAM(s) Oral at bedtime    MEDICATIONS  (PRN):  acetaminophen   Tablet .. 650 milliGRAM(s) Oral every 6 hours PRN Mild Pain (1 - 3), Moderate Pain (4 - 6)  dextrose 40% Gel 15 Gram(s) Oral once PRN Blood Glucose LESS THAN 70 milliGRAM(s)/deciliter  glucagon  Injectable 1 milliGRAM(s) IntraMuscular once PRN Glucose LESS THAN 70 milligrams/deciliter    __________________________________________________  REVIEW OF SYSTEMS:    CONSTITUTIONAL: No fever,   EYES: no acute visual disturbances  NECK: No pain or stiffness  RESPIRATORY: sob, on home oxygen  CARDIOVASCULAR: No chest pain, no palpitations  GASTROINTESTINAL: No pain. No nausea or vomiting; No diarrhea   NEUROLOGICAL: No headache or numbness, no tremors  MUSCULOSKELETAL: No joint pain, no muscle pain  GENITOURINARY: ESRD on HD  PSYCHIATRY: alert, oriented  ALL OTHER  ROS negative        Vital Signs Last 24 Hrs  T(C): 36.6 (2019 14:33), Max: 37 (2019 05:05)  T(F): 97.9 (2019 14:33), Max: 98.6 (2019 05:05)  HR: 75 (2019 14:33) (72 - 96)  BP: 120/71 (2019 14:33) (96/47 - 132/47)  BP(mean): --  RR: 16 (2019 14:33) (16 - 19)  SpO2: 96% (2019 14:33) (95% - 100%)  ________________________________________________  PHYSICAL EXAM:  GENERAL: NAD  HEENT: Normocephalic;  conjunctivae and sclerae clear; moist mucous membranes;   NECK : supple  CHEST/LUNG: dec breath sounds at bases   LABS:      137  |  103  |  52<H>  ----------------------------<  120<H>  4.2   |  25  |  5.31<H>    Ca    8.3<L>      2019 15:04      Creatinine Trend: 5.31 <--, 4.68 <--, 5.80 <--, 4.71 <--, 4.34 <--, 6.50 <--, 5.81 <--                        9.4    9.63  )-----------( 195      ( 2019 15:04 )             30.9     Urine Studies:  Urinalysis Basic - ( 2019 22:14 )    Color: Yellow / Appearance: very cloudy / S.020 / pH:   Gluc:  / Ketone: Negative  / Bili: Negative / Urobili: Negative   Blood:  / Protein: 100 / Nitrite: Negative   Leuk Esterase: Moderate / RBC: 5-10 /HPF / WBC >50 /HPF   Sq Epi:  / Non Sq Epi: Few /HPF / Bacteria: TNTC /HPF            ABG - ( 2019 00:03 )  pH, Arterial: 7.41  pH, Blood: x     /  pCO2: 45    /  pO2: 90    / HCO3: 28    / Base Excess: 3.3   /  SaO2: 98                    HEART: S1 S2  regular;   ABDOMEN: Soft, Nontender, Nondistended; Bowel sounds present  EXTREMITIES: trace edema+  SKIN: warm and dry; no rash  NERVOUS SYSTEM:  Awake and alert;     _________________________________________________

## 2019-11-28 NOTE — PROGRESS NOTE ADULT - PROBLEM SELECTOR PLAN 2
Echo showing Left Ventricle: Normal Left Ventricular Systolic Function,  (EF = 55 to 60%) Mild concentric left ventricular  hypertrophy. Grade I diastolic dysfunction (Impaired  relaxation).  Cards f/u   Hd to prevent fluid overload

## 2019-11-28 NOTE — PROGRESS NOTE ADULT - ASSESSMENT
1. Anemia  2. Rectal bleeding stopped  3. No evidence of acute GI bleeding       Suggestions:    1. Monitor H/H  2. Transfuse PRBC as needed  3. Protonix daily  4. Avoid NSAID  5. Colonoscopy (pt refusing)  6. DVT prophylaxis

## 2019-11-29 DIAGNOSIS — E78.5 HYPERLIPIDEMIA, UNSPECIFIED: ICD-10-CM

## 2019-11-29 DIAGNOSIS — I50.9 HEART FAILURE, UNSPECIFIED: ICD-10-CM

## 2019-11-29 DIAGNOSIS — I10 ESSENTIAL (PRIMARY) HYPERTENSION: ICD-10-CM

## 2019-11-29 DIAGNOSIS — L03.115 CELLULITIS OF RIGHT LOWER LIMB: ICD-10-CM

## 2019-11-29 DIAGNOSIS — E11.9 TYPE 2 DIABETES MELLITUS WITHOUT COMPLICATIONS: ICD-10-CM

## 2019-11-29 DIAGNOSIS — Z02.9 ENCOUNTER FOR ADMINISTRATIVE EXAMINATIONS, UNSPECIFIED: ICD-10-CM

## 2019-11-29 LAB
ANION GAP SERPL CALC-SCNC: 8 MMOL/L — SIGNIFICANT CHANGE UP (ref 5–17)
BUN SERPL-MCNC: 29 MG/DL — HIGH (ref 7–18)
CALCIUM SERPL-MCNC: 8.6 MG/DL — SIGNIFICANT CHANGE UP (ref 8.4–10.5)
CHLORIDE SERPL-SCNC: 101 MMOL/L — SIGNIFICANT CHANGE UP (ref 96–108)
CO2 SERPL-SCNC: 27 MMOL/L — SIGNIFICANT CHANGE UP (ref 22–31)
CREAT SERPL-MCNC: 3.83 MG/DL — HIGH (ref 0.5–1.3)
GLUCOSE BLDC GLUCOMTR-MCNC: 150 MG/DL — HIGH (ref 70–99)
GLUCOSE BLDC GLUCOMTR-MCNC: 152 MG/DL — HIGH (ref 70–99)
GLUCOSE BLDC GLUCOMTR-MCNC: 155 MG/DL — HIGH (ref 70–99)
GLUCOSE BLDC GLUCOMTR-MCNC: 161 MG/DL — HIGH (ref 70–99)
GLUCOSE SERPL-MCNC: 138 MG/DL — HIGH (ref 70–99)
HCT VFR BLD CALC: 32.1 % — LOW (ref 34.5–45)
HGB BLD-MCNC: 9.7 G/DL — LOW (ref 11.5–15.5)
MCHC RBC-ENTMCNC: 29 PG — SIGNIFICANT CHANGE UP (ref 27–34)
MCHC RBC-ENTMCNC: 30.2 GM/DL — LOW (ref 32–36)
MCV RBC AUTO: 96.1 FL — SIGNIFICANT CHANGE UP (ref 80–100)
NRBC # BLD: 0 /100 WBCS — SIGNIFICANT CHANGE UP (ref 0–0)
PLATELET # BLD AUTO: 204 K/UL — SIGNIFICANT CHANGE UP (ref 150–400)
POTASSIUM SERPL-MCNC: 4.2 MMOL/L — SIGNIFICANT CHANGE UP (ref 3.5–5.3)
POTASSIUM SERPL-SCNC: 4.2 MMOL/L — SIGNIFICANT CHANGE UP (ref 3.5–5.3)
RBC # BLD: 3.34 M/UL — LOW (ref 3.8–5.2)
RBC # FLD: 15.9 % — HIGH (ref 10.3–14.5)
SODIUM SERPL-SCNC: 136 MMOL/L — SIGNIFICANT CHANGE UP (ref 135–145)
WBC # BLD: 8.58 K/UL — SIGNIFICANT CHANGE UP (ref 3.8–10.5)
WBC # FLD AUTO: 8.58 K/UL — SIGNIFICANT CHANGE UP (ref 3.8–10.5)

## 2019-11-29 RX ORDER — IPRATROPIUM/ALBUTEROL SULFATE 18-103MCG
3 AEROSOL WITH ADAPTER (GRAM) INHALATION ONCE
Refills: 0 | Status: DISCONTINUED | OUTPATIENT
Start: 2019-11-29 | End: 2019-12-02

## 2019-11-29 RX ADMIN — Medication 1: at 17:29

## 2019-11-29 RX ADMIN — Medication 325 MILLIGRAM(S): at 12:02

## 2019-11-29 RX ADMIN — Medication 0: at 08:33

## 2019-11-29 RX ADMIN — Medication 650 MILLIGRAM(S): at 09:40

## 2019-11-29 RX ADMIN — SEVELAMER CARBONATE 1600 MILLIGRAM(S): 2400 POWDER, FOR SUSPENSION ORAL at 17:31

## 2019-11-29 RX ADMIN — Medication 3 UNIT(S): at 12:02

## 2019-11-29 RX ADMIN — PANTOPRAZOLE SODIUM 40 MILLIGRAM(S): 20 TABLET, DELAYED RELEASE ORAL at 05:29

## 2019-11-29 RX ADMIN — BUDESONIDE AND FORMOTEROL FUMARATE DIHYDRATE 2 PUFF(S): 160; 4.5 AEROSOL RESPIRATORY (INHALATION) at 12:04

## 2019-11-29 RX ADMIN — SEVELAMER CARBONATE 1600 MILLIGRAM(S): 2400 POWDER, FOR SUSPENSION ORAL at 12:02

## 2019-11-29 RX ADMIN — Medication 50 MILLIGRAM(S): at 22:08

## 2019-11-29 RX ADMIN — Medication 650 MILLIGRAM(S): at 08:32

## 2019-11-29 RX ADMIN — HEPARIN SODIUM 5000 UNIT(S): 5000 INJECTION INTRAVENOUS; SUBCUTANEOUS at 17:30

## 2019-11-29 RX ADMIN — Medication 3 UNIT(S): at 08:34

## 2019-11-29 RX ADMIN — Medication 3 UNIT(S): at 17:30

## 2019-11-29 RX ADMIN — CHLORHEXIDINE GLUCONATE 1 APPLICATION(S): 213 SOLUTION TOPICAL at 12:01

## 2019-11-29 RX ADMIN — HEPARIN SODIUM 5000 UNIT(S): 5000 INJECTION INTRAVENOUS; SUBCUTANEOUS at 05:30

## 2019-11-29 RX ADMIN — MIDODRINE HYDROCHLORIDE 10 MILLIGRAM(S): 2.5 TABLET ORAL at 05:29

## 2019-11-29 RX ADMIN — INSULIN GLARGINE 15 UNIT(S): 100 INJECTION, SOLUTION SUBCUTANEOUS at 22:10

## 2019-11-29 RX ADMIN — SEVELAMER CARBONATE 1600 MILLIGRAM(S): 2400 POWDER, FOR SUSPENSION ORAL at 08:32

## 2019-11-29 RX ADMIN — PIPERACILLIN AND TAZOBACTAM 25 GRAM(S): 4; .5 INJECTION, POWDER, LYOPHILIZED, FOR SOLUTION INTRAVENOUS at 17:31

## 2019-11-29 RX ADMIN — ATORVASTATIN CALCIUM 10 MILLIGRAM(S): 80 TABLET, FILM COATED ORAL at 22:09

## 2019-11-29 RX ADMIN — Medication 1: at 12:02

## 2019-11-29 RX ADMIN — PIPERACILLIN AND TAZOBACTAM 25 GRAM(S): 4; .5 INJECTION, POWDER, LYOPHILIZED, FOR SOLUTION INTRAVENOUS at 05:29

## 2019-11-29 RX ADMIN — BUDESONIDE AND FORMOTEROL FUMARATE DIHYDRATE 2 PUFF(S): 160; 4.5 AEROSOL RESPIRATORY (INHALATION) at 22:08

## 2019-11-29 NOTE — PROGRESS NOTE ADULT - PROBLEM SELECTOR PLAN 1
afebrile, WBC WNL  c/w Zosyn , Day 2 today  BC NGTD  f/u UC  ID Dr. Barbour following Right great toe with dark area , no injury noted  afebrile, WBC WNL  c/w Zosyn , Day 2 today  BC NGTD  f/u UC  F/u XRAy right foot.   ID Dr. Barbour following

## 2019-11-29 NOTE — PROGRESS NOTE ADULT - SUBJECTIVE AND OBJECTIVE BOX
Time of Visit:  Patient seen and examined.     MEDICATIONS  (STANDING):  atorvastatin 10 milliGRAM(s) Oral at bedtime  budesonide  80 MICROgram(s)/formoterol 4.5 MICROgram(s) Inhaler 2 Puff(s) Inhalation two times a day  chlorhexidine 4% Liquid 1 Application(s) Topical daily  dextrose 5%. 1000 milliLiter(s) (50 mL/Hr) IV Continuous <Continuous>  dextrose 50% Injectable 12.5 Gram(s) IV Push once  dextrose 50% Injectable 25 Gram(s) IV Push once  dextrose 50% Injectable 25 Gram(s) IV Push once  epoetin randy Injectable 67960 Unit(s) IV Push <User Schedule>  ferrous    sulfate 325 milliGRAM(s) Oral daily  heparin  Injectable 5000 Unit(s) SubCutaneous every 12 hours  insulin glargine Injectable (LANTUS) 15 Unit(s) SubCutaneous at bedtime  insulin lispro (HumaLOG) corrective regimen sliding scale   SubCutaneous three times a day before meals  insulin lispro Injectable (HumaLOG) 3 Unit(s) SubCutaneous three times a day before meals  midodrine. 10 milliGRAM(s) Oral every 8 hours  pantoprazole    Tablet 40 milliGRAM(s) Oral before breakfast  piperacillin/tazobactam IVPB.. 3.375 Gram(s) IV Intermittent every 12 hours  sevelamer carbonate 1600 milliGRAM(s) Oral three times a day with meals  traZODone 50 milliGRAM(s) Oral at bedtime      MEDICATIONS  (PRN):  acetaminophen   Tablet .. 650 milliGRAM(s) Oral every 6 hours PRN Mild Pain (1 - 3), Moderate Pain (4 - 6)  albuterol/ipratropium for Nebulization. 3 milliLiter(s) Nebulizer once PRN Shortness of Breath and/or Wheezing  dextrose 40% Gel 15 Gram(s) Oral once PRN Blood Glucose LESS THAN 70 milliGRAM(s)/deciliter  glucagon  Injectable 1 milliGRAM(s) IntraMuscular once PRN Glucose LESS THAN 70 milligrams/deciliter       Medications up to date at time of exam.      PHYSICAL EXAMINATION:  Patient has no new complaints.  GENERAL: The patient is a well-developed, well-nourished, in no apparent distress.     Vital Signs Last 24 Hrs  T(C): 37 (2019 14:05), Max: 37 (2019 14:05)  T(F): 98.6 (2019 14:05), Max: 98.6 (2019 14:05)  HR: 73 (2019 14:05) (73 - 93)  BP: 141/70 (2019 14:05) (126/51 - 153/72)  BP(mean): --  RR: 18 (2019 14:05) (17 - 18)  SpO2: 100% (2019 14:05) (97% - 100%)   (if applicable)    Chest Tube (if applicable)    HEENT: Head is normocephalic and atraumatic. Extraocular muscles are intact. Mucous membranes are moist.     NECK: Supple, no palpable adenopathy.    LUNGS: Clear to auscultation, no wheezing, rales, or rhonchi.    HEART: Regular rate and rhythm without murmur.    ABDOMEN: Soft, nontender, and nondistended.  No hepatosplenomegaly is noted.    : No painful voiding, no pelvic pain    EXTREMITIES: L arm lymphedema. R arm AVF     NEUROLOGIC: Awake, alert, oriented, grossly intact    SKIN: Warm, dry, good turgor.      LABS:                        9.7    8.58  )-----------( 204      ( 2019 07:43 )             32.1     11-    136  |  101  |  29<H>  ----------------------------<  138<H>  4.2   |  27  |  3.83<H>    Ca    8.6      2019 07:43        Urinalysis Basic - ( 2019 22:14 )    Color: Yellow / Appearance: very cloudy / S.020 / pH: x  Gluc: x / Ketone: Negative  / Bili: Negative / Urobili: Negative   Blood: x / Protein: 100 / Nitrite: Negative   Leuk Esterase: Moderate / RBC: 5-10 /HPF / WBC >50 /HPF   Sq Epi: x / Non Sq Epi: Few /HPF / Bacteria: TNTC /HPF                      MICROBIOLOGY: (if applicable)    RADIOLOGY & ADDITIONAL STUDIES:  EKG:   CXR:  ECHO:    IMPRESSION: 79y Female PAST MEDICAL & SURGICAL HISTORY:  Chronic CHF  MS (mitral stenosis)  GERD (gastroesophageal reflux disease)  COPD (chronic obstructive pulmonary disease): On home oxygen  HLD (hyperlipidemia): not taking statins  HTN (hypertension)  ESRD (end stage renal disease) on dialysis  Chronic diastolic (congestive) heart failure  Anemia of chronic disease  Breast carcinoma: Lt side s/p lumpectomy and radiation in   DM (diabetes mellitus): type 2  S/P subtotal hysterectomy  S/P lumpectomy, left breast:    p/w           Imp: This is a 79 yr old woman for smoker with prior mentioned medical condition condition presented with SOB due to acute on chronic hypoxic hypercapnic resp failure due to Acute exacerbation of COPD with CHF and anemia. BP improved with midodrine      SUGG:  -continue dialysis as per nephro   - continue  mitidrone for low BP  -continue meds  -o2 supp to keep O2 Sat>90%  -duoneb  -symbicort  -out pat pul f/u

## 2019-11-29 NOTE — PROGRESS NOTE ADULT - NEGATIVE ENMT SYMPTOMS
no vertigo/no hearing difficulty/no dry mouth/no gum bleeding/no throat pain/no ear pain/no dysphagia

## 2019-11-29 NOTE — PROGRESS NOTE ADULT - SUBJECTIVE AND OBJECTIVE BOX
NP Note discussed with  Primary Attending    Patient is a 79y old  Female who presents with a chief complaint of sob (2019 14:24)      INTERVAL HPI/OVERNIGHT EVENTS: no new complaints    MEDICATIONS  (STANDING):  atorvastatin 10 milliGRAM(s) Oral at bedtime  budesonide  80 MICROgram(s)/formoterol 4.5 MICROgram(s) Inhaler 2 Puff(s) Inhalation two times a day  chlorhexidine 4% Liquid 1 Application(s) Topical daily  dextrose 5%. 1000 milliLiter(s) (50 mL/Hr) IV Continuous <Continuous>  dextrose 50% Injectable 12.5 Gram(s) IV Push once  dextrose 50% Injectable 25 Gram(s) IV Push once  dextrose 50% Injectable 25 Gram(s) IV Push once  epoetin randy Injectable 50049 Unit(s) IV Push <User Schedule>  ferrous    sulfate 325 milliGRAM(s) Oral daily  heparin  Injectable 5000 Unit(s) SubCutaneous every 12 hours  insulin glargine Injectable (LANTUS) 15 Unit(s) SubCutaneous at bedtime  insulin lispro (HumaLOG) corrective regimen sliding scale   SubCutaneous three times a day before meals  insulin lispro Injectable (HumaLOG) 3 Unit(s) SubCutaneous three times a day before meals  midodrine. 10 milliGRAM(s) Oral every 8 hours  pantoprazole    Tablet 40 milliGRAM(s) Oral before breakfast  piperacillin/tazobactam IVPB.. 3.375 Gram(s) IV Intermittent every 12 hours  sevelamer carbonate 1600 milliGRAM(s) Oral three times a day with meals  traZODone 50 milliGRAM(s) Oral at bedtime    MEDICATIONS  (PRN):  acetaminophen   Tablet .. 650 milliGRAM(s) Oral every 6 hours PRN Mild Pain (1 - 3), Moderate Pain (4 - 6)  albuterol/ipratropium for Nebulization. 3 milliLiter(s) Nebulizer once PRN Shortness of Breath and/or Wheezing  dextrose 40% Gel 15 Gram(s) Oral once PRN Blood Glucose LESS THAN 70 milliGRAM(s)/deciliter  glucagon  Injectable 1 milliGRAM(s) IntraMuscular once PRN Glucose LESS THAN 70 milligrams/deciliter      __________________________________________________  REVIEW OF SYSTEMS:    CONSTITUTIONAL: No fever,   EYES: no acute visual disturbances  NECK: No pain or stiffness  RESPIRATORY: Less SOB, No cough  CARDIOVASCULAR: No chest pain, no palpitations  GASTROINTESTINAL: No pain. No nausea or vomiting; No diarrhea   NEUROLOGICAL: No headache or numbness, no tremors  MUSCULOSKELETAL: No joint pain, no muscle pain  GENITOURINARY: no dysuria, no frequency, no hesitancy  PSYCHIATRY: no depression , no anxiety  ALL OTHER  ROS negative        Vital Signs Last 24 Hrs  T(C): 36.8 (2019 05:05), Max: 36.9 (2019 18:39)  T(F): 98.3 (2019 05:05), Max: 98.5 (2019 18:39)  HR: 80 (2019 05:05) (73 - 93)  BP: 153/72 (2019 05:05) (120/71 - 153/72)  BP(mean): --  RR: 17 (2019 05:05) (16 - 17)  SpO2: 97% (2019 05:05) (96% - 99%)    ________________________________________________  PHYSICAL EXAM:  GENERAL: NAD  HEENT: Normocephalic;  conjunctivae and sclerae clear; moist mucous membranes;   NECK : supple  CHEST/LUNG: Mildly labored. Clear upper lobes. Fine bibasilar crackles.    HEART: S1 S2  regular; no murmurs, gallops or rubs  ABDOMEN: Soft, Nontender, Nondistended; Bowel sounds present  EXTREMITIES: no cyanosis; no edema; no calf tenderness  SKIN: warm and dry; no rash  NERVOUS SYSTEM:  Awake and alert; Oriented  to place, person and time ; no new deficits    _________________________________________________  LABS:                        9.7    8.58  )-----------( 204      ( 2019 07:43 )             32.1         136  |  101  |  29<H>  ----------------------------<  138<H>  4.2   |  27  |  3.83<H>    Ca    8.6      2019 07:43        Urinalysis Basic - ( 2019 22:14 )    Color: Yellow / Appearance: very cloudy / S.020 / pH: x  Gluc: x / Ketone: Negative  / Bili: Negative / Urobili: Negative   Blood: x / Protein: 100 / Nitrite: Negative   Leuk Esterase: Moderate / RBC: 5-10 /HPF / WBC >50 /HPF   Sq Epi: x / Non Sq Epi: Few /HPF / Bacteria: TNTC /HPF      CAPILLARY BLOOD GLUCOSE      POCT Blood Glucose.: 152 mg/dL (2019 11:28)  POCT Blood Glucose.: 150 mg/dL (2019 08:06)  POCT Blood Glucose.: 154 mg/dL (2019 21:45)  POCT Blood Glucose.: 82 mg/dL (2019 16:37)        RADIOLOGY & ADDITIONAL TESTS:    Imaging Personally Reviewed:  YES/NO    Consultant(s) Notes Reviewed:   YES/ No    Care Discussed with Consultants :     Plan of care was discussed with patient and /or primary care giver; all questions and concerns were addressed and care was aligned with patient's wishes. NP Note discussed with  Primary Attending    Patient is a 79y old  Female who presents with a chief complaint of sob (2019 14:24)  HPI  78 y/o F, from home walks with cane at baseline, with a PMH of chronic diastolic heart failure (stage II), ESRD on HD T//S, moderate MS, COPD on home oxygen, HTN, HLD, DM, and GERD  who presented with generalized weakness, generalized body pain and shortness of breath on exertion for x 1 day. Admitted for SOB due to acute on chronic hypoxic hypercapnic respiratory failure due to Acute exacerbation of COPD with CHF and anemia.  Hospital course complicated by fever , likely from right foot  cellulitis , on Zosyn , Day 2. BD  NGTD. UC pending results. ID Dr. Barbour following.    INTERVAL HPI/OVERNIGHT EVENTS: Pt seen and examined this morning. Pt awake , NAD, reports breathing and feeling better.     MEDICATIONS  (STANDING):  atorvastatin 10 milliGRAM(s) Oral at bedtime  budesonide  80 MICROgram(s)/formoterol 4.5 MICROgram(s) Inhaler 2 Puff(s) Inhalation two times a day  chlorhexidine 4% Liquid 1 Application(s) Topical daily  dextrose 5%. 1000 milliLiter(s) (50 mL/Hr) IV Continuous <Continuous>  dextrose 50% Injectable 12.5 Gram(s) IV Push once  dextrose 50% Injectable 25 Gram(s) IV Push once  dextrose 50% Injectable 25 Gram(s) IV Push once  epoetin randy Injectable 33319 Unit(s) IV Push <User Schedule>  ferrous    sulfate 325 milliGRAM(s) Oral daily  heparin  Injectable 5000 Unit(s) SubCutaneous every 12 hours  insulin glargine Injectable (LANTUS) 15 Unit(s) SubCutaneous at bedtime  insulin lispro (HumaLOG) corrective regimen sliding scale   SubCutaneous three times a day before meals  insulin lispro Injectable (HumaLOG) 3 Unit(s) SubCutaneous three times a day before meals  midodrine. 10 milliGRAM(s) Oral every 8 hours  pantoprazole    Tablet 40 milliGRAM(s) Oral before breakfast  piperacillin/tazobactam IVPB.. 3.375 Gram(s) IV Intermittent every 12 hours  sevelamer carbonate 1600 milliGRAM(s) Oral three times a day with meals  traZODone 50 milliGRAM(s) Oral at bedtime    MEDICATIONS  (PRN):  acetaminophen   Tablet .. 650 milliGRAM(s) Oral every 6 hours PRN Mild Pain (1 - 3), Moderate Pain (4 - 6)  albuterol/ipratropium for Nebulization. 3 milliLiter(s) Nebulizer once PRN Shortness of Breath and/or Wheezing  dextrose 40% Gel 15 Gram(s) Oral once PRN Blood Glucose LESS THAN 70 milliGRAM(s)/deciliter  glucagon  Injectable 1 milliGRAM(s) IntraMuscular once PRN Glucose LESS THAN 70 milligrams/deciliter      __________________________________________________  REVIEW OF SYSTEMS:    CONSTITUTIONAL: No fever,   EYES: no acute visual disturbances  NECK: No pain or stiffness  RESPIRATORY: Less SOB, No cough  CARDIOVASCULAR: No chest pain, no palpitations  GASTROINTESTINAL: No pain. No nausea or vomiting; No diarrhea   NEUROLOGICAL: No headache or numbness, no tremors  MUSCULOSKELETAL: No joint pain, no muscle pain  GENITOURINARY: no dysuria, no frequency, no hesitancy  PSYCHIATRY: no depression , no anxiety  ALL OTHER  ROS negative        Vital Signs Last 24 Hrs  T(C): 36.8 (2019 05:05), Max: 36.9 (2019 18:39)  T(F): 98.3 (2019 05:05), Max: 98.5 (2019 18:39)  HR: 80 (2019 05:05) (73 - 93)  BP: 153/72 (2019 05:05) (120/71 - 153/72)  BP(mean): --  RR: 17 (2019 05:05) (16 - 17)  SpO2: 97% (2019 05:05) (96% - 99%)    ________________________________________________  PHYSICAL EXAM:  GENERAL: NAD  HEENT: Normocephalic;  conjunctivae and sclerae clear; moist mucous membranes;   NECK : supple  CHEST/LUNG: Mildly labored. Clear upper lobes. Fine bibasilar crackles.    HEART: S1 S2  regular; no murmurs, gallops or rubs  ABDOMEN: Soft, Nontender, Nondistended; Bowel sounds present  EXTREMITIES: no cyanosis; no edema; no calf tenderness  SKIN: warm and dry; no rash  NERVOUS SYSTEM:  Awake and alert; Oriented  to place, person and time ; no new deficits    _________________________________________________  LABS:                        9.7    8.58  )-----------( 204      ( 2019 07:43 )             32.1     11-    136  |  101  |  29<H>  ----------------------------<  138<H>  4.2   |  27  |  3.83<H>    Ca    8.6      2019 07:43        Urinalysis Basic - ( 2019 22:14 )    Color: Yellow / Appearance: very cloudy / S.020 / pH: x  Gluc: x / Ketone: Negative  / Bili: Negative / Urobili: Negative   Blood: x / Protein: 100 / Nitrite: Negative   Leuk Esterase: Moderate / RBC: 5-10 /HPF / WBC >50 /HPF   Sq Epi: x / Non Sq Epi: Few /HPF / Bacteria: TNTC /HPF      CAPILLARY BLOOD GLUCOSE      POCT Blood Glucose.: 152 mg/dL (2019 11:28)  POCT Blood Glucose.: 150 mg/dL (2019 08:06)  POCT Blood Glucose.: 154 mg/dL (2019 21:45)  POCT Blood Glucose.: 82 mg/dL (2019 16:37)        RADIOLOGY & ADDITIONAL TESTS:    Imaging Personally Reviewed:  YES/NO    Consultant(s) Notes Reviewed:   YES/ No    Care Discussed with Consultants :     Plan of care was discussed with patient and /or primary care giver; all questions and concerns were addressed and care was aligned with patient's wishes. NP Note discussed with  Primary Attending    Patient is a 79y old  Female who presents with a chief complaint of sob (2019 14:24)  HPI  78 y/o F, from home walks with cane at baseline, with a PMH of chronic diastolic heart failure (stage II), ESRD on HD T//S, moderate MS, COPD on home oxygen, HTN, HLD, DM, and GERD  who presented with generalized weakness, generalized body pain and shortness of breath on exertion for x 1 day. Admitted for SOB due to acute on chronic hypoxic hypercapnic respiratory failure due to Acute exacerbation of COPD with CHF and anemia.  Hospital course complicated by fever , likely from right foot  cellulitis , on Zosyn , Day 2. BD  NGTD. UC pending results. ID Dr. Barbour following.    INTERVAL HPI/OVERNIGHT EVENTS: Pt seen and examined this morning. Pt awake , NAD, reports breathing and feeling better.     MEDICATIONS  (STANDING):  atorvastatin 10 milliGRAM(s) Oral at bedtime  budesonide  80 MICROgram(s)/formoterol 4.5 MICROgram(s) Inhaler 2 Puff(s) Inhalation two times a day  chlorhexidine 4% Liquid 1 Application(s) Topical daily  dextrose 5%. 1000 milliLiter(s) (50 mL/Hr) IV Continuous <Continuous>  dextrose 50% Injectable 12.5 Gram(s) IV Push once  dextrose 50% Injectable 25 Gram(s) IV Push once  dextrose 50% Injectable 25 Gram(s) IV Push once  epoetin randy Injectable 07730 Unit(s) IV Push <User Schedule>  ferrous    sulfate 325 milliGRAM(s) Oral daily  heparin  Injectable 5000 Unit(s) SubCutaneous every 12 hours  insulin glargine Injectable (LANTUS) 15 Unit(s) SubCutaneous at bedtime  insulin lispro (HumaLOG) corrective regimen sliding scale   SubCutaneous three times a day before meals  insulin lispro Injectable (HumaLOG) 3 Unit(s) SubCutaneous three times a day before meals  midodrine. 10 milliGRAM(s) Oral every 8 hours  pantoprazole    Tablet 40 milliGRAM(s) Oral before breakfast  piperacillin/tazobactam IVPB.. 3.375 Gram(s) IV Intermittent every 12 hours  sevelamer carbonate 1600 milliGRAM(s) Oral three times a day with meals  traZODone 50 milliGRAM(s) Oral at bedtime    MEDICATIONS  (PRN):  acetaminophen   Tablet .. 650 milliGRAM(s) Oral every 6 hours PRN Mild Pain (1 - 3), Moderate Pain (4 - 6)  albuterol/ipratropium for Nebulization. 3 milliLiter(s) Nebulizer once PRN Shortness of Breath and/or Wheezing  dextrose 40% Gel 15 Gram(s) Oral once PRN Blood Glucose LESS THAN 70 milliGRAM(s)/deciliter  glucagon  Injectable 1 milliGRAM(s) IntraMuscular once PRN Glucose LESS THAN 70 milligrams/deciliter      __________________________________________________  REVIEW OF SYSTEMS:    CONSTITUTIONAL: No fever,   EYES: no acute visual disturbances  NECK: No pain or stiffness  RESPIRATORY: Less SOB, No cough  CARDIOVASCULAR: No chest pain, no palpitations  GASTROINTESTINAL: No pain. No nausea or vomiting; No diarrhea   NEUROLOGICAL: No headache or numbness, no tremors  MUSCULOSKELETAL: No joint pain, no muscle pain  GENITOURINARY: no dysuria, no frequency, no hesitancy  PSYCHIATRY: no depression , no anxiety  ALL OTHER  ROS negative        Vital Signs Last 24 Hrs  T(C): 36.8 (2019 05:05), Max: 36.9 (2019 18:39)  T(F): 98.3 (2019 05:05), Max: 98.5 (2019 18:39)  HR: 80 (2019 05:05) (73 - 93)  BP: 153/72 (2019 05:05) (120/71 - 153/72)  BP(mean): --  RR: 17 (2019 05:05) (16 - 17)  SpO2: 97% (2019 05:05) (96% - 99%)    ________________________________________________  PHYSICAL EXAM:  GENERAL: NAD  HEENT: Normocephalic;  conjunctivae and sclerae clear; moist mucous membranes;   NECK : supple  CHEST/LUNG: Mildly labored. Clear upper lobes. Fine bibasilar crackles.    HEART: S1 S2  regular; no murmurs, gallops or rubs  ABDOMEN: Soft, Nontender, Nondistended; Bowel sounds present  EXTREMITIES: no cyanosis; no calf tenderness. Right foot with erythema and mild edema, mild tenderness. (+) PPP  SKIN: warm and dry; no rash  NERVOUS SYSTEM:  Awake and alert; Oriented  to place, person and time ; no new deficits    _________________________________________________  LABS:                        9.7    8.58  )-----------( 204      ( 2019 07:43 )             32.1         136  |  101  |  29<H>  ----------------------------<  138<H>  4.2   |  27  |  3.83<H>    Ca    8.6      2019 07:43        Urinalysis Basic - ( 2019 22:14 )    Color: Yellow / Appearance: very cloudy / S.020 / pH: x  Gluc: x / Ketone: Negative  / Bili: Negative / Urobili: Negative   Blood: x / Protein: 100 / Nitrite: Negative   Leuk Esterase: Moderate / RBC: 5-10 /HPF / WBC >50 /HPF   Sq Epi: x / Non Sq Epi: Few /HPF / Bacteria: TNTC /HPF      CAPILLARY BLOOD GLUCOSE      POCT Blood Glucose.: 152 mg/dL (2019 11:28)  POCT Blood Glucose.: 150 mg/dL (2019 08:06)  POCT Blood Glucose.: 154 mg/dL (2019 21:45)  POCT Blood Glucose.: 82 mg/dL (2019 16:37)        RADIOLOGY & ADDITIONAL TESTS:    Imaging Personally Reviewed:  YES/NO    Consultant(s) Notes Reviewed:   YES/ No    Care Discussed with Consultants :     Plan of care was discussed with patient and /or primary care giver; all questions and concerns were addressed and care was aligned with patient's wishes.

## 2019-11-29 NOTE — PROGRESS NOTE ADULT - PROBLEM SELECTOR PLAN 2
Anemia of Chronic disease 2/2 CKD  Hgb stable, 9.7 today  No overt bleeding  Symptoms improving  Continue with iron supplements  Epogen in HD per Renal  Nephrology Dr. Chadwick following

## 2019-11-29 NOTE — PROGRESS NOTE ADULT - SUBJECTIVE AND OBJECTIVE BOX
[   ] ICU                                          [   ] CCU                                      [  X ] Medical Floor      Patient is comfortable. No new complaints reported, No abdominal pain, N/V, hematemesis, hematochezia, melena, fever, chills, chest pain, SOB, cough or diarrhea reported.    VITALS  Vital Signs Last 24 Hrs  T(C): 37 (29 Nov 2019 14:05), Max: 37 (29 Nov 2019 14:05)  T(F): 98.6 (29 Nov 2019 14:05), Max: 98.6 (29 Nov 2019 14:05)  HR: 73 (29 Nov 2019 14:05) (73 - 93)  BP: 141/70 (29 Nov 2019 14:05) (126/51 - 153/72)   RR: 18 (29 Nov 2019 14:05) (17 - 18)  SpO2: 100% (29 Nov 2019 14:05) (97% - 100%)       MEDICATIONS  (STANDING):  atorvastatin 10 milliGRAM(s) Oral at bedtime  budesonide  80 MICROgram(s)/formoterol 4.5 MICROgram(s) Inhaler 2 Puff(s) Inhalation two times a day  chlorhexidine 4% Liquid 1 Application(s) Topical daily  dextrose 5%. 1000 milliLiter(s) (50 mL/Hr) IV Continuous <Continuous>  dextrose 50% Injectable 12.5 Gram(s) IV Push once  dextrose 50% Injectable 25 Gram(s) IV Push once  dextrose 50% Injectable 25 Gram(s) IV Push once  epoetin randy Injectable 82629 Unit(s) IV Push <User Schedule>  ferrous    sulfate 325 milliGRAM(s) Oral daily  heparin  Injectable 5000 Unit(s) SubCutaneous every 12 hours  insulin glargine Injectable (LANTUS) 15 Unit(s) SubCutaneous at bedtime  insulin lispro (HumaLOG) corrective regimen sliding scale   SubCutaneous three times a day before meals  insulin lispro Injectable (HumaLOG) 3 Unit(s) SubCutaneous three times a day before meals  midodrine. 10 milliGRAM(s) Oral every 8 hours  pantoprazole    Tablet 40 milliGRAM(s) Oral before breakfast  piperacillin/tazobactam IVPB.. 3.375 Gram(s) IV Intermittent every 12 hours  sevelamer carbonate 1600 milliGRAM(s) Oral three times a day with meals  traZODone 50 milliGRAM(s) Oral at bedtime    MEDICATIONS  (PRN):  acetaminophen   Tablet .. 650 milliGRAM(s) Oral every 6 hours PRN Mild Pain (1 - 3), Moderate Pain (4 - 6)  albuterol/ipratropium for Nebulization. 3 milliLiter(s) Nebulizer once PRN Shortness of Breath and/or Wheezing  dextrose 40% Gel 15 Gram(s) Oral once PRN Blood Glucose LESS THAN 70 milliGRAM(s)/deciliter  glucagon  Injectable 1 milliGRAM(s) IntraMuscular once PRN Glucose LESS THAN 70 milligrams/deciliter                            9.7    8.58  )-----------( 204      ( 29 Nov 2019 07:43 )             32.1       11-29    136  |  101  |  29<H>  ----------------------------<  138<H>  4.2   |  27  |  3.83<H>    Ca    8.6      29 Nov 2019 07:43

## 2019-11-29 NOTE — PROGRESS NOTE ADULT - SUBJECTIVE AND OBJECTIVE BOX
Patient denies CP, SOB Review of systems otherwise (-)    acetaminophen   Tablet .. 650 milliGRAM(s) Oral every 6 hours PRN  albuterol/ipratropium for Nebulization 3 milliLiter(s) Nebulizer every 6 hours  atorvastatin 10 milliGRAM(s) Oral at bedtime  budesonide  80 MICROgram(s)/formoterol 4.5 MICROgram(s) Inhaler 2 Puff(s) Inhalation two times a day  chlorhexidine 4% Liquid 1 Application(s) Topical daily  dextrose 40% Gel 15 Gram(s) Oral once PRN  dextrose 5%. 1000 milliLiter(s) IV Continuous <Continuous>  dextrose 50% Injectable 12.5 Gram(s) IV Push once  dextrose 50% Injectable 25 Gram(s) IV Push once  dextrose 50% Injectable 25 Gram(s) IV Push once  epoetin randy Injectable 01330 Unit(s) IV Push <User Schedule>  ferrous    sulfate 325 milliGRAM(s) Oral daily  glucagon  Injectable 1 milliGRAM(s) IntraMuscular once PRN  heparin  Injectable 5000 Unit(s) SubCutaneous every 12 hours  insulin glargine Injectable (LANTUS) 15 Unit(s) SubCutaneous at bedtime  insulin lispro (HumaLOG) corrective regimen sliding scale   SubCutaneous three times a day before meals  insulin lispro Injectable (HumaLOG) 3 Unit(s) SubCutaneous three times a day before meals  midodrine. 10 milliGRAM(s) Oral every 8 hours  pantoprazole    Tablet 40 milliGRAM(s) Oral before breakfast  piperacillin/tazobactam IVPB.. 3.375 Gram(s) IV Intermittent every 12 hours  sevelamer carbonate 1600 milliGRAM(s) Oral three times a day with meals  traZODone 50 milliGRAM(s) Oral at bedtime                            9.7    8.58  )-----------( 204      ( 29 Nov 2019 07:43 )             32.1       Hemoglobin: 9.7 g/dL (11-29 @ 07:43)  Hemoglobin: 9.4 g/dL (11-28 @ 15:04)  Hemoglobin: 9.3 g/dL (11-27 @ 06:27)  Hemoglobin: 9.1 g/dL (11-26 @ 18:51)  Hemoglobin: 9.6 g/dL (11-26 @ 08:52)      11-29    136  |  101  |  29<H>  ----------------------------<  138<H>  4.2   |  27  |  3.83<H>    Ca    8.6      29 Nov 2019 07:43      Creatinine Trend: 3.83<--, 5.31<--, 4.68<--, 5.80<--, 4.71<--, 4.34<--    COAGS:           T(C): 36.8 (11-29-19 @ 05:05), Max: 36.9 (11-28-19 @ 18:39)  HR: 80 (11-29-19 @ 05:05) (73 - 93)  BP: 153/72 (11-29-19 @ 05:05) (120/71 - 153/72)  RR: 17 (11-29-19 @ 05:05) (16 - 17)  SpO2: 97% (11-29-19 @ 05:05) (96% - 99%)  Wt(kg): --    I&O's Summary      Gen: Appears well in NAD  HEENT:  (-)icterus (-)pallor  CV: N S1 S2 1/6 HODA (+)2 Pulses B/l  Resp:  Clear to ausculatation B/L, normal effort  GI: (+) BS Soft, NT, ND  Lymph:  (-)Edema, (-)obvious lymphadenopathy  Skin: Warm to touch, Normal turgor  Psych: Appropriate mood and affect      TELEMETRY: 	  OFF       ASSESSMENT/PLAN: 	79y Female female hx of ESRD on HD, HTN, ANemia, copd , chf, PAF, no A/C documented , now with anemia, SOB , + troponin on admission labs     -  HD per renal  - Fever w/u per Med  - Abx per primary team  - cont midodrine for BP support  - (+) trop  - will plan on NST when medically optimized if within GOC    Kyler Jaimes MD, [facility]  BEEPER [phone]

## 2019-11-30 LAB
-  AMIKACIN: SIGNIFICANT CHANGE UP
-  AMPICILLIN/SULBACTAM: SIGNIFICANT CHANGE UP
-  AMPICILLIN: SIGNIFICANT CHANGE UP
-  AZTREONAM: SIGNIFICANT CHANGE UP
-  CEFAZOLIN: SIGNIFICANT CHANGE UP
-  CEFEPIME: SIGNIFICANT CHANGE UP
-  CEFOXITIN: SIGNIFICANT CHANGE UP
-  CEFTRIAXONE: SIGNIFICANT CHANGE UP
-  CIPROFLOXACIN: SIGNIFICANT CHANGE UP
-  DAPTOMYCIN: SIGNIFICANT CHANGE UP
-  GENTAMICIN: SIGNIFICANT CHANGE UP
-  IMIPENEM: SIGNIFICANT CHANGE UP
-  LEVOFLOXACIN: SIGNIFICANT CHANGE UP
-  LEVOFLOXACIN: SIGNIFICANT CHANGE UP
-  LINEZOLID: SIGNIFICANT CHANGE UP
-  MEROPENEM: SIGNIFICANT CHANGE UP
-  NITROFURANTOIN: SIGNIFICANT CHANGE UP
-  PENICILLIN: SIGNIFICANT CHANGE UP
-  PIPERACILLIN/TAZOBACTAM: SIGNIFICANT CHANGE UP
-  TIGECYCLINE: SIGNIFICANT CHANGE UP
-  TOBRAMYCIN: SIGNIFICANT CHANGE UP
-  TRIMETHOPRIM/SULFAMETHOXAZOLE: SIGNIFICANT CHANGE UP
-  VANCOMYCIN: SIGNIFICANT CHANGE UP
ANION GAP SERPL CALC-SCNC: 6 MMOL/L — SIGNIFICANT CHANGE UP (ref 5–17)
BUN SERPL-MCNC: 35 MG/DL — HIGH (ref 7–18)
CALCIUM SERPL-MCNC: 8.1 MG/DL — LOW (ref 8.4–10.5)
CHLORIDE SERPL-SCNC: 104 MMOL/L — SIGNIFICANT CHANGE UP (ref 96–108)
CO2 SERPL-SCNC: 28 MMOL/L — SIGNIFICANT CHANGE UP (ref 22–31)
CREAT SERPL-MCNC: 4.49 MG/DL — HIGH (ref 0.5–1.3)
CULTURE RESULTS: SIGNIFICANT CHANGE UP
GLUCOSE BLDC GLUCOMTR-MCNC: 118 MG/DL — HIGH (ref 70–99)
GLUCOSE BLDC GLUCOMTR-MCNC: 124 MG/DL — HIGH (ref 70–99)
GLUCOSE BLDC GLUCOMTR-MCNC: 172 MG/DL — HIGH (ref 70–99)
GLUCOSE SERPL-MCNC: 124 MG/DL — HIGH (ref 70–99)
HCT VFR BLD CALC: 29.9 % — LOW (ref 34.5–45)
HGB BLD-MCNC: 8.9 G/DL — LOW (ref 11.5–15.5)
MCHC RBC-ENTMCNC: 28.8 PG — SIGNIFICANT CHANGE UP (ref 27–34)
MCHC RBC-ENTMCNC: 29.8 GM/DL — LOW (ref 32–36)
MCV RBC AUTO: 96.8 FL — SIGNIFICANT CHANGE UP (ref 80–100)
METHOD TYPE: SIGNIFICANT CHANGE UP
METHOD TYPE: SIGNIFICANT CHANGE UP
NRBC # BLD: 0 /100 WBCS — SIGNIFICANT CHANGE UP (ref 0–0)
ORGANISM # SPEC MICROSCOPIC CNT: SIGNIFICANT CHANGE UP
PLATELET # BLD AUTO: 200 K/UL — SIGNIFICANT CHANGE UP (ref 150–400)
POTASSIUM SERPL-MCNC: 4.2 MMOL/L — SIGNIFICANT CHANGE UP (ref 3.5–5.3)
POTASSIUM SERPL-SCNC: 4.2 MMOL/L — SIGNIFICANT CHANGE UP (ref 3.5–5.3)
RBC # BLD: 3.09 M/UL — LOW (ref 3.8–5.2)
RBC # FLD: 15.8 % — HIGH (ref 10.3–14.5)
SODIUM SERPL-SCNC: 138 MMOL/L — SIGNIFICANT CHANGE UP (ref 135–145)
SPECIMEN SOURCE: SIGNIFICANT CHANGE UP
WBC # BLD: 6.76 K/UL — SIGNIFICANT CHANGE UP (ref 3.8–10.5)
WBC # FLD AUTO: 6.76 K/UL — SIGNIFICANT CHANGE UP (ref 3.8–10.5)

## 2019-11-30 RX ADMIN — MIDODRINE HYDROCHLORIDE 10 MILLIGRAM(S): 2.5 TABLET ORAL at 21:00

## 2019-11-30 RX ADMIN — SEVELAMER CARBONATE 1600 MILLIGRAM(S): 2400 POWDER, FOR SUSPENSION ORAL at 11:40

## 2019-11-30 RX ADMIN — BUDESONIDE AND FORMOTEROL FUMARATE DIHYDRATE 2 PUFF(S): 160; 4.5 AEROSOL RESPIRATORY (INHALATION) at 22:22

## 2019-11-30 RX ADMIN — Medication 325 MILLIGRAM(S): at 11:40

## 2019-11-30 RX ADMIN — ATORVASTATIN CALCIUM 10 MILLIGRAM(S): 80 TABLET, FILM COATED ORAL at 22:21

## 2019-11-30 RX ADMIN — PIPERACILLIN AND TAZOBACTAM 25 GRAM(S): 4; .5 INJECTION, POWDER, LYOPHILIZED, FOR SOLUTION INTRAVENOUS at 06:40

## 2019-11-30 RX ADMIN — Medication 50 MILLIGRAM(S): at 22:21

## 2019-11-30 RX ADMIN — PIPERACILLIN AND TAZOBACTAM 25 GRAM(S): 4; .5 INJECTION, POWDER, LYOPHILIZED, FOR SOLUTION INTRAVENOUS at 22:21

## 2019-11-30 RX ADMIN — ERYTHROPOIETIN 10000 UNIT(S): 10000 INJECTION, SOLUTION INTRAVENOUS; SUBCUTANEOUS at 18:53

## 2019-11-30 RX ADMIN — INSULIN GLARGINE 15 UNIT(S): 100 INJECTION, SOLUTION SUBCUTANEOUS at 22:21

## 2019-11-30 RX ADMIN — BUDESONIDE AND FORMOTEROL FUMARATE DIHYDRATE 2 PUFF(S): 160; 4.5 AEROSOL RESPIRATORY (INHALATION) at 11:41

## 2019-11-30 RX ADMIN — PANTOPRAZOLE SODIUM 40 MILLIGRAM(S): 20 TABLET, DELAYED RELEASE ORAL at 05:37

## 2019-11-30 RX ADMIN — CHLORHEXIDINE GLUCONATE 1 APPLICATION(S): 213 SOLUTION TOPICAL at 11:40

## 2019-11-30 RX ADMIN — SEVELAMER CARBONATE 1600 MILLIGRAM(S): 2400 POWDER, FOR SUSPENSION ORAL at 08:25

## 2019-11-30 RX ADMIN — Medication 3 UNIT(S): at 11:41

## 2019-11-30 RX ADMIN — Medication 3 UNIT(S): at 08:25

## 2019-11-30 RX ADMIN — Medication 1: at 11:41

## 2019-11-30 RX ADMIN — HEPARIN SODIUM 5000 UNIT(S): 5000 INJECTION INTRAVENOUS; SUBCUTANEOUS at 05:36

## 2019-11-30 NOTE — PROGRESS NOTE ADULT - PROBLEM SELECTOR PLAN 2
Anemia of Chronic disease 2/2 CKD  Hgb stable, 9.7 today  No overt bleeding  Symptoms improving  Continue with iron supplements  Epogen in HD per Renal

## 2019-11-30 NOTE — CHART NOTE - NSCHARTNOTEFT_GEN_A_CORE
Alerted by attending that patient has had 4 episodes loose BM. Stool culture sent. Patient on no laxatives or stool softeners. Primary team please follow up.

## 2019-11-30 NOTE — PROGRESS NOTE ADULT - SUBJECTIVE AND OBJECTIVE BOX
[   ] ICU                                          [   ] CCU                                      [ X  ] Medical Floor      Patient is comfortable. No new complaints reported, No abdominal pain, N/V, hematemesis, hematochezia, melena, fever, chills, chest pain, SOB, cough or diarrhea reported.    VITALS  Vital Signs Last 24 Hrs  T(C): 36.7 (30 Nov 2019 05:10), Max: 37 (29 Nov 2019 14:05)  T(F): 98.1 (30 Nov 2019 05:10), Max: 98.6 (29 Nov 2019 14:05)  HR: 85 (30 Nov 2019 05:10) (73 - 85)  BP: 144/69 (30 Nov 2019 05:10) (141/70 - 144/69)   RR: 18 (30 Nov 2019 05:10) (18 - 18)  SpO2: 99% (30 Nov 2019 05:10) (92% - 100%)       MEDICATIONS  (STANDING):  atorvastatin 10 milliGRAM(s) Oral at bedtime  budesonide  80 MICROgram(s)/formoterol 4.5 MICROgram(s) Inhaler 2 Puff(s) Inhalation two times a day  chlorhexidine 4% Liquid 1 Application(s) Topical daily  dextrose 5%. 1000 milliLiter(s) (50 mL/Hr) IV Continuous <Continuous>  dextrose 50% Injectable 12.5 Gram(s) IV Push once  dextrose 50% Injectable 25 Gram(s) IV Push once  dextrose 50% Injectable 25 Gram(s) IV Push once  epoetin randy Injectable 01635 Unit(s) IV Push <User Schedule>  ferrous    sulfate 325 milliGRAM(s) Oral daily  heparin  Injectable 5000 Unit(s) SubCutaneous every 12 hours  insulin glargine Injectable (LANTUS) 15 Unit(s) SubCutaneous at bedtime  insulin lispro (HumaLOG) corrective regimen sliding scale   SubCutaneous three times a day before meals  insulin lispro Injectable (HumaLOG) 3 Unit(s) SubCutaneous three times a day before meals  midodrine. 10 milliGRAM(s) Oral every 8 hours  pantoprazole    Tablet 40 milliGRAM(s) Oral before breakfast  piperacillin/tazobactam IVPB.. 3.375 Gram(s) IV Intermittent every 12 hours  sevelamer carbonate 1600 milliGRAM(s) Oral three times a day with meals  traZODone 50 milliGRAM(s) Oral at bedtime    MEDICATIONS  (PRN):  acetaminophen   Tablet .. 650 milliGRAM(s) Oral every 6 hours PRN Mild Pain (1 - 3), Moderate Pain (4 - 6)  albuterol/ipratropium for Nebulization. 3 milliLiter(s) Nebulizer once PRN Shortness of Breath and/or Wheezing  dextrose 40% Gel 15 Gram(s) Oral once PRN Blood Glucose LESS THAN 70 milliGRAM(s)/deciliter  glucagon  Injectable 1 milliGRAM(s) IntraMuscular once PRN Glucose LESS THAN 70 milligrams/deciliter                            9.7    8.58  )-----------( 204      ( 29 Nov 2019 07:43 )             32.1       11-29    136  |  101  |  29<H>  ----------------------------<  138<H>  4.2   |  27  |  3.83<H>    Ca    8.6      29 Nov 2019 07:43

## 2019-11-30 NOTE — PROGRESS NOTE ADULT - SUBJECTIVE AND OBJECTIVE BOX
Patient denies CP, SOB Review of systems otherwise (-)      acetaminophen   Tablet .. 650 milliGRAM(s) Oral every 6 hours PRN  albuterol/ipratropium for Nebulization. 3 milliLiter(s) Nebulizer once PRN  atorvastatin 10 milliGRAM(s) Oral at bedtime  budesonide  80 MICROgram(s)/formoterol 4.5 MICROgram(s) Inhaler 2 Puff(s) Inhalation two times a day  chlorhexidine 4% Liquid 1 Application(s) Topical daily  dextrose 40% Gel 15 Gram(s) Oral once PRN  dextrose 5%. 1000 milliLiter(s) IV Continuous <Continuous>  dextrose 50% Injectable 12.5 Gram(s) IV Push once  dextrose 50% Injectable 25 Gram(s) IV Push once  dextrose 50% Injectable 25 Gram(s) IV Push once  epoetin randy Injectable 45626 Unit(s) IV Push <User Schedule>  ferrous    sulfate 325 milliGRAM(s) Oral daily  glucagon  Injectable 1 milliGRAM(s) IntraMuscular once PRN  heparin  Injectable 5000 Unit(s) SubCutaneous every 12 hours  insulin glargine Injectable (LANTUS) 15 Unit(s) SubCutaneous at bedtime  insulin lispro (HumaLOG) corrective regimen sliding scale   SubCutaneous three times a day before meals  insulin lispro Injectable (HumaLOG) 3 Unit(s) SubCutaneous three times a day before meals  midodrine. 10 milliGRAM(s) Oral every 8 hours  pantoprazole    Tablet 40 milliGRAM(s) Oral before breakfast  piperacillin/tazobactam IVPB.. 3.375 Gram(s) IV Intermittent every 12 hours  sevelamer carbonate 1600 milliGRAM(s) Oral three times a day with meals  traZODone 50 milliGRAM(s) Oral at bedtime                            9.7    8.58  )-----------( 204      ( 29 Nov 2019 07:43 )             32.1       11-29    136  |  101  |  29<H>  ----------------------------<  138<H>  4.2   |  27  |  3.83<H>    Ca    8.6      29 Nov 2019 07:43              T(C): 36.5 (11-30-19 @ 14:47), Max: 36.7 (11-30-19 @ 05:10)  HR: 76 (11-30-19 @ 14:47) (76 - 85)  BP: 141/90 (11-30-19 @ 14:47) (141/90 - 144/69)  RR: 16 (11-30-19 @ 14:47) (16 - 18)  SpO2: 95% (11-30-19 @ 14:47) (92% - 99%)  Wt(kg): --    I&O's Summary        Gen: Appears well in NAD  HEENT:  (-)icterus (-)pallor  CV: N S1 S2 1/6 HODA (+)2 Pulses B/l  Resp:  Clear to ausculatation B/L, normal effort  GI: (+) BS Soft, NT, ND  Lymph:  (-)Edema, (-)obvious lymphadenopathy  Skin: Warm to touch, Normal turgor  Psych: Appropriate mood and affect      TELEMETRY: 	  OFF       ASSESSMENT/PLAN: 	79y Female female hx of ESRD on HD, HTN, ANemia, copd , chf, PAF, no A/C documented , now with anemia, SOB , + troponin on admission labs     - Fluid removal with HD per renal  - Fever w/u per Med  - Abx per primary team  - cont midodrine for BP support  - (+) trop  - will plan on NST when medically optimized if within GOC    Keysha Ellis MD

## 2019-11-30 NOTE — PROGRESS NOTE ADULT - SUBJECTIVE AND OBJECTIVE BOX
Patient is a 79y old  Female who presents with a chief complaint of sob (2019 14:24)  HPI  80 y/o F, from home walks with cane at baseline, with a PMH of chronic diastolic heart failure (stage II), ESRD on HD /, moderate MS, COPD on home oxygen, HTN, HLD, DM, and GERD  who presented with generalized weakness, generalized body pain and shortness of breath on exertion for x 1 day. Admitted for SOB due to acute on chronic hypoxic hypercapnic respiratory failure due to Acute exacerbation of COPD with CHF and anemia.  Hospital course complicated by fever , likely from right foot  cellulitis , on Zosyn , Day 2. BD  NGTD.  pending results. ID Dr. Barbour following.    INTERVAL HPI/OVERNIGHT EVENTS: Pt seen and examined this morning. Pt awake , NAD, reports breathing and feeling better.     MEDICATIONS  (STANDING):  atorvastatin 10 milliGRAM(s) Oral at bedtime  budesonide  80 MICROgram(s)/formoterol 4.5 MICROgram(s) Inhaler 2 Puff(s) Inhalation two times a day  chlorhexidine 4% Liquid 1 Application(s) Topical daily  dextrose 5%. 1000 milliLiter(s) (50 mL/Hr) IV Continuous <Continuous>  dextrose 50% Injectable 12.5 Gram(s) IV Push once  dextrose 50% Injectable 25 Gram(s) IV Push once  dextrose 50% Injectable 25 Gram(s) IV Push once  epoetin randy Injectable 01237 Unit(s) IV Push <User Schedule>  ferrous    sulfate 325 milliGRAM(s) Oral daily  heparin  Injectable 5000 Unit(s) SubCutaneous every 12 hours  insulin glargine Injectable (LANTUS) 15 Unit(s) SubCutaneous at bedtime  insulin lispro (HumaLOG) corrective regimen sliding scale   SubCutaneous three times a day before meals  insulin lispro Injectable (HumaLOG) 3 Unit(s) SubCutaneous three times a day before meals  midodrine. 10 milliGRAM(s) Oral every 8 hours  pantoprazole    Tablet 40 milliGRAM(s) Oral before breakfast  piperacillin/tazobactam IVPB.. 3.375 Gram(s) IV Intermittent every 12 hours  sevelamer carbonate 1600 milliGRAM(s) Oral three times a day with meals  traZODone 50 milliGRAM(s) Oral at bedtime    MEDICATIONS  (PRN):  acetaminophen   Tablet .. 650 milliGRAM(s) Oral every 6 hours PRN Mild Pain (1 - 3), Moderate Pain (4 - 6)  albuterol/ipratropium for Nebulization. 3 milliLiter(s) Nebulizer once PRN Shortness of Breath and/or Wheezing  dextrose 40% Gel 15 Gram(s) Oral once PRN Blood Glucose LESS THAN 70 milliGRAM(s)/deciliter  glucagon  Injectable 1 milliGRAM(s) IntraMuscular once PRN Glucose LESS THAN 70 milligrams/deciliter    __________________________________________________  REVIEW OF SYSTEMS:    CONSTITUTIONAL: No fever,   EYES: no acute visual disturbances  NECK: No pain or stiffness  RESPIRATORY: Less SOB, No cough  CARDIOVASCULAR: No chest pain, no palpitations  GASTROINTESTINAL: No pain. No nausea or vomiting; No diarrhea   NEUROLOGICAL: No headache or numbness, no tremors  MUSCULOSKELETAL: No joint pain, no muscle pain  GENITOURINARY: no dysuria, no frequency, no hesitancy  PSYCHIATRY: no depression , no anxiety  ALL OTHER  ROS negative      Vital Signs Last 24 Hrs  T(C): 36.6 (2019 21:14), Max: 36.7 (2019 05:10)  T(F): 97.9 (2019 21:14), Max: 98.1 (2019 05:10)  HR: 90 (2019 21:14) (71 - 90)  BP: 138/70 (2019 21:14) (131/68 - 162/90)  BP(mean): --  RR: 17 (2019 21:14) (16 - 18)  SpO2: 96% (2019 21:14) (76% - 99%)  ________________________________________________  PHYSICAL EXAM:  GENERAL: NAD  HEENT: Normocephalic;  conjunctivae and sclerae clear; moist mucous membranes;   NECK : supple  CHEST/LUNG: Mildly labored. Clear upper lobes. Fine bibasilar crackles.    HEART: S1 S2  regular; no murmurs, gallops or rubs  ABDOMEN: Soft, Nontender, Nondistended; Bowel sounds present  EXTREMITIES: no cyanosis; no calf tenderness. Right foot with erythema and mild edema, mild tenderness. (+) PPP  SKIN: warm and dry; no rash  NERVOUS SYSTEM:  Awake and alert; Oriented  to place, person and time ; no new deficits    _________________________________________________  LABS:      138  |  104  |  35<H>  ----------------------------<  124<H>  4.2   |  28  |  4.49<H>    Ca    8.1<L>      2019 17:10      Creatinine Trend: 4.49 <--, 3.83 <--, 5.31 <--, 4.68 <--, 5.80 <--, 4.71 <--                        8.9    6.76  )-----------( 200      ( 2019 17:10 )             29.9     Urine Studies:  Urinalysis Basic - ( 2019 22:14 )    Color: Yellow / Appearance: very cloudy / S.020 / pH:   Gluc:  / Ketone: Negative  / Bili: Negative / Urobili: Negative   Blood:  / Protein: 100 / Nitrite: Negative   Leuk Esterase: Moderate / RBC: 5-10 /HPF / WBC >50 /HPF   Sq Epi:  / Non Sq Epi: Few /HPF / Bacteria: TNTC /HPF                  cerns were addressed and care was aligned with patient's wishes.

## 2019-11-30 NOTE — PROGRESS NOTE ADULT - SUBJECTIVE AND OBJECTIVE BOX
Problem List:  ESRD due to DKD  Anemia refusing colonoscopy, c/o 4 time diarrhea in am dark stool  Right foot cellulitis with left toe ischemia.  c/o pain in right foot area and swelling        PAST MEDICAL & SURGICAL HISTORY:  Chronic CHF  MS (mitral stenosis)  GERD (gastroesophageal reflux disease)  COPD (chronic obstructive pulmonary disease): On home oxygen  HLD (hyperlipidemia): not taking statins  HTN (hypertension)  ESRD (end stage renal disease) on dialysis  Chronic diastolic (congestive) heart failure  Anemia of chronic disease  Breast carcinoma: Lt side s/p lumpectomy and radiation in 2004  DM (diabetes mellitus): type 2  S/P subtotal hysterectomy  S/P lumpectomy, left breast: 2004      No Known Allergies      MEDICATIONS  (STANDING):  atorvastatin 10 milliGRAM(s) Oral at bedtime  budesonide  80 MICROgram(s)/formoterol 4.5 MICROgram(s) Inhaler 2 Puff(s) Inhalation two times a day  chlorhexidine 4% Liquid 1 Application(s) Topical daily  dextrose 5%. 1000 milliLiter(s) (50 mL/Hr) IV Continuous <Continuous>  dextrose 50% Injectable 12.5 Gram(s) IV Push once  dextrose 50% Injectable 25 Gram(s) IV Push once  dextrose 50% Injectable 25 Gram(s) IV Push once  epoetin randy Injectable 09931 Unit(s) IV Push <User Schedule>  ferrous    sulfate 325 milliGRAM(s) Oral daily  heparin  Injectable 5000 Unit(s) SubCutaneous every 12 hours  insulin glargine Injectable (LANTUS) 15 Unit(s) SubCutaneous at bedtime  insulin lispro (HumaLOG) corrective regimen sliding scale   SubCutaneous three times a day before meals  insulin lispro Injectable (HumaLOG) 3 Unit(s) SubCutaneous three times a day before meals  midodrine. 10 milliGRAM(s) Oral every 8 hours  pantoprazole    Tablet 40 milliGRAM(s) Oral before breakfast  piperacillin/tazobactam IVPB.. 3.375 Gram(s) IV Intermittent every 12 hours  sevelamer carbonate 1600 milliGRAM(s) Oral three times a day with meals  traZODone 50 milliGRAM(s) Oral at bedtime    MEDICATIONS  (PRN):  acetaminophen   Tablet .. 650 milliGRAM(s) Oral every 6 hours PRN Mild Pain (1 - 3), Moderate Pain (4 - 6)  albuterol/ipratropium for Nebulization. 3 milliLiter(s) Nebulizer once PRN Shortness of Breath and/or Wheezing  dextrose 40% Gel 15 Gram(s) Oral once PRN Blood Glucose LESS THAN 70 milliGRAM(s)/deciliter  glucagon  Injectable 1 milliGRAM(s) IntraMuscular once PRN Glucose LESS THAN 70 milligrams/deciliter                            9.7    8.58  )-----------( 204      ( 29 Nov 2019 07:43 )             32.1     11-29    136  |  101  |  29<H>  ----------------------------<  138<H>  4.2   |  27  |  3.83<H>    Ca    8.6      29 Nov 2019 07:43              REVIEW OF SYSTEMS:  as above  No sob  Appetite improved.  No chest pain        VITALS:  T(F): 98.1 (11-30-19 @ 05:10), Max: 98.6 (11-29-19 @ 14:05)  HR: 85 (11-30-19 @ 05:10)  BP: 144/69 (11-30-19 @ 05:10)  RR: 18 (11-30-19 @ 05:10)  SpO2: 99% (11-30-19 @ 05:10)  Wt(kg): --      PHYSICAL EXAM:  Constitutional: well developed, no diaphoresis, no distress.  Neck: No JVD, no carotid bruit, supple, no adenopathy  Respiratory: Good air entrance B/L, no wheezes, rales or rhonchi  Cardiovascular: S1, S2, RRR, no pericardial rub, no murmur  Abdomen: BS+, soft, no tenderness, no bruit  Pelvis: bladder nondistended  Extremities: right foot mild edema and erythema in ant plantar area.  No discharge from first toe area. Ischemic ulcer.    Neurological: A/O x 3, no focal deficits  Psychiatric: Normal mood, normal affect  Vascular Access: right AVF with bruti and thrill

## 2019-11-30 NOTE — CONSULT NOTE ADULT - SUBJECTIVE AND OBJECTIVE BOX
Patient is a 79y old  Female who presents with a chief complaint of SOB (29 Nov 2019 15:15)      HPI:  Pt is a 78 y/o F, from home walks with cane at baseline, with a PMH of chronic diastolic heart failure (stage II), ESRD on HD T/Th/S, moderate MS, COPD on home oxygen, HTN, HLD, DM, and GERD  who presented with generalized weakness, generalized body pain and shortness of breath on exertion since 1 day.  According to the patient, since yesterday she has been feeling weak and has generalized body pain, more on her shoulders. She mentioned that she had minor fall from her bed yesterday and was not able to sleep whole night. She has shortness of breath on mild exertion limiting her ambulation.   She denied fever, chest pain, nausea, vomiting, abdominal pain and all other review of systems except mentioned above. (22 Nov 2019 17:38)      S: Patient is consulted for right hallux ischemic changes, seen bedside this afternoon resting comfortably in bed with both feet dangling off the bed. Patient is AAOx3 and in NAD. States the "black wound" to the distal right great toe has been present for over month and has been getting bigger. Initial onset was after wearing tight shoes. Patient has been at the hospital for a week, does not see a podiatrist regularly. Admits to being diabetic with numbness and tingling to her feet. Denies f/c/n/v or SOB. No other lower extremity complaints.       PMH:Chronic CHF  MS (mitral stenosis)  GERD (gastroesophageal reflux disease)  COPD (chronic obstructive pulmonary disease)  HLD (hyperlipidemia)  HTN (hypertension)  ESRD (end stage renal disease) on dialysis  Chronic diastolic (congestive) heart failure  Anemia of chronic disease  Anemia  Hypertension  Congestive heart failure  Chronic kidney disease  No pertinent past medical history  Breast carcinoma  Renal mass  DM (diabetes mellitus)  Asthma    Allergies: No Known Allergies    Medications: acetaminophen   Tablet .. 650 milliGRAM(s) Oral every 6 hours PRN  albuterol/ipratropium for Nebulization. 3 milliLiter(s) Nebulizer once PRN  atorvastatin 10 milliGRAM(s) Oral at bedtime  budesonide  80 MICROgram(s)/formoterol 4.5 MICROgram(s) Inhaler 2 Puff(s) Inhalation two times a day  chlorhexidine 4% Liquid 1 Application(s) Topical daily  dextrose 40% Gel 15 Gram(s) Oral once PRN  dextrose 5%. 1000 milliLiter(s) IV Continuous <Continuous>  dextrose 50% Injectable 12.5 Gram(s) IV Push once  dextrose 50% Injectable 25 Gram(s) IV Push once  dextrose 50% Injectable 25 Gram(s) IV Push once  epoetin randy Injectable 57589 Unit(s) IV Push <User Schedule>  ferrous    sulfate 325 milliGRAM(s) Oral daily  glucagon  Injectable 1 milliGRAM(s) IntraMuscular once PRN  heparin  Injectable 5000 Unit(s) SubCutaneous every 12 hours  insulin glargine Injectable (LANTUS) 15 Unit(s) SubCutaneous at bedtime  insulin lispro (HumaLOG) corrective regimen sliding scale   SubCutaneous three times a day before meals  insulin lispro Injectable (HumaLOG) 3 Unit(s) SubCutaneous three times a day before meals  midodrine. 10 milliGRAM(s) Oral every 8 hours  pantoprazole    Tablet 40 milliGRAM(s) Oral before breakfast  piperacillin/tazobactam IVPB.. 3.375 Gram(s) IV Intermittent every 12 hours  sevelamer carbonate 1600 milliGRAM(s) Oral three times a day with meals  traZODone 50 milliGRAM(s) Oral at bedtime    FH:Diabetes mellitus  Family history of breast cancer (Aunt)  No pertinent family history in first degree relatives    PSX: S/P subtotal hysterectomy  S/P lumpectomy, left breast  No significant past surgical history    SH: acetaminophen   Tablet .. 650 milliGRAM(s) Oral every 6 hours PRN  albuterol/ipratropium for Nebulization. 3 milliLiter(s) Nebulizer once PRN  atorvastatin 10 milliGRAM(s) Oral at bedtime  budesonide  80 MICROgram(s)/formoterol 4.5 MICROgram(s) Inhaler 2 Puff(s) Inhalation two times a day  chlorhexidine 4% Liquid 1 Application(s) Topical daily  dextrose 40% Gel 15 Gram(s) Oral once PRN  dextrose 5%. 1000 milliLiter(s) IV Continuous <Continuous>  dextrose 50% Injectable 12.5 Gram(s) IV Push once  dextrose 50% Injectable 25 Gram(s) IV Push once  dextrose 50% Injectable 25 Gram(s) IV Push once  epoetin randy Injectable 39654 Unit(s) IV Push <User Schedule>  ferrous    sulfate 325 milliGRAM(s) Oral daily  glucagon  Injectable 1 milliGRAM(s) IntraMuscular once PRN  heparin  Injectable 5000 Unit(s) SubCutaneous every 12 hours  insulin glargine Injectable (LANTUS) 15 Unit(s) SubCutaneous at bedtime  insulin lispro (HumaLOG) corrective regimen sliding scale   SubCutaneous three times a day before meals  insulin lispro Injectable (HumaLOG) 3 Unit(s) SubCutaneous three times a day before meals  midodrine. 10 milliGRAM(s) Oral every 8 hours  pantoprazole    Tablet 40 milliGRAM(s) Oral before breakfast  piperacillin/tazobactam IVPB.. 3.375 Gram(s) IV Intermittent every 12 hours  sevelamer carbonate 1600 milliGRAM(s) Oral three times a day with meals  traZODone 50 milliGRAM(s) Oral at bedtime      Vital Signs Last 24 Hrs  T(C): 36.7 (30 Nov 2019 05:10), Max: 37 (29 Nov 2019 14:05)  T(F): 98.1 (30 Nov 2019 05:10), Max: 98.6 (29 Nov 2019 14:05)  HR: 85 (30 Nov 2019 05:10) (73 - 85)  BP: 144/69 (30 Nov 2019 05:10) (141/70 - 144/69)  BP(mean): --  RR: 18 (30 Nov 2019 05:10) (18 - 18)  SpO2: 99% (30 Nov 2019 05:10) (92% - 100%)    LABS                        9.7    8.58  )-----------( 204      ( 29 Nov 2019 07:43 )             32.1               11-29    136  |  101  |  29<H>  ----------------------------<  138<H>  4.2   |  27  |  3.83<H>    Ca    8.6      29 Nov 2019 07:43          PHYSICAL EXAM  GEN: SHIKHA REDMAN is a pleasant well-nourished, well developed 79y Female in no acute distress, alert awake, and oriented to person, place and time.   LE Focused:    Vasc:  DP/PT nonpalpable. No pedal hair present. CFT brisk to digit. Notable pitting edema to the right dorsal foot. No focal increase in warmth.   Derm: mild Erythema with moderate edema noted to the right dorsal forefoot. Dry hematoma noted to the tip of right distal tip of hallux, not mobile with out fluctuance. No purulence or active discharge. No other open lesions. Webspaces dry clean and intact.   Neuro: Protective sensation diminished tested with semmes nancy monofilament.   MSK: muslcle strength 5/5 in all quadrants. Mild tenderness to palpation at the distal tip of right hallux.       x-ray of right foot, pending         Cultures: Culture Results:   >100,000 CFU/ml Escherichia coli  50,000 - 99,000 CFU/mL Streptococcus bovis (11-28 @ 01:01)  Culture Results:   No growth to date. (11-27 @ 01:42)  Culture Results:   No growth to date. (11-27 @ 01:31)  Hemoglobin A1C, Whole Blood (11.23.19 @ 16:16)    Hemoglobin A1C, Whole Blood: 7.7: Method: Immunoassay       Reference Range                4.0-5.6%       High risk (prediabetic)        5.7-6.4%       Diabetic, diagnostic             >=6.5%       ADA diabetic treatment goal       <7.0%  The Hemoglobin A1c testing is NGSP-certified.Reference ranges are based  upon the 2010 recommendations of  the American Diabetes Association.  Interpretation may vary for children  and adolescents. %        A: right hallux hematoma/ischemic wound    P:   Chart reviewed and Patient evaluated  Discussed diagnosis and treatment with patient  Local wound care with betadine, patient does not want dressing   Stable wound, no signs of acute infection   Pending x-ray, will f/u  LATOYA/PVR ordered, recommend Vascular consult.   Continue with IV antibiotics As Per ID/primary team  Weight bearing As Tolerated in surgical shoe to right foot.   HbA1c reviewed, 7.7  Discussed importance of daily foot examinations and proper shoe gear and to importance of lower Fasting Blood Glucose levels.   Podiatry will follow while in house.  Discussed with Attending Dr. Marr

## 2019-11-30 NOTE — PROGRESS NOTE ADULT - SUBJECTIVE AND OBJECTIVE BOX
Time of Visit:  Patient seen and examined.     MEDICATIONS  (STANDING):  atorvastatin 10 milliGRAM(s) Oral at bedtime  budesonide  80 MICROgram(s)/formoterol 4.5 MICROgram(s) Inhaler 2 Puff(s) Inhalation two times a day  chlorhexidine 4% Liquid 1 Application(s) Topical daily  dextrose 5%. 1000 milliLiter(s) (50 mL/Hr) IV Continuous <Continuous>  dextrose 50% Injectable 12.5 Gram(s) IV Push once  dextrose 50% Injectable 25 Gram(s) IV Push once  dextrose 50% Injectable 25 Gram(s) IV Push once  epoetin randy Injectable 02777 Unit(s) IV Push <User Schedule>  ferrous    sulfate 325 milliGRAM(s) Oral daily  heparin  Injectable 5000 Unit(s) SubCutaneous every 12 hours  insulin glargine Injectable (LANTUS) 15 Unit(s) SubCutaneous at bedtime  insulin lispro (HumaLOG) corrective regimen sliding scale   SubCutaneous three times a day before meals  insulin lispro Injectable (HumaLOG) 3 Unit(s) SubCutaneous three times a day before meals  midodrine. 10 milliGRAM(s) Oral every 8 hours  pantoprazole    Tablet 40 milliGRAM(s) Oral before breakfast  piperacillin/tazobactam IVPB.. 3.375 Gram(s) IV Intermittent every 12 hours  sevelamer carbonate 1600 milliGRAM(s) Oral three times a day with meals  traZODone 50 milliGRAM(s) Oral at bedtime      MEDICATIONS  (PRN):  acetaminophen   Tablet .. 650 milliGRAM(s) Oral every 6 hours PRN Mild Pain (1 - 3), Moderate Pain (4 - 6)  albuterol/ipratropium for Nebulization. 3 milliLiter(s) Nebulizer once PRN Shortness of Breath and/or Wheezing  dextrose 40% Gel 15 Gram(s) Oral once PRN Blood Glucose LESS THAN 70 milliGRAM(s)/deciliter  glucagon  Injectable 1 milliGRAM(s) IntraMuscular once PRN Glucose LESS THAN 70 milligrams/deciliter       Medications up to date at time of exam.      PHYSICAL EXAMINATION:  Patient has no new complaints.  GENERAL: The patient is a well-developed, well-nourished, in no apparent distress.     Vital Signs Last 24 Hrs  T(C): 36.7 (30 Nov 2019 17:52), Max: 36.7 (30 Nov 2019 05:10)  T(F): 98.1 (30 Nov 2019 17:52), Max: 98.1 (30 Nov 2019 05:10)  HR: 71 (30 Nov 2019 17:52) (71 - 85)  BP: 131/68 (30 Nov 2019 17:52) (131/68 - 144/69)  BP(mean): --  RR: 16 (30 Nov 2019 17:52) (16 - 18)  SpO2: 94% (30 Nov 2019 17:52) (92% - 99%)   (if applicable)    Chest Tube (if applicable)    HEENT: Head is normocephalic and atraumatic. Extraocular muscles are intact. Mucous membranes are moist.     NECK: Supple, no palpable adenopathy.    LUNGS: Clear to auscultation, no wheezing, rales, or rhonchi.    HEART: Regular rate and rhythm without murmur.    ABDOMEN: Soft, nontender, and nondistended.  No hepatosplenomegaly is noted.    : No painful voiding, no pelvic pain    EXTREMITIES: L arm + chronic lymphedema    NEUROLOGIC: Awake, alert, oriented, grossly intact    SKIN: Warm, dry, good turgor.      LABS:                        8.9    6.76  )-----------( 200      ( 30 Nov 2019 17:10 )             29.9     11-30    138  |  104  |  35<H>  ----------------------------<  124<H>  4.2   |  28  |  4.49<H>    Ca    8.1<L>      30 Nov 2019 17:10                          MICROBIOLOGY: (if applicable)    RADIOLOGY & ADDITIONAL STUDIES:  EKG:   CXR:  ECHO:    IMPRESSION: 79y Female PAST MEDICAL & SURGICAL HISTORY:  Chronic CHF  MS (mitral stenosis)  GERD (gastroesophageal reflux disease)  COPD (chronic obstructive pulmonary disease): On home oxygen  HLD (hyperlipidemia): not taking statins  HTN (hypertension)  ESRD (end stage renal disease) on dialysis  Chronic diastolic (congestive) heart failure  Anemia of chronic disease  Breast carcinoma: Lt side s/p lumpectomy and radiation in 2004  DM (diabetes mellitus): type 2  S/P subtotal hysterectomy  S/P lumpectomy, left breast: 2004   p/w           Imp: This is a 79 yr old woman for smoker with prior mentioned medical condition condition presented with SOB due to acute on chronic hypoxic hypercapnic resp failure due to Acute exacerbation of COPD with CHF and anemia. BP improved with midodrine      SUGG:  -continue dialysis as per nephro   - continue  mitidrone for low BP  -continue meds  -o2 supp to keep O2 Sat>90%  -duoneb  -symbicort  -out pat pul f/u

## 2019-12-01 LAB
-  CANDIDA ALBICANS: SIGNIFICANT CHANGE UP
-  CANDIDA GLABRATA: SIGNIFICANT CHANGE UP
-  CANDIDA KRUSEI: SIGNIFICANT CHANGE UP
-  CANDIDA PARAPSILOSIS: SIGNIFICANT CHANGE UP
-  CANDIDA TROPICALIS: SIGNIFICANT CHANGE UP
-  COAGULASE NEGATIVE STAPHYLOCOCCUS: SIGNIFICANT CHANGE UP
-  K. PNEUMONIAE GROUP: SIGNIFICANT CHANGE UP
-  KPC RESISTANCE GENE: SIGNIFICANT CHANGE UP
-  STREPTOCOCCUS SP. (NOT GRP A, B OR S PNEUMONIAE): SIGNIFICANT CHANGE UP
A BAUMANNII DNA SPEC QL NAA+PROBE: SIGNIFICANT CHANGE UP
E CLOAC COMP DNA BLD POS QL NAA+PROBE: SIGNIFICANT CHANGE UP
E COLI DNA BLD POS QL NAA+NON-PROBE: SIGNIFICANT CHANGE UP
ENTEROCOC DNA BLD POS QL NAA+NON-PROBE: SIGNIFICANT CHANGE UP
ENTEROCOC DNA BLD POS QL NAA+NON-PROBE: SIGNIFICANT CHANGE UP
GLUCOSE BLDC GLUCOMTR-MCNC: 136 MG/DL — HIGH (ref 70–99)
GLUCOSE BLDC GLUCOMTR-MCNC: 156 MG/DL — HIGH (ref 70–99)
GLUCOSE BLDC GLUCOMTR-MCNC: 186 MG/DL — HIGH (ref 70–99)
GLUCOSE BLDC GLUCOMTR-MCNC: 206 MG/DL — HIGH (ref 70–99)
GP B STREP DNA BLD POS QL NAA+NON-PROBE: SIGNIFICANT CHANGE UP
GRAM STN FLD: SIGNIFICANT CHANGE UP
HAEM INFLU DNA BLD POS QL NAA+NON-PROBE: SIGNIFICANT CHANGE UP
K OXYTOCA DNA BLD POS QL NAA+NON-PROBE: SIGNIFICANT CHANGE UP
L MONOCYTOG DNA BLD POS QL NAA+NON-PROBE: SIGNIFICANT CHANGE UP
METHOD TYPE: SIGNIFICANT CHANGE UP
MRSA SPEC QL CULT: SIGNIFICANT CHANGE UP
MSSA DNA SPEC QL NAA+PROBE: SIGNIFICANT CHANGE UP
N MEN ISLT CULT: SIGNIFICANT CHANGE UP
P AERUGINOSA DNA BLD POS NAA+NON-PROBE: SIGNIFICANT CHANGE UP
S MARCESCENS DNA BLD POS NAA+NON-PROBE: SIGNIFICANT CHANGE UP
S PNEUM DNA BLD POS QL NAA+NON-PROBE: SIGNIFICANT CHANGE UP
S PYO DNA BLD POS QL NAA+NON-PROBE: SIGNIFICANT CHANGE UP

## 2019-12-01 RX ADMIN — SEVELAMER CARBONATE 1600 MILLIGRAM(S): 2400 POWDER, FOR SUSPENSION ORAL at 08:08

## 2019-12-01 RX ADMIN — INSULIN GLARGINE 15 UNIT(S): 100 INJECTION, SOLUTION SUBCUTANEOUS at 22:00

## 2019-12-01 RX ADMIN — HEPARIN SODIUM 5000 UNIT(S): 5000 INJECTION INTRAVENOUS; SUBCUTANEOUS at 17:09

## 2019-12-01 RX ADMIN — Medication 3 UNIT(S): at 11:19

## 2019-12-01 RX ADMIN — SEVELAMER CARBONATE 1600 MILLIGRAM(S): 2400 POWDER, FOR SUSPENSION ORAL at 17:09

## 2019-12-01 RX ADMIN — Medication 325 MILLIGRAM(S): at 11:19

## 2019-12-01 RX ADMIN — SEVELAMER CARBONATE 1600 MILLIGRAM(S): 2400 POWDER, FOR SUSPENSION ORAL at 12:11

## 2019-12-01 RX ADMIN — Medication 2: at 11:19

## 2019-12-01 RX ADMIN — PIPERACILLIN AND TAZOBACTAM 25 GRAM(S): 4; .5 INJECTION, POWDER, LYOPHILIZED, FOR SOLUTION INTRAVENOUS at 22:01

## 2019-12-01 RX ADMIN — Medication 3 UNIT(S): at 08:08

## 2019-12-01 RX ADMIN — PANTOPRAZOLE SODIUM 40 MILLIGRAM(S): 20 TABLET, DELAYED RELEASE ORAL at 06:56

## 2019-12-01 RX ADMIN — BUDESONIDE AND FORMOTEROL FUMARATE DIHYDRATE 2 PUFF(S): 160; 4.5 AEROSOL RESPIRATORY (INHALATION) at 11:18

## 2019-12-01 RX ADMIN — HEPARIN SODIUM 5000 UNIT(S): 5000 INJECTION INTRAVENOUS; SUBCUTANEOUS at 06:56

## 2019-12-01 RX ADMIN — PIPERACILLIN AND TAZOBACTAM 25 GRAM(S): 4; .5 INJECTION, POWDER, LYOPHILIZED, FOR SOLUTION INTRAVENOUS at 11:18

## 2019-12-01 RX ADMIN — MIDODRINE HYDROCHLORIDE 10 MILLIGRAM(S): 2.5 TABLET ORAL at 07:00

## 2019-12-01 RX ADMIN — Medication 1: at 17:09

## 2019-12-01 RX ADMIN — Medication 3 UNIT(S): at 17:09

## 2019-12-01 RX ADMIN — CHLORHEXIDINE GLUCONATE 1 APPLICATION(S): 213 SOLUTION TOPICAL at 11:19

## 2019-12-01 NOTE — PROVIDER CONTACT NOTE (OTHER) - REASON
Pt refused her AM labs b/c of b/l arm precautions. SAIRA Adler made aware on unit, she will cancel the labs since pt had labs drawn late in HD yesterday.

## 2019-12-01 NOTE — PROGRESS NOTE ADULT - SUBJECTIVE AND OBJECTIVE BOX
Patient denies CP, SOB Review of systems otherwise (-)      acetaminophen   Tablet .. 650 milliGRAM(s) Oral every 6 hours PRN  albuterol/ipratropium for Nebulization. 3 milliLiter(s) Nebulizer once PRN  atorvastatin 10 milliGRAM(s) Oral at bedtime  budesonide  80 MICROgram(s)/formoterol 4.5 MICROgram(s) Inhaler 2 Puff(s) Inhalation two times a day  chlorhexidine 4% Liquid 1 Application(s) Topical daily  dextrose 40% Gel 15 Gram(s) Oral once PRN  dextrose 5%. 1000 milliLiter(s) IV Continuous <Continuous>  dextrose 50% Injectable 12.5 Gram(s) IV Push once  dextrose 50% Injectable 25 Gram(s) IV Push once  dextrose 50% Injectable 25 Gram(s) IV Push once  epoetin randy Injectable 78337 Unit(s) IV Push <User Schedule>  ferrous    sulfate 325 milliGRAM(s) Oral daily  glucagon  Injectable 1 milliGRAM(s) IntraMuscular once PRN  heparin  Injectable 5000 Unit(s) SubCutaneous every 12 hours  insulin glargine Injectable (LANTUS) 15 Unit(s) SubCutaneous at bedtime  insulin lispro (HumaLOG) corrective regimen sliding scale   SubCutaneous three times a day before meals  insulin lispro Injectable (HumaLOG) 3 Unit(s) SubCutaneous three times a day before meals  midodrine. 10 milliGRAM(s) Oral every 8 hours  pantoprazole    Tablet 40 milliGRAM(s) Oral before breakfast  piperacillin/tazobactam IVPB.. 3.375 Gram(s) IV Intermittent every 12 hours  sevelamer carbonate 1600 milliGRAM(s) Oral three times a day with meals  traZODone 50 milliGRAM(s) Oral at bedtime                            8.9    6.76  )-----------( 200      ( 30 Nov 2019 17:10 )             29.9       11-30    138  |  104  |  35<H>  ----------------------------<  124<H>  4.2   |  28  |  4.49<H>    Ca    8.1<L>      30 Nov 2019 17:10              T(C): 36.8 (12-01-19 @ 14:43), Max: 36.9 (12-01-19 @ 06:30)  HR: 72 (12-01-19 @ 14:43) (71 - 90)  BP: 133/88 (12-01-19 @ 14:43) (115/68 - 162/90)  RR: 18 (12-01-19 @ 14:43) (16 - 18)  SpO2: 97% (12-01-19 @ 14:43) (76% - 97%)  Wt(kg): --    I&O's Summary        Gen: Appears well in NAD  HEENT:  (-)icterus (-)pallor  CV: N S1 S2 1/6 HODA (+)2 Pulses B/l  Resp:  Clear to ausculatation B/L, normal effort  GI: (+) BS Soft, NT, ND  Lymph:  (-)Edema, (-)obvious lymphadenopathy  Skin: Warm to touch, Normal turgor  Psych: Appropriate mood and affect      TELEMETRY: 	  OFF       ASSESSMENT/PLAN: 	79y Female female hx of ESRD on HD, HTN, ANemia, copd , chf, PAF, no A/C documented , now with anemia, SOB , + troponin on admission labs     - No chest pain or anginal symptoms today   - Fluid removal with HD per renal  - Fever w/u per Med  - Abx per primary team  - cont midodrine for BP support  - (+) trop  - will plan on NST when medically optimized if within GOC    Keysha Ellis MD

## 2019-12-01 NOTE — PROGRESS NOTE ADULT - NEUROLOGICAL DETAILS
responds to pain/responds to verbal commands/sensation intact
responds to verbal commands/responds to pain
sensation intact/responds to pain/responds to verbal commands

## 2019-12-01 NOTE — PROVIDER CONTACT NOTE (CRITICAL VALUE NOTIFICATION) - SITUATION
Critical lab: B/C Drawn on 11/27-preliminary results show growth in aerobic gram bottle with gram negative rods. PCR ready in 3 hours Critical lab: B/C Drawn on 11/27-preliminary results show growth in aerobic gram bottle with gram negative rods. PCR ready in 3 hours. As per Dr. Branham no further orders placed on at this time. Continue current regimen.

## 2019-12-01 NOTE — PROGRESS NOTE ADULT - SUBJECTIVE AND OBJECTIVE BOX
INTERVAL HPI/OVERNIGHT EVENTS: Pt seen and examined this morning. Pt awake , NAD, reports breathing and feeling better.     MEDICATIONS  (STANDING):  atorvastatin 10 milliGRAM(s) Oral at bedtime  budesonide  80 MICROgram(s)/formoterol 4.5 MICROgram(s) Inhaler 2 Puff(s) Inhalation two times a day  chlorhexidine 4% Liquid 1 Application(s) Topical daily  dextrose 5%. 1000 milliLiter(s) (50 mL/Hr) IV Continuous <Continuous>  dextrose 50% Injectable 12.5 Gram(s) IV Push once  dextrose 50% Injectable 25 Gram(s) IV Push once  dextrose 50% Injectable 25 Gram(s) IV Push once  epoetin randy Injectable 08847 Unit(s) IV Push <User Schedule>  ferrous    sulfate 325 milliGRAM(s) Oral daily  heparin  Injectable 5000 Unit(s) SubCutaneous every 12 hours  insulin glargine Injectable (LANTUS) 15 Unit(s) SubCutaneous at bedtime  insulin lispro (HumaLOG) corrective regimen sliding scale   SubCutaneous three times a day before meals  insulin lispro Injectable (HumaLOG) 3 Unit(s) SubCutaneous three times a day before meals  midodrine. 10 milliGRAM(s) Oral every 8 hours  pantoprazole    Tablet 40 milliGRAM(s) Oral before breakfast  piperacillin/tazobactam IVPB.. 3.375 Gram(s) IV Intermittent every 12 hours  sevelamer carbonate 1600 milliGRAM(s) Oral three times a day with meals  traZODone 50 milliGRAM(s) Oral at bedtime    MEDICATIONS  (PRN):  acetaminophen   Tablet .. 650 milliGRAM(s) Oral every 6 hours PRN Mild Pain (1 - 3), Moderate Pain (4 - 6)  albuterol/ipratropium for Nebulization. 3 milliLiter(s) Nebulizer once PRN Shortness of Breath and/or Wheezing  dextrose 40% Gel 15 Gram(s) Oral once PRN Blood Glucose LESS THAN 70 milliGRAM(s)/deciliter  glucagon  Injectable 1 milliGRAM(s) IntraMuscular once PRN Glucose LESS THAN 70 milligrams/deciliter    Vital Signs Last 24 Hrs  T(C): 36.5 (01 Dec 2019 21:10), Max: 36.9 (01 Dec 2019 06:30)  T(F): 97.7 (01 Dec 2019 21:10), Max: 98.5 (01 Dec 2019 06:30)  HR: 73 (01 Dec 2019 21:10) (72 - 76)  BP: 130/50 (01 Dec 2019 21:10) (116/78 - 133/88)  BP(mean): --  RR: 18 (01 Dec 2019 21:10) (17 - 18)  SpO2: 100% (01 Dec 2019 21:10) (96% - 100%)  __________________________________________________  REVIEW OF SYSTEMS:    CONSTITUTIONAL: No fever,   EYES: no acute visual disturbances  NECK: No pain or stiffness  RESPIRATORY: Less SOB, No cough  CARDIOVASCULAR: No chest pain, no palpitations  GASTROINTESTINAL: No pain. No nausea or vomiting; No diarrhea   NEUROLOGICAL: No headache or numbness, no tremors  MUSCULOSKELETAL: No joint pain, no muscle pain  GENITOURINARY: no dysuria, no frequency, no hesitancy  PSYCHIATRY: no depression , no anxiety  ALL OTHER  ROS negative      ________________________________________________  PHYSICAL EXAM:  GENERAL: NAD  HEENT: Normocephalic;  conjunctivae and sclerae clear; moist mucous membranes;   NECK : supple  CHEST/LUNG: Mildly labored. Clear upper lobes. Fine bibasilar crackles.    HEART: S1 S2  regular; no murmurs, gallops or rubs  ABDOMEN: Soft, Nontender, Nondistended; Bowel sounds present  EXTREMITIES: no cyanosis; no calf tenderness. Right foot with erythema and mild edema, mild tenderness. (+) PPP  SKIN: warm and dry; no rash  NERVOUS SYSTEM:  Awake and alert; Oriented  to place, person and time ; no new deficits    _________________________________________________  LABS:  11-30    138  |  104  |  35<H>  ----------------------------<  124<H>  4.2   |  28  |  4.49<H>    Ca    8.1<L>      2019 17:10      Creatinine Trend: 4.49 <--, 3.83 <--, 5.31 <--, 4.68 <--, 5.80 <--                        8.9    6.76  )-----------( 200      ( 2019 17:10 )             29.9     Urine Studies:  Urinalysis Basic - ( 2019 22:14 )    Color: Yellow / Appearance: very cloudy / S.020 / pH:   Gluc:  / Ketone: Negative  / Bili: Negative / Urobili: Negative   Blood:  / Protein: 100 / Nitrite: Negative   Leuk Esterase: Moderate / RBC: 5-10 /HPF / WBC >50 /HPF   Sq Epi:  / Non Sq Epi: Few /HPF / Bacteria: TNTC /HPF                            cerns were addressed and care was aligned with patient's wishes.

## 2019-12-01 NOTE — PROGRESS NOTE ADULT - SUBJECTIVE AND OBJECTIVE BOX
[   ] ICU                                          [   ] CCU                                      [ X  ] Medical Floor      Patient is comfortable. No new complaints reported, No abdominal pain, N/V, hematemesis, hematochezia, melena, fever, chills, chest pain, SOB, cough or diarrhea reported.    VITALS  Vital Signs Last 24 Hrs  T(C): 36.9 (01 Dec 2019 06:30), Max: 36.9 (01 Dec 2019 06:30)  T(F): 98.5 (01 Dec 2019 06:30), Max: 98.5 (01 Dec 2019 06:30)  HR: 76 (01 Dec 2019 06:30) (71 - 90)  BP: 116/78 (01 Dec 2019 06:30) (115/68 - 162/90)   RR: 17 (01 Dec 2019 06:30) (16 - 17)  SpO2: 96% (01 Dec 2019 06:30) (76% - 96%)       MEDICATIONS  (STANDING):  atorvastatin 10 milliGRAM(s) Oral at bedtime  budesonide  80 MICROgram(s)/formoterol 4.5 MICROgram(s) Inhaler 2 Puff(s) Inhalation two times a day  chlorhexidine 4% Liquid 1 Application(s) Topical daily  dextrose 5%. 1000 milliLiter(s) (50 mL/Hr) IV Continuous <Continuous>  dextrose 50% Injectable 12.5 Gram(s) IV Push once  dextrose 50% Injectable 25 Gram(s) IV Push once  dextrose 50% Injectable 25 Gram(s) IV Push once  epoetin randy Injectable 38818 Unit(s) IV Push <User Schedule>  ferrous    sulfate 325 milliGRAM(s) Oral daily  heparin  Injectable 5000 Unit(s) SubCutaneous every 12 hours  insulin glargine Injectable (LANTUS) 15 Unit(s) SubCutaneous at bedtime  insulin lispro (HumaLOG) corrective regimen sliding scale   SubCutaneous three times a day before meals  insulin lispro Injectable (HumaLOG) 3 Unit(s) SubCutaneous three times a day before meals  midodrine. 10 milliGRAM(s) Oral every 8 hours  pantoprazole    Tablet 40 milliGRAM(s) Oral before breakfast  piperacillin/tazobactam IVPB.. 3.375 Gram(s) IV Intermittent every 12 hours  sevelamer carbonate 1600 milliGRAM(s) Oral three times a day with meals  traZODone 50 milliGRAM(s) Oral at bedtime    MEDICATIONS  (PRN):  acetaminophen   Tablet .. 650 milliGRAM(s) Oral every 6 hours PRN Mild Pain (1 - 3), Moderate Pain (4 - 6)  albuterol/ipratropium for Nebulization. 3 milliLiter(s) Nebulizer once PRN Shortness of Breath and/or Wheezing  dextrose 40% Gel 15 Gram(s) Oral once PRN Blood Glucose LESS THAN 70 milliGRAM(s)/deciliter  glucagon  Injectable 1 milliGRAM(s) IntraMuscular once PRN Glucose LESS THAN 70 milligrams/deciliter                            8.9    6.76  )-----------( 200      ( 30 Nov 2019 17:10 )             29.9       11-30    138  |  104  |  35<H>  ----------------------------<  124<H>  4.2   |  28  |  4.49<H>    Ca    8.1<L>      30 Nov 2019 17:10

## 2019-12-01 NOTE — PROGRESS NOTE ADULT - NEGATIVE ENMT SYMPTOMS
no dysphagia/no hearing difficulty/no vertigo/no ear pain/no dry mouth/no throat pain/no gum bleeding

## 2019-12-01 NOTE — PROGRESS NOTE ADULT - SUBJECTIVE AND OBJECTIVE BOX
Time of Visit:  Patient seen and examined.     MEDICATIONS  (STANDING):  atorvastatin 10 milliGRAM(s) Oral at bedtime  budesonide  80 MICROgram(s)/formoterol 4.5 MICROgram(s) Inhaler 2 Puff(s) Inhalation two times a day  chlorhexidine 4% Liquid 1 Application(s) Topical daily  dextrose 5%. 1000 milliLiter(s) (50 mL/Hr) IV Continuous <Continuous>  dextrose 50% Injectable 12.5 Gram(s) IV Push once  dextrose 50% Injectable 25 Gram(s) IV Push once  dextrose 50% Injectable 25 Gram(s) IV Push once  epoetin randy Injectable 93214 Unit(s) IV Push <User Schedule>  ferrous    sulfate 325 milliGRAM(s) Oral daily  heparin  Injectable 5000 Unit(s) SubCutaneous every 12 hours  insulin glargine Injectable (LANTUS) 15 Unit(s) SubCutaneous at bedtime  insulin lispro (HumaLOG) corrective regimen sliding scale   SubCutaneous three times a day before meals  insulin lispro Injectable (HumaLOG) 3 Unit(s) SubCutaneous three times a day before meals  midodrine. 10 milliGRAM(s) Oral every 8 hours  pantoprazole    Tablet 40 milliGRAM(s) Oral before breakfast  piperacillin/tazobactam IVPB.. 3.375 Gram(s) IV Intermittent every 12 hours  sevelamer carbonate 1600 milliGRAM(s) Oral three times a day with meals  traZODone 50 milliGRAM(s) Oral at bedtime      MEDICATIONS  (PRN):  acetaminophen   Tablet .. 650 milliGRAM(s) Oral every 6 hours PRN Mild Pain (1 - 3), Moderate Pain (4 - 6)  albuterol/ipratropium for Nebulization. 3 milliLiter(s) Nebulizer once PRN Shortness of Breath and/or Wheezing  dextrose 40% Gel 15 Gram(s) Oral once PRN Blood Glucose LESS THAN 70 milliGRAM(s)/deciliter  glucagon  Injectable 1 milliGRAM(s) IntraMuscular once PRN Glucose LESS THAN 70 milligrams/deciliter       Medications up to date at time of exam.      PHYSICAL EXAMINATION:  Patient has no new complaints.  GENERAL: The patient is a well-developed, well-nourished, in no apparent distress.     Vital Signs Last 24 Hrs  T(C): 36.8 (01 Dec 2019 14:43), Max: 36.9 (01 Dec 2019 06:30)  T(F): 98.3 (01 Dec 2019 14:43), Max: 98.5 (01 Dec 2019 06:30)  HR: 72 (01 Dec 2019 14:43) (72 - 90)  BP: 133/88 (01 Dec 2019 14:43) (115/68 - 162/90)  BP(mean): --  RR: 18 (01 Dec 2019 14:43) (16 - 18)  SpO2: 97% (01 Dec 2019 14:43) (76% - 97%)   (if applicable)    Chest Tube (if applicable)    HEENT: Head is normocephalic and atraumatic. Extraocular muscles are intact. Mucous membranes are moist.     NECK: Supple, no palpable adenopathy.    LUNGS: Clear to auscultation, no wheezing, rales, or rhonchi.    HEART: Regular rate and rhythm without murmur.    ABDOMEN: Soft, nontender, and nondistended.  No hepatosplenomegaly is noted.    : No painful voiding, no pelvic pain    EXTREMITIES: Without any cyanosis, clubbing, rash, lesions or edema.    NEUROLOGIC: Awake, alert, oriented, grossly intact    SKIN: Warm, dry, good turgor.      LABS:                        8.9    6.76  )-----------( 200      ( 30 Nov 2019 17:10 )             29.9     11-30    138  |  104  |  35<H>  ----------------------------<  124<H>  4.2   |  28  |  4.49<H>    Ca    8.1<L>      30 Nov 2019 17:10                          MICROBIOLOGY: (if applicable)    RADIOLOGY & ADDITIONAL STUDIES:  EKG:   CXR:  ECHO:    IMPRESSION: 79y Female PAST MEDICAL & SURGICAL HISTORY:  Chronic CHF  MS (mitral stenosis)  GERD (gastroesophageal reflux disease)  COPD (chronic obstructive pulmonary disease): On home oxygen  HLD (hyperlipidemia): not taking statins  HTN (hypertension)  ESRD (end stage renal disease) on dialysis  Chronic diastolic (congestive) heart failure  Anemia of chronic disease  Breast carcinoma: Lt side s/p lumpectomy and radiation in 2004  DM (diabetes mellitus): type 2  S/P subtotal hysterectomy  S/P lumpectomy, left breast: 2004   p/w           RECOMMENDATIONS: Time of Visit:  Patient seen and examined.     MEDICATIONS  (STANDING):  atorvastatin 10 milliGRAM(s) Oral at bedtime  budesonide  80 MICROgram(s)/formoterol 4.5 MICROgram(s) Inhaler 2 Puff(s) Inhalation two times a day  chlorhexidine 4% Liquid 1 Application(s) Topical daily  dextrose 5%. 1000 milliLiter(s) (50 mL/Hr) IV Continuous <Continuous>  dextrose 50% Injectable 12.5 Gram(s) IV Push once  dextrose 50% Injectable 25 Gram(s) IV Push once  dextrose 50% Injectable 25 Gram(s) IV Push once  epoetin randy Injectable 88869 Unit(s) IV Push <User Schedule>  ferrous    sulfate 325 milliGRAM(s) Oral daily  heparin  Injectable 5000 Unit(s) SubCutaneous every 12 hours  insulin glargine Injectable (LANTUS) 15 Unit(s) SubCutaneous at bedtime  insulin lispro (HumaLOG) corrective regimen sliding scale   SubCutaneous three times a day before meals  insulin lispro Injectable (HumaLOG) 3 Unit(s) SubCutaneous three times a day before meals  midodrine. 10 milliGRAM(s) Oral every 8 hours  pantoprazole    Tablet 40 milliGRAM(s) Oral before breakfast  piperacillin/tazobactam IVPB.. 3.375 Gram(s) IV Intermittent every 12 hours  sevelamer carbonate 1600 milliGRAM(s) Oral three times a day with meals  traZODone 50 milliGRAM(s) Oral at bedtime      MEDICATIONS  (PRN):  acetaminophen   Tablet .. 650 milliGRAM(s) Oral every 6 hours PRN Mild Pain (1 - 3), Moderate Pain (4 - 6)  albuterol/ipratropium for Nebulization. 3 milliLiter(s) Nebulizer once PRN Shortness of Breath and/or Wheezing  dextrose 40% Gel 15 Gram(s) Oral once PRN Blood Glucose LESS THAN 70 milliGRAM(s)/deciliter  glucagon  Injectable 1 milliGRAM(s) IntraMuscular once PRN Glucose LESS THAN 70 milligrams/deciliter       Medications up to date at time of exam.      PHYSICAL EXAMINATION:  Patient has no new complaints.  GENERAL: The patient is a well-developed, well-nourished, in no apparent distress.     Vital Signs Last 24 Hrs  T(C): 36.8 (01 Dec 2019 14:43), Max: 36.9 (01 Dec 2019 06:30)  T(F): 98.3 (01 Dec 2019 14:43), Max: 98.5 (01 Dec 2019 06:30)  HR: 72 (01 Dec 2019 14:43) (72 - 90)  BP: 133/88 (01 Dec 2019 14:43) (115/68 - 162/90)  BP(mean): --  RR: 18 (01 Dec 2019 14:43) (16 - 18)  SpO2: 97% (01 Dec 2019 14:43) (76% - 97%)   (if applicable)    Chest Tube (if applicable)    HEENT: Head is normocephalic and atraumatic. Extraocular muscles are intact. Mucous membranes are moist.     NECK: Supple, no palpable adenopathy.    LUNGS: Clear to auscultation, no wheezing, rales, or rhonchi.    HEART: Regular rate and rhythm without murmur.    ABDOMEN: Soft, nontender, and nondistended.  No hepatosplenomegaly is noted.    : No painful voiding, no pelvic pain    EXTREMITIES: Without any cyanosis, clubbing, rash, lesions or edema.    NEUROLOGIC: Awake, alert, oriented, grossly intact    SKIN: Warm, dry, good turgor.      LABS:                        8.9    6.76  )-----------( 200      ( 30 Nov 2019 17:10 )             29.9     11-30    138  |  104  |  35<H>  ----------------------------<  124<H>  4.2   |  28  |  4.49<H>    Ca    8.1<L>      30 Nov 2019 17:10                          MICROBIOLOGY: (if applicable)    RADIOLOGY & ADDITIONAL STUDIES:  EKG:   CXR:  ECHO:    IMPRESSION: 79y Female PAST MEDICAL & SURGICAL HISTORY:  Chronic CHF  MS (mitral stenosis)  GERD (gastroesophageal reflux disease)  COPD (chronic obstructive pulmonary disease): On home oxygen  HLD (hyperlipidemia): not taking statins  HTN (hypertension)  ESRD (end stage renal disease) on dialysis  Chronic diastolic (congestive) heart failure  Anemia of chronic disease  Breast carcinoma: Lt side s/p lumpectomy and radiation in 2004  DM (diabetes mellitus): type 2  S/P subtotal hysterectomy  S/P lumpectomy, left breast: 2004   p/w         Imp: This is a 79 yr old woman for smoker with prior mentioned medical condition condition presented with SOB due to acute on chronic hypoxic hypercapnic resp failure due to Acute exacerbation of COPD with CHF and anemia. BP improved with midodrine      SUGG:  -continue dialysis as per nephro   - continue  mitidrone for low BP  -continue meds  -o2 supp to keep O2 Sat>90%  -duoneb  -symbicort  -out pat pul f/u

## 2019-12-01 NOTE — PROGRESS NOTE ADULT - SUBJECTIVE AND OBJECTIVE BOX
Patient is a 79y old  Female who presents with a chief complaint of SOB (29 Nov 2019 15:15)      HPI:  Pt is a 78 y/o F, from home walks with cane at baseline, with a PMH of chronic diastolic heart failure (stage II), ESRD on HD T/Th/S, moderate MS, COPD on home oxygen, HTN, HLD, DM, and GERD  who presented with generalized weakness, generalized body pain and shortness of breath on exertion since 1 day.  According to the patient, since yesterday she has been feeling weak and has generalized body pain, more on her shoulders. She mentioned that she had minor fall from her bed yesterday and was not able to sleep whole night. She has shortness of breath on mild exertion limiting her ambulation.   She denied fever, chest pain, nausea, vomiting, abdominal pain and all other review of systems except mentioned above. (22 Nov 2019 17:38)      S: Patient is consulted for right hallux ischemic changes, seen bedside this morning resting comfortably in bed with both feet dangling off the bed. Patient is AAOx3 and in NAD. States the "black wound" to the distal right great toe has been present for over month and has been getting bigger. Initial onset was after wearing tight shoes. Patient has been at the hospital for a week, does not see a podiatrist regularly. Admits to being diabetic with numbness and tingling to her feet. Denies f/c/n/v or SOB. No other lower extremity complaints.     Pt was seen by podiatry this AM,  hallux Hematoma stable, no acute signs of infection, xray LATOYA/pvr pending. Pt refused dressing.      PMH:Chronic CHF  MS (mitral stenosis)  GERD (gastroesophageal reflux disease)  COPD (chronic obstructive pulmonary disease)  HLD (hyperlipidemia)  HTN (hypertension)  ESRD (end stage renal disease) on dialysis  Chronic diastolic (congestive) heart failure  Anemia of chronic disease  Anemia  Hypertension  Congestive heart failure  Chronic kidney disease  No pertinent past medical history  Breast carcinoma  Renal mass  DM (diabetes mellitus)  Asthma    Allergies: No Known Allergies    Medications: acetaminophen   Tablet .. 650 milliGRAM(s) Oral every 6 hours PRN  albuterol/ipratropium for Nebulization. 3 milliLiter(s) Nebulizer once PRN  atorvastatin 10 milliGRAM(s) Oral at bedtime  budesonide  80 MICROgram(s)/formoterol 4.5 MICROgram(s) Inhaler 2 Puff(s) Inhalation two times a day  chlorhexidine 4% Liquid 1 Application(s) Topical daily  dextrose 40% Gel 15 Gram(s) Oral once PRN  dextrose 5%. 1000 milliLiter(s) IV Continuous <Continuous>  dextrose 50% Injectable 12.5 Gram(s) IV Push once  dextrose 50% Injectable 25 Gram(s) IV Push once  dextrose 50% Injectable 25 Gram(s) IV Push once  epoetin randy Injectable 06849 Unit(s) IV Push <User Schedule>  ferrous    sulfate 325 milliGRAM(s) Oral daily  glucagon  Injectable 1 milliGRAM(s) IntraMuscular once PRN  heparin  Injectable 5000 Unit(s) SubCutaneous every 12 hours  insulin glargine Injectable (LANTUS) 15 Unit(s) SubCutaneous at bedtime  insulin lispro (HumaLOG) corrective regimen sliding scale   SubCutaneous three times a day before meals  insulin lispro Injectable (HumaLOG) 3 Unit(s) SubCutaneous three times a day before meals  midodrine. 10 milliGRAM(s) Oral every 8 hours  pantoprazole    Tablet 40 milliGRAM(s) Oral before breakfast  piperacillin/tazobactam IVPB.. 3.375 Gram(s) IV Intermittent every 12 hours  sevelamer carbonate 1600 milliGRAM(s) Oral three times a day with meals  traZODone 50 milliGRAM(s) Oral at bedtime    FH:Diabetes mellitus  Family history of breast cancer (Aunt)  No pertinent family history in first degree relatives    PSX: S/P subtotal hysterectomy  S/P lumpectomy, left breast  No significant past surgical history    SH: acetaminophen   Tablet .. 650 milliGRAM(s) Oral every 6 hours PRN  albuterol/ipratropium for Nebulization. 3 milliLiter(s) Nebulizer once PRN  atorvastatin 10 milliGRAM(s) Oral at bedtime  budesonide  80 MICROgram(s)/formoterol 4.5 MICROgram(s) Inhaler 2 Puff(s) Inhalation two times a day  chlorhexidine 4% Liquid 1 Application(s) Topical daily  dextrose 40% Gel 15 Gram(s) Oral once PRN  dextrose 5%. 1000 milliLiter(s) IV Continuous <Continuous>  dextrose 50% Injectable 12.5 Gram(s) IV Push once  dextrose 50% Injectable 25 Gram(s) IV Push once  dextrose 50% Injectable 25 Gram(s) IV Push once  epoetin randy Injectable 24575 Unit(s) IV Push <User Schedule>  ferrous    sulfate 325 milliGRAM(s) Oral daily  glucagon  Injectable 1 milliGRAM(s) IntraMuscular once PRN  heparin  Injectable 5000 Unit(s) SubCutaneous every 12 hours  insulin glargine Injectable (LANTUS) 15 Unit(s) SubCutaneous at bedtime  insulin lispro (HumaLOG) corrective regimen sliding scale   SubCutaneous three times a day before meals  insulin lispro Injectable (HumaLOG) 3 Unit(s) SubCutaneous three times a day before meals  midodrine. 10 milliGRAM(s) Oral every 8 hours  pantoprazole    Tablet 40 milliGRAM(s) Oral before breakfast  piperacillin/tazobactam IVPB.. 3.375 Gram(s) IV Intermittent every 12 hours  sevelamer carbonate 1600 milliGRAM(s) Oral three times a day with meals  traZODone 50 milliGRAM(s) Oral at bedtime      Vital Signs Last 24 Hrs  ICU Vital Signs Last 24 Hrs  T(C): 36.9 (01 Dec 2019 06:30), Max: 36.9 (01 Dec 2019 06:30)  T(F): 98.5 (01 Dec 2019 06:30), Max: 98.5 (01 Dec 2019 06:30)  HR: 76 (01 Dec 2019 06:30) (71 - 90)  BP: 116/78 (01 Dec 2019 06:30) (115/68 - 162/90)  BP(mean): --  ABP: --  ABP(mean): --  RR: 17 (01 Dec 2019 06:30) (16 - 17)  SpO2: 96% (01 Dec 2019 06:30) (76% - 96%)      LABS                        8.9    6.76  )-----------( 200      ( 30 Nov 2019 17:10 )             29.9   11-30    138  |  104  |  35<H>  ----------------------------<  124<H>  4.2   |  28  |  4.49<H>    Ca    8.1<L>      30 Nov 2019 17:10              PHYSICAL EXAM  GEN: SHIKHA REDMAN is a pleasant well-nourished, well developed 79y Female in no acute distress, alert awake, and oriented to person, place and time.   LE Focused:    Vasc:  DP/PT nonpalpable. No pedal hair present. CFT brisk to digit. Notable pitting edema to the right dorsal foot. No focal increase in warmth.   Derm: mild Erythema with moderate edema noted to the right dorsal forefoot. Dry hematoma noted to the tip of right distal tip of hallux, not mobile with out fluctuance. No purulence or active discharge. No other open lesions. Webspaces dry clean and intact.   Neuro: Protective sensation diminished tested with semmes nancy monofilament.   MSK: muslcle strength 5/5 in all quadrants. Mild tenderness to palpation at the distal tip of right hallux.       x-ray of right foot, pending         Cultures: Culture Results:   >100,000 CFU/ml Escherichia coli  50,000 - 99,000 CFU/mL Streptococcus bovis (11-28 @ 01:01)  Culture Results:   No growth to date. (11-27 @ 01:42)  Culture Results:   No growth to date. (11-27 @ 01:31)  Hemoglobin A1C, Whole Blood (11.23.19 @ 16:16)    Hemoglobin A1C, Whole Blood: 7.7: Method: Immunoassay       Reference Range                4.0-5.6%       High risk (prediabetic)        5.7-6.4%       Diabetic, diagnostic             >=6.5%       ADA diabetic treatment goal       <7.0%  The Hemoglobin A1c testing is NGSP-certified. Reference ranges are based  upon the 2010 recommendations of  the American Diabetes Association.  Interpretation may vary for children  and adolescents. %        A: right hallux hematoma/ischemic wound    P:   Chart reviewed and Patient evaluated  Discussed diagnosis and treatment with patient  Local wound care with betadine, patient refused dressing   Stable wound, no signs of acute infection   Pending x-ray, will f/u  LATOYA/PVR ordered, pending  Continue with IV antibiotics As Per ID/primary team  Weight bearing As Tolerated in surgical shoe to right foot.   HbA1c reviewed, 7.7  Discussed importance of daily foot examinations and proper shoe gear and to importance of lower Fasting Blood Glucose levels.   Podiatry will follow while in house.  Discussed with Attending Dr. Marr

## 2019-12-02 ENCOUNTER — TRANSCRIPTION ENCOUNTER (OUTPATIENT)
Age: 79
End: 2019-12-02

## 2019-12-02 VITALS
HEART RATE: 87 BPM | TEMPERATURE: 98 F | DIASTOLIC BLOOD PRESSURE: 94 MMHG | SYSTOLIC BLOOD PRESSURE: 148 MMHG | OXYGEN SATURATION: 95 % | RESPIRATION RATE: 17 BRPM

## 2019-12-02 LAB
CULTURE RESULTS: SIGNIFICANT CHANGE UP
GLUCOSE BLDC GLUCOMTR-MCNC: 192 MG/DL — HIGH (ref 70–99)
GLUCOSE BLDC GLUCOMTR-MCNC: 281 MG/DL — HIGH (ref 70–99)
GLUCOSE BLDC GLUCOMTR-MCNC: 282 MG/DL — HIGH (ref 70–99)
SPECIMEN SOURCE: SIGNIFICANT CHANGE UP

## 2019-12-02 PROCEDURE — 93923 UPR/LXTR ART STDY 3+ LVLS: CPT

## 2019-12-02 PROCEDURE — 86870 RBC ANTIBODY IDENTIFICATION: CPT

## 2019-12-02 PROCEDURE — 87046 STOOL CULTR AEROBIC BACT EA: CPT

## 2019-12-02 PROCEDURE — 84466 ASSAY OF TRANSFERRIN: CPT

## 2019-12-02 PROCEDURE — 84484 ASSAY OF TROPONIN QUANT: CPT

## 2019-12-02 PROCEDURE — 83540 ASSAY OF IRON: CPT

## 2019-12-02 PROCEDURE — 82728 ASSAY OF FERRITIN: CPT

## 2019-12-02 PROCEDURE — 80061 LIPID PANEL: CPT

## 2019-12-02 PROCEDURE — 83735 ASSAY OF MAGNESIUM: CPT

## 2019-12-02 PROCEDURE — 87040 BLOOD CULTURE FOR BACTERIA: CPT

## 2019-12-02 PROCEDURE — 93923 UPR/LXTR ART STDY 3+ LVLS: CPT | Mod: 26

## 2019-12-02 PROCEDURE — 82553 CREATINE MB FRACTION: CPT

## 2019-12-02 PROCEDURE — 93005 ELECTROCARDIOGRAM TRACING: CPT

## 2019-12-02 PROCEDURE — P9040: CPT

## 2019-12-02 PROCEDURE — 82550 ASSAY OF CK (CPK): CPT

## 2019-12-02 PROCEDURE — 86922 COMPATIBILITY TEST ANTIGLOB: CPT

## 2019-12-02 PROCEDURE — 73620 X-RAY EXAM OF FOOT: CPT | Mod: 26,RT

## 2019-12-02 PROCEDURE — 87150 DNA/RNA AMPLIFIED PROBE: CPT

## 2019-12-02 PROCEDURE — 73620 X-RAY EXAM OF FOOT: CPT

## 2019-12-02 PROCEDURE — 99285 EMERGENCY DEPT VISIT HI MDM: CPT | Mod: 25

## 2019-12-02 PROCEDURE — 84100 ASSAY OF PHOSPHORUS: CPT

## 2019-12-02 PROCEDURE — 83036 HEMOGLOBIN GLYCOSYLATED A1C: CPT

## 2019-12-02 PROCEDURE — 36415 COLL VENOUS BLD VENIPUNCTURE: CPT

## 2019-12-02 PROCEDURE — 94640 AIRWAY INHALATION TREATMENT: CPT

## 2019-12-02 PROCEDURE — 80048 BASIC METABOLIC PNL TOTAL CA: CPT

## 2019-12-02 PROCEDURE — 82607 VITAMIN B-12: CPT

## 2019-12-02 PROCEDURE — 87086 URINE CULTURE/COLONY COUNT: CPT

## 2019-12-02 PROCEDURE — 83880 ASSAY OF NATRIURETIC PEPTIDE: CPT

## 2019-12-02 PROCEDURE — 99261: CPT

## 2019-12-02 PROCEDURE — 86901 BLOOD TYPING SEROLOGIC RH(D): CPT

## 2019-12-02 PROCEDURE — 36430 TRANSFUSION BLD/BLD COMPNT: CPT

## 2019-12-02 PROCEDURE — 82306 VITAMIN D 25 HYDROXY: CPT

## 2019-12-02 PROCEDURE — 85027 COMPLETE CBC AUTOMATED: CPT

## 2019-12-02 PROCEDURE — 86900 BLOOD TYPING SEROLOGIC ABO: CPT

## 2019-12-02 PROCEDURE — 87045 FECES CULTURE AEROBIC BACT: CPT

## 2019-12-02 PROCEDURE — 86850 RBC ANTIBODY SCREEN: CPT

## 2019-12-02 PROCEDURE — 83605 ASSAY OF LACTIC ACID: CPT

## 2019-12-02 PROCEDURE — 97162 PT EVAL MOD COMPLEX 30 MIN: CPT

## 2019-12-02 PROCEDURE — 93306 TTE W/DOPPLER COMPLETE: CPT

## 2019-12-02 PROCEDURE — 86905 BLOOD TYPING RBC ANTIGENS: CPT

## 2019-12-02 PROCEDURE — 82962 GLUCOSE BLOOD TEST: CPT

## 2019-12-02 PROCEDURE — 87186 SC STD MICRODIL/AGAR DIL: CPT

## 2019-12-02 PROCEDURE — 84443 ASSAY THYROID STIM HORMONE: CPT

## 2019-12-02 PROCEDURE — 71045 X-RAY EXAM CHEST 1 VIEW: CPT

## 2019-12-02 PROCEDURE — 80053 COMPREHEN METABOLIC PANEL: CPT

## 2019-12-02 PROCEDURE — 74176 CT ABD & PELVIS W/O CONTRAST: CPT

## 2019-12-02 PROCEDURE — 82803 BLOOD GASES ANY COMBINATION: CPT

## 2019-12-02 PROCEDURE — 81001 URINALYSIS AUTO W/SCOPE: CPT

## 2019-12-02 PROCEDURE — 83550 IRON BINDING TEST: CPT

## 2019-12-02 RX ORDER — MIDODRINE HYDROCHLORIDE 2.5 MG/1
1 TABLET ORAL
Qty: 63 | Refills: 0
Start: 2019-12-02 | End: 2019-12-22

## 2019-12-02 RX ORDER — POVIDONE-IODINE 5 %
1 AEROSOL (ML) TOPICAL
Qty: 14 | Refills: 0
Start: 2019-12-02 | End: 2019-12-15

## 2019-12-02 RX ADMIN — MIDODRINE HYDROCHLORIDE 10 MILLIGRAM(S): 2.5 TABLET ORAL at 13:15

## 2019-12-02 RX ADMIN — Medication 3 UNIT(S): at 12:36

## 2019-12-02 RX ADMIN — BUDESONIDE AND FORMOTEROL FUMARATE DIHYDRATE 2 PUFF(S): 160; 4.5 AEROSOL RESPIRATORY (INHALATION) at 12:38

## 2019-12-02 RX ADMIN — HEPARIN SODIUM 5000 UNIT(S): 5000 INJECTION INTRAVENOUS; SUBCUTANEOUS at 05:53

## 2019-12-02 RX ADMIN — PIPERACILLIN AND TAZOBACTAM 25 GRAM(S): 4; .5 INJECTION, POWDER, LYOPHILIZED, FOR SOLUTION INTRAVENOUS at 13:15

## 2019-12-02 RX ADMIN — PANTOPRAZOLE SODIUM 40 MILLIGRAM(S): 20 TABLET, DELAYED RELEASE ORAL at 05:53

## 2019-12-02 RX ADMIN — Medication 1: at 08:37

## 2019-12-02 RX ADMIN — Medication 325 MILLIGRAM(S): at 12:36

## 2019-12-02 RX ADMIN — SEVELAMER CARBONATE 1600 MILLIGRAM(S): 2400 POWDER, FOR SUSPENSION ORAL at 12:37

## 2019-12-02 RX ADMIN — Medication 3 UNIT(S): at 08:38

## 2019-12-02 RX ADMIN — CHLORHEXIDINE GLUCONATE 1 APPLICATION(S): 213 SOLUTION TOPICAL at 12:37

## 2019-12-02 RX ADMIN — Medication 3: at 12:36

## 2019-12-02 NOTE — PROGRESS NOTE ADULT - CARDIOVASCULAR DETAILS
positive S1/positive S2
positive S2
positive S1/positive S2
positive S1/positive S2
positive S2/positive S1

## 2019-12-02 NOTE — PROGRESS NOTE ADULT - MOUTH
moist/normal mouth and gums
moist/normal mouth and gums
normal mouth and gums
normal mouth and gums/moist
normal mouth and gums/moist
moist/normal mouth and gums

## 2019-12-02 NOTE — PROGRESS NOTE ADULT - PROBLEM SELECTOR PROBLEM 6
HTN (hypertension)
COPD (chronic obstructive pulmonary disease)
Prophylactic measure
COPD (chronic obstructive pulmonary disease)
HTN (hypertension)

## 2019-12-02 NOTE — PROGRESS NOTE ADULT - PROBLEM SELECTOR PLAN 9
DVT ppx : Heparin SC   GI ppx: PPI

## 2019-12-02 NOTE — PROGRESS NOTE ADULT - REASON FOR ADMISSION
Anemia
Anemia
SOB
Symptomatic Anemia
generalized weakness, SOB, anemia
sob
ESRD
ESRD  WEakness  Anemia
Symptomatic Anemia
Symptomatic anemia
SOB
generalized weakness, SOB, anemia
SOB
generalized weakness, SOB, anemia
generalized weakness, SOB, anemia

## 2019-12-02 NOTE — PROGRESS NOTE ADULT - ATTENDING COMMENTS
CARDIOLOGY ATTENDING    Agree with above. Admitted with volume overload. Get echo, f/u renal regarding volume removal via HD
Patient seen and examined.  Agree with above.   -Given positive troponin would ideally check NST when medically optimized and if within GOC however at this time the patient is refusing ischemic evaluation despite knowing risks including but not limited to MI/death  -Pt. is DNR and wants conservative CV care  -Follow up medicine/primary team    Keysha Ellis MD
PATIENT WAS SEEN AND EXAMINED  - D/W PATIENT AND STAFF  -DC PLAN HOME WITH HOME PT  - DR. BURGOS ( COVERING DR. JOE CROOKS )

## 2019-12-02 NOTE — PROGRESS NOTE ADULT - PROBLEM SELECTOR PROBLEM 1
Cellulitis of right foot
Cellulitis of right foot
Symptomatic anemia
Cellulitis of right foot
Symptomatic anemia
Cellulitis of right foot

## 2019-12-02 NOTE — PROGRESS NOTE ADULT - CONSTITUTIONAL DETAILS
no distress
no distress/well-groomed/well-developed/well-nourished
well-developed/well-groomed/no distress
well-developed/well-groomed/no distress/well-nourished
well-groomed/no distress/well-developed/well-nourished
well-nourished/no distress/well-developed/well-groomed
well-developed/well-groomed/no distress

## 2019-12-02 NOTE — PROGRESS NOTE ADULT - NSHPATTENDINGPLANDISCUSS_GEN_ALL_CORE
the medical team and the patient
the patient and medical team
the patient, Gi issues and importance of colonoscopy discussed and medical team
the patient, family called podiatry
DR. BURGOS
house staff covering
house staff covering
np covering
DR. BURGOS
house staff covering

## 2019-12-02 NOTE — PROGRESS NOTE ADULT - NEGATIVE GASTROINTESTINAL SYMPTOMS
no diarrhea
no abdominal pain/no hematochezia/no nausea/no melena/no jaundice/no vomiting/no diarrhea
no abdominal pain/no melena/no jaundice/no nausea/no vomiting/no diarrhea/no hematochezia
no abdominal pain/no nausea/no vomiting/no diarrhea/no melena/no jaundice/no hematochezia
no abdominal pain/no vomiting/no diarrhea/no nausea/no melena/no hematochezia/no jaundice
no hematochezia/no diarrhea/no vomiting/no abdominal pain/no nausea/no melena/no jaundice
no hematochezia/no nausea/no abdominal pain/no vomiting/no diarrhea/no melena/no jaundice
no melena/no diarrhea/no abdominal pain/no nausea/no hematochezia/no jaundice/no vomiting
no nausea/no abdominal pain/no vomiting/no diarrhea/no melena/no hematochezia/no jaundice
no vomiting/no diarrhea/no nausea/no abdominal pain/no melena/no jaundice/no hematochezia

## 2019-12-02 NOTE — PROGRESS NOTE ADULT - PROBLEM SELECTOR PLAN 10
Pt from home   PT recs home PT  CM following

## 2019-12-02 NOTE — PROGRESS NOTE ADULT - SUBJECTIVE AND OBJECTIVE BOX
79y Female    Meds:  piperacillin/tazobactam IVPB.. 3.375 Gram(s) IV Intermittent every 12 hours    Allergies    No Known Allergies    Intolerances        VITALS:  Vital Signs Last 24 Hrs  T(C): 36.8 (02 Dec 2019 14:03), Max: 37 (02 Dec 2019 05:20)  T(F): 98.2 (02 Dec 2019 14:03), Max: 98.6 (02 Dec 2019 05:20)  HR: 87 (02 Dec 2019 14:03) (73 - 87)  BP: 148/94 (02 Dec 2019 14:03) (123/54 - 148/94)  BP(mean): --  RR: 17 (02 Dec 2019 14:03) (17 - 18)  SpO2: 95% (02 Dec 2019 14:03) (93% - 100%)    LABS/DIAGNOSTIC TESTS:                          8.9    6.76  )-----------( 200      ( 30 Nov 2019 17:10 )             29.9         11-30    138  |  104  |  35<H>  ----------------------------<  124<H>  4.2   |  28  |  4.49<H>    Ca    8.1<L>      30 Nov 2019 17:10            CULTURES: .Stool  11-30 @ 22:30   No enteric pathogens to date: Final culture pending  --  --      .Urine  11-28 @ 01:01   >100,000 CFU/ml Escherichia coli  50,000 - 99,000 CFU/mL Streptococcus bovis  --  Escherichia coli  Streptococcus bovis      .Blood  11-27 @ 01:42   No growth at 5 days.  --  --      .Blood  11-27 @ 01:31   Growth in aerobic bottle: Gram Negative Rods            RADIOLOGY:      ROS:  [  ] UNABLE TO ELICIT 79y Female who is doing great and has no active complaints and her right foot pains and redness have improved, she still has some swelling of her right forefoot though. She actually was found to have E.coli in her urine culture(as well as strep Bovis but insig growth) and E.Coli in 1 of 4 bottles of blood cultures. She is for discharge home today. She has no fevers, chills or diarrhea.    Meds:  piperacillin/tazobactam IVPB.. 3.375 Gram(s) IV Intermittent every 12 hours    Allergies    No Known Allergies    Intolerances        VITALS:  Vital Signs Last 24 Hrs  T(C): 36.8 (02 Dec 2019 14:03), Max: 37 (02 Dec 2019 05:20)  T(F): 98.2 (02 Dec 2019 14:03), Max: 98.6 (02 Dec 2019 05:20)  HR: 87 (02 Dec 2019 14:03) (73 - 87)  BP: 148/94 (02 Dec 2019 14:03) (123/54 - 148/94)  BP(mean): --  RR: 17 (02 Dec 2019 14:03) (17 - 18)  SpO2: 95% (02 Dec 2019 14:03) (93% - 100%)    LABS/DIAGNOSTIC TESTS:                          8.9    6.76  )-----------( 200      ( 30 Nov 2019 17:10 )             29.9         11-30    138  |  104  |  35<H>  ----------------------------<  124<H>  4.2   |  28  |  4.49<H>    Ca    8.1<L>      30 Nov 2019 17:10            CULTURES: .Stool  11-30 @ 22:30   No enteric pathogens to date: Final culture pending  --  --      .Urine  11-28 @ 01:01   >100,000 CFU/ml Escherichia coli  50,000 - 99,000 CFU/mL Streptococcus bovis  --  Escherichia coli  Streptococcus bovis      .Blood  11-27 @ 01:42   No growth at 5 days.  --  --      .Blood  11-27 @ 01:31   Growth in aerobic bottle: Gram Negative Rods            RADIOLOGY:      ROS:  [  ] UNABLE TO ELICIT

## 2019-12-02 NOTE — PROGRESS NOTE ADULT - PROBLEM SELECTOR PLAN 5
T/Th/Sat  last HD 11/28  C/w with Midodrine   c/w Sevelamer HCl  Nephrology Dr. Chadwick following
Bl, urine culture pending  Monitor fever  Monitor VS  Continue Zosyn IV   ID on board
Likely diastolic heart failure as echocardiogram on 2018  shows normal e.f with left ventricular hypertrophy.   Pt takes Lasix 40 daily ay home   Continue home dose of Lasix
Likely diastolic heart failure as echocardiogram on 2018  shows normal e.f with left ventricular hypertrophy.   Pt takes Lasix 40 daily ay home   Continue home dose of Lasix
Likely diastolic heart failure as echocardiogram on 2018  shows normal e.f with left ventricular hypertrophy.   Pt takes Lasix 40 daily ay home   lasix held for hypotension
Likely diastolic heart failure as echocardiogram on 2018  shows normal e.f with left ventricular hypertrophy.   Pt takes Lasix 40 daily ay home   lasix held for hypotension
Bl, urine culture pending  Monitor fever  Monitor VS  Continue Zosyn IV   ID on board
T/Th/Sat  last HD 11/28  C/w with Midodrine   c/w Sevelamer HCl  Nephrology Dr. Chadwick following

## 2019-12-02 NOTE — PROGRESS NOTE ADULT - PROBLEM SELECTOR PLAN 7
iss
DVT ppx: heparin
A1C 7.7  controlled  c/w Lantus 15 units HS and premeal Humalog 3 units TID  Monitor BG ac/HS and cover with low corrective HISS  Hypoglycemia protocol
DVT ppx: heparin

## 2019-12-02 NOTE — PROGRESS NOTE ADULT - SUBJECTIVE AND OBJECTIVE BOX
Problem List:  esrd  copd WITH DECLINING RESPIRATORY STATUS  On home o2  Increased weakness and fatigue  RLE cellulitis.  Anemia refused work up.      PAST MEDICAL & SURGICAL HISTORY:  Chronic CHF  MS (mitral stenosis)  GERD (gastroesophageal reflux disease)  COPD (chronic obstructive pulmonary disease): On home oxygen  HLD (hyperlipidemia): not taking statins  HTN (hypertension)  ESRD (end stage renal disease) on dialysis  Chronic diastolic (congestive) heart failure  Anemia of chronic disease  Breast carcinoma: Lt side s/p lumpectomy and radiation in 2004  DM (diabetes mellitus): type 2  S/P subtotal hysterectomy  S/P lumpectomy, left breast: 2004      No Known Allergies      MEDICATIONS  (STANDING):  atorvastatin 10 milliGRAM(s) Oral at bedtime  budesonide  80 MICROgram(s)/formoterol 4.5 MICROgram(s) Inhaler 2 Puff(s) Inhalation two times a day  chlorhexidine 4% Liquid 1 Application(s) Topical daily  dextrose 5%. 1000 milliLiter(s) (50 mL/Hr) IV Continuous <Continuous>  dextrose 50% Injectable 12.5 Gram(s) IV Push once  dextrose 50% Injectable 25 Gram(s) IV Push once  dextrose 50% Injectable 25 Gram(s) IV Push once  epoetin randy Injectable 61932 Unit(s) IV Push <User Schedule>  ferrous    sulfate 325 milliGRAM(s) Oral daily  heparin  Injectable 5000 Unit(s) SubCutaneous every 12 hours  insulin glargine Injectable (LANTUS) 15 Unit(s) SubCutaneous at bedtime  insulin lispro (HumaLOG) corrective regimen sliding scale   SubCutaneous three times a day before meals  insulin lispro Injectable (HumaLOG) 3 Unit(s) SubCutaneous three times a day before meals  midodrine. 10 milliGRAM(s) Oral every 8 hours  pantoprazole    Tablet 40 milliGRAM(s) Oral before breakfast  piperacillin/tazobactam IVPB.. 3.375 Gram(s) IV Intermittent every 12 hours  sevelamer carbonate 1600 milliGRAM(s) Oral three times a day with meals  traZODone 50 milliGRAM(s) Oral at bedtime    MEDICATIONS  (PRN):  acetaminophen   Tablet .. 650 milliGRAM(s) Oral every 6 hours PRN Mild Pain (1 - 3), Moderate Pain (4 - 6)  albuterol/ipratropium for Nebulization. 3 milliLiter(s) Nebulizer once PRN Shortness of Breath and/or Wheezing  dextrose 40% Gel 15 Gram(s) Oral once PRN Blood Glucose LESS THAN 70 milliGRAM(s)/deciliter  glucagon  Injectable 1 milliGRAM(s) IntraMuscular once PRN Glucose LESS THAN 70 milligrams/deciliter                            8.9    6.76  )-----------( 200      ( 30 Nov 2019 17:10 )             29.9     11-30    138  |  104  |  35<H>  ----------------------------<  124<H>  4.2   |  28  |  4.49<H>    Ca    8.1<L>      30 Nov 2019 17:10              REVIEW OF SYSTEMS:  General: no fever no chills, no weight loss.  EYES/ENT: No visual changes;  No vertigo, no headache.  NECK: No pain or stiffness  RESPIRATORY: No cough, wheezing, hemoptysis; No shortness of breath, feels better today.  CARDIOVASCULAR: No chest pain or palpitations. No Edema  GASTROINTESTINAL: No abdominal or epigastric pain. No nausea, vomiting. No diarrhea or constipation. No melena.  GENITOURINARY: No dysuria, frequency, foamy urine, urinary urgency, incontinence or hematuria  NEUROLOGICAL: No numbness or weakness, no tremor , no dizziness.   Muscle skeletal : no joint pain and no swelling of joints and limbs.  SKIN: No itching, burning, rashes.        VITALS:  T(F): 98.6 (12-02-19 @ 05:20), Max: 98.6 (12-02-19 @ 05:20)  HR: 74 (12-02-19 @ 05:20)  BP: 123/54 (12-02-19 @ 05:20)  RR: 17 (12-02-19 @ 05:20)  SpO2: 93% (12-02-19 @ 05:20)  Wt(kg): --      PHYSICAL EXAM:  Constitutional: well developed, no diaphoresis, no distress.  Neck: No JVD, no carotid bruit, supple, no adenopathy  Respiratory: Good air entrance B/L, no wheezes, rales or rhonchi  Cardiovascular: S1, S2, RRR, no pericardial rub, no murmur  Abdomen: BS+, soft, no tenderness, no bruit  Pelvis: bladder nondistended  Extremities: No cyanosis or clubbing. No peripheral edema.     Neurological: A/O x 3, no focal deficits  Psychiatric: Normal mood, normal affect    Vascular Access: right AVF with bruti and thrill

## 2019-12-02 NOTE — PROGRESS NOTE ADULT - PROBLEM SELECTOR PROBLEM 4
Chronic CHF
ESRD (end stage renal disease) on dialysis
Elevated troponin
Chronic CHF
ESRD (end stage renal disease) on dialysis

## 2019-12-02 NOTE — PROGRESS NOTE ADULT - SUBJECTIVE AND OBJECTIVE BOX
INTERVAL HPI/OVERNIGHT EVENTS: Pt seen and examined this morning. Pt awake , NAD, reports breathing and feeling better.     MEDICATIONS  (STANDING):  atorvastatin 10 milliGRAM(s) Oral at bedtime  budesonide  80 MICROgram(s)/formoterol 4.5 MICROgram(s) Inhaler 2 Puff(s) Inhalation two times a day  chlorhexidine 4% Liquid 1 Application(s) Topical daily  dextrose 5%. 1000 milliLiter(s) (50 mL/Hr) IV Continuous <Continuous>  dextrose 50% Injectable 12.5 Gram(s) IV Push once  dextrose 50% Injectable 25 Gram(s) IV Push once  dextrose 50% Injectable 25 Gram(s) IV Push once  epoetin randy Injectable 97977 Unit(s) IV Push <User Schedule>  ferrous    sulfate 325 milliGRAM(s) Oral daily  heparin  Injectable 5000 Unit(s) SubCutaneous every 12 hours  insulin glargine Injectable (LANTUS) 15 Unit(s) SubCutaneous at bedtime  insulin lispro (HumaLOG) corrective regimen sliding scale   SubCutaneous three times a day before meals  insulin lispro Injectable (HumaLOG) 3 Unit(s) SubCutaneous three times a day before meals  midodrine. 10 milliGRAM(s) Oral every 8 hours  pantoprazole    Tablet 40 milliGRAM(s) Oral before breakfast  piperacillin/tazobactam IVPB.. 3.375 Gram(s) IV Intermittent every 12 hours  sevelamer carbonate 1600 milliGRAM(s) Oral three times a day with meals  traZODone 50 milliGRAM(s) Oral at bedtime    MEDICATIONS  (PRN):  acetaminophen   Tablet .. 650 milliGRAM(s) Oral every 6 hours PRN Mild Pain (1 - 3), Moderate Pain (4 - 6)  albuterol/ipratropium for Nebulization. 3 milliLiter(s) Nebulizer once PRN Shortness of Breath and/or Wheezing  dextrose 40% Gel 15 Gram(s) Oral once PRN Blood Glucose LESS THAN 70 milliGRAM(s)/deciliter  glucagon  Injectable 1 milliGRAM(s) IntraMuscular once PRN Glucose LESS THAN 70 milligrams/deciliter    Vital Signs Last 24 Hrs  T(C): 36.8 (02 Dec 2019 14:03), Max: 37 (02 Dec 2019 05:20)  T(F): 98.2 (02 Dec 2019 14:03), Max: 98.6 (02 Dec 2019 05:20)  HR: 87 (02 Dec 2019 14:03) (73 - 87)  BP: 148/94 (02 Dec 2019 14:03) (123/54 - 148/94)  BP(mean): --  RR: 17 (02 Dec 2019 14:03) (17 - 18)  SpO2: 95% (02 Dec 2019 14:03) (93% - 100%)__________________________________________________  REVIEW OF SYSTEMS:    CONSTITUTIONAL: No fever,   EYES: no acute visual disturbances  NECK: No pain or stiffness  RESPIRATORY: Less SOB, No cough  CARDIOVASCULAR: No chest pain, no palpitations  GASTROINTESTINAL: No pain. No nausea or vomiting; No diarrhea   NEUROLOGICAL: No headache or numbness, no tremors  MUSCULOSKELETAL: No joint pain, no muscle pain  GENITOURINARY: no dysuria, no frequency, no hesitancy  PSYCHIATRY: no depression , no anxiety  ALL OTHER  ROS negative      ________________________________________________  PHYSICAL EXAM:  GENERAL: NAD  HEENT: Normocephalic;  conjunctivae and sclerae clear; moist mucous membranes;   NECK : supple  CHEST/LUNG: Mildly labored. Clear upper lobes. Fine bibasilar crackles.    HEART: S1 S2  regular; no murmurs, gallops or rubs  ABDOMEN: Soft, Nontender, Nondistended; Bowel sounds present  EXTREMITIES: no cyanosis; no calf tenderness. Right foot with erythema and mild edema, mild tenderness. (+) PPP  SKIN: warm and dry; no rash  NERVOUS SYSTEM:  Awake and alert; Oriented  to place, person and time ; no new deficits    _________________________________________________  LABS:  11-30    138  |  104  |  35<H>  ----------------------------<  124<H>  4.2   |  28  |  4.49<H>    Ca    8.1<L>      2019 17:10      Creatinine Trend: 4.49 <--, 3.83 <--, 5.31 <--, 4.68 <--, 5.80 <--                        8.9    6.76  )-----------( 200      ( 2019 17:10 )             29.9     Urine Studies:  Urinalysis Basic - ( 2019 22:14 )    Color: Yellow / Appearance: very cloudy / S.020 / pH:   Gluc:  / Ketone: Negative  / Bili: Negative / Urobili: Negative   Blood:  / Protein: 100 / Nitrite: Negative   Leuk Esterase: Moderate / RBC: 5-10 /HPF / WBC >50 /HPF   Sq Epi:  / Non Sq Epi: Few /HPF / Bacteria: TNTC /HPF                                  cerns were addressed and care was aligned with patient's wishes.

## 2019-12-02 NOTE — PROGRESS NOTE ADULT - NEGATIVE ENMT SYMPTOMS
no ear pain/no dry mouth/no vertigo/no gum bleeding/no hearing difficulty/no throat pain/no dysphagia

## 2019-12-02 NOTE — PROGRESS NOTE ADULT - MS EXT PE MLT D E PC
pedal edema
no clubbing/no cyanosis
no clubbing/no cyanosis
no cyanosis/no clubbing
no clubbing/no cyanosis

## 2019-12-02 NOTE — PROGRESS NOTE ADULT - GASTROINTESTINAL DETAILS
soft/no guarding/no distention/no rebound tenderness/no rigidity/nontender/bowel sounds normal
no distention/bowel sounds normal/no rigidity/no rebound tenderness/nontender/no bruit/no guarding/soft
no distention/no rebound tenderness/no rigidity/bowel sounds normal/no guarding/soft/nontender
no distention/bowel sounds normal/no rigidity/no rebound tenderness/no guarding/soft/nontender
no rebound tenderness/bowel sounds normal/nontender/no guarding/soft/no distention/no rigidity
no rebound tenderness/no rigidity/no distention/nontender/soft/bowel sounds normal/no guarding
nontender/no rebound tenderness/no rigidity/no guarding/no distention/bowel sounds normal/soft
nontender/no rebound tenderness/no rigidity/soft/bowel sounds normal/no distention/no guarding
bowel sounds normal/no rigidity/no rebound tenderness/nontender/no distention/soft/no guarding

## 2019-12-02 NOTE — PROGRESS NOTE ADULT - PROBLEM SELECTOR PROBLEM 5
ESRD (end stage renal disease) on dialysis
Chronic diastolic (congestive) heart failure
Fever
ESRD (end stage renal disease) on dialysis
Fever

## 2019-12-02 NOTE — PROGRESS NOTE ADULT - PROBLEM SELECTOR PLAN 3
continue with Duonebs, Budesonide inhaler  Oxygen supplement to keep SpO2 > 90%
continue with Duonebs, Budesonide inhaler  Oxygen supplement to keep SpO2 > 90%
Monitor for chest pain  plan for stress test once stable
Pt has h/o COPD and uses O2 3L at home. Former smoker  Continue Bronchodilator  Continue oxygen supplementation
Pt has h/o COPD and uses O2 3L at home. Former smoker  Continue Bronchodilator  Continue oxygen supplementation  Due to persistent SOB despite dialysis, would consider COPD exacerbation for cause for SOB  -Started on duonebs
Pt has h/o COPD and uses O2 at home. Former smoker  Continue Bronchodilator  Continue oxygen supplementation
Pt has h/o COPD and uses O2 at home. Former smoker  Continue Bronchodilator  Continue oxygen supplementation
Monitor for chest pain  plan for stress test once stable
continue with Duonebs, Budesonide inhaler  Oxygen supplement to keep SpO2 > 90%  Pulelias Baeza following
continue with Duonebs, Budesonide inhaler  Oxygen supplement to keep SpO2 > 90%

## 2019-12-02 NOTE — DISCHARGE NOTE NURSING/CASE MANAGEMENT/SOCIAL WORK - NSDCPEPT PROEDHF_GEN_ALL_CORE
Activities as tolerated/Report signs and symptoms to primary care provider/Monitor weight daily/Low salt diet/Call primary care provider for follow up after discharge

## 2019-12-02 NOTE — PROGRESS NOTE ADULT - NOSE
no deviation/congestion/septal deviation L/no discharge
no deviation/no discharge
no discharge/no deviation

## 2019-12-02 NOTE — PROGRESS NOTE ADULT - SUBJECTIVE AND OBJECTIVE BOX
Patient is a 79y old  Female who presents with a chief complaint of SOB (29 Nov 2019 15:15)      HPI:  Pt is a 80 y/o F, from home walks with cane at baseline, with a PMH of chronic diastolic heart failure (stage II), ESRD on HD T/Th/S, moderate MS, COPD on home oxygen, HTN, HLD, DM, and GERD  who presented with generalized weakness, generalized body pain and shortness of breath on exertion since 1 day.  According to the patient, since yesterday she has been feeling weak and has generalized body pain, more on her shoulders. She mentioned that she had minor fall from her bed yesterday and was not able to sleep whole night. She has shortness of breath on mild exertion limiting her ambulation.   She denied fever, chest pain, nausea, vomiting, abdominal pain and all other review of systems except mentioned above. (22 Nov 2019 17:38)      S: Patient is consulted for right hallux ischemic changes, seen bedside this morning resting comfortably in bed with both feet dangling off the bed. Patient is AAOx3 and in NAD. States the "black wound" to the distal right great toe has been present for over month and has been getting bigger. Initial onset was after wearing tight shoes. Patient has been at the hospital for a week, does not see a podiatrist regularly. Admits to being diabetic with numbness and tingling to her feet. Denies f/c/n/v or SOB. No other lower extremity complaints.     Pt was seen by podiatry this AM,  hallux Hematoma stable, no acute signs of infection, xray LATOYA/pvr pending. Pt refused dressing. betadine with bandaid applied      PMH:Chronic CHF  MS (mitral stenosis)  GERD (gastroesophageal reflux disease)  COPD (chronic obstructive pulmonary disease)  HLD (hyperlipidemia)  HTN (hypertension)  ESRD (end stage renal disease) on dialysis  Chronic diastolic (congestive) heart failure  Anemia of chronic disease  Anemia  Hypertension  Congestive heart failure  Chronic kidney disease  No pertinent past medical history  Breast carcinoma  Renal mass  DM (diabetes mellitus)  Asthma    Allergies: No Known Allergies    Medications: acetaminophen   Tablet .. 650 milliGRAM(s) Oral every 6 hours PRN  albuterol/ipratropium for Nebulization. 3 milliLiter(s) Nebulizer once PRN  atorvastatin 10 milliGRAM(s) Oral at bedtime  budesonide  80 MICROgram(s)/formoterol 4.5 MICROgram(s) Inhaler 2 Puff(s) Inhalation two times a day  chlorhexidine 4% Liquid 1 Application(s) Topical daily  dextrose 40% Gel 15 Gram(s) Oral once PRN  dextrose 5%. 1000 milliLiter(s) IV Continuous <Continuous>  dextrose 50% Injectable 12.5 Gram(s) IV Push once  dextrose 50% Injectable 25 Gram(s) IV Push once  dextrose 50% Injectable 25 Gram(s) IV Push once  epoetin randy Injectable 30197 Unit(s) IV Push <User Schedule>  ferrous    sulfate 325 milliGRAM(s) Oral daily  glucagon  Injectable 1 milliGRAM(s) IntraMuscular once PRN  heparin  Injectable 5000 Unit(s) SubCutaneous every 12 hours  insulin glargine Injectable (LANTUS) 15 Unit(s) SubCutaneous at bedtime  insulin lispro (HumaLOG) corrective regimen sliding scale   SubCutaneous three times a day before meals  insulin lispro Injectable (HumaLOG) 3 Unit(s) SubCutaneous three times a day before meals  midodrine. 10 milliGRAM(s) Oral every 8 hours  pantoprazole    Tablet 40 milliGRAM(s) Oral before breakfast  piperacillin/tazobactam IVPB.. 3.375 Gram(s) IV Intermittent every 12 hours  sevelamer carbonate 1600 milliGRAM(s) Oral three times a day with meals  traZODone 50 milliGRAM(s) Oral at bedtime    FH:Diabetes mellitus  Family history of breast cancer (Aunt)  No pertinent family history in first degree relatives    PSX: S/P subtotal hysterectomy  S/P lumpectomy, left breast  No significant past surgical history    SH: acetaminophen   Tablet .. 650 milliGRAM(s) Oral every 6 hours PRN  albuterol/ipratropium for Nebulization. 3 milliLiter(s) Nebulizer once PRN  atorvastatin 10 milliGRAM(s) Oral at bedtime  budesonide  80 MICROgram(s)/formoterol 4.5 MICROgram(s) Inhaler 2 Puff(s) Inhalation two times a day  chlorhexidine 4% Liquid 1 Application(s) Topical daily  dextrose 40% Gel 15 Gram(s) Oral once PRN  dextrose 5%. 1000 milliLiter(s) IV Continuous <Continuous>  dextrose 50% Injectable 12.5 Gram(s) IV Push once  dextrose 50% Injectable 25 Gram(s) IV Push once  dextrose 50% Injectable 25 Gram(s) IV Push once  epoetin randy Injectable 97958 Unit(s) IV Push <User Schedule>  ferrous    sulfate 325 milliGRAM(s) Oral daily  glucagon  Injectable 1 milliGRAM(s) IntraMuscular once PRN  heparin  Injectable 5000 Unit(s) SubCutaneous every 12 hours  insulin glargine Injectable (LANTUS) 15 Unit(s) SubCutaneous at bedtime  insulin lispro (HumaLOG) corrective regimen sliding scale   SubCutaneous three times a day before meals  insulin lispro Injectable (HumaLOG) 3 Unit(s) SubCutaneous three times a day before meals  midodrine. 10 milliGRAM(s) Oral every 8 hours  pantoprazole    Tablet 40 milliGRAM(s) Oral before breakfast  piperacillin/tazobactam IVPB.. 3.375 Gram(s) IV Intermittent every 12 hours  sevelamer carbonate 1600 milliGRAM(s) Oral three times a day with meals  traZODone 50 milliGRAM(s) Oral at bedtime      Vital Signs Last 24 Hrs  T(C): 36.8 (02 Dec 2019 14:03), Max: 37 (02 Dec 2019 05:20)  T(F): 98.2 (02 Dec 2019 14:03), Max: 98.6 (02 Dec 2019 05:20)  HR: 87 (02 Dec 2019 14:03) (73 - 87)  BP: 148/94 (02 Dec 2019 14:03) (123/54 - 148/94)  BP(mean): --  RR: 17 (02 Dec 2019 14:03) (17 - 18)  SpO2: 95% (02 Dec 2019 14:03) (93% - 100%)                  PHYSICAL EXAM  GEN: SHIKHA REDMAN is a pleasant well-nourished, well developed 79y Female in no acute distress, alert awake, and oriented to person, place and time.   LE Focused:    Vasc:  DP/PT nonpalpable. No pedal hair present. CFT brisk to digit. Notable pitting edema to the right dorsal foot. No focal increase in warmth.   Derm: mild Erythema with moderate edema noted to the right dorsal forefoot. Dry hematoma noted to the tip of right distal tip of hallux, not mobile with out fluctuance. No purulence or active discharge. No other open lesions. Webspaces dry clean and intact.   Neuro: Protective sensation diminished tested with semmes nancy monofilament.   MSK: muslcle strength 5/5 in all quadrants. Mild tenderness to palpation at the distal tip of right hallux.       x-ray of right foot, pending         Cultures: Culture Results:   >100,000 CFU/ml Escherichia coli  50,000 - 99,000 CFU/mL Streptococcus bovis (11-28 @ 01:01)  Culture Results:   No growth to date. (11-27 @ 01:42)  Culture Results:   No growth to date. (11-27 @ 01:31)  Hemoglobin A1C, Whole Blood (11.23.19 @ 16:16)    Hemoglobin A1C, Whole Blood: 7.7: Method: Immunoassay       Reference Range                4.0-5.6%       High risk (prediabetic)        5.7-6.4%       Diabetic, diagnostic             >=6.5%       ADA diabetic treatment goal       <7.0%  The Hemoglobin A1c testing is NGSP-certified. Reference ranges are based  upon the 2010 recommendations of  the American Diabetes Association.  Interpretation may vary for children  and adolescents. %        A: right hallux hematoma/ischemic wound    P:   Chart reviewed and Patient evaluated  Discussed diagnosis and treatment with patient  Local wound care with betadine, patient refused dressing   Stable wound, no signs of acute infection   x ray negative  LATOYA/PVR reviewed wnl  Continue with IV antibiotics As Per ID/primary team  Weight bearing As Tolerated in surgical shoe to right foot.   HbA1c reviewed, 7.7  Discussed importance of daily foot examinations and proper shoe gear and to importance of lower Fasting Blood Glucose levels.   stable for discharge and follow up in clinic [address] [place] [address].  Discussed and seen with attending Dr Zapata

## 2019-12-02 NOTE — PROGRESS NOTE ADULT - SUBJECTIVE AND OBJECTIVE BOX
Patient denies CP, SOB Review of systems otherwise (-)         acetaminophen   Tablet .. 650 milliGRAM(s) Oral every 6 hours PRN  albuterol/ipratropium for Nebulization. 3 milliLiter(s) Nebulizer once PRN  atorvastatin 10 milliGRAM(s) Oral at bedtime  budesonide  80 MICROgram(s)/formoterol 4.5 MICROgram(s) Inhaler 2 Puff(s) Inhalation two times a day  chlorhexidine 4% Liquid 1 Application(s) Topical daily  dextrose 40% Gel 15 Gram(s) Oral once PRN  dextrose 5%. 1000 milliLiter(s) IV Continuous <Continuous>  dextrose 50% Injectable 12.5 Gram(s) IV Push once  dextrose 50% Injectable 25 Gram(s) IV Push once  dextrose 50% Injectable 25 Gram(s) IV Push once  epoetin randy Injectable 26296 Unit(s) IV Push <User Schedule>  ferrous    sulfate 325 milliGRAM(s) Oral daily  glucagon  Injectable 1 milliGRAM(s) IntraMuscular once PRN  heparin  Injectable 5000 Unit(s) SubCutaneous every 12 hours  insulin glargine Injectable (LANTUS) 15 Unit(s) SubCutaneous at bedtime  insulin lispro (HumaLOG) corrective regimen sliding scale   SubCutaneous three times a day before meals  insulin lispro Injectable (HumaLOG) 3 Unit(s) SubCutaneous three times a day before meals  midodrine. 10 milliGRAM(s) Oral every 8 hours  pantoprazole    Tablet 40 milliGRAM(s) Oral before breakfast  piperacillin/tazobactam IVPB.. 3.375 Gram(s) IV Intermittent every 12 hours  sevelamer carbonate 1600 milliGRAM(s) Oral three times a day with meals  traZODone 50 milliGRAM(s) Oral at bedtime                            8.9    6.76  )-----------( 200      ( 30 Nov 2019 17:10 )             29.9       Hemoglobin: 8.9 g/dL (11-30 @ 17:10)  Hemoglobin: 9.7 g/dL (11-29 @ 07:43)  Hemoglobin: 9.4 g/dL (11-28 @ 15:04)      11-30    138  |  104  |  35<H>  ----------------------------<  124<H>  4.2   |  28  |  4.49<H>    Ca    8.1<L>      30 Nov 2019 17:10      Creatinine Trend: 4.49<--, 3.83<--, 5.31<--, 4.68<--, 5.80<--, 4.71<--    COAGS:           T(C): 37 (12-02-19 @ 05:20), Max: 37 (12-02-19 @ 05:20)  HR: 74 (12-02-19 @ 05:20) (72 - 74)  BP: 123/54 (12-02-19 @ 05:20) (123/54 - 133/88)  RR: 17 (12-02-19 @ 05:20) (17 - 18)  SpO2: 93% (12-02-19 @ 05:20) (93% - 100%)  Wt(kg): --    I&O's Summary        Gen: Appears well in NAD  HEENT:  (-)icterus (-)pallor  CV: N S1 S2 1/6 HODA (+)2 Pulses B/l  Resp:  Clear to ausculatation B/L, normal effort  GI: (+) BS Soft, NT, ND  Lymph:  (-)Edema, (-)obvious lymphadenopathy  Skin: Warm to touch, Normal turgor  Psych: Appropriate mood and affect      TELEMETRY: 	  OFF       ASSESSMENT/PLAN: 	79y Female female hx of ESRD on HD, HTN, ANemia, copd , chf, PAF, no A/C documented , now with anemia, SOB , + troponin on admission labs     - pulm follow up   - cont ABX . follow up on cultures sent   - Fluid removal with HD per renal  - cont midodrine for BP support  - (+) trop  - will plan on NST when medically optimized if within GOC  D/W Dr Ellis

## 2019-12-02 NOTE — PROGRESS NOTE ADULT - PROBLEM SELECTOR PLAN 6
BP's acceptable  on HD  c/w Midodrine  c/w statin  Cardiology Dr. Caleb ventura
IMPROVE VTE Individual Risk Assessment   RISK                                                          Points  [  ] Previous VTE                                                3  [  ] Thrombophilia                                             2  [  ] Lower limb paralysis                                    2        (unable to hold up >15 seconds)    [  ] Current Cancer                                             2         (within 6 months)  [  x] Immobilization > 24 hrs                              1  [  ] ICU/CCU stay > 24 hours                            1  [x  ] Age > 60                                                    1  IMPROVE VTE Score _________2  Heparin s/c  for DVT prophylaxis
On home oxygen  Continue nc  C/w symbycort bid  Pulmonary on board
BP's acceptable  on HD  c/w Midodrine  c/w statin  Cardiology Dr. Caleb ventura
On home oxygen  Continue nc  C/w symbycort bid  Pulmonary on board

## 2019-12-02 NOTE — PROGRESS NOTE ADULT - ASSESSMENT
1.	Fever likely from right foot cellulitis  2.	UTI/ BACTEREMIA - with E.coli    Plan - can DC home on ceftin 500mgs po bid x 7 days and Doxycycline 100mgs po bid x 5 days.  reconsult prn.

## 2019-12-02 NOTE — PROGRESS NOTE ADULT - PROVIDER SPECIALTY LIST ADULT
Patients discharged to home steady gait, skin warm,dry and consistent with ethnicity.Vital signs stable. Patient was given instructions and demonstrated understanding using teach back method.AVS printed with follow information. All belongings were taken with patient upon time of discharge.   Podiatry

## 2019-12-02 NOTE — PROGRESS NOTE ADULT - PROBLEM SELECTOR PROBLEM 7
DM (diabetes mellitus)
Prophylactic measure
DM (diabetes mellitus)
Prophylactic measure

## 2019-12-02 NOTE — PROGRESS NOTE ADULT - PROBLEM SELECTOR PROBLEM 3
COPD (chronic obstructive pulmonary disease)
Elevated troponin
COPD (chronic obstructive pulmonary disease)
Elevated troponin
COPD (chronic obstructive pulmonary disease)

## 2019-12-02 NOTE — DISCHARGE NOTE NURSING/CASE MANAGEMENT/SOCIAL WORK - NSDCFUADDAPPT_GEN_ALL_CORE_FT
[facility]  phone [phone]  fax [phone]  T, Th and Sa  Outpatient HD resumes 12/03/2019    [facility] through [facility]  7 hours/7days  Renewed effective 12/03/2019  phone [phone]  fax [phone]

## 2019-12-02 NOTE — PROGRESS NOTE ADULT - NEGATIVE GENERAL SYMPTOMS
no chills/no anorexia/no fever/no weight loss/no sweating
no chills/no sweating/no anorexia/no fever/no weight loss
no fever/no chills/no sweating/no anorexia/no weight loss
no fever/no chills/no sweating/no weight loss/no anorexia
no fever/no sweating/no anorexia/no weight loss/no chills
no sweating/no anorexia/no chills/no fever/no weight loss
no weight loss/no fever/no sweating/no anorexia/no chills
no fever/no chills

## 2019-12-02 NOTE — PROGRESS NOTE ADULT - [facility] HE PE MLT D E PC
regular rate and rhythm
regular rate and rhythm/no rub
regular rate and rhythm/no rub
no rub/regular rate and rhythm
regular rate and rhythm/no rub

## 2019-12-02 NOTE — PROGRESS NOTE ADULT - SUBJECTIVE AND OBJECTIVE BOX
Time of Visit:  Patient seen and examined.     MEDICATIONS  (STANDING):  atorvastatin 10 milliGRAM(s) Oral at bedtime  budesonide  80 MICROgram(s)/formoterol 4.5 MICROgram(s) Inhaler 2 Puff(s) Inhalation two times a day  chlorhexidine 4% Liquid 1 Application(s) Topical daily  dextrose 5%. 1000 milliLiter(s) (50 mL/Hr) IV Continuous <Continuous>  dextrose 50% Injectable 12.5 Gram(s) IV Push once  dextrose 50% Injectable 25 Gram(s) IV Push once  dextrose 50% Injectable 25 Gram(s) IV Push once  epoetin randy Injectable 88064 Unit(s) IV Push <User Schedule>  ferrous    sulfate 325 milliGRAM(s) Oral daily  heparin  Injectable 5000 Unit(s) SubCutaneous every 12 hours  insulin glargine Injectable (LANTUS) 15 Unit(s) SubCutaneous at bedtime  insulin lispro (HumaLOG) corrective regimen sliding scale   SubCutaneous three times a day before meals  insulin lispro Injectable (HumaLOG) 3 Unit(s) SubCutaneous three times a day before meals  midodrine. 10 milliGRAM(s) Oral every 8 hours  pantoprazole    Tablet 40 milliGRAM(s) Oral before breakfast  piperacillin/tazobactam IVPB.. 3.375 Gram(s) IV Intermittent every 12 hours  sevelamer carbonate 1600 milliGRAM(s) Oral three times a day with meals  traZODone 50 milliGRAM(s) Oral at bedtime      MEDICATIONS  (PRN):  acetaminophen   Tablet .. 650 milliGRAM(s) Oral every 6 hours PRN Mild Pain (1 - 3), Moderate Pain (4 - 6)  albuterol/ipratropium for Nebulization. 3 milliLiter(s) Nebulizer once PRN Shortness of Breath and/or Wheezing  dextrose 40% Gel 15 Gram(s) Oral once PRN Blood Glucose LESS THAN 70 milliGRAM(s)/deciliter  glucagon  Injectable 1 milliGRAM(s) IntraMuscular once PRN Glucose LESS THAN 70 milligrams/deciliter       Medications up to date at time of exam.      PHYSICAL EXAMINATION:  Patient has no new complaints.  GENERAL: The patient is a well-developed, well-nourished, in no apparent distress.     Vital Signs Last 24 Hrs  T(C): 36.8 (02 Dec 2019 14:03), Max: 37 (02 Dec 2019 05:20)  T(F): 98.2 (02 Dec 2019 14:03), Max: 98.6 (02 Dec 2019 05:20)  HR: 87 (02 Dec 2019 14:03) (73 - 87)  BP: 148/94 (02 Dec 2019 14:03) (123/54 - 148/94)  BP(mean): --  RR: 17 (02 Dec 2019 14:03) (17 - 18)  SpO2: 95% (02 Dec 2019 14:03) (93% - 100%)   (if applicable)    Chest Tube (if applicable)    HEENT: Head is normocephalic and atraumatic. Extraocular muscles are intact. Mucous membranes are moist.     NECK: Supple, no palpable adenopathy.    LUNGS: Clear to auscultation, no wheezing, rales, or rhonchi.    HEART: Regular rate and rhythm without murmur.    ABDOMEN: Soft, nontender, and nondistended.  No hepatosplenomegaly is noted.    : No painful voiding, no pelvic pain    EXTREMITIES: L arm chronic lymphedema    NEUROLOGIC: Awake, alert, oriented, grossly intact    SKIN: Warm, dry, good turgor.      LABS:        MICROBIOLOGY: (if applicable)    RADIOLOGY & ADDITIONAL STUDIES:  EKG:   CXR:  ECHO:    IMPRESSION: 79y Female PAST MEDICAL & SURGICAL HISTORY:  Chronic CHF  MS (mitral stenosis)  GERD (gastroesophageal reflux disease)  COPD (chronic obstructive pulmonary disease): On home oxygen  HLD (hyperlipidemia): not taking statins  HTN (hypertension)  ESRD (end stage renal disease) on dialysis  Chronic diastolic (congestive) heart failure  Anemia of chronic disease  Breast carcinoma: Lt side s/p lumpectomy and radiation in 2004  DM (diabetes mellitus): type 2  S/P subtotal hysterectomy  S/P lumpectomy, left breast: 2004   p/w         Imp: This is a 79 yr old woman for smoker with prior mentioned medical condition condition presented with SOB due to acute on chronic hypoxic hypercapnic resp failure due to Acute exacerbation of COPD with CHF and anemia. BP improved with midodrine      SUGG:  -continue dialysis as per nephro   - continue  mitidrone for low BP  -continue meds  -o2 supp to keep O2 Sat>90%  -duoneb  -symbicort  -out pat pul f/u

## 2019-12-02 NOTE — PROGRESS NOTE ADULT - NECK DETAILS
no JVD/supple
supple/no JVD
no JVD/supple
supple/no JVD

## 2019-12-02 NOTE — PROGRESS NOTE ADULT - PROBLEM SELECTOR PLAN 4
c/w statin, HD  Elevated Trop on admission   Cardiology will plan on NST when medically optimized if within GOC  Card Dr. Ellis following
Continue HD:  Hypotension -on midodrine   Monitor BMP
Elevated troponin to 0.078  Likely demand ischemia and secondary to ESRD  Monitor serial cardiac enzymes
Elevated troponin to 0.078  Likely demand ischemia and secondary to ESRD  Monitor serial cardiac enzymes  Cardio, Dr. Jaimes  -To follow up echo
Continue HD:  Hypotension -on medodrine   Monitor BMP
c/w statin, HD  Elevated Trop on admission   Cardiology will plan on NST when medically optimized if within GOC  Card Dr. Ellis following

## 2019-12-02 NOTE — PROGRESS NOTE ADULT - RS GEN PE MLT RESP DETAILS PC
good air movement/no rhonchi/no rales/clear to auscultation bilaterally/breath sounds equal/no wheezes
airway patent/breath sounds equal/good air movement/no wheezes
airway patent/breath sounds equal/no rhonchi/good air movement/no wheezes
airway patent/no wheezes/breath sounds equal/good air movement
breath sounds equal/good air movement/no rales/no rhonchi/no wheezes/airway patent
good air movement/airway patent/breath sounds equal/no wheezes
no rales/airway patent/breath sounds equal/good air movement
no rales/no rhonchi/airway patent/breath sounds equal/respirations non-labored/good air movement
breath sounds equal/airway patent/good air movement/no rhonchi/no rales

## 2019-12-02 NOTE — PROGRESS NOTE ADULT - PROBLEM SELECTOR PROBLEM 2
Symptomatic anemia
Symptomatic anemia
Chronic diastolic (congestive) heart failure
ESRD (end stage renal disease) on dialysis
Chronic diastolic (congestive) heart failure
Symptomatic anemia
Symptomatic anemia

## 2019-12-02 NOTE — PROGRESS NOTE ADULT - SUBJECTIVE AND OBJECTIVE BOX
[   ] ICU                                          [   ] CCU                                      [  X ] Medical Floor      Patient is comfortable. No new complaints reported, No abdominal pain, N/V, hematemesis, hematochezia, melena, fever, chills, chest pain, SOB, cough or diarrhea reported.    VITALS  Vital Signs Last 24 Hrs  T(C): 36.8 (02 Dec 2019 14:03), Max: 37 (02 Dec 2019 05:20)  T(F): 98.2 (02 Dec 2019 14:03), Max: 98.6 (02 Dec 2019 05:20)  HR: 87 (02 Dec 2019 14:03) (73 - 87)  BP: 148/94 (02 Dec 2019 14:03) (123/54 - 148/94)   RR: 17 (02 Dec 2019 14:03) (17 - 18)  SpO2: 95% (02 Dec 2019 14:03) (93% - 100%)       MEDICATIONS  (STANDING):  atorvastatin 10 milliGRAM(s) Oral at bedtime  budesonide  80 MICROgram(s)/formoterol 4.5 MICROgram(s) Inhaler 2 Puff(s) Inhalation two times a day  chlorhexidine 4% Liquid 1 Application(s) Topical daily  dextrose 5%. 1000 milliLiter(s) (50 mL/Hr) IV Continuous <Continuous>  dextrose 50% Injectable 12.5 Gram(s) IV Push once  dextrose 50% Injectable 25 Gram(s) IV Push once  dextrose 50% Injectable 25 Gram(s) IV Push once  epoetin randy Injectable 87991 Unit(s) IV Push <User Schedule>  ferrous    sulfate 325 milliGRAM(s) Oral daily  heparin  Injectable 5000 Unit(s) SubCutaneous every 12 hours  insulin glargine Injectable (LANTUS) 15 Unit(s) SubCutaneous at bedtime  insulin lispro (HumaLOG) corrective regimen sliding scale   SubCutaneous three times a day before meals  insulin lispro Injectable (HumaLOG) 3 Unit(s) SubCutaneous three times a day before meals  midodrine. 10 milliGRAM(s) Oral every 8 hours  pantoprazole    Tablet 40 milliGRAM(s) Oral before breakfast  piperacillin/tazobactam IVPB.. 3.375 Gram(s) IV Intermittent every 12 hours  sevelamer carbonate 1600 milliGRAM(s) Oral three times a day with meals  traZODone 50 milliGRAM(s) Oral at bedtime    MEDICATIONS  (PRN):  acetaminophen   Tablet .. 650 milliGRAM(s) Oral every 6 hours PRN Mild Pain (1 - 3), Moderate Pain (4 - 6)  albuterol/ipratropium for Nebulization. 3 milliLiter(s) Nebulizer once PRN Shortness of Breath and/or Wheezing  dextrose 40% Gel 15 Gram(s) Oral once PRN Blood Glucose LESS THAN 70 milliGRAM(s)/deciliter  glucagon  Injectable 1 milliGRAM(s) IntraMuscular once PRN Glucose LESS THAN 70 milligrams/deciliter                            8.9    6.76  )-----------( 200      ( 30 Nov 2019 17:10 )             29.9       11-30    138  |  104  |  35<H>  ----------------------------<  124<H>  4.2   |  28  |  4.49<H>    Ca    8.1<L>      30 Nov 2019 17:10

## 2019-12-03 LAB
CULTURE RESULTS: SIGNIFICANT CHANGE UP
SPECIMEN SOURCE: SIGNIFICANT CHANGE UP

## 2019-12-20 ENCOUNTER — INPATIENT (INPATIENT)
Facility: HOSPITAL | Age: 79
LOS: 3 days | Discharge: HOME | DRG: 623 | End: 2019-12-24
Attending: INTERNAL MEDICINE | Admitting: INTERNAL MEDICINE
Payer: COMMERCIAL

## 2019-12-20 VITALS
SYSTOLIC BLOOD PRESSURE: 124 MMHG | HEART RATE: 91 BPM | RESPIRATION RATE: 16 BRPM | DIASTOLIC BLOOD PRESSURE: 71 MMHG | WEIGHT: 160.06 LBS | TEMPERATURE: 99 F | OXYGEN SATURATION: 98 %

## 2019-12-20 DIAGNOSIS — I96 GANGRENE, NOT ELSEWHERE CLASSIFIED: ICD-10-CM

## 2019-12-20 DIAGNOSIS — Z90.711 ACQUIRED ABSENCE OF UTERUS WITH REMAINING CERVICAL STUMP: Chronic | ICD-10-CM

## 2019-12-20 LAB
ALBUMIN SERPL ELPH-MCNC: 3 G/DL — LOW (ref 3.5–5)
ALP SERPL-CCNC: 94 U/L — SIGNIFICANT CHANGE UP (ref 40–120)
ALT FLD-CCNC: 20 U/L DA — SIGNIFICANT CHANGE UP (ref 10–60)
ANION GAP SERPL CALC-SCNC: 7 MMOL/L — SIGNIFICANT CHANGE UP (ref 5–17)
APTT BLD: 31.6 SEC — SIGNIFICANT CHANGE UP (ref 27.5–36.3)
AST SERPL-CCNC: 14 U/L — SIGNIFICANT CHANGE UP (ref 10–40)
BASOPHILS # BLD AUTO: 0.03 K/UL — SIGNIFICANT CHANGE UP (ref 0–0.2)
BASOPHILS NFR BLD AUTO: 0.4 % — SIGNIFICANT CHANGE UP (ref 0–2)
BILIRUB SERPL-MCNC: 0.7 MG/DL — SIGNIFICANT CHANGE UP (ref 0.2–1.2)
BUN SERPL-MCNC: 36 MG/DL — HIGH (ref 7–18)
CALCIUM SERPL-MCNC: 8.9 MG/DL — SIGNIFICANT CHANGE UP (ref 8.4–10.5)
CHLORIDE SERPL-SCNC: 95 MMOL/L — LOW (ref 96–108)
CO2 SERPL-SCNC: 31 MMOL/L — SIGNIFICANT CHANGE UP (ref 22–31)
CREAT SERPL-MCNC: 3.61 MG/DL — HIGH (ref 0.5–1.3)
EOSINOPHIL # BLD AUTO: 0.18 K/UL — SIGNIFICANT CHANGE UP (ref 0–0.5)
EOSINOPHIL NFR BLD AUTO: 2.6 % — SIGNIFICANT CHANGE UP (ref 0–6)
FLU A RESULT: SIGNIFICANT CHANGE UP
FLU A RESULT: SIGNIFICANT CHANGE UP
FLUAV AG NPH QL: SIGNIFICANT CHANGE UP
FLUBV AG NPH QL: SIGNIFICANT CHANGE UP
GLUCOSE SERPL-MCNC: 234 MG/DL — HIGH (ref 70–99)
HCT VFR BLD CALC: 35.7 % — SIGNIFICANT CHANGE UP (ref 34.5–45)
HGB BLD-MCNC: 11.1 G/DL — LOW (ref 11.5–15.5)
IMM GRANULOCYTES NFR BLD AUTO: 0.1 % — SIGNIFICANT CHANGE UP (ref 0–1.5)
INR BLD: 1.19 RATIO — HIGH (ref 0.88–1.16)
LACTATE SERPL-SCNC: 0.7 MMOL/L — SIGNIFICANT CHANGE UP (ref 0.7–2)
LYMPHOCYTES # BLD AUTO: 0.81 K/UL — LOW (ref 1–3.3)
LYMPHOCYTES # BLD AUTO: 11.6 % — LOW (ref 13–44)
MCHC RBC-ENTMCNC: 28.3 PG — SIGNIFICANT CHANGE UP (ref 27–34)
MCHC RBC-ENTMCNC: 31.1 GM/DL — LOW (ref 32–36)
MCV RBC AUTO: 91.1 FL — SIGNIFICANT CHANGE UP (ref 80–100)
MONOCYTES # BLD AUTO: 0.58 K/UL — SIGNIFICANT CHANGE UP (ref 0–0.9)
MONOCYTES NFR BLD AUTO: 8.3 % — SIGNIFICANT CHANGE UP (ref 2–14)
NEUTROPHILS # BLD AUTO: 5.37 K/UL — SIGNIFICANT CHANGE UP (ref 1.8–7.4)
NEUTROPHILS NFR BLD AUTO: 77 % — SIGNIFICANT CHANGE UP (ref 43–77)
NRBC # BLD: 0 /100 WBCS — SIGNIFICANT CHANGE UP (ref 0–0)
PLATELET # BLD AUTO: 161 K/UL — SIGNIFICANT CHANGE UP (ref 150–400)
POTASSIUM SERPL-MCNC: 3.7 MMOL/L — SIGNIFICANT CHANGE UP (ref 3.5–5.3)
POTASSIUM SERPL-SCNC: 3.7 MMOL/L — SIGNIFICANT CHANGE UP (ref 3.5–5.3)
PROT SERPL-MCNC: 7.7 G/DL — SIGNIFICANT CHANGE UP (ref 6–8.3)
PROTHROM AB SERPL-ACNC: 13.3 SEC — HIGH (ref 10–12.9)
RBC # BLD: 3.92 M/UL — SIGNIFICANT CHANGE UP (ref 3.8–5.2)
RBC # FLD: 15.3 % — HIGH (ref 10.3–14.5)
RSV RESULT: SIGNIFICANT CHANGE UP
RSV RNA RESP QL NAA+PROBE: SIGNIFICANT CHANGE UP
SODIUM SERPL-SCNC: 133 MMOL/L — LOW (ref 135–145)
WBC # BLD: 6.98 K/UL — SIGNIFICANT CHANGE UP (ref 3.8–10.5)
WBC # FLD AUTO: 6.98 K/UL — SIGNIFICANT CHANGE UP (ref 3.8–10.5)

## 2019-12-20 PROCEDURE — 71045 X-RAY EXAM CHEST 1 VIEW: CPT | Mod: 26

## 2019-12-20 PROCEDURE — 73660 X-RAY EXAM OF TOE(S): CPT | Mod: 26,RT

## 2019-12-20 PROCEDURE — 99285 EMERGENCY DEPT VISIT HI MDM: CPT

## 2019-12-20 RX ORDER — HALOPERIDOL DECANOATE 100 MG/ML
5 INJECTION INTRAMUSCULAR ONCE
Refills: 0 | Status: COMPLETED | OUTPATIENT
Start: 2019-12-20 | End: 2019-12-20

## 2019-12-20 RX ADMIN — HALOPERIDOL DECANOATE 5 MILLIGRAM(S): 100 INJECTION INTRAMUSCULAR at 20:41

## 2019-12-20 NOTE — CONSULT NOTE ADULT - ATTENDING COMMENTS
Prior LATOYA 1.2.  In addition, PVR demonstrated pulsatile waveforms to the digit although decreased compared to ankle  This likely represents non reconstructible small vessel disease, I do not think angiogram is warranted.  cont abx, local wound care.

## 2019-12-20 NOTE — ED ADULT NURSE REASSESSMENT NOTE - NS ED NURSE REASSESS COMMENT FT1
pt awake and alert ,with family members at bedside,with o2 nasal cannula at 2lpm, with saline lock intact,no redness no swelling noted, pt,on right arm precation for dialysis access.

## 2019-12-20 NOTE — ED PROVIDER NOTE - LOWER EXTREMITY EXAM, RIGHT
dry ganglion to plantar aspect of right toe, about dime sized. no discharge, or malodor. Decreased PT/ DP pulses., pulses dopplerable.

## 2019-12-20 NOTE — CONSULT NOTE ADULT - ASSESSMENT
79 yoF with multiple comorbidities including DM, PVD with R hallux wound    wound without evidence of gangrene or ascending cellulitis, noncrepitus  DP dopplerable with decreased sensation  previous LATOYA/PVR c/w decreased flow to metatarsal    - previuos LATOYA/PVR to be reviewed  - local wound care with betadine dressing  - glucose control  - consult podiatry   - consult renal to eval for HD  - will discuss the case with attending

## 2019-12-20 NOTE — ED ADULT TRIAGE NOTE - CHIEF COMPLAINT QUOTE
diarrhea and fever x 1 days, patient also presents with necrotic right big toe. patient uses oxygen 24/7

## 2019-12-20 NOTE — ED PROVIDER NOTE - OBJECTIVE STATEMENT
78 y/o F patient with a significant PMHx of HTN, ESRD, CHF, on dialysis (Tuesday, Thursday, Saturday), COPD on home O2, DM presents to the ED for worsening swelling and darkening of right toe. Patient has right grey toe cellulitis. Patient's daughter relates watery black stool and decreased appetite x1 week. Patient also missed podiatry follow up. Patient denies any fever, chills, cough, chest pain, shortness of breath or any other complaints.

## 2019-12-20 NOTE — ASU PREOP CHECKLIST - WEIGHT IN LBS
BLE AAROM / PROM WFL grossly throughout, assessed in sitting/no Passive ROM deficits were identified 182.9

## 2019-12-20 NOTE — ED PROVIDER NOTE - CLINICAL SUMMARY MEDICAL DECISION MAKING FREE TEXT BOX
Concerned for worsening ganglion in dialysis patient. Will do labs, X-ray and and consult with vascular.

## 2019-12-20 NOTE — CONSULT NOTE ADULT - SUBJECTIVE AND OBJECTIVE BOX
HPI:    79 yoF with PMH of dCHF s/p CardioMEMS implantation, moderate MS, asthma, COPD on home O2, anemia (on weekly procrit), ESRD (HD T/Th/S), HTN, HLD, DM and GERD; PSH of Left lumpectomy/radiation for breast CA in 2005, and JACLYN uterine CA. Recently was admitted a month ago for R foot cellulitis with hallux wound, treated with IV zosyn and doxy, podiatry consulted who recommended betadine dressing, per note, pt declined. LATOYA/PVR was done, in which showed LATOYA of 1.29 b/l, and decreased flow on bilateral metatarsal. No prior vascular intervention. Vascular consulted to evaluation.    Pt reports R great toe black wound starting to get worse after wearing tight shoes, denies claudication pain, did not follow up for wound care post discharge from last admission. Not on AC. Denies fever, chills, but has nausea and vomiting. Decreased PO intake and appetite for 3d, she ambulates herself without assistance.       REVIEW OF SYSTEMS:   Negative except stated above in HPI      Allergies    No Known Allergies    Intolerances    	  Home Medications:  Advair Diskus 100 mcg-50 mcg inhalation powder: 1 puff(s) inhaled 2 times a day (22 Nov 2019 17:26)  atorvastatin 10 mg oral tablet: 1 tab(s) orally once a day (22 Nov 2019 17:26)  epoetin randy: 49521 unit(s) intravenous 3 times a week with dialysis (22 Nov 2019 17:26)  ferrous sulfate 325 mg (65 mg elemental iron) oral delayed release tablet: 1 tab(s) orally once a day (22 Nov 2019 17:26)  lidocaine-prilocaine 2.5%-2.5% topical cream: 1 application topically  (22 Nov 2019 17:26)  traZODone 50 mg oral tablet: 1 tab(s) orally once a day (at bedtime) (22 Nov 2019 17:26)      PAST MEDICAL & SURGICAL HISTORY:  Chronic CHF  MS (mitral stenosis)  GERD (gastroesophageal reflux disease)  COPD (chronic obstructive pulmonary disease): On home oxygen  HLD (hyperlipidemia): not taking statins  HTN (hypertension)  ESRD (end stage renal disease) on dialysis  Chronic diastolic (congestive) heart failure  Anemia of chronic disease  Breast carcinoma: Lt side s/p lumpectomy and radiation in 2004  DM (diabetes mellitus): type 2  S/P subtotal hysterectomy  S/P lumpectomy, left breast: 2004      FAMILY HISTORY:  Diabetes mellitus  Family history of breast cancer (Aunt)      SOCIAL HISTORY:    - Smoking/tobacco  - EtOH  - Drug/substances        T(C): 37 (12-20-19 @ 16:37), Max: 37 (12-20-19 @ 16:37)  HR: 91 (12-20-19 @ 16:37) (91 - 91)  BP: 124/71 (12-20-19 @ 16:37) (124/71 - 124/71)  RR: 16 (12-20-19 @ 16:37) (16 - 16)  SpO2: 98% (12-20-19 @ 16:37) (98% - 98%)  I&O's Summary      PHYSICAL EXAM:   General: Alert and oriented, not in acute distress  Cardiovascular: Regular rate and rhythm, no m/r/g  Respiratory: Breathing unlabored  Gastrointestinal:  Soft, nondistended, nontender  Extremities: R great toe with dry ?necrotic wound posteriorly with surrounding erythema, no purulence or crepitus; decreased sensation distally; b/l foot warm, palp femoral, nonpalp DP, +dopplerable       LABS:	 	    CBC Full  -  ( 20 Dec 2019 17:54 )  WBC Count : 6.98 K/uL  Hemoglobin : 11.1 g/dL  Hematocrit : 35.7 %  Platelet Count - Automated : 161 K/uL  Mean Cell Volume : 91.1 fl  Mean Cell Hemoglobin : 28.3 pg  Mean Cell Hemoglobin Concentration : 31.1 gm/dL  Auto Neutrophil # : 5.37 K/uL  Auto Lymphocyte # : 0.81 K/uL  Auto Monocyte # : 0.58 K/uL  Auto Eosinophil # : 0.18 K/uL  Auto Basophil # : 0.03 K/uL  Auto Neutrophil % : 77.0 %  Auto Lymphocyte % : 11.6 %  Auto Monocyte % : 8.3 %  Auto Eosinophil % : 2.6 %  Auto Basophil % : 0.4 %    12-20    133<L>  |  95<L>  |  36<H>  ----------------------------<  234<H>  3.7   |  31  |  3.61<H>    Ca    8.9      20 Dec 2019 17:54    TPro  7.7  /  Alb  3.0<L>  /  TBili  0.7  /  DBili  x   /  AST  14  /  ALT  20  /  AlkPhos  94  12-20

## 2019-12-21 DIAGNOSIS — J44.9 CHRONIC OBSTRUCTIVE PULMONARY DISEASE, UNSPECIFIED: ICD-10-CM

## 2019-12-21 DIAGNOSIS — I10 ESSENTIAL (PRIMARY) HYPERTENSION: ICD-10-CM

## 2019-12-21 DIAGNOSIS — I96 GANGRENE, NOT ELSEWHERE CLASSIFIED: ICD-10-CM

## 2019-12-21 DIAGNOSIS — N18.6 END STAGE RENAL DISEASE: ICD-10-CM

## 2019-12-21 DIAGNOSIS — E78.5 HYPERLIPIDEMIA, UNSPECIFIED: ICD-10-CM

## 2019-12-21 DIAGNOSIS — Z71.89 OTHER SPECIFIED COUNSELING: ICD-10-CM

## 2019-12-21 DIAGNOSIS — Z29.9 ENCOUNTER FOR PROPHYLACTIC MEASURES, UNSPECIFIED: ICD-10-CM

## 2019-12-21 DIAGNOSIS — I50.9 HEART FAILURE, UNSPECIFIED: ICD-10-CM

## 2019-12-21 LAB
24R-OH-CALCIDIOL SERPL-MCNC: 22 NG/ML — LOW (ref 30–80)
ALBUMIN SERPL ELPH-MCNC: 2.8 G/DL — LOW (ref 3.5–5)
ALBUMIN SERPL ELPH-MCNC: 3 G/DL — LOW (ref 3.5–5)
ALP SERPL-CCNC: 100 U/L — SIGNIFICANT CHANGE UP (ref 40–120)
ALP SERPL-CCNC: 89 U/L — SIGNIFICANT CHANGE UP (ref 40–120)
ALT FLD-CCNC: 19 U/L DA — SIGNIFICANT CHANGE UP (ref 10–60)
ALT FLD-CCNC: 19 U/L DA — SIGNIFICANT CHANGE UP (ref 10–60)
ANION GAP SERPL CALC-SCNC: 6 MMOL/L — SIGNIFICANT CHANGE UP (ref 5–17)
ANION GAP SERPL CALC-SCNC: 7 MMOL/L — SIGNIFICANT CHANGE UP (ref 5–17)
AST SERPL-CCNC: 12 U/L — SIGNIFICANT CHANGE UP (ref 10–40)
AST SERPL-CCNC: 15 U/L — SIGNIFICANT CHANGE UP (ref 10–40)
BASOPHILS # BLD AUTO: 0.02 K/UL — SIGNIFICANT CHANGE UP (ref 0–0.2)
BASOPHILS NFR BLD AUTO: 0.3 % — SIGNIFICANT CHANGE UP (ref 0–2)
BILIRUB SERPL-MCNC: 0.7 MG/DL — SIGNIFICANT CHANGE UP (ref 0.2–1.2)
BILIRUB SERPL-MCNC: 0.9 MG/DL — SIGNIFICANT CHANGE UP (ref 0.2–1.2)
BUN SERPL-MCNC: 26 MG/DL — HIGH (ref 7–18)
BUN SERPL-MCNC: 44 MG/DL — HIGH (ref 7–18)
CALCIUM SERPL-MCNC: 8.6 MG/DL — SIGNIFICANT CHANGE UP (ref 8.4–10.5)
CALCIUM SERPL-MCNC: 8.6 MG/DL — SIGNIFICANT CHANGE UP (ref 8.4–10.5)
CALCIUM SERPL-MCNC: 8.8 MG/DL — SIGNIFICANT CHANGE UP (ref 8.4–10.5)
CHLORIDE SERPL-SCNC: 97 MMOL/L — SIGNIFICANT CHANGE UP (ref 96–108)
CHLORIDE SERPL-SCNC: 97 MMOL/L — SIGNIFICANT CHANGE UP (ref 96–108)
CHOLEST SERPL-MCNC: 229 MG/DL — HIGH (ref 10–199)
CK MB BLD-MCNC: 3.1 % — SIGNIFICANT CHANGE UP (ref 0–3.5)
CK MB CFR SERPL CALC: 1.5 NG/ML — SIGNIFICANT CHANGE UP (ref 0–3.6)
CK SERPL-CCNC: 49 U/L — SIGNIFICANT CHANGE UP (ref 21–215)
CO2 SERPL-SCNC: 28 MMOL/L — SIGNIFICANT CHANGE UP (ref 22–31)
CO2 SERPL-SCNC: 30 MMOL/L — SIGNIFICANT CHANGE UP (ref 22–31)
CREAT SERPL-MCNC: 3.02 MG/DL — HIGH (ref 0.5–1.3)
CREAT SERPL-MCNC: 4.16 MG/DL — HIGH (ref 0.5–1.3)
CRP SERPL-MCNC: 9.14 MG/DL — HIGH (ref 0–0.4)
EOSINOPHIL # BLD AUTO: 0.26 K/UL — SIGNIFICANT CHANGE UP (ref 0–0.5)
EOSINOPHIL NFR BLD AUTO: 4 % — SIGNIFICANT CHANGE UP (ref 0–6)
ERYTHROCYTE [SEDIMENTATION RATE] IN BLOOD: 82 MM/HR — HIGH (ref 0–20)
FOLATE SERPL-MCNC: 12.9 NG/ML — SIGNIFICANT CHANGE UP
GLUCOSE BLDC GLUCOMTR-MCNC: 108 MG/DL — HIGH (ref 70–99)
GLUCOSE BLDC GLUCOMTR-MCNC: 121 MG/DL — HIGH (ref 70–99)
GLUCOSE BLDC GLUCOMTR-MCNC: 251 MG/DL — HIGH (ref 70–99)
GLUCOSE BLDC GLUCOMTR-MCNC: 255 MG/DL — HIGH (ref 70–99)
GLUCOSE BLDC GLUCOMTR-MCNC: 272 MG/DL — HIGH (ref 70–99)
GLUCOSE BLDC GLUCOMTR-MCNC: 327 MG/DL — HIGH (ref 70–99)
GLUCOSE SERPL-MCNC: 197 MG/DL — HIGH (ref 70–99)
GLUCOSE SERPL-MCNC: 251 MG/DL — HIGH (ref 70–99)
HBA1C BLD-MCNC: 8.8 % — HIGH (ref 4–5.6)
HCT VFR BLD CALC: 36.2 % — SIGNIFICANT CHANGE UP (ref 34.5–45)
HCT VFR BLD CALC: 38.4 % — SIGNIFICANT CHANGE UP (ref 34.5–45)
HDLC SERPL-MCNC: 47 MG/DL — LOW
HGB BLD-MCNC: 11.1 G/DL — LOW (ref 11.5–15.5)
HGB BLD-MCNC: 11.9 G/DL — SIGNIFICANT CHANGE UP (ref 11.5–15.5)
IMM GRANULOCYTES NFR BLD AUTO: 0.3 % — SIGNIFICANT CHANGE UP (ref 0–1.5)
LIPID PNL WITH DIRECT LDL SERPL: 138 MG/DL — SIGNIFICANT CHANGE UP
LYMPHOCYTES # BLD AUTO: 0.78 K/UL — LOW (ref 1–3.3)
LYMPHOCYTES # BLD AUTO: 12.1 % — LOW (ref 13–44)
MAGNESIUM SERPL-MCNC: 1.9 MG/DL — SIGNIFICANT CHANGE UP (ref 1.6–2.6)
MAGNESIUM SERPL-MCNC: 2.1 MG/DL — SIGNIFICANT CHANGE UP (ref 1.6–2.6)
MCHC RBC-ENTMCNC: 28 PG — SIGNIFICANT CHANGE UP (ref 27–34)
MCHC RBC-ENTMCNC: 28.1 PG — SIGNIFICANT CHANGE UP (ref 27–34)
MCHC RBC-ENTMCNC: 30.7 GM/DL — LOW (ref 32–36)
MCHC RBC-ENTMCNC: 31 GM/DL — LOW (ref 32–36)
MCV RBC AUTO: 90.6 FL — SIGNIFICANT CHANGE UP (ref 80–100)
MCV RBC AUTO: 91.2 FL — SIGNIFICANT CHANGE UP (ref 80–100)
MONOCYTES # BLD AUTO: 0.53 K/UL — SIGNIFICANT CHANGE UP (ref 0–0.9)
MONOCYTES NFR BLD AUTO: 8.2 % — SIGNIFICANT CHANGE UP (ref 2–14)
NEUTROPHILS # BLD AUTO: 4.85 K/UL — SIGNIFICANT CHANGE UP (ref 1.8–7.4)
NEUTROPHILS NFR BLD AUTO: 75.1 % — SIGNIFICANT CHANGE UP (ref 43–77)
NRBC # BLD: 0 /100 WBCS — SIGNIFICANT CHANGE UP (ref 0–0)
NRBC # BLD: 0 /100 WBCS — SIGNIFICANT CHANGE UP (ref 0–0)
NT-PROBNP SERPL-SCNC: HIGH PG/ML (ref 0–450)
PHOSPHATE SERPL-MCNC: 2.8 MG/DL — SIGNIFICANT CHANGE UP (ref 2.5–4.5)
PHOSPHATE SERPL-MCNC: 3.8 MG/DL — SIGNIFICANT CHANGE UP (ref 2.5–4.5)
PLATELET # BLD AUTO: 150 K/UL — SIGNIFICANT CHANGE UP (ref 150–400)
PLATELET # BLD AUTO: 163 K/UL — SIGNIFICANT CHANGE UP (ref 150–400)
POTASSIUM SERPL-MCNC: 3.8 MMOL/L — SIGNIFICANT CHANGE UP (ref 3.5–5.3)
POTASSIUM SERPL-MCNC: 3.8 MMOL/L — SIGNIFICANT CHANGE UP (ref 3.5–5.3)
POTASSIUM SERPL-SCNC: 3.8 MMOL/L — SIGNIFICANT CHANGE UP (ref 3.5–5.3)
POTASSIUM SERPL-SCNC: 3.8 MMOL/L — SIGNIFICANT CHANGE UP (ref 3.5–5.3)
PROT SERPL-MCNC: 7.5 G/DL — SIGNIFICANT CHANGE UP (ref 6–8.3)
PROT SERPL-MCNC: 8.1 G/DL — SIGNIFICANT CHANGE UP (ref 6–8.3)
PTH-INTACT FLD-MCNC: 248 PG/ML — HIGH (ref 15–65)
RBC # BLD: 3.97 M/UL — SIGNIFICANT CHANGE UP (ref 3.8–5.2)
RBC # BLD: 4.24 M/UL — SIGNIFICANT CHANGE UP (ref 3.8–5.2)
RBC # FLD: 15.3 % — HIGH (ref 10.3–14.5)
RBC # FLD: 15.4 % — HIGH (ref 10.3–14.5)
SODIUM SERPL-SCNC: 132 MMOL/L — LOW (ref 135–145)
SODIUM SERPL-SCNC: 133 MMOL/L — LOW (ref 135–145)
TOTAL CHOLESTEROL/HDL RATIO MEASUREMENT: 4.9 RATIO — SIGNIFICANT CHANGE UP (ref 3.3–7.1)
TRIGL SERPL-MCNC: 219 MG/DL — HIGH (ref 10–149)
TROPONIN I SERPL-MCNC: 0.07 NG/ML — HIGH (ref 0–0.04)
TSH SERPL-MCNC: 0.54 UU/ML — SIGNIFICANT CHANGE UP (ref 0.34–4.82)
VIT B12 SERPL-MCNC: 366 PG/ML — SIGNIFICANT CHANGE UP (ref 232–1245)
WBC # BLD: 6.46 K/UL — SIGNIFICANT CHANGE UP (ref 3.8–10.5)
WBC # BLD: 9.27 K/UL — SIGNIFICANT CHANGE UP (ref 3.8–10.5)
WBC # FLD AUTO: 6.46 K/UL — SIGNIFICANT CHANGE UP (ref 3.8–10.5)
WBC # FLD AUTO: 9.27 K/UL — SIGNIFICANT CHANGE UP (ref 3.8–10.5)

## 2019-12-21 PROCEDURE — 70450 CT HEAD/BRAIN W/O DYE: CPT | Mod: 26

## 2019-12-21 PROCEDURE — 99221 1ST HOSP IP/OBS SF/LOW 40: CPT

## 2019-12-21 RX ORDER — INSULIN GLARGINE 100 [IU]/ML
12 INJECTION, SOLUTION SUBCUTANEOUS AT BEDTIME
Refills: 0 | Status: DISCONTINUED | OUTPATIENT
Start: 2019-12-21 | End: 2019-12-24

## 2019-12-21 RX ORDER — SEVELAMER CARBONATE 2400 MG/1
0 POWDER, FOR SUSPENSION ORAL
Qty: 84 | Refills: 0 | DISCHARGE

## 2019-12-21 RX ORDER — FUROSEMIDE 40 MG
40 TABLET ORAL DAILY
Refills: 0 | Status: DISCONTINUED | OUTPATIENT
Start: 2019-12-21 | End: 2019-12-21

## 2019-12-21 RX ORDER — METOPROLOL TARTRATE 50 MG
0 TABLET ORAL
Qty: 90 | Refills: 0 | DISCHARGE

## 2019-12-21 RX ORDER — SEVELAMER CARBONATE 2400 MG/1
1600 POWDER, FOR SUSPENSION ORAL
Refills: 0 | Status: DISCONTINUED | OUTPATIENT
Start: 2019-12-21 | End: 2019-12-24

## 2019-12-21 RX ORDER — TIOTROPIUM BROMIDE 18 UG/1
1 CAPSULE ORAL; RESPIRATORY (INHALATION) DAILY
Refills: 0 | Status: DISCONTINUED | OUTPATIENT
Start: 2019-12-21 | End: 2019-12-24

## 2019-12-21 RX ORDER — INSULIN LISPRO 100/ML
VIAL (ML) SUBCUTANEOUS
Refills: 0 | Status: DISCONTINUED | OUTPATIENT
Start: 2019-12-21 | End: 2019-12-24

## 2019-12-21 RX ORDER — LATANOPROST 0.05 MG/ML
1 SOLUTION/ DROPS OPHTHALMIC; TOPICAL AT BEDTIME
Refills: 0 | Status: DISCONTINUED | OUTPATIENT
Start: 2019-12-21 | End: 2019-12-24

## 2019-12-21 RX ORDER — PREDNISOLONE SODIUM PHOSPHATE 1 %
1 DROPS OPHTHALMIC (EYE)
Refills: 0 | Status: DISCONTINUED | OUTPATIENT
Start: 2019-12-21 | End: 2019-12-24

## 2019-12-21 RX ORDER — HEPARIN SODIUM 5000 [USP'U]/ML
5000 INJECTION INTRAVENOUS; SUBCUTANEOUS EVERY 12 HOURS
Refills: 0 | Status: DISCONTINUED | OUTPATIENT
Start: 2019-12-21 | End: 2019-12-24

## 2019-12-21 RX ORDER — INSULIN DETEMIR 100/ML (3)
0 INSULIN PEN (ML) SUBCUTANEOUS
Qty: 6 | Refills: 0 | DISCHARGE

## 2019-12-21 RX ORDER — POVIDONE-IODINE 5 %
1 AEROSOL (ML) TOPICAL DAILY
Refills: 0 | Status: DISCONTINUED | OUTPATIENT
Start: 2019-12-21 | End: 2019-12-24

## 2019-12-21 RX ORDER — LATANOPROST 0.05 MG/ML
0 SOLUTION/ DROPS OPHTHALMIC; TOPICAL
Qty: 7.5 | Refills: 0 | DISCHARGE

## 2019-12-21 RX ORDER — FERROUS SULFATE 325(65) MG
1 TABLET ORAL
Qty: 0 | Refills: 0 | DISCHARGE

## 2019-12-21 RX ORDER — TIMOLOL 0.5 %
1 DROPS OPHTHALMIC (EYE)
Refills: 0 | Status: DISCONTINUED | OUTPATIENT
Start: 2019-12-21 | End: 2019-12-24

## 2019-12-21 RX ORDER — FERROUS SULFATE 325(65) MG
325 TABLET ORAL DAILY
Refills: 0 | Status: DISCONTINUED | OUTPATIENT
Start: 2019-12-21 | End: 2019-12-21

## 2019-12-21 RX ORDER — CHLORHEXIDINE GLUCONATE 213 G/1000ML
1 SOLUTION TOPICAL
Refills: 0 | Status: DISCONTINUED | OUTPATIENT
Start: 2019-12-21 | End: 2019-12-24

## 2019-12-21 RX ORDER — DICLOFENAC SODIUM 30 MG/G
0 GEL TOPICAL
Qty: 100 | Refills: 0 | DISCHARGE

## 2019-12-21 RX ORDER — ALBUTEROL 90 UG/1
2 AEROSOL, METERED ORAL EVERY 6 HOURS
Refills: 0 | Status: DISCONTINUED | OUTPATIENT
Start: 2019-12-21 | End: 2019-12-24

## 2019-12-21 RX ORDER — INSULIN LISPRO 100/ML
4 VIAL (ML) SUBCUTANEOUS
Refills: 0 | Status: DISCONTINUED | OUTPATIENT
Start: 2019-12-21 | End: 2019-12-24

## 2019-12-21 RX ORDER — ERYTHROPOIETIN 10000 [IU]/ML
10000 INJECTION, SOLUTION INTRAVENOUS; SUBCUTANEOUS
Refills: 0 | Status: DISCONTINUED | OUTPATIENT
Start: 2019-12-21 | End: 2019-12-21

## 2019-12-21 RX ORDER — TRAZODONE HCL 50 MG
50 TABLET ORAL AT BEDTIME
Refills: 0 | Status: DISCONTINUED | OUTPATIENT
Start: 2019-12-21 | End: 2019-12-24

## 2019-12-21 RX ORDER — MIDODRINE HYDROCHLORIDE 2.5 MG/1
0 TABLET ORAL
Qty: 63 | Refills: 0 | DISCHARGE

## 2019-12-21 RX ORDER — ATORVASTATIN CALCIUM 80 MG/1
10 TABLET, FILM COATED ORAL AT BEDTIME
Refills: 0 | Status: DISCONTINUED | OUTPATIENT
Start: 2019-12-21 | End: 2019-12-24

## 2019-12-21 RX ORDER — ATORVASTATIN CALCIUM 80 MG/1
1 TABLET, FILM COATED ORAL
Qty: 0 | Refills: 0 | DISCHARGE

## 2019-12-21 RX ORDER — PIPERACILLIN AND TAZOBACTAM 4; .5 G/20ML; G/20ML
3.38 INJECTION, POWDER, LYOPHILIZED, FOR SOLUTION INTRAVENOUS EVERY 12 HOURS
Refills: 0 | Status: DISCONTINUED | OUTPATIENT
Start: 2019-12-21 | End: 2019-12-24

## 2019-12-21 RX ORDER — PREDNISOLONE SODIUM PHOSPHATE 1 %
0 DROPS OPHTHALMIC (EYE)
Qty: 10 | Refills: 0 | DISCHARGE

## 2019-12-21 RX ORDER — ONDANSETRON 8 MG/1
4 TABLET, FILM COATED ORAL EVERY 6 HOURS
Refills: 0 | Status: DISCONTINUED | OUTPATIENT
Start: 2019-12-21 | End: 2019-12-24

## 2019-12-21 RX ORDER — ATORVASTATIN CALCIUM 80 MG/1
0 TABLET, FILM COATED ORAL
Qty: 90 | Refills: 0 | DISCHARGE

## 2019-12-21 RX ORDER — LIDOCAINE AND PRILOCAINE CREAM 25; 25 MG/G; MG/G
1 CREAM TOPICAL DAILY
Refills: 0 | Status: DISCONTINUED | OUTPATIENT
Start: 2019-12-21 | End: 2019-12-24

## 2019-12-21 RX ORDER — BRINZOLAMIDE/BRIMONIDINE TARTRATE 10; 2 MG/ML; MG/ML
0 SUSPENSION/ DROPS OPHTHALMIC
Qty: 8 | Refills: 0 | DISCHARGE

## 2019-12-21 RX ORDER — PANTOPRAZOLE SODIUM 20 MG/1
0 TABLET, DELAYED RELEASE ORAL
Qty: 90 | Refills: 0 | DISCHARGE

## 2019-12-21 RX ORDER — INSULIN ASPART 100 [IU]/ML
0 INJECTION, SOLUTION SUBCUTANEOUS
Qty: 15 | Refills: 0 | DISCHARGE

## 2019-12-21 RX ORDER — ALBUTEROL 90 UG/1
0 AEROSOL, METERED ORAL
Qty: 18 | Refills: 0 | DISCHARGE

## 2019-12-21 RX ORDER — BUDESONIDE AND FORMOTEROL FUMARATE DIHYDRATE 160; 4.5 UG/1; UG/1
2 AEROSOL RESPIRATORY (INHALATION)
Refills: 0 | Status: DISCONTINUED | OUTPATIENT
Start: 2019-12-21 | End: 2019-12-24

## 2019-12-21 RX ORDER — MUPIROCIN 20 MG/G
1 OINTMENT TOPICAL
Refills: 0 | Status: DISCONTINUED | OUTPATIENT
Start: 2019-12-21 | End: 2019-12-24

## 2019-12-21 RX ORDER — TRAZODONE HCL 50 MG
0 TABLET ORAL
Qty: 30 | Refills: 0 | DISCHARGE

## 2019-12-21 RX ORDER — GABAPENTIN 400 MG/1
100 CAPSULE ORAL DAILY
Refills: 0 | Status: DISCONTINUED | OUTPATIENT
Start: 2019-12-21 | End: 2019-12-24

## 2019-12-21 RX ORDER — TRAZODONE HCL 50 MG
1 TABLET ORAL
Qty: 0 | Refills: 0 | DISCHARGE

## 2019-12-21 RX ORDER — MIDODRINE HYDROCHLORIDE 2.5 MG/1
5 TABLET ORAL THREE TIMES A DAY
Refills: 0 | Status: DISCONTINUED | OUTPATIENT
Start: 2019-12-21 | End: 2019-12-24

## 2019-12-21 RX ORDER — GABAPENTIN 400 MG/1
0 CAPSULE ORAL
Qty: 180 | Refills: 0 | DISCHARGE

## 2019-12-21 RX ORDER — BUDESONIDE AND FORMOTEROL FUMARATE DIHYDRATE 160; 4.5 UG/1; UG/1
0 AEROSOL RESPIRATORY (INHALATION)
Qty: 10.2 | Refills: 0 | DISCHARGE

## 2019-12-21 RX ADMIN — Medication 1 DROP(S): at 06:07

## 2019-12-21 RX ADMIN — INSULIN GLARGINE 12 UNIT(S): 100 INJECTION, SOLUTION SUBCUTANEOUS at 22:34

## 2019-12-21 RX ADMIN — MIDODRINE HYDROCHLORIDE 5 MILLIGRAM(S): 2.5 TABLET ORAL at 06:06

## 2019-12-21 RX ADMIN — HEPARIN SODIUM 5000 UNIT(S): 5000 INJECTION INTRAVENOUS; SUBCUTANEOUS at 20:44

## 2019-12-21 RX ADMIN — Medication 3: at 22:33

## 2019-12-21 RX ADMIN — Medication 1 DROP(S): at 06:06

## 2019-12-21 RX ADMIN — MUPIROCIN 1 APPLICATION(S): 20 OINTMENT TOPICAL at 07:09

## 2019-12-21 RX ADMIN — ERYTHROPOIETIN 10000 UNIT(S): 10000 INJECTION, SOLUTION INTRAVENOUS; SUBCUTANEOUS at 16:04

## 2019-12-21 RX ADMIN — Medication 40 MILLIGRAM(S): at 06:06

## 2019-12-21 RX ADMIN — PIPERACILLIN AND TAZOBACTAM 25 GRAM(S): 4; .5 INJECTION, POWDER, LYOPHILIZED, FOR SOLUTION INTRAVENOUS at 21:21

## 2019-12-21 RX ADMIN — Medication 4: at 12:24

## 2019-12-21 RX ADMIN — Medication 1 APPLICATION(S): at 10:00

## 2019-12-21 RX ADMIN — Medication 4 UNIT(S): at 12:25

## 2019-12-21 RX ADMIN — CHLORHEXIDINE GLUCONATE 1 APPLICATION(S): 213 SOLUTION TOPICAL at 06:07

## 2019-12-21 RX ADMIN — ATORVASTATIN CALCIUM 10 MILLIGRAM(S): 80 TABLET, FILM COATED ORAL at 22:33

## 2019-12-21 RX ADMIN — Medication 50 MILLIGRAM(S): at 22:33

## 2019-12-21 RX ADMIN — HEPARIN SODIUM 5000 UNIT(S): 5000 INJECTION INTRAVENOUS; SUBCUTANEOUS at 06:06

## 2019-12-21 RX ADMIN — Medication 4 UNIT(S): at 08:18

## 2019-12-21 RX ADMIN — Medication 3: at 08:17

## 2019-12-21 NOTE — H&P ADULT - PROBLEM SELECTOR PLAN 4
Pt has h/o COPD and uses O2 at home. Former smoker  Continue Bronchodilator  Continue oxygen supplementation.

## 2019-12-21 NOTE — H&P ADULT - NSHPPHYSICALEXAM_GEN_ALL_CORE
Vital Signs Last 24 Hrs  T(C): 36.3 (21 Dec 2019 00:25), Max: 37.1 (20 Dec 2019 21:24)  T(F): 97.3 (21 Dec 2019 00:25), Max: 98.7 (20 Dec 2019 21:24)  HR: 87 (21 Dec 2019 00:25) (87 - 106)  BP: 170/69 (21 Dec 2019 00:25) (124/71 - 170/69)  BP(mean): --  RR: 18 (21 Dec 2019 00:25) (16 - 18)  SpO2: 100% (21 Dec 2019 00:25) (98% - 100%)    PHYSICAL EXAM:   · Appearance: well appearing  · Distress: no apparent  · Mentation: A&O x2  · ENMT: Airway patent, Nasal mucosa clear. Mouth with normal mucosa. Throat has no vesicles, no oropharyngeal exudates and uvula is midline.  · EYES: Clear bilaterally, pupils equal, round and reactive to light.  · CARDIAC: Normal rate, regular rhythm.  Heart sounds S1, S2.  No murmurs, rubs or gallops.  · RESPIRATORY: Breath sounds clear and equal bilaterally.  · GASTROINTESTINAL: Abdomen soft, non-tender, no guarding.  · M[facility] EXT EXAM: right lower extremity findings  · Right Lower Location: toe  · Right Lower Extremity: dry ganglion to plantar aspect of right toe, about dime sized. no discharge, or malodor. Decreased PT/ DP pulses., pulses dopplerable.  · NEUROLOGICAL: Alert and oriented, no focal deficits, no motor or sensory deficits.  · SKIN: Skin normal color for race, warm, dry and intact. No evidence of rash.

## 2019-12-21 NOTE — H&P ADULT - PROBLEM SELECTOR PLAN 5
Pt has h/o COPD and uses O2 at home. Former smoker  Continue Bronchodilator  Continue oxygen supplementation. - pt takes atorvastatin 10 mg at home  - c/w atorvastatin 10 mg   - f/u lipid profile  - Dash Diet

## 2019-12-21 NOTE — H&P ADULT - ASSESSMENT
79 year old female from home ambulates with cane at baseline with a significant PMHx of HTN, DM ,ESRD, chronic diastolic heart failure s/p CardioMEMS implantation, moderate MS, ESRD on dialysis (Tuesday, Thursday, Saturday), COPD on home O2, asthma , HLD  and PSHx of Left lumpectomy/radiation for breast CA in 2005, and JACLYN uterine cancer presents to the ED for worsening swelling and darkening of right toe. Patient has right grey toe cellulitis. Patient's daughter relates watery black stool and decreased appetite x1 week.  Recently was admitted a month ago for R foot cellulitis with hallux wound, treated with IV zosyn and doxy, podiatry consulted who recommended betadine dressing, per note, pt declined. LATOYA/PVR was done, in which showed LATOYA of 1.29 b/l, and decreased flow on bilateral metatarsal. Pt reports R great toe black wound starting to get worse after wearing tight shoes Patient also missed podiatry follow up post discharge from last admission. pt endorses nausea and vomiting. Patient denies any denies claudication pain, fever, chills, cough, chest pain, shortness of breath or any other complaints.    In ED :   Xray right 1st toe :  No acute fracture-subluxation demonstrated. Stable mild erosion at first interphalangeal joint - gouty arthropathy not excluded. Interval increase in soft tissue swelling, as above.   CXR : Stable chest. Chronic fibrosis vs. pulmonary vascular congestion - no significant interval change compared with 11/26/19 exam.  EKG : NSR ,  RBB  Lactate 0.7       Pt is admitted for management of Toe gangrene 79 year old female from home ambulates with cane at baseline with a significant PMHx of HTN, DM ,ESRD, chronic diastolic heart failure s/p CardioMEMS implantation, moderate MS, ESRD on dialysis (Tuesday, Thursday, Saturday), COPD on home O2, asthma , HLD  and PSHx of Left lumpectomy/radiation for breast CA in 2005, and JACLYN uterine cancer presents to the ED for worsening swelling and darkening of right toe. Patient has right grey toe cellulitis. Patient's daughter relates watery black stool and decreased appetite x1 week.  Recently was admitted a month ago for R foot cellulitis with hallux wound, treated with IV zosyn and doxy, podiatry consulted who recommended betadine dressing, per note, pt declined. LATOYA/PVR was done, in which showed LATOYA of 1.29 b/l, and decreased flow on bilateral metatarsal. Pt reports R great toe black wound starting to get worse after wearing tight shoes Patient also missed podiatry follow up post discharge from last admission. pt endorses nausea and vomiting. Patient denies any denies claudication pain, fever, chills, cough, chest pain, shortness of breath or any other complaints.    In ED :   Xray right 1st toe :  No acute fracture-subluxation demonstrated. Stable mild erosion at first interphalangeal joint - gouty arthropathy not excluded. Interval increase in soft tissue swelling, as above.   CXR : Stable chest. Chronic fibrosis vs. pulmonary vascular congestion - no significant interval change compared with 11/26/19 exam.  EKG : NSR ,  RBB  Lactate 0.7     Pt is admitted for management of Toe gangrene 79 year old female from home ambulates with cane at baseline with a significant PMHx of HTN, DM ,ESRD, chronic diastolic heart failure s/p CardioMEMS implantation, moderate MS, ESRD on dialysis (Tuesday, Thursday, Saturday), COPD on home O2, asthma , HLD  and PSHx of Left lumpectomy/radiation for breast CA in 2005, and JACLYN uterine cancer presents to the ED for worsening swelling and darkening of right toe. Patient has right grey toe cellulitis. Patient's daughter relates watery black stool and decreased appetite x1 week.  Recently was admitted a month ago for R foot cellulitis with hallux wound, treated with IV zosyn and doxy, podiatry consulted who recommended betadine dressing, per note, pt declined. LATOYA/PVR was done, in which showed LATOYA of 1.29 b/l, and decreased flow on bilateral metatarsal. Pt reports R great toe black wound starting to get worse after wearing tight shoes Patient also missed podiatry follow up post discharge from last admission. pt endorses nausea and vomiting. Patient denies any denies claudication pain, fever, chills, cough, chest pain, shortness of breath or any other complaints.    In ED :   Xray right 1st toe :  No acute fracture-subluxation demonstrated. Stable mild erosion at first interphalangeal joint - gouty arthropathy not excluded. Interval increase in soft tissue swelling, as above.   CXR : Stable chest. Chronic fibrosis vs. pulmonary vascular congestion - no significant interval change compared with 11/26/19 exam.  EKG : NSR ,  RBB  Lactate 0.7     Pt is DNR/DNI  M[facility]ST form filled in chart pending attending signature    Pt is admitted for management of Toe gangrene

## 2019-12-21 NOTE — CHART NOTE - NSCHARTNOTEFT_GEN_A_CORE
Rapid Response PGY 2 Note  Pt is a 79 female admitted for toe gangrene    Rapid response team called because pt became non responsive and was having agonal breathing.     Patient was seen and examined at the bedside by the rapid response team. Pt was non responding to sternal rub, Pt noted to have BP of 70/30 and was given 700cc NS, pt saturation remained above 90's. Pt was placed on venturi mask and after about 7-8 min pt woke up and started responding. Pt didn't remember what happened, no seizure like activity was noted, pt followed commands and was moving all 4 extremities. Bp improved. Pt underwent CT head that was negative for any acute stroke/bleed. Pt isbeing transferred to tele for monitoring. Will do stat labs and attending on case inform    Allergies    No Known Allergies    Intolerances        PAST MEDICAL & SURGICAL HISTORY:  Chronic CHF  MS (mitral stenosis)  GERD (gastroesophageal reflux disease)  COPD (chronic obstructive pulmonary disease): On home oxygen  HLD (hyperlipidemia): not taking statins  HTN (hypertension)  ESRD (end stage renal disease) on dialysis  Chronic diastolic (congestive) heart failure  Anemia of chronic disease  Breast carcinoma: Lt side s/p lumpectomy and radiation in 2004  DM (diabetes mellitus): type 2  S/P subtotal hysterectomy  S/P lumpectomy, left breast: 2004      Vital Signs Last 24 Hrs  T(C): 36.6 (21 Dec 2019 18:19), Max: 37.2 (21 Dec 2019 04:36)  T(F): 97.8 (21 Dec 2019 18:19), Max: 98.9 (21 Dec 2019 04:36)  HR: 91 (21 Dec 2019 18:19) (79 - 106)  BP: 151/76 (21 Dec 2019 18:19) (104/60 - 170/69)  BP(mean): --  RR: 18 (21 Dec 2019 18:19) (18 - 18)  SpO2: 97% (21 Dec 2019 18:19) (95% - 100%)          GENERAL: The patient is awake and alert in no apparent distress.   HEENT: Head is normocephalic and atraumatic. Extraocular muscles are intact. Mucous membranes are moist. No throat erythema/exudates no lymphadenopathy, no JVD,   NECK: Supple.  LUNGS: Clear to auscultation BL without wheezing, rales or rhonchi; respirations unlabored  HEART: Regular rate and rhythm ,+S1/+S2, no murmurs, rubs, gallops  ABDOMEN: Soft, nontender, and nondistended, no rebound, guarding rigidity, bowel sounds in all 4 quadrants  EXTREMITIES: Without any cyanosis, clubbing, rash, lesions or edema.  SKIN: No new rashes or lesions.  MSK: strength equal BL  VASCULAR: Radial and Dorsal pedal pulses palpable BL  NEUROLOGIC: Grossly intact.  PSYCH: No new changes.                            11.1   6.46  )-----------( 163      ( 21 Dec 2019 14:50 )             36.2     12-21    133<L>  |  97  |  44<H>  ----------------------------<  251<H>  3.8   |  30  |  4.16<H>    Ca    8.8      21 Dec 2019 14:50  Phos  3.8     12-21  Mg     2.1     12-21    TPro  7.5  /  Alb  2.8<L>  /  TBili  0.7  /  DBili  x   /  AST  12  /  ALT  19  /  AlkPhos  89  12-21         LIVER FUNCTIONS - ( 21 Dec 2019 14:50 )  Alb: 2.8 g/dL / Pro: 7.5 g/dL / ALK PHOS: 89 U/L / ALT: 19 U/L DA / AST: 12 U/L / GGT: x              PT/INR - ( 20 Dec 2019 17:54 )   PT: 13.3 sec;   INR: 1.19 ratio         PTT - ( 20 Dec 2019 17:54 )  PTT:31.6 sec    Vital Signs Last 24 Hrs*       Assessment- Rapid Response called for 79y year old Female with a past medical history of     Plan- Rapid Response PGY 2 Note  Pt is a 79 female admitted for toe gangrene    Rapid response team called because pt became non responsive and was having agonal breathing.     Patient was seen and examined at the bedside by the rapid response team. Pt was non responding to sternal rub, Pt noted to have BP of 70/30 and was given 700cc NS, pt saturation remained above 90's. Pt was placed on venturi mask and after about 7-8 min pt woke up and started responding. Pt didn't remember what happened, no seizure like activity was noted, pt followed commands and was moving all 4 extremities. Bp improved. Pt underwent CT head that was negative for any acute stroke/bleed. Pt is being transferred to [facility] for monitoring. Will do stat labs and attending on case informed    Allergies    No Known Allergies    Intolerances        PAST MEDICAL & SURGICAL HISTORY:  Chronic CHF  MS (mitral stenosis)  GERD (gastroesophageal reflux disease)  COPD (chronic obstructive pulmonary disease): On home oxygen  HLD (hyperlipidemia): not taking statins  HTN (hypertension)  ESRD (end stage renal disease) on dialysis  Chronic diastolic (congestive) heart failure  Anemia of chronic disease  Breast carcinoma: Lt side s/p lumpectomy and radiation in 2004  DM (diabetes mellitus): type 2  S/P subtotal hysterectomy  S/P lumpectomy, left breast: 2004      Vital Signs Last 24 Hrs  T(C): 36.6 (21 Dec 2019 18:19), Max: 37.2 (21 Dec 2019 04:36)  T(F): 97.8 (21 Dec 2019 18:19), Max: 98.9 (21 Dec 2019 04:36)  HR: 91 (21 Dec 2019 18:19) (79 - 106)  BP: 151/76 (21 Dec 2019 18:19) (104/60 - 170/69)  BP(mean): --  RR: 18 (21 Dec 2019 18:19) (18 - 18)  SpO2: 97% (21 Dec 2019 18:19) (95% - 100%)          GENERAL: The patient is awake and alert in no apparent distress.   HEENT: Head is normocephalic and atraumatic. Extraocular muscles are intact. Mucous membranes are moist. No throat erythema/exudates no lymphadenopathy, no JVD,   NECK: Supple.  LUNGS: Clear to auscultation BL without wheezing, rales or rhonchi; respirations unlabored  HEART: Regular rate and rhythm ,+S1/+S2, no murmurs, rubs, gallops  ABDOMEN: Soft, nontender, and nondistended, no rebound, guarding rigidity, bowel sounds in all 4 quadrants  EXTREMITIES: Without any cyanosis, clubbing, rash, lesions or edema.  SKIN: No new rashes or lesions.  MSK: strength equal BL  VASCULAR: Radial and Dorsal pedal pulses palpable BL  NEUROLOGIC: Grossly intact.  PSYCH: No new changes.                            11.1   6.46  )-----------( 163      ( 21 Dec 2019 14:50 )             36.2     12-21    133<L>  |  97  |  44<H>  ----------------------------<  251<H>  3.8   |  30  |  4.16<H>    Ca    8.8      21 Dec 2019 14:50  Phos  3.8     12-21  Mg     2.1     12-21    TPro  7.5  /  Alb  2.8<L>  /  TBili  0.7  /  DBili  x   /  AST  12  /  ALT  19  /  AlkPhos  89  12-21         LIVER FUNCTIONS - ( 21 Dec 2019 14:50 )  Alb: 2.8 g/dL / Pro: 7.5 g/dL / ALK PHOS: 89 U/L / ALT: 19 U/L DA / AST: 12 U/L / GGT: x              PT/INR - ( 20 Dec 2019 17:54 )   PT: 13.3 sec;   INR: 1.19 ratio         PTT - ( 20 Dec 2019 17:54 )  PTT:31.6 sec    Vital Signs Last 24 Hrs*       Assessment- Rapid Response called for 79y year old Female with a past medical history as above for a syncopal episode during dialysis.     Plan-Syncope  Differentials include hypotensive/TIA/ Metabolic/Arrhthmia  CT head negative for any acute stroke/bleed  f/u Trop, cbc, cmp, magnesium and phos  Monitor on tele

## 2019-12-21 NOTE — CONSULT NOTE ADULT - SKIN COMMENTS
superficial ulceration of right 1st hallux on plantar aspect, no drainage and no palpable bone, skin discoloration but no gangrene.

## 2019-12-21 NOTE — H&P ADULT - PROBLEM SELECTOR PLAN 6
IMPROVE VTE Individual Risk Assessment    RISK                                                                Points    [  ] Previous VTE                                                  3    [  ] Thrombophilia                                               2    [  ] Lower limb paralysis                                      2        (unable to hold up >15 seconds)      [  ] Current Cancer                                              2         (within 6 months)  [X] Immobilization > 24 hrs                                1  [  ] ICU/CCU stay > 24 hours                              1    [X] Age > 60                                                      1    IMPROVE VTE Score _____2____  c/w Heparin SC 5000 q12 (renally dosed)

## 2019-12-21 NOTE — H&P ADULT - PROBLEM SELECTOR PLAN 2
36/3.61    Pt has ESRD and has dialysis schedule Tuesday/thursday/ saturday  Pt missed dialysis today  Plan for dialysis tomorrow  Avoid nephrotoxic medication  ED provider consulted Nephrology Dr. Angela. - ESRD Maintenance HD Tuesday/thursday/ saturday   - last dialysis thursday  - BUN/Cr 36/3.61 on admission  - K  3.7  - EKG NSR , RBBB no peaked T waves   - no crackles , pedal edema or fluid overload  - Avoid Nephrotoxic Meds/ Agents such as (NSAIDs, IV contrast, Aminoglycosides such as gentamicin, Gadolinium contrast, Phosphate containing enemas, etc..)  - Adjust Medications according to eGFR  - Renal diet  - f/u intact PTH , 1-25 hydroxy Vit D , 25 hydroxy Vit D  - f/u CM (pt will need reinstatement of dialysis upon discharge)  ** Nephrology consulted Dr. Tamayo

## 2019-12-21 NOTE — CONSULT NOTE ADULT - SUBJECTIVE AND OBJECTIVE BOX
Chief complain      HPI    PAST MEDICAL & SURGICAL HISTORY:  Chronic CHF  MS (mitral stenosis)  GERD (gastroesophageal reflux disease)  COPD (chronic obstructive pulmonary disease): On home oxygen  HLD (hyperlipidemia): not taking statins  HTN (hypertension)  ESRD (end stage renal disease) on dialysis  Chronic diastolic (congestive) heart failure  Anemia of chronic disease  Breast carcinoma: Lt side s/p lumpectomy and radiation in 2004  DM (diabetes mellitus): type 2  S/P subtotal hysterectomy  S/P lumpectomy, left breast: 2004      Home Medications Reviewed    Hospital Medications:   MEDICATIONS  (STANDING):  atorvastatin 10 milliGRAM(s) Oral at bedtime  budesonide  80 MICROgram(s)/formoterol 4.5 MICROgram(s) Inhaler 2 Puff(s) Inhalation two times a day  chlorhexidine 4% Liquid 1 Application(s) Topical <User Schedule>  epoetin randy Injectable 61769 Unit(s) IV Push <User Schedule>  ferrous    sulfate 325 milliGRAM(s) Oral daily  furosemide    Tablet 40 milliGRAM(s) Oral daily  gabapentin 100 milliGRAM(s) Oral daily  heparin  Injectable 5000 Unit(s) SubCutaneous every 12 hours  insulin glargine Injectable (LANTUS) 12 Unit(s) SubCutaneous at bedtime  insulin lispro (HumaLOG) corrective regimen sliding scale   SubCutaneous Before meals and at bedtime  insulin lispro Injectable (HumaLOG) 4 Unit(s) SubCutaneous three times a day before meals  latanoprost 0.005% Ophthalmic Solution 1 Drop(s) Both EYES at bedtime  lidocaine/prilocaine Cream 1 Application(s) Topical daily  midodrine. 5 milliGRAM(s) Oral three times a day  mupirocin 2% Nasal 1 Application(s) Nasal two times a day  piperacillin/tazobactam IVPB.. 3.375 Gram(s) IV Intermittent every 12 hours  povidone iodine 10% Solution 1 Application(s) Topical daily  prednisoLONE acetate 1% Suspension 1 Drop(s) Both EYES two times a day  sevelamer carbonate 1600 milliGRAM(s) Oral three times a day with meals  timolol 0.5% Solution 1 Drop(s) Both EYES two times a day  tiotropium 18 MICROgram(s) Capsule 1 Capsule(s) Inhalation daily  traZODone 50 milliGRAM(s) Oral at bedtime    MEDICATIONS  (PRN):  ALBUTerol    90 MICROgram(s) HFA Inhaler 2 Puff(s) Inhalation every 6 hours PRN Shortness of Breath and/or Wheezing  ondansetron Injectable 4 milliGRAM(s) IV Push every 6 hours PRN Nausea and/or Vomiting      Allergies    No Known Allergies    Intolerances                              11.1   6.98  )-----------( 161      ( 20 Dec 2019 17:54 )             35.7     12-20    133<L>  |  95<L>  |  36<H>  ----------------------------<  234<H>  3.7   |  31  |  3.61<H>    Ca    8.9      20 Dec 2019 17:54    TPro  7.7  /  Alb  3.0<L>  /  TBili  0.7  /  DBili  x   /  AST  14  /  ALT  20  /  AlkPhos  94  12-20    PT/INR - ( 20 Dec 2019 17:54 )   PT: 13.3 sec;   INR: 1.19 ratio         PTT - ( 20 Dec 2019 17:54 )  PTT:31.6 sec          RADIOLOGY & ADDITIONAL STUDIES:    SOCIAL HISTORY: Denies ETOh,Smoking,     FAMILY HISTORY:  Diabetes mellitus  Family history of breast cancer (Aunt)      REVIEW OF SYSTEMS:  CONSTITUTIONAL: No malaise, No fatigue, No fevers or chills, well developed, no diaphoresis  EYES/ENT: No visual changes;  No vertigo or throat pain   NECK: No pain or stiffness  RESPIRATORY: No cough, wheezing, hemoptysis; No shortness of breath  CARDIOVASCULAR: No chest pain or palpitations. No edema  GASTROINTESTINAL: No abdominal or epigastric pain. No nausea, vomiting, or hematemesis; No diarrhea or constipation. No melena or hematochezia.  GENITOURINARY: No dysuria, frequency, foamy urine, urinary urgency, incontinence or hematuria  NEUROLOGICAL: No numbness or weakness, No tremor  SKIN: No itching, burning, rashes, or lesions   VASCULAR: No claudication  Musculoskeletal: no arthralgia, no myalgia  All other review of systems is negative unless indicated above.    VITALS:  Vital Signs Last 24 Hrs  T(C): 36.9 (21 Dec 2019 07:23), Max: 37.2 (21 Dec 2019 04:36)  T(F): 98.5 (21 Dec 2019 07:23), Max: 98.9 (21 Dec 2019 04:36)  HR: 100 (21 Dec 2019 07:23) (87 - 106)  BP: 151/61 (21 Dec 2019 07:23) (104/60 - 170/69)  BP(mean): --  RR: 18 (21 Dec 2019 07:23) (16 - 18)  SpO2: 100% (21 Dec 2019 07:23) (96% - 100%)    Height (cm): 157.5 (12-21 @ 05:34)  Weight (kg): 79.2 (12-21 @ 05:34)  BMI (kg/m2): 31.9 (12-21 @ 05:34)  BSA (m2): 1.8 (12-21 @ 05:34)    PHYSICAL EXAM:  Constitutional: NAD  HEENT: anicteric sclera, oropharynx clear, MMM  Neck: No JVD  Respiratory: good air entrance B/L, no wheezes, rales or rhonchi  Cardiovascular: S1, S2, RRR, no pericardial rub, no murmur  Gastrointestinal: BS+, soft, no tenderness, no distension, no bruit  Pelvis: bladder non-distended, no CVA tenderness  Extremities: No cyanosis or clubbing. No peripheral edema  Neurological: A/O x 3, no focal deficits  Psychiatric: Normal mood, normal affect  : No CVA tenderness. No haas.   Skin: No rashes  Vascular: all pulses present  Access: Chief complain/  HPI  79 years old came to the ER for few days of diarrhea with out fever, abdominal pain none no vomit or nausea  Diarrhea resolved after admission.  Patient also noted ulcer in her right first toe with no discharge and no pain, She attribuite the ulcer to her thight shoes.  She missed her podiatry follow up.    She is know to have COPD but feels well and no SOB, no cough.  History of bradycardia and hypotension on last admission, resolved.    COMPARISON: 12/2/19.    CLINICAL HISTORY:  Persistent toe swelling.    Technique:: 3 films of distal right foot -attention to all the toes.    FINDINGS:  No fracture or dislocation.   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 Mild paraarticular erosion at medial aspect of head of 1st proximal phalanx is stable - could be due to erosive osteoarthritis - but gouty arthropathy not excluded. See arrows.   Interval increase in nonspecific, soft tissue swelling at distal first toe - but no discrete osteomyelitic erosion. No tracking of soft tissue air.    IMPRESSION:   1. No acute fracture-subluxation demonstrated.  2. Stable mild erosion at first interphalangeal joint - gouty arthropathy not excluded.  3. Interval increase in soft tissue swelling, as above.   This report to ER via PACs system.      PRIOR:11/26/19.    CLINICAL INDICATION: . Abdominal pain. Sepsis. Assess for chest process.    TECHNIQUE: portable chest.    FINDINGS:   Stable mild cardiomegaly.  The cardiomediastinal silhouette is otherwise within normal limits.   Persistent, diffuse interstitial prominence - likely related to chronic fibrosis or to mild pulmonary vascular congestion.  No focal consolidations. No pleural effusion or pneumothorax  No acute bony findings.    IMPRESSION:   Stable chest.  Chronic fibrosis vs. pulmonary vascular congestion - no significant interval change compared with 11/26/19 exam.          PAST MEDICAL & SURGICAL HISTORY:  Chronic CHF  MS (mitral stenosis)  GERD (gastroesophageal reflux disease)  COPD (chronic obstructive pulmonary disease): On home oxygen  HLD (hyperlipidemia): not taking statins  HTN (hypertension)  ESRD (end stage renal disease) on dialysis  Chronic diastolic (congestive) heart failure  Anemia of chronic disease  Breast carcinoma: Lt side s/p lumpectomy and radiation in 2004  DM (diabetes mellitus): type 2  S/P subtotal hysterectomy  S/P lumpectomy, left breast: 2004      Home Medications Reviewed    Hospital Medications:   MEDICATIONS  (STANDING):  atorvastatin 10 milliGRAM(s) Oral at bedtime  budesonide  80 MICROgram(s)/formoterol 4.5 MICROgram(s) Inhaler 2 Puff(s) Inhalation two times a day  chlorhexidine 4% Liquid 1 Application(s) Topical <User Schedule>  epoetin randy Injectable 62200 Unit(s) IV Push <User Schedule>  ferrous    sulfate 325 milliGRAM(s) Oral daily  furosemide    Tablet 40 milliGRAM(s) Oral daily  gabapentin 100 milliGRAM(s) Oral daily  heparin  Injectable 5000 Unit(s) SubCutaneous every 12 hours  insulin glargine Injectable (LANTUS) 12 Unit(s) SubCutaneous at bedtime  insulin lispro (HumaLOG) corrective regimen sliding scale   SubCutaneous Before meals and at bedtime  insulin lispro Injectable (HumaLOG) 4 Unit(s) SubCutaneous three times a day before meals  latanoprost 0.005% Ophthalmic Solution 1 Drop(s) Both EYES at bedtime  lidocaine/prilocaine Cream 1 Application(s) Topical daily  midodrine. 5 milliGRAM(s) Oral three times a day  mupirocin 2% Nasal 1 Application(s) Nasal two times a day  piperacillin/tazobactam IVPB.. 3.375 Gram(s) IV Intermittent every 12 hours  povidone iodine 10% Solution 1 Application(s) Topical daily  prednisoLONE acetate 1% Suspension 1 Drop(s) Both EYES two times a day  sevelamer carbonate 1600 milliGRAM(s) Oral three times a day with meals  timolol 0.5% Solution 1 Drop(s) Both EYES two times a day  tiotropium 18 MICROgram(s) Capsule 1 Capsule(s) Inhalation daily  traZODone 50 milliGRAM(s) Oral at bedtime    MEDICATIONS  (PRN):  ALBUTerol    90 MICROgram(s) HFA Inhaler 2 Puff(s) Inhalation every 6 hours PRN Shortness of Breath and/or Wheezing  ondansetron Injectable 4 milliGRAM(s) IV Push every 6 hours PRN Nausea and/or Vomiting      Allergies    No Known Allergies    Intolerances                              11.1   6.98  )-----------( 161      ( 20 Dec 2019 17:54 )             35.7     12-20    133<L>  |  95<L>  |  36<H>  ----------------------------<  234<H>  3.7   |  31  |  3.61<H>    Ca    8.9      20 Dec 2019 17:54    TPro  7.7  /  Alb  3.0<L>  /  TBili  0.7  /  DBili  x   /  AST  14  /  ALT  20  /  AlkPhos  94  12-20    PT/INR - ( 20 Dec 2019 17:54 )   PT: 13.3 sec;   INR: 1.19 ratio         PTT - ( 20 Dec 2019 17:54 )  PTT:31.6 sec          RADIOLOGY & ADDITIONAL STUDIES:    SOCIAL HISTORY: Denies ETOh,Smoking,     FAMILY HISTORY:  Diabetes mellitus  Family history of breast cancer (Aunt)      REVIEW OF SYSTEMS:  CONSTITUTIONAL: No malaise, No fatigue, No fevers or chills, well developed, no diaphoresis  EYES/ENT: No visual changes;  No vertigo or throat pain   NECK: No pain or stiffness  RESPIRATORY: No cough, wheezing, hemoptysis; No shortness of breath  CARDIOVASCULAR: No chest pain or palpitations. No edema  GASTROINTESTINAL: No abdominal or epigastric pain. No nausea, vomiting, C/O REDUCED APPETITE. C/O diarrhea up to her admission that resolved.  GENITOURINARY: No dysuria, frequency, foamy urine, urinary urgency, incontinence or hematuria  NEUROLOGICAL: increased weakness in the last few month    VITALS:  Vital Signs Last 24 Hrs  T(C): 36.9 (21 Dec 2019 07:23), Max: 37.2 (21 Dec 2019 04:36)  T(F): 98.5 (21 Dec 2019 07:23), Max: 98.9 (21 Dec 2019 04:36)  HR: 100 (21 Dec 2019 07:23) (87 - 106)  BP: 151/61 (21 Dec 2019 07:23) (104/60 - 170/69)  BP(mean): --  RR: 18 (21 Dec 2019 07:23) (16 - 18)  SpO2: 100% (21 Dec 2019 07:23) (96% - 100%)    Height (cm): 157.5 (12-21 @ 05:34)  Weight (kg): 79.2 (12-21 @ 05:34)  BMI (kg/m2): 31.9 (12-21 @ 05:34)  BSA (m2): 1.8 (12-21 @ 05:34)    PHYSICAL EXAM:  Constitutional: NAD  HEENT: anicteric sclera, oropharynx clear, MMM  Neck: No JVD  Respiratory: good air entrance B/L, no wheezes, rales or rhonchi  Cardiovascular: S1, S2, RRR, no pericardial rub, no murmur  Gastrointestinal: BS+, soft, no tenderness, no distension, no bruit  Pelvis: bladder non-distended, no CVA tenderness  Extremities: No cyanosis or clubbing. No peripheral edema  Neurological: A/O x 3, no focal deficits  Psychiatric: Normal mood, normal affect  : No CVA tenderness. No haas.   Skin: No rashes  Vascular: all pulses present  Access:

## 2019-12-21 NOTE — H&P ADULT - PROBLEM SELECTOR PLAN 3
Likely diastolic heart failure as echocardiogram on 2018  shows normal e.f with left ventricular hypertrophy.   Pt takes Lasix 40 daily ay home   Continue home dose of Lasix. - Prior History of diastolic HF with with AICD (in 2005-[employer]- last replaced 2018)  - on exam no RALES , crackles or leg edema   - No evidence of pul edema on CXR  - No JVD  - EKG : NSR ,  RBB  - fluid restrictions 1200 mL , DASH Diet   - c/w Lasix   - f/u weight daily , intake & output   - keep negative  - f/u ProBNP

## 2019-12-21 NOTE — CONSULT NOTE ADULT - ASSESSMENT
ESRD, dialysis TTS.  Diarrhea  Right first toe ulcer ESRD, dialysis TTS. Dialysis today .  History of hypotension to follow BP and reduced Midodrine to 5 mg TID and follow BP  Anemia with 11 hemoglobin, got Aranesp on Monday, DC Epogen  Diarrhea resolved  Right first toe ulcer, chronic with no discharge , no fever and no leukocytosis to follow with podiatry and ID.      COPD stable . ESRD, dialysis TTS. Dialysis today .  History of hypotension to follow BP and reduced Midodrine to 5 mg TID and follow BP  Anemia with 11 hemoglobin, got Aranesp on Monday, DC Epogen  Diarrhea resolved  Right first toe ulcer, chronic with no discharge , no fever and no leukocytosis to follow with podiatry and ID.      COPD stable .    As per dialysis nurse BP dropped after 2 hours dialysis and she was not responsive  MS improved after fluids given back  Patient was moved to Telemetry and BP improved and MS improved.  Will continue with increased dose of Midodrine before dialysis and follow BP

## 2019-12-21 NOTE — CONSULT NOTE ADULT - RS GEN PE MLT RESP DETAILS PC
no rales/no wheezes/breath sounds equal/good air movement/no rhonchi/clear to auscultation bilaterally

## 2019-12-21 NOTE — CONSULT NOTE ADULT - CONSTITUTIONAL
The history and physical exam on this patient has been reviewed in detail.  The laboratory studies have been reviewed.  The procedure, risks and complications of the procedure have been explained in detail to the patient.  All questions have been answered.    There are no changes to the physical exam.    There are no changes to the current treatment plan.    All preoperative testing and laboratory studies were reviewed with the patient in pre op.  
detailed exam

## 2019-12-21 NOTE — H&P ADULT - HISTORY OF PRESENT ILLNESS
79 year old female from home ambulates with cane at baseline with a significant PMHx of HTN, DM ,ESRD, chronic diastolic heart failure s/p CardioMEMS implantation, moderate MS, ESRD on dialysis (Tuesday, Thursday, Saturday), COPD on home O2, asthma , HLD  and PSHx of Left lumpectomy/radiation for breast CA in 2005, and JACLYN uterine cancer presents to the ED for worsening swelling and darkening of right toe. Patient has right grey toe cellulitis. Patient's daughter relates watery black stool and decreased appetite x1 week.  Recently was admitted a month ago for R foot cellulitis with hallux wound, treated with IV zosyn and doxy, podiatry consulted who recommended betadine dressing, per note, pt declined. LATOYA/PVR was done, in which showed LATOYA of 1.29 b/l, and decreased flow on bilateral metatarsal. Pt reports R great toe black wound starting to get worse after wearing tight shoes Patient also missed podiatry follow up post discharge from last admission. pt endorses nausea and vomiting. Patient denies any denies claudication pain, fever, chills, cough, chest pain, shortness of breath or any other complaints.    In ED :   Xray right 1st toe :  No acute fracture-subluxation demonstrated. Stable mild erosion at first interphalangeal joint - gouty arthropathy not excluded. Interval increase in soft tissue swelling, as above.   CXR : Stable chest. Chronic fibrosis vs. pulmonary vascular congestion - no significant interval change compared with 11/26/19 exam.  EKG : NSR ,  RBB  Lactate 0.7 79 year old female from home ambulates with cane at baseline with a significant PMHx of HTN, DM ,ESRD, chronic diastolic heart failure s/p CardioMEMS implantation, moderate MS, ESRD on dialysis (Tuesday, Thursday, Saturday), COPD on home O2, asthma , HLD  and PSHx of Left lumpectomy/radiation for breast CA in 2005, and JACLYN uterine cancer presents to the ED for worsening swelling and darkening of right toe. Patient has right grey toe cellulitis. Patient's daughter relates watery black stool and decreased appetite x1 week.  Recently was admitted a month ago for R foot cellulitis with hallux wound, treated with IV zosyn and doxy, podiatry consulted who recommended betadine dressing, per note, pt declined. LATOYA/PVR was done, in which showed LATOYA of 1.29 b/l, and decreased flow on bilateral metatarsal. Pt reports R great toe black wound starting to get worse after wearing tight shoes Patient also missed podiatry follow up post discharge from last admission. pt endorses nausea and vomiting. Patient denies any denies claudication pain, fever, chills, cough, chest pain, shortness of breath or any other complaints.    In ED :   Xray right 1st toe :  No acute fracture-subluxation demonstrated. Stable mild erosion at first interphalangeal joint - gouty arthropathy not excluded. Interval increase in soft tissue swelling, as above.   CXR : Stable chest. Chronic fibrosis vs. pulmonary vascular congestion - no significant interval change compared with 11/26/19 exam.  EKG : NSR ,  RBB  Lactate 0.7     Pt is DNR/DNI  MOLST form filled in chart pending attending signature

## 2019-12-21 NOTE — CONSULT NOTE ADULT - ASSESSMENT
-follow CBC  Results for Durga Cabrales (MRN [id number]) as of 4/3/2018 09:13   Ref  Range 4/2/2018 05:33   Folate Latest Ref Range: 3 1 - 17 5 ng/mL >20 0 (H)     Results for Durga Cabrales (MRN [id number]) as of 4/3/2018 09:13   Ref   Range 4/2/2018 05:33   Vitamin B-12 Latest Ref Range: 100 - 900 pg/mL 505 Right 1st  toe ulcer - r/o osteomyelitis  Diarrhea - r/o c. difficile as recently on abxs    Plan - get MRI of right foot  c.difficile titer  cont Zosyn 3.375gms iv q12hrs for now.

## 2019-12-21 NOTE — PROGRESS NOTE ADULT - SUBJECTIVE AND OBJECTIVE BOX
Patient is a 79y old  Female who presents with a chief complaint of Toe gangrene (21 Dec 2019 03:36)    PATIENT IS SEEN AND EXAMINED IN MEDICAL FLOOR.  PRESTON [    ]    ROSA [   ]      GT [   ]    ALLERGIES:  No Known Allergies      Daily Height in cm: 157.48 (21 Dec 2019 05:34)    Daily     VITALS:    Vital Signs Last 24 Hrs  T(C): 36.9 (21 Dec 2019 07:23), Max: 37.2 (21 Dec 2019 04:36)  T(F): 98.5 (21 Dec 2019 07:23), Max: 98.9 (21 Dec 2019 04:36)  HR: 100 (21 Dec 2019 07:23) (87 - 106)  BP: 151/61 (21 Dec 2019 07:23) (104/60 - 170/69)  BP(mean): --  RR: 18 (21 Dec 2019 07:23) (16 - 18)  SpO2: 100% (21 Dec 2019 07:23) (96% - 100%)    LABS:    CBC Full  -  ( 20 Dec 2019 17:54 )  WBC Count : 6.98 K/uL  RBC Count : 3.92 M/uL  Hemoglobin : 11.1 g/dL  Hematocrit : 35.7 %  Platelet Count - Automated : 161 K/uL  Mean Cell Volume : 91.1 fl  Mean Cell Hemoglobin : 28.3 pg  Mean Cell Hemoglobin Concentration : 31.1 gm/dL  Auto Neutrophil # : 5.37 K/uL  Auto Lymphocyte # : 0.81 K/uL  Auto Monocyte # : 0.58 K/uL  Auto Eosinophil # : 0.18 K/uL  Auto Basophil # : 0.03 K/uL  Auto Neutrophil % : 77.0 %  Auto Lymphocyte % : 11.6 %  Auto Monocyte % : 8.3 %  Auto Eosinophil % : 2.6 %  Auto Basophil % : 0.4 %    PT/INR - ( 20 Dec 2019 17:54 )   PT: 13.3 sec;   INR: 1.19 ratio         PTT - ( 20 Dec 2019 17:54 )  PTT:31.6 sec  12-20    133<L>  |  95<L>  |  36<H>  ----------------------------<  234<H>  3.7   |  31  |  3.61<H>    Ca    8.9      20 Dec 2019 17:54    TPro  7.7  /  Alb  3.0<L>  /  TBili  0.7  /  DBili  x   /  AST  14  /  ALT  20  /  AlkPhos  94  12-20    CAPILLARY BLOOD GLUCOSE      POCT Blood Glucose.: 255 mg/dL (21 Dec 2019 08:02)  POCT Blood Glucose.: 251 mg/dL (21 Dec 2019 03:06)        LIVER FUNCTIONS - ( 20 Dec 2019 17:54 )  Alb: 3.0 g/dL / Pro: 7.7 g/dL / ALK PHOS: 94 U/L / ALT: 20 U/L DA / AST: 14 U/L / GGT: x           Creatinine Trend: 3.61<--, 4.49<--, 3.83<--, 5.31<--, 4.68<--, 5.80<--  I&O's Summary          .Stool  11-30 @ 22:30   No enteric pathogens isolated.  (Stool culture examined for Salmonella,  Shigella, Campylobacter, Aeromonas, Plesiomonas,  Vibrio, E.coli O157 and Yersinia)  --  --      .Urine  11-28 @ 01:01   >100,000 CFU/ml Escherichia coli  50,000 - 99,000 CFU/mL Streptococcus bovis  --  Escherichia coli  Streptococcus bovis      .Blood  11-27 @ 01:42   No growth at 5 days.  --  --      .Blood  11-27 @ 01:31   Growth in aerobic bottle: Gram Negative Rods  Sent to  [facility]   for Identification  "Due to technical problems, Proteus sp. will Not be reported as part of  the BCID panel until further notice"  ***Blood Panel PCR results on this specimen are available  approximately 3 hours after the Gram stain result.***  Gram stain, PCR, and/or culture results may not always  correspond due to difference in methodologies.  ************************************************************  This PCR assay was performed using [employer].  The following targets are tested for: Enterococcus,  vancomycin resistant enterococci, Listeria monocytogenes,  coagulase negative staphylococci, S. aureus,  methicillin resistant S. aureus, Streptococcus agalactiae  (Group B), S. pneumoniae, S. pyogenes (Group A),  Acinetobacter baumannii, Enterobacter cloacae, E. coli,  Klebsiella oxytoca, K. pneumoniae, Proteus sp.,  Serratia marcescens, Haemophilus influenzae,  Neisseria meningitidis, Pseudomonas aeruginosa, Candida  albicans, C. glabrata, C krusei, C parapsilosis,  C. tropicalis and the KPC resistance gene.  --  Blood Culture PCR          MEDICATIONS:    MEDICATIONS  (STANDING):  atorvastatin 10 milliGRAM(s) Oral at bedtime  budesonide  80 MICROgram(s)/formoterol 4.5 MICROgram(s) Inhaler 2 Puff(s) Inhalation two times a day  chlorhexidine 4% Liquid 1 Application(s) Topical <User Schedule>  epoetin randy Injectable 41508 Unit(s) IV Push <User Schedule>  ferrous    sulfate 325 milliGRAM(s) Oral daily  furosemide    Tablet 40 milliGRAM(s) Oral daily  gabapentin 100 milliGRAM(s) Oral daily  heparin  Injectable 5000 Unit(s) SubCutaneous every 12 hours  insulin glargine Injectable (LANTUS) 12 Unit(s) SubCutaneous at bedtime  insulin lispro (HumaLOG) corrective regimen sliding scale   SubCutaneous Before meals and at bedtime  insulin lispro Injectable (HumaLOG) 4 Unit(s) SubCutaneous three times a day before meals  latanoprost 0.005% Ophthalmic Solution 1 Drop(s) Both EYES at bedtime  lidocaine/prilocaine Cream 1 Application(s) Topical daily  midodrine. 5 milliGRAM(s) Oral three times a day  mupirocin 2% Nasal 1 Application(s) Nasal two times a day  piperacillin/tazobactam IVPB.. 3.375 Gram(s) IV Intermittent every 12 hours  povidone iodine 10% Solution 1 Application(s) Topical daily  prednisoLONE acetate 1% Suspension 1 Drop(s) Both EYES two times a day  sevelamer carbonate 1600 milliGRAM(s) Oral three times a day with meals  timolol 0.5% Solution 1 Drop(s) Both EYES two times a day  tiotropium 18 MICROgram(s) Capsule 1 Capsule(s) Inhalation daily  traZODone 50 milliGRAM(s) Oral at bedtime      MEDICATIONS  (PRN):  ALBUTerol    90 MICROgram(s) HFA Inhaler 2 Puff(s) Inhalation every 6 hours PRN Shortness of Breath and/or Wheezing  ondansetron Injectable 4 milliGRAM(s) IV Push every 6 hours PRN Nausea and/or Vomiting      REVIEW OF SYSTEMS:                           ALL ROS DONE [ X   ]    CONSTITUTIONAL:  LETHARGIC [   ], FEVER [   ], UNRESPONSIVE [   ]  CVS:  CP  [   ], SOB, [   ], PALPITATIONS [   ], DIZZYNESS [   ]  RS: COUGH [   ], SPUTUM [   ]  GI: ABDOMINAL PAIN [   ], NAUSEA [   ], VOMITINGS [   ], DIARRHEA [   ], CONSTIPATION [   ]  :  DYSURIA [   ], NOCTURIA [   ], INCREASED FREQUENCY [   ], DRIBLING [   ],  SKELETAL: PAINFUL JOINTS [   ], SWOLLEN JOINTS [   ], NECK ACHE [   ], LOW BACK ACHE [   ],  SKIN : ULCERS [   ], RASH [   ], ITCHING [   ]  CNS: HEAD ACHE [   ], DOUBLE VISION [   ], BLURRED VISION [   ], AMS / CONFUSION [   ], SEIZURES [   ], WEAKNESS [   ],TINGLING / NUMBNESS [   ]    PHYSICAL EXAMINATION:  GENERAL APPEARANCE: NO DISTRESS  HEENT:  NO PALLOR, NO  JVD,  NO   NODES, NECK SUPPLE  CVS: S1 +, S2 +,   RS: AEEB,  OCCASIONAL  RALES +,   NO RONCHI  ABD: SOFT, NT, NO, BS +  EXT: NO PE  SKIN: WARM,   SKELETAL:  ROM ACCEPTABLE  CNS:  AAO X    ,   DEFICITS    RADIOLOGY :      ASSESSMENT :     Gangrene, not elsewhere classified  Yes  Chronic CHF  MS (mitral stenosis)  GERD (gastroesophageal reflux disease)  COPD (chronic obstructive pulmonary disease)  HLD (hyperlipidemia)  HTN (hypertension)  ESRD (end stage renal disease) on dialysis  Chronic diastolic (congestive) heart failure  Anemia of chronic disease  Anemia  Hypertension  Congestive heart failure  Chronic kidney disease  No pertinent past medical history  Breast carcinoma  Renal mass  DM (diabetes mellitus)  Asthma  S/P subtotal hysterectomy  S/P lumpectomy, left breast  No significant past surgical history      PLAN:  HPI:  79 year old female from home ambulates with cane at baseline with a significant PMHx of HTN, DM ,ESRD, chronic diastolic heart failure s/p CardioMEMS implantation, moderate MS, ESRD on dialysis (Tuesday, Thursday, Saturday), COPD on home O2, asthma , HLD  and PSHx of Left lumpectomy/radiation for breast CA in 2005, and JACLYN uterine cancer presents to the ED for worsening swelling and darkening of right toe. Patient has right grey toe cellulitis. Patient's daughter relates watery black stool and decreased appetite x1 week.  Recently was admitted a month ago for R foot cellulitis with hallux wound, treated with IV zosyn and doxy, podiatry consulted who recommended betadine dressing, per note, pt declined. LATOYA/PVR was done, in which showed LATOYA of 1.29 b/l, and decreased flow on bilateral metatarsal. Pt reports R great toe black wound starting to get worse after wearing tight shoes Patient also missed podiatry follow up post discharge from last admission. pt endorses nausea and vomiting. Patient denies any denies claudication pain, fever, chills, cough, chest pain, shortness of breath or any other complaints.    In ED :   Xray right 1st toe :  No acute fracture-subluxation demonstrated. Stable mild erosion at first interphalangeal joint - gouty arthropathy not excluded. Interval increase in soft tissue swelling, as above.   CXR : Stable chest. Chronic fibrosis vs. pulmonary vascular congestion - no significant interval change compared with 11/26/19 exam.  EKG : NSR ,  RBB  Lactate 0.7     Pt is DNR/DNI  MOLST form filled in chart pending attending signature (21 Dec 2019 03:36)    - Patient is a 79y old  Female who presents with a chief complaint of Toe gangrene (21 Dec 2019 03:36)    PATIENT IS SEEN AND EXAMINED IN MEDICAL FLOOR.    ALLERGIES:  No Known Allergies      Daily Height in cm: 157.48 (21 Dec 2019 05:34)      VITALS:    Vital Signs Last 24 Hrs  T(C): 36.9 (21 Dec 2019 07:23), Max: 37.2 (21 Dec 2019 04:36)  T(F): 98.5 (21 Dec 2019 07:23), Max: 98.9 (21 Dec 2019 04:36)  HR: 100 (21 Dec 2019 07:23) (87 - 106)  BP: 151/61 (21 Dec 2019 07:23) (104/60 - 170/69)  BP(mean): --  RR: 18 (21 Dec 2019 07:23) (16 - 18)  SpO2: 100% (21 Dec 2019 07:23) (96% - 100%)    LABS:    CBC Full  -  ( 20 Dec 2019 17:54 )  WBC Count : 6.98 K/uL  RBC Count : 3.92 M/uL  Hemoglobin : 11.1 g/dL  Hematocrit : 35.7 %  Platelet Count - Automated : 161 K/uL  Mean Cell Volume : 91.1 fl  Mean Cell Hemoglobin : 28.3 pg  Mean Cell Hemoglobin Concentration : 31.1 gm/dL  Auto Neutrophil # : 5.37 K/uL  Auto Lymphocyte # : 0.81 K/uL  Auto Monocyte # : 0.58 K/uL  Auto Eosinophil # : 0.18 K/uL  Auto Basophil # : 0.03 K/uL  Auto Neutrophil % : 77.0 %  Auto Lymphocyte % : 11.6 %  Auto Monocyte % : 8.3 %  Auto Eosinophil % : 2.6 %  Auto Basophil % : 0.4 %    PT/INR - ( 20 Dec 2019 17:54 )   PT: 13.3 sec;   INR: 1.19 ratio         PTT - ( 20 Dec 2019 17:54 )  PTT:31.6 sec  12-20    133<L>  |  95<L>  |  36<H>  ----------------------------<  234<H>  3.7   |  31  |  3.61<H>    Ca    8.9      20 Dec 2019 17:54    TPro  7.7  /  Alb  3.0<L>  /  TBili  0.7  /  DBili  x   /  AST  14  /  ALT  20  /  AlkPhos  94  12-20    CAPILLARY BLOOD GLUCOSE    POCT Blood Glucose.: 255 mg/dL (21 Dec 2019 08:02)  POCT Blood Glucose.: 251 mg/dL (21 Dec 2019 03:06)      LIVER FUNCTIONS - ( 20 Dec 2019 17:54 )  Alb: 3.0 g/dL / Pro: 7.7 g/dL / ALK PHOS: 94 U/L / ALT: 20 U/L DA / AST: 14 U/L / GGT: x           Creatinine Trend: 3.61<--, 4.49<--, 3.83<--, 5.31<--, 4.68<--, 5.80<--  I&O's Summary          .Stool  11-30 @ 22:30   No enteric pathogens isolated.  (Stool culture examined for Salmonella,  Shigella, Campylobacter, Aeromonas, Plesiomonas,  Vibrio, E.coli O157 and Yersinia)  --  --      .Urine  11-28 @ 01:01   >100,000 CFU/ml Escherichia coli  50,000 - 99,000 CFU/mL Streptococcus bovis  --  Escherichia coli  Streptococcus bovis      .Blood  11-27 @ 01:42   No growth at 5 days.  --  --      .Blood  11-27 @ 01:31   Growth in aerobic bottle: Gram Negative Rods  Sent to  [facility]   for Identification  "Due to technical problems, Proteus sp. will Not be reported as part of  the BCID panel until further notice"  ***Blood Panel PCR results on this specimen are available  approximately 3 hours after the Gram stain result.***  Gram stain, PCR, and/or culture results may not always  correspond due to difference in methodologies.  ************************************************************  This PCR assay was performed using [employer].  The following targets are tested for: Enterococcus,  vancomycin resistant enterococci, Listeria monocytogenes,  coagulase negative staphylococci, S. aureus,  methicillin resistant S. aureus, Streptococcus agalactiae  (Group B), S. pneumoniae, S. pyogenes (Group A),  Acinetobacter baumannii, Enterobacter cloacae, E. coli,  Klebsiella oxytoca, K. pneumoniae, Proteus sp.,  Serratia marcescens, Haemophilus influenzae,  Neisseria meningitidis, Pseudomonas aeruginosa, Candida  albicans, C. glabrata, C krusei, C parapsilosis,  C. tropicalis and the KPC resistance gene.  --  Blood Culture PCR          MEDICATIONS:    MEDICATIONS  (STANDING):  atorvastatin 10 milliGRAM(s) Oral at bedtime  budesonide  80 MICROgram(s)/formoterol 4.5 MICROgram(s) Inhaler 2 Puff(s) Inhalation two times a day  chlorhexidine 4% Liquid 1 Application(s) Topical <User Schedule>  epoetin randy Injectable 63216 Unit(s) IV Push <User Schedule>  ferrous    sulfate 325 milliGRAM(s) Oral daily  furosemide    Tablet 40 milliGRAM(s) Oral daily  gabapentin 100 milliGRAM(s) Oral daily  heparin  Injectable 5000 Unit(s) SubCutaneous every 12 hours  insulin glargine Injectable (LANTUS) 12 Unit(s) SubCutaneous at bedtime  insulin lispro (HumaLOG) corrective regimen sliding scale   SubCutaneous Before meals and at bedtime  insulin lispro Injectable (HumaLOG) 4 Unit(s) SubCutaneous three times a day before meals  latanoprost 0.005% Ophthalmic Solution 1 Drop(s) Both EYES at bedtime  lidocaine/prilocaine Cream 1 Application(s) Topical daily  midodrine. 5 milliGRAM(s) Oral three times a day  mupirocin 2% Nasal 1 Application(s) Nasal two times a day  piperacillin/tazobactam IVPB.. 3.375 Gram(s) IV Intermittent every 12 hours  povidone iodine 10% Solution 1 Application(s) Topical daily  prednisoLONE acetate 1% Suspension 1 Drop(s) Both EYES two times a day  sevelamer carbonate 1600 milliGRAM(s) Oral three times a day with meals  timolol 0.5% Solution 1 Drop(s) Both EYES two times a day  tiotropium 18 MICROgram(s) Capsule 1 Capsule(s) Inhalation daily  traZODone 50 milliGRAM(s) Oral at bedtime      MEDICATIONS  (PRN):  ALBUTerol    90 MICROgram(s) HFA Inhaler 2 Puff(s) Inhalation every 6 hours PRN Shortness of Breath and/or Wheezing  ondansetron Injectable 4 milliGRAM(s) IV Push every 6 hours PRN Nausea and/or Vomiting      REVIEW OF SYSTEMS:                           ALL ROS DONE [ X   ]    CONSTITUTIONAL:  LETHARGIC [   ], FEVER [   ], UNRESPONSIVE [   ]  CVS:  CP  [   ], SOB, [   ], PALPITATIONS [   ], DIZZYNESS [   ]  RS: COUGH [   ], SPUTUM [   ]  GI: ABDOMINAL PAIN [   ], NAUSEA [   ], VOMITINGS [   ], DIARRHEA [   ], CONSTIPATION [   ]  :  DYSURIA [   ], NOCTURIA [   ], INCREASED FREQUENCY [   ], DRIBLING [   ],  SKELETAL: PAINFUL JOINTS [   ], SWOLLEN JOINTS [   ], NECK ACHE [   ], LOW BACK ACHE [   ],  SKIN : ULCERS [   ], RASH [   ], ITCHING [   ]  CNS: HEAD ACHE [   ], DOUBLE VISION [   ], BLURRED VISION [   ], AMS / CONFUSION [   ], SEIZURES [   ], WEAKNESS [   ],TINGLING / NUMBNESS [   ]    PHYSICAL EXAMINATION:  GENERAL APPEARANCE: NO DISTRESS  HEENT:  NO PALLOR, NO  JVD,  NO   NODES, NECK SUPPLE  CVS: S1 +, S2 +,   RS: AEEB,  OCCASIONAL  RALES +,   NO RONCHI  ABD: SOFT, NT, NO, BS +  EXT: NO PE,                                                    RIGHT BIG TOE DISCOLORED, ULCERATED, RIGHT FORE ARM AVF +, BRUIT +  SKIN: WARM,   SKELETAL:  ROM ACCEPTABLE  CNS:  AAO X 3   , NO  DEFICITS    RADIOLOGY :    < from: Xray Toes, Right Foot (12.20.19 @ 17:48) >  IMPRESSION:   1. No acute fracture-subluxation demonstrated.  2. Stable mild erosion at first interphalangeal joint - gouty arthropathy not excluded.  3. Interval increase in soft tissue swelling, as above.     < end of copied text >        ASSESSMENT :     Gangrene, not elsewhere classified  Yes  Chronic CHF  MS (mitral stenosis)  GERD (gastroesophageal reflux disease)  COPD (chronic obstructive pulmonary disease)  HLD (hyperlipidemia)  HTN (hypertension)  ESRD (end stage renal disease) on dialysis  Chronic diastolic (congestive) heart failure  Anemia of chronic disease  Anemia  Hypertension  Congestive heart failure  Chronic kidney disease  No pertinent past medical history  Breast carcinoma  Renal mass  DM (diabetes mellitus)  Asthma  S/P subtotal hysterectomy  S/P lumpectomy, left breast      PLAN:  HPI:  79 year old female from home ambulates with cane at baseline with a significant PMHx of HTN, DM ,ESRD, chronic diastolic heart failure s/p CardioMEMS implantation, moderate MS, ESRD on dialysis (Tuesday, Thursday, Saturday), COPD on home O2, asthma , HLD  and PSHx of Left lumpectomy/radiation for breast CA in 2005, and JACLYN uterine cancer presents to the ED for worsening swelling and darkening of right toe. Patient has right grey toe cellulitis. Patient's daughter relates watery black stool and decreased appetite x1 week.  Recently was admitted a month ago for R foot cellulitis with hallux wound, treated with IV zosyn and doxy, podiatry consulted who recommended betadine dressing, per note, pt declined. LATOYA/PVR was done, in which showed LATOYA of 1.29 b/l, and decreased flow on bilateral metatarsal. Pt reports R great toe black wound starting to get worse after wearing tight shoes Patient also missed podiatry follow up post discharge from last admission. pt endorses nausea and vomiting. Patient denies any denies claudication pain, fever, chills, cough, chest pain, shortness of breath or any other complaints.    In ED :   Xray right 1st toe :  No acute fracture-subluxation demonstrated. Stable mild erosion at first interphalangeal joint - gouty arthropathy not excluded. Interval increase in soft tissue swelling, as above.   CXR : Stable chest. Chronic fibrosis vs. pulmonary vascular congestion - no significant interval change compared with 11/26/19 exam.  EKG : NSR ,  RBB  Lactate 0.7     Pt is DNR/DNI  MOLST form filled in chart pending attending signature (21 Dec 2019 03:36)    - PAD, RIGHT BIG TOE DIABETIC FOOT ULCER, SUSPECT OSTEOMYELITIS OF RIGHT BIG TOE  ON IV. ZOSYN. F/UP PODIATRY, VASCULAR AND ID CONSULTS AND WOUND / BLOOD CXS. OBTAIN MRI OF RIGHT FOOT  - S/P RAPID RESPONSE TODAY, S/P IV FLUIDS. F/UP TROPONINS TO R/O ACUTE MI & CT HEAD . MONITOR IN TELEMETRY. OBTAIN ICU CONSULT IF FURTHER REQUIRED. CARDIOLOGY CONSULT TO OBTAIN  - ESRD ON HD  - RECENT UTI AND BACTEREMIA DUE TO E.COLI ( RESOLVED )   - GI AND DVT PROPHYLAXIS  -

## 2019-12-21 NOTE — PATIENT PROFILE ADULT - DO YOU FEEL LIKE HURTING OTHERS?
Is This A New Presentation, Or A Follow-Up?: Growths
Have Your Skin Lesions Been Treated?: not been treated
no

## 2019-12-21 NOTE — CONSULT NOTE ADULT - SUBJECTIVE AND OBJECTIVE BOX
HPI:  79 year old female from home ambulates with cane at baseline with a significant PMHx of HTN, DM ,ESRD, chronic diastolic heart failure s/p CardioMEMS implantation, moderate MS, ESRD on dialysis (Tuesday, Thursday, Saturday), COPD on home O2, asthma , HLD  and PSHx of Left lumpectomy/radiation for breast CA in 2005, and JACLYN uterine cancer presents to the ED for worsening swelling and darkening of right toe. Patient has right grey toe cellulitis. Patient's daughter relates watery black stool and decreased appetite x1 week.  Recently was admitted a month ago for R foot cellulitis with hallux wound, treated with IV zosyn and doxy, podiatry consulted who recommended betadine dressing, per note, pt declined. LATOYA/PVR was done, in which showed LATOYA of 1.29 b/l, and decreased flow on bilateral metatarsal. Pt reports R great toe black wound starting to get worse after wearing tight shoes Patient also missed podiatry follow up post discharge from last admission. pt endorses nausea and vomiting. Patient denies any denies claudication pain, fever, chills, cough, chest pain, shortness of breath or any other complaints.    In ED :   Xray right 1st toe :  No acute fracture-subluxation demonstrated. Stable mild erosion at first interphalangeal joint - gouty arthropathy not excluded. Interval increase in soft tissue swelling, as above.   CXR : Stable chest. Chronic fibrosis vs. pulmonary vascular congestion - no significant interval change compared with 11/26/19 exam.  EKG : NSR ,  RBB  Lactate 0.7     Pt is DNR/DNI  MOLST form filled in chart pending attending signature (21 Dec 2019 03:36)      PAST MEDICAL & SURGICAL HISTORY:  Chronic CHF  MS (mitral stenosis)  GERD (gastroesophageal reflux disease)  COPD (chronic obstructive pulmonary disease): On home oxygen  HLD (hyperlipidemia): not taking statins  HTN (hypertension)  ESRD (end stage renal disease) on dialysis  Chronic diastolic (congestive) heart failure  Anemia of chronic disease  Breast carcinoma: Lt side s/p lumpectomy and radiation in 2004  DM (diabetes mellitus): type 2  S/P subtotal hysterectomy  S/P lumpectomy, left breast: 2004      No Known Allergies      Meds:  ALBUTerol    90 MICROgram(s) HFA Inhaler 2 Puff(s) Inhalation every 6 hours PRN  atorvastatin 10 milliGRAM(s) Oral at bedtime  budesonide  80 MICROgram(s)/formoterol 4.5 MICROgram(s) Inhaler 2 Puff(s) Inhalation two times a day  chlorhexidine 4% Liquid 1 Application(s) Topical <User Schedule>  epoetin randy Injectable 10807 Unit(s) IV Push <User Schedule>  ferrous    sulfate 325 milliGRAM(s) Oral daily  furosemide    Tablet 40 milliGRAM(s) Oral daily  gabapentin 100 milliGRAM(s) Oral daily  heparin  Injectable 5000 Unit(s) SubCutaneous every 12 hours  insulin glargine Injectable (LANTUS) 12 Unit(s) SubCutaneous at bedtime  insulin lispro (HumaLOG) corrective regimen sliding scale   SubCutaneous Before meals and at bedtime  insulin lispro Injectable (HumaLOG) 4 Unit(s) SubCutaneous three times a day before meals  latanoprost 0.005% Ophthalmic Solution 1 Drop(s) Both EYES at bedtime  lidocaine/prilocaine Cream 1 Application(s) Topical daily  midodrine. 5 milliGRAM(s) Oral three times a day  mupirocin 2% Nasal 1 Application(s) Nasal two times a day  ondansetron Injectable 4 milliGRAM(s) IV Push every 6 hours PRN  piperacillin/tazobactam IVPB.. 3.375 Gram(s) IV Intermittent every 12 hours  povidone iodine 10% Solution 1 Application(s) Topical daily  prednisoLONE acetate 1% Suspension 1 Drop(s) Both EYES two times a day  sevelamer carbonate 1600 milliGRAM(s) Oral three times a day with meals  timolol 0.5% Solution 1 Drop(s) Both EYES two times a day  tiotropium 18 MICROgram(s) Capsule 1 Capsule(s) Inhalation daily  traZODone 50 milliGRAM(s) Oral at bedtime      SOCIAL HISTORY:  Smoker:  YES / NO        PACK YEARS:                         WHEN QUIT?  ETOH use:  YES / NO               FREQUENCY / QUANTITY:  Ilicit Drug use:  YES / NO  Occupation:  Assisted device use (Cane / Walker):  Live with:    FAMILY HISTORY:  Diabetes mellitus  Family history of breast cancer (Aunt)      VITALS:  Vital Signs Last 24 Hrs  T(C): 36.7 (21 Dec 2019 14:37), Max: 37.2 (21 Dec 2019 04:36)  T(F): 98.1 (21 Dec 2019 14:37), Max: 98.9 (21 Dec 2019 04:36)  HR: 95 (21 Dec 2019 14:37) (87 - 106)  BP: 143/69 (21 Dec 2019 14:37) (104/60 - 170/69)  BP(mean): --  RR: 18 (21 Dec 2019 14:37) (18 - 18)  SpO2: 95% (21 Dec 2019 14:37) (95% - 100%)    LABS/DIAGNOSTIC TESTS:                          11.1   6.46  )-----------( 163      ( 21 Dec 2019 14:50 )             36.2     WBC Count: 6.46 K/uL (12-21 @ 14:50)  WBC Count: 6.98 K/uL (12-20 @ 17:54)      12-21    133<L>  |  97  |  44<H>  ----------------------------<  251<H>  3.8   |  30  |  4.16<H>    Ca    8.8      21 Dec 2019 14:50  Phos  3.8     12-21  Mg     2.1     12-21    TPro  7.5  /  Alb  2.8<L>  /  TBili  0.7  /  DBili  x   /  AST  12  /  ALT  19  /  AlkPhos  89  12-21          LIVER FUNCTIONS - ( 21 Dec 2019 14:50 )  Alb: 2.8 g/dL / Pro: 7.5 g/dL / ALK PHOS: 89 U/L / ALT: 19 U/L DA / AST: 12 U/L / GGT: x             PT/INR - ( 20 Dec 2019 17:54 )   PT: 13.3 sec;   INR: 1.19 ratio         PTT - ( 20 Dec 2019 17:54 )  PTT:31.6 sec    LACTATE:Lactate, Blood: 0.7 mmol/L (12-20 @ 17:54)      ABG -     CULTURES:           RADIOLOGY:< from: Xray Toes, Right Foot (12.20.19 @ 17:48) >  EXAM:  TOE(S) RIGHT FOOT                            PROCEDURE DATE:  12/20/2019          INTERPRETATION:  DATE OF STUDY: 12/20/2019    COMPARISON: 12/2/19.    CLINICAL HISTORY:  Persistent toe swelling.    Technique:: 3 films of distal right foot -attention to all the toes.    FINDINGS:  No fracture or dislocation.   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 Mild paraarticular erosion at medial aspect of head of 1st proximal phalanx is stable - could be due to erosive osteoarthritis - but gouty arthropathy not excluded. See arrows.   Interval increase in nonspecific, soft tissue swelling at distal first toe - but no discrete osteomyelitic erosion. No tracking of soft tissue air.    IMPRESSION:   1. No acute fracture-subluxation demonstrated.  2. Stable mild erosion at first interphalangeal joint - gouty arthropathy not excluded.  3. Interval increase in soft tissue swelling, as above.   This report to ER via PACs system.        DIEGO VALERO M.D., ATTENDING RADIOLOGIST  This document has been electronically signed. Dec 20 2019  8:50PM      ---------------------------------------------------------------------------------------------------------------------    < from: Xray Chest 1 View-PORTABLE IMMEDIATE (12.20.19 @ 17:00) >  EXAM:  XR CHEST PORTABLE IMMED 1V                            PROCEDURE DATE:  12/20/2019          INTERPRETATION:    DATE OF STUDY: 12/20/2019    PRIOR:11/26/19.    CLINICAL INDICATION: . Abdominal pain. Sepsis. Assess for chest process.    TECHNIQUE: portable chest.    FINDINGS:   Stable mild cardiomegaly.  The cardiomediastinal silhouette is otherwise within normal limits.   Persistent, diffuse interstitial prominence - likely related to chronic fibrosis or to mild pulmonary vascular congestion.  No focal consolidations. No pleural effusion or pneumothorax  No acute bony findings.    IMPRESSION:   Stable chest.  Chronic fibrosis vs. pulmonary vascular congestion - no significant interval change compared with 11/26/19 exam.                  DIEGO VALERO M.D., ATTENDING RADIOLOGIST  This document has been electronically signed. Dec 20 2019  6:51PM          < end of copied text >      ROS  [  ] UNABLE TO ELICIT HPI:  79 year old female from home ambulates with cane at baseline with a significant PMHx of HTN, DM ,ESRD, chronic diastolic heart failure s/p CardioMEMS implantation, moderate MS, ESRD on dialysis (Tuesday, Thursday, Saturday), COPD on home O2, asthma , HLD  and PSHx of Left lumpectomy/radiation for breast CA in 2005, and JACLYN uterine cancer presents to the ED for worsening swelling and darkening of right toe. Patient has right grey toe cellulitis. Patient's daughter relates watery black stool and decreased appetite x1 week.  Recently was admitted a month ago for R foot cellulitis with hallux wound, treated with IV zosyn and doxy, podiatry consulted who recommended betadine dressing, per note, pt declined. LATOYA/PVR was done, in which showed LATOYA of 1.29 b/l, and decreased flow on bilateral metatarsal. Pt reports R great toe black wound starting to get worse after wearing tight shoes Patient also missed podiatry follow up post discharge from last admission. pt endorses nausea and vomiting. Patient denies any denies claudication pain, fever, chills, cough, chest pain, shortness of breath or any other complaints.  In ED :   Xray right 1st toe :  No acute fracture-subluxation demonstrated. Stable mild erosion at first interphalangeal joint - gouty arthropathy not excluded. Interval increase in soft tissue swelling, as above.   CXR : Stable chest. Chronic fibrosis vs. pulmonary vascular congestion - no significant interval change compared with 11/26/19 exam.      History as above, pt who was recently admitted here with right foot cellulitis and discharged after doing better, she presents with diarrhea x few days multiple episodes per day and loose and foul smelling , no abdominal pain, no nausea or vomiting , no fevers or chills, she also has a right 1st toe ulcer which appears dry and the toe is a little discolored. She denies any pain.   She had a rapid response earlier when she dropped her blood pressure.      PAST MEDICAL & SURGICAL HISTORY:  Chronic CHF  MS (mitral stenosis)  GERD (gastroesophageal reflux disease)  COPD (chronic obstructive pulmonary disease): On home oxygen  HLD (hyperlipidemia): not taking statins  HTN (hypertension)  ESRD (end stage renal disease) on dialysis  Chronic diastolic (congestive) heart failure  Anemia of chronic disease  Breast carcinoma: Lt side s/p lumpectomy and radiation in 2004  DM (diabetes mellitus): type 2  S/P subtotal hysterectomy  S/P lumpectomy, left breast: 2004      No Known Allergies      Meds:  ALBUTerol    90 MICROgram(s) HFA Inhaler 2 Puff(s) Inhalation every 6 hours PRN  atorvastatin 10 milliGRAM(s) Oral at bedtime  budesonide  80 MICROgram(s)/formoterol 4.5 MICROgram(s) Inhaler 2 Puff(s) Inhalation two times a day  chlorhexidine 4% Liquid 1 Application(s) Topical <User Schedule>  epoetin randy Injectable 90849 Unit(s) IV Push <User Schedule>  ferrous    sulfate 325 milliGRAM(s) Oral daily  furosemide    Tablet 40 milliGRAM(s) Oral daily  gabapentin 100 milliGRAM(s) Oral daily  heparin  Injectable 5000 Unit(s) SubCutaneous every 12 hours  insulin glargine Injectable (LANTUS) 12 Unit(s) SubCutaneous at bedtime  insulin lispro (HumaLOG) corrective regimen sliding scale   SubCutaneous Before meals and at bedtime  insulin lispro Injectable (HumaLOG) 4 Unit(s) SubCutaneous three times a day before meals  latanoprost 0.005% Ophthalmic Solution 1 Drop(s) Both EYES at bedtime  lidocaine/prilocaine Cream 1 Application(s) Topical daily  midodrine. 5 milliGRAM(s) Oral three times a day  mupirocin 2% Nasal 1 Application(s) Nasal two times a day  ondansetron Injectable 4 milliGRAM(s) IV Push every 6 hours PRN  piperacillin/tazobactam IVPB.. 3.375 Gram(s) IV Intermittent every 12 hours  povidone iodine 10% Solution 1 Application(s) Topical daily  prednisoLONE acetate 1% Suspension 1 Drop(s) Both EYES two times a day  sevelamer carbonate 1600 milliGRAM(s) Oral three times a day with meals  timolol 0.5% Solution 1 Drop(s) Both EYES two times a day  tiotropium 18 MICROgram(s) Capsule 1 Capsule(s) Inhalation daily  traZODone 50 milliGRAM(s) Oral at bedtime      SOCIAL HISTORY:  Smoker:  no  ETOH use: no    FAMILY HISTORY:  Diabetes mellitus  Family history of breast cancer (Aunt)      VITALS:  Vital Signs Last 24 Hrs  T(C): 36.7 (21 Dec 2019 14:37), Max: 37.2 (21 Dec 2019 04:36)  T(F): 98.1 (21 Dec 2019 14:37), Max: 98.9 (21 Dec 2019 04:36)  HR: 95 (21 Dec 2019 14:37) (87 - 106)  BP: 143/69 (21 Dec 2019 14:37) (104/60 - 170/69)  BP(mean): --  RR: 18 (21 Dec 2019 14:37) (18 - 18)  SpO2: 95% (21 Dec 2019 14:37) (95% - 100%)    LABS/DIAGNOSTIC TESTS:                          11.1   6.46  )-----------( 163      ( 21 Dec 2019 14:50 )             36.2     WBC Count: 6.46 K/uL (12-21 @ 14:50)  WBC Count: 6.98 K/uL (12-20 @ 17:54)      12-21    133<L>  |  97  |  44<H>  ----------------------------<  251<H>  3.8   |  30  |  4.16<H>    Ca    8.8      21 Dec 2019 14:50  Phos  3.8     12-21  Mg     2.1     12-21    TPro  7.5  /  Alb  2.8<L>  /  TBili  0.7  /  DBili  x   /  AST  12  /  ALT  19  /  AlkPhos  89  12-21          LIVER FUNCTIONS - ( 21 Dec 2019 14:50 )  Alb: 2.8 g/dL / Pro: 7.5 g/dL / ALK PHOS: 89 U/L / ALT: 19 U/L DA / AST: 12 U/L / GGT: x             PT/INR - ( 20 Dec 2019 17:54 )   PT: 13.3 sec;   INR: 1.19 ratio         PTT - ( 20 Dec 2019 17:54 )  PTT:31.6 sec    LACTATE:Lactate, Blood: 0.7 mmol/L (12-20 @ 17:54)      ABG -     CULTURES:           RADIOLOGY:< from: Xray Toes, Right Foot (12.20.19 @ 17:48) >  EXAM:  TOE(S) RIGHT FOOT                            PROCEDURE DATE:  12/20/2019          INTERPRETATION:  DATE OF STUDY: 12/20/2019    COMPARISON: 12/2/19.    CLINICAL HISTORY:  Persistent toe swelling.    Technique:: 3 films of distal right foot -attention to all the toes.    FINDINGS:  No fracture or dislocation.   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 Mild paraarticular erosion at medial aspect of head of 1st proximal phalanx is stable - could be due to erosive osteoarthritis - but gouty arthropathy not excluded. See arrows.   Interval increase in nonspecific, soft tissue swelling at distal first toe - but no discrete osteomyelitic erosion. No tracking of soft tissue air.    IMPRESSION:   1. No acute fracture-subluxation demonstrated.  2. Stable mild erosion at first interphalangeal joint - gouty arthropathy not excluded.  3. Interval increase in soft tissue swelling, as above.   This report to ER via PACs system.        DIEGO VALERO M.D., ATTENDING RADIOLOGIST  This document has been electronically signed. Dec 20 2019  8:50PM      ---------------------------------------------------------------------------------------------------------------------    < from: Xray Chest 1 View-PORTABLE IMMEDIATE (12.20.19 @ 17:00) >  EXAM:  XR CHEST PORTABLE IMMED 1V                            PROCEDURE DATE:  12/20/2019          INTERPRETATION:    DATE OF STUDY: 12/20/2019    PRIOR:11/26/19.    CLINICAL INDICATION: . Abdominal pain. Sepsis. Assess for chest process.    TECHNIQUE: portable chest.    FINDINGS:   Stable mild cardiomegaly.  The cardiomediastinal silhouette is otherwise within normal limits.   Persistent, diffuse interstitial prominence - likely related to chronic fibrosis or to mild pulmonary vascular congestion.  No focal consolidations. No pleural effusion or pneumothorax  No acute bony findings.    IMPRESSION:   Stable chest.  Chronic fibrosis vs. pulmonary vascular congestion - no significant interval change compared with 11/26/19 exam.                  DIEGO VALERO M.D., ATTENDING RADIOLOGIST  This document has been electronically signed. Dec 20 2019  6:51PM          < end of copied text >      ROS  [  ] UNABLE TO ELICIT

## 2019-12-21 NOTE — H&P ADULT - PROBLEM SELECTOR PLAN 7
Son Goals of care discussed with patient and family   meaning of CPR described in detail after understanding risk associated with age.  Pt is DNR/DNI  MOLST form filled in chart pending attending signature

## 2019-12-21 NOTE — CONSULT NOTE ADULT - SUBJECTIVE AND OBJECTIVE BOX
S : 79y year old Female seen at bedside for Right plantar hallux gangrene. Pt states she feels fine and denies pain to her plantar right great toe. She states she does not apply any cream or dressing on her toe. Pt states she did not f/u with podiatry after her last admission earlier this month. Pt denies F/N/V/C/SOB.     Chief Complaint : Patient is a 79y old  Female who presents with a chief complaint of Toe gangrene (21 Dec 2019 09:57)    HPI : HPI:  79 year old female from home ambulates with cane at baseline with a significant PMHx of HTN, DM ,ESRD, chronic diastolic heart failure s/p CardioMEMS implantation, moderate MS, ESRD on dialysis (Tuesday, Thursday, Saturday), COPD on home O2, asthma , HLD  and PSHx of Left lumpectomy/radiation for breast CA in 2005, and JACLYN uterine cancer presents to the ED for worsening swelling and darkening of right toe. Patient has right grey toe cellulitis. Patient's daughter relates watery black stool and decreased appetite x1 week.  Recently was admitted a month ago for R foot cellulitis with hallux wound, treated with IV zosyn and doxy, podiatry consulted who recommended betadine dressing, per note, pt declined. LATOYA/PVR was done, in which showed LATOYA of 1.29 b/l, and decreased flow on bilateral metatarsal. Pt reports R great toe black wound starting to get worse after wearing tight shoes Patient also missed podiatry follow up post discharge from last admission. pt endorses nausea and vomiting. Patient denies any denies claudication pain, fever, chills, cough, chest pain, shortness of breath or any other complaints.    In ED :   Xray right 1st toe :  No acute fracture-subluxation demonstrated. Stable mild erosion at first interphalangeal joint - gouty arthropathy not excluded. Interval increase in soft tissue swelling, as above.   CXR : Stable chest. Chronic fibrosis vs. pulmonary vascular congestion - no significant interval change compared with 11/26/19 exam.  EKG : NSR ,  RBB  Lactate 0.7     Pt is DNR/DNI  MOLST form filled in chart pending attending signature (21 Dec 2019 03:36)      Patient admits to  (-) Fevers, (-) Chills, (-) Nausea, (-) Vomiting, (-) Shortness of Breath      PMH: Chronic CHF  MS (mitral stenosis)  GERD (gastroesophageal reflux disease)  COPD (chronic obstructive pulmonary disease)  HLD (hyperlipidemia)  HTN (hypertension)  ESRD (end stage renal disease) on dialysis  Chronic diastolic (congestive) heart failure  Anemia of chronic disease  Anemia  Hypertension  Congestive heart failure  Chronic kidney disease  No pertinent past medical history  Breast carcinoma  Renal mass  DM (diabetes mellitus)  Asthma    PSH:S/P subtotal hysterectomy  S/P lumpectomy, left breast  No significant past surgical history      Allergies:No Known Allergies      Labs:                          11.1   6.98  )-----------( 161      ( 20 Dec 2019 17:54 )             35.7     WBC Trend  6.98 Date (12-20 @ 17:54)  6.76 Date (11-30 @ 17:10)  8.58 Date (11-29 @ 07:43)  9.63 Date (11-28 @ 15:04)      Chem  12-20    133<L>  |  95<L>  |  36<H>  ----------------------------<  234<H>  3.7   |  31  |  3.61<H>    Ca    8.9      20 Dec 2019 17:54    TPro  7.7  /  Alb  3.0<L>  /  TBili  0.7  /  DBili  x   /  AST  14  /  ALT  20  /  AlkPhos  94  12-20          T(F): 98.5 (12-21-19 @ 07:23), Max: 98.9 (12-21-19 @ 04:36)  HR: 100 (12-21-19 @ 07:23) (87 - 106)  BP: 151/61 (12-21-19 @ 07:23) (104/60 - 170/69)  RR: 18 (12-21-19 @ 07:23) (16 - 18)  SpO2: 100% (12-21-19 @ 07:23) (96% - 100%)  Wt(kg): --    O:   General: Pleasant  female NAD & AOX3.    Integument:  Skin warm, dry and supple bilateral.    Hyperkeratotic lesion with Ulceration underneath post debridement Right plantar hallux, + hyperkeratotic border, wound base Fibrogranular , wound size (1.5 cm X 1.2 cm X 0.3cm) - edema, + rashaun-wound erythema, + purulence 1cc, + fluctuance, + tracking/tunneling, + probe to bone.   Vascular: Dorsalis Pedis and Posterior Tibial pulses 1/4.  Capillary re-fill time less then 3 seconds digits 1-5 bilateral.    Neuro: Protective sensation diminished to the level of the digits bilateral.  MSK: Muscle strength 5/5 all major muscle groups bilateral.    A: Right hallux ulceration suspicious for osteomyelitis       P:   Chart reviewed and Patient evaluated  Discussed diagnosis and treatment with patient  Excisional debridement with 15 blade of hyperkeratotic lesion down to subcutaneous tissue revealing Right hallux ulceration  Obtained wound culture to be sent to Pathology  Applied betadine with dry sterile dressing  X-rays reviewed: shows small erosion distal phalanx right hallux  Recommend IV antibiotics   Recommend right foot MRI to rule out osteomyelitis: ulcer probes to bone - likely OM   LATOYA reviewed R:12.6 L1.29  Pt to WBAT to right heel in forefoot offloading shoe, full WB left foot   Discussed importance of daily foot examinations and proper shoe gear and to importance of lower Fasting Blood Glucose levels.   Podiatry will follow while in house.  Wound care: betadine to right hallux wound. dress with 4x4 gauze kimi and ACE bandage. change daily and keep c/d/i.   Discussed with Attending  and seen with attending

## 2019-12-22 LAB
ALBUMIN SERPL ELPH-MCNC: 2.7 G/DL — LOW (ref 3.5–5)
ALP SERPL-CCNC: 84 U/L — SIGNIFICANT CHANGE UP (ref 40–120)
ALT FLD-CCNC: 18 U/L DA — SIGNIFICANT CHANGE UP (ref 10–60)
ANION GAP SERPL CALC-SCNC: 8 MMOL/L — SIGNIFICANT CHANGE UP (ref 5–17)
AST SERPL-CCNC: 13 U/L — SIGNIFICANT CHANGE UP (ref 10–40)
BASOPHILS # BLD AUTO: 0.04 K/UL — SIGNIFICANT CHANGE UP (ref 0–0.2)
BASOPHILS NFR BLD AUTO: 0.6 % — SIGNIFICANT CHANGE UP (ref 0–2)
BILIRUB SERPL-MCNC: 0.6 MG/DL — SIGNIFICANT CHANGE UP (ref 0.2–1.2)
BUN SERPL-MCNC: 30 MG/DL — HIGH (ref 7–18)
C DIFF BY PCR RESULT: SIGNIFICANT CHANGE UP
C DIFF TOX GENS STL QL NAA+PROBE: SIGNIFICANT CHANGE UP
CALCIUM SERPL-MCNC: 9.2 MG/DL — SIGNIFICANT CHANGE UP (ref 8.4–10.5)
CHLORIDE SERPL-SCNC: 97 MMOL/L — SIGNIFICANT CHANGE UP (ref 96–108)
CO2 SERPL-SCNC: 29 MMOL/L — SIGNIFICANT CHANGE UP (ref 22–31)
CREAT SERPL-MCNC: 3.7 MG/DL — HIGH (ref 0.5–1.3)
EOSINOPHIL # BLD AUTO: 0.26 K/UL — SIGNIFICANT CHANGE UP (ref 0–0.5)
EOSINOPHIL NFR BLD AUTO: 3.9 % — SIGNIFICANT CHANGE UP (ref 0–6)
GLUCOSE BLDC GLUCOMTR-MCNC: 115 MG/DL — HIGH (ref 70–99)
GLUCOSE BLDC GLUCOMTR-MCNC: 203 MG/DL — HIGH (ref 70–99)
GLUCOSE BLDC GLUCOMTR-MCNC: 232 MG/DL — HIGH (ref 70–99)
GLUCOSE BLDC GLUCOMTR-MCNC: 301 MG/DL — HIGH (ref 70–99)
GLUCOSE SERPL-MCNC: 208 MG/DL — HIGH (ref 70–99)
HCT VFR BLD CALC: 36 % — SIGNIFICANT CHANGE UP (ref 34.5–45)
HGB BLD-MCNC: 11 G/DL — LOW (ref 11.5–15.5)
IMM GRANULOCYTES NFR BLD AUTO: 0.3 % — SIGNIFICANT CHANGE UP (ref 0–1.5)
LYMPHOCYTES # BLD AUTO: 1.06 K/UL — SIGNIFICANT CHANGE UP (ref 1–3.3)
LYMPHOCYTES # BLD AUTO: 15.9 % — SIGNIFICANT CHANGE UP (ref 13–44)
MAGNESIUM SERPL-MCNC: 2.2 MG/DL — SIGNIFICANT CHANGE UP (ref 1.6–2.6)
MCHC RBC-ENTMCNC: 27.9 PG — SIGNIFICANT CHANGE UP (ref 27–34)
MCHC RBC-ENTMCNC: 30.6 GM/DL — LOW (ref 32–36)
MCV RBC AUTO: 91.4 FL — SIGNIFICANT CHANGE UP (ref 80–100)
MONOCYTES # BLD AUTO: 0.56 K/UL — SIGNIFICANT CHANGE UP (ref 0–0.9)
MONOCYTES NFR BLD AUTO: 8.4 % — SIGNIFICANT CHANGE UP (ref 2–14)
NEUTROPHILS # BLD AUTO: 4.74 K/UL — SIGNIFICANT CHANGE UP (ref 1.8–7.4)
NEUTROPHILS NFR BLD AUTO: 70.9 % — SIGNIFICANT CHANGE UP (ref 43–77)
NRBC # BLD: 0 /100 WBCS — SIGNIFICANT CHANGE UP (ref 0–0)
PHOSPHATE SERPL-MCNC: 3.7 MG/DL — SIGNIFICANT CHANGE UP (ref 2.5–4.5)
PLATELET # BLD AUTO: 154 K/UL — SIGNIFICANT CHANGE UP (ref 150–400)
POTASSIUM SERPL-MCNC: 3.7 MMOL/L — SIGNIFICANT CHANGE UP (ref 3.5–5.3)
POTASSIUM SERPL-SCNC: 3.7 MMOL/L — SIGNIFICANT CHANGE UP (ref 3.5–5.3)
PROLACTIN SERPL-MCNC: 46.8 NG/ML — HIGH (ref 3.4–24.1)
PROT SERPL-MCNC: 7.2 G/DL — SIGNIFICANT CHANGE UP (ref 6–8.3)
RBC # BLD: 3.94 M/UL — SIGNIFICANT CHANGE UP (ref 3.8–5.2)
RBC # FLD: 15.3 % — HIGH (ref 10.3–14.5)
SODIUM SERPL-SCNC: 134 MMOL/L — LOW (ref 135–145)
VIT D25+D1,25 OH+D1,25 PNL SERPL-MCNC: 19.2 PG/ML — LOW (ref 19.9–79.3)
WBC # BLD: 6.68 K/UL — SIGNIFICANT CHANGE UP (ref 3.8–10.5)
WBC # FLD AUTO: 6.68 K/UL — SIGNIFICANT CHANGE UP (ref 3.8–10.5)

## 2019-12-22 RX ADMIN — SEVELAMER CARBONATE 1600 MILLIGRAM(S): 2400 POWDER, FOR SUSPENSION ORAL at 12:36

## 2019-12-22 RX ADMIN — Medication 1 DROP(S): at 06:13

## 2019-12-22 RX ADMIN — INSULIN GLARGINE 12 UNIT(S): 100 INJECTION, SOLUTION SUBCUTANEOUS at 21:26

## 2019-12-22 RX ADMIN — BUDESONIDE AND FORMOTEROL FUMARATE DIHYDRATE 2 PUFF(S): 160; 4.5 AEROSOL RESPIRATORY (INHALATION) at 21:26

## 2019-12-22 RX ADMIN — SEVELAMER CARBONATE 1600 MILLIGRAM(S): 2400 POWDER, FOR SUSPENSION ORAL at 17:26

## 2019-12-22 RX ADMIN — Medication 4 UNIT(S): at 08:24

## 2019-12-22 RX ADMIN — MIDODRINE HYDROCHLORIDE 5 MILLIGRAM(S): 2.5 TABLET ORAL at 17:25

## 2019-12-22 RX ADMIN — SEVELAMER CARBONATE 1600 MILLIGRAM(S): 2400 POWDER, FOR SUSPENSION ORAL at 12:39

## 2019-12-22 RX ADMIN — ATORVASTATIN CALCIUM 10 MILLIGRAM(S): 80 TABLET, FILM COATED ORAL at 21:26

## 2019-12-22 RX ADMIN — Medication 2: at 17:25

## 2019-12-22 RX ADMIN — CHLORHEXIDINE GLUCONATE 1 APPLICATION(S): 213 SOLUTION TOPICAL at 06:13

## 2019-12-22 RX ADMIN — GABAPENTIN 100 MILLIGRAM(S): 400 CAPSULE ORAL at 12:33

## 2019-12-22 RX ADMIN — TIOTROPIUM BROMIDE 1 CAPSULE(S): 18 CAPSULE ORAL; RESPIRATORY (INHALATION) at 21:26

## 2019-12-22 RX ADMIN — Medication 2: at 08:22

## 2019-12-22 RX ADMIN — Medication 4 UNIT(S): at 12:33

## 2019-12-22 RX ADMIN — MUPIROCIN 1 APPLICATION(S): 20 OINTMENT TOPICAL at 17:29

## 2019-12-22 RX ADMIN — LATANOPROST 1 DROP(S): 0.05 SOLUTION/ DROPS OPHTHALMIC; TOPICAL at 21:26

## 2019-12-22 RX ADMIN — MIDODRINE HYDROCHLORIDE 5 MILLIGRAM(S): 2.5 TABLET ORAL at 12:33

## 2019-12-22 RX ADMIN — Medication 4 UNIT(S): at 17:25

## 2019-12-22 RX ADMIN — Medication 1 DROP(S): at 06:14

## 2019-12-22 RX ADMIN — HEPARIN SODIUM 5000 UNIT(S): 5000 INJECTION INTRAVENOUS; SUBCUTANEOUS at 17:27

## 2019-12-22 RX ADMIN — Medication 1 DROP(S): at 17:28

## 2019-12-22 RX ADMIN — Medication 50 MILLIGRAM(S): at 21:26

## 2019-12-22 RX ADMIN — BUDESONIDE AND FORMOTEROL FUMARATE DIHYDRATE 2 PUFF(S): 160; 4.5 AEROSOL RESPIRATORY (INHALATION) at 12:40

## 2019-12-22 RX ADMIN — HEPARIN SODIUM 5000 UNIT(S): 5000 INJECTION INTRAVENOUS; SUBCUTANEOUS at 06:14

## 2019-12-22 RX ADMIN — PIPERACILLIN AND TAZOBACTAM 25 GRAM(S): 4; .5 INJECTION, POWDER, LYOPHILIZED, FOR SOLUTION INTRAVENOUS at 17:28

## 2019-12-22 RX ADMIN — MUPIROCIN 1 APPLICATION(S): 20 OINTMENT TOPICAL at 06:14

## 2019-12-22 RX ADMIN — PIPERACILLIN AND TAZOBACTAM 25 GRAM(S): 4; .5 INJECTION, POWDER, LYOPHILIZED, FOR SOLUTION INTRAVENOUS at 06:13

## 2019-12-22 RX ADMIN — MIDODRINE HYDROCHLORIDE 5 MILLIGRAM(S): 2.5 TABLET ORAL at 06:13

## 2019-12-22 RX ADMIN — Medication 4: at 12:34

## 2019-12-22 NOTE — PROGRESS NOTE ADULT - SUBJECTIVE AND OBJECTIVE BOX
S : 79y year old Female seen at bedside for Right plantar hallux gangrene. Pt states she feels fine and denies pain to her plantar right great toe. She states she does not apply any cream or dressing on her toe. Pt states she did not f/u with podiatry after her last admission earlier this month. Pt denies F/N/V/C/SOB.     Chief Complaint : Patient is a 79y old  Female who presents with a chief complaint of Toe gangrene (21 Dec 2019 09:57)    HPI : HPI:  79 year old female from home ambulates with cane at baseline with a significant PMHx of HTN, DM ,ESRD, chronic diastolic heart failure s/p CardioMEMS implantation, moderate MS, ESRD on dialysis (Tuesday, Thursday, Saturday), COPD on home O2, asthma , HLD  and PSHx of Left lumpectomy/radiation for breast CA in 2005, and JACLYN uterine cancer presents to the ED for worsening swelling and darkening of right toe. Patient has right grey toe cellulitis. Patient's daughter relates watery black stool and decreased appetite x1 week.  Recently was admitted a month ago for R foot cellulitis with hallux wound, treated with IV zosyn and doxy, podiatry consulted who recommended betadine dressing, per note, pt declined. LATOYA/PVR was done, in which showed LATOYA of 1.29 b/l, and decreased flow on bilateral metatarsal. Pt reports R great toe black wound starting to get worse after wearing tight shoes Patient also missed podiatry follow up post discharge from last admission. pt endorses nausea and vomiting. Patient denies any denies claudication pain, fever, chills, cough, chest pain, shortness of breath or any other complaints.    In ED :   Xray right 1st toe :  No acute fracture-subluxation demonstrated. Stable mild erosion at first interphalangeal joint - gouty arthropathy not excluded. Interval increase in soft tissue swelling, as above.   CXR : Stable chest. Chronic fibrosis vs. pulmonary vascular congestion - no significant interval change compared with 11/26/19 exam.  EKG : NSR ,  RBB  Lactate 0.7     Pt is DNR/DNI  MOLST form filled in chart pending attending signature (21 Dec 2019 03:36)      Patient admits to  (-) Fevers, (-) Chills, (-) Nausea, (-) Vomiting, (-) Shortness of Breath      PMH: Chronic CHF  MS (mitral stenosis)  GERD (gastroesophageal reflux disease)  COPD (chronic obstructive pulmonary disease)  HLD (hyperlipidemia)  HTN (hypertension)  ESRD (end stage renal disease) on dialysis  Chronic diastolic (congestive) heart failure  Anemia of chronic disease  Anemia  Hypertension  Congestive heart failure  Chronic kidney disease  No pertinent past medical history  Breast carcinoma  Renal mass  DM (diabetes mellitus)  Asthma    PSH:S/P subtotal hysterectomy  S/P lumpectomy, left breast  No significant past surgical history      Allergies:No Known Allergies      Labs:                        11.0   6.68  )-----------( 154      ( 22 Dec 2019 07:20 )             36.0   12-22    134<L>  |  97  |  30<H>  ----------------------------<  208<H>  3.7   |  29  |  3.70<H>    Ca    9.2      22 Dec 2019 07:20  Phos  3.7     12-22  Mg     2.2     12-22    TPro  7.2  /  Alb  2.7<L>  /  TBili  0.6  /  DBili  x   /  AST  13  /  ALT  18  /  AlkPhos  84  12-22  Vital Signs Last 24 Hrs  T(C): 36.7 (22 Dec 2019 07:18), Max: 36.8 (22 Dec 2019 05:06)  T(F): 98.1 (22 Dec 2019 07:18), Max: 98.2 (22 Dec 2019 05:06)  HR: 81 (22 Dec 2019 07:18) (79 - 95)  BP: 89/56 (22 Dec 2019 07:18) (89/56 - 162/76)  BP(mean): --  RR: 18 (22 Dec 2019 07:18) (18 - 18)  SpO2: 95% (22 Dec 2019 07:18) (95% - 97%)    O:   General: Pleasant  female NAD & AOX3.    Integument:  Skin warm, dry and supple bilateral.    Hyperkeratotic lesion with Ulceration underneath post debridement Right plantar hallux, + hyperkeratotic border, wound base Fibrogranular , wound size (1.5 cm X 1.2 cm X 0.3cm) - edema, + rashaun-wound erythema, + purulence 1cc, + fluctuance, + tracking/tunneling, + probe to bone.   Vascular: Dorsalis Pedis and Posterior Tibial pulses 1/4.  Capillary re-fill time less then 3 seconds digits 1-5 bilateral.    Neuro: Protective sensation diminished to the level of the digits bilateral.  MSK: Muscle strength 5/5 all major muscle groups bilateral.    A: Right hallux ulceration suspicious for osteomyelitis       P:   Chart reviewed and Patient evaluated  Discussed diagnosis and treatment with patient  Excisional debridement with 15 blade of hyperkeratotic lesion down to subcutaneous tissue revealing Right hallux ulceration  Obtained wound culture to be sent to Pathology  Applied betadine with dry sterile dressing  X-rays reviewed: shows small erosion distal phalanx right hallux  Recommend IV antibiotics   Recommend right foot MRI to rule out osteomyelitis: ulcer probes to bone - likely OM   LATOYA reviewed R:12.6 L1.29  Pt to WBAT to right heel in forefoot offloading shoe, full WB left foot   Discussed importance of daily foot examinations and proper shoe gear and to importance of lower Fasting Blood Glucose levels.   Podiatry will follow while in house.  Wound care: betadine to right hallux wound. dress with 4x4 gauze kimi and ACE bandage. change daily and keep c/d/i.   Discussed with Attending .

## 2019-12-22 NOTE — PROGRESS NOTE ADULT - SUBJECTIVE AND OBJECTIVE BOX
Problem List:  ESRD  COPD  Right toe ulcer  Diarrhea started 3 days before admission 2 times a day.  C/O 2 episodes of diarrhea large amount in the night and in am  No abdominal pain , no vomit and no nausea    PAST MEDICAL & SURGICAL HISTORY:  Chronic CHF  MS (mitral stenosis)  GERD (gastroesophageal reflux disease)  COPD (chronic obstructive pulmonary disease): On home oxygen  HLD (hyperlipidemia): not taking statins  HTN (hypertension)  ESRD (end stage renal disease) on dialysis  Chronic diastolic (congestive) heart failure  Anemia of chronic disease  Breast carcinoma: Lt side s/p lumpectomy and radiation in 2004  DM (diabetes mellitus): type 2  S/P subtotal hysterectomy  S/P lumpectomy, left breast: 2004      No Known Allergies      MEDICATIONS  (STANDING):  atorvastatin 10 milliGRAM(s) Oral at bedtime  budesonide  80 MICROgram(s)/formoterol 4.5 MICROgram(s) Inhaler 2 Puff(s) Inhalation two times a day  chlorhexidine 4% Liquid 1 Application(s) Topical <User Schedule>  gabapentin 100 milliGRAM(s) Oral daily  heparin  Injectable 5000 Unit(s) SubCutaneous every 12 hours  insulin glargine Injectable (LANTUS) 12 Unit(s) SubCutaneous at bedtime  insulin lispro (HumaLOG) corrective regimen sliding scale   SubCutaneous Before meals and at bedtime  insulin lispro Injectable (HumaLOG) 4 Unit(s) SubCutaneous three times a day before meals  latanoprost 0.005% Ophthalmic Solution 1 Drop(s) Both EYES at bedtime  lidocaine/prilocaine Cream 1 Application(s) Topical daily  midodrine. 5 milliGRAM(s) Oral three times a day  mupirocin 2% Nasal 1 Application(s) Nasal two times a day  piperacillin/tazobactam IVPB.. 3.375 Gram(s) IV Intermittent every 12 hours  povidone iodine 10% Solution 1 Application(s) Topical daily  prednisoLONE acetate 1% Suspension 1 Drop(s) Both EYES two times a day  sevelamer carbonate 1600 milliGRAM(s) Oral three times a day with meals  timolol 0.5% Solution 1 Drop(s) Both EYES two times a day  tiotropium 18 MICROgram(s) Capsule 1 Capsule(s) Inhalation daily  traZODone 50 milliGRAM(s) Oral at bedtime    MEDICATIONS  (PRN):  ALBUTerol    90 MICROgram(s) HFA Inhaler 2 Puff(s) Inhalation every 6 hours PRN Shortness of Breath and/or Wheezing  ondansetron Injectable 4 milliGRAM(s) IV Push every 6 hours PRN Nausea and/or Vomiting                            11.0   6.68  )-----------( 154      ( 22 Dec 2019 07:20 )             36.0     12-22    134<L>  |  97  |  30<H>  ----------------------------<  208<H>  3.7   |  29  |  3.70<H>    Ca    9.2      22 Dec 2019 07:20  Phos  3.7     12-22  Mg     2.2     12-22    TPro  7.2  /  Alb  2.7<L>  /  TBili  0.6  /  DBili  x   /  AST  13  /  ALT  18  /  AlkPhos  84  12-22    PT/INR - ( 20 Dec 2019 17:54 )   PT: 13.3 sec;   INR: 1.19 ratio         PTT - ( 20 Dec 2019 17:54 )  PTT:31.6 sec        REVIEW OF SYSTEMS:  General: no fever no chills.    RESPIRATORY: No cough, wheezing, hemoptysis; No shortness of breath  CARDIOVASCULAR: No chest pain or palpitations. No Edema  GASTROINTESTINAL: No abdominal or epigastric pain. As above  GENITOURINARY: No dysuria,.        VITALS:  T(F): 98.1 (12-22-19 @ 07:18), Max: 98.2 (12-22-19 @ 05:06)  HR: 81 (12-22-19 @ 07:18)  BP: 89/56 (12-22-19 @ 07:18)  RR: 18 (12-22-19 @ 07:18)  SpO2: 95% (12-22-19 @ 07:18)  Wt(kg): --      PHYSICAL EXAM:  Constitutional: well developed, no diaphoresis, no distress.  Neck: No JVD, no carotid bruit, supple, no adenopathy  Respiratory: Good air entrance B/L, no wheezes, rales or rhonchi  Cardiovascular: S1, S2, RRR, no pericardial rub, no murmur  Abdomen: BS+, soft, no tenderness, no bruit  Pelvis: bladder nondistended  Extremities: No cyanosis or clubbing. No peripheral edema.   Vascular Access: left AVF with bruit and thrill

## 2019-12-22 NOTE — PROGRESS NOTE ADULT - SUBJECTIVE AND OBJECTIVE BOX
Patient is a 79y old  Female who presents with a chief complaint of Toe gangrene (21 Dec 2019 03:36)    MEDICATIONS  (STANDING):  atorvastatin 10 milliGRAM(s) Oral at bedtime  budesonide  80 MICROgram(s)/formoterol 4.5 MICROgram(s) Inhaler 2 Puff(s) Inhalation two times a day  chlorhexidine 4% Liquid 1 Application(s) Topical <User Schedule>  gabapentin 100 milliGRAM(s) Oral daily  heparin  Injectable 5000 Unit(s) SubCutaneous every 12 hours  insulin glargine Injectable (LANTUS) 12 Unit(s) SubCutaneous at bedtime  insulin lispro (HumaLOG) corrective regimen sliding scale   SubCutaneous Before meals and at bedtime  insulin lispro Injectable (HumaLOG) 4 Unit(s) SubCutaneous three times a day before meals  latanoprost 0.005% Ophthalmic Solution 1 Drop(s) Both EYES at bedtime  lidocaine/prilocaine Cream 1 Application(s) Topical daily  midodrine. 5 milliGRAM(s) Oral three times a day  mupirocin 2% Nasal 1 Application(s) Nasal two times a day  piperacillin/tazobactam IVPB.. 3.375 Gram(s) IV Intermittent every 12 hours  povidone iodine 10% Solution 1 Application(s) Topical daily  prednisoLONE acetate 1% Suspension 1 Drop(s) Both EYES two times a day  sevelamer carbonate 1600 milliGRAM(s) Oral three times a day with meals  timolol 0.5% Solution 1 Drop(s) Both EYES two times a day  tiotropium 18 MICROgram(s) Capsule 1 Capsule(s) Inhalation daily  traZODone 50 milliGRAM(s) Oral at bedtime    MEDICATIONS  (PRN):  ALBUTerol    90 MICROgram(s) HFA Inhaler 2 Puff(s) Inhalation every 6 hours PRN Shortness of Breath and/or Wheezing  ondansetron Injectable 4 milliGRAM(s) IV Push every 6 hours PRN Nausea and/or Vomiting    Vital Signs Last 24 Hrs  T(C): 36.7 (22 Dec 2019 07:18), Max: 36.8 (22 Dec 2019 05:06)  T(F): 98.1 (22 Dec 2019 07:18), Max: 98.2 (22 Dec 2019 05:06)  HR: 81 (22 Dec 2019 07:18) (79 - 95)  BP: 89/56 (22 Dec 2019 07:18) (89/56 - 162/76)  BP(mean): --  RR: 18 (22 Dec 2019 07:18) (18 - 18)  SpO2: 95% (22 Dec 2019 07:18) (95% - 97%)  Constitutional: No fever, fatigue  Skin: No rash.  Eyes: No recent vision problems or eye pain.  ENT: No congestion, ear pain, or sore throat.  Cardiovascular: No chest pain or palpation.  Respiratory: No cough, shortness of breath, congestion, or wheezing.  Gastrointestinal: No abdominal pain, nausea, vomiting, or diarrhea.  Genitourinary: No dysuria.  Musculoskeletal: No joint swelling.  Neurologic: No headache.    PHYSICAL EXAMINATION:  GENERAL APPEARANCE: NO DISTRESS  HEENT:  NO PALLOR, NO  JVD,  NO   NODES, NECK SUPPLE  CVS: S1 +, S2 +,   RS: AEEB,  OCCASIONAL  RALES +,   NO RONCHI  ABD: SOFT, NT, NO, BS +  EXT: NO PE,                                                    RIGHT BIG TOE DISCOLORED, ULCERATED, RIGHT FORE ARM AVF +, BRUIT +  SKIN: WARM,   SKELETAL:  ROM ACCEPTABLE  CNS:  AAO X 3   , NO  DEFICITS    RADIOLOGY :    < from: Xray Toes, Right Foot (12.20.19 @ 17:48) >  IMPRESSION:   1. No acute fracture-subluxation demonstrated.  2. Stable mild erosion at first interphalangeal joint - gouty arthropathy not excluded.  3. Interval increase in soft tissue swelling, as above.     < end of copied text >        ASSESSMENT :     Gangrene, not elsewhere classified  Yes  Chronic CHF  MS (mitral stenosis)  GERD (gastroesophageal reflux disease)  COPD (chronic obstructive pulmonary disease)  HLD (hyperlipidemia)  HTN (hypertension)  ESRD (end stage renal disease) on dialysis  Chronic diastolic (congestive) heart failure  Anemia of chronic disease  Anemia  Hypertension  Congestive heart failure  Chronic kidney disease  No pertinent past medical history  Breast carcinoma  Renal mass  DM (diabetes mellitus)  Asthma  S/P subtotal hysterectomy  S/P lumpectomy, left breast

## 2019-12-23 LAB
-  AMIKACIN: SIGNIFICANT CHANGE UP
-  AMIKACIN: SIGNIFICANT CHANGE UP
-  AMOXICILLIN/CLAVULANIC ACID: SIGNIFICANT CHANGE UP
-  AMOXICILLIN/CLAVULANIC ACID: SIGNIFICANT CHANGE UP
-  AMPICILLIN/SULBACTAM: SIGNIFICANT CHANGE UP
-  AMPICILLIN/SULBACTAM: SIGNIFICANT CHANGE UP
-  AMPICILLIN: SIGNIFICANT CHANGE UP
-  AMPICILLIN: SIGNIFICANT CHANGE UP
-  AZTREONAM: SIGNIFICANT CHANGE UP
-  AZTREONAM: SIGNIFICANT CHANGE UP
-  CEFAZOLIN: SIGNIFICANT CHANGE UP
-  CEFAZOLIN: SIGNIFICANT CHANGE UP
-  CEFEPIME: SIGNIFICANT CHANGE UP
-  CEFEPIME: SIGNIFICANT CHANGE UP
-  CEFOXITIN: SIGNIFICANT CHANGE UP
-  CEFOXITIN: SIGNIFICANT CHANGE UP
-  CEFTRIAXONE: SIGNIFICANT CHANGE UP
-  CEFTRIAXONE: SIGNIFICANT CHANGE UP
-  CIPROFLOXACIN: SIGNIFICANT CHANGE UP
-  CIPROFLOXACIN: SIGNIFICANT CHANGE UP
-  ERTAPENEM: SIGNIFICANT CHANGE UP
-  ERTAPENEM: SIGNIFICANT CHANGE UP
-  GENTAMICIN: SIGNIFICANT CHANGE UP
-  GENTAMICIN: SIGNIFICANT CHANGE UP
-  IMIPENEM: SIGNIFICANT CHANGE UP
-  IMIPENEM: SIGNIFICANT CHANGE UP
-  LEVOFLOXACIN: SIGNIFICANT CHANGE UP
-  LEVOFLOXACIN: SIGNIFICANT CHANGE UP
-  MEROPENEM: SIGNIFICANT CHANGE UP
-  MEROPENEM: SIGNIFICANT CHANGE UP
-  PIPERACILLIN/TAZOBACTAM: SIGNIFICANT CHANGE UP
-  PIPERACILLIN/TAZOBACTAM: SIGNIFICANT CHANGE UP
-  TOBRAMYCIN: SIGNIFICANT CHANGE UP
-  TOBRAMYCIN: SIGNIFICANT CHANGE UP
-  TRIMETHOPRIM/SULFAMETHOXAZOLE: SIGNIFICANT CHANGE UP
-  TRIMETHOPRIM/SULFAMETHOXAZOLE: SIGNIFICANT CHANGE UP
ALBUMIN SERPL ELPH-MCNC: 2.6 G/DL — LOW (ref 3.5–5)
ALP SERPL-CCNC: 85 U/L — SIGNIFICANT CHANGE UP (ref 40–120)
ALT FLD-CCNC: 15 U/L DA — SIGNIFICANT CHANGE UP (ref 10–60)
ANION GAP SERPL CALC-SCNC: 7 MMOL/L — SIGNIFICANT CHANGE UP (ref 5–17)
AST SERPL-CCNC: 11 U/L — SIGNIFICANT CHANGE UP (ref 10–40)
BASOPHILS # BLD AUTO: 0.04 K/UL — SIGNIFICANT CHANGE UP (ref 0–0.2)
BASOPHILS NFR BLD AUTO: 0.5 % — SIGNIFICANT CHANGE UP (ref 0–2)
BILIRUB SERPL-MCNC: 0.4 MG/DL — SIGNIFICANT CHANGE UP (ref 0.2–1.2)
BUN SERPL-MCNC: 44 MG/DL — HIGH (ref 7–18)
CALCIUM SERPL-MCNC: 8.7 MG/DL — SIGNIFICANT CHANGE UP (ref 8.4–10.5)
CHLORIDE SERPL-SCNC: 97 MMOL/L — SIGNIFICANT CHANGE UP (ref 96–108)
CO2 SERPL-SCNC: 30 MMOL/L — SIGNIFICANT CHANGE UP (ref 22–31)
CREAT SERPL-MCNC: 4.48 MG/DL — HIGH (ref 0.5–1.3)
EOSINOPHIL # BLD AUTO: 0.43 K/UL — SIGNIFICANT CHANGE UP (ref 0–0.5)
EOSINOPHIL NFR BLD AUTO: 5.7 % — SIGNIFICANT CHANGE UP (ref 0–6)
GLUCOSE BLDC GLUCOMTR-MCNC: 109 MG/DL — HIGH (ref 70–99)
GLUCOSE BLDC GLUCOMTR-MCNC: 169 MG/DL — HIGH (ref 70–99)
GLUCOSE BLDC GLUCOMTR-MCNC: 211 MG/DL — HIGH (ref 70–99)
GLUCOSE BLDC GLUCOMTR-MCNC: 261 MG/DL — HIGH (ref 70–99)
GLUCOSE SERPL-MCNC: 210 MG/DL — HIGH (ref 70–99)
HCT VFR BLD CALC: 36 % — SIGNIFICANT CHANGE UP (ref 34.5–45)
HGB BLD-MCNC: 11 G/DL — LOW (ref 11.5–15.5)
IMM GRANULOCYTES NFR BLD AUTO: 0.4 % — SIGNIFICANT CHANGE UP (ref 0–1.5)
LYMPHOCYTES # BLD AUTO: 1.23 K/UL — SIGNIFICANT CHANGE UP (ref 1–3.3)
LYMPHOCYTES # BLD AUTO: 16.4 % — SIGNIFICANT CHANGE UP (ref 13–44)
MAGNESIUM SERPL-MCNC: 2.1 MG/DL — SIGNIFICANT CHANGE UP (ref 1.6–2.6)
MCHC RBC-ENTMCNC: 27.9 PG — SIGNIFICANT CHANGE UP (ref 27–34)
MCHC RBC-ENTMCNC: 30.6 GM/DL — LOW (ref 32–36)
MCV RBC AUTO: 91.4 FL — SIGNIFICANT CHANGE UP (ref 80–100)
METHOD TYPE: SIGNIFICANT CHANGE UP
METHOD TYPE: SIGNIFICANT CHANGE UP
MONOCYTES # BLD AUTO: 0.59 K/UL — SIGNIFICANT CHANGE UP (ref 0–0.9)
MONOCYTES NFR BLD AUTO: 7.9 % — SIGNIFICANT CHANGE UP (ref 2–14)
NEUTROPHILS # BLD AUTO: 5.19 K/UL — SIGNIFICANT CHANGE UP (ref 1.8–7.4)
NEUTROPHILS NFR BLD AUTO: 69.1 % — SIGNIFICANT CHANGE UP (ref 43–77)
NRBC # BLD: 0 /100 WBCS — SIGNIFICANT CHANGE UP (ref 0–0)
PHOSPHATE SERPL-MCNC: 4 MG/DL — SIGNIFICANT CHANGE UP (ref 2.5–4.5)
PLATELET # BLD AUTO: 177 K/UL — SIGNIFICANT CHANGE UP (ref 150–400)
POTASSIUM SERPL-MCNC: 3.6 MMOL/L — SIGNIFICANT CHANGE UP (ref 3.5–5.3)
POTASSIUM SERPL-SCNC: 3.6 MMOL/L — SIGNIFICANT CHANGE UP (ref 3.5–5.3)
PROT SERPL-MCNC: 7.1 G/DL — SIGNIFICANT CHANGE UP (ref 6–8.3)
RBC # BLD: 3.94 M/UL — SIGNIFICANT CHANGE UP (ref 3.8–5.2)
RBC # FLD: 15.2 % — HIGH (ref 10.3–14.5)
SODIUM SERPL-SCNC: 134 MMOL/L — LOW (ref 135–145)
WBC # BLD: 7.51 K/UL — SIGNIFICANT CHANGE UP (ref 3.8–10.5)
WBC # FLD AUTO: 7.51 K/UL — SIGNIFICANT CHANGE UP (ref 3.8–10.5)

## 2019-12-23 PROCEDURE — 73718 MRI LOWER EXTREMITY W/O DYE: CPT | Mod: 26,RT

## 2019-12-23 RX ADMIN — PIPERACILLIN AND TAZOBACTAM 25 GRAM(S): 4; .5 INJECTION, POWDER, LYOPHILIZED, FOR SOLUTION INTRAVENOUS at 19:14

## 2019-12-23 RX ADMIN — Medication 4 UNIT(S): at 12:35

## 2019-12-23 RX ADMIN — Medication 1 DROP(S): at 19:14

## 2019-12-23 RX ADMIN — Medication 3: at 12:35

## 2019-12-23 RX ADMIN — INSULIN GLARGINE 12 UNIT(S): 100 INJECTION, SOLUTION SUBCUTANEOUS at 22:18

## 2019-12-23 RX ADMIN — Medication 1: at 22:17

## 2019-12-23 RX ADMIN — HEPARIN SODIUM 5000 UNIT(S): 5000 INJECTION INTRAVENOUS; SUBCUTANEOUS at 05:21

## 2019-12-23 RX ADMIN — CHLORHEXIDINE GLUCONATE 1 APPLICATION(S): 213 SOLUTION TOPICAL at 05:23

## 2019-12-23 RX ADMIN — Medication 1 DROP(S): at 19:13

## 2019-12-23 RX ADMIN — SEVELAMER CARBONATE 1600 MILLIGRAM(S): 2400 POWDER, FOR SUSPENSION ORAL at 19:12

## 2019-12-23 RX ADMIN — Medication 1 DROP(S): at 05:21

## 2019-12-23 RX ADMIN — SEVELAMER CARBONATE 1600 MILLIGRAM(S): 2400 POWDER, FOR SUSPENSION ORAL at 08:36

## 2019-12-23 RX ADMIN — Medication 2: at 08:36

## 2019-12-23 RX ADMIN — BUDESONIDE AND FORMOTEROL FUMARATE DIHYDRATE 2 PUFF(S): 160; 4.5 AEROSOL RESPIRATORY (INHALATION) at 22:17

## 2019-12-23 RX ADMIN — LIDOCAINE AND PRILOCAINE CREAM 1 APPLICATION(S): 25; 25 CREAM TOPICAL at 12:32

## 2019-12-23 RX ADMIN — LATANOPROST 1 DROP(S): 0.05 SOLUTION/ DROPS OPHTHALMIC; TOPICAL at 22:16

## 2019-12-23 RX ADMIN — PIPERACILLIN AND TAZOBACTAM 25 GRAM(S): 4; .5 INJECTION, POWDER, LYOPHILIZED, FOR SOLUTION INTRAVENOUS at 05:20

## 2019-12-23 RX ADMIN — MIDODRINE HYDROCHLORIDE 5 MILLIGRAM(S): 2.5 TABLET ORAL at 05:21

## 2019-12-23 RX ADMIN — TIOTROPIUM BROMIDE 1 CAPSULE(S): 18 CAPSULE ORAL; RESPIRATORY (INHALATION) at 12:36

## 2019-12-23 RX ADMIN — SEVELAMER CARBONATE 1600 MILLIGRAM(S): 2400 POWDER, FOR SUSPENSION ORAL at 12:30

## 2019-12-23 RX ADMIN — HEPARIN SODIUM 5000 UNIT(S): 5000 INJECTION INTRAVENOUS; SUBCUTANEOUS at 19:13

## 2019-12-23 RX ADMIN — GABAPENTIN 100 MILLIGRAM(S): 400 CAPSULE ORAL at 12:35

## 2019-12-23 RX ADMIN — MIDODRINE HYDROCHLORIDE 5 MILLIGRAM(S): 2.5 TABLET ORAL at 12:30

## 2019-12-23 RX ADMIN — MIDODRINE HYDROCHLORIDE 5 MILLIGRAM(S): 2.5 TABLET ORAL at 19:12

## 2019-12-23 RX ADMIN — Medication 50 MILLIGRAM(S): at 22:16

## 2019-12-23 RX ADMIN — BUDESONIDE AND FORMOTEROL FUMARATE DIHYDRATE 2 PUFF(S): 160; 4.5 AEROSOL RESPIRATORY (INHALATION) at 12:28

## 2019-12-23 RX ADMIN — MUPIROCIN 1 APPLICATION(S): 20 OINTMENT TOPICAL at 05:22

## 2019-12-23 RX ADMIN — Medication 4 UNIT(S): at 08:37

## 2019-12-23 RX ADMIN — ATORVASTATIN CALCIUM 10 MILLIGRAM(S): 80 TABLET, FILM COATED ORAL at 22:16

## 2019-12-23 NOTE — CONSULT NOTE ADULT - SUBJECTIVE AND OBJECTIVE BOX
HISTORY OF PRESENT ILLNESS: HPI:  79 year old female well known to our office with a significant PMHx of HTN, DM ,ESRD, chronic diastolic heart failure s/p CardioMEMS implantation, No ischemia on stress 12/17 moderate MS, ESRD on dialysis (Tuesday, Thursday, Saturday), COPD on home O2, asthma , HLD  and PSHx of Left lumpectomy/radiation for breast CA in 2005, and JACLYN uterine cancer presents to the ED for worsening swelling and darkening of right toe. Patient has right grey toe cellulitis. Patient's daughter relates watery black stool and decreased appetite x1 week.  Recently was admitted a month ago for R foot cellulitis with hallux wound, treated with IV zosyn and doxy, podiatry consulted who recommended betadine dressing, per note, pt declined. LATOYA/PVR was done, in which showed LATOYA of 1.29 b/l, and decreased flow on bilateral metatarsal. Pt reports R great toe black wound starting to get worse after wearing tight shoes Patient also missed podiatry follow up post discharge from last admission. pt endorses nausea and vomiting. Patient denies any denies claudication pain, fever, chills, cough, chest pain, shortness of breath or any other complaints.  An RRT was called as patient became unresponsive during HD, her rhythm on the monitor was sinus but was noted to be hypotensive.  Responded to saline bolus.     In ED :   Xray right 1st toe :  No acute fracture-subluxation demonstrated. Stable mild erosion at first interphalangeal joint - gouty arthropathy not excluded. Interval increase in soft tissue swelling, as above.   CXR : Stable chest. Chronic fibrosis vs. pulmonary vascular congestion - no significant interval change compared with 11/26/19 exam.  EKG : NSR ,  RBB  Lactate 0.7     Pt is DNR/DNI  MOLST form filled in chart pending attending signature (21 Dec 2019 03:36)      PAST MEDICAL & SURGICAL HISTORY:  Chronic CHF  MS (mitral stenosis)  GERD (gastroesophageal reflux disease)  COPD (chronic obstructive pulmonary disease): On home oxygen  HLD (hyperlipidemia): not taking statins  HTN (hypertension)  ESRD (end stage renal disease) on dialysis  Chronic diastolic (congestive) heart failure  Anemia of chronic disease  Breast carcinoma: Lt side s/p lumpectomy and radiation in 2004  DM (diabetes mellitus): type 2  S/P subtotal hysterectomy  S/P lumpectomy, left breast: 2004          MEDICATIONS:  MEDICATIONS  (STANDING):  atorvastatin 10 milliGRAM(s) Oral at bedtime  budesonide  80 MICROgram(s)/formoterol 4.5 MICROgram(s) Inhaler 2 Puff(s) Inhalation two times a day  chlorhexidine 4% Liquid 1 Application(s) Topical <User Schedule>  gabapentin 100 milliGRAM(s) Oral daily  heparin  Injectable 5000 Unit(s) SubCutaneous every 12 hours  insulin glargine Injectable (LANTUS) 12 Unit(s) SubCutaneous at bedtime  insulin lispro (HumaLOG) corrective regimen sliding scale   SubCutaneous Before meals and at bedtime  insulin lispro Injectable (HumaLOG) 4 Unit(s) SubCutaneous three times a day before meals  latanoprost 0.005% Ophthalmic Solution 1 Drop(s) Both EYES at bedtime  lidocaine/prilocaine Cream 1 Application(s) Topical daily  midodrine. 5 milliGRAM(s) Oral three times a day  mupirocin 2% Nasal 1 Application(s) Nasal two times a day  piperacillin/tazobactam IVPB.. 3.375 Gram(s) IV Intermittent every 12 hours  povidone iodine 10% Solution 1 Application(s) Topical daily  prednisoLONE acetate 1% Suspension 1 Drop(s) Both EYES two times a day  sevelamer carbonate 1600 milliGRAM(s) Oral three times a day with meals  timolol 0.5% Solution 1 Drop(s) Both EYES two times a day  tiotropium 18 MICROgram(s) Capsule 1 Capsule(s) Inhalation daily  traZODone 50 milliGRAM(s) Oral at bedtime      Allergies    No Known Allergies    Intolerances        FAMILY HISTORY:  Diabetes mellitus  Family history of breast cancer (Aunt)    Non-contributary for premature coronary disease or sudden cardiac death    SOCIAL HISTORY:    [ X] Non-smoker  [ ] Smoker  [ ] Alcohol      REVIEW OF SYSTEMS:  [ ]chest pain  [  ]shortness of breath  [  ]palpitations  [  ]syncope  [ ]near syncope [ ]upper extremity weakness   [ ] lower extremity weakness  [  ]diplopia  [  ]altered mental status   [  ]fevers  [ ]chills [ ]nausea  [ ]vomitting  [  ]dysphagia    [ ]abdominal pain  [ ]melena  [ ]BRBPR    [  ]epistaxis  [  ]rash    [ ]lower extremity edema        [x ] All others negative	  [ ] Unable to obtain      LABS:	 	    CARDIAC MARKERS:  CARDIAC MARKERS ( 21 Dec 2019 20:58 )  0.065 ng/mL / x     / 49 U/L / x     / 1.5 ng/mL                              11.0   7.51  )-----------( 177      ( 23 Dec 2019 06:20 )             36.0     Hb Trend: 11.0<--    12-23    134<L>  |  97  |  44<H>  ----------------------------<  210<H>  3.6   |  30  |  4.48<H>    Ca    8.7      23 Dec 2019 06:20  Phos  4.0     12-23  Mg     2.1     12-23    TPro  7.1  /  Alb  2.6<L>  /  TBili  0.4  /  DBili  x   /  AST  11  /  ALT  15  /  AlkPhos  85  12-23    Creatinine Trend: 4.48<--, 3.70<--, 3.02<--, 4.16<--, 3.61<--, 4.49<--      PHYSICAL EXAM:  T(C): 36.9 (12-23-19 @ 07:29), Max: 37.2 (12-22-19 @ 23:42)  HR: 69 (12-23-19 @ 07:29) (69 - 92)  BP: 134/62 (12-23-19 @ 07:29) (91/49 - 139/72)  RR: 18 (12-23-19 @ 07:29) (18 - 20)  SpO2: 100% (12-23-19 @ 07:29) (93% - 100%)  Wt(kg): --   BMI (kg/m2): 31.9 (12-21-19 @ 05:34)  I&O's Summary      Gen: Appears well in NAD  HEENT:  (-)icterus (-)pallor  CV: N S1 S2 1/6 HODA (+)2 Pulses B/l  Resp:  Clear to ausculatation B/L, normal effort  GI: (+) BS Soft, NT, ND  Lymph:  (-)Edema, (-)obvious lymphadenopathy  Skin: Warm to touch, Normal turgor  Psych: Appropriate mood and affect        TELEMETRY: 	Sinus, PAT      ECG:   Sinus 86 BPM, RBBB, LAFB	    RADIOLOGY:         CXR:   < from: Xray Chest 1 View-PORTABLE IMMEDIATE (12.20.19 @ 17:00) >  Stable chest.  Chronic fibrosis vs. pulmonary vascular congestion - no significant interval change compared with 11/26/19 exam.    < end of copied text >      ASSESSMENT/PLAN: 	79y Female  female well known to our office with a significant PMHx of HTN, DM ,ESRD, chronic diastolic heart failure s/p CardioMEMS implantation, No ischemia on stress 12/17 moderate MS, ESRD on dialysis (Tuesday, Thursday, Saturday), COPD on home O2, asthma , HLD  and PSHx of Left lumpectomy/radiation for breast CA in 2005, and JACLYN uterine cancer presents to the ED for worsening swelling and darkening of right toe. HISTORY OF PRESENT ILLNESS: HPI:  79 year old female well known to our office with a significant PMHx of HTN, DM ,ESRD, chronic diastolic heart failure s/p CardioMEMS implantation, No ischemia on stress 12/17 moderate MS, ESRD on dialysis (Tuesday, Thursday, Saturday), COPD on home O2, asthma , HLD  and PSHx of Left lumpectomy/radiation for breast CA in 2005, and JACLYN uterine cancer presents to the ED for worsening swelling and darkening of right toe. Patient has right grey toe cellulitis. Patient's daughter relates watery black stool and decreased appetite x1 week.  Recently was admitted a month ago for R foot cellulitis with hallux wound, treated with IV zosyn and doxy, podiatry consulted who recommended betadine dressing, per note, pt declined. LATOYA/PVR was done, in which showed LATOYA of 1.29 b/l, and decreased flow on bilateral metatarsal. Pt reports R great toe black wound starting to get worse after wearing tight shoes Patient also missed podiatry follow up post discharge from last admission. pt endorses nausea and vomiting. Patient denies any denies claudication pain, fever, chills, cough, chest pain, shortness of breath or any other complaints.  An RRT was called as patient became unresponsive during HD, her rhythm on the monitor was sinus but was noted to be hypotensive.  Responded to saline bolus.     In ED :   Xray right 1st toe :  No acute fracture-subluxation demonstrated. Stable mild erosion at first interphalangeal joint - gouty arthropathy not excluded. Interval increase in soft tissue swelling, as above.   CXR : Stable chest. Chronic fibrosis vs. pulmonary vascular congestion - no significant interval change compared with 11/26/19 exam.  EKG : NSR ,  RBB  Lactate 0.7     Pt is DNR/DNI  MOLST form filled in chart pending attending signature (21 Dec 2019 03:36)      PAST MEDICAL & SURGICAL HISTORY:  Chronic CHF  MS (mitral stenosis)  GERD (gastroesophageal reflux disease)  COPD (chronic obstructive pulmonary disease): On home oxygen  HLD (hyperlipidemia): not taking statins  HTN (hypertension)  ESRD (end stage renal disease) on dialysis  Chronic diastolic (congestive) heart failure  Anemia of chronic disease  Breast carcinoma: Lt side s/p lumpectomy and radiation in 2004  DM (diabetes mellitus): type 2  S/P subtotal hysterectomy  S/P lumpectomy, left breast: 2004          MEDICATIONS:  MEDICATIONS  (STANDING):  atorvastatin 10 milliGRAM(s) Oral at bedtime  budesonide  80 MICROgram(s)/formoterol 4.5 MICROgram(s) Inhaler 2 Puff(s) Inhalation two times a day  chlorhexidine 4% Liquid 1 Application(s) Topical <User Schedule>  gabapentin 100 milliGRAM(s) Oral daily  heparin  Injectable 5000 Unit(s) SubCutaneous every 12 hours  insulin glargine Injectable (LANTUS) 12 Unit(s) SubCutaneous at bedtime  insulin lispro (HumaLOG) corrective regimen sliding scale   SubCutaneous Before meals and at bedtime  insulin lispro Injectable (HumaLOG) 4 Unit(s) SubCutaneous three times a day before meals  latanoprost 0.005% Ophthalmic Solution 1 Drop(s) Both EYES at bedtime  lidocaine/prilocaine Cream 1 Application(s) Topical daily  midodrine. 5 milliGRAM(s) Oral three times a day  mupirocin 2% Nasal 1 Application(s) Nasal two times a day  piperacillin/tazobactam IVPB.. 3.375 Gram(s) IV Intermittent every 12 hours  povidone iodine 10% Solution 1 Application(s) Topical daily  prednisoLONE acetate 1% Suspension 1 Drop(s) Both EYES two times a day  sevelamer carbonate 1600 milliGRAM(s) Oral three times a day with meals  timolol 0.5% Solution 1 Drop(s) Both EYES two times a day  tiotropium 18 MICROgram(s) Capsule 1 Capsule(s) Inhalation daily  traZODone 50 milliGRAM(s) Oral at bedtime      Allergies    No Known Allergies    Intolerances        FAMILY HISTORY:  Diabetes mellitus  Family history of breast cancer (Aunt)    Non-contributary for premature coronary disease or sudden cardiac death    SOCIAL HISTORY:    [ X] Non-smoker  [ ] Smoker  [ ] Alcohol      REVIEW OF SYSTEMS:  [ ]chest pain  [  ]shortness of breath  [  ]palpitations  [  ]syncope  [ ]near syncope [ ]upper extremity weakness   [ ] lower extremity weakness  [  ]diplopia  [  ]altered mental status   [  ]fevers  [ ]chills [ ]nausea  [ ]vomitting  [  ]dysphagia    [ ]abdominal pain  [ ]melena  [ ]BRBPR    [  ]epistaxis  [  ]rash    [ ]lower extremity edema        [x ] All others negative	  [ ] Unable to obtain      LABS:	 	    CARDIAC MARKERS:  CARDIAC MARKERS ( 21 Dec 2019 20:58 )  0.065 ng/mL / x     / 49 U/L / x     / 1.5 ng/mL                              11.0   7.51  )-----------( 177      ( 23 Dec 2019 06:20 )             36.0     Hb Trend: 11.0<--    12-23    134<L>  |  97  |  44<H>  ----------------------------<  210<H>  3.6   |  30  |  4.48<H>    Ca    8.7      23 Dec 2019 06:20  Phos  4.0     12-23  Mg     2.1     12-23    TPro  7.1  /  Alb  2.6<L>  /  TBili  0.4  /  DBili  x   /  AST  11  /  ALT  15  /  AlkPhos  85  12-23    Creatinine Trend: 4.48<--, 3.70<--, 3.02<--, 4.16<--, 3.61<--, 4.49<--      PHYSICAL EXAM:  T(C): 36.9 (12-23-19 @ 07:29), Max: 37.2 (12-22-19 @ 23:42)  HR: 69 (12-23-19 @ 07:29) (69 - 92)  BP: 134/62 (12-23-19 @ 07:29) (91/49 - 139/72)  RR: 18 (12-23-19 @ 07:29) (18 - 20)  SpO2: 100% (12-23-19 @ 07:29) (93% - 100%)  Wt(kg): --   BMI (kg/m2): 31.9 (12-21-19 @ 05:34)  I&O's Summary      Gen: Appears well in NAD  HEENT:  (-)icterus (-)pallor  CV: N S1 S2 1/6 HODA (+)2 Pulses B/l  Resp:  Clear to ausculatation B/L, normal effort  GI: (+) BS Soft, NT, ND  Lymph:  (-)Edema, (-)obvious lymphadenopathy  Skin: Warm to touch, Normal turgor  Psych: Appropriate mood and affect        TELEMETRY: 	Sinus, PAT      ECG:   Sinus 86 BPM, RBBB, LAFB	    RADIOLOGY:         CXR:   < from: Xray Chest 1 View-PORTABLE IMMEDIATE (12.20.19 @ 17:00) >  Stable chest.  Chronic fibrosis vs. pulmonary vascular congestion - no significant interval change compared with 11/26/19 exam.    < end of copied text >      ASSESSMENT/PLAN: 	79y Female  female well known to our office with a significant PMHx of HTN, DM ,ESRD, chronic diastolic heart failure s/p CardioMEMS implantation, No ischemia on stress 12/17 moderate MS, ESRD on dialysis (Tuesday, Thursday, Saturday), COPD on home O2, asthma , HLD  and PSHx of Left lumpectomy/radiation for breast CA in 2005, and JACLYN uterine cancer presents to the ED for worsening swelling and darkening of right toe s/p episode of unresponsiveness and hypotension in HD.    - Appears episode was non arrhythmic in nature as she was reportedly in sinus during the episode  - Likely due to hypotension in the setting of diarrhea  - No clinical CHF  - No pertinent findings on tele  - Podiatry f/u  - Abx per primary team      I once again thank you for allowing me to participate in the care of your mutual  patient.  If you have any questions or concerns please do not hesitate to contact me.    Kyler Jaimes MD, [facility]  BEEPER [phone]

## 2019-12-23 NOTE — PROGRESS NOTE ADULT - SUBJECTIVE AND OBJECTIVE BOX
Problem List:  ESRD  Diarrhea  Toe ulcer    PAST MEDICAL & SURGICAL HISTORY:  Chronic CHF  MS (mitral stenosis)  GERD (gastroesophageal reflux disease)  COPD (chronic obstructive pulmonary disease): On home oxygen  HLD (hyperlipidemia): not taking statins  HTN (hypertension)  ESRD (end stage renal disease) on dialysis  Chronic diastolic (congestive) heart failure  Anemia of chronic disease  Breast carcinoma: Lt side s/p lumpectomy and radiation in 2004  DM (diabetes mellitus): type 2  S/P subtotal hysterectomy  S/P lumpectomy, left breast: 2004      No Known Allergies      MEDICATIONS  (STANDING):  atorvastatin 10 milliGRAM(s) Oral at bedtime  budesonide  80 MICROgram(s)/formoterol 4.5 MICROgram(s) Inhaler 2 Puff(s) Inhalation two times a day  chlorhexidine 4% Liquid 1 Application(s) Topical <User Schedule>  gabapentin 100 milliGRAM(s) Oral daily  heparin  Injectable 5000 Unit(s) SubCutaneous every 12 hours  insulin glargine Injectable (LANTUS) 12 Unit(s) SubCutaneous at bedtime  insulin lispro (HumaLOG) corrective regimen sliding scale   SubCutaneous Before meals and at bedtime  insulin lispro Injectable (HumaLOG) 4 Unit(s) SubCutaneous three times a day before meals  latanoprost 0.005% Ophthalmic Solution 1 Drop(s) Both EYES at bedtime  lidocaine/prilocaine Cream 1 Application(s) Topical daily  midodrine. 5 milliGRAM(s) Oral three times a day  mupirocin 2% Nasal 1 Application(s) Nasal two times a day  piperacillin/tazobactam IVPB.. 3.375 Gram(s) IV Intermittent every 12 hours  povidone iodine 10% Solution 1 Application(s) Topical daily  prednisoLONE acetate 1% Suspension 1 Drop(s) Both EYES two times a day  sevelamer carbonate 1600 milliGRAM(s) Oral three times a day with meals  timolol 0.5% Solution 1 Drop(s) Both EYES two times a day  tiotropium 18 MICROgram(s) Capsule 1 Capsule(s) Inhalation daily  traZODone 50 milliGRAM(s) Oral at bedtime    MEDICATIONS  (PRN):  ALBUTerol    90 MICROgram(s) HFA Inhaler 2 Puff(s) Inhalation every 6 hours PRN Shortness of Breath and/or Wheezing  ondansetron Injectable 4 milliGRAM(s) IV Push every 6 hours PRN Nausea and/or Vomiting                            11.0   7.51  )-----------( 177      ( 23 Dec 2019 06:20 )             36.0     12-23    134<L>  |  97  |  44<H>  ----------------------------<  210<H>  3.6   |  30  |  4.48<H>    Ca    8.7      23 Dec 2019 06:20  Phos  4.0     12-23  Mg     2.1     12-23    TPro  7.1  /  Alb  2.6<L>  /  TBili  0.4  /  DBili  x   /  AST  11  /  ALT  15  /  AlkPhos  85  12-23      C Diff toxin not detected.      REVIEW OF SYSTEMS:  General: no fever no chills, no weight loss.  EYES/ENT: No visual changes;  No vertigo, no headache.  NECK: No pain or stiffness  RESPIRATORY: No cough, wheezing, hemoptysis; No shortness of breath  CARDIOVASCULAR: No chest pain or palpitations. No Edema  GASTROINTESTINAL: No abdominal or epigastric pain. No diarrhea  GENITOURINARY: No dysuria, frequency, foamy urine, urinary urgency, incontinence or hematuria          VITALS:  T(F): 97.9 (12-23-19 @ 11:21), Max: 99 (12-22-19 @ 23:42)  HR: 73 (12-23-19 @ 11:21)  BP: 118/60 (12-23-19 @ 11:21)  RR: 18 (12-23-19 @ 11:21)  SpO2: 92% (12-23-19 @ 11:21)  Wt(kg): --      PHYSICAL EXAM:  Constitutional: well developed, no diaphoresis, no distress.  Neck: No JVD, no carotid bruit, supple, no adenopathy  Respiratory: Good air entrance B/L, no wheezes, rales or rhonchi  Cardiovascular: S1, S2, RRR, no pericardial rub, no murmur  Abdomen: BS+, soft, no tenderness, no bruit  Pelvis: bladder nondistended  Extremities: No cyanosis or clubbing. No peripheral edema.     Vascular Access: right AVF .

## 2019-12-23 NOTE — PROGRESS NOTE ADULT - PROBLEM SELECTOR PLAN 7
Goals of care discussed with patient and family   meaning of CPR described in detail after understanding risk associated with age.  Pt is DNR/DNI  MOLST form filled in chart pending attending signature

## 2019-12-23 NOTE — PROGRESS NOTE ADULT - SUBJECTIVE AND OBJECTIVE BOX
S : 79y year old Female seen at bedside for Right plantar hallux gangrene. Pt states she feels fine and denies pain to her plantar right great toe. She states she does not apply any cream or dressing on her toe. Pt states she did not f/u with podiatry after her last admission earlier this month. Pt denies F/N/V/C/SOB. Pending MRI.    Chief Complaint : Patient is a 79y old  Female who presents with a chief complaint of Toe gangrene (21 Dec 2019 09:57)    HPI : HPI:  79 year old female from home ambulates with cane at baseline with a significant PMHx of HTN, DM ,ESRD, chronic diastolic heart failure s/p CardioMEMS implantation, moderate MS, ESRD on dialysis (Tuesday, Thursday, Saturday), COPD on home O2, asthma , HLD  and PSHx of Left lumpectomy/radiation for breast CA in 2005, and JACLYN uterine cancer presents to the ED for worsening swelling and darkening of right toe. Patient has right grey toe cellulitis. Patient's daughter relates watery black stool and decreased appetite x1 week.  Recently was admitted a month ago for R foot cellulitis with hallux wound, treated with IV zosyn and doxy, podiatry consulted who recommended betadine dressing, per note, pt declined. LATOYA/PVR was done, in which showed LATOYA of 1.29 b/l, and decreased flow on bilateral metatarsal. Pt reports R great toe black wound starting to get worse after wearing tight shoes Patient also missed podiatry follow up post discharge from last admission. pt endorses nausea and vomiting. Patient denies any denies claudication pain, fever, chills, cough, chest pain, shortness of breath or any other complaints.    In ED :   Xray right 1st toe :  No acute fracture-subluxation demonstrated. Stable mild erosion at first interphalangeal joint - gouty arthropathy not excluded. Interval increase in soft tissue swelling, as above.   CXR : Stable chest. Chronic fibrosis vs. pulmonary vascular congestion - no significant interval change compared with 11/26/19 exam.  EKG : NSR ,  RBB  Lactate 0.7     Pt is DNR/DNI  MOLST form filled in chart pending attending signature (21 Dec 2019 03:36)      Patient admits to  (-) Fevers, (-) Chills, (-) Nausea, (-) Vomiting, (-) Shortness of Breath      PMH: Chronic CHF  MS (mitral stenosis)  GERD (gastroesophageal reflux disease)  COPD (chronic obstructive pulmonary disease)  HLD (hyperlipidemia)  HTN (hypertension)  ESRD (end stage renal disease) on dialysis  Chronic diastolic (congestive) heart failure  Anemia of chronic disease  Anemia  Hypertension  Congestive heart failure  Chronic kidney disease  No pertinent past medical history  Breast carcinoma  Renal mass  DM (diabetes mellitus)  Asthma    PSH:S/P subtotal hysterectomy  S/P lumpectomy, left breast  No significant past surgical history      Allergies:No Known Allergies                            11.0   7.51  )-----------( 177      ( 23 Dec 2019 06:20 )             36.0   12-23    134<L>  |  97  |  44<H>  ----------------------------<  210<H>  3.6   |  30  |  4.48<H>    Ca    8.7      23 Dec 2019 06:20  Phos  4.0     12-23  Mg     2.1     12-23    TPro  7.1  /  Alb  2.6<L>  /  TBili  0.4  /  DBili  x   /  AST  11  /  ALT  15  /  AlkPhos  85  12-23    O:   General: Pleasant  female NAD & AOX3.    Integument:  Skin warm, dry and supple bilateral.    Hyperkeratotic lesion with Ulceration underneath post debridement Right plantar hallux, + hyperkeratotic border, wound base Fibrogranular , wound size (1.5 cm X 1.2 cm X 0.3cm) - edema, + rashaun-wound erythema, + purulence 1cc, + fluctuance, + tracking/tunneling, + probe to bone.   Vascular: Dorsalis Pedis and Posterior Tibial pulses 1/4.  Capillary re-fill time less then 3 seconds digits 1-5 bilateral.    Neuro: Protective sensation diminished to the level of the digits bilateral.  MSK: Muscle strength 5/5 all major muscle groups bilateral.    A: Right hallux ulceration suspicious for osteomyelitis       P:   Chart reviewed and Patient evaluated  Discussed diagnosis and treatment with patient  Excisional debridement with 15 blade of hyperkeratotic lesion down to subcutaneous tissue revealing Right hallux ulceration  wound culture pending final results  Applied betadine with dry sterile dressing  X-rays reviewed: shows small erosion distal phalanx right hallux  Recommend IV antibiotics   Recommend right foot MRI to rule out osteomyelitis: ulcer probes to bone - likely OM   LATOYA reviewed R:12.6 L1.29  Pt to WBAT to right heel in forefoot offloading shoe, full WB left foot   Discussed importance of daily foot examinations and proper shoe gear and to importance of lower Fasting Blood Glucose levels.   Podiatry will follow while in house.  Wound care: betadine to right hallux wound. dress with 4x4 gauze kimi and ACE bandage. change daily and keep c/d/i.   Discussed with Attending .

## 2019-12-23 NOTE — PROGRESS NOTE ADULT - PROBLEM SELECTOR PLAN 1
- p/w dry gangrene to plantar aspect of right toe . no discharge, or malodor.   - previous LATOYA/PVR c/w decreased flow to metatarsal  -f/u US Duplex Arterial lower extremity  -Surgical swab grows E.coli and Morganella morganii  - No Leukocytosis   - Afebrile   - lactate 0.7  - on Zosyn   - ESR: 82  - Blood Cx: negative  - Podiatry consulted  ** Vascular consulted Dr. Dennis  ** ID consulted Dr. Barbour

## 2019-12-23 NOTE — PROGRESS NOTE ADULT - PROBLEM SELECTOR PLAN 2
- ESRD Maintenance HD Tuesday/thursday/ saturday   - last dialysis Saturday  - BUN/Cr 36/3.61 on admission  ** Nephrology consulted Dr. Taamyo

## 2019-12-23 NOTE — PROGRESS NOTE ADULT - SUBJECTIVE AND OBJECTIVE BOX
PGY1 Note discussed with supervising resident and primary attending.    Patient is a 79y old  Female who presents with a chief complaint of Toe gangrene (22 Dec 2019 11:01)      INTERVAL HPI/OVERNIGHT EVENTS: Patient seen and examined at bedside. No acute events.     MEDICATIONS  (STANDING):  atorvastatin 10 milliGRAM(s) Oral at bedtime  budesonide  80 MICROgram(s)/formoterol 4.5 MICROgram(s) Inhaler 2 Puff(s) Inhalation two times a day  chlorhexidine 4% Liquid 1 Application(s) Topical <User Schedule>  gabapentin 100 milliGRAM(s) Oral daily  heparin  Injectable 5000 Unit(s) SubCutaneous every 12 hours  insulin glargine Injectable (LANTUS) 12 Unit(s) SubCutaneous at bedtime  insulin lispro (HumaLOG) corrective regimen sliding scale   SubCutaneous Before meals and at bedtime  insulin lispro Injectable (HumaLOG) 4 Unit(s) SubCutaneous three times a day before meals  latanoprost 0.005% Ophthalmic Solution 1 Drop(s) Both EYES at bedtime  lidocaine/prilocaine Cream 1 Application(s) Topical daily  midodrine. 5 milliGRAM(s) Oral three times a day  mupirocin 2% Nasal 1 Application(s) Nasal two times a day  piperacillin/tazobactam IVPB.. 3.375 Gram(s) IV Intermittent every 12 hours  povidone iodine 10% Solution 1 Application(s) Topical daily  prednisoLONE acetate 1% Suspension 1 Drop(s) Both EYES two times a day  sevelamer carbonate 1600 milliGRAM(s) Oral three times a day with meals  timolol 0.5% Solution 1 Drop(s) Both EYES two times a day  tiotropium 18 MICROgram(s) Capsule 1 Capsule(s) Inhalation daily  traZODone 50 milliGRAM(s) Oral at bedtime    MEDICATIONS  (PRN):  ALBUTerol    90 MICROgram(s) HFA Inhaler 2 Puff(s) Inhalation every 6 hours PRN Shortness of Breath and/or Wheezing  ondansetron Injectable 4 milliGRAM(s) IV Push every 6 hours PRN Nausea and/or Vomiting      Allergies    No Known Allergies    Intolerances        REVIEW OF SYSTEMS:  CONSTITUTIONAL: No fever, weight loss, or fatigue  RESPIRATORY: No cough, wheezing, chills or hemoptysis; No shortness of breath  CARDIOVASCULAR: No chest pain, palpitations, dizziness, or leg swelling  GASTROINTESTINAL: No abdominal or epigastric pain. No nausea, vomiting, or hematemesis; No diarrhea or constipation. No melena or hematochezia.  NEUROLOGICAL: No headaches, memory loss, loss of strength, numbness, or tremors  SKIN: No itching, burning, rashes, or lesions     Vital Signs Last 24 Hrs  T(C): 36.9 (23 Dec 2019 07:29), Max: 37.2 (22 Dec 2019 23:42)  T(F): 98.4 (23 Dec 2019 07:29), Max: 99 (22 Dec 2019 23:42)  HR: 69 (23 Dec 2019 07:29) (69 - 92)  BP: 134/62 (23 Dec 2019 07:29) (91/49 - 139/72)  BP(mean): --  RR: 18 (23 Dec 2019 07:29) (18 - 20)  SpO2: 100% (23 Dec 2019 07:29) (93% - 100%)    PHYSICAL EXAM:  GENERAL: NAD, well-groomed, well-developed  HEAD:  Atraumatic, Normocephalic  EYES: EOMI, PERRLA, conjunctiva and sclera clear  NECK: Supple, No JVD, Normal thyroid  CHEST/LUNG: Clear to percussion bilaterally; No rales, rhonchi, wheezing, or rubs  HEART: Regular rate and rhythm; No murmurs, rubs, or gallops  ABDOMEN: Soft, Nontender, Nondistended; Bowel sounds present  NERVOUS SYSTEM:  Alert & Oriented X3, Good concentration; Motor Strength 5/5 B/L   EXTREMITIES: DP/PT pulses not palpable.  No clubbing, cyanosis, or edema  SKIN; R foot and toe dressed    LABS:                        11.0   7.51  )-----------( 177      ( 23 Dec 2019 06:20 )             36.0     12-23    134<L>  |  97  |  44<H>  ----------------------------<  210<H>  3.6   |  30  |  4.48<H>    Ca    8.7      23 Dec 2019 06:20  Phos  4.0     12-23  Mg     2.1     12-23    TPro  7.1  /  Alb  2.6<L>  /  TBili  0.4  /  DBili  x   /  AST  11  /  ALT  15  /  AlkPhos  85  12-23        CAPILLARY BLOOD GLUCOSE      POCT Blood Glucose.: 211 mg/dL (23 Dec 2019 07:37)  POCT Blood Glucose.: 115 mg/dL (22 Dec 2019 20:51)  POCT Blood Glucose.: 232 mg/dL (22 Dec 2019 16:51)  POCT Blood Glucose.: 301 mg/dL (22 Dec 2019 12:06)      RADIOLOGY & ADDITIONAL TESTS:    Imaging Personally Reviewed:  [x ] YES  [ ] NO    Consultant(s) Notes Reviewed:  [x ] YES  [ ] NO

## 2019-12-24 ENCOUNTER — TRANSCRIPTION ENCOUNTER (OUTPATIENT)
Age: 79
End: 2019-12-24

## 2019-12-24 VITALS
TEMPERATURE: 98 F | RESPIRATION RATE: 18 BRPM | SYSTOLIC BLOOD PRESSURE: 154 MMHG | DIASTOLIC BLOOD PRESSURE: 72 MMHG | HEART RATE: 77 BPM | OXYGEN SATURATION: 95 %

## 2019-12-24 LAB
-  AMIKACIN: SIGNIFICANT CHANGE UP
-  AMOXICILLIN/CLAVULANIC ACID: SIGNIFICANT CHANGE UP
-  AMPICILLIN/SULBACTAM: SIGNIFICANT CHANGE UP
-  AMPICILLIN: SIGNIFICANT CHANGE UP
-  AZTREONAM: SIGNIFICANT CHANGE UP
-  CEFAZOLIN: SIGNIFICANT CHANGE UP
-  CEFEPIME: SIGNIFICANT CHANGE UP
-  CEFOXITIN: SIGNIFICANT CHANGE UP
-  CEFTRIAXONE: SIGNIFICANT CHANGE UP
-  CIPROFLOXACIN: SIGNIFICANT CHANGE UP
-  ERTAPENEM: SIGNIFICANT CHANGE UP
-  GENTAMICIN: SIGNIFICANT CHANGE UP
-  LEVOFLOXACIN: SIGNIFICANT CHANGE UP
-  MEROPENEM: SIGNIFICANT CHANGE UP
-  PIPERACILLIN/TAZOBACTAM: SIGNIFICANT CHANGE UP
-  TOBRAMYCIN: SIGNIFICANT CHANGE UP
-  TRIMETHOPRIM/SULFAMETHOXAZOLE: SIGNIFICANT CHANGE UP
ANION GAP SERPL CALC-SCNC: 6 MMOL/L — SIGNIFICANT CHANGE UP (ref 5–17)
BASOPHILS # BLD AUTO: 0.03 K/UL — SIGNIFICANT CHANGE UP (ref 0–0.2)
BASOPHILS NFR BLD AUTO: 0.4 % — SIGNIFICANT CHANGE UP (ref 0–2)
BUN SERPL-MCNC: 29 MG/DL — HIGH (ref 7–18)
CALCIUM SERPL-MCNC: 8.7 MG/DL — SIGNIFICANT CHANGE UP (ref 8.4–10.5)
CHLORIDE SERPL-SCNC: 100 MMOL/L — SIGNIFICANT CHANGE UP (ref 96–108)
CO2 SERPL-SCNC: 30 MMOL/L — SIGNIFICANT CHANGE UP (ref 22–31)
CREAT SERPL-MCNC: 3.73 MG/DL — HIGH (ref 0.5–1.3)
EOSINOPHIL # BLD AUTO: 0.4 K/UL — SIGNIFICANT CHANGE UP (ref 0–0.5)
EOSINOPHIL NFR BLD AUTO: 4.8 % — SIGNIFICANT CHANGE UP (ref 0–6)
GLUCOSE BLDC GLUCOMTR-MCNC: 185 MG/DL — HIGH (ref 70–99)
GLUCOSE BLDC GLUCOMTR-MCNC: 204 MG/DL — HIGH (ref 70–99)
GLUCOSE BLDC GLUCOMTR-MCNC: 263 MG/DL — HIGH (ref 70–99)
GLUCOSE SERPL-MCNC: 167 MG/DL — HIGH (ref 70–99)
HCT VFR BLD CALC: 36.6 % — SIGNIFICANT CHANGE UP (ref 34.5–45)
HGB BLD-MCNC: 10.9 G/DL — LOW (ref 11.5–15.5)
IMM GRANULOCYTES NFR BLD AUTO: 0.4 % — SIGNIFICANT CHANGE UP (ref 0–1.5)
LYMPHOCYTES # BLD AUTO: 1.18 K/UL — SIGNIFICANT CHANGE UP (ref 1–3.3)
LYMPHOCYTES # BLD AUTO: 14.1 % — SIGNIFICANT CHANGE UP (ref 13–44)
MAGNESIUM SERPL-MCNC: 2.3 MG/DL — SIGNIFICANT CHANGE UP (ref 1.6–2.6)
MCHC RBC-ENTMCNC: 27.5 PG — SIGNIFICANT CHANGE UP (ref 27–34)
MCHC RBC-ENTMCNC: 29.8 GM/DL — LOW (ref 32–36)
MCV RBC AUTO: 92.2 FL — SIGNIFICANT CHANGE UP (ref 80–100)
METHOD TYPE: SIGNIFICANT CHANGE UP
MONOCYTES # BLD AUTO: 0.68 K/UL — SIGNIFICANT CHANGE UP (ref 0–0.9)
MONOCYTES NFR BLD AUTO: 8.1 % — SIGNIFICANT CHANGE UP (ref 2–14)
NEUTROPHILS # BLD AUTO: 6.05 K/UL — SIGNIFICANT CHANGE UP (ref 1.8–7.4)
NEUTROPHILS NFR BLD AUTO: 72.2 % — SIGNIFICANT CHANGE UP (ref 43–77)
NRBC # BLD: 0 /100 WBCS — SIGNIFICANT CHANGE UP (ref 0–0)
PHOSPHATE SERPL-MCNC: 3.5 MG/DL — SIGNIFICANT CHANGE UP (ref 2.5–4.5)
PLATELET # BLD AUTO: 197 K/UL — SIGNIFICANT CHANGE UP (ref 150–400)
POTASSIUM SERPL-MCNC: 4.3 MMOL/L — SIGNIFICANT CHANGE UP (ref 3.5–5.3)
POTASSIUM SERPL-SCNC: 4.3 MMOL/L — SIGNIFICANT CHANGE UP (ref 3.5–5.3)
RBC # BLD: 3.97 M/UL — SIGNIFICANT CHANGE UP (ref 3.8–5.2)
RBC # FLD: 15.6 % — HIGH (ref 10.3–14.5)
SODIUM SERPL-SCNC: 136 MMOL/L — SIGNIFICANT CHANGE UP (ref 135–145)
WBC # BLD: 8.37 K/UL — SIGNIFICANT CHANGE UP (ref 3.8–10.5)
WBC # FLD AUTO: 8.37 K/UL — SIGNIFICANT CHANGE UP (ref 3.8–10.5)

## 2019-12-24 PROCEDURE — 73718 MRI LOWER EXTREMITY W/O DYE: CPT

## 2019-12-24 PROCEDURE — 82746 ASSAY OF FOLIC ACID SERUM: CPT

## 2019-12-24 PROCEDURE — 94640 AIRWAY INHALATION TREATMENT: CPT

## 2019-12-24 PROCEDURE — 87631 RESP VIRUS 3-5 TARGETS: CPT

## 2019-12-24 PROCEDURE — 83036 HEMOGLOBIN GLYCOSYLATED A1C: CPT

## 2019-12-24 PROCEDURE — 85027 COMPLETE CBC AUTOMATED: CPT

## 2019-12-24 PROCEDURE — 84146 ASSAY OF PROLACTIN: CPT

## 2019-12-24 PROCEDURE — 87040 BLOOD CULTURE FOR BACTERIA: CPT

## 2019-12-24 PROCEDURE — 70450 CT HEAD/BRAIN W/O DYE: CPT

## 2019-12-24 PROCEDURE — 82306 VITAMIN D 25 HYDROXY: CPT

## 2019-12-24 PROCEDURE — 87070 CULTURE OTHR SPECIMN AEROBIC: CPT

## 2019-12-24 PROCEDURE — 82550 ASSAY OF CK (CPK): CPT

## 2019-12-24 PROCEDURE — 87493 C DIFF AMPLIFIED PROBE: CPT

## 2019-12-24 PROCEDURE — 86140 C-REACTIVE PROTEIN: CPT

## 2019-12-24 PROCEDURE — 85652 RBC SED RATE AUTOMATED: CPT

## 2019-12-24 PROCEDURE — 83880 ASSAY OF NATRIURETIC PEPTIDE: CPT

## 2019-12-24 PROCEDURE — 82652 VIT D 1 25-DIHYDROXY: CPT

## 2019-12-24 PROCEDURE — 99285 EMERGENCY DEPT VISIT HI MDM: CPT | Mod: 25

## 2019-12-24 PROCEDURE — 71045 X-RAY EXAM CHEST 1 VIEW: CPT

## 2019-12-24 PROCEDURE — 82310 ASSAY OF CALCIUM: CPT

## 2019-12-24 PROCEDURE — 82553 CREATINE MB FRACTION: CPT

## 2019-12-24 PROCEDURE — 84100 ASSAY OF PHOSPHORUS: CPT

## 2019-12-24 PROCEDURE — 83735 ASSAY OF MAGNESIUM: CPT

## 2019-12-24 PROCEDURE — 82962 GLUCOSE BLOOD TEST: CPT

## 2019-12-24 PROCEDURE — 93005 ELECTROCARDIOGRAM TRACING: CPT

## 2019-12-24 PROCEDURE — 73660 X-RAY EXAM OF TOE(S): CPT

## 2019-12-24 PROCEDURE — 80048 BASIC METABOLIC PNL TOTAL CA: CPT

## 2019-12-24 PROCEDURE — 84443 ASSAY THYROID STIM HORMONE: CPT

## 2019-12-24 PROCEDURE — 80061 LIPID PANEL: CPT

## 2019-12-24 PROCEDURE — 85610 PROTHROMBIN TIME: CPT

## 2019-12-24 PROCEDURE — 82607 VITAMIN B-12: CPT

## 2019-12-24 PROCEDURE — 87186 SC STD MICRODIL/AGAR DIL: CPT

## 2019-12-24 PROCEDURE — 36415 COLL VENOUS BLD VENIPUNCTURE: CPT

## 2019-12-24 PROCEDURE — 83605 ASSAY OF LACTIC ACID: CPT

## 2019-12-24 PROCEDURE — 83970 ASSAY OF PARATHORMONE: CPT

## 2019-12-24 PROCEDURE — 84484 ASSAY OF TROPONIN QUANT: CPT

## 2019-12-24 PROCEDURE — 85730 THROMBOPLASTIN TIME PARTIAL: CPT

## 2019-12-24 PROCEDURE — 80053 COMPREHEN METABOLIC PANEL: CPT

## 2019-12-24 RX ORDER — FUROSEMIDE 40 MG
1 TABLET ORAL
Qty: 13 | Refills: 0
Start: 2019-12-24 | End: 2020-01-22

## 2019-12-24 RX ORDER — FUROSEMIDE 40 MG
1 TABLET ORAL
Qty: 30 | Refills: 0
Start: 2019-12-24 | End: 2020-01-22

## 2019-12-24 RX ORDER — MIDODRINE HYDROCHLORIDE 2.5 MG/1
1 TABLET ORAL
Qty: 1 | Refills: 0
Start: 2019-12-24

## 2019-12-24 RX ORDER — AMLODIPINE BESYLATE 2.5 MG/1
0 TABLET ORAL
Qty: 90 | Refills: 0 | DISCHARGE

## 2019-12-24 RX ORDER — FUROSEMIDE 40 MG
0 TABLET ORAL
Qty: 30 | Refills: 0 | DISCHARGE

## 2019-12-24 RX ADMIN — GABAPENTIN 100 MILLIGRAM(S): 400 CAPSULE ORAL at 11:42

## 2019-12-24 RX ADMIN — CHLORHEXIDINE GLUCONATE 1 APPLICATION(S): 213 SOLUTION TOPICAL at 06:20

## 2019-12-24 RX ADMIN — HEPARIN SODIUM 5000 UNIT(S): 5000 INJECTION INTRAVENOUS; SUBCUTANEOUS at 17:01

## 2019-12-24 RX ADMIN — Medication 1 DROP(S): at 17:01

## 2019-12-24 RX ADMIN — Medication 1 DROP(S): at 06:19

## 2019-12-24 RX ADMIN — Medication 1: at 16:53

## 2019-12-24 RX ADMIN — SEVELAMER CARBONATE 1600 MILLIGRAM(S): 2400 POWDER, FOR SUSPENSION ORAL at 14:14

## 2019-12-24 RX ADMIN — BUDESONIDE AND FORMOTEROL FUMARATE DIHYDRATE 2 PUFF(S): 160; 4.5 AEROSOL RESPIRATORY (INHALATION) at 11:42

## 2019-12-24 RX ADMIN — MUPIROCIN 1 APPLICATION(S): 20 OINTMENT TOPICAL at 06:19

## 2019-12-24 RX ADMIN — Medication 4 UNIT(S): at 16:53

## 2019-12-24 RX ADMIN — SEVELAMER CARBONATE 1600 MILLIGRAM(S): 2400 POWDER, FOR SUSPENSION ORAL at 08:06

## 2019-12-24 RX ADMIN — TIOTROPIUM BROMIDE 1 CAPSULE(S): 18 CAPSULE ORAL; RESPIRATORY (INHALATION) at 11:42

## 2019-12-24 RX ADMIN — MIDODRINE HYDROCHLORIDE 5 MILLIGRAM(S): 2.5 TABLET ORAL at 06:18

## 2019-12-24 RX ADMIN — Medication 1 DROP(S): at 06:18

## 2019-12-24 RX ADMIN — Medication 4 UNIT(S): at 08:04

## 2019-12-24 RX ADMIN — Medication 1 DROP(S): at 17:00

## 2019-12-24 RX ADMIN — PIPERACILLIN AND TAZOBACTAM 25 GRAM(S): 4; .5 INJECTION, POWDER, LYOPHILIZED, FOR SOLUTION INTRAVENOUS at 06:18

## 2019-12-24 RX ADMIN — Medication 4 UNIT(S): at 11:45

## 2019-12-24 RX ADMIN — Medication 3: at 11:44

## 2019-12-24 RX ADMIN — MUPIROCIN 1 APPLICATION(S): 20 OINTMENT TOPICAL at 17:01

## 2019-12-24 RX ADMIN — SEVELAMER CARBONATE 1600 MILLIGRAM(S): 2400 POWDER, FOR SUSPENSION ORAL at 17:02

## 2019-12-24 RX ADMIN — HEPARIN SODIUM 5000 UNIT(S): 5000 INJECTION INTRAVENOUS; SUBCUTANEOUS at 06:19

## 2019-12-24 RX ADMIN — Medication 2: at 08:04

## 2019-12-24 NOTE — DISCHARGE NOTE PROVIDER - NSDCCPCAREPLAN_GEN_ALL_CORE_FT
PRINCIPAL DISCHARGE DIAGNOSIS  Diagnosis: Osteomyelitis  Assessment and Plan of Treatment: You were admitted due to worsening swelling and darkening of right toe.You were treated for Righttoe cellulitis and osteomelitis with zosyn and later switched to levaquin. You are advised to take levaquin 250 mg everyday for 6 weeks.MRI showed Osteomyelitis of distal first phalynx.   Surgical swab grew E.coli; Morganella morganii and Proteus. ID Dr. Barbour was consulted. Podiatry and vascular surgery was consulted. Vascular surgery recommended local wound care and antibiotics.   Follow with your primary care doctor in few weeks.      SECONDARY DISCHARGE DIAGNOSES  Diagnosis: Chronic CHF  Assessment and Plan of Treatment: You have history of diastolic heart failure with with AICD (in 2005-[employer]- last replaced 2018). Take lasix 40 mg on the days you are not getting dialysis and follow with your cardiologist if you need to resume lasix daily.       Diagnosis: ESRD (end stage renal disease) on dialysis  Assessment and Plan of Treatment: You had an episode of syncope during one of your dilaysis sessions and you were started on midodrine. You are advised to take midodrine 10 mg before dialysis on your dialysis days. Follow with your nephrologist.    Diagnosis: Diabetes mellitus  Assessment and Plan of Treatment: Continue with your current treatment. Your HbA1C is 8.8. Eat healthy diet rich in fruits and vegetables. Avoid salty and fatty diet. Excercise regularly. See your PCP regularly. Get your HbA1C checked every 3 months.    Diagnosis: COPD (chronic obstructive pulmonary disease)  Assessment and Plan of Treatment: Continue with your current treatment for COPD.    Diagnosis: HLD (hyperlipidemia)  Assessment and Plan of Treatment: Take statin daily. Eat healthy diet rich in fruits and vegetables. Avoid salty and fatty diet .Excercise regularly. See your PCP regularly.

## 2019-12-24 NOTE — PROGRESS NOTE ADULT - SUBJECTIVE AND OBJECTIVE BOX
Problem List:  ESRD  Episodes of hypotension due to Autonomic neuropathy and diastolic HF, moderate to severe TR and increased Right Vent pressure  Left toe ulcer        PAST MEDICAL & SURGICAL HISTORY:  Chronic CHF  GERD (gastroesophageal reflux disease)  COPD (chronic obstructive pulmonary disease): On home oxygen  HLD (hyperlipidemia): not taking statins  HTN (hypertension)  ESRD (end stage renal disease) on dialysis  Chronic diastolic (congestive) heart failure  Anemia of chronic disease  Breast carcinoma: Lt side s/p lumpectomy and radiation in 2004  DM (diabetes mellitus): type 2  S/P subtotal hysterectomy  S/P lumpectomy, left breast: 2004      No Known Allergies      MEDICATIONS  (STANDING):  atorvastatin 10 milliGRAM(s) Oral at bedtime  budesonide  80 MICROgram(s)/formoterol 4.5 MICROgram(s) Inhaler 2 Puff(s) Inhalation two times a day  chlorhexidine 4% Liquid 1 Application(s) Topical <User Schedule>  gabapentin 100 milliGRAM(s) Oral daily  heparin  Injectable 5000 Unit(s) SubCutaneous every 12 hours  insulin glargine Injectable (LANTUS) 12 Unit(s) SubCutaneous at bedtime  insulin lispro (HumaLOG) corrective regimen sliding scale   SubCutaneous Before meals and at bedtime  insulin lispro Injectable (HumaLOG) 4 Unit(s) SubCutaneous three times a day before meals  latanoprost 0.005% Ophthalmic Solution 1 Drop(s) Both EYES at bedtime  lidocaine/prilocaine Cream 1 Application(s) Topical daily  midodrine. 5 milliGRAM(s) Oral three times a day  mupirocin 2% Nasal 1 Application(s) Nasal two times a day  piperacillin/tazobactam IVPB.. 3.375 Gram(s) IV Intermittent every 12 hours  povidone iodine 10% Solution 1 Application(s) Topical daily  prednisoLONE acetate 1% Suspension 1 Drop(s) Both EYES two times a day  sevelamer carbonate 1600 milliGRAM(s) Oral three times a day with meals  timolol 0.5% Solution 1 Drop(s) Both EYES two times a day  tiotropium 18 MICROgram(s) Capsule 1 Capsule(s) Inhalation daily  traZODone 50 milliGRAM(s) Oral at bedtime    MEDICATIONS  (PRN):  ALBUTerol    90 MICROgram(s) HFA Inhaler 2 Puff(s) Inhalation every 6 hours PRN Shortness of Breath and/or Wheezing  ondansetron Injectable 4 milliGRAM(s) IV Push every 6 hours PRN Nausea and/or Vomiting                            10.9   8.37  )-----------( 197      ( 24 Dec 2019 07:12 )             36.6     12-24    136  |  100  |  29<H>  ----------------------------<  167<H>  4.3   |  30  |  3.73<H>    Ca    8.7      24 Dec 2019 07:12  Phos  3.5     12-24  Mg     2.3     12-24    TPro  7.1  /  Alb  2.6<L>  /  TBili  0.4  /  DBili  x   /  AST  11  /  ALT  15  /  AlkPhos  85  12-23            REVIEW OF SYSTEMS:  General: no fever no chills, no weight loss.  EYES/ENT: No visual changes;  No vertigo, no headache.  NECK: No pain or stiffness  RESPIRATORY: No cough, wheezing, hemoptysis; No shortness of breath  CARDIOVASCULAR: No chest pain or palpitations. No Edema  GASTROINTESTINAL: No abdominal or epigastric pain. No nausea, vomiting. No diarrhea or constipation. No melena.  GENITOURINARY: No dysuria, frequency, foamy urine, urinary urgency, incontinence or hematuria  NEUROLOGICAL: No numbness or weakness, no tremor , no dizziness.   Muscle skeletal : no joint pain and no swelling of joints and limbs.  SKIN: No itching, burning, rashes.        VITALS:  T(F): 98.2 (12-24-19 @ 07:28), Max: 98.5 (12-23-19 @ 20:56)  HR: 71 (12-24-19 @ 07:28)  BP: 131/81 (12-24-19 @ 07:28)  RR: 18 (12-24-19 @ 07:28)  SpO2: 98% (12-24-19 @ 07:28)  Wt(kg): --    12-24 @ 07:01  -  12-24 @ 09:46  --------------------------------------------------------  IN: 200 mL / OUT: 0 mL / NET: 200 mL        PHYSICAL EXAM:  Constitutional: well developed, no diaphoresis, no distress.  Neck: No JVD, no carotid bruit, supple, no adenopathy  Respiratory: Good air entrance B/L, no wheezes, rales or rhonchi  Cardiovascular: S1, S2, RRR, no pericardial rub, no murmur  Abdomen: BS+, soft, no tenderness, no bruit  Pelvis: bladder nondistended  Extremities: No cyanosis or clubbing. No peripheral edema.   Pulses: All present  Neurological: A/O x 3, no focal deficits  Psychiatric: Normal mood, normal affect  Skin: No rashes  Vascular Access: Problem List:  ESRD  Episodes of hypotension due to Autonomic neuropathy and diastolic HF, moderate to severe TR and increased Right Vent pressure  Left toe ulcer  Feels well  today  No diarrhea  No sob          PAST MEDICAL & SURGICAL HISTORY:  Chronic CHF  GERD (gastroesophageal reflux disease)  COPD (chronic obstructive pulmonary disease): On home oxygen  HLD (hyperlipidemia): not taking statins  HTN (hypertension)  ESRD (end stage renal disease) on dialysis  Chronic diastolic (congestive) heart failure  Anemia of chronic disease  Breast carcinoma: Lt side s/p lumpectomy and radiation in 2004  DM (diabetes mellitus): type 2  S/P subtotal hysterectomy  S/P lumpectomy, left breast: 2004      No Known Allergies      MEDICATIONS  (STANDING):  atorvastatin 10 milliGRAM(s) Oral at bedtime  budesonide  80 MICROgram(s)/formoterol 4.5 MICROgram(s) Inhaler 2 Puff(s) Inhalation two times a day  chlorhexidine 4% Liquid 1 Application(s) Topical <User Schedule>  gabapentin 100 milliGRAM(s) Oral daily  heparin  Injectable 5000 Unit(s) SubCutaneous every 12 hours  insulin glargine Injectable (LANTUS) 12 Unit(s) SubCutaneous at bedtime  insulin lispro (HumaLOG) corrective regimen sliding scale   SubCutaneous Before meals and at bedtime  insulin lispro Injectable (HumaLOG) 4 Unit(s) SubCutaneous three times a day before meals  latanoprost 0.005% Ophthalmic Solution 1 Drop(s) Both EYES at bedtime  lidocaine/prilocaine Cream 1 Application(s) Topical daily  midodrine. 5 milliGRAM(s) Oral three times a day  mupirocin 2% Nasal 1 Application(s) Nasal two times a day  piperacillin/tazobactam IVPB.. 3.375 Gram(s) IV Intermittent every 12 hours  povidone iodine 10% Solution 1 Application(s) Topical daily  prednisoLONE acetate 1% Suspension 1 Drop(s) Both EYES two times a day  sevelamer carbonate 1600 milliGRAM(s) Oral three times a day with meals  timolol 0.5% Solution 1 Drop(s) Both EYES two times a day  tiotropium 18 MICROgram(s) Capsule 1 Capsule(s) Inhalation daily  traZODone 50 milliGRAM(s) Oral at bedtime    MEDICATIONS  (PRN):  ALBUTerol    90 MICROgram(s) HFA Inhaler 2 Puff(s) Inhalation every 6 hours PRN Shortness of Breath and/or Wheezing  ondansetron Injectable 4 milliGRAM(s) IV Push every 6 hours PRN Nausea and/or Vomiting                            10.9   8.37  )-----------( 197      ( 24 Dec 2019 07:12 )             36.6     12-24    136  |  100  |  29<H>  ----------------------------<  167<H>  4.3   |  30  |  3.73<H>    Ca    8.7      24 Dec 2019 07:12  Phos  3.5     12-24  Mg     2.3     12-24    TPro  7.1  /  Alb  2.6<L>  /  TBili  0.4  /  DBili  x   /  AST  11  /  ALT  15  /  AlkPhos  85  12-23            REVIEW OF SYSTEMS:  General: no fever no chills, no weight loss.  EYES/ENT: No visual changes;  No vertigo, no headache.    RESPIRATORY: No cough, wheezing, hemoptysis; No shortness of breath  CARDIOVASCULAR: No chest pain or palpitations. No Edema  GASTROINTESTINAL: No abdominal or epigastric pain. No nausea, vomiting. No diarrhea or constipation. No melena.  GENITOURINARY: No dysuria.        VITALS:  T(F): 98.2 (12-24-19 @ 07:28), Max: 98.5 (12-23-19 @ 20:56)  HR: 71 (12-24-19 @ 07:28)  BP: 131/81 (12-24-19 @ 07:28)  RR: 18 (12-24-19 @ 07:28)  SpO2: 98% (12-24-19 @ 07:28)  Wt(kg): --    12-24 @ 07:01  -  12-24 @ 09:46  --------------------------------------------------------  IN: 200 mL / OUT: 0 mL / NET: 200 mL        PHYSICAL EXAM:  Constitutional: well developed, no diaphoresis, no distress.  Neck: No JVD, no carotid bruit, supple, no adenopathy  Respiratory: Good air entrance B/L, no wheezes, rales or rhonchi  Cardiovascular: S1, S2, RRR, no pericardial rub, no murmur  Abdomen: BS+, soft, no tenderness, no bruit  Pelvis: bladder nondistended  Extremities: No cyanosis or clubbing. No peripheral edema.   Pulses: All present  Neurological: A/O x 3, no focal deficits  Psychiatric: Normal mood, normal affect  right AVF with bruit and thrill

## 2019-12-24 NOTE — PROGRESS NOTE ADULT - SUBJECTIVE AND OBJECTIVE BOX
Pt was seen by podiatry this afternoon with attending. MRI result was discussed and Pt was informed about benefit sand risks of  partial right hallux  amputation vs long term antibiotics for bone infection. Pt partially agreeable to partial hallux amp, will confirm by 12/25. Surgery scheduled 11:30 am 12/27. Please optimize patient medically, will appreciate preop orders.  S : 79y year old Female seen at bedside for Right plantar hallux gangrene. Pt states she feels fine and denies pain to her plantar right great toe. She states she does not apply any cream or dressing on her toe. Pt states she did not f/u with podiatry after her last admission earlier this month. Pt denies F/N/V/C/SOB. Pending MRI.    Chief Complaint : Patient is a 79y old  Female who presents with a chief complaint of Toe gangrene (21 Dec 2019 09:57)    HPI : HPI:  79 year old female from home ambulates with cane at baseline with a significant PMHx of HTN, DM ,ESRD, chronic diastolic heart failure s/p CardioMEMS implantation, moderate MS, ESRD on dialysis (Tuesday, Thursday, Saturday), COPD on home O2, asthma , HLD  and PSHx of Left lumpectomy/radiation for breast CA in 2005, and JACLYN uterine cancer presents to the ED for worsening swelling and darkening of right toe. Patient has right grey toe cellulitis. Patient's daughter relates watery black stool and decreased appetite x1 week.  Recently was admitted a month ago for R foot cellulitis with hallux wound, treated with IV zosyn and doxy, podiatry consulted who recommended betadine dressing, per note, pt declined. LATOYA/PVR was done, in which showed LATOYA of 1.29 b/l, and decreased flow on bilateral metatarsal. Pt reports R great toe black wound starting to get worse after wearing tight shoes Patient also missed podiatry follow up post discharge from last admission. pt endorses nausea and vomiting. Patient denies any denies claudication pain, fever, chills, cough, chest pain, shortness of breath or any other complaints.    In ED :   Xray right 1st toe :  No acute fracture-subluxation demonstrated. Stable mild erosion at first interphalangeal joint - gouty arthropathy not excluded. Interval increase in soft tissue swelling, as above.   CXR : Stable chest. Chronic fibrosis vs. pulmonary vascular congestion - no significant interval change compared with 11/26/19 exam.  EKG : NSR ,  RBB  Lactate 0.7     Pt is DNR/DNI  MOLST form filled in chart pending attending signature (21 Dec 2019 03:36)      Patient admits to  (-) Fevers, (-) Chills, (-) Nausea, (-) Vomiting, (-) Shortness of Breath      PMH: Chronic CHF  MS (mitral stenosis)  GERD (gastroesophageal reflux disease)  COPD (chronic obstructive pulmonary disease)  HLD (hyperlipidemia)  HTN (hypertension)  ESRD (end stage renal disease) on dialysis  Chronic diastolic (congestive) heart failure  Anemia of chronic disease  Anemia  Hypertension  Congestive heart failure  Chronic kidney disease  No pertinent past medical history  Breast carcinoma  Renal mass  DM (diabetes mellitus)  Asthma    PSH:S/P subtotal hysterectomy  S/P lumpectomy, left breast  No significant past surgical history      Allergies:No Known Allergies                        10.9   8.37  )-----------( 197      ( 24 Dec 2019 07:12 )             36.6   12-24    136  |  100  |  29<H>  ----------------------------<  167<H>  4.3   |  30  |  3.73<H>    Ca    8.7      24 Dec 2019 07:12  Phos  3.5     12-24  Mg     2.3     12-24    TPro  7.1  /  Alb  2.6<L>  /  TBili  0.4  /  DBili  x   /  AST  11  /  ALT  15  /  AlkPhos  85  12-23  Vital Signs Last 24 Hrs  T(C): 36.9 (24 Dec 2019 11:17), Max: 36.9 (23 Dec 2019 20:56)  T(F): 98.4 (24 Dec 2019 11:17), Max: 98.5 (23 Dec 2019 20:56)  HR: 70 (24 Dec 2019 11:45) (70 - 76)  BP: 158/69 (24 Dec 2019 11:45) (118/68 - 170/90)  BP(mean): --  RR: 18 (24 Dec 2019 11:17) (16 - 18)  SpO2: 93% (24 Dec 2019 11:45) (91% - 99%)      O:   General: Pleasant  female NAD & AOX3.    Integument:  Skin warm, dry and supple bilateral.    Hyperkeratotic lesion with Ulceration underneath post debridement Right plantar hallux, + hyperkeratotic border, wound base Fibrogranular , wound size (1.5 cm X 1.2 cm X 0.3cm) - edema, + rashaun-wound erythema, + purulence 2cc, + fluctuance, + tracking/tunneling, + probe to bone.   Vascular: Dorsalis Pedis and Posterior Tibial pulses 1/4.  Capillary re-fill time less then 3 seconds digits 1-5 bilateral.    Neuro: Protective sensation diminished to the level of the digits bilateral.  MSK: Muscle strength 5/5 all major muscle groups bilateral.    A: Right hallux ulceration suspicious for osteomyelitis       P:   Chart reviewed and Patient evaluated  Discussed diagnosis and treatment with patient  Excisional debridement with 15 blade of hyperkeratotic lesion down to subcutaneous tissue revealing Right hallux ulceration  wound culture reviewed  Applied betadine with dry sterile dressing  X-rays reviewed: shows small erosion distal phalanx right hallux  Recommend IV antibiotics   MRI reviewed showing distal phalanx OM  Discussion was done about abx treatment vs Right hallux partial amp. Pt partially agreeable for amp.  surgery scheduled 12/27 11:30 am. please optimize patient medically, will appreciate preop orders. no dialysis before surgery.  LATOYA reviewed R:12.6 L1.29  Pt to WBAT to right heel in forefoot offloading shoe, full WB left foot   Discussed importance of daily foot examinations and proper shoe gear and to importance of lower Fasting Blood Glucose levels.   Podiatry will follow while in house.  Wound care: betadine to right hallux wound. dress with 4x4 gauze kimi and ACE bandage. change daily and keep c/d/i.   Discussed with Attending  and seen with Dr Hernandez

## 2019-12-24 NOTE — PROGRESS NOTE ADULT - SUBJECTIVE AND OBJECTIVE BOX
Patient denies chest pain or shortness of breath.   Review of systems otherwise (-)  	  MEDICATIONS:  MEDICATIONS  (STANDING):  atorvastatin 10 milliGRAM(s) Oral at bedtime  budesonide  80 MICROgram(s)/formoterol 4.5 MICROgram(s) Inhaler 2 Puff(s) Inhalation two times a day  chlorhexidine 4% Liquid 1 Application(s) Topical <User Schedule>  gabapentin 100 milliGRAM(s) Oral daily  heparin  Injectable 5000 Unit(s) SubCutaneous every 12 hours  insulin glargine Injectable (LANTUS) 12 Unit(s) SubCutaneous at bedtime  insulin lispro (HumaLOG) corrective regimen sliding scale   SubCutaneous Before meals and at bedtime  insulin lispro Injectable (HumaLOG) 4 Unit(s) SubCutaneous three times a day before meals  latanoprost 0.005% Ophthalmic Solution 1 Drop(s) Both EYES at bedtime  lidocaine/prilocaine Cream 1 Application(s) Topical daily  midodrine. 5 milliGRAM(s) Oral three times a day  mupirocin 2% Nasal 1 Application(s) Nasal two times a day  piperacillin/tazobactam IVPB.. 3.375 Gram(s) IV Intermittent every 12 hours  povidone iodine 10% Solution 1 Application(s) Topical daily  prednisoLONE acetate 1% Suspension 1 Drop(s) Both EYES two times a day  sevelamer carbonate 1600 milliGRAM(s) Oral three times a day with meals  timolol 0.5% Solution 1 Drop(s) Both EYES two times a day  tiotropium 18 MICROgram(s) Capsule 1 Capsule(s) Inhalation daily  traZODone 50 milliGRAM(s) Oral at bedtime      LABS:	 	    CARDIAC MARKERS:  CARDIAC MARKERS ( 21 Dec 2019 20:58 )  0.065 ng/mL / x     / 49 U/L / x     / 1.5 ng/mL                   10.9   8.37  )-----------( 197      ( 24 Dec 2019 07:12 )             36.6     Hemoglobin: 10.9 g/dL (12-24 @ 07:12)  Hemoglobin: 11.0 g/dL (12-23 @ 06:20)  Hemoglobin: 11.0 g/dL (12-22 @ 07:20)  Hemoglobin: 11.9 g/dL (12-21 @ 20:58)  Hemoglobin: 11.1 g/dL (12-21 @ 14:50)      12-24    136  |  100  |  29<H>  ----------------------------<  167<H>  4.3   |  30  |  3.73<H>    Ca    8.7      24 Dec 2019 07:12  Phos  3.5     12-24  Mg     2.3     12-24    TPro  7.1  /  Alb  2.6<L>  /  TBili  0.4  /  DBili  x   /  AST  11  /  ALT  15  /  AlkPhos  85  12-23    Creatinine Trend: 3.73<--, 4.48<--, 3.70<--, 3.02<--, 4.16<--, 3.61<--      PHYSICAL EXAM:  T(C): 36.8 (12-24-19 @ 07:28), Max: 36.9 (12-23-19 @ 20:56)  HR: 71 (12-24-19 @ 07:28) (70 - 76)  BP: 131/81 (12-24-19 @ 07:28) (118/60 - 170/90)  RR: 18 (12-24-19 @ 07:28) (16 - 18)  SpO2: 98% (12-24-19 @ 07:28) (91% - 99%)  Wt(kg): --  I&O's Summary    24 Dec 2019 07:01  -  24 Dec 2019 10:07  --------------------------------------------------------  IN: 200 mL / OUT: 0 mL / NET: 200 mL          Gen: Appears well in NAD  HEENT:  (-)icterus (-)pallor  CV: N S1 S2 1/6 HODA (+)2 Pulses B/l  Resp:  Clear to ausculatation B/L, normal effort  GI: (+) BS Soft, NT, ND  Lymph:  (-)Edema, (-)obvious lymphadenopathy  Skin: Warm to touch, Normal turgor  Psych: Appropriate mood and affect      TELEMETRY: 	  OFF        ASSESSMENT/PLAN: 	79y  Female  well known to our office with a significant PMHx of HTN, DM ,ESRD, chronic diastolic heart failure s/p CardioMEMS implantation, No ischemia on stress 12/17 moderate MS, ESRD on dialysis (Tuesday, Thursday, Saturday), COPD on home O2, asthma , HLD  and PSHx of Left lumpectomy/radiation for breast CA in 2005, and JACLYN uterine cancer presents to the ED for worsening swelling and darkening of right toe s/p episode of unresponsiveness and hypotension in HD.    - D/W Dr. Tamayo, nephrologist.  She has consistently low BP with HD, and hypertensive otherwise.  Suspect she has significant autonomic dysfunction.  - cont midodrine for now  - Given LOC with HD will need to allow for high BP  - Abx per primary team    Kyler Jaimes MD, [facility]  BEEPER [phone]

## 2019-12-24 NOTE — DISCHARGE NOTE NURSING/CASE MANAGEMENT/SOCIAL WORK - NSSCNAMETXT_GEN_ALL_CORE
[facility] (formerly [facility]) [phone]

## 2019-12-24 NOTE — PROGRESS NOTE ADULT - REASON FOR ADMISSION
Right hallux ulcer
Toe gangrene

## 2019-12-24 NOTE — DISCHARGE NOTE NURSING/CASE MANAGEMENT/SOCIAL WORK - PATIENT PORTAL LINK FT
You can access the FollowMyHealth Patient Portal offered by [facility] by registering at the following website: [email or web address]. By joining [employer]’s FollowMyHealth portal, you will also be able to view your health information using other applications (apps) compatible with our system.

## 2019-12-24 NOTE — DISCHARGE NOTE PROVIDER - HOSPITAL COURSE
79 year old female from home ambulates with cane at baseline with a significant PMHx of HTN, DM ,ESRD, chronic diastolic heart failure s/p CardioMEMS implantation, moderate MS, ESRD on dialysis (Tuesday, Thursday, Saturday), COPD on home O2, asthma , HLD  and PSHx of Left lumpectomy/radiation for breast CA in 2005, and JACLYN uterine cancer presents to the ED for worsening swelling and darkening of right toe.    Pt was treated for R toe cellulitis with zosyn and later switched to levaquin. Pt will be discharged on 250 qd levaquin for 6 weeks.    MRI showed Osteomyelitis of distal 1st phalynx.     Surgical swab grew E.coli; Morganella morganii and proteus.    Pt was afebrile and had no leukocytosis. DP was dopplerable with decreased sensation    Previous LATOYA/PVR c/w decreased flow to metatarsal.    ID Dr. Barbour was consulted. Podiatry and vascular surgery was consulted. Vascular surgery recommended local wound care and antibiotics.     Pt had regular dialysis for ESRD and had syncopal episode during one of her dialysis sessions. As per Nephro Dr. Tamayo pt was started on Midodrine 10 mg before dialysis.

## 2019-12-24 NOTE — DISCHARGE NOTE PROVIDER - NSDCMRMEDTOKEN_GEN_ALL_CORE_FT
Advair Diskus 100 mcg-50 mcg inhalation powder: 1 puff(s) inhaled 2 times a day  ALBUTEROL    AER HFA:   atorvastatin 10 mg oral tablet: 1 tab(s) orally once a day  Betadine 10% topical solution: Apply topically to affected area once a day   DICLOFENAC   GEL 1%:   Drisdol 50,000 intl units (1.25 mg) oral capsule: 1 cap(s) orally once a week  epoetin randy: 75046 unit(s) intravenous 3 times a week with dialysis  ferrous sulfate 325 mg (65 mg elemental iron) oral delayed release tablet: 1 tab(s) orally once a day  FUROSEMIDE   TAB 40M tab(s) orally Monday, Wednesday, and Friday  on non-dialysis days   GABAPENTIN   CAP 100MG:   LATANOPROST  SOL 0.005%:   Levemir 100 units/mL subcutaneous solution: 15 unit(s) subcutaneous once a day   levoFLOXacin 250 mg oral tablet: 1 tab(s) orally once a day   lidocaine-prilocaine 2.5%-2.5% topical cream: 1 application topically   midodrine 10 mg oral tablet: 1 tab(s) orally Tuesday, Thursday, Saturday  on your dialysis days just before dialysis.   NovoLOG FlexPen 100 units/mL injectable solution: 5 unit(s) injectable 3 times a day, WITH MEALS  pantoprazole 40 mg oral granule, delayed release: 1 each orally once a day   PREDNISOLONE VINAY 1% OP:   sevelamer carbonate 800 mg oral tablet: 2 tab(s) orally 3 times a day (with meals)  SIMBRINZA    VINAY 1-0.2%:   SYMBICORT    AER 80-4.5:   timolol hemihydrate 0.5% ophthalmic solution: 1 drop(s) to each affected eye 2 times a day  tiotropium 18 mcg inhalation capsule: 1 cap(s) inhaled once a day  traZODone 50 mg oral tablet: 1 tab(s) orally once a day (at bedtime) Advair Diskus 100 mcg-50 mcg inhalation powder: 1 puff(s) inhaled 2 times a day  ALBUTEROL    AER HFA:   atorvastatin 10 mg oral tablet: 1 tab(s) orally once a day  Betadine 10% topical solution: Apply topically to affected area once a day   DICLOFENAC   GEL 1%:   Drisdol 50,000 intl units (1.25 mg) oral capsule: 1 cap(s) orally once a week  epoetin randy: 83406 unit(s) intravenous 3 times a week with dialysis  ferrous sulfate 325 mg (65 mg elemental iron) oral delayed release tablet: 1 tab(s) orally once a day  FUROSEMIDE   TAB 40M tab(s) orally Monday, Wednesday, and Friday  on non-dialysis days   GABAPENTIN   CAP 100MG:   LATANOPROST  SOL 0.005%:   Levemir 100 units/mL subcutaneous solution: 15 unit(s) subcutaneous once a day   levoFLOXacin 250 mg oral tablet: 1 tab(s) orally once a day   lidocaine-prilocaine 2.5%-2.5% topical cream: 1 application topically   midodrine 10 mg oral tablet: 1 tab(s) orally Tuesday, Thursday, Saturday  on your dialysis days just before dialysis.   NovoLOG FlexPen 100 units/mL injectable solution: 5 unit(s) injectable 3 times a day, WITH MEALS  pantoprazole 40 mg oral granule, delayed release: 1 each orally once a day   PREDNISOLONE VINYA 1% OP:   sevelamer carbonate 800 mg oral tablet: 2 tab(s) orally 3 times a day (with meals)  SIMBRINZA    VINAY 1-0.2%:   SYMBICORT    AER 80-4.5:   timolol hemihydrate 0.5% ophthalmic solution: 1 drop(s) to each affected eye 2 times a day  tiotropium 18 mcg inhalation capsule: 1 cap(s) inhaled once a day  traZODone 50 mg oral tablet: 1 tab(s) orally once a day (at bedtime) Advair Diskus 100 mcg-50 mcg inhalation powder: 1 puff(s) inhaled 2 times a day  ALBUTEROL    AER HFA:   atorvastatin 10 mg oral tablet: 1 tab(s) orally once a day  Betadine 10% topical solution: Apply topically to affected area once a day   DICLOFENAC   GEL 1%:   Drisdol 50,000 intl units (1.25 mg) oral capsule: 1 cap(s) orally once a week  epoetin randy: 46312 unit(s) intravenous 3 times a week with dialysis  ferrous sulfate 325 mg (65 mg elemental iron) oral delayed release tablet: 1 tab(s) orally once a day  FUROSEMIDE   TAB 40M tab(s) orally Monday, Wednesday, and Friday  on non-dialysis days   GABAPENTIN   CAP 100MG:   LATANOPROST  SOL 0.005%:   Levemir 100 units/mL subcutaneous solution: 15 unit(s) subcutaneous once a day   levoFLOXacin 250 mg oral tablet: 1 tab(s) orally once a day   lidocaine-prilocaine 2.5%-2.5% topical cream: 1 application topically   midodrine 10 mg oral tablet: 1 tab(s) orally Tuesday, Thursday, Saturday  on your dialysis days just before dialysis.   NovoLOG FlexPen 100 units/mL injectable solution: 5 unit(s) injectable 3 times a day, WITH MEALS  pantoprazole 40 mg oral granule, delayed release: 1 each orally once a day   PREDNISOLONE VINAY 1% OP:   sevelamer carbonate 800 mg oral tablet: 2 tab(s) orally 3 times a day (with meals)  SIMBRINZA    VINAY 1-0.2%:   SYMBICORT    AER 80-4.5:   timolol hemihydrate 0.5% ophthalmic solution: 1 drop(s) to each affected eye 2 times a day  tiotropium 18 mcg inhalation capsule: 1 cap(s) inhaled once a day  traZODone 50 mg oral tablet: 1 tab(s) orally once a day (at bedtime) Advair Diskus 100 mcg-50 mcg inhalation powder: 1 puff(s) inhaled 2 times a day  ALBUTEROL    AER HFA:   atorvastatin 10 mg oral tablet: 1 tab(s) orally once a day  Betadine 10% topical solution: Apply topically to affected area once a day   DICLOFENAC   GEL 1%:   Drisdol 50,000 intl units (1.25 mg) oral capsule: 1 cap(s) orally once a week  epoetin randy: 77346 unit(s) intravenous 3 times a week with dialysis  ferrous sulfate 325 mg (65 mg elemental iron) oral delayed release tablet: 1 tab(s) orally once a day  FUROSEMIDE   TAB 40M tab(s) orally Monday, Wednesday, and Friday  on non-dialysis days   GABAPENTIN   CAP 100MG:   LATANOPROST  SOL 0.005%:   Levemir 100 units/mL subcutaneous solution: 15 unit(s) subcutaneous once a day   levoFLOXacin 250 mg oral tablet: 1 tab(s) orally once a day   lidocaine-prilocaine 2.5%-2.5% topical cream: 1 application topically   midodrine 10 mg oral tablet: 1 tab(s) orally Tuesday, Thursday, Saturday  on your dialysis days just before dialysis.   NovoLOG FlexPen 100 units/mL injectable solution: 5 unit(s) injectable 3 times a day, WITH MEALS  pantoprazole 40 mg oral granule, delayed release: 1 each orally once a day   PREDNISOLONE VINAY 1% OP:   sevelamer carbonate 800 mg oral tablet: 2 tab(s) orally 3 times a day (with meals)  SIMBRINZA    VINAY 1-0.2%:   SYMBICORT    AER 80-4.5:   timolol hemihydrate 0.5% ophthalmic solution: 1 drop(s) to each affected eye 2 times a day  tiotropium 18 mcg inhalation capsule: 1 cap(s) inhaled once a day  traZODone 50 mg oral tablet: 1 tab(s) orally once a day (at bedtime)

## 2019-12-26 LAB
CULTURE RESULTS: SIGNIFICANT CHANGE UP
ORGANISM # SPEC MICROSCOPIC CNT: SIGNIFICANT CHANGE UP
SPECIMEN SOURCE: SIGNIFICANT CHANGE UP

## 2020-01-13 LAB
CULTURE RESULTS: SIGNIFICANT CHANGE UP
ORGANISM # SPEC MICROSCOPIC CNT: SIGNIFICANT CHANGE UP
ORGANISM # SPEC MICROSCOPIC CNT: SIGNIFICANT CHANGE UP
SPECIMEN SOURCE: SIGNIFICANT CHANGE UP

## 2020-02-03 NOTE — DISCHARGE NOTE ADULT - FUNCTIONAL SCREEN CURRENT LEVEL: DRESSING, MLM
Contacted patient to review Dr. Roberts's recommendation:  Patient states he was seen at [facility] today and was told his arm pain could be either a torn rotator cuff or elbow injury. He is having an MRI this week to see if the cause is arthritis or old trauma. He will then be routed to steroid shots or possibly surgery for repair.    Patient has not checked in with Dr. Stubbs yet for a surgery schedule date.  Patient asks to be put on to Dr. Cowart's schedule tomorrow so he will be able to plan for his carotid surgery asap.  Patient states he would prefer to discuss starting Imdur 15mg daily with Dr. Roberts at tomorrow's visit.   (2) assistive person (0) independent

## 2020-02-25 NOTE — PROGRESS NOTE ADULT - ASSESSMENT
ESRD due to diabetes and hypertension  History of Pulmonary hypertension and COPD.  Diastolic HF    CHF post dialysis improved    Increased PO4 start renagel/ renvela.  Anemia GI follow up   Iron sat 25%  Continue Epogen    Low BP - taken in lower extremities. At present stable. nO NEED FOR mIDODRINE AT PRESENT  Cardiology follow up.      COPD may need to continue Advair, as per PMD negative -  no rash

## 2020-05-28 NOTE — ED ADULT TRIAGE NOTE - SPO2 (%)
Call to pt to update on needing to have labs drawn prior to infusion due to policy change with infusion center for covid 19 patient expresses understanding.   
94

## 2020-07-08 NOTE — PHYSICAL THERAPY INITIAL EVALUATION ADULT - PREDICTED DURATION OF THERAPY (DAYS/WKS), PT EVAL
2WKS O-T Advancement Flap Text: The defect edges were debeveled with a #15 scalpel blade.  Given the location of the defect, shape of the defect and the proximity to free margins an O-T advancement flap was deemed most appropriate.  Using a sterile surgical marker, an appropriate advancement flap was drawn incorporating the defect and placing the expected incisions within the relaxed skin tension lines where possible.    The area thus outlined was incised deep to adipose tissue with a #15 scalpel blade.  The skin margins were undermined to an appropriate distance in all directions utilizing iris scissors.

## 2020-10-21 NOTE — DISCHARGE NOTE ADULT - DISCHARGE WEIGHT
PA denied. Please change to one of the pending medications. Form scanned into media.
PA request for Lidocaine patch.     PA processed and submitted to pt insurance, waiting for response in regards to coverage
actual

## 2021-01-24 NOTE — H&P ADULT - CHARACTER OF SYSTOLIC MURMUR
General Cardiology Progress Note   Karen Eric 67 y o  female MRN: [id number]  Unit/Bed#: [facility] 426-01 Encounter: [id number]      Assessment:  Principal Problem:    Atrial flutter ([address] )  Active Problems:    Essential hypertension    Pericardial effusion without cardiac tamponade    Mediastinal mass    Pain and swelling of right upper extremity    Metastatic GIST    Leukocytosis    Anemia    Pulmonary embolus (HCC)    Acute kidney injury (HCC)      Impression:     History of breast cancer remotely   Mediastinal mass/tumor  o  post biopsy, awaiting pathology   Large hemorrhagic pericardial effusion status post initial drainage, and subsequent additional pericardial window 1/21/2021 at the time of VATS  o Chest tube discontinued  o Chest x-ray today with well inflated lungs   GIST metastatic, based upon liver biopsy   Atrial fibrillation/flutter  o Well controlled heart rates on calcium channel blocker/beta-blocker  o No previous anticoagulation due to risk with bloody pericardial effusion  o However with recent CT imaging more suggestive of pulmonary infarction, PE, slim started anticoagulation with heparin   Hypertension  o Controlled   Preserved left ventricular function   Anemia    Plan:     A little more volume overloaded appearing today with edema and JVD   Got Plasma-Lyte, packed red blood cells and Lasix yesterday with some diuresis   Would try to limit IV fluids today, continue Lasix   Will check limited echo tomorrow considering introduction of heparin with her previous pericardial mass/bloody pericardial effusion  Subjective:   Patient seen and examined  She does states she feels a little bit more short of breath today, denies chest pain or chest heaviness or palpitations  AFib rates are well controlled  Heparin initiated  Got packed red blood cells yesterday    Review of Systems   Constitution: Negative for diaphoresis, fever, malaise/fatigue and night sweats     Cardiovascular: Negative for chest pain, dyspnea on exertion, leg swelling, orthopnea, palpitations and syncope  Respiratory: Positive for shortness of breath  Negative for cough, sputum production and wheezing  Skin: Negative for itching and rash  Gastrointestinal: Negative for abdominal pain, change in bowel habit and diarrhea  Neurological: Negative for dizziness, focal weakness, light-headedness and weakness  Objective   Vitals: Blood pressure 121/65, pulse 73, temperature 98 1 °F (36 7 °C), resp  rate 20, height 5' 4" (1 626 m), weight 85 kg (187 lb 6 3 oz), SpO2 92 %  , Body mass index is 32 17 kg/m² , I/O last 3 completed shifts: In: 790 [P O :440; Blood:350]  Out: 3780 [Urine:1225; Chest Tube:310]  I/O this shift:  In: -   Out: 500 [Urine:500]  Wt Readings from Last 3 Encounters:   01/24/21 85 kg (187 lb 6 3 oz)   01/03/21 89 8 kg (198 lb)       Intake/Output Summary (Last 24 hours) at 1/24/2021 0948  Last data filed at 1/24/2021 0931  Gross per 24 hour   Intake 790 ml   Output 1770 ml   Net -980 ml     I/O last 3 completed shifts: In: 790 [P O :440; Blood:350]  Out: 1972 [Urine:1225; Chest Tube:310]        Physical Exam  Constitutional:       General: She is not in acute distress  Appearance: She is not diaphoretic  HENT:      Head: Normocephalic  Eyes:      Conjunctiva/sclera: Conjunctivae normal    Neck:      Musculoskeletal: Normal range of motion and neck supple  Vascular: No JVD  Cardiovascular:      Rate and Rhythm: Normal rate  Rhythm irregular  Heart sounds: Normal heart sounds  No murmur  No gallop  Pulmonary:      Effort: Pulmonary effort is normal  No respiratory distress  Breath sounds: Rales (Left base) present  No wheezing  Abdominal:      General: Bowel sounds are normal  There is no distension  Palpations: Abdomen is soft  Tenderness: There is no abdominal tenderness  Musculoskeletal: Normal range of motion  Right lower leg: No edema        Left lower leg: No edema  Skin:     General: Skin is warm and dry  Neurological:      Mental Status: She is alert and oriented to person, place, and time           Meds/Allergies   No Known Allergies    Current Facility-Administered Medications:     acetaminophen (TYLENOL) tablet 650 mg, 650 mg, Oral, Q6H PRN, Hema Bui MD, 650 mg at 01/14/21 2020    aluminum-magnesium hydroxide-simethicone (MYLANTA) oral suspension 30 mL, 30 mL, Oral, Q6H PRN, Hema Bui MD    diltiazem (CARDIZEM CD) 24 hr capsule 180 mg, 180 mg, Oral, Daily, Hema Bui MD, 180 mg at 01/24/21 0926    diltiazem (CARDIZEM) tablet 30 mg, 30 mg, Oral, Q6H PRN, Hema Bui MD    docusate sodium (COLACE) capsule 100 mg, 100 mg, Oral, BID, Hema Bui MD, 100 mg at 01/24/21 0926    electrolyte-148 (PLASMALYTE) solution, 50 mL/hr, Intravenous, Continuous, Jeny Tate MD, Last Rate: 50 mL/hr at 01/23/21 1800, 50 mL/hr at 01/23/21 1800    HYDROmorphone (DILAUDID) injection 0 2 mg, 0 2 mg, Intravenous, Q4H PRN, Hema Bui MD, 0 2 mg at 01/09/21 0645    HYDROmorphone (DILAUDID) injection 0 2 mg, 0 2 mg, Intravenous, Once, Hema Bui MD    metoprolol succinate (TOPROL-XL) 24 hr tablet 100 mg, 100 mg, Oral, Q12H [facility], Hema Bui MD, 100 mg at 01/24/21 0926    midodrine (PROAMATINE) tablet 5 mg, 5 mg, Oral, TID AC, Jeny Tate MD, 5 mg at 01/24/21 0604    ondansetron (ZOFRAN) injection 4 mg, 4 mg, Intravenous, Q6H PRN, Hema Bui MD    oxyCODONE (ROXICODONE) IR tablet 2 5 mg, 2 5 mg, Oral, Q4H PRN, Hema Bui MD    oxyCODONE (ROXICODONE) IR tablet 5 mg, 5 mg, Oral, Q4H PRN, Hema Bui MD, 5 mg at 01/23/21 0109    senna (SENOKOT) tablet 8 6 mg, 1 tablet, Oral, Daily, Hema Bui MD, 8 6 mg at 01/24/21 9133    Laboratory Results:        CBC with diff:   Results from last 7 days   Lab Units 01/24/21  0455 01/23/21  0349 01/22/21  1410 01/22/21  0525 01/21/21  0525 01/20/21  0501 01/19/21  1748 01/17/21  0955   WBC Thousand/uL 19 11* 25 65*  --  16 87* 14 31* 15 24* 13 24* 16 70*   HEMOGLOBIN g/dL 8 3* 7 4* 7 8* 7 3* 8 3* 8 5* 8 5* 8 1*   HEMATOCRIT % 26 7* 25 3* 26 9* 25 4* 27 8* 29 3* 28 2* 27 1*   MCV fL 71* 69*  --  70* 69* 70* 69* 68*   PLATELETS Thousands/uL 393* 408*  --  381 365 371 354 333   MCH pg 22 0* 20 3*  --  20 2* 20 6* 20 2* 20 7* 20 4*   MCHC g/dL 31 1* 29 2*  --  28 7* 29 9* 29 0* 30 1* 29 9*   RDW % 21 3* 20 4*  --  20 3* 20 3* 20 0* 20 1* 19 4*   MPV fL 11 6 11 2  --  11 7 12 0 11 4 11 3 11 4   NRBC AUTO /100 WBCs 4 1  --  0 0 1 0 0   NRBC /100 WBC  --   --   --   --   --   --  1  --        CMP:  Results from last 7 days   Lab Units 01/24/21  0455 01/23/21  0349 01/22/21  0525 01/21/21  0525 01/20/21  0501 01/19/21  1748 01/17/21  0955   POTASSIUM mmol/L 4 4 5 1 4 4 3 8 3 8 3 6 3 8   CHLORIDE mmol/L 102 103 110* 107 106 109* 109*   CO2 mmol/L 22 23 21 25 27 26 27   BUN mg/dL 33* 28* 16 12 10 8 10   CREATININE mg/dL 1 52* 1 52* 0 93 0 86 0 90 0 77 0 74   CALCIUM mg/dL 7 8* 8 1* 8 3 8 4 8 5 8 5 8 4   AST U/L  --   --   --   --   --  19  --    ALT U/L  --   --   --   --   --  24  --    ALK PHOS U/L  --   --   --   --   --  71  --    EGFR ml/min/1 73sq m 39 39 71 78 74 89 94       BMP:  Results from last 7 days   Lab Units 01/24/21  0455 01/23/21  0349 01/22/21  0525 01/21/21  0525 01/20/21  0501 01/19/21  1748 01/17/21  0955   POTASSIUM mmol/L 4 4 5 1 4 4 3 8 3 8 3 6 3 8   CHLORIDE mmol/L 102 103 110* 107 106 109* 109*   CO2 mmol/L 22 23 21 25 27 26 27   BUN mg/dL 33* 28* 16 12 10 8 10   CREATININE mg/dL 1 52* 1 52* 0 93 0 86 0 90 0 77 0 74   CALCIUM mg/dL 7 8* 8 1* 8 3 8 4 8 5 8 5 8 4       NT-proBNP: No results for input(s): NTBNP in the last 72 hours       Magnesium:       Coags:   Results from last 7 days   Lab Units 01/20/21  1946 01/20/21  1338   PTT seconds 67* 24   INR   --  1 43*       TSH:        Hemoglobin A1C )      Lipid Profile:   No results found for: CHOL  No results found for: HDL  No results found for: LDLCALC  No results found for: LDLDIRECT  No results found for: TRIG    Cardiac testing:   EKG personally reviewed by Marques Benson MD      Echo from  personally reviewed-IVC collapsing, EF normal, pericardial thickening  Chest x-ray from today personally reviewed-cardiomegaly with fairly good bilateral aeration    Results for orders placed during the hospital encounter of 21   Echo complete with contrast if indicated    N[facility]ative [facility]  [facility], [address] [facility]  [phone]    Transthoracic Echocardiogram  2D, M-mode, Doppler, and Color Doppler    Study date:  2021    Patient: Rcio De La Cruz  MR number: [id number]  Account number: [de-identified]  : 1948  Age: 67 years  Gender: Female  Status: Inpatient  Location: Emergency room  Height: 64 in  Weight: 197 6 lb  BP: 117/ 67 mmHg    Indications: Atrial flutter  Diagnoses: I48 1 - Atrial flutter    Sonographer:  Lew Gary RDCS  Primary Physician:  Antoni Acosta MD  Referring Physician:  Rabia Bacon MD  Group:  [facility]  Interpreting Physician:  Tabitha Hamilton MD    SUMMARY    LEFT VENTRICLE:  The cavity was small  Systolic function was normal  Ejection fraction was estimated to be 65 %  There were no regional wall motion abnormalities  Wall thickness was at the upper limits of normal     RIGHT VENTRICLE:  The size was normal   Systolic function was normal     LEFT ATRIUM:  The atrium was mildly dilated  MITRAL VALVE:  There was mild to moderate regurgitation  AORTIC VALVE:  There was trace regurgitation  TRICUSPID VALVE:  There was mild regurgitation  PERICARDIUM:  A large pericardial effusion was identified circumferential to the heart  There was no evidence of hemodynamic compromise  HISTORY: PRIOR HISTORY: Hypertension      PROCEDURE: The procedure was performed in the emergency room  This was a routine study  The transthoracic approach was used  The study included complete 2D imaging, M-mode, complete spectral Doppler, and color Doppler  The heart rate was  100 bpm, at the start of the study  Echocardiographic views were limited due to poor acoustic window availability  This was a technically difficult study  LEFT VENTRICLE: The cavity was small  Systolic function was normal  Ejection fraction was estimated to be 65 %  There were no regional wall motion abnormalities  Wall thickness was at the upper limits of normal     RIGHT VENTRICLE: The size was normal  Systolic function was normal  Wall thickness was normal     LEFT ATRIUM: The atrium was mildly dilated  RIGHT ATRIUM: Size was normal     MITRAL VALVE: Valve structure was normal  There was normal leaflet separation  DOPPLER: The transmitral velocity was within the normal range  There was no evidence for stenosis  There was mild to moderate regurgitation  The regurgitant jet  was eccentric and directed posteriorly  AORTIC VALVE: The valve was trileaflet  Leaflets exhibited normal thickness and normal cuspal separation  DOPPLER: Transaortic velocity was within the normal range  There was no evidence for stenosis  There was trace regurgitation  TRICUSPID VALVE: The valve structure was normal  There was normal leaflet separation  DOPPLER: The transtricuspid velocity was within the normal range  There was no evidence for stenosis  There was mild regurgitation  PULMONIC VALVE: Leaflets exhibited normal thickness, no calcification, and normal cuspal separation  DOPPLER: The transpulmonic velocity was within the normal range  There was no regurgitation  PERICARDIUM: A large pericardial effusion was identified circumferential to the heart  There was no evidence of hemodynamic compromise  The pericardium was normal in appearance  AORTA: The root exhibited normal size      SYSTEMIC VEINS: IVC: The inferior vena cava was normal in size and course  Respirophasic changes were normal     SYSTEM MEASUREMENT TABLES    2D  %FS: 26 02 %  Ao Diam: 3 2 cm  EDV(Teich): 66 92 ml  EF(Teich): 51 72 %  ESV(Teich): 32 31 ml  IVSd: 1 01 cm  LA Area: 21 05 cm2  LA Diam: 3 39 cm  LVEDV MOD A4C: 28 28 ml  LVEF MOD A4C: 86 2 %  LVESV MOD A4C: 3 9 ml  LVIDd: 3 92 cm  LVIDs: 2 9 cm  LVLd A4C: 6 cm  LVLs A4C: 5 29 cm  LVPWd: 1 17 cm  RA Area: 12 36 cm2  RVIDd: 2 64 cm  SV MOD A4C: 24 38 ml  SV(Teich): 34 61 ml    CW  TR Vmax: 2 17 m/s  TR maxP 81 mmHg    [facility] Accredited Echocardiography Laboratory    Prepared and electronically signed by    Cal Lyons MD  Signed 2021 16:28:40       No results found for this or any previous visit  No results found for this or any previous visit  No results found for this or any previous visit  No results found for this or any previous visit  No results found for this or any previous visit  Shashank Stubbs MD    Portions of the record may have been created with voice recognition software  Occasional wrong word or "sound a like" substitutions may have occurred due to the inherent limitations of voice recognition software  Read the chart carefully and recognize, using context, where substitutions have occurred  murmur loudness: III/VI

## 2021-02-18 NOTE — PROGRESS NOTE ADULT - NEGATIVE ENMT SYMPTOMS
Lf vm for pt; pt due for fu in April/May w/HP since EJF not here-but pt was supposed to have HST first-please give pt sleep lab # to avel no hearing difficulty/no ear pain/no throat pain/no dysphagia/no gum bleeding/no dry mouth/no vertigo

## 2021-03-03 NOTE — H&P PST ADULT - CIGARETTE, PACK YRS
[Procedure: ___] : status post [unfilled] [Clean/Dry/Intact] : clean, dry and intact [Healed] : healed [Swelling] : swollen [Neuro Intact] : an unremarkable neurological exam [Vascular Intact] : ~T peripheral vascular exam normal [Negative Marcel's] : maneuvers demonstrated a negative Marcel's sign [Xray (Date:___)] : [unfilled] Xray -  [Doing Well] : is doing well [No Sign of Infection] : is showing no signs of infection [Adequate Pain Control] : has adequate pain control [3] : the patient reports pain that is 3/10 in severity [Chills] : no chills [Constipation] : no constipation [Diarrhea] : no diarrhea [Dysuria] : no dysuria [Fever] : no fever [Nausea] : no nausea [Vomiting] : no vomiting [Erythema] : not erythematous [Discharge] : absent of discharge [Dehiscence] : not dehisced [de-identified] : S/P  Left total knee arthroplasty.\par  Computerassisted tibial resection, OrthAlign procedure. DOS 2/11/21 [de-identified] : Pt is 3 weeks post-op. The pt finished injections yesterday. She is currently in home PT. She presents to the office today in a wheelchair. Pt reports pain at night. She sleeps with a pillow between her legs. The pt also c/o of right knee pain. She has known bone on bone osteoarthritis of the right knee.  [de-identified] : Left knee exam shows healed incision with no sign of infection. Range of motion 0-90 degrees [de-identified] : 3V xray of the left knee done in the office today and reviewed by Dr. Germán Henderson demonstrates s/p implants in good positioning with no evidence of wear, loosening, or subsidence.  [de-identified] : Pt should continue taking Aspirin BID for DVTP. We prescribed outpatient PT. She should continue to do low impact exercises. Pt understands the importance of prophylaxis for invasive dental procedures. F/u with us in 3 weeks. \par \par \par The patient has bone on bone osteoarthritis of the right knee. She is a candidate for a right TKA and is interested in pursuing the right TKA in August 2021. We will discuss the right TKA at her third post-op visit. \par \par The patient is a 65 year individual with end stage arthritis of their right knee joint. Based upon the patient's continued symptoms and failure to respond to conservative treatment I have recommended a right total knee arthroplasty for this patient. A long discussion took place with the patient describing what a total joint replacement is and what the procedure would entail. A total knee arthroplasty model, similar to the implant that was used during the operation, was utilized to demonstrate and to discuss the various bearing surfaces of the implants. The hospitalization and post-operative care and rehabilitation were also discussed. The use of perioperative antibiotics and DVT prophylaxis were discussed. The risk, benefits and alternatives to a surgical intervention were discussed at length with the patient. The patient was also advised of risks related to the medical comorbidities, elevated body mass index (BMI), and smoking where applicable. We discussed how to reduce modifiable risk factors and encouraged smoking cessation were applicable.. A lengthy discussion took place to review the most common complications including but not limited to: deep vein thrombosis, pulmonary embolus, heart attack, stroke, infection, wound breakdown, numbness, damage to nerves, tendon, muscles, arteries or other blood vessels, death and other possible complications from anesthesia. The patient was told that we will take steps to minimize these risks by using sterile technique, antibiotics and DVT prophylaxis when appropriate and follow the patient postoperatively in the office setting to monitor progress. The possibility of recurrent pain, no improvement in pain and actual worsening of pain were also discussed with the patient.\par The discharge plan of care focused on the patient going home following surgery. The patient was encouraged to make the necessary arrangements to have someone stay with them when they are discharged home. Following discharge, a home care nurse was to the patient. The home care nurse would open the patient’s home care case and request home physical therapy services. Home physical therapy was to commence following discharge provided it was appropriate and covered by the health insurance benefit plan. \par The benefits of surgery were discussed with the patient including the potential for improving her current clinical condition through operative intervention. Alternatives to surgical intervention including continued conservative management were also discussed in detail. All questions were answered to the satisfaction of the patient. The treatment plan of care, as well as a model of a total knee arthroplasty equivalent to the one that will be used for their total joint replacement, was shared with the patient. The patient agreed to the plan of care as well as the use of implants in their total joint replacement.  80

## 2021-06-30 NOTE — ED PROVIDER NOTE - NSCAREINITIATED _GEN_ER
I reviewed the H&P, I examined the patient, and there are no changes in the patient's condition.     Brenda Luciano MD PGY-5  IR  
Gabriel Murrieta(Resident)

## 2021-07-06 NOTE — H&P PST ADULT - NSANTHOBSERVEDRD_ENT_A_CORE
[FreeTextEntry1] : \par #H/o vulvar abscess\par -s/p I&D and healing well\par -R labia w/o signs of abscess or mass appreciated \par -Encouraged sitz baths if nothing any recurrence of mass\par -No shaving to area\par -RTC if painful, fluctuant mass noted again and/or with fever No

## 2021-12-02 NOTE — ED ADULT TRIAGE NOTE - AS TEMP SITE
oral Complex Repair And Melolabial Flap Text: The defect edges were debeveled with a #15 scalpel blade.  The primary defect was closed partially with a complex linear closure.  Given the location of the remaining defect, shape of the defect and the proximity to free margins a melolabial flap was deemed most appropriate for complete closure of the defect.  Using a sterile surgical marker, an appropriate advancement flap was drawn incorporating the defect and placing the expected incisions within the relaxed skin tension lines where possible.    The area thus outlined was incised deep to adipose tissue with a #15 scalpel blade.  The skin margins were undermined to an appropriate distance in all directions utilizing iris scissors.

## 2021-12-15 NOTE — DIETITIAN INITIAL EVALUATION ADULT. - PROBLEM SELECTOR PROBLEM 3
Syncope    Syncope is when you temporarily lose consciousness, also called fainting or passing out. It is caused by a sudden decrease in blood flow to the brain. Even though most causes of syncope are not dangerous, syncope can possibly be a sign of a serious medical problem. Signs that you may be about to faint include feeling dizzy, lightheaded, nausea, visual changes, or cold/clammy skin. Do not drive, operate heavy machinery, or play sports until your health care provider says it is okay.    SEEK IMMEDIATE MEDICAL CARE IF YOU HAVE ANY OF THE FOLLOWING SYMPTOMS: severe headache, pain in your chest/abdomen/back, bleeding from your mouth or rectum, palpitations, shortness of breath, pain with breathing, seizure, confusion, or trouble walking.     Abdominal Pain    Many things can cause abdominal pain. Many times, abdominal pain is not caused by a disease and will improve without treatment. Your health care provider will do a physical exam to determine if there is a dangerous cause of your pain; blood tests and imaging may help determine the cause of your pain. However, in many cases, no cause may be found and you may need further testing as an outpatient. Monitor your abdominal pain for any changes.     SEEK IMMEDIATE MEDICAL CARE IF YOU HAVE ANY OF THE FOLLOWING SYMPTOMS: worsening abdominal pain, uncontrollable vomiting, profuse diarrhea, inability to have bowel movements or pass gas, black or bloody stools, fever accompanying chest pain or back pain, or fainting. These symptoms may represent a serious problem that is an emergency. Do not wait to see if the symptoms will go away. Get medical help right away. Call 911 and do not drive yourself to the hospital.
Heart failure

## 2021-12-25 NOTE — ASU PREOP CHECKLIST - VERIFY SURGICAL SITE/SIDE WITH PATIENT
58 yo male pt PMH DM, HTN who presents to ED w flu like/covid sx; cough, fevers, chills, body aches. Has only taken "contact c?" for symptoms; no Tylenol. Denies n/v/d, abd pain. Pfizer x2. no SOB, cp. O2 sat stable not requiring supplemental O2. Pt oriented to quarantine if covid +; and to take Tylenol and/or motrin for symptoms of fever and body aches. PAtient understood.
done

## 2022-01-01 NOTE — ED ADULT NURSE NOTE - PRIMARY CARE PROVIDER
Pt connected to continuous pulse oximetry monitor. Pt resting on gurney in no apparent distress. Call light within reach. Family denies further needs at this time.     unk

## 2022-03-10 NOTE — CONSULT NOTE ADULT - CONSULT REASON
evaluate patient for fistulogram
ESRD, HD
34 years old female with h/o epilepsy, Factor V Leiden mutation with h/o DVT/PE, IBS, hypothyroidism, endometriosis, bipolar disorder present to ED with complain of left flank pain and hematuria. Per note, patient was seen in multiple ED with no cause found. Patient was anxious, agitated and demanding haldol and ativan while in ED. While in ED, patient had 2 episode of GTC seizure, received IM ativan and Versed in ED. Received multiple dose of haldo and benzo in ED for agitation  Hemodynamically stable, afebrile, sat well at RA. EKG with NSR, VR 86, QTc 459. No leukocytosis, plt 145, K 3.9, Cr 1.02, hCG negative. Valproic acid level 25. UA  with large blood, RBC > 50. CT abd/pelvis with no hydronephrosis or obstructing stone. 4cm left adnexal cyst. Pelvis ultrasound with simple left ovarian cyst measuring 3.8cm, Trace pelvis free fluid. Small anterior uterine fibroid. Refused pelvis exam in ED    SH: Patient denied toxic habits  
Bacteremia

## 2022-04-05 NOTE — H&P ADULT - PROBLEM SELECTOR PLAN 1
Interval History: NAEON, pain well controlled, AF, HDS    Medications:  Continuous Infusions:  Scheduled Meds:   atorvastatin  20 mg Oral QHS    carvediloL  6.25 mg Oral QAM    And    carvediloL  3.125 mg Oral QHS    folic acid  1 mg Oral Daily    insulin detemir U-100  16 Units Subcutaneous QHS    magnesium oxide  800 mg Oral BID    morphine  2 mg Intravenous Once    mycophenolate  500 mg Oral BID    sevelamer carbonate  800 mg Oral TID WM    simethicone  2 tablet Oral TID    tacrolimus  3 mg Oral Daily AM    And    tacrolimus  2 mg Oral Daily PM    timolol maleate 0.5%  1 drop Both Eyes Daily     PRN Meds:sodium chloride, acetaminophen, dextrose 10%, dextrose 10%, glucagon (human recombinant), glucose, glucose, insulin aspart U-100, naloxone, ondansetron, oxyCODONE, senna-docusate 8.6-50 mg, sodium chloride 0.9%, zolpidem     Review of patient's allergies indicates:   Allergen Reactions    Shrimp Hives    Topamax [topiramate] Other (See Comments)     Vision changes    Zoloft [sertraline] Palpitations     Objective:     Vital Signs (Most Recent):  Temp: 98.4 °F (36.9 °C) (04/05/22 0503)  Pulse: 91 (04/05/22 0503)  Resp: 18 (04/05/22 0503)  BP: 136/81 (04/05/22 0503)  SpO2: 99 % (04/05/22 0503) Vital Signs (24h Range):  Temp:  [97.5 °F (36.4 °C)-99.1 °F (37.3 °C)] 98.4 °F (36.9 °C)  Pulse:  [] 91  Resp:  [10-21] 18  SpO2:  [97 %-100 %] 99 %  BP: (136-203)/() 136/81     Weight: 76.5 kg (168 lb 10.4 oz)  Body mass index is 27.22 kg/m².    Intake/Output - Last 3 Shifts         04/03 0700  04/04 0659 04/04 0700  04/05 0659 04/05 0700  04/06 0659    P.O.       I.V. (mL/kg)  400 (5.2)     Blood       Other 200      Total Intake(mL/kg) 200 (2.6) 400 (5.2)     Urine (mL/kg/hr)       Other 0      Total Output 0      Net +200 +400            Urine Occurrence  1 x             Physical Exam  Vitals and nursing note reviewed.   Constitutional:       General: She is not in acute distress.     Appearance: She is  well-developed. She is not diaphoretic.   HENT:      Head: Normocephalic and atraumatic.   Eyes:      Pupils: Pupils are equal, round, and reactive to light.   Cardiovascular:      Rate and Rhythm: Normal rate and regular rhythm.   Pulmonary:      Effort: Pulmonary effort is normal. No respiratory distress.   Abdominal:      General: There is no distension.      Palpations: Abdomen is soft.      Tenderness: There is abdominal tenderness. There is no guarding.      Comments: PD cath in place, Incisions CDI, abd soft, appropriately tender   Musculoskeletal:         General: Normal range of motion.      Cervical back: Normal range of motion and neck supple.   Skin:     General: Skin is warm and dry.   Neurological:      Mental Status: She is alert and oriented to person, place, and time.   Psychiatric:         Behavior: Behavior normal.         Thought Content: Thought content normal.         Judgment: Judgment normal.       Significant Labs:  CBC:   Recent Labs   Lab 04/04/22  0815   WBC 4.39   RBC 2.83*   HGB 7.5*   HCT 24.8*      MCV 88   MCH 26.5*   MCHC 30.2*     CMP:   Recent Labs   Lab 04/03/22  0507 04/04/22  0815   * 103   CALCIUM 8.9 8.8   ALBUMIN 2.8*  --    PROT 7.4  --     141   K 4.0 4.5   CO2 21* 22*    109   BUN 37* 39*   CREATININE 4.3* 4.3*   ALKPHOS 61  --    ALT 5*  --    AST 11  --    BILITOT 0.5  --        Significant Diagnostics:  I have reviewed all pertinent imaging results/findings within the past 24 hours.   - p/w dry gangrene to plantar aspect of right toe . no discharge, or malodor. Decreased PT/ DP pulses , pulses dopplerable with decreased sensation  - wound without evidence of gangrene or ascending cellulitis, noncrepitus  - previous LATOYA/PVR c/w decreased flow to metatarsal  - No Leukocytosis   - Afebrile   - lactate 0.7  - f/u ESR , CRP  - f/u Blood Cx (Specimen received)  - f/u PT.  - f/u Podiatry consulted  ** Vascular consulted Dr. Dennis - p/w dry gangrene to plantar aspect of right toe . no discharge, or malodor. Decreased PT/ DP pulses , pulses dopplerable with decreased sensation  - wound without evidence of gangrene or ascending cellulitis, noncrepitus  - previous LATOYA/PVR c/w decreased flow to metatarsal  - No Leukocytosis   - Afebrile   - lactate 0.7  - will start on Zosyn   - f/u ESR , CRP  - f/u Blood Cx (Specimen received)  - f/u PT.  - f/u Podiatry consulted  ** Vascular consulted Dr. Dennis  ** ID consulted Dr. Barbour

## 2022-06-14 NOTE — PATIENT PROFILE ADULT. - FALL HARM RISK
46 year old female w/ PMH of DM, obesity, sleep apnea, s/p lap sleeve gastrectomy 4 days ago presents after fall.  Mechanical trip and fall, landed on stomach, no head trauma or LOC, reports neck, back, and abdominal pain.  Associated N/V that resolved. No blood thinners. other/Pt. gets ALFORD/SOB

## 2022-08-05 NOTE — PATIENT PROFILE ADULT. - NS PRO ABUSE SCREEN AFRAID ANYONE YN
[facility] [address] [place] [address]/[facility]  Medication Management  ANTICOAGULATION    Referring Provider: Dr. Alondra Ventura INR: 2-3    TODAY'S INR: 1.8    WARFARIN Dosage: Warfarin 2 mg po every MWF; 4 mg po all other days    Patient will take warfarin 6 mg po today for 1 dose    INR (no units)   Date Value   2022 1.8   2022 4.3   2022 4.7   2022 2.5   2022 2.6   2022 2.8   2022 2.4       Medication changes:  Increase furosemide to 80 mg po every other day  AMIODARONE increased to 200 mg BID on 22 - which will increase INR    Notes:    Fingerstick INR drawn per clinic protocol. Patient states no visible blood in urine and no black tarry stool. Denies any missed doses of warfarin. No change in other maintenance medications or in diet. Will recheck INR in 1 week. Patient acknowledges working in consult agreement with pharmacist as referred by his/her physician. For Pharmacy Admin Tracking Only    Intervention Detail: Adherence Monitorin and Dose Adjustment: 1, reason: Therapy Optimization  Total # of Interventions Recommended: 2  Total # of Interventions Accepted: 2  Time Spent (min): NASH Smart.  Arlin Griffin unable to assess

## 2022-08-23 NOTE — H&P ADULT - NSHPPOACENTRALVENOUSCATHETER_GEN_ALL_CORE
no Hx of severe HTN at home.  -BPs currently stable  -c/w metoprolol and  lasix   - Holding spirololactone, and losartan

## 2022-11-09 NOTE — PROGRESS NOTE ADULT - PROBLEM SELECTOR PLAN 2
hd AS SCHEDULED Sski Pregnancy And Lactation Text: This medication is Pregnancy Category D and isn't considered safe during pregnancy. It is excreted in breast milk.

## 2023-04-01 NOTE — PROGRESS NOTE ADULT - PROBLEM SELECTOR PLAN 3
- Prior History of diastolic HF with with AICD (in 2005-[employer]- last replaced 2018)  -not in exacerbation  - fluid restrictions 1200 mL , DASH Diet Yes

## 2023-07-08 NOTE — PATIENT PROFILE ADULT. - HEALTHCARE INFORMATION NEEDED, PROFILE
Doyle slip faxed to [place], [place]  07/07/23 0810 Dr Jeremy Wyman was notified about the pts blood pressure being high.       Sabrina Polk RN  07/07/23 0366 Patient states his wife contacted [facility] & he's aware that his wife spoke with Pooja Block RN. Patient provides verbal permission for writer to speak with Abdirashid Velásquez regarding his plan of care.       AUBREE Torres  07/07/23 3823 Pt accepted at [facility]. Accepting Doctor is PG[facility]E Hyperpia. Bed # 3 [facility] will fax pink slip to [phone] once completed. EMTALA form Section 1 completed. [facility] transport request faxed. Transportation arranged by [employer].  ETA 0300    RN to RN # is 1 [facility] [place]  07/07/23 [address] [place] [address], [facility] [place]  07/07/23 [address], [facility] [place]  07/07/23 [address], [facility] [place]  07/08/23 0006 Pt transported on stretcher, awake alert and oriented.      Dalia Marlow RN  07/08/23 9811 Writer contacted [employer], waiting on determination from [employer].        AUBREE Alvares  07/07/23 3325 Writer contacted [employer] for update on [employer]. Access stated still waiting for determination, will return writer's call with updates as able.       AUBREE Mata  07/07/23 8593 Writer received call from [employer] who stated Dr Wynonia Schirmer wants patient admitted to [facility]. Writer explained process of using [employer] to apply to [employer] stated Cesar Nice will relay physician request for [facility] admission. Hien Shlomo stated she's been in contact with patient's wife all morning & expects she'll be updated on patient's destination by wife. Writer waiting for medical clearance & lab results to contact [facility].       AUBREE Gayle  07/07/23 9022 Xray at bedside      Katie Worthy RN  07/07/23 0598 the ED to discuss patient's care. Without a release of information & as patient's a protected/\"locked\" patient, no information was given. Writer contacted [employer], confirmed patient's seen at their [place] location & left a HIPAA compliant voicemail for [employer].  Once patient's medically cleared, writer will contact [employer] to begin application process for [address]  07/07/23 8599 none

## 2023-07-13 NOTE — ED PROVIDER NOTE - CHIEF COMPLAINT
The patient is a 79y Female complaining of Previous Accession (Optional): [id number] Previous Accession (Optional): [id number]

## 2023-08-15 NOTE — SWALLOW BEDSIDE ASSESSMENT ADULT - SWALLOW EVAL: DIAGNOSIS
Pt p/w adequate labial seal, timely & adequate AP transport, timely pharyngeal swallow trigger, however reduced hyolaryngeal elevation. No overt s&s of aspiration noted on PO trials. Birth Control Pills Counseling: Birth Control Pill Counseling: I discussed with the patient the potential side effects of OCPs including but not limited to increased risk of stroke, heart attack, thrombophlebitis, deep venous thrombosis, hepatic adenomas, breast changes, GI upset, headaches, and depression.  The patient verbalized understanding of the proper use and possible adverse effects of OCPs. All of the patient's questions and concerns were addressed.

## 2023-10-16 NOTE — ED ADULT NURSE NOTE - CHIEF COMPLAINT QUOTE
as per the pt " I have the sob for 2 days " pt has h/o CHF Surgeon Performing Repair (Optional): Lawanda

## 2023-10-25 NOTE — ED ADULT NURSE NOTE - NS ED NURSE LEVEL OF CONSCIOUSNESS AFFECT
Calm Colchicine Counseling:  Patient counseled regarding adverse effects including but not limited to stomach upset (nausea, vomiting, stomach pain, or diarrhea).  Patient instructed to limit alcohol consumption while taking this medication.  Colchicine may reduce blood counts especially with prolonged use.  The patient understands that monitoring of kidney function and blood counts may be required, especially at baseline. The patient verbalized understanding of the proper use and possible adverse effects of colchicine.  All of the patient's questions and concerns were addressed.

## 2024-01-01 NOTE — ED PROVIDER NOTE - MUSCULOSKELETAL, MLM
Spine appears normal, range of motion is not limited, no muscle or joint tenderness Attending and PA/NP shared services statement (NON-critical care):

## 2024-02-08 NOTE — DIETITIAN INITIAL EVALUATION ADULT. - DIET TYPE
Yes... regular/caffeine free/renal replacement pts:no protein restr,no conc K & phos, low sodium/consistent carbohydrate (no snacks)/low sodium/1500ml/DASH/TLC (sodium and cholesterol restricted diet)

## 2024-02-24 NOTE — H&P ADULT - DOES THIS PATIENT HAVE A HISTORY OF OR HAS BEEN DX WITH HEART FAILURE?
Pt discharged with written instructions.  Pt verbalizes understanding of medications that were prescribed and when he should come back to the ED.  No further questions at this time, pt was able to get up and dressed per self and wife will pick pt up at the front enterance   yes

## 2024-05-14 NOTE — ED ADULT NURSE NOTE - CAS TRG GEN SKIN COLOR
Patient called and would like a return call from clinical staff to discuss the medication.    [phone]     Normal for race

## 2024-07-12 NOTE — DISCHARGE NOTE ADULT - NURSING SECTION COMPLETE
-Healthy diet, good quality and duration of sleep, healthy water intake, regular exercise and healthy weight discussed  -Immunizations:   Flu: Recommended annually  Shingrix: N/.A   Tdap: Recommended  PCV 20: Recommended d/t DM2  -Following with dentistry and optometry regularly  -Colon cancer screening: N.A  -No concerns for anxiety/depression  -Tobacco never, EtOH rare, No illicit/recreational drugs  -Feeling safe at home   
Following with Dr. Reagan for DM  Most recent A1c from 1/2024 well controlled at  6.2%    Continue current medications and regular follow up with endocrinology  
Normotensive in office at 116/72  Home blood pressures much the same    Continue lisinopril 40mg daily  
0 = independent
Patient/Caregiver provided printed discharge information.

## 2024-08-24 NOTE — PATIENT PROFILE ADULT - NSPROMEDSBROUGHTTOHOSP_GEN_A_NUR
-symptoms of suprapubic pain, urinary F/U/D. UA showed nitrite positive UTI. Received empiric dose of vancomycin and zosyn in ED for sepsis, transitioned to CTX, then transitioned to Cefepime after persistent leukocytosis. Urine Cx with ESBL Klebsiella.    - Meropenem transitioned to Ertapenem for 7 d duration, with OPAT note  - Midline team consulted  - CM to provide assistance and CM at [facility] to provide contact for patient to do follow up.  -  ordered for IV Abx assistance   yes

## 2024-10-02 NOTE — PROGRESS NOTE ADULT - NS NEC GEN PE MLT EXAM PC
JOEY
detailed exam

## 2024-10-18 NOTE — ED PROVIDER NOTE - NEUROLOGICAL, MLM
Mouth: Mucous membranes are moist.      Pharynx: Oropharynx is clear. No oropharyngeal exudate or posterior oropharyngeal erythema.   Cardiovascular:      Rate and Rhythm: Normal rate and regular rhythm.   Pulmonary:      Effort: Pulmonary effort is normal. No respiratory distress.      Breath sounds: Normal breath sounds. No wheezing or rales.   Abdominal:      General: Bowel sounds are normal.      Palpations: Abdomen is soft.      Tenderness: There is no abdominal tenderness.   Musculoskeletal:      Right lower leg: No edema.      Left lower leg: No edema.   Skin:     General: Skin is warm and dry.   Neurological:      Mental Status: She is alert and oriented to person, place, and time.   Psychiatric:         Mood and Affect: Mood normal.           Assessment/Plan:    Silvia was seen today for other.    Diagnoses and all orders for this visit:    Acute non-recurrent frontal sinusitis  -     amoxicillin-clavulanate (AUGMENTIN) 875-125 MG per tablet; Take 1 tablet by mouth 2 times daily for 10 days  -     fluticasone (FLONASE) 50 MCG/ACT nasal spray; 2 sprays by Each Nostril route daily  -     benzonatate (TESSALON) 100 MG capsule; Take 1-2 capsules by mouth 3 times daily as needed for Cough  Rapid covid, flu and RSV negative in the office today. Take medications as prescribed. Continue Tylenol or Advil as needed. Call if symptoms worsen or fail to improve.     Acute cough  -     AMB POC COVID-19 COV  -     AMB POC RAPID INFLUENZA TEST  -     AMB POC RSV          Follow-up and Dispositions    Return if symptoms worsen or fail to improve.         On this date, 10/18/24, I have spent 25 minutes reviewing previous notes, test results and face to face with the patient discussing the diagnosis and importance of compliance with the treatment plan as well as documenting on the day of the visit.     An electronic signature was used to authenticate this note.  -DIVYA Cason  
Alert and oriented, no focal deficits, no motor or sensory deficits, normal speech, no facial droop; Pt is able to walk independently with minimal difficulty

## 2024-11-15 NOTE — PROGRESS NOTE ADULT - ASSESSMENT
79 year old female from home ambulates with cane at baseline with a significant PMHx of HTN, DM ,ESRD, chronic diastolic heart failure s/p CardioMEMS implantation, moderate MS, ESRD on dialysis (Tuesday, Thursday, Saturday), COPD on home O2, asthma , HLD  and PSHx of Left lumpectomy/radiation for breast CA in 2005, and JACLYN uterine cancer presents to the ED for worsening swelling and darkening of right toe. Patient has right grey toe cellulitis. Patient's daughter relates watery black stool and decreased appetite x1 week.  Recently was admitted a month ago for R foot cellulitis with hallux wound, treated with IV zosyn and doxy, podiatry consulted who recommended betadine dressing, per note, pt declined. LATOYA/PVR was done, in which showed LATOYA of 1.29 b/l, and decreased flow on bilateral metatarsal. Pt reports R great toe black wound starting to get worse after wearing tight shoes Patient also missed podiatry follow up post discharge from last admission. pt endorses nausea and vomiting. Patient denies any denies claudication pain, fever, chills, cough, chest pain, shortness of breath or any other complaints.    In ED :   Xray right 1st toe :  No acute fracture-subluxation demonstrated. Stable mild erosion at first interphalangeal joint - gouty arthropathy not excluded. Interval increase in soft tissue swelling, as above.   CXR : Stable chest. Chronic fibrosis vs. pulmonary vascular congestion - no significant interval change compared with 11/26/19 exam.  EKG : NSR ,  RBB  Lactate 0.7     Pt is DNR/DNI  MOLST form filled in chart pending attending signature    Pt is admitted for management of Toe gangrene
ESRD dialysis today, 3 K BATH ATTEMPT 1.5 KG fluid removal in dialysis today.  Hypotensive episodes in dialysis, on Midodrine  Diastolic HF
ESRD due to diabetes ands hypertension  Episode of hypotension in dialysis with fluid removal, continue Midodrine 5 mg TID.  Drop in BP in am , to follow, ? hypovolemia due to diarrhea.  Follow po intake and diarrhea    Diarrhea 2 episodes in the last 12 hours, as above  Issues discussed
PLAN:  HPI:  79 year old female from home ambulates with cane at baseline with a significant PMHx of HTN, DM ,ESRD, chronic diastolic heart failure s/p CardioMEMS implantation, moderate MS, ESRD on dialysis (Tuesday, Thursday, Saturday), COPD on home O2, asthma , HLD  and PSHx of Left lumpectomy/radiation for breast CA in 2005, and JACLYN uterine cancer presents to the ED for worsening swelling and darkening of right toe. Patient has right grey toe cellulitis. Patient's daughter relates watery black stool and decreased appetite x1 week.  Recently was admitted a month ago for R foot cellulitis with hallux wound, treated with IV zosyn and doxy, podiatry consulted who recommended betadine dressing, per note, pt declined. LATOYA/PVR was done, in which showed LATOYA of 1.29 b/l, and decreased flow on bilateral metatarsal. Pt reports R great toe black wound starting to get worse after wearing tight shoes Patient also missed podiatry follow up post discharge from last admission. pt endorses nausea and vomiting. Patient denies any denies claudication pain, fever, chills, cough, chest pain, shortness of breath or any other complaints.    In ED :   Xray right 1st toe :  No acute fracture-subluxation demonstrated. Stable mild erosion at first interphalangeal joint - gouty arthropathy not excluded. Interval increase in soft tissue swelling, as above.   CXR : Stable chest. Chronic fibrosis vs. pulmonary vascular congestion - no significant interval change compared with 11/26/19 exam.  EKG : NSR ,  RBB  Lactate 0.7     Pt is DNR/DNI    PAD - cont present abx, ID / vascular f/u     esrd - hd as scheduled    S/p RRT yesterday - pt appears euvolemic, hemodynamically  stable at present , acrds f/u     Dm2- iss    dvt prophylaxis    discussed management plan with pts family     -
ESRD  Episodes of hypotension during dialysis  Will add midodrine 10 mg on dialysis days to be taken 2 hours before dialysis
full weight-bearing

## 2025-03-06 NOTE — DIETITIAN INITIAL EVALUATION ADULT. - PERTINENT MEDS FT
MEDICATIONS  (STANDING):  albuterol/ipratropium for Nebulization 3 milliLiter(s) Nebulizer every 6 hours  atorvastatin 10 milliGRAM(s) Oral at bedtime  budesonide  80 MICROgram(s)/formoterol 4.5 MICROgram(s) Inhaler 2 Puff(s) Inhalation two times a day  chlorhexidine 4% Liquid 1 Application(s) Topical daily  dextrose 5%. 1000 milliLiter(s) (50 mL/Hr) IV Continuous <Continuous>  dextrose 50% Injectable 12.5 Gram(s) IV Push once  dextrose 50% Injectable 25 Gram(s) IV Push once  dextrose 50% Injectable 25 Gram(s) IV Push once  epoetin randy Injectable 26763 Unit(s) IV Push <User Schedule>  ferrous    sulfate 325 milliGRAM(s) Oral daily  heparin  Injectable 5000 Unit(s) SubCutaneous every 12 hours  insulin glargine Injectable (LANTUS) 15 Unit(s) SubCutaneous at bedtime  insulin lispro (HumaLOG) corrective regimen sliding scale   SubCutaneous three times a day before meals  insulin lispro Injectable (HumaLOG) 3 Unit(s) SubCutaneous three times a day before meals  midodrine. 10 milliGRAM(s) Oral every 8 hours  pantoprazole    Tablet 40 milliGRAM(s) Oral before breakfast  piperacillin/tazobactam IVPB.. 3.375 Gram(s) IV Intermittent every 12 hours  sevelamer carbonate 1600 milliGRAM(s) Oral three times a day with meals  traZODone 50 milliGRAM(s) Oral at bedtime    MEDICATIONS  (PRN):  acetaminophen   Tablet .. 650 milliGRAM(s) Oral every 6 hours PRN Mild Pain (1 - 3), Moderate Pain (4 - 6)  dextrose 40% Gel 15 Gram(s) Oral once PRN Blood Glucose LESS THAN 70 milliGRAM(s)/deciliter  glucagon  Injectable 1 milliGRAM(s) IntraMuscular once PRN Glucose LESS THAN 70 milligrams/deciliter
Weakness

## 2025-05-13 NOTE — PATIENT PROFILE ADULT - VISION (WITH CORRECTIVE LENSES IF THE PATIENT USUALLY WEARS THEM):
Normal vision: sees adequately in most situations; can see medication labels, newsprint

## 2025-06-17 NOTE — H&P ADULT - CONSTITUTIONAL DETAILS
Patient: Wan Dunlap    Procedure Summary       Date: 06/17/25 Room / Location: [facility] / [facility]    Anesthesia Start: 1231 Anesthesia Stop:     Procedure: ARTHROSCOPY ROTATOR CUFF REPAIR, EXTENSIVE DEBRIDEMENT, BICEPS TENODESIS (Left: Shoulder) Diagnosis:       Complete rotator cuff tear or rupture of left shoulder, not specified as traumatic      Bicipital tendinitis, left shoulder      (Complete rotator cuff tear or rupture of left shoulder, not specified as traumatic [M75.122])      (Bicipital tendinitis, left shoulder [M75.22])    Surgeons: Thomas Samson MD Responsible Provider: Dann Mora MD    Anesthesia Type: general ASA Status: 2            Anesthesia Type: general    Vitals Value Taken Time   /78 06/17/25 14:41   Temp 36 06/17/25 14:41   Pulse 60 06/17/25 14:40   Resp 19 06/17/25 14:40   SpO2 99 % 06/17/25 14:40   Vitals shown include unfiled device data.    Anesthesia Post Evaluation    Patient location during evaluation: PACU  Patient participation: waiting for patient participation  Level of consciousness: responsive to verbal stimuli  Pain score: 0  Pain management: adequate  Airway patency: patent  Cardiovascular status: hemodynamically stable  Respiratory status: spontaneous ventilation, face mask and nonlabored ventilation  Hydration status: euvolemic  Postoperative Nausea and Vomiting: none        There were no known notable events for this encounter.    
no distress/well-groomed

## 2025-07-16 NOTE — PROGRESS NOTE ADULT - PROBLEM SELECTOR PLAN 3
s/p vanco ivpb x1, redose after HD today, monitor vanco level.   consider covering VRE  f/u rpt blood cxs- also growing Enterococcus 1/2 bottles. f/u sensitivity  rpt blood cxs today  f/u w/ID eval, agree w/TTE/ JAYSON  likely source PC. remove PC. if any issues w/AVF, will need a shiley for next hd. trying to reach primary team/ADS NP
s/p vanco ivpb x1, redose after HD today, monitor vanco level.   consider covering VRE  f/u rpt blood cxs- also growing Enterococcus 1/2 bottles. f/u sensitivity  rpt blood cxs today  f/u w/ID eval, agree w/TTE/ JAYSON  likely source PC. remove PC. if any issues w/AVF, will need a shiley for next hd. trying to reach primary team/ADS NP
f/u rpt blood cxs 7/11 NTD. +Enterococuus PC tip   f/u w/ID, on Ampicillin. ?abx choice after d/c
HD per nephrology  HD today via fistula, apply EMLA prior as painful cannulation for patient
Normal schedule is T/Th/Sat  - renal following  - HD per nephrology, getting HD today  - hold BB pre HD  - apply EMLA prior as painful cannulation for patient  - may also need angio of fistula, will need to coordinate w/ IR
Normal schedule is T/Th/Sat  - renal following  - HD per nephrology, next on Thursday   - apply EMLA prior as painful cannulation for patient
Normal schedule is T/Th/Sat  - renal following  - HD per nephrology, next on Thursday/tomorrow   - hold BB tomorrow am   - apply EMLA prior as painful cannulation for patient  - also needs angioplasty of AVF, could not be coordinated for today
s/p vanco ivpb x1, redose after HD today, monitor vanco level.   consider covering VRE  f/u rpt blood cxs- also growing Enterococcus 1/2 bottles.  f/u w/ID eval,  if any issues w/AVF, will need a shiley for next hd.
[Negative] : Heme/Lymph
s/p vanco ivpb x1, redose after HD today, monitor vanco level.   consider covering VRE  f/u rpt blood cxs- also growing Enterococcus 1/2 bottles.  f/u w/ID eval,  if any issues w/AVF, will need a shiley for next hd.
Normal schedule is T/Th/Sat  - renal following  - HD per nephrology, s/p HD on 7/12 via AVF  - hold BB pre HD  - apply EMLA prior as painful cannulation for patient  - angio of fistula rec by renal, fistula working with mild slow flow, IR rec vascular eval, vascular rec OP f/u with them as OP, vascular US of fistuala w/o stenosis or thrombosis, OP vasc f/u schedule for next friday  -due for OP HD tomorrow